# Patient Record
Sex: FEMALE | Race: BLACK OR AFRICAN AMERICAN | NOT HISPANIC OR LATINO | Employment: OTHER | URBAN - METROPOLITAN AREA
[De-identification: names, ages, dates, MRNs, and addresses within clinical notes are randomized per-mention and may not be internally consistent; named-entity substitution may affect disease eponyms.]

---

## 2017-01-29 ENCOUNTER — HOSPITAL ENCOUNTER (OUTPATIENT)
Facility: HOSPITAL | Age: 47
Discharge: HOME | End: 2017-01-30
Attending: EMERGENCY MEDICINE | Admitting: FAMILY MEDICINE
Payer: MEDICARE

## 2017-01-29 DIAGNOSIS — R10.84 GENERALIZED ABDOMINAL PAIN: ICD-10-CM

## 2017-01-29 DIAGNOSIS — R53.81 PHYSICAL DECONDITIONING: ICD-10-CM

## 2017-01-29 DIAGNOSIS — E87.1 HYPONATREMIA: Primary | ICD-10-CM

## 2017-01-29 LAB
ALBUMIN SERPL BCP-MCNC: 4.2 G/DL
ALP SERPL-CCNC: 60 U/L
ALT SERPL W/O P-5'-P-CCNC: 12 U/L
AMPHET+METHAMPHET UR QL: NEGATIVE
AMYLASE SERPL-CCNC: 206 U/L
ANION GAP SERPL CALC-SCNC: 11 MMOL/L
ANION GAP SERPL CALC-SCNC: 18 MMOL/L
ANISOCYTOSIS BLD QL SMEAR: SLIGHT
AST SERPL-CCNC: 27 U/L
B-HCG UR QL: NEGATIVE
BARBITURATES UR QL SCN>200 NG/ML: NEGATIVE
BASOPHILS # BLD AUTO: 0.01 K/UL
BASOPHILS # BLD AUTO: 0.02 K/UL
BASOPHILS NFR BLD: 0.3 %
BASOPHILS NFR BLD: 0.4 %
BENZODIAZ UR QL SCN>200 NG/ML: NEGATIVE
BILIRUB SERPL-MCNC: 0.9 MG/DL
BILIRUB UR QL STRIP: NEGATIVE
BUN SERPL-MCNC: 6 MG/DL
BUN SERPL-MCNC: 9 MG/DL
BZE UR QL SCN: NEGATIVE
CALCIUM SERPL-MCNC: 8.9 MG/DL
CALCIUM SERPL-MCNC: 9.7 MG/DL
CANNABINOIDS UR QL SCN: NEGATIVE
CHLORIDE SERPL-SCNC: 100 MMOL/L
CHLORIDE SERPL-SCNC: 85 MMOL/L
CLARITY UR: CLEAR
CO2 SERPL-SCNC: 17 MMOL/L
CO2 SERPL-SCNC: 20 MMOL/L
COLOR UR: YELLOW
CREAT SERPL-MCNC: 0.7 MG/DL
CREAT SERPL-MCNC: 0.8 MG/DL
CREAT UR-MCNC: 18.6 MG/DL
CTP QC/QA: YES
DIFFERENTIAL METHOD: ABNORMAL
DIFFERENTIAL METHOD: ABNORMAL
EOSINOPHIL # BLD AUTO: 0 K/UL
EOSINOPHIL # BLD AUTO: 0 K/UL
EOSINOPHIL NFR BLD: 0.4 %
EOSINOPHIL NFR BLD: 1 %
ERYTHROCYTE [DISTWIDTH] IN BLOOD BY AUTOMATED COUNT: 17.8 %
ERYTHROCYTE [DISTWIDTH] IN BLOOD BY AUTOMATED COUNT: 18 %
EST. GFR  (AFRICAN AMERICAN): >60 ML/MIN/1.73 M^2
EST. GFR  (AFRICAN AMERICAN): >60 ML/MIN/1.73 M^2
EST. GFR  (NON AFRICAN AMERICAN): >60 ML/MIN/1.73 M^2
EST. GFR  (NON AFRICAN AMERICAN): >60 ML/MIN/1.73 M^2
ETHANOL SERPL-MCNC: 154 MG/DL
FOLATE SERPL-MCNC: 15.9 NG/ML
GLUCOSE SERPL-MCNC: 53 MG/DL
GLUCOSE SERPL-MCNC: 59 MG/DL
GLUCOSE UR QL STRIP: NEGATIVE
HCT VFR BLD AUTO: 22.8 %
HCT VFR BLD AUTO: 24.4 %
HGB BLD-MCNC: 7.2 G/DL
HGB BLD-MCNC: 7.4 G/DL
HGB UR QL STRIP: NEGATIVE
HYPOCHROMIA BLD QL SMEAR: ABNORMAL
INR PPP: 0.9
KETONES UR QL STRIP: ABNORMAL
LEUKOCYTE ESTERASE UR QL STRIP: NEGATIVE
LIPASE SERPL-CCNC: 23 U/L
LYMPHOCYTES # BLD AUTO: 1.7 K/UL
LYMPHOCYTES # BLD AUTO: 1.9 K/UL
LYMPHOCYTES NFR BLD: 34.7 %
LYMPHOCYTES NFR BLD: 49.3 %
MAGNESIUM SERPL-MCNC: 2.1 MG/DL
MCH RBC QN AUTO: 20.4 PG
MCH RBC QN AUTO: 20.8 PG
MCHC RBC AUTO-ENTMCNC: 30.3 %
MCHC RBC AUTO-ENTMCNC: 31.6 %
MCV RBC AUTO: 66 FL
MCV RBC AUTO: 67 FL
METHADONE UR QL SCN>300 NG/ML: NEGATIVE
MONOCYTES # BLD AUTO: 0.4 K/UL
MONOCYTES # BLD AUTO: 0.5 K/UL
MONOCYTES NFR BLD: 11.3 %
MONOCYTES NFR BLD: 9.3 %
NEUTROPHILS # BLD AUTO: 1.5 K/UL
NEUTROPHILS # BLD AUTO: 2.7 K/UL
NEUTROPHILS NFR BLD: 38.1 %
NEUTROPHILS NFR BLD: 55.2 %
NITRITE UR QL STRIP: NEGATIVE
OB PNL STL: NEGATIVE
OPIATES UR QL SCN: NEGATIVE
PCP UR QL SCN>25 NG/ML: NEGATIVE
PH UR STRIP: 6 [PH] (ref 5–8)
PLATELET # BLD AUTO: 284 K/UL
PLATELET # BLD AUTO: 299 K/UL
PLATELET BLD QL SMEAR: ABNORMAL
PMV BLD AUTO: 8.7 FL
PMV BLD AUTO: 8.7 FL
POIKILOCYTOSIS BLD QL SMEAR: SLIGHT
POTASSIUM SERPL-SCNC: 4.2 MMOL/L
POTASSIUM SERPL-SCNC: 4.4 MMOL/L
PROT SERPL-MCNC: 7.5 G/DL
PROT UR QL STRIP: NEGATIVE
PROTHROMBIN TIME: 10.1 SEC
RBC # BLD AUTO: 3.46 M/UL
RBC # BLD AUTO: 3.62 M/UL
SODIUM SERPL-SCNC: 120 MMOL/L
SODIUM SERPL-SCNC: 131 MMOL/L
SP GR UR STRIP: <=1.005 (ref 1–1.03)
STOMATOCYTES BLD QL SMEAR: PRESENT
TARGETS BLD QL SMEAR: ABNORMAL
TOXICOLOGY INFORMATION: NORMAL
URN SPEC COLLECT METH UR: ABNORMAL
UROBILINOGEN UR STRIP-ACNC: NEGATIVE EU/DL
VIT B12 SERPL-MCNC: 574 PG/ML
WBC # BLD AUTO: 3.81 K/UL
WBC # BLD AUTO: 4.96 K/UL

## 2017-01-29 PROCEDURE — 63600175 PHARM REV CODE 636 W HCPCS: Performed by: FAMILY MEDICINE

## 2017-01-29 PROCEDURE — 25000003 PHARM REV CODE 250: Performed by: EMERGENCY MEDICINE

## 2017-01-29 PROCEDURE — 99285 EMERGENCY DEPT VISIT HI MDM: CPT | Mod: 25

## 2017-01-29 PROCEDURE — 80320 DRUG SCREEN QUANTALCOHOLS: CPT

## 2017-01-29 PROCEDURE — G0378 HOSPITAL OBSERVATION PER HR: HCPCS

## 2017-01-29 PROCEDURE — 96374 THER/PROPH/DIAG INJ IV PUSH: CPT

## 2017-01-29 PROCEDURE — 82272 OCCULT BLD FECES 1-3 TESTS: CPT

## 2017-01-29 PROCEDURE — 25000003 PHARM REV CODE 250: Performed by: FAMILY MEDICINE

## 2017-01-29 PROCEDURE — 80048 BASIC METABOLIC PNL TOTAL CA: CPT

## 2017-01-29 PROCEDURE — 82746 ASSAY OF FOLIC ACID SERUM: CPT

## 2017-01-29 PROCEDURE — 85610 PROTHROMBIN TIME: CPT

## 2017-01-29 PROCEDURE — 80053 COMPREHEN METABOLIC PANEL: CPT

## 2017-01-29 PROCEDURE — 85025 COMPLETE CBC W/AUTO DIFF WBC: CPT | Mod: 91

## 2017-01-29 PROCEDURE — 93005 ELECTROCARDIOGRAM TRACING: CPT

## 2017-01-29 PROCEDURE — 96376 TX/PRO/DX INJ SAME DRUG ADON: CPT

## 2017-01-29 PROCEDURE — 83735 ASSAY OF MAGNESIUM: CPT

## 2017-01-29 PROCEDURE — 63600175 PHARM REV CODE 636 W HCPCS: Performed by: EMERGENCY MEDICINE

## 2017-01-29 PROCEDURE — 36415 COLL VENOUS BLD VENIPUNCTURE: CPT

## 2017-01-29 PROCEDURE — 96361 HYDRATE IV INFUSION ADD-ON: CPT

## 2017-01-29 PROCEDURE — 82570 ASSAY OF URINE CREATININE: CPT

## 2017-01-29 PROCEDURE — 82150 ASSAY OF AMYLASE: CPT

## 2017-01-29 PROCEDURE — 82607 VITAMIN B-12: CPT

## 2017-01-29 PROCEDURE — 83690 ASSAY OF LIPASE: CPT

## 2017-01-29 PROCEDURE — 81003 URINALYSIS AUTO W/O SCOPE: CPT

## 2017-01-29 RX ORDER — DICYCLOMINE HYDROCHLORIDE 20 MG/1
20 TABLET ORAL 3 TIMES DAILY PRN
Status: DISCONTINUED | OUTPATIENT
Start: 2017-01-29 | End: 2017-01-30 | Stop reason: HOSPADM

## 2017-01-29 RX ORDER — SODIUM CHLORIDE 9 MG/ML
INJECTION, SOLUTION INTRAVENOUS CONTINUOUS
Status: DISCONTINUED | OUTPATIENT
Start: 2017-01-29 | End: 2017-01-30 | Stop reason: HOSPADM

## 2017-01-29 RX ORDER — MONTELUKAST SODIUM 10 MG/1
10 TABLET ORAL NIGHTLY
Status: DISCONTINUED | OUTPATIENT
Start: 2017-01-29 | End: 2017-01-30 | Stop reason: HOSPADM

## 2017-01-29 RX ORDER — LORAZEPAM 2 MG/ML
1 INJECTION INTRAMUSCULAR
Status: DISCONTINUED | OUTPATIENT
Start: 2017-01-29 | End: 2017-01-30

## 2017-01-29 RX ORDER — HYDROMORPHONE HYDROCHLORIDE 1 MG/ML
0.5 INJECTION, SOLUTION INTRAMUSCULAR; INTRAVENOUS; SUBCUTANEOUS
Status: COMPLETED | OUTPATIENT
Start: 2017-01-29 | End: 2017-01-29

## 2017-01-29 RX ORDER — ASPIRIN 325 MG/1
100 TABLET, FILM COATED ORAL 2 TIMES DAILY
Status: DISCONTINUED | OUTPATIENT
Start: 2017-01-29 | End: 2017-01-30 | Stop reason: HOSPADM

## 2017-01-29 RX ORDER — VENLAFAXINE HYDROCHLORIDE 75 MG/1
75 CAPSULE, EXTENDED RELEASE ORAL 2 TIMES DAILY
Status: DISCONTINUED | OUTPATIENT
Start: 2017-01-29 | End: 2017-01-29

## 2017-01-29 RX ORDER — HYDROMORPHONE HYDROCHLORIDE 1 MG/ML
0.5 INJECTION, SOLUTION INTRAMUSCULAR; INTRAVENOUS; SUBCUTANEOUS ONCE
Status: COMPLETED | OUTPATIENT
Start: 2017-01-29 | End: 2017-01-29

## 2017-01-29 RX ORDER — ONDANSETRON 4 MG/1
4 TABLET, ORALLY DISINTEGRATING ORAL EVERY 6 HOURS PRN
Status: DISCONTINUED | OUTPATIENT
Start: 2017-01-29 | End: 2017-01-30 | Stop reason: HOSPADM

## 2017-01-29 RX ORDER — FUROSEMIDE 40 MG/1
40 TABLET ORAL DAILY
Status: CANCELLED | OUTPATIENT
Start: 2017-01-29

## 2017-01-29 RX ORDER — ALPRAZOLAM 1 MG/1
1 TABLET ORAL DAILY PRN
Status: DISCONTINUED | OUTPATIENT
Start: 2017-01-29 | End: 2017-01-30

## 2017-01-29 RX ORDER — SODIUM CHLORIDE 9 MG/ML
1000 INJECTION, SOLUTION INTRAVENOUS
Status: COMPLETED | OUTPATIENT
Start: 2017-01-29 | End: 2017-01-29

## 2017-01-29 RX ORDER — CLONIDINE HYDROCHLORIDE 0.2 MG/1
0.2 TABLET ORAL NIGHTLY
Status: DISCONTINUED | OUTPATIENT
Start: 2017-01-29 | End: 2017-01-30 | Stop reason: HOSPADM

## 2017-01-29 RX ORDER — PANTOPRAZOLE SODIUM 40 MG/1
40 TABLET, DELAYED RELEASE ORAL DAILY
Status: DISCONTINUED | OUTPATIENT
Start: 2017-01-29 | End: 2017-01-30 | Stop reason: HOSPADM

## 2017-01-29 RX ORDER — LISINOPRIL 5 MG/1
10 TABLET ORAL EVERY MORNING
Status: DISCONTINUED | OUTPATIENT
Start: 2017-01-30 | End: 2017-01-30 | Stop reason: HOSPADM

## 2017-01-29 RX ORDER — QUETIAPINE FUMARATE 25 MG/1
100 TABLET, FILM COATED ORAL NIGHTLY
Status: DISCONTINUED | OUTPATIENT
Start: 2017-01-29 | End: 2017-01-30 | Stop reason: HOSPADM

## 2017-01-29 RX ORDER — HYDROCHLOROTHIAZIDE 25 MG/1
25 TABLET ORAL DAILY
Status: CANCELLED | OUTPATIENT
Start: 2017-01-29

## 2017-01-29 RX ORDER — DOCUSATE SODIUM 100 MG/1
100 CAPSULE, LIQUID FILLED ORAL 2 TIMES DAILY
Status: DISCONTINUED | OUTPATIENT
Start: 2017-01-29 | End: 2017-01-30 | Stop reason: HOSPADM

## 2017-01-29 RX ADMIN — SODIUM CHLORIDE 1000 ML: 0.9 INJECTION, SOLUTION INTRAVENOUS at 11:01

## 2017-01-29 RX ADMIN — ONDANSETRON 4 MG: 4 TABLET, ORALLY DISINTEGRATING ORAL at 06:01

## 2017-01-29 RX ADMIN — MONTELUKAST SODIUM 10 MG: 10 TABLET, FILM COATED ORAL at 08:01

## 2017-01-29 RX ADMIN — HYDROMORPHONE HYDROCHLORIDE 0.5 MG: 1 INJECTION, SOLUTION INTRAMUSCULAR; INTRAVENOUS; SUBCUTANEOUS at 05:01

## 2017-01-29 RX ADMIN — Medication 1 TABLET: at 05:01

## 2017-01-29 RX ADMIN — PAROXETINE HYDROCHLORIDE 30 MG: 20 TABLET, FILM COATED ORAL at 05:01

## 2017-01-29 RX ADMIN — DOCUSATE SODIUM 100 MG: 100 CAPSULE, LIQUID FILLED ORAL at 08:01

## 2017-01-29 RX ADMIN — CLONIDINE HYDROCHLORIDE 0.2 MG: 0.2 TABLET ORAL at 08:01

## 2017-01-29 RX ADMIN — QUETIAPINE FUMARATE 100 MG: 25 TABLET, FILM COATED ORAL at 08:01

## 2017-01-29 RX ADMIN — FOLIC ACID: 5 INJECTION, SOLUTION INTRAMUSCULAR; INTRAVENOUS; SUBCUTANEOUS at 05:01

## 2017-01-29 RX ADMIN — DICYCLOMINE HYDROCHLORIDE 20 MG: 20 TABLET ORAL at 08:01

## 2017-01-29 RX ADMIN — PANTOPRAZOLE SODIUM 40 MG: 40 TABLET, DELAYED RELEASE ORAL at 05:01

## 2017-01-29 RX ADMIN — HYDROMORPHONE HYDROCHLORIDE 0.5 MG: 1 INJECTION, SOLUTION INTRAMUSCULAR; INTRAVENOUS; SUBCUTANEOUS at 12:01

## 2017-01-29 RX ADMIN — SODIUM CHLORIDE: 0.9 INJECTION, SOLUTION INTRAVENOUS at 05:01

## 2017-01-29 RX ADMIN — HYDROMORPHONE HYDROCHLORIDE 0.5 MG: 1 INJECTION, SOLUTION INTRAMUSCULAR; INTRAVENOUS; SUBCUTANEOUS at 11:01

## 2017-01-29 RX ADMIN — SODIUM CHLORIDE 1000 ML: 0.9 INJECTION, SOLUTION INTRAVENOUS at 12:01

## 2017-01-29 NOTE — PLAN OF CARE
Problem: Patient Care Overview  Goal: Plan of Care Review  Outcome: Ongoing (interventions implemented as appropriate)  Plan of care reviewed with patient. Voices understanding. Fluids and banana bag infusing per orders. Psych consult placed today. Dr. Juarez to see patient. Pain 9/10 and dilaudid administered. Patient states pain is getting better after dilaudid. Will be monitored overnight.

## 2017-01-29 NOTE — ED PROVIDER NOTES
Encounter Date: 2017       History     Chief Complaint   Patient presents with    Abdominal Pain     Pt states stomach cramps started this morning, noted blood in stool, hx of bleeding ulcers, gastric bypass, ETOH abuse     Review of patient's allergies indicates:   Allergen Reactions    Penicillins Hives     HPI Comments: Patient is a 46-year-old female who complains of abdominal pain that began this morning.  She also noted blood in her stool this morning.  Patient has a long history of alcoholism and said she drank 4 drinks last night.  She has nausea but no vomiting.  No fever or chills.  She has no urinary complaints.    The history is provided by the patient.     Past Medical History   Diagnosis Date    Alcohol abuse 10/7/2015    Breast calcification, left      Pt states this has been worked up, she received a biopsy in the past to confirm calcifications were from scar tissue from when she had breast reduction surgery.      Chronic diastolic congestive heart failure     Encounter for blood transfusion     GERD (gastroesophageal reflux disease)     Glaucoma     Hypertension     Hyponatremia 10/2015     Na 109. attributed to polydipsia and alcohol abuse.  suffered a seizure in the ICU .    Insomnia     Malingering 2016    PTSD (post-traumatic stress disorder)      Past Medical History Pertinent Negatives   Diagnosis Date Noted    Diabetes mellitus 2016    Stroke 2016    Transfusion reaction 2016     Past Surgical History   Procedure Laterality Date    Gastric bypass   and     Bile fistula      Breast reduction  2003     section      Cholecystectomy  after      Family History   Problem Relation Age of Onset    Diabetes Mother     Lupus Mother     Diabetes Maternal Grandmother     Crohn's disease Cousin      Social History   Substance Use Topics    Smoking status: Current Every Day Smoker     Packs/day: 1.00     Years: 4.00     Types:  Cigarettes    Smokeless tobacco: Never Used    Alcohol use Yes      Comment: last night- vodka and cranberry juice     Review of Systems   Constitutional: Negative for chills and fever.   Respiratory: Negative for shortness of breath.    Gastrointestinal: Positive for abdominal pain, blood in stool and nausea. Negative for vomiting.   Neurological: Negative for syncope.   All other systems reviewed and are negative.      Physical Exam   Initial Vitals   BP Pulse Resp Temp SpO2   01/29/17 0901 01/29/17 0901 01/29/17 0901 -- 01/29/17 0901   152/94 80 20  100 %     Physical Exam    Nursing note and vitals reviewed.  Constitutional: She appears distressed.   HENT:   Head: Normocephalic and atraumatic.   Eyes: EOM are normal.   Neck: Normal range of motion. Neck supple.   Abdominal: Soft.   Diffuse tenderness with voluntary guarding.  No rebound.  Bowel sounds are normal.  Rectal : No hemorrhoids.  Scant brown stool, no gross blood.   Neurological: She is alert.         ED Course   Critical Care  Date/Time: 1/29/2017 2:31 PM  Performed by: ERICA SPRING  Authorized by: ERICA SPRING   Direct patient critical care time: 12 minutes  Ordering / reviewing critical care time: 12 minutes  Documentation critical care time: 12 minutes  Consulting other physicians critical care time: 3 minutes  Total critical care time (exclusive of procedural time) : 39 minutes        Labs Reviewed   CBC W/ AUTO DIFFERENTIAL - Abnormal; Notable for the following:        Result Value    RBC 3.62 (*)     Hemoglobin 7.4 (*)     Hematocrit 24.4 (*)     MCV 67 (*)     MCH 20.4 (*)     MCHC 30.3 (*)     RDW 17.8 (*)     MPV 8.7 (*)     All other components within normal limits   COMPREHENSIVE METABOLIC PANEL - Abnormal; Notable for the following:     Sodium 120 (*)     Chloride 85 (*)     CO2 17 (*)     Glucose 59 (*)     Anion Gap 18 (*)     All other components within normal limits   URINALYSIS - Abnormal; Notable for the following:      Specific Gravity, UA <=1.005 (*)     Ketones, UA Trace (*)     All other components within normal limits   AMYLASE - Abnormal; Notable for the following:     Amylase 206 (*)     All other components within normal limits   ALCOHOL,MEDICAL (ETHANOL) - Abnormal; Notable for the following:     Alcohol, Medical, Serum 154 (*)     All other components within normal limits   PROTIME-INR   DRUG SCREEN PANEL, URINE EMERGENCY   LIPASE   MAGNESIUM   OCCULT BLOOD X 1, STOOL                               ED Course     Clinical Impression:   The primary encounter diagnosis was Hyponatremia. Diagnoses of Generalized abdominal pain and Alcoholism /alcohol abuse were also pertinent to this visit.          Cruz Cline MD  01/29/17 [id number]

## 2017-01-29 NOTE — IP AVS SNAPSHOT
[facility]  [address]  [place] [address]  Phone: [phone]           Patient Discharge Instructions     Our goal is to set you up for success. This packet includes information on your condition, medications, and your home care. It will help you to care for yourself so you don't get sicker and need to go back to the hospital.     Please ask your nurse if you have any questions.        There are many details to remember when preparing to leave the hospital. Here is what you will need to do:    1. Take your medicine. If you are prescribed medications, review your Medication List in the following pages. You may have new medications to  at the pharmacy and others that you'll need to stop taking. Review the instructions for how and when to take your medications. Talk with your doctor or nurses if you are unsure of what to do.     2. Go to your follow-up appointments. Specific follow-up information is listed in the following pages. Your may be contacted by a transition nurse or clinical provider about future appointments. Be sure we have all of the phone numbers to reach you, if needed. Please contact your provider's office if you are unable to make an appointment.     3. Watch for warning signs. Your doctor or nurse will give you detailed warning signs to watch for and when to call for assistance. These instructions may also include educational information about your condition. If you experience any of warning signs to your health, call your doctor.               ** Verify the list of medication(s) below is accurate and up to date. Carry this with you in case of emergency. If your medications have changed, please notify your healthcare provider.             Medication List      START taking these medications        Additional Info                      disulfiram 250 mg tablet   Commonly known as:  ANTABUSE   Quantity:  30 tablet   Refills:  0   Dose:  250 mg    Last time this was given:   250 mg on 1/30/2017  9:25 AM   Instructions:  Take 1 tablet (250 mg total) by mouth once daily.     Begin Date    AM    Noon    PM    Bedtime       lorazepam 0.5 MG tablet   Commonly known as:  ATIVAN   Quantity:  5 tablet   Refills:  0   Dose:  0.5 mg    Start Date:  1/31/2017   Last time this was given:  1 mg on 1/30/2017  3:41 PM   Instructions:  Take 1 tablet (0.5 mg total) by mouth 2 (two) times daily. Today: take 2 tablets by mouth Tomorrow take 1 tablet every 12 hours Third day take 1 tablet by mouth     Begin Date    AM    Noon    PM    Bedtime         CHANGE how you take these medications        Additional Info                      ondansetron 8 MG Tbdl   Commonly known as:  ZOFRAN-ODT   Quantity:  60 tablet   Refills:  2   Dose:  8 mg   What changed:  reasons to take this    Last time this was given:  4 mg on 1/29/2017  6:00 PM   Instructions:  Take 1 tablet (8 mg total) by mouth every 8 (eight) hours as needed.     Begin Date    AM    Noon    PM    Bedtime       paroxetine 40 MG tablet   Commonly known as:  PAXIL   Quantity:  30 tablet   Refills:  0   Dose:  40 mg   What changed:    - medication strength  - how much to take    Last time this was given:  40 mg on 1/30/2017  9:24 AM   Instructions:  Take 1 tablet (40 mg total) by mouth once daily.     Begin Date    AM    Noon    PM    Bedtime       quetiapine 100 MG Tab   Commonly known as:  SEROQUEL   Quantity:  30 tablet   Refills:  0   Dose:  100 mg   What changed:    - when to take this  - reasons to take this    Last time this was given:  100 mg on 1/29/2017  8:51 PM   Instructions:  Take 1 tablet (100 mg total) by mouth every evening.     Begin Date    AM    Noon    PM    Bedtime       venlafaxine 75 MG 24 hr capsule   Commonly known as:  EFFEXOR-XR   Quantity:  7 capsule   Refills:  0   Dose:  75 mg   What changed:  when to take this    Last time this was given:  75 mg on 1/30/2017  9:24 AM   Instructions:  Take 1 capsule (75 mg total) by mouth once  daily.     Begin Date    AM    Noon    PM    Bedtime         CONTINUE taking these medications        Additional Info                      cloNIDine 0.2 MG tablet   Commonly known as:  CATAPRES   Quantity:  90 tablet   Refills:  0    Last time this was given:  0.2 mg on 1/29/2017  8:51 PM   Instructions:  TAKE 1 TABLET BY MOUTH EVERY EVENING     Begin Date    AM    Noon    PM    Bedtime       cyanocobalamin 1,000 mcg/mL injection   Refills:  0   Dose:  1000 mcg    Instructions:  Inject 1,000 mcg into the muscle every 28 days. On the 15th on the month     Begin Date    AM    Noon    PM    Bedtime       dicyclomine 20 mg tablet   Commonly known as:  BENTYL   Refills:  0   Dose:  20 mg    Last time this was given:  20 mg on 1/30/2017  6:15 AM   Instructions:  Take 20 mg by mouth 3 (three) times daily as needed (for abdominal cramps).     Begin Date    AM    Noon    PM    Bedtime       docusate sodium 100 MG capsule   Commonly known as:  COLACE   Refills:  0   Dose:  100 mg    Last time this was given:  100 mg on 1/30/2017  9:24 AM   Instructions:  Take 1 capsule (100 mg total) by mouth 2 (two) times daily.     Begin Date    AM    Noon    PM    Bedtime       DORZOLAMIDE-TIMOLOL (PF) OPHT   Refills:  0   Dose:  1 drop    Instructions:  Place 1 drop into both eyes every evening.     Begin Date    AM    Noon    PM    Bedtime       fluticasone 50 mcg/actuation nasal spray   Commonly known as:  FLONASE   Refills:  0   Dose:  1 spray    Instructions:  1 spray by Each Nare route once daily.     Begin Date    AM    Noon    PM    Bedtime       furosemide 40 MG tablet   Commonly known as:  LASIX   Refills:  0   Dose:  40 mg    Instructions:  Take 40 mg by mouth once daily.     Begin Date    AM    Noon    PM    Bedtime       latanoprost 0.005 % ophthalmic solution   Refills:  0   Dose:  1 drop    Instructions:  Place 1 drop into both eyes every evening.     Begin Date    AM    Noon    PM    Bedtime       lisinopril 10 MG tablet    Refills:  0   Dose:  10 mg    Instructions:  Take 10 mg by mouth every morning.     Begin Date    AM    Noon    PM    Bedtime       montelukast 10 mg tablet   Commonly known as:  SINGULAIR   Refills:  0   Dose:  10 mg    Last time this was given:  10 mg on 1/29/2017  8:51 PM   Instructions:  Take 10 mg by mouth every evening.     Begin Date    AM    Noon    PM    Bedtime       multivitamin tablet   Commonly known as:  THERAGRAN   Quantity:  30 tablet   Refills:  5   Dose:  1 tablet    Last time this was given:  1 tablet on 1/30/2017  9:24 AM   Instructions:  Take 1 tablet by mouth once daily.     Begin Date    AM    Noon    PM    Bedtime       omeprazole 40 MG capsule   Commonly known as:  PRILOSEC   Refills:  0   Dose:  40 mg    Instructions:  Take 40 mg by mouth once daily.     Begin Date    AM    Noon    PM    Bedtime       potassium chloride 10 MEQ Cpsr   Commonly known as:  MICRO-K   Refills:  0   Dose:  10 mEq    Instructions:  Take 10 mEq by mouth once daily.     Begin Date    AM    Noon    PM    Bedtime       promethazine 25 MG suppository   Commonly known as:  PHENERGAN   Refills:  0   Dose:  25 mg    Instructions:  Place 25 mg rectally every 6 (six) hours as needed for Nausea.     Begin Date    AM    Noon    PM    Bedtime       thiamine mononitrate (vit B1) 100 mg Tab   Quantity:  60 tablet   Refills:  0   Dose:  100 mg    Last time this was given:  100 mg on 1/30/2017 10:15 AM   Instructions:  Take 1 tablet (100 mg total) by mouth 2 (two) times daily.     Begin Date    AM    Noon    PM    Bedtime         STOP taking these medications     alprazolam 1 MG tablet   Commonly known as:  XANAX       hydrochlorothiazide 25 MG tablet   Commonly known as:  HYDRODIURIL       ibuprofen 800 MG tablet   Commonly known as:  ADVIL,MOTRIN            Where to Get Your Medications      You can get these medications from any pharmacy     Bring a paper prescription for each of these medications     disulfiram 250 mg tablet     lorazepam 0.5 MG tablet    paroxetine 40 MG tablet    quetiapine 100 MG Tab    venlafaxine 75 MG 24 hr capsule                  Please bring to all follow up appointments:    1. A copy of your discharge instructions.  2. All medicines you are currently taking in their original bottles.  3. Identification and insurance card.    Please arrive 15 minutes ahead of scheduled appointment time.    Please call 24 hours in advance if you must reschedule your appointment and/or time.        Follow-up Information     Follow up with Caridad Padilla MD On 2/7/2017.    Specialty:  Family Medicine    Why:  time: 10:10    Contact information:    [address] [facility]  [place] [address]  [phone]          Follow up with Walt Juarez MD On 2/23/2017.    Specialty:  Psychiatry    Why:  time:3:00    Contact information:    [address] [facility]  [place] [address]  [phone]          [facility] [place]    Specialty:  Home Health Services    Why:  Home Health    Contact information:    [address]  [address]  [place] [address]  [phone]        Referrals     Future Orders    Ambulatory referral to Home Health         Discharge Instructions     Future Orders    Activity as tolerated     Call MD for:  difficulty breathing or increased cough     Call MD for:  increased confusion or weakness     Call MD for:  persistent dizziness, light-headedness, or visual disturbances     Call MD for:  persistent nausea and vomiting or diarrhea     Call MD for:  redness, tenderness, or signs of infection (pain, swelling, redness, odor or green/yellow discharge around incision site)     Call MD for:  severe persistent headache     Call MD for:  severe uncontrolled pain     Call MD for:  temperature >100.4     Diet general     Scheduling Instructions:    Low salt    Questions:    Total calories:      Fat restriction, if any:      Protein restriction, if any:      Na restriction, if any:      Fluid  restriction:      Additional restrictions:          Discharge Instructions       OVARIAN CYST (ENGLISH) View Edit Remove  OVARIAN CYSTS, TREATMENT FOR (ENGLISH) View Edit Remove  OVARIAN CYSTS, UNDERSTANDING (ENGLISH) View Edit Remove  ALCOHOL ABUSE (ENGLISH) View Edit Remove  ALCOHOL WITHDRAWAL (ENGLISH) View Edit Remove  ALCOHOL WITHDRAWAL: WHAT TO EXPECT (ENGLISH) View Edit Remove  ALCOHOLISM: HOW TO BE PART OF THE SOLUTION (ENGLISH) View Edit Remove  ALCOHOLISM: RESOURCES FOR FAMILY AND FRIENDS (ENGLISH) View Edit Remove  ALCOHOLISM, UNDERSTANDING (ENGLISH) View Edit Remove  ALCOHOLISM: GETTING HELP (ENGLISH) View Edit Remove  DRIVING UNDER THE INFLUENCE (ENGLISH) View Edit Remove  MYTHS AND FACTS, ALCOHOLISM (ENGLISH) View Edit Remove  DISULFIRAM (ENGLISH) View Edit Remove  LORAZEPAM ORAL TABLET (ENGLISH) View Edit Remove          Discharge References/Attachments     OVARIAN CYST (ENGLISH)    OVARIAN CYSTS, TREATMENT FOR  (ENGLISH)    OVARIAN CYSTS, UNDERSTANDING (ENGLISH)    ALCOHOL ABUSE (ENGLISH)    ALCOHOL WITHDRAWAL (ENGLISH)    ALCOHOL WITHDRAWAL: WHAT TO EXPECT (ENGLISH)    ALCOHOLISM: HOW TO BE PART OF THE SOLUTION (ENGLISH)    ALCOHOLISM: RESOURCES FOR FAMILY AND FRIENDS (ENGLISH)    ALCOHOLISM, UNDERSTANDING (ENGLISH)    ALCOHOLISM: GETTING HELP (ENGLISH)    DRIVING UNDER THE INFLUENCE (ENGLISH)    MYTHS AND FACTS, ALCOHOLISM (ENGLISH)    DISULFIRAM (ENGLISH)    LORAZEPAM ORAL TABLET (ENGLISH)        Primary Diagnosis     Your primary diagnosis was:  Low Sodium Levels      Admission Information     Date & Time Provider Department [facility]    1/29/2017  8:59 AM Jarrett Walker MD [facility] [id number]      Care Providers     Provider Role Specialty Primary office phone    Jarrett Walker MD Attending Provider Family Medicine [phone]    Jarrett Walker MD Team Attending  Family Medicine  [phone]    Walt Juarez MD Consulting Physician  Psychiatry [phone]      Your  "Vitals Were     BP Pulse Temp Resp Height Weight    137/61 56 98.2 °F (36.8 °C) (Oral) 18 5' 5" (1.651 m) 72.6 kg (160 lb)    SpO2 BMI             97% 26.63 kg/m2         Recent Lab Values        6/23/2015                           7:58 AM           A1C 4.5                       Pending Labs     Order Current Status    CBC with Automated Differential In process      Allergies as of 1/30/2017        Reactions    Penicillins Hives      [facility]     [facility] Nurse Care Line - 24/7 Assistance  Unless otherwise directed by your provider, please contact [facility], our nurse care line that is available for 24/7 assistance.     Registered nurses in the [facility] provide clinical advisement, health education, appointment booking, and other advisory services.  Call for this free service at [phone].        Advance Directives     An advance directive is a document which, in the event you are no longer able to make decisions for yourself, tells your healthcare team what kind of treatment you do or do not want to receive, or who you would like to make those decisions for you.  If you do not currently have an advance directive, [facility] encourages you to create one.  For more information call:  [phone]), [phone] ([phone]),  or log on to [email or web address].        Smoking Cessation     If you would like to quit smoking:   You may be eligible for free services if you are a [place] resident and started smoking cigarettes before September 1, 1988.  Call the Smoking Cessation [employer] (SCT) toll free at (377) 229-6903 or (266) 073-4612.   Call 7-495-QUIT-NOW if you do not meet the above criteria.            Language Assistance Services     ATTENTION: Language assistance services are available, free of charge. Please call [phone].      ATENCIÓN: Si habla español, tiene a caba disposición servicios gratuitos de asistencia lingüística. Llame al " [phone].     [facility] Ý: N?u b?n nói Ti?ng Vi?t, có các d?ch v? h? tr? ngôn ng? mi?n phí dành cho b?n. G?i s? [phone].        Heart Failure Education       Heart Failure: Being Active  You have a condition called heart failure. Being active doesnt mean that you have to wear yourself out. Even a little movement each day helps to strengthen your heart. If you cant get out to exercise, you can do simple stretching and strengthening exercises at home. These are good ways to keep you well-conditioned and prevent you and your heart from becoming excessively weak.    Ideas to get you started  · Add a little movement to things you do now. Walk to mail letters. Park your car at the far end of the parking lot and walk to the store. Walk up a flight of stairs instead of taking the elevator.  · Choose activities you enjoy. You might walk, swim, or ride an exercise bike. Things like gardening and washing the car count, too. Other possibilities include: washing dishes, walking the dog, walking around the mall, and doing aerobic activities with friends.  · Join a group exercise program at a [facility], a senior center, or a community center. Or look into a hospital cardiac rehabilitation program. Ask your doctor if you qualify.  Tips to keep you going  · Get up and get dressed each day. Go to a coffee shop and read a newspaper or go somewhere that you'll be in the presence of other active people. Youll feel more like being active.  · Make a plan. Choose one or more activities that you enjoy and that you can easily do. Then plan to do at least one each day. You might write your plan on a calendar.  · Go with a friend or a group if you like company. This can help you feel supported and stay motivated, too.  · Plan social events that you enjoy. This will keep you mentally engaged as well as physically motivated to do things you find pleasure in.  For your safety  · Talk with your healthcare provider before starting an  exercise program.  · Exercise indoors when its too hot or too cold outside, or when the air quality is poor. Try walking at a shopping mall.  · Wear socks and sturdy shoes to maintain your balance and prevent falls.  · Start slowly. Do a few minutes several times a day at first. Increase your time and speed little by little.  · Stop and rest whenever you feel tired or get short of breath.  · Dont push yourself on days when you dont feel well.  © 5687-3998 [employer]. [address] [place] [address]. All rights reserved. This information is not intended as a substitute for professional medical care. Always follow your healthcare professional's instructions.              Heart Failure: Evaluating Your Heart  You have a condition called heart failure. To evaluate your condition, your doctor will examine you, ask questions, and do some tests. Along with looking for signs of heart failure, the doctor looks for any other health problems that may have led to heart failure. The results of your evaluation will help your doctor form a treatment plan.  Health history and physical exam  Your visit will start with a health history. Tell the doctor about any symptoms youve noticed and about all medicines you take. Then youll have a physical exam. This includes listening to your heartbeat and breathing. Youll also be checked for swelling (edema) in your legs and neck. When you have fluid buildup or fluid in the lungs, it may be called congestive heart failure.  Diagnosing heart failure     During an echocardiogram, sound waves bounce off the heart. These are converted into a picture on the screen.   The following may be done to help your doctor form a diagnosis:  · X-rays show the size and shape of your heart. These pictures can also show fluid in your lungs.  · An electrocardiogram (ECG or EKG) shows the pattern of your heartbeat. Small pads (electrodes) are placed on your chest, arms, and legs.  Wires connect the pads to the ECG machine, which records your hearts electrical signals. This can give the doctor information about heart function.  · An echocardiogram uses ultrasound waves to show the structure and movement of your heart muscle. This shows how well the heart pumps. It also shows the thickness of the heart walls, and if the heart is enlarged. It is one of the most useful, non-invasive tests as it provides information about the heart's general function. This helps your doctor make treatment decisions.  · Lab tests evaluate small amounts of blood or urine for signs of problems. A BNP lab test can help diagnose and evaluate heart failure. BNP stands for B-type natriuretic peptide. The ventricles secrete more BNP when heart failure worsens. Lab tests can also provide information about metabolic dysfunction or heart dysfunction.  Your treatment plan  Based on the results of your evaluation and tests, your doctor will develop a treatment plan. This plan is designed to relieve some of your heart failure symptoms and help make you more comfortable. Your treatment plan may include:  · Medicine to help your heart work better and improve your quality of life  · Changes in what you eat and drink to help prevent fluid from backing up in your body  · Daily monitoring of your weight and heart failure symptoms to see how well your treatment plan is working  · Exercise to help you stay healthy  · Help with quitting smoking  · Emotional and psychological support to help adjust to the changes  · Referrals to other specialists to make sure you are being treated comprehensively  © 4607-3317 [employer]. [address] [place] [address], [place] [address]. All rights reserved. This information is not intended as a substitute for professional medical care. Always follow your healthcare professional's instructions.              Heart Failure: Making Changes to Your Diet  You have a condition called heart failure.  When you have heart failure, excess fluid is more likely to build up in your body because your heart isn't working well. This makes the heart work harder to pump blood. Fluid buildup causes symptoms such as shortness of breath and swelling (edema). This is often referred to as congestive heart failure or CHF. Controlling the amount of salt (sodium) you eat may help stop fluid from building up. Your doctor may also tell you to reduce the amount of fluid you drink.  Reading food labels    Your healthcare provider will tell you how much sodium you can eat each day. Read food labels to keep track. Keep in mind that certain foods are high in salt. These include canned, frozen, and processed foods. Check the amount of sodium in each serving. Watch out for high-sodium ingredients. These include MSG (monosodium glutamate), baking soda, and sodium phosphate.   Eating less salt  Give yourself time to get used to eating less salt. It may take a little while. Here are some tips to help:  · Take the saltshaker off the table. Replace it with salt-free herb mixes and spices.  · Eat fresh or plain frozen vegetables. These have much less salt than canned vegetables.  · Choose low-sodium snacks like sodium-free pretzels, crackers, or air-popped popcorn.  · Dont add salt to your food when youre cooking. Instead, season your foods with pepper, lemon, garlic, or onion.  · When you eat out, ask that your food be cooked without added salt.  · Avoid eating fried foods as these often have a great deal of salt.  If youre told to limit fluids  You may need to limit how much fluid you have to help prevent swelling. This includes anything that is liquid at room temperature, such as ice cream and soup. If your doctor tells you to limit fluid, try these tips:  · Measure drinks in a measuring cup before you drink them. This will help you meet daily goals.  · Chill drinks to make them more refreshing.  · Suck on frozen lemon wedges to quench  thirst.  · Only drink when youre thirsty.  · Chew sugarless gum or suck on hard candy to keep your mouth moist.  · Weigh yourself daily to know if your body's fluid content is rising.  My sodium goal  Your healthcare provider may give you a sodium goal to meet each day. This includes sodium found in food as well as salt that you add. My goal is to eat no more than ___________ mg of sodium per day.     When to call your doctor  Call your doctor right away if you have any symptoms of worsening heart failure. These can include:  · Sudden weight gain  · Increased swelling of your legs or ankles  · Trouble breathing when youre resting or at night  · Increase in the number of pillows you have to sleep on  · Chest pain, pressure, discomfort, or pain in the jaw, neck, or back   © 7470-7310 [employer]. [address] [place] [address]. All rights reserved. This information is not intended as a substitute for professional medical care. Always follow your healthcare professional's instructions.              Heart Failure: Medicines to Help Your Heart    You have a condition called heart failure (also known as congestive heart failure, or CHF). Your doctor will likely prescribe medicines for heart failure and any underlying health problems you have. Most heart failure patients take one or more types of medicinen. Your healthcare provider will work to find the combination of medicines that works best for you.  Heart failure medicines  Here are the most common heart failure medicines:  · ACE inhibitors lower blood pressure and decrease strain on the heart. This makes it easier for the heart to pump. Angiotensin receptor blockers have similar effects. These are prescribed for some patients instead of ACE inhibitors.  · Beta-blockers relieve stress on the heart. They also improve symptoms. They may also improve the heart's pumping action over time.  · Diuretics (also called water pills) help rid your  body of excess water. This can help rid your body of swelling (edema). Having less fluid to pump means your heart doesnt have to work as hard. Some diuretics make your body lose a mineral called potassium. Your doctor will tell you if you need to take supplements or eat more foods high in potassium.  · Digoxin helps your heart pump with more strength. This helps your heart pump more blood with each beat. So, more oxygen-rich blood travels to the rest of the body.  · Aldosterone antagonists help alter hormones and decrease strain on the heart.  · Hydralazine and nitrates are two separate medicines used together to treat heart failure. They may come in one combination pill. They lower blood pressure and decrease how hard the heart has to pump.  Medicines for related conditions  Controlling other heart problems helps keep heart failure under control, too. Depending on other heart problems you have, medicines may be prescribed to:  · Lower blood pressure (antihypertensives).  · Lower cholesterol levels (statins).  · Prevent blood clots (anticoagulants or aspirin).  · Keep the heartbeat steady (antiarrhythmics).  © 2609-4309 [employer]. [address] [place] [address]. All rights reserved. This information is not intended as a substitute for professional medical care. Always follow your healthcare professional's instructions.              Heart Failure: Procedures That May Help    The heart is a muscle that pumps oxygen-rich blood to all parts of the body. When you have heart failure, the heart is not able to pump as well as it should. Blood and fluid may back up into the lungs (congestive heart failure), and some parts of the body dont get enough oxygen-rich blood to work normally. These problems lead to the symptoms of heart failure.     Certain procedures may help the heart pump better in some cases of heart failure. Some procedures are done to treat health problems that may have caused  the heart failure such as coronary artery disease or heart rhythm problems. For more serious heart failure, other options are available.  Treating artery and valve problems  If you have coronary artery disease or valve disease, procedures may be done to improve blood flow. This helps the heart pump better, which can improve heart failure symptoms. First, your doctor may do a cardiac catheterization to help detect clogged blood vessels or valve damage. During this procedure, a  thin tube (catheter) in inserted into a blood vessel and guided to the heart. There a dye is injected and a special type of X-ray (angiogram) is taken of the blood vessels. Procedures to open a blocked artery or fix damaged valves can also be done using catheterization.  · Angioplasty uses a balloon-tipped instrument at the end of the catheter. The balloon is inflated to widen the narrowed artery. In many cases, a stent is expanded to further support the narrowed artery. A stent is a metal mesh tube.  · Valve surgery repairs or replacement of faulty valves can also be done during catheterization so blood can flow properly through the chambers of the heart.  Bypass surgery is another option to help treat blocked arteries. It uses a healthy blood vessel from elsewhere in the body. The healthy blood vessel is attached above and below the blocked area so that blood can flow around the blocked artery.  Treating heart rhythm problems  A device may be placed in the chest to help a weak heart maintain a healthy, heartbeat so the heart can pump more effectively:  · Pacemaker. A pacemaker is an implanted device that regulates your heartbeat electronically. It monitors your heart's rhythm and generates a painless electric impulse that helps the heart beat in a regular rhythm. A pacemaker is programmed to meet your specific heart rhythm needs.  · Biventricular pacing/cardiac resynchronization therapy. A type of pacemaker that paces both pumping chambers  of the heart at the same time to coordinate contractions and to improve the heart's function. Some people with heart failure are candidates for this therapy.  · Implantable cardioverter defibrillator. A device similar to a pacemaker that senses when the heart is beating too fast and delivers an electrical shock to convert the fast rhythm to a normal rhythm. This can be a life saving device.  In severe cases  In more serious cases of heart failure when other treatments no longer work, other options may include:  · Ventricular assist devices (VADs). These are mechanical devices used to take over the pumping function for one or both of the heart's ventricles, or pumping chambers. A VAD may be necessary when heart failure progresses to the point that medicines and other treatments no longer help. In some cases, a VAD may be used as a bridge to transplant.  · Heart transplant. This is replacing the diseased heart with a healthy one from a donor. This is an option for a few people who are very sick. A heart transplant is very serious and not an option for all patients. Your doctor can tell you more.  © 7580-4591 [employer]. [address] [place] [address]. All rights reserved. This information is not intended as a substitute for professional medical care. Always follow your healthcare professional's instructions.              Heart Failure: Tracking Your Weight  You have a condition called heart failure. When you have heart failure, a sudden weight gain or a steady rise in weight is a warning sign that your body is retaining too much water and salt. This could mean your heart failure is getting worse. If left untreated, it can cause problems for your lungs and result in shortness of breath. Weighing yourself each day is the best way to know if youre retaining water. If your weight goes up quickly, call your doctor. You will be given instructions on how to get rid of the excess water. You will  likely need medicines and to avoid salt. This will help your heart work better.  Call your doctor if you gain more than 2 pounds in 1 day, more than 5 pounds in 1 week, or whatever weight gain you were told to report by your doctor. This is often a sign of worsening heart failure and needs to be evaluated and treated. Your doctor will tell you what to do next.   Tips for weighing yourself    · Weigh yourself at the same time each morning, wearing the same clothes. Weigh yourself after urinating and before eating.  · Use the same scale each day. Make sure the numbers are easy to read. Put the scale on a flat, hard surface -- not on a rug or carpet.  · Do not stop weighing yourself. If you forget one day, weigh again the next morning.  How to use your weight chart  · Keep your weight chart near the scale. Write your weight on the chart as soon as you get off the scale.  · Fill in the month and the start date on the chart. Then write down your weight each day. Your chart will look like this:    · If you miss a day, leave the space blank. Weigh yourself the next day and write your weight in the next space.  · Take your weight chart with you when you go to see your doctor.  © 5846-1965 The "[employer]. [address] [place] [address], [place] [address]. All rights reserved. This information is not intended as a substitute for professional medical care. Always follow your healthcare professional's instructions.              Heart Failure: Warning Signs of a Flare-Up  You have a condition called heart failure. Once you have heart failure, flare-ups can happen. Below are signs that can mean your heart failure is getting worse. If you notice any of these warning signs, call your healthcare provider.  Swelling    · Your feet, ankles, or lower legs get puffier.  · You notice skin changes on your lower legs.  · Your shoes feel too tight.  · Your clothes are tighter in the waist.  · You have trouble getting rings on or off your  fingers.  Shortness of breath  · You have to breathe harder even when youre doing your normal activities or when youre resting.  · You are short of breath walking up stairs or even short distances.  · You wake up at night short of breath or coughing.  · You need to use more pillows or sit up to sleep.  · You wake up tired or restless.  Other warning signs  · You feel weaker, dizzy, or more tired.  · You have chest pain or changes in your heartbeat.  · You have a cough that wont go away.  · You cant remember things or dont feel like eating.  Tracking your weight  Gaining weight is often the first warning sign that heart failure is getting worse. Gaining even a few pounds can be a sign that your body is retaining excess water and salt. Weighing yourself each day in the morning after you urinate and before you eat, is the best way to know if you're retaining water. Get a scale that is easy to read and make sure you wear the same clothes and use the same scale every time you weigh. Your healthcare provider will show you how to track your weight. Call your doctor if you gain more than 2 pounds in 1 day, 5 pounds in 1 week, or whatever weight gain you were told to report by your doctor. This is often a sign of worsening heart failure and needs to be evaluated and treated before it compromises your breathing. Your doctor will tell you what to do next.    © 3982-7086 [employer]. [address] [place] [address], [place] [address]. All rights reserved. This information is not intended as a substitute for professional medical care. Always follow your healthcare professional's instructions.              [facility] Sign-Up     Activating your [facility] account is as easy as 1-2-3!     1) Visit [email or web address], select Sign Up Now, enter this activation code and your date of birth, then select Next.  [email or web address]  Expires: 3/16/2017 10:42 AM      2) Create a username and password to use when you visit [facility] in  the future and select a security question in case you lose your password and select Next.    3) Enter your e-mail address and click Sign Up!    Additional Information  If you have questions, please e-mail [email or web address] or call [phone] to talk to our [facility] staff. Remember, [facility] is NOT to be used for urgent needs. For medical emergencies, dial 911.          [facility] complies with applicable Federal civil rights laws and does not discriminate on the basis of race, color, national origin, age, disability, or sex.

## 2017-01-29 NOTE — ED TRIAGE NOTES
47 Y/O F woth CC of Abdominal pain 9/10. Pt states she is an alcoholic and last night had 4 vodka and cranberries. Pt states this morning has been having constant abdominal cramping to lower quadrants. States noticed bright red blood in stool, has hx of gastric ulcers and bypass. Currently nauseous without vomiting. Believes she is dehydrated. Also states too weak to walk. No other complaints verbalized. NAD noted.

## 2017-01-29 NOTE — H&P
"[facility]  History & Physical    SUBJECTIVE:     Chief Complaint/Reason for Admission: Hyponatremia    History of Present Illness:  Patient is a 46 year old female with a PMHx of alcholism, HTN, GERD, gastric bypass, anxiety/dpression and PTSD here with hyponatremia.  Patient presented to the ED after complaining of 9/10 dull abdominal pain that started suddenly.  She reports drinking 4 vodka cranberries overnight.  States she got worried when she noted bright red blood in her stool this AM and presented to the ED.  Pain currently is localized to her periumbilical and suprapubic region.  States she has intermittent nausea, but has not had any episodes of emesis.  Reports feeling fatigued and weak, and states "I feel dehydrated".  She currently denies any headaches/CP/SOB.    Per chart check patient with several admissions to the hospital with hyponatremia.  Has had EGD in the past with last EGD in 2016.  Patient was noted to have bleeding from persistent anastomotic ulcer.  FOBT in ED is negative.    Review of patient's allergies indicates:   Allergen Reactions    Penicillins Hives       Past Medical History   Diagnosis Date    Alcohol abuse 10/7/2015    Breast calcification, left      Pt states this has been worked up, she received a biopsy in the past to confirm calcifications were from scar tissue from when she had breast reduction surgery.      Chronic diastolic congestive heart failure     Encounter for blood transfusion     GERD (gastroesophageal reflux disease)     Glaucoma     Hypertension     Hyponatremia 10/2015     Na 109. attributed to polydipsia and alcohol abuse.  suffered a seizure in the ICU .    Insomnia     Malingering 2016    PTSD (post-traumatic stress disorder)      Past Surgical History   Procedure Laterality Date    Gastric bypass   and     Bile fistula      Breast reduction       section      Cholecystectomy  after  "     Family History   Problem Relation Age of Onset    Diabetes Mother     Lupus Mother     Diabetes Maternal Grandmother     Crohn's disease Cousin      Social History   Substance Use Topics    Smoking status: Current Every Day Smoker     Packs/day: 1.00     Years: 4.00     Types: Cigarettes    Smokeless tobacco: Never Used    Alcohol use Yes      Comment: last night- vodka and cranberry juice        Review of Systems:  Constitutional: no fever or chills or activity change  Eyes: no visual changes  ENT: no nasal congestion or sore throat  Respiratory: no cough or shortness of breath  Cardiovascular: no chest pain or palpitations  Gastrointestinal: positive for nausea/abdominal pain, bright red blood in stool  Genitourinary: no hematuria or dysuria  Musculoskeletal: no arthralgias or myalgias  Neurological: no seizures or tremors  Behavioral/Psych: no auditory or visual hallucinations, +depression    OBJECTIVE:     Vital Signs (Most Recent):  Pulse: 74 (01/29/17 1352)  Resp: 13 (01/29/17 1352)  BP: 100/75 (01/29/17 1352)  SpO2: 100 % (01/29/17 1352)    Physical Exam:  General: well developed, well nourished no acute distress noted, smells of alcohol  Eyes: conjunctivae/corneas clear. PERRL..  HENT: Head:normocephalic, atraumatic. Nose: Nares normal. Septum midline. Mucosa normal. No drainage or sinus tenderness., no discharge. Neck: supple, symmetrical, trachea midline, no JVD and thyroid not enlarged, symmetric, no tenderness/mass/nodules  Lungs:  clear to auscultation bilaterally and normal respiratory effort  Cardiovascular: Heart: regular rate and rhythm, S1, S2 normal, no murmur, click, rub or gallop. Chest Wall: no tenderness. Extremities: no cyanosis or edema, or clubbing. Pulses: 2+ and symmetric.  Abdomen: soft, tender to palpation periumbilical and suprapubic region  Musculoskeletal:no edema or swelling noted  Neurologic: Normal strength and tone. No focal numbness or weakness  Psych/Behavioral:   Flat affect tearful on exam    Laboratory:  CBC:     Recent Labs  Lab 01/29/17  1002   WBC 4.96   RBC 3.62*   HGB 7.4*   HCT 24.4*      MCV 67*   MCH 20.4*   MCHC 30.3*     CMP:     Recent Labs  Lab 01/29/17  1002   GLU 59*   CALCIUM 9.7   ALBUMIN 4.2   PROT 7.5   *   K 4.2   CO2 17*   CL 85*   BUN 9   CREATININE 0.8   ALKPHOS 60   ALT 12   AST 27   BILITOT 0.9       Recent Labs  Lab 01/29/17  1002   COLORU Yellow   SPECGRAV <=1.005*   PHUR 6.0   PROTEINUA Negative   NITRITE Negative   LEUKOCYTESUR Negative   UROBILINOGEN Negative       ASSESSMENT/PLAN:   Patient is a 46 year old female with a PMHx of alcholism, HTN, GERD, gastric bypass, anxiety/dpression and PTSD here with hyponatremia.    Plan: Admit to [facility]-Dr. Walker    Hyponatremia:  -per chart check patient has multiple hospitalizations with low sodium levels, history of seizing with levels as low as 109 in the past   -pt alert and oriented without neurological deficits or complaints  -Na+: 120 in the ED, s/p I.25 L IVF  -continue IVFs NS at 100cc/hr    ?Hematochezia:  -pt reported bright red blood in stool this AM  -FOBT in ED negative  -has a history of bleeding anastomotic ulcers  -H/H: 7.4/24.4 on admit (baseline: 8/25).  -pt required transfusion in past for bleeding ulcers  -CBC q6 hours  -NPO at this time  -Pantoprazole 40 mg    HTN:  -pt reported not taking her medications 2/2 to feeling weak  -restarted patient's home meds: Clonidine 0.2mg, Lisinopril 10 mg    HFpEF:  -Diastolic dysfunction  -pt on home dose lasix, will restart once clinically improved    Abdominal Pain:  -pt with history of gastric bypass surgery and bleeding anastomotic ulcers  -takes home meds: Bentyl will restart and monitor pain     Anxiety/Depression/PTSD:  -patient tearful on exam  -would like to seek help to quiet drinking  -restarted home medications: Xanax 1 mg qdaily PRN, paroetine 30mg, Seroquel 100 mg, Venlafaxine  -Psych consulted: recs  appreciated    Alcoholism:  -pt with longstanding history of drinking  -reports 3-5 drinks every other night  -several hospital admissions per chart check  -last drink yesterday 4 drinks of vodka cranberry  -Alcohol   -CIIWA score  -banana bag, thiamine, folate  -f/u B12, folate levels  -neuro checks  -aspiration/fall precuations  -NPO at this time  -Ativan 1 mg PRN  -PT/OT/ST  -Patient interested in quitting, Dr. Juarez consulted    PPx:  -Pantropazole  -SCDs    Dispo: monitor CBC q6.    Patient discussed with staff.    Carmen Lebron MD  [facility],  2  1/29/2017  2:11 PM

## 2017-01-30 VITALS
TEMPERATURE: 98 F | HEART RATE: 56 BPM | HEIGHT: 65 IN | OXYGEN SATURATION: 97 % | DIASTOLIC BLOOD PRESSURE: 61 MMHG | WEIGHT: 160 LBS | BODY MASS INDEX: 26.66 KG/M2 | RESPIRATION RATE: 18 BRPM | SYSTOLIC BLOOD PRESSURE: 137 MMHG

## 2017-01-30 LAB
ALBUMIN SERPL BCP-MCNC: 3.3 G/DL
ALP SERPL-CCNC: 48 U/L
ALT SERPL W/O P-5'-P-CCNC: 13 U/L
ANION GAP SERPL CALC-SCNC: 12 MMOL/L
ANION GAP SERPL CALC-SCNC: 13 MMOL/L
ANION GAP SERPL CALC-SCNC: 14 MMOL/L
ANION GAP SERPL CALC-SCNC: 9 MMOL/L
ANISOCYTOSIS BLD QL SMEAR: SLIGHT
AST SERPL-CCNC: 28 U/L
BASOPHILS # BLD AUTO: 0 K/UL
BASOPHILS # BLD AUTO: 0.01 K/UL
BASOPHILS NFR BLD: 0 %
BASOPHILS NFR BLD: 0.3 %
BASOPHILS NFR BLD: 0.3 %
BASOPHILS NFR BLD: 0.4 %
BILIRUB SERPL-MCNC: 0.9 MG/DL
BUN SERPL-MCNC: 6 MG/DL
BUN SERPL-MCNC: 6 MG/DL
BUN SERPL-MCNC: 7 MG/DL
BUN SERPL-MCNC: 7 MG/DL
BURR CELLS BLD QL SMEAR: ABNORMAL
BURR CELLS BLD QL SMEAR: ABNORMAL
CALCIUM SERPL-MCNC: 8.4 MG/DL
CALCIUM SERPL-MCNC: 8.6 MG/DL
CALCIUM SERPL-MCNC: 8.7 MG/DL
CALCIUM SERPL-MCNC: 8.7 MG/DL
CHLORIDE SERPL-SCNC: 103 MMOL/L
CHLORIDE SERPL-SCNC: 105 MMOL/L
CHLORIDE SERPL-SCNC: 105 MMOL/L
CHLORIDE SERPL-SCNC: 106 MMOL/L
CO2 SERPL-SCNC: 17 MMOL/L
CO2 SERPL-SCNC: 21 MMOL/L
CREAT SERPL-MCNC: 0.7 MG/DL
DACRYOCYTES BLD QL SMEAR: ABNORMAL
DACRYOCYTES BLD QL SMEAR: ABNORMAL
DIFFERENTIAL METHOD: ABNORMAL
EOSINOPHIL # BLD AUTO: 0 K/UL
EOSINOPHIL NFR BLD: 0.7 %
EOSINOPHIL NFR BLD: 0.8 %
EOSINOPHIL NFR BLD: 0.9 %
EOSINOPHIL NFR BLD: 1 %
ERYTHROCYTE [DISTWIDTH] IN BLOOD BY AUTOMATED COUNT: 17.9 %
ERYTHROCYTE [DISTWIDTH] IN BLOOD BY AUTOMATED COUNT: 18.1 %
ERYTHROCYTE [DISTWIDTH] IN BLOOD BY AUTOMATED COUNT: 18.1 %
ERYTHROCYTE [DISTWIDTH] IN BLOOD BY AUTOMATED COUNT: 18.5 %
EST. GFR  (AFRICAN AMERICAN): >60 ML/MIN/1.73 M^2
EST. GFR  (NON AFRICAN AMERICAN): >60 ML/MIN/1.73 M^2
GLUCOSE SERPL-MCNC: 51 MG/DL
GLUCOSE SERPL-MCNC: 51 MG/DL
GLUCOSE SERPL-MCNC: 52 MG/DL
GLUCOSE SERPL-MCNC: 54 MG/DL
HCT VFR BLD AUTO: 23.2 %
HCT VFR BLD AUTO: 23.2 %
HCT VFR BLD AUTO: 24 %
HCT VFR BLD AUTO: 26.5 %
HGB BLD-MCNC: 6.9 G/DL
HGB BLD-MCNC: 6.9 G/DL
HGB BLD-MCNC: 7 G/DL
HGB BLD-MCNC: 7.8 G/DL
HYPOCHROMIA BLD QL SMEAR: ABNORMAL
LYMPHOCYTES # BLD AUTO: 0.8 K/UL
LYMPHOCYTES # BLD AUTO: 1 K/UL
LYMPHOCYTES # BLD AUTO: 1 K/UL
LYMPHOCYTES # BLD AUTO: 1.4 K/UL
LYMPHOCYTES NFR BLD: 27.8 %
LYMPHOCYTES NFR BLD: 29.1 %
LYMPHOCYTES NFR BLD: 36.7 %
LYMPHOCYTES NFR BLD: 45.5 %
MAGNESIUM SERPL-MCNC: 1.9 MG/DL
MCH RBC QN AUTO: 20.2 PG
MCH RBC QN AUTO: 20.3 PG
MCH RBC QN AUTO: 20.4 PG
MCH RBC QN AUTO: 20.7 PG
MCHC RBC AUTO-ENTMCNC: 29.2 %
MCHC RBC AUTO-ENTMCNC: 29.4 %
MCHC RBC AUTO-ENTMCNC: 29.7 %
MCHC RBC AUTO-ENTMCNC: 29.7 %
MCV RBC AUTO: 68 FL
MCV RBC AUTO: 69 FL
MCV RBC AUTO: 69 FL
MCV RBC AUTO: 70 FL
MONOCYTES # BLD AUTO: 0.2 K/UL
MONOCYTES # BLD AUTO: 0.3 K/UL
MONOCYTES NFR BLD: 10 %
MONOCYTES NFR BLD: 10.3 %
MONOCYTES NFR BLD: 5.7 %
MONOCYTES NFR BLD: 9 %
NEUTROPHILS # BLD AUTO: 1.3 K/UL
NEUTROPHILS # BLD AUTO: 1.4 K/UL
NEUTROPHILS # BLD AUTO: 1.8 K/UL
NEUTROPHILS # BLD AUTO: 2.2 K/UL
NEUTROPHILS NFR BLD: 44.5 %
NEUTROPHILS NFR BLD: 52.1 %
NEUTROPHILS NFR BLD: 60.9 %
NEUTROPHILS NFR BLD: 64 %
OVALOCYTES BLD QL SMEAR: ABNORMAL
PHOSPHATE SERPL-MCNC: 3.4 MG/DL
PLATELET # BLD AUTO: 218 K/UL
PLATELET # BLD AUTO: 245 K/UL
PLATELET # BLD AUTO: 253 K/UL
PLATELET # BLD AUTO: 288 K/UL
PLATELET BLD QL SMEAR: ABNORMAL
PMV BLD AUTO: 8.2 FL
PMV BLD AUTO: 8.6 FL
PMV BLD AUTO: 8.8 FL
PMV BLD AUTO: 9.7 FL
POIKILOCYTOSIS BLD QL SMEAR: SLIGHT
POLYCHROMASIA BLD QL SMEAR: ABNORMAL
POTASSIUM SERPL-SCNC: 4.4 MMOL/L
POTASSIUM SERPL-SCNC: 4.4 MMOL/L
POTASSIUM SERPL-SCNC: 4.5 MMOL/L
POTASSIUM SERPL-SCNC: 4.5 MMOL/L
PROT SERPL-MCNC: 6 G/DL
RBC # BLD AUTO: 3.34 M/UL
RBC # BLD AUTO: 3.39 M/UL
RBC # BLD AUTO: 3.46 M/UL
RBC # BLD AUTO: 3.84 M/UL
SODIUM SERPL-SCNC: 133 MMOL/L
SODIUM SERPL-SCNC: 135 MMOL/L
SODIUM SERPL-SCNC: 135 MMOL/L
SODIUM SERPL-SCNC: 136 MMOL/L
STOMATOCYTES BLD QL SMEAR: PRESENT
TARGETS BLD QL SMEAR: ABNORMAL
WBC # BLD AUTO: 2.59 K/UL
WBC # BLD AUTO: 3.01 K/UL
WBC # BLD AUTO: 3.02 K/UL
WBC # BLD AUTO: 3.5 K/UL

## 2017-01-30 PROCEDURE — 85025 COMPLETE CBC W/AUTO DIFF WBC: CPT | Mod: 91

## 2017-01-30 PROCEDURE — G8978 MOBILITY CURRENT STATUS: HCPCS | Mod: CK

## 2017-01-30 PROCEDURE — 97161 PT EVAL LOW COMPLEX 20 MIN: CPT

## 2017-01-30 PROCEDURE — G0378 HOSPITAL OBSERVATION PER HR: HCPCS

## 2017-01-30 PROCEDURE — 84100 ASSAY OF PHOSPHORUS: CPT

## 2017-01-30 PROCEDURE — G8987 SELF CARE CURRENT STATUS: HCPCS | Mod: CJ

## 2017-01-30 PROCEDURE — 25000003 PHARM REV CODE 250: Performed by: FAMILY MEDICINE

## 2017-01-30 PROCEDURE — 80048 BASIC METABOLIC PNL TOTAL CA: CPT | Mod: 91

## 2017-01-30 PROCEDURE — 25500020 PHARM REV CODE 255: Performed by: FAMILY MEDICINE

## 2017-01-30 PROCEDURE — 63600175 PHARM REV CODE 636 W HCPCS: Performed by: FAMILY MEDICINE

## 2017-01-30 PROCEDURE — 80053 COMPREHEN METABOLIC PANEL: CPT

## 2017-01-30 PROCEDURE — G8988 SELF CARE GOAL STATUS: HCPCS | Mod: CI

## 2017-01-30 PROCEDURE — 94761 N-INVAS EAR/PLS OXIMETRY MLT: CPT

## 2017-01-30 PROCEDURE — 25000003 PHARM REV CODE 250: Performed by: PSYCHIATRY & NEUROLOGY

## 2017-01-30 PROCEDURE — 97166 OT EVAL MOD COMPLEX 45 MIN: CPT

## 2017-01-30 PROCEDURE — 83735 ASSAY OF MAGNESIUM: CPT

## 2017-01-30 PROCEDURE — 80048 BASIC METABOLIC PNL TOTAL CA: CPT

## 2017-01-30 PROCEDURE — G8979 MOBILITY GOAL STATUS: HCPCS | Mod: CJ

## 2017-01-30 PROCEDURE — 36415 COLL VENOUS BLD VENIPUNCTURE: CPT

## 2017-01-30 RX ORDER — LORAZEPAM 0.5 MG/1
0.5 TABLET ORAL 2 TIMES DAILY
Qty: 5 TABLET | Refills: 0 | Status: SHIPPED | OUTPATIENT
Start: 2017-01-31 | End: 2017-01-30

## 2017-01-30 RX ORDER — PAROXETINE HYDROCHLORIDE 40 MG/1
40 TABLET, FILM COATED ORAL DAILY
Qty: 30 TABLET | Refills: 0 | Status: SHIPPED | OUTPATIENT
Start: 2017-01-30 | End: 2017-01-30

## 2017-01-30 RX ORDER — VENLAFAXINE HYDROCHLORIDE 75 MG/1
150 CAPSULE, EXTENDED RELEASE ORAL DAILY
Status: DISCONTINUED | OUTPATIENT
Start: 2017-02-06 | End: 2017-01-30 | Stop reason: HOSPADM

## 2017-01-30 RX ORDER — LORAZEPAM 0.5 MG/1
0.5 TABLET ORAL 2 TIMES DAILY
Status: DISCONTINUED | OUTPATIENT
Start: 2017-01-31 | End: 2017-01-30 | Stop reason: HOSPADM

## 2017-01-30 RX ORDER — DISULFIRAM 250 MG/1
250 TABLET ORAL DAILY
Qty: 30 TABLET | Refills: 0 | Status: SHIPPED | OUTPATIENT
Start: 2017-01-30 | End: 2017-01-30

## 2017-01-30 RX ORDER — VENLAFAXINE HYDROCHLORIDE 75 MG/1
75 CAPSULE, EXTENDED RELEASE ORAL DAILY
Qty: 7 CAPSULE | Refills: 0 | Status: SHIPPED | OUTPATIENT
Start: 2017-01-30 | End: 2017-09-19

## 2017-01-30 RX ORDER — QUETIAPINE FUMARATE 100 MG/1
100 TABLET, FILM COATED ORAL NIGHTLY
Qty: 30 TABLET | Refills: 0 | Status: SHIPPED | OUTPATIENT
Start: 2017-01-30 | End: 2018-03-22 | Stop reason: SDUPTHER

## 2017-01-30 RX ORDER — VENLAFAXINE HYDROCHLORIDE 75 MG/1
75 CAPSULE, EXTENDED RELEASE ORAL DAILY
Qty: 7 CAPSULE | Refills: 0 | Status: SHIPPED | OUTPATIENT
Start: 2017-01-30 | End: 2017-01-30

## 2017-01-30 RX ORDER — DISULFIRAM 250 MG/1
250 TABLET ORAL DAILY
Qty: 30 TABLET | Refills: 0 | Status: ON HOLD | OUTPATIENT
Start: 2017-01-30 | End: 2017-05-02

## 2017-01-30 RX ORDER — PAROXETINE HYDROCHLORIDE 40 MG/1
40 TABLET, FILM COATED ORAL DAILY
Qty: 30 TABLET | Refills: 0 | Status: SHIPPED | OUTPATIENT
Start: 2017-01-30 | End: 2017-09-19

## 2017-01-30 RX ORDER — PAROXETINE HYDROCHLORIDE 20 MG/1
40 TABLET, FILM COATED ORAL DAILY
Status: DISCONTINUED | OUTPATIENT
Start: 2017-01-30 | End: 2017-01-30 | Stop reason: HOSPADM

## 2017-01-30 RX ORDER — DISULFIRAM 250 MG/1
250 TABLET ORAL DAILY
Status: DISCONTINUED | OUTPATIENT
Start: 2017-01-30 | End: 2017-01-30 | Stop reason: HOSPADM

## 2017-01-30 RX ORDER — LORAZEPAM 0.5 MG/1
0.5 TABLET ORAL 2 TIMES DAILY
Qty: 5 TABLET | Refills: 0 | Status: ON HOLD | OUTPATIENT
Start: 2017-01-31 | End: 2017-05-03 | Stop reason: HOSPADM

## 2017-01-30 RX ORDER — LORAZEPAM 1 MG/1
1 TABLET ORAL 3 TIMES DAILY
Status: DISCONTINUED | OUTPATIENT
Start: 2017-01-30 | End: 2017-01-30 | Stop reason: HOSPADM

## 2017-01-30 RX ORDER — VENLAFAXINE HYDROCHLORIDE 75 MG/1
75 CAPSULE, EXTENDED RELEASE ORAL DAILY
Status: DISCONTINUED | OUTPATIENT
Start: 2017-01-30 | End: 2017-01-30 | Stop reason: HOSPADM

## 2017-01-30 RX ORDER — MORPHINE SULFATE 2 MG/ML
1 INJECTION, SOLUTION INTRAMUSCULAR; INTRAVENOUS ONCE
Status: COMPLETED | OUTPATIENT
Start: 2017-01-30 | End: 2017-01-30

## 2017-01-30 RX ORDER — LORAZEPAM 0.5 MG/1
0.5 TABLET ORAL ONCE
Status: DISCONTINUED | OUTPATIENT
Start: 2017-02-01 | End: 2017-01-30 | Stop reason: HOSPADM

## 2017-01-30 RX ADMIN — Medication 1 TABLET: at 09:01

## 2017-01-30 RX ADMIN — LORAZEPAM 1 MG: 1 TABLET ORAL at 09:01

## 2017-01-30 RX ADMIN — MORPHINE SULFATE 1 MG: 2 INJECTION, SOLUTION INTRAMUSCULAR; INTRAVENOUS at 10:01

## 2017-01-30 RX ADMIN — PROMETHAZINE HYDROCHLORIDE 12.5 MG: 25 INJECTION INTRAMUSCULAR; INTRAVENOUS at 06:01

## 2017-01-30 RX ADMIN — PAROXETINE HYDROCHLORIDE 40 MG: 20 TABLET, FILM COATED ORAL at 09:01

## 2017-01-30 RX ADMIN — SODIUM CHLORIDE: 0.9 INJECTION, SOLUTION INTRAVENOUS at 05:01

## 2017-01-30 RX ADMIN — Medication 100 MG: at 10:01

## 2017-01-30 RX ADMIN — VENLAFAXINE HYDROCHLORIDE 75 MG: 75 CAPSULE, EXTENDED RELEASE ORAL at 09:01

## 2017-01-30 RX ADMIN — IOHEXOL 75 ML: 350 INJECTION, SOLUTION INTRAVENOUS at 02:01

## 2017-01-30 RX ADMIN — PANTOPRAZOLE SODIUM 40 MG: 40 TABLET, DELAYED RELEASE ORAL at 09:01

## 2017-01-30 RX ADMIN — DICYCLOMINE HYDROCHLORIDE 20 MG: 20 TABLET ORAL at 06:01

## 2017-01-30 RX ADMIN — DOCUSATE SODIUM 100 MG: 100 CAPSULE, LIQUID FILLED ORAL at 09:01

## 2017-01-30 RX ADMIN — IOHEXOL 30 ML: 350 INJECTION, SOLUTION INTRAVENOUS at 11:01

## 2017-01-30 RX ADMIN — DISULFIRAM 250 MG: 250 TABLET ORAL at 09:01

## 2017-01-30 RX ADMIN — LORAZEPAM 1 MG: 1 TABLET ORAL at 03:01

## 2017-01-30 NOTE — PT/OT/SLP EVAL
Occupational Therapy  Evaluation    Daksha Marie   MRN: [id number]   Admitting Diagnosis: Hyponatremia    OT Date of Treatment: 17   OT Start Time: 1640  OT Stop Time: 1701  OT Total Time (min): 21 min    Billable Minutes:  Evaluation 21  Total Time 21    Diagnosis: Hyponatremia   The primary encounter diagnosis was Hyponatremia. Diagnoses of Generalized abdominal pain and Alcoholism /alcohol abuse were also pertinent to this visit.  Pt. Information obtained from medical records and pt interview.    Past Medical History   Diagnosis Date    Alcohol abuse 10/7/2015    Breast calcification, left      Pt states this has been worked up, she received a biopsy in the past to confirm calcifications were from scar tissue from when she had breast reduction surgery.      Chronic diastolic congestive heart failure     Encounter for blood transfusion     GERD (gastroesophageal reflux disease)     Glaucoma     Hypertension     Hyponatremia 10/2015     Na 109. attributed to polydipsia and alcohol abuse.  suffered a seizure in the ICU .    Insomnia     Malingering 2016    PTSD (post-traumatic stress disorder)       Past Surgical History   Procedure Laterality Date    Gastric bypass   and     Bile fistula      Breast reduction       section      Cholecystectomy  after        Referring physician: Aaron  Date referred to OT: 2017    General Precautions: Standard, fall  Orthopedic Precautions: N/A  Braces: N/A    Do you have any cultural, spiritual, Gnosticism conflicts, given your current situation?: none     Patient History:  Living Environment  Lives With: child(delano), adult, child(delano), dependent  Living Arrangements: house  Home Accessibility:  (tub is not a walk in; 1 step to enter)  Transportation Available: family or friend will provide  Living Environment Comment: Pt. lives with 14 and 22 y/o children in [facility] with 1 step to enter with tub/shower combo; pt  indep all ADLS, IADLS; ambulates with RW on occasions; drives and indep IADLS.  DME  pt has access to TTB, BSc  Equipment Currently Used at Home: walker, rolling (owns BSC and has borrowed TTB)    Prior level of function:   Bed Mobility/Transfers: needs device  Grooming: independent  Bathing: independent  Upper Body Dressing: independent  Lower Body Dressing: independent  Toileting: independent  Home Management Skills: independent  Homemaking Responsibilities: Yes  Meal Prep Responsibility: Primary  Laundry Responsibility: Primary  Cleaning Responsibility: Primary  Bill Paying/Finance Responsibility: Primary  Shopping Responsibility: Primary  Driving License: Yes  Mode of Transportation: Car     Dominant hand: right    Subjective:  Communicated with nurse prior to session.    Chief Complaint: abdominal pain  Patient/Family stated goals: none    Pain Ratin/10  Location - Side: Bilateral  Location - Orientation: generalized  Location: abdomen     Pain Rating Post-Intervention:  (note rated but grimaces 3/10 hoff baker sclae used to rate)    Objective:       Cognitive Exam:  Oriented to: Person, Place, Time and Situation  Follows Commands/attention: Follows multistep  commands  Communication: clear/fluent  Memory:  No Deficits noted  Safety awareness/insight to disability: impaired  Coping skills/emotional control: Appropriate to situation    Visual/perceptual:  Intact    Physical Exam:  Postural examination/scapula alignment: Rounded shoulder  Skin integrity: Visible skin intact  Edema: None noted BUE    Sensation:   Intact    Upper Extremity Range of Motion:  Right Upper Extremity: WFL  Left Upper Extremity: WFL    Upper Extremity Strength:  Right Upper Extremity: WFL  Left Upper Extremity: WFL   Strength: WNL    Fine motor coordination:   Intact    Gross motor coordination: WFL    Functional Mobility:  Bed Mobility:  Rolling/Turning to Left: Independent  Rolling/Turning Right: Independent  Scooting/Bridging:  Independent  Supine to Sit: Modified Independent (slow moving from abdominal pain)    Transfers:  Sit <> Stand Assistance: Stand By Assistance  Sit <> Stand Assistive Device: Rolling Walker  Bed <> Chair Technique: Stand Pivot  Bed <> Chair Transfer Assistance: Contact Guard Assistance  Bed <> Chair Assistive Device: Rolling Walker  Toilet Transfer Assistive Device: bedside commode  Tub Transfer Technique: Stand Pivot  Tub Bench Transfer Assistance: Stand By Assistance    Functional Ambulation: CGA with RW in room and in hallway straight; scissoring legs without RW use; unsteady and limping on LLE with RW    Activities of Daily Living:  Feeding Level of Assistance: Independent    UE Dressing Level of Assistance: Independent    LE Dressing Level of Assistance: Modified independent (socks)    Grooming Position: Standing at sink (for balance safety)  Grooming Level of Assistance: Stand by assistance  Toileting Where Assessed: Bedside commode  Toileting Level of Assistance: Stand by assistance (unsteady)         Balance:   Static Sit: NORMAL: No deviations seen in posture held statically  Dynamic Sit: NORMAL: No deviations seen in posture held dynamically  Static Stand: FAIR: Maintains without assist but unable to take challenges  Dynamic stand: FAIR: Needs CONTACT GUARD during gait    Therapeutic Activities and Exercises:  Importance of HH therapy; fall risk; safety ed; recommended DME for home safety .    AM-PAC 6 CLICK ADL  How much help from another person does this patient currently need?  1 = Unable, Total/Dependent Assistance  2 = A lot, Maximum/Moderate Assistance  3 = A little, Minimum/Contact Guard/Supervision  4 = None, Modified [place]/Independent    Putting on and taking off regular lower body clothing? : 4  Bathing (including washing, rinsing, drying)?: 3  Toileting, which includes using toilet, bedpan, or urinal? : 3  Putting on and taking off regular upper body clothing?: 4  Taking care of personal  "grooming such as brushing teeth?: 3  Eating meals?: 4  Total Score: 21    AM-PAC Raw Score CMS "G-Code Modifier Level of Impairment Assistance   6 % Total / Unable   7 - 9 CM 80 - 100% Maximal Assist   10 - 14 CL 60 - 80% Moderate Assist   15 - 19 CK 40 - 60% Moderate Assist   20 - 22 CJ 20 - 40% Minimal Assist   23 CI 1-20% SBA / CGA   24 CH 0% Independent/ Mod I       Patient left seated EOB with all lines intact, call button in reach, nurse and  notified     Assessment:  Daksha Marie is a 46 y.o. female with a medical diagnosis of Hyponatremia and presents with impaired standing balancee and high fall risk; LOB during fx mobility with HHA with noted scissoring legs; Ambulated with RW and CGA with improved stability with no scissoring noted bur unsteady with "limping" in LLE but pt attributes it to pain in abdominal area.  Educated pt in importance to use RW for safety with mobiilty and home and necessity for HH therapy 2/2 high fall risk.  Recommended use of TTB at [facility] and BSc.  Pt. In agreement.  Feeding indep; Grooming SBA standing at sik; LBB dressing Yakelin socksl; UBD indep; toileting SBA; BSC stand pivot . SBA.  Recommend HH therapy; pt has RW, BSc and TTB access at home. Continue with OT PO    Rehab identified problem list/impairments: Rehab identified problem list/impairments: weakness, impaired self care skills, impaired balance, impaired functional mobilty, impaired endurance, pain, decreased safety awareness, gait instability    Rehab potential is good.    Activity tolerance: Good    Discharge recommendations: Discharge Facility/Level Of Care Needs: home health PT, home health OT     Barriers to discharge: Barriers to Discharge: None    Equipment recommendations: none     GOALS:   Occupational Therapy Goals     Not on file      Multidisciplinary Problems (Resolved)        Problem: Occupational Therapy Goal    Goal Priority Disciplines Outcome Interventions   Occupational " Therapy Goal   (Resolved)     OT, PT/OT Outcome(s) achieved    Description:  Goals to be met by: 2/5/2017    Patient will increase functional independence with ADLs by performing:    Grooming while standing at sink with Modified [place].  Toileting from bedside commode with Modified [place] for hygiene and clothing management.   Toilet transfer to bedside commode with Modified [place].                PLAN:  Patient to be seen 3 x/week to address the above listed problems via self-care/home management, therapeutic activities, therapeutic exercises  Plan of Care expires:    Plan of Care reviewed with: patient    OT G-codes  Functional Assessment Tool Used: AM-PAC  Score: 21  Functional Limitation: Self care  Self Care Current Status (): CJ  Self Care Goal Status (): DAKOTA Tapia OT  01/30/2017

## 2017-01-30 NOTE — PLAN OF CARE
"  Pt scheduled for abdominal CT. Possible discharge later today per team. PCP and Psych follow up appts scheduled. Pt reports she has transportation home and no further discharge needs.  PT/OT consulted and informed TN recommendations are for home health and RW. TN spoke with pt regarding recommendations and pt stated, " I don't need that." TN informed pt insurance would cover costs and hh is recommended for safe discharge as she is weak. Pt again declined home health and RW. Tn informed Family Medicine Team of pt's refusal.    1715- Pt now wanting home health and request [facility]. TN called referral to Mellisa at  [facility] and sent referral via [employer] care.       01/30/17 1040   Discharge Assessment   Assessment Type Discharge Planning Assessment   Confirmed/corrected address and phone number on facesheet? Yes   Assessment information obtained from? Patient   Expected Length of Stay (days) 1   Communicated expected length of stay with patient/caregiver yes   Prior to hospitilization cognitive status: Alert/Oriented   Prior to hospitalization functional status: Independent   Current cognitive status: Alert/Oriented   Current Functional Status: Independent   Arrived From home or self-care   Lives With child(delano), adult;child(delano), dependent   Able to Return to Prior Arrangements yes   Is patient able to care for self after discharge? Yes   How many people do you have in your home that can help with your care after discharge? 1   Who are your caregiver(s) and their phone number(s)? Leisa [phone]   Patient's perception of discharge disposition home or selfcare   Readmission Within The Last 30 Days no previous admission in last 30 days   Patient currently being followed by outpatient case management? No   Patient currently receives home health services? No   Does the patient currently use HME? Yes   Equipment Currently Used at Home 3-in-1 commode   Do you have any problems affording any of your prescribed " medications? No   Is the patient taking medications as prescribed? yes   Do you have any financial concerns preventing you from receiving the healthcare you need? No   Does the patient have transportation to healthcare appointments? Yes   Transportation Available family or friend will provide;car   Does the patient receive services at the [facility]? No   Are there any open cases? No   Discharge Plan A Home with family   Discharge Plan B Home Health   Patient/Family In Agreement With Plan yes

## 2017-01-30 NOTE — PLAN OF CARE
Problem: Patient Care Overview  Goal: Individualization & Mutuality  Outcome: Ongoing (interventions implemented as appropriate)  Answered all questions concerning medications and visits form md. Resting comfortably in bed. No evidence of alcohol withdrawal.

## 2017-01-30 NOTE — DISCHARGE SUMMARY
Discharge Summary      Admit Date: 1/29/2017    Discharge Date and Time:  1/30/17     Attending Physician: Montse Rg MD    Discharge Physician: Carmen Lebron    Principal Diagnoses: Hyponatremia  The primary encounter diagnosis was Hyponatremia. Diagnoses of Generalized abdominal pain and Alcoholism /alcohol abuse were also pertinent to this visit.    Discharged Condition: stable    Hospital Course: Daksha Marie is a 46 y.o. female with pmh  has a past medical history of Alcohol abuse (10/7/2015); Breast calcification, left; Chronic diastolic congestive heart failure; Encounter for blood transfusion; GERD (gastroesophageal reflux disease); Glaucoma (2008); Hypertension; Hyponatremia (10/2015); Insomnia; Malingering (1/24/2016); and PTSD (post-traumatic stress disorder). and persistent anastomotic ulcer who presented with Hyponatremia. The following is the hospital course:    Patient presented to the ED with BRB in stool and periumbilical and suprapubic pain on 1/29/2017. She was subsequently found to be hyponatremic (120 in the ED that improved with I.25 L IVF NS to 135 on 1/30/2017).   She has a history of anastomotic ulcers and gastric bypass surgery. Home bentyl was restarted for abdominal pain. Her exam was negative to pain on deep palpation. She had no more episodes of BRB in her stool after the initial episode that brought her into the ED. FOBT was negative in the ED and H/H remained stable in the ranges of 7.4/24.4 -7.0/24 and will continue pantoprazole 40mg.     Workup was negative for an infectious process.   CT scan showed:  Previous induration small bowel loops with adjacent fluid and thickening are seen with presumed operative change from focal small bowel resection.  Remote operative change from cholecystectomy and partial gastrectomy.  Nonspecific edema retroperitoneum about the origin of the celiac axis, superior mesenteric artery and periaortic region which may represent fluid in  sequela of pancreatitis however indeterminate. Unusual hypodensity in the left lobe liver above the falciform ligament also unchanged. 3 cm probable right ovarian cyst.    Patient was seen by psychiatry as she suggested that she wanted help to stop drinking. She was started on Xanex, Ativan, paroextine, quetiapine and venlafaxine per their recs. She will need a referral to follow with Dr. Juarez for outpatient care.   Patient endorsed non-compliance with HTN medication 2/2 to weakness. Home medications of clonidine 0.2mg and lisinopril 10mg were restarted but HCTZ will be held on discharge in light of chronic hyponatremia. BP was stable upon discharge at 102/50. She will restart lasix for diastolic dysfunction.    Patient will need outpatient psychiatry follow up.    Consults: IP CONSULT TO PSYCHIATRY    Significant Diagnostic Studies: CT abdomen  Laboratory Data:  CBC CMP     Recent Labs  Lab 01/30/17  0010 01/30/17  0614 01/30/17  1139   WBC 3.01* 2.59* 3.02*   RBC 3.39* 3.46* 3.34*   HGB 6.9* 7.0* 6.9*   HCT 23.2* 24.0* 23.2*    253 218   MCV 68* 69* 70*   MCH 20.4* 20.2* 20.7*   MCHC 29.7* 29.2* 29.7*      Recent Labs  Lab 01/29/17  1002  01/30/17  0010 01/30/17  0614 01/30/17  0755   CALCIUM 9.7  < > 8.4* 8.6*  8.7 8.7   PROT 7.5  --   --  6.0  --    *  < > 133* 135*  135* 136   K 4.2  < > 4.5 4.5  4.4 4.4   CO2 17*  < > 21* 17*  17* 17*   CL 85*  < > 103 105  106 105   BUN 9  < > 7 7  6 6   CREATININE 0.8  < > 0.7 0.7  0.7 0.7   ALKPHOS 60  --   --  48*  --    ALT 12  --   --  13  --    AST 27  --   --  28  --    BILITOT 0.9  --   --  0.9  --    < > = values in this interval not displayed.         Recent Labs  Lab 01/29/17  1002   INR 0.9     meds: lisinopril (generic) and clonidine, bently, zofran, pantoprazole    Disposition: Home or Self Care    Patient Instructions:   Current Discharge Medication List      START taking these medications    Details   disulfiram (ANTABUSE) 250 mg tablet  Take 1 tablet (250 mg total) by mouth once daily.  Qty: 30 tablet, Refills: 0      lorazepam (ATIVAN) 0.5 MG tablet Take 1 tablet (0.5 mg total) by mouth 2 (two) times daily. Today: take 2 tablets by mouth  Tomorrow take 1 tablet every 12 hours  Third day take 1 tablet by mouth  Qty: 5 tablet, Refills: 0         CONTINUE these medications which have CHANGED    Details   paroxetine (PAXIL) 40 MG tablet Take 1 tablet (40 mg total) by mouth once daily.  Qty: 30 tablet, Refills: 0      quetiapine (SEROQUEL) 100 MG Tab Take 1 tablet (100 mg total) by mouth every evening.  Qty: 30 tablet, Refills: 0      venlafaxine (EFFEXOR-XR) 75 MG 24 hr capsule Take 1 capsule (75 mg total) by mouth once daily.  Qty: 7 capsule, Refills: 0         CONTINUE these medications which have NOT CHANGED    Details   cloNIDine (CATAPRES) 0.2 MG tablet TAKE 1 TABLET BY MOUTH EVERY EVENING  Qty: 90 tablet, Refills: 0      cyanocobalamin 1,000 mcg/mL injection Inject 1,000 mcg into the muscle every 28 days. On the 15th on the month      dicyclomine (BENTYL) 20 mg tablet Take 20 mg by mouth 3 (three) times daily as needed (for abdominal cramps).      docusate sodium (COLACE) 100 MG capsule Take 1 capsule (100 mg total) by mouth 2 (two) times daily.  Refills: 0      DORZOLAMIDE-TIMOLOL, PF, OPHT Place 1 drop into both eyes every evening.       fluticasone (FLONASE) 50 mcg/actuation nasal spray 1 spray by Each Nare route once daily.      furosemide (LASIX) 40 MG tablet Take 40 mg by mouth once daily.      latanoprost 0.005 % ophthalmic solution Place 1 drop into both eyes every evening.       lisinopril 10 MG tablet Take 10 mg by mouth every morning.       montelukast (SINGULAIR) 10 mg tablet Take 10 mg by mouth every evening.      multivitamin (THERAGRAN) tablet Take 1 tablet by mouth once daily.  Qty: 30 tablet, Refills: 5      omeprazole (PRILOSEC) 40 MG capsule Take 40 mg by mouth once daily.      ondansetron (ZOFRAN-ODT) 8 MG TbDL Take 1 tablet  (8 mg total) by mouth every 8 (eight) hours as needed.  Qty: 60 tablet, Refills: 2      potassium chloride (MICRO-K) 10 MEQ CpSR Take 10 mEq by mouth once daily.       thiamine mononitrate 100 mg Tab Take 1 tablet (100 mg total) by mouth 2 (two) times daily.  Qty: 60 tablet, Refills: 0      promethazine (PHENERGAN) 25 MG suppository Place 25 mg rectally every 6 (six) hours as needed for Nausea.         STOP taking these medications       alprazolam (XANAX) 1 MG tablet Comments:   Reason for Stopping:         hydrochlorothiazide (HYDRODIURIL) 25 MG tablet Comments:   Reason for Stopping:         ibuprofen (ADVIL,MOTRIN) 800 MG tablet Comments:   Reason for Stopping:                 Discharge Procedure Orders  Diet general   Scheduling Instructions: Low salt     Activity as tolerated     Call MD for:  temperature >100.4     Call MD for:  persistent nausea and vomiting or diarrhea     Call MD for:  severe uncontrolled pain     Call MD for:  redness, tenderness, or signs of infection (pain, swelling, redness, odor or green/yellow discharge around incision site)     Call MD for:  difficulty breathing or increased cough     Call MD for:  severe persistent headache     Call MD for:  persistent dizziness, light-headedness, or visual disturbances     Call MD for:  increased confusion or weakness         Carmen Lebron  01/30/2017  1:06 PM

## 2017-01-30 NOTE — PT/OT/SLP EVAL
Physical Therapy  Evaluation    Daksha Marie   MRN: [id number]   Admitting Diagnosis: Hyponatremia    PT Received On: 17  PT Start Time: 1137     PT Stop Time: 1155    PT Total Time (min): 18 min       Billable Minutes:  Evaluation 18    Diagnosis: Hyponatremia      Past Medical History   Diagnosis Date    Alcohol abuse 10/7/2015    Breast calcification, left      Pt states this has been worked up, she received a biopsy in the past to confirm calcifications were from scar tissue from when she had breast reduction surgery.      Chronic diastolic congestive heart failure     Encounter for blood transfusion     GERD (gastroesophageal reflux disease)     Glaucoma     Hypertension     Hyponatremia 10/2015     Na 109. attributed to polydipsia and alcohol abuse.  suffered a seizure in the ICU .    Insomnia     Malingering 2016    PTSD (post-traumatic stress disorder)       Past Surgical History   Procedure Laterality Date    Gastric bypass   and     Bile fistula      Breast reduction       section      Cholecystectomy  after        General Precautions: Standard, fall  Orthopedic Precautions: N/A   Braces: N/A       Do you have any cultural, spiritual, Temple conflicts, given your current situation?: none    Patient History:  Lives With: child(delano), adult, child(delano), dependent  Living Arrangements: house  Home Accessibility:  (no concerns)  Transportation Available: family or friend will provide  Living Environment Comment: Lives with 2 sons 13 and 20, reports supportive father down the street  Equipment Currently Used at Home: none (states that she has BSC)  DME owned (not currently used): bedside commode    Previous Level of Function:  Ambulation Skills: independent  Transfer Skills: independent  ADL Skills: independent    Subjective:  Communicated with nsg prior to session.  Pt agreeable to eval, states that her ambulation is limited 2/2 B ALLISON  fatigue.  Chief Complaint: B LE fatigue and abdominal pain  Patient goals: none stated    Pain Ratin/10            Pain Addressed: Pre-medicate for activity, Reposition, Distraction, Cessation of Activity, Nurse notified  Pain Rating Post-Intervention: 6/10    Objective:   Patient found with: peripheral IV     Cognitive Exam:  Oriented to: Person, Place, Time and Situation    Follows Commands/attention: Follows one-step commands  Communication: clear/fluent  Safety awareness/insight to disability: impaired    Physical Exam:  Postural examination/scapula alignment: Rounded shoulder and Head forward    Skin integrity: Visible skin intact  Edema: none noted    Sensation:   Intact  light/touch B LE's      Upper Extremity Range of Motion:  Right Upper Extremity: WFL  Left Upper Extremity: WFL    Upper Extremity Strength:  Right Upper Extremity: WFL  Left Upper Extremity: WFL    Lower Extremity Range of Motion:  Right Lower Extremity: WFL  Left Lower Extremity: WFL    Lower Extremity Strength:  Right Lower Extremity: WFL  Left Lower Extremity: WFL     Fine motor coordination:  N/T    Gross motor coordination: WFL    Functional Mobility:  Bed Mobility:  Scooting/Bridging: Supervision  Supine to Sit: Supervision  Sit to Supine: Supervision    Transfers:  Sit <> Stand Assistance: Contact Guard Assistance  Sit <> Stand Assistive Device: Rolling Walker    Gait:   Gait Distance: 50'  Assistance 1: Contact Guard Assistance  Gait Assistive Device: Rolling walker  Gait Pattern: reciprocal  Gait Deviation(s): decreased segundo, increased time in double stance, decreased step length, toes first, decreased toe-to-floor clearance, decreased weight-shifting ability, excessive knee flexion    Stairs:  N/T    Balance:   Static Sit: FAIR+: Able to take MINIMAL challenges from all directions  Dynamic Sit: FAIR+: Maintains balance through MINIMAL excursions of active trunk motion  Static Stand: FAIR+: Takes MINIMAL challenges from all  "directions  Dynamic stand: FAIR: Needs CONTACT GUARD during gait    Therapeutic Activities and Exercises:  eval only.  Pt ambulating with "CP like" gait pattern without contributing deficits.  AM-PAC 6 CLICK MOBILITY  How much help from another person does this patient currently need?   1 = Unable, Total/Dependent Assistance  2 = A lot, Maximum/Moderate Assistance  3 = A little, Minimum/Contact Guard/Supervision  4 = None, Modified [place]/Independent    Turning over in bed (including adjusting bedclothes, sheets and blankets)?: 4  Sitting down on and standing up from a chair with arms (e.g., wheelchair, bedside commode, etc.): 3  Moving from lying on back to sitting on the side of the bed?: 4  Moving to and from a bed to a chair (including a wheelchair)?: 3  Need to walk in hospital room?: 3  Climbing 3-5 steps with a railing?: 2  Total Score: 19     AM-PAC Raw Score CMS G-Code Modifier Level of Impairment Assistance   6 % Total / Unable   7 - 9 CM 80 - 100% Maximal Assist   10 - 14 CL 60 - 80% Moderate Assist   15 - 19 CK 40 - 60% Moderate Assist   20 - 22 CJ 20 - 40% Minimal Assist   23 CI 1-20% SBA / CGA   24 CH 0% Independent/ Mod I     Patient left left sidelying with all lines intact, call button in reach and nsg notified.    Assessment:   Daksha Marie is a 46 y.o. female with a medical diagnosis of Hyponatremia and presents with decreased functional independence 2/2 pain, deconditioning, and gen weakness.  Pt could benefit from .skilled  PT services to address deficits below in order to maximize function prior to DC.    Rehab identified problem list/impairments: Rehab identified problem list/impairments: weakness, impaired endurance, impaired self care skills, impaired functional mobilty, decreased coordination, impaired balance, gait instability, decreased safety awareness, pain, impaired coordination, impaired fine motor    Rehab potential is good.    Activity tolerance: " Fair    Discharge recommendations: Discharge Facility/Level Of Care Needs: home health PT     Barriers to discharge: Barriers to Discharge: None    Equipment recommendations: Equipment Needed After Discharge: walker, rolling     GOALS:   Physical Therapy Goals        Problem: Physical Therapy Goal    Goal Priority Disciplines Outcome Goal Variances Interventions   Physical Therapy Goal     PT/OT, PT Ongoing (interventions implemented as appropriate)     Description:  Goals to be met by: 2017     Patient will increase functional independence with mobility by performin. Supine to sit with Modified [place]  2. Sit to stand transfer with Modified [place]  3. Gait  x 150 feet with Modified [place] using Rolling Walker.                 PLAN:    Patient to be seen 5 x/week to address the above listed problems via gait training, therapeutic activities, therapeutic exercises  Plan of Care expires: 17  Plan of Care reviewed with: patient    Functional Assessment Tool Used: AM-PAC  Score: 19  Functional Limitation: Mobility: Walking and moving around  Mobility: Walking and Moving Around Current Status (): CK  Mobility: Walking and Moving Around Goal Status (): ANALIA Guevara, PT  2017

## 2017-01-30 NOTE — PLAN OF CARE
"Problem: Occupational Therapy Goal  Goal: Occupational Therapy Goal  Goals to be met by: 2/5/2017    Patient will increase functional independence with ADLs by performing:    Grooming while standing at sink with Modified [place].  Toileting from bedside commode with Modified [place] for hygiene and clothing management.   Toilet transfer to bedside commode with Modified [place].  Outcome: Ongoing (interventions implemented as appropriate)  Pt. limited by impaired standing balancee and high fall risk; LOB during fx mobility with HHA with noted scissoring legs; Ambulated with RW and CGA with improved stability with no scissoring noted bur unsteady with "limping" in LLE but pt attributes it to pain in abdominal area.  Educated pt in importance to use RW for safety with mobiilty and home and necessity for HH therapy 2/2 high fall risk.  Recommended use of TTB at [facility] and BSc.  Pt. In agreement.  Feeding indep; Grooming SBA standing at sik; LBB dressing [place] socksl; UBD indep; toileting SBA; BSC stand pivot . SBA.  Recommend HH therapy; pt has RW, BSc and TTB access at home. Continue with OT POC.       "

## 2017-01-30 NOTE — PROGRESS NOTES
Progress Note    Admit Date: 1/29/2017   LOS: 0 days     SUBJECTIVE:     Patient seen and examined this AM.  Reports feeling well this AM.  Reports suprapubic and periumbilical pain persists.  Denies any nausea/vomiting or blood stool overnight.    Scheduled Meds:   cloNIDine  0.2 mg Oral QHS    docusate sodium  100 mg Oral BID    lisinopril  10 mg Oral QAM    montelukast  10 mg Oral QHS    multivitamin  1 tablet Oral Daily    pantoprazole  40 mg Oral Daily    paroxetine  30 mg Oral Daily    quetiapine  100 mg Oral QHS    thiamine mononitrate (vit B1)  100 mg Oral BID     Continuous Infusions:   sodium chloride 0.9% 100 mL/hr at 01/30/17 0557     PRN Meds:alprazolam, dicyclomine, flu vac [id number] 36mos up(PF), lorazepam, ondansetron, pneumoc 13-joseph conj-dip cr(PF)    Review of patient's allergies indicates:   Allergen Reactions    Penicillins Hives       Review of Systems  Constitutional: negative for chills, fevers and night sweats  Respiratory: negative for cough and dyspnea on exertion  Cardiovascular: negative for chest pain  Gastrointestinal: positive for abdominal pain, positive for bright red blood per rectum (prior to admit, no episodes since admit) negative change in bowel habits  Genitourinary:negative for dysuria and frequency    OBJECTIVE:     Vital Signs (Most Recent)  Temp: 98.3 °F (36.8 °C) (01/30/17 0500)  Pulse: (!) 58 (01/30/17 0500)  Resp: 18 (01/29/17 2000)  BP: (!) 102/50 (01/30/17 0500)  SpO2: 96 % (01/30/17 0443)    Vital Signs Range (Last 24H):  Temp:  [98.3 °F (36.8 °C)-98.7 °F (37.1 °C)]   Pulse:  [58-89]   Resp:  [11-20]   BP: (100-153)/()   SpO2:  [96 %-100 %]     I & O (Last 24H):  Intake/Output Summary (Last 24 hours) at 01/30/17 0832  Last data filed at 01/30/17 0500   Gross per 24 hour   Intake           1000.5 ml   Output             2300 ml   Net          -1299.5 ml     Physical Exam:  General: well developed, well nourished, no acute distress  Lungs:  clear to  auscultation bilaterally and normal respiratory effort  Cardiovascular: RRR, normal heart sounds  Abdomen:  Suprapubic tenderness,periumbilical tenderness to palpation  Neuro: no focal deficits noted  Psych: flat effect and depressed mood     Laboratory:  CBC:   Recent Labs  Lab 17  0614   WBC 2.59*   RBC 3.46*   HGB 7.0*   HCT 24.0*      MCV 69*   MCH 20.2*   MCHC 29.2*     CMP:   Recent Labs  Lab 17  0614   GLU 51*  52*   CALCIUM 8.6*  8.7   ALBUMIN 3.3*   PROT 6.0   *  135*   K 4.5  4.4   CO2 17*  17*     106   BUN 7  6   CREATININE 0.7  0.7   ALKPHOS 48*   ALT 13   AST 28   BILITOT 0.9       Recent Labs  Lab 17  1002   COLORU Yellow   SPECGRAV <=1.005*   PHUR 6.0   PROTEINUA Negative   NITRITE Negative   LEUKOCYTESUR Negative   UROBILINOGEN Negative       ASSESSMENT/PLAN:     Patient is a 46 year old female with a PMHx of alcholism, HTN, GERD, gastric bypass, anxiety/dpression and PTSD here with hyponatremia.    Hyponatremia (improving):  -per chart check patient has multiple hospitalizations with low sodium levels, history of seizing with levels as low as 109 in the past   -pt alert and oriented without neurological deficits or complaints  -Na+: 120 in the ED, s/p I.25 L IVF NS, continued IVFs NS at 100cc/hr  -improvin this AM     Hematochezia:  -pt reported bright red blood in stool prior to admit (1 episode)  -FOBT in ED negative  -has a history of bleeding anastomotic ulcers  -H/H: 7.4/24.4 on admit (baseline: ).  -pt required transfusion in past for bleeding ulcers  -CBC trended q6: remains stable  -NPO at this time  -Pantoprazole 40 mg  - H/H stable at 7.0/24 this AM     HTN:  -pt reported not taking her medications 2/2 to feeling weak  -restarted patient's home meds: Clonidine 0.2mg, Lisinopril 10 mg  - BP controlled; 102/50 this AM  -will hold patient's HCTZ on discharge in light of chronic hyponatremia     HFpEF:  -Diastolic dysfunction  -will  restart home dose lasix      Suprapubic Abdominal Pain:  -pt with history of gastric bypass surgery and bleeding anastomotic ulcers  -takes home meds: Bentyl will restart and monitor pain   - Lipase 23, Amylase 206   -UA: negative for infectious process  - f/u CT abdomen     Anxiety/Depression/PTSD:  -patient tearful on exam  -would like to seek help to quit drinking  -Xanax, Ativan, Paroextine, Quetiapine, Velafaxine per Dr. Juarez    Alcoholism:  -pt with longstanding history of drinking  -reports 3-5 drinks every other night  -several hospital admissions per chart check  -last drink Saturday 4 drinks of vodka cranberry  -CIIWA :negative  -s/p banana bag, will administer thiamine, folate  -B12, folate levels WNL  -neuro checks  -aspiration/fall precuations  -NPO at this time until CT abdomen complete  -Ativan 1 mg PRN  -PT/OT/ST  -Patient clear from psych for discharge     PPx:  -Pantropazole  -SCDs     Dispo: f/u CT abdomen.    Carmen Lebron MD  [facility],  2  1/30/2017  9:50 AM

## 2017-01-30 NOTE — PLAN OF CARE
Problem: Physical Therapy Goal  Goal: Physical Therapy Goal  Goals to be met by: 2017     Patient will increase functional independence with mobility by performin. Supine to sit with Modified [place]  2. Sit to stand transfer with Modified [place]  3. Gait x 150 feet with Modified [place] using Rolling Walker.   Outcome: Ongoing (interventions implemented as appropriate)  Initial PT evaluation performed.  Pt could benefit from skilled PT services 5x/wk in order to maximize function prior to D/C.  RW and HHPT recommended upon D/C.

## 2017-01-30 NOTE — PLAN OF CARE
Problem: Patient Care Overview  Goal: Plan of Care Review  Outcome: Ongoing (interventions implemented as appropriate)  Pt received on RA.  SPO2  97%.  Pt in no apparent respiratory distress.  Will continue to monitor.

## 2017-01-30 NOTE — PLAN OF CARE
Problem: Patient Care Overview  Goal: Plan of Care Review  Outcome: Ongoing (interventions implemented as appropriate)  Plan of care reviewed with patient. Instructed to call for assistance when needed. Verbalizes understanding of teaching. Tolerating iv fluids. Received prn medication for abdominal cramps. Reports medication works.md consults called for today. Resting comfortably. Bed alarm on.

## 2017-01-30 NOTE — DISCHARGE INSTRUCTIONS
OVARIAN CYST (ENGLISH) View Edit Remove  OVARIAN CYSTS, TREATMENT FOR (ENGLISH) View Edit Remove  OVARIAN CYSTS, UNDERSTANDING (ENGLISH) View Edit Remove  ALCOHOL ABUSE (ENGLISH) View Edit Remove  ALCOHOL WITHDRAWAL (ENGLISH) View Edit Remove  ALCOHOL WITHDRAWAL: WHAT TO EXPECT (ENGLISH) View Edit Remove  ALCOHOLISM: HOW TO BE PART OF THE SOLUTION (ENGLISH) View Edit Remove  ALCOHOLISM: RESOURCES FOR FAMILY AND FRIENDS (ENGLISH) View Edit Remove  ALCOHOLISM, UNDERSTANDING (ENGLISH) View Edit Remove  ALCOHOLISM: GETTING HELP (ENGLISH) View Edit Remove  DRIVING UNDER THE INFLUENCE (ENGLISH) View Edit Remove  MYTHS AND FACTS, ALCOHOLISM (ENGLISH) View Edit Remove  DISULFIRAM (ENGLISH) View Edit Remove  LORAZEPAM ORAL TABLET (ENGLISH) View Edit Remove

## 2017-01-31 NOTE — PLAN OF CARE
Patient discharged off unit with wheelchair with nurse at side. No distress noted. Discharge instructions given by off going nurse. PIV removed by offgoing nurse.  No questions verbalized.
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
## 2017-03-01 RX ORDER — CLONIDINE HYDROCHLORIDE 0.2 MG/1
TABLET ORAL
Qty: 90 TABLET | Refills: 0 | Status: SHIPPED | OUTPATIENT
Start: 2017-03-01 | End: 2017-06-29 | Stop reason: SDUPTHER

## 2017-03-01 RX ORDER — CLONIDINE HYDROCHLORIDE 0.2 MG/1
TABLET ORAL
Qty: 30 TABLET | Refills: 0 | Status: SHIPPED | OUTPATIENT
Start: 2017-03-01 | End: 2017-03-01 | Stop reason: SDUPTHER

## 2017-03-02 RX ORDER — IBUPROFEN 800 MG/1
TABLET ORAL
Qty: 50 TABLET | Refills: 0 | Status: ON HOLD | OUTPATIENT
Start: 2017-03-02 | End: 2017-05-03 | Stop reason: HOSPADM

## 2017-05-01 ENCOUNTER — ANESTHESIA EVENT (OUTPATIENT)
Dept: EMERGENCY MEDICINE | Facility: HOSPITAL | Age: 47
DRG: 641 | End: 2017-05-01
Payer: MEDICARE

## 2017-05-01 ENCOUNTER — ANESTHESIA (OUTPATIENT)
Dept: EMERGENCY MEDICINE | Facility: HOSPITAL | Age: 47
DRG: 641 | End: 2017-05-01
Payer: MEDICARE

## 2017-05-01 ENCOUNTER — HOSPITAL ENCOUNTER (INPATIENT)
Facility: HOSPITAL | Age: 47
LOS: 2 days | Discharge: HOME | DRG: 641 | End: 2017-05-03
Attending: EMERGENCY MEDICINE | Admitting: HOSPITALIST
Payer: MEDICARE

## 2017-05-01 DIAGNOSIS — E87.1 ACUTE HYPONATREMIA: ICD-10-CM

## 2017-05-01 DIAGNOSIS — Z45.2 ENCOUNTER FOR CENTRAL LINE PLACEMENT: ICD-10-CM

## 2017-05-01 DIAGNOSIS — E87.20 ACIDOSIS: ICD-10-CM

## 2017-05-01 DIAGNOSIS — E87.1 HYPONATREMIA WITH DECREASED SERUM OSMOLALITY: Chronic | ICD-10-CM

## 2017-05-01 DIAGNOSIS — S01.01XA OCCIPITAL SCALP LACERATION, INITIAL ENCOUNTER: ICD-10-CM

## 2017-05-01 DIAGNOSIS — T14.90XA TRAUMA: ICD-10-CM

## 2017-05-01 DIAGNOSIS — E87.1 HYPONATREMIA: ICD-10-CM

## 2017-05-01 LAB
ALBUMIN SERPL BCP-MCNC: 4.2 G/DL
ALP SERPL-CCNC: 67 U/L
ALT SERPL W/O P-5'-P-CCNC: 16 U/L
AMMONIA PLAS-SCNC: 25 UMOL/L
ANION GAP SERPL CALC-SCNC: 21 MMOL/L
ANISOCYTOSIS BLD QL SMEAR: SLIGHT
AST SERPL-CCNC: 46 U/L
B-HCG UR QL: NEGATIVE
BASOPHILS # BLD AUTO: 0.01 K/UL
BASOPHILS NFR BLD: 0.1 %
BILIRUB SERPL-MCNC: 0.9 MG/DL
BUN SERPL-MCNC: 10 MG/DL
CALCIUM SERPL-MCNC: 8.9 MG/DL
CHLORIDE SERPL-SCNC: 87 MMOL/L
CO2 SERPL-SCNC: 12 MMOL/L
CREAT SERPL-MCNC: 1.1 MG/DL
CTP QC/QA: YES
DACRYOCYTES BLD QL SMEAR: ABNORMAL
DIFFERENTIAL METHOD: ABNORMAL
EOSINOPHIL # BLD AUTO: 0 K/UL
EOSINOPHIL NFR BLD: 0.1 %
ERYTHROCYTE [DISTWIDTH] IN BLOOD BY AUTOMATED COUNT: 18.7 %
EST. GFR  (AFRICAN AMERICAN): >60 ML/MIN/1.73 M^2
EST. GFR  (NON AFRICAN AMERICAN): >60 ML/MIN/1.73 M^2
ETHANOL SERPL-MCNC: 150 MG/DL
GLUCOSE SERPL-MCNC: 72 MG/DL
HCT VFR BLD AUTO: 31.2 %
HGB BLD-MCNC: 9.8 G/DL
HYPOCHROMIA BLD QL SMEAR: ABNORMAL
LYMPHOCYTES # BLD AUTO: 0.8 K/UL
LYMPHOCYTES NFR BLD: 9.7 %
MCH RBC QN AUTO: 20.4 PG
MCHC RBC AUTO-ENTMCNC: 31.4 %
MCV RBC AUTO: 65 FL
MONOCYTES # BLD AUTO: 0.4 K/UL
MONOCYTES NFR BLD: 5 %
NEUTROPHILS # BLD AUTO: 7 K/UL
NEUTROPHILS NFR BLD: 85.1 %
OVALOCYTES BLD QL SMEAR: ABNORMAL
PLATELET # BLD AUTO: 172 K/UL
PLATELET BLD QL SMEAR: ABNORMAL
PMV BLD AUTO: 9.4 FL
POCT GLUCOSE: 52 MG/DL (ref 70–110)
POCT GLUCOSE: 81 MG/DL (ref 70–110)
POCT GLUCOSE: 89 MG/DL (ref 70–110)
POIKILOCYTOSIS BLD QL SMEAR: ABNORMAL
POTASSIUM SERPL-SCNC: 3.9 MMOL/L
PROT SERPL-MCNC: 7.8 G/DL
RBC # BLD AUTO: 4.8 M/UL
SODIUM SERPL-SCNC: 120 MMOL/L
TARGETS BLD QL SMEAR: ABNORMAL
TROPONIN I SERPL DL<=0.01 NG/ML-MCNC: 0.01 NG/ML
WBC # BLD AUTO: 8.32 K/UL

## 2017-05-01 PROCEDURE — 96366 THER/PROPH/DIAG IV INF ADDON: CPT

## 2017-05-01 PROCEDURE — 90471 IMMUNIZATION ADMIN: CPT | Performed by: EMERGENCY MEDICINE

## 2017-05-01 PROCEDURE — 80053 COMPREHEN METABOLIC PANEL: CPT

## 2017-05-01 PROCEDURE — 25000003 PHARM REV CODE 250: Performed by: EMERGENCY MEDICINE

## 2017-05-01 PROCEDURE — 63600175 PHARM REV CODE 636 W HCPCS: Performed by: EMERGENCY MEDICINE

## 2017-05-01 PROCEDURE — 84484 ASSAY OF TROPONIN QUANT: CPT

## 2017-05-01 PROCEDURE — 99285 EMERGENCY DEPT VISIT HI MDM: CPT | Mod: 25

## 2017-05-01 PROCEDURE — 12000002 HC ACUTE/MED SURGE SEMI-PRIVATE ROOM

## 2017-05-01 PROCEDURE — 81025 URINE PREGNANCY TEST: CPT | Performed by: EMERGENCY MEDICINE

## 2017-05-01 PROCEDURE — 96376 TX/PRO/DX INJ SAME DRUG ADON: CPT

## 2017-05-01 PROCEDURE — 85025 COMPLETE CBC W/AUTO DIFF WBC: CPT

## 2017-05-01 PROCEDURE — 96365 THER/PROPH/DIAG IV INF INIT: CPT

## 2017-05-01 PROCEDURE — C1751 CATH, INF, PER/CENT/MIDLINE: HCPCS | Performed by: ANESTHESIOLOGY

## 2017-05-01 PROCEDURE — 90714 TD VACC NO PRESV 7 YRS+ IM: CPT | Performed by: EMERGENCY MEDICINE

## 2017-05-01 PROCEDURE — 0HQ0XZZ REPAIR SCALP SKIN, EXTERNAL APPROACH: ICD-10-PCS | Performed by: HOSPITALIST

## 2017-05-01 PROCEDURE — 80320 DRUG SCREEN QUANTALCOHOLS: CPT

## 2017-05-01 PROCEDURE — 82962 GLUCOSE BLOOD TEST: CPT

## 2017-05-01 PROCEDURE — 3E0234Z INTRODUCTION OF SERUM, TOXOID AND VACCINE INTO MUSCLE, PERCUTANEOUS APPROACH: ICD-10-PCS | Performed by: HOSPITALIST

## 2017-05-01 PROCEDURE — 96375 TX/PRO/DX INJ NEW DRUG ADDON: CPT

## 2017-05-01 PROCEDURE — 12002 RPR S/N/AX/GEN/TRNK2.6-7.5CM: CPT

## 2017-05-01 PROCEDURE — 76937 US GUIDE VASCULAR ACCESS: CPT | Performed by: ANESTHESIOLOGY

## 2017-05-01 PROCEDURE — 82140 ASSAY OF AMMONIA: CPT

## 2017-05-01 RX ORDER — DEXTROSE 50 % IN WATER (D50W) INTRAVENOUS SYRINGE
Status: DISPENSED
Start: 2017-05-01 | End: 2017-05-02

## 2017-05-01 RX ORDER — MORPHINE SULFATE 2 MG/ML
4 INJECTION, SOLUTION INTRAMUSCULAR; INTRAVENOUS
Status: COMPLETED | OUTPATIENT
Start: 2017-05-01 | End: 2017-05-01

## 2017-05-01 RX ORDER — MORPHINE SULFATE 2 MG/ML
2.5 INJECTION, SOLUTION INTRAMUSCULAR; INTRAVENOUS
Status: COMPLETED | OUTPATIENT
Start: 2017-05-01 | End: 2017-05-01

## 2017-05-01 RX ORDER — SODIUM CHLORIDE 9 MG/ML
1000 INJECTION, SOLUTION INTRAVENOUS
Status: COMPLETED | OUTPATIENT
Start: 2017-05-01 | End: 2017-05-01

## 2017-05-01 RX ORDER — ONDANSETRON 2 MG/ML
4 INJECTION INTRAMUSCULAR; INTRAVENOUS
Status: COMPLETED | OUTPATIENT
Start: 2017-05-01 | End: 2017-05-01

## 2017-05-01 RX ORDER — SODIUM CHLORIDE 9 MG/ML
125 INJECTION, SOLUTION INTRAVENOUS ONCE
Status: DISCONTINUED | OUTPATIENT
Start: 2017-05-01 | End: 2017-05-01

## 2017-05-01 RX ADMIN — MORPHINE SULFATE 4 MG: 2 INJECTION, SOLUTION INTRAMUSCULAR; INTRAVENOUS at 05:05

## 2017-05-01 RX ADMIN — MORPHINE SULFATE 2.5 MG: 2 INJECTION, SOLUTION INTRAMUSCULAR; INTRAVENOUS at 08:05

## 2017-05-01 RX ADMIN — MORPHINE SULFATE 4 MG: 2 INJECTION, SOLUTION INTRAMUSCULAR; INTRAVENOUS at 04:05

## 2017-05-01 RX ADMIN — FOLIC ACID: 5 INJECTION, SOLUTION INTRAMUSCULAR; INTRAVENOUS; SUBCUTANEOUS at 05:05

## 2017-05-01 RX ADMIN — SODIUM CHLORIDE 1000 ML: 0.9 INJECTION, SOLUTION INTRAVENOUS at 07:05

## 2017-05-01 RX ADMIN — ONDANSETRON 4 MG: 2 INJECTION INTRAMUSCULAR; INTRAVENOUS at 11:05

## 2017-05-01 RX ADMIN — CLOSTRIDIUM TETANI TOXOID ANTIGEN (FORMALDEHYDE INACTIVATED) AND CORYNEBACTERIUM DIPHTHERIAE TOXOID ANTIGEN (FORMALDEHYDE INACTIVATED) 0.5 ML: 5; 2 INJECTION, SUSPENSION INTRAMUSCULAR at 04:05
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
## 2017-05-01 NOTE — IP AVS SNAPSHOT
[facility]  [address]  [place] [address]  Phone: [phone]           Patient Discharge Instructions   Our goal is to set you up for success. This packet includes information on your condition, medications, and your home care.  It will help you care for yourself to prevent having to return to the hospital.     Please ask your nurse if you have any questions.      There are many details to remember when preparing to leave the hospital. Here is what you will need to do:    1. Take your medicine. If you are prescribed medications, review your Medication List on the following pages. You may have new medications to  at the pharmacy and others that you'll need to stop taking. Review the instructions for how and when to take your medications. Talk with your doctor or nurses if you are unsure of what to do.     2. Go to your follow-up appointments. Specific follow-up information is listed in the following pages. Your may be contacted by a nurse or clinical provider about future appointments. Be sure we have all of the phone numbers to reach you. Please contact your provider's office if you are unable to make an appointment.     3. Watch for warning signs. Your doctor or nurse will give you detailed warning signs to watch for and when to call for assistance. These instructions may also include educational information about your condition. If you experience any of warning signs to your health, call your doctor.           [facility]  Unless otherwise directed by your provider, please   contact [facility], our nurse care line   that is available for 24/7 assistance.     [phone] (toll-free)     Registered nurses in the [facility]   provide: appointment scheduling, clinical advisement, health education, and other advisory services.                  ** Verify the list of medication(s) below is accurate and up to date. Carry this with you in case of emergency. If your  medications have changed, please notify your healthcare provider.             Medication List      START taking these medications        Additional Info                      disulfiram 250 mg tablet   Commonly known as:  ANTABUSE   Quantity:  30 tablet   Refills:  11   Dose:  250 mg    Instructions:  Take 1 tablet (250 mg total) by mouth once daily.     Begin Date    AM    Noon    PM    Bedtime       ferrous sulfate 325 (65 FE) MG EC tablet   Refills:  0   Dose:  325 mg    Instructions:  Take 1 tablet (325 mg total) by mouth once daily.     Begin Date    AM    Noon    PM    Bedtime       hydrocodone-acetaminophen 10-325mg  mg Tab   Commonly known as:  NORCO   Quantity:  15 tablet   Refills:  0   Dose:  1 tablet    Last time this was given:  1 tablet on 5/3/2017  5:33 PM   Instructions:  Take 1 tablet by mouth every 6 (six) hours as needed for Pain.     Begin Date    AM    Noon    PM    Bedtime         CONTINUE taking these medications        Additional Info                      cloNIDine 0.2 MG tablet   Commonly known as:  CATAPRES   Quantity:  90 tablet   Refills:  0   Comments:  **Patient requests 90 days supply**    Instructions:  TAKE 1 TABLET BY MOUTH EVERY EVENING     Begin Date    AM    Noon    PM    Bedtime       cyanocobalamin 1,000 mcg/mL injection   Refills:  0   Dose:  1000 mcg    Instructions:  Inject 1,000 mcg into the muscle every 28 days. On the 15th on the month     Begin Date    AM    Noon    PM    Bedtime       dicyclomine 20 mg tablet   Commonly known as:  BENTYL   Refills:  0   Dose:  20 mg    Instructions:  Take 20 mg by mouth 3 (three) times daily as needed (for abdominal cramps).     Begin Date    AM    Noon    PM    Bedtime       docusate sodium 100 MG capsule   Commonly known as:  COLACE   Refills:  0   Dose:  100 mg    Last time this was given:  100 mg on 5/3/2017  8:00 AM   Instructions:  Take 1 capsule (100 mg total) by mouth 2 (two) times daily.     Begin Date    AM    Noon    PM     Bedtime       DORZOLAMIDE-TIMOLOL (PF) OPHT   Refills:  0   Dose:  1 drop    Instructions:  Place 1 drop into both eyes every evening.     Begin Date    AM    Noon    PM    Bedtime       fluticasone 50 mcg/actuation nasal spray   Commonly known as:  FLONASE   Refills:  0   Dose:  1 spray    Instructions:  1 spray by Each Nare route once daily.     Begin Date    AM    Noon    PM    Bedtime       latanoprost 0.005 % ophthalmic solution   Refills:  0   Dose:  1 drop    Instructions:  Place 1 drop into both eyes every evening.     Begin Date    AM    Noon    PM    Bedtime       lisinopril 10 MG tablet   Refills:  0   Dose:  10 mg    Instructions:  Take 10 mg by mouth every morning.     Begin Date    AM    Noon    PM    Bedtime       montelukast 10 mg tablet   Commonly known as:  SINGULAIR   Refills:  0   Dose:  10 mg    Instructions:  Take 10 mg by mouth every evening.     Begin Date    AM    Noon    PM    Bedtime       multivitamin tablet   Commonly known as:  THERAGRAN   Quantity:  30 tablet   Refills:  5   Dose:  1 tablet    Instructions:  Take 1 tablet by mouth once daily.     Begin Date    AM    Noon    PM    Bedtime       omeprazole 40 MG capsule   Commonly known as:  PRILOSEC   Quantity:  30 capsule   Refills:  11   Dose:  40 mg    Instructions:  Take 1 capsule (40 mg total) by mouth once daily.     Begin Date    AM    Noon    PM    Bedtime       ondansetron 8 MG Tbdl   Commonly known as:  ZOFRAN-ODT   Quantity:  60 tablet   Refills:  2   Dose:  8 mg    Instructions:  Take 1 tablet (8 mg total) by mouth every 8 (eight) hours as needed.     Begin Date    AM    Noon    PM    Bedtime       paroxetine 40 MG tablet   Commonly known as:  PAXIL   Quantity:  30 tablet   Refills:  0   Dose:  40 mg    Last time this was given:  40 mg on 5/3/2017  8:00 AM   Instructions:  Take 1 tablet (40 mg total) by mouth once daily.     Begin Date    AM    Noon    PM    Bedtime       potassium chloride 10 MEQ Cpsr   Commonly known  as:  MICRO-K   Refills:  0   Dose:  10 mEq    Instructions:  Take 10 mEq by mouth once daily.     Begin Date    AM    Noon    PM    Bedtime       promethazine 25 MG suppository   Commonly known as:  PHENERGAN   Refills:  0   Dose:  25 mg    Instructions:  Place 25 mg rectally every 6 (six) hours as needed for Nausea.     Begin Date    AM    Noon    PM    Bedtime       quetiapine 100 MG Tab   Commonly known as:  SEROQUEL   Quantity:  30 tablet   Refills:  0   Dose:  100 mg    Instructions:  Take 1 tablet (100 mg total) by mouth every evening.     Begin Date    AM    Noon    PM    Bedtime       thiamine mononitrate (vit B1) 100 mg Tab   Quantity:  60 tablet   Refills:  0   Dose:  100 mg    Instructions:  Take 1 tablet (100 mg total) by mouth 2 (two) times daily.     Begin Date    AM    Noon    PM    Bedtime       venlafaxine 75 MG 24 hr capsule   Commonly known as:  EFFEXOR-XR   Quantity:  7 capsule   Refills:  0   Dose:  75 mg    Instructions:  Take 1 capsule (75 mg total) by mouth once daily.     Begin Date    AM    Noon    PM    Bedtime         STOP taking these medications     furosemide 40 MG tablet   Commonly known as:  LASIX       ibuprofen 800 MG tablet   Commonly known as:  ADVIL,MOTRIN       lorazepam 0.5 MG tablet   Commonly known as:  ATIVAN            Where to Get Your Medications      You can get these medications from any pharmacy     Bring a paper prescription for each of these medications     disulfiram 250 mg tablet    hydrocodone-acetaminophen 10-325mg  mg Tab    omeprazole 40 MG capsule       You don't need a prescription for these medications     ferrous sulfate 325 (65 FE) MG EC tablet                  Please bring to all follow up appointments:    1. A copy of your discharge instructions.  2. All medicines you are currently taking in their original bottles.  3. Identification and insurance card.    Please arrive 15 minutes ahead of scheduled appointment time.    Please call 24 hours in  advance if you must reschedule your appointment and/or time.        Your Scheduled Appointments     May 11, 2017  9:00 AM CDT   Post OP with DO [place] - General Surgery ([facility] [place])    [address]  4th [address]  [facility] [address] [phone]            May 15, 2017  9:00 AM CDT   Hospital Follow Up with MD [place] - Internal Medicine Priority ([facility])    [address]  [facility] [address] [phone]              Follow-up Information     Follow up with [facility] Internal Medicine [facility]. Call on 5/15/2017.    Specialty:  Priority Care    Why:  Lab 8:30am and clinic appt at 9am    Contact information:    [address] [facility] [address]  [facility] [address] [phone]        Follow up with Raina Ron DO On 5/11/2017.    Specialties:  General Surgery, Surgery    Why:  For wound re-check and staple removal, appt at 9am    Contact information:    [address] [facility]  [address]  [facility] [id number] [phone]          Discharge Instructions     Future Orders    Activity as tolerated     Call MD for:  persistent dizziness, light-headedness, or visual disturbances     Call MD for:  redness, tenderness, or signs of infection (pain, swelling, redness, odor or green/yellow discharge around incision site)     Diet general     Questions:    Total calories:      Fat restriction, if any:      Protein restriction, if any:      Na restriction, if any:      Fluid restriction:      Additional restrictions:        Discharge References/Attachments     HEART FAILURE, WHAT IS (ENGLISH)    HEART FAILURE: BEING ACTIVE (ENGLISH)    HEART FAILURE: TRACKING YOUR WEIGHT (ENGLISH)    HEART FAILURE: WARNING SIGNS OF A FLARE-UP (ENGLISH)    HEART FAILURE: MAKING CHANGES TO YOUR DIET (ENGLISH)        Primary Diagnosis     Your primary diagnosis was:  Low Sodium Levels      Admission Information     Date & Time Provider Department CSN     "5/1/2017  4:05 PM Luis A Sun MD [facility] [id number]      Care Providers     Provider Role Specialty Primary office phone    Luis A Sun MD Attending Provider Hospitalist [phone]    Raina Ron,  Consulting Physician  General Surgery [phone]      Your Vitals Were     BP Pulse Temp Resp Height Weight    135/78 61 97.6 °F (36.4 °C) (Oral) 18 5' 5" (1.651 m) 66.9 kg (147 lb 7.8 oz)    Last Period SpO2 BMI          04/08/2017 100% 24.54 kg/m2        Recent Lab Values        6/23/2015                           7:58 AM           A1C 4.5                       Pending Labs     Order Current Status    Antibody identification In process    Prepare RBC 1 Unit Preliminary result    Prepare RBC 1 Unit Preliminary result      Allergies as of 5/3/2017        Reactions    Penicillins Hives      Advance Directives     An advance directive is a document which, in the event you are no longer able to make decisions for yourself, tells your healthcare team what kind of treatment you do or do not want to receive, or who you would like to make those decisions for you.  If you do not currently have an advance directive, [facility] encourages you to create one.  For more information call:  [phone]), [phone] ([phone]),  or log on to [email or web address].        Language Assistance Services     ATTENTION: Language assistance services are available, free of charge. Please call [phone].      ATENCIÓN: Si habla español, tiene a caba disposición servicios gratuitos de asistencia lingüística. Llame al [phone].     CHÚ Ý: N?u b?n nói Ti?ng Vi?t, có các d?ch v? h? tr? ngôn ng? mi?n phí dành cho b?n. G?i s? [phone].        Blood Transfusion Reaction Signs and Symptoms     The blood you have received has been matched for you as carefully as possible. Most patients who receive a blood transfusion do not experience problems. However, there can be a delayed " reaction that happens a few weeks after your blood transfusion. Contact your physician immediately if you experience any NEW SYMPTOMS listed below:     Fever greater than 100.4 degrees    Chills   Yellow color to your skin or eyes(Jaundice)   Back pain, chest pain, or pain at the infusion site   Weakness (more than usual)   Discomfort or uneasiness more than usual (Malaise)   Nausea or vomiting   Shortness of breath, wheezing, or coughing   Higher or lower blood pressure than normal   Skin rash, itching, skin redness, or localized swelling (example: hands or feet)   Urinating less than normal   Urine appears reddish or orange and is darker than normal      Remember that some these signs may already exist for you--such as having chronic back pain or high blood pressure. You only need to look for and report to your doctor any new occurrences since your blood transfusion that are of concern.        Heart Failure Education       Heart Failure: Being Active  You have a condition called heart failure. Being active doesnt mean that you have to wear yourself out. Even a little movement each day helps to strengthen your heart. If you cant get out to exercise, you can do simple stretching and strengthening exercises at home. These are good ways to keep you well-conditioned and prevent you and your heart from becoming excessively weak.    Ideas to get you started  · Add a little movement to things you do now. Walk to mail letters. Park your car at the far end of the parking lot and walk to the store. Walk up a flight of stairs instead of taking the elevator.  · Choose activities you enjoy. You might walk, swim, or ride an exercise bike. Things like gardening and washing the car count, too. Other possibilities include: washing dishes, walking the dog, walking around the mall, and doing aerobic activities with friends.  · Join a group exercise program at a [facility] or [facility], a senior center, or a community center. Or  look into a hospital cardiac rehabilitation program. Ask your doctor if you qualify.  Tips to keep you going  · Get up and get dressed each day. Go to a coffee shop and read a newspaper or go somewhere that you'll be in the presence of other active people. Youll feel more like being active.  · Make a plan. Choose one or more activities that you enjoy and that you can easily do. Then plan to do at least one each day. You might write your plan on a calendar.  · Go with a friend or a group if you like company. This can help you feel supported and stay motivated, too.  · Plan social events that you enjoy. This will keep you mentally engaged as well as physically motivated to do things you find pleasure in.  For your safety  · Talk with your healthcare provider before starting an exercise program.  · Exercise indoors when its too hot or too cold outside, or when the air quality is poor. Try walking at a shopping mall.  · Wear socks and sturdy shoes to maintain your balance and prevent falls.  · Start slowly. Do a few minutes several times a day at first. Increase your time and speed little by little.  · Stop and rest whenever you feel tired or get short of breath.  · Dont push yourself on days when you dont feel well.  Date Last Reviewed: 3/20/2016  © 0525-5409 [employer]. [address] [place] [address], [place] [address]. All rights reserved. This information is not intended as a substitute for professional medical care. Always follow your healthcare professional's instructions.              Heart Failure: Evaluating Your Heart  You have a condition called heart failure. To evaluate your condition, your doctor will examine you, ask questions, and do some tests. Along with looking for signs of heart failure, the doctor looks for any other health problems that may have led to heart failure. The results of your evaluation will help your doctor form a treatment plan.  Health history and physical exam  Your visit  will start with a health history. Tell the doctor about any symptoms youve noticed and about all medicines you take. Then youll have a physical exam. This includes listening to your heartbeat and breathing. Youll also be checked for swelling (edema) in your legs and neck. When you have fluid buildup or fluid in the lungs, it may be called congestive heart failure.  Diagnosing heart failure     During an echocardiogram, sound waves bounce off the heart. These are converted into a picture on the screen.   The following may be done to help your doctor form a diagnosis:  · X-rays show the size and shape of your heart. These pictures can also show fluid in your lungs.  · An electrocardiogram (ECG or EKG) shows the pattern of your heartbeat. Small pads (electrodes) are placed on your chest, arms, and legs. Wires connect the pads to the ECG machine, which records your hearts electrical signals. This can give the doctor information about heart function.  · An echocardiogram uses ultrasound waves to show the structure and movement of your heart muscle. This shows how well the heart pumps. It also shows the thickness of the heart walls, and if the heart is enlarged. It is one of the most useful, non-invasive tests as it provides information about the heart's general function. This helps your doctor make treatment decisions.  · Lab tests evaluate small amounts of blood or urine for signs of problems. A BNP lab test can help diagnose and evaluate heart failure. BNP stands for B-type natriuretic peptide. The ventricles secrete more BNP when heart failure worsens. Lab tests can also provide information about metabolic dysfunction or heart dysfunction.  Your treatment plan  Based on the results of your evaluation and tests, your doctor will develop a treatment plan. This plan is designed to relieve some of your heart failure symptoms and help make you more comfortable. Your treatment plan may include:  · Medicine to help your  heart work better and improve your quality of life  · Changes in what you eat and drink to help prevent fluid from backing up in your body  · Daily monitoring of your weight and heart failure symptoms to see how well your treatment plan is working  · Exercise to help you stay healthy  · Help with quitting smoking  · Emotional and psychological support to help adjust to the changes  · Referrals to other specialists to make sure you are being treated comprehensively  Date Last Reviewed: 3/21/2016  © 8685-8232 [employer]. [address] [place] [address], [place] [address]. All rights reserved. This information is not intended as a substitute for professional medical care. Always follow your healthcare professional's instructions.              Heart Failure: Making Changes to Your Diet  You have a condition called heart failure. When you have heart failure, excess fluid is more likely to build up in your body because your heart isn't working well. This makes the heart work harder to pump blood. Fluid buildup causes symptoms such as shortness of breath and swelling (edema). This is often referred to as congestive heart failure or CHF. Controlling the amount of salt (sodium) you eat may help stop fluid from building up. Your doctor may also tell you to reduce the amount of fluid you drink.  Reading food labels    Your healthcare provider will tell you how much sodium you can eat each day. Read food labels to keep track. Keep in mind that certain foods are high in salt. These include canned, frozen, and processed foods. Check the amount of sodium in each serving. Watch out for high-sodium ingredients. These include MSG (monosodium glutamate), baking soda, and sodium phosphate.   Eating less salt  Give yourself time to get used to eating less salt. It may take a little while. Here are some tips to help:  · Take the saltshaker off the table. Replace it with salt-free herb mixes and spices.  · Eat fresh or plain frozen  vegetables. These have much less salt than canned vegetables.  · Choose low-sodium snacks like sodium-free pretzels, crackers, or air-popped popcorn.  · Dont add salt to your food when youre cooking. Instead, season your foods with pepper, lemon, garlic, or onion.  · When you eat out, ask that your food be cooked without added salt.  · Avoid eating fried foods as these often have a great deal of salt.  If youre told to limit fluids  You may need to limit how much fluid you have to help prevent swelling. This includes anything that is liquid at room temperature, such as ice cream and soup. If your doctor tells you to limit fluid, try these tips:  · Measure drinks in a measuring cup before you drink them. This will help you meet daily goals.  · Chill drinks to make them more refreshing.  · Suck on frozen lemon wedges to quench thirst.  · Only drink when youre thirsty.  · Chew sugarless gum or suck on hard candy to keep your mouth moist.  · Weigh yourself daily to know if your body's fluid content is rising.  My sodium goal  Your healthcare provider may give you a sodium goal to meet each day. This includes sodium found in food as well as salt that you add. My goal is to eat no more than ___________ mg of sodium per day.     When to call your doctor  Call your doctor right away if you have any symptoms of worsening heart failure. These can include:  · Sudden weight gain  · Increased swelling of your legs or ankles  · Trouble breathing when youre resting or at night  · Increase in the number of pillows you have to sleep on  · Chest pain, pressure, discomfort, or pain in the jaw, neck, or back   Date Last Reviewed: 3/21/2016  © 0193-1519 [employer] [address] [place] [address], [place] [address]. All rights reserved. This information is not intended as a substitute for professional medical care. Always follow your healthcare professional's instructions.              Heart Failure: Medicines to Help Your  Heart    You have a condition called heart failure (also known as congestive heart failure, or CHF). Your doctor will likely prescribe medicines for heart failure and any underlying health problems you have. Most heart failure patients take one or more types of medicinen. Your healthcare provider will work to find the combination of medicines that works best for you.  Heart failure medicines  Here are the most common heart failure medicines:  · ACE inhibitors lower blood pressure and decrease strain on the heart. This makes it easier for the heart to pump. Angiotensin receptor blockers have similar effects. These are prescribed for some patients instead of ACE inhibitors.  · Beta-blockers relieve stress on the heart. They also improve symptoms. They may also improve the heart's pumping action over time.  · Diuretics (also called water pills) help rid your body of excess water. This can help rid your body of swelling (edema). Having less fluid to pump means your heart doesnt have to work as hard. Some diuretics make your body lose a mineral called potassium. Your doctor will tell you if you need to take supplements or eat more foods high in potassium.  · Digoxin helps your heart pump with more strength. This helps your heart pump more blood with each beat. So, more oxygen-rich blood travels to the rest of the body.  · Aldosterone antagonists help alter hormones and decrease strain on the heart.  · Hydralazine and nitrates are two separate medicines used together to treat heart failure. They may come in one combination pill. They lower blood pressure and decrease how hard the heart has to pump.  Medicines for related conditions  Controlling other heart problems helps keep heart failure under control, too. Depending on other heart problems you have, medicines may be prescribed to:  · Lower blood pressure (antihypertensives).  · Lower cholesterol levels (statins).  · Prevent blood clots (anticoagulants or  aspirin).  · Keep the heartbeat steady (antiarrhythmics).  Date Last Reviewed: 3/5/2016  © 6798-2834 [employer]. [address] [place] [address], [place] [address]. All rights reserved. This information is not intended as a substitute for professional medical care. Always follow your healthcare professional's instructions.              Heart Failure: Procedures That May Help    The heart is a muscle that pumps oxygen-rich blood to all parts of the body. When you have heart failure, the heart is not able to pump as well as it should. Blood and fluid may back up into the lungs (congestive heart failure), and some parts of the body dont get enough oxygen-rich blood to work normally. These problems lead to the symptoms of heart failure.     Certain procedures may help the heart pump better in some cases of heart failure. Some procedures are done to treat health problems that may have caused the heart failure such as coronary artery disease or heart rhythm problems. For more serious heart failure, other options are available.  Treating artery and valve problems  If you have coronary artery disease or valve disease, procedures may be done to improve blood flow. This helps the heart pump better, which can improve heart failure symptoms. First, your doctor may do a cardiac catheterization to help detect clogged blood vessels or valve damage. During this procedure, a  thin tube (catheter) in inserted into a blood vessel and guided to the heart. There a dye is injected and a special type of X-ray (angiogram) is taken of the blood vessels. Procedures to open a blocked artery or fix damaged valves can also be done using catheterization.  · Angioplasty uses a balloon-tipped instrument at the end of the catheter. The balloon is inflated to widen the narrowed artery. In many cases, a stent is expanded to further support the narrowed artery. A stent is a metal mesh tube.  · Valve surgery repairs or replacement of faulty  valves can also be done during catheterization so blood can flow properly through the chambers of the heart.  Bypass surgery is another option to help treat blocked arteries. It uses a healthy blood vessel from elsewhere in the body. The healthy blood vessel is attached above and below the blocked area so that blood can flow around the blocked artery.  Treating heart rhythm problems  A device may be placed in the chest to help a weak heart maintain a healthy, heartbeat so the heart can pump more effectively:  · Pacemaker. A pacemaker is an implanted device that regulates your heartbeat electronically. It monitors your heart's rhythm and generates a painless electric impulse that helps the heart beat in a regular rhythm. A pacemaker is programmed to meet your specific heart rhythm needs.  · Biventricular pacing/cardiac resynchronization therapy. A type of pacemaker that paces both pumping chambers of the heart at the same time to coordinate contractions and to improve the heart's function. Some people with heart failure are candidates for this therapy.  · Implantable cardioverter defibrillator. A device similar to a pacemaker that senses when the heart is beating too fast and delivers an electrical shock to convert the fast rhythm to a normal rhythm. This can be a life saving device.  In severe cases  In more serious cases of heart failure when other treatments no longer work, other options may include:  · Ventricular assist devices (VADs). These are mechanical devices used to take over the pumping function for one or both of the heart's ventricles, or pumping chambers. A VAD may be necessary when heart failure progresses to the point that medicines and other treatments no longer help. In some cases, a VAD may be used as a bridge to transplant.  · Heart transplant. This is replacing the diseased heart with a healthy one from a donor. This is an option for a few people who are very sick. A heart transplant is very  serious and not an option for all patients. Your doctor can tell you more.  Date Last Reviewed: 3/20/2016  © 8489-5684 [employer]. [address] [place] [address], [place] [address]. All rights reserved. This information is not intended as a substitute for professional medical care. Always follow your healthcare professional's instructions.              Heart Failure: Tracking Your Weight  You have a condition called heart failure. When you have heart failure, a sudden weight gain or a steady rise in weight is a warning sign that your body is retaining too much water and salt. This could mean your heart failure is getting worse. If left untreated, it can cause problems for your lungs and result in shortness of breath. Weighing yourself each day is the best way to know if youre retaining water. If your weight goes up quickly, call your doctor. You will be given instructions on how to get rid of the excess water. You will likely need medicines and to avoid salt. This will help your heart work better.  Call your doctor if you gain more than 2 pounds in 1 day, more than 5 pounds in 1 week, or whatever weight gain you were told to report by your doctor. This is often a sign of worsening heart failure and needs to be evaluated and treated. Your doctor will tell you what to do next.   Tips for weighing yourself    · Weigh yourself at the same time each morning, wearing the same clothes. Weigh yourself after urinating and before eating.  · Use the same scale each day. Make sure the numbers are easy to read. Put the scale on a flat, hard surface -- not on a rug or carpet.  · Do not stop weighing yourself. If you forget one day, weigh again the next morning.  How to use your weight chart  · Keep your weight chart near the scale. Write your weight on the chart as soon as you get off the scale.  · Fill in the month and the start date on the chart. Then write down your weight each day. Your chart will look like  this:    · If you miss a day, leave the space blank. Weigh yourself the next day and write your weight in the next space.  · Take your weight chart with you when you go to see your doctor.  Date Last Reviewed: 3/20/2016  © 1106-1015 [employer]. [address] [place] [id number]. All rights reserved. This information is not intended as a substitute for professional medical care. Always follow your healthcare professional's instructions.              Heart Failure: Warning Signs of a Flare-Up  You have a condition called heart failure. Once you have heart failure, flare-ups can happen. Below are signs that can mean your heart failure is getting worse. If you notice any of these warning signs, call your healthcare provider.  Swelling    · Your feet, ankles, or lower legs get puffier.  · You notice skin changes on your lower legs.  · Your shoes feel too tight.  · Your clothes are tighter in the waist.  · You have trouble getting rings on or off your fingers.  Shortness of breath  · You have to breathe harder even when youre doing your normal activities or when youre resting.  · You are short of breath walking up stairs or even short distances.  · You wake up at night short of breath or coughing.  · You need to use more pillows or sit up to sleep.  · You wake up tired or restless.  Other warning signs  · You feel weaker, dizzy, or more tired.  · You have chest pain or changes in your heartbeat.  · You have a cough that wont go away.  · You cant remember things or dont feel like eating.  Tracking your weight  Gaining weight is often the first warning sign that heart failure is getting worse. Gaining even a few pounds can be a sign that your body is retaining excess water and salt. Weighing yourself each day in the morning after you urinate and before you eat, is the best way to know if you're retaining water. Get a scale that is easy to read and make sure you wear the same clothes and use the  same scale every time you weigh. Your healthcare provider will show you how to track your weight. Call your doctor if you gain more than 2 pounds in 1 day, 5 pounds in 1 week, or whatever weight gain you were told to report by your doctor. This is often a sign of worsening heart failure and needs to be evaluated and treated before it compromises your breathing. Your doctor will tell you what to do next.    Date Last Reviewed: 3/15/2016  © 9823-9849 [employer]. [address] [place] [address]. All rights reserved. This information is not intended as a substitute for professional medical care. Always follow your healthcare professional's instructions.              [facility] Sign-Up     Activating your [facility] account is as easy as 1-2-3!     1) Visit [email or web address], select Sign Up Now, enter this activation code and your date of birth, then select Next.  [email or web address]  Expires: 6/17/2017  6:52 PM      2) Create a username and password to use when you visit [facility] in the future and select a security question in case you lose your password and select Next.    3) Enter your e-mail address and click Sign Up!    Additional Information  If you have questions, please e-mail [email or web address] or call [phone] to talk to our [facility] staff. Remember, [facility] is NOT to be used for urgent needs. For medical emergencies, dial 911.          [facility] complies with applicable Federal civil rights laws and does not discriminate on the basis of race, color, national origin, age, disability, or sex.

## 2017-05-01 NOTE — ED PROVIDER NOTES
Encounter Date: 2017       History     Chief Complaint   Patient presents with    Fatigue     felt weak and dizzy, got out of bed and fell, hit the back of her head with +laceration, CBG 52, 1 amp of D50     Review of patient's allergies indicates:   Allergen Reactions    Penicillins Hives     HPI Comments: 6-year-old female presents to the emergency department by EMS, after she was found on the floor by her sons.  Patient can't tell me exactly what happened.  She apparently told the EMS that she was feeling weak and dizzy, and hit the back of her head.  She had about a 5 cm laceration to the posterior occiput, was put into a c-collar by EMS and arrived C-collared and backboarded.  On arrival are triage obtained a blood glucose which was 52.  She was given 1 amp of D50.  Patient is complaining of headache and neck pain.  She says she was drinking this morning.  She can't remember exactly what happened.  She denies having chest pain or shortness of breath.  She is extremely uncomfortable on the c-collar and backboard.    The history is provided by the patient. The history is limited by the condition of the patient.     Past Medical History:   Diagnosis Date    Alcohol abuse 10/7/2015    Breast calcification, left     Pt states this has been worked up, she received a biopsy in the past to confirm calcifications were from scar tissue from when she had breast reduction surgery.      Chronic diastolic congestive heart failure     Encounter for blood transfusion     GERD (gastroesophageal reflux disease)     Glaucoma     Hypertension     Hyponatremia 10/2015    Na 109. attributed to polydipsia and alcohol abuse.  suffered a seizure in the ICU .    Insomnia     Malingering 2016    PTSD (post-traumatic stress disorder)      Past Surgical History:   Procedure Laterality Date    bile fistula      breast reduction       SECTION      CHOLECYSTECTOMY  after     GASTRIC BYPASS    and 2008     Family History   Problem Relation Age of Onset    Diabetes Mother     Lupus Mother     Diabetes Maternal Grandmother     Crohn's disease Cousin      Social History   Substance Use Topics    Smoking status: Current Every Day Smoker     Packs/day: 1.00     Years: 4.00     Types: Cigarettes    Smokeless tobacco: Never Used    Alcohol use Yes      Comment: last night- vodka and cranberry juice     Review of Systems   Unable to perform ROS: Mental status change       Physical Exam   Initial Vitals   BP Pulse Resp Temp SpO2   -- -- -- -- --            Physical Exam    Nursing note and vitals reviewed.  Constitutional: She appears well-developed and well-nourished. She appears distressed.   Smells of alcohol   HENT:   Head: Normocephalic and atraumatic.   Right Ear: External ear normal.   Left Ear: External ear normal.   Eyes: EOM are normal. Pupils are equal, round, and reactive to light.   Neck: Normal range of motion. Neck supple.   Cardiovascular: Normal rate and normal heart sounds.   Pulmonary/Chest: Breath sounds normal.   Abdominal: Soft. She exhibits no distension. There is no tenderness.   Musculoskeletal: Normal range of motion.   Neurological: She is alert and oriented to person, place, and time. She has normal strength. No cranial nerve deficit.   Skin: Skin is warm and dry.   Has 5cm linear laceration, approx 0.5 deep on the occiput  Some bogginess around the laceration  No midline neck tenderness   Psychiatric:   Agitated         ED Course   Lac Repair  Date/Time: 5/1/2017 4:49 PM  Performed by: IDALIA MONTOYA  Authorized by: IDALIA MONTOYA   Body area: head/neck  Location details: scalp  Laceration length: 5 cm  Tendon involvement: none  Nerve involvement: none  Vascular damage: no  Patient sedated: no  Amount of cleaning: standard  Debridement: none  Skin closure: staples  Number of sutures: 5  Technique: simple  Approximation: close  Approximation difficulty: simple        Labs  Reviewed   POCT GLUCOSE - Abnormal; Notable for the following:        Result Value    POCT Glucose 52 (*)     All other components within normal limits   CBC W/ AUTO DIFFERENTIAL   COMPREHENSIVE METABOLIC PANEL   ALCOHOL,MEDICAL (ETHANOL)   TROPONIN I   POCT URINE PREGNANCY   POCT GLUCOSE          X-Rays:   Independently Interpreted Readings:   Chest X-Ray: CVC OK, no PTX     Medical Decision Making:   Initial Assessment:   46 year-old female with polysubstance abuse and alcohol abuse presents after a syncopal episode fall with a laceration back of her head, brought in by EMS with a c-collar on.  Differential Diagnosis:   Skull fracture, intracranial hemorrhage, C-spine fracture, intoxication, metabolic derangement, renal failure with hypoglycemia  Clinical Tests:   Lab Tests: Ordered and Reviewed  The following lab test(s) were unremarkable: CMP and CBC  ED Management:  Patient was given morphine x 2  Banana bag  Tetanus updated  HCT/Cspine was negative  CXR and pelvis Xray negative  Patient's laceration was cleaned and stapled with 5 staples    Unable to get peripheral or EJ for access. Anesthesia called for central line to treat hyponatremia. Patient will need admission to unreferred service, with normal saline. Patient signed out to Dr Lefort at 7:00pm.   Pain, nausea improved with medications.  Will admit for further management.  VSS.                   ED Course     Clinical Impression:   The encounter diagnosis was Trauma.    Disposition:   Disposition: Admitted  Condition: Stable       Guy J. Lefort, MD  05/01/17 0411

## 2017-05-02 PROBLEM — E87.1 HYPONATREMIA WITH DECREASED SERUM OSMOLALITY: Chronic | Status: ACTIVE | Noted: 2017-05-01

## 2017-05-02 PROBLEM — R55 SYNCOPE AND COLLAPSE: Status: ACTIVE | Noted: 2017-05-02

## 2017-05-02 LAB
ALBUMIN SERPL BCP-MCNC: 3.3 G/DL
ALP SERPL-CCNC: 50 U/L
ALT SERPL W/O P-5'-P-CCNC: 10 U/L
ANION GAP SERPL CALC-SCNC: 5 MMOL/L
ANION GAP SERPL CALC-SCNC: 7 MMOL/L
ANISOCYTOSIS BLD QL SMEAR: SLIGHT
APTT BLDCRRT: 26.8 SEC
AST SERPL-CCNC: 19 U/L
BASOPHILS # BLD AUTO: 0 K/UL
BASOPHILS NFR BLD: 0 %
BILIRUB SERPL-MCNC: 0.5 MG/DL
BLD GP AB SCN CELLS X3 SERPL QL: NORMAL
BUN SERPL-MCNC: 14 MG/DL
BUN SERPL-MCNC: 15 MG/DL
BUN SERPL-MCNC: 16 MG/DL
CALCIUM SERPL-MCNC: 8.4 MG/DL
CALCIUM SERPL-MCNC: 8.5 MG/DL
CALCIUM SERPL-MCNC: 8.6 MG/DL
CHLORIDE SERPL-SCNC: 100 MMOL/L
CHLORIDE SERPL-SCNC: 96 MMOL/L
CHLORIDE SERPL-SCNC: 96 MMOL/L
CO2 SERPL-SCNC: 24 MMOL/L
CO2 SERPL-SCNC: 26 MMOL/L
CREAT SERPL-MCNC: 0.7 MG/DL
CREAT SERPL-MCNC: 0.9 MG/DL
CREAT SERPL-MCNC: 1 MG/DL
CREAT SERPL-MCNC: 1 MG/DL
DAT IGG-SP REAG RBC-IMP: NORMAL
DIFFERENTIAL METHOD: ABNORMAL
EOSINOPHIL # BLD AUTO: 0 K/UL
EOSINOPHIL NFR BLD: 0.2 %
ERYTHROCYTE [DISTWIDTH] IN BLOOD BY AUTOMATED COUNT: 18.6 %
EST. GFR  (AFRICAN AMERICAN): >60 ML/MIN/1.73 M^2
EST. GFR  (NON AFRICAN AMERICAN): >60 ML/MIN/1.73 M^2
GLUCOSE SERPL-MCNC: 217 MG/DL
GLUCOSE SERPL-MCNC: 93 MG/DL
GLUCOSE SERPL-MCNC: 93 MG/DL
GLUCOSE SERPL-MCNC: 96 MG/DL
GLUCOSE SERPL-MCNC: 96 MG/DL
GLUCOSE SERPL-MCNC: 97 MG/DL
HCT VFR BLD AUTO: 21.5 %
HCT VFR BLD AUTO: 22.6 %
HGB BLD-MCNC: 6.6 G/DL
HGB BLD-MCNC: 6.8 G/DL
HYPOCHROMIA BLD QL SMEAR: ABNORMAL
INR PPP: 1
LYMPHOCYTES # BLD AUTO: 1.3 K/UL
LYMPHOCYTES NFR BLD: 29.7 %
MAGNESIUM SERPL-MCNC: 1.6 MG/DL
MCH RBC QN AUTO: 19.8 PG
MCHC RBC AUTO-ENTMCNC: 30.7 %
MCV RBC AUTO: 65 FL
MONOCYTES # BLD AUTO: 0.4 K/UL
MONOCYTES NFR BLD: 9.9 %
NEUTROPHILS # BLD AUTO: 2.7 K/UL
NEUTROPHILS NFR BLD: 60.2 %
OVALOCYTES BLD QL SMEAR: ABNORMAL
PHOSPHATE SERPL-MCNC: 4.1 MG/DL
PLATELET # BLD AUTO: 185 K/UL
PLATELET BLD QL SMEAR: ABNORMAL
PMV BLD AUTO: 9 FL
POCT GLUCOSE: 148 MG/DL (ref 70–110)
POCT GLUCOSE: 69 MG/DL (ref 70–110)
POCT GLUCOSE: 81 MG/DL (ref 70–110)
POCT GLUCOSE: 82 MG/DL (ref 70–110)
POCT GLUCOSE: 87 MG/DL (ref 70–110)
POCT GLUCOSE: 94 MG/DL (ref 70–110)
POIKILOCYTOSIS BLD QL SMEAR: ABNORMAL
POTASSIUM SERPL-SCNC: 3.7 MMOL/L
POTASSIUM SERPL-SCNC: 3.7 MMOL/L
POTASSIUM SERPL-SCNC: 4.1 MMOL/L
POTASSIUM SERPL-SCNC: 4.4 MMOL/L
PROT SERPL-MCNC: 5.9 G/DL
PROTHROMBIN TIME: 10.2 SEC
RBC # BLD AUTO: 3.33 M/UL
SODIUM SERPL-SCNC: 127 MMOL/L
SODIUM SERPL-SCNC: 127 MMOL/L
SODIUM SERPL-SCNC: 131 MMOL/L
STOMATOCYTES BLD QL SMEAR: PRESENT
TARGETS BLD QL SMEAR: ABNORMAL
WBC # BLD AUTO: 4.45 K/UL

## 2017-05-02 PROCEDURE — 84100 ASSAY OF PHOSPHORUS: CPT

## 2017-05-02 PROCEDURE — 86850 RBC ANTIBODY SCREEN: CPT

## 2017-05-02 PROCEDURE — 85018 HEMOGLOBIN: CPT

## 2017-05-02 PROCEDURE — 85025 COMPLETE CBC W/AUTO DIFF WBC: CPT

## 2017-05-02 PROCEDURE — 86900 BLOOD TYPING SEROLOGIC ABO: CPT

## 2017-05-02 PROCEDURE — 86901 BLOOD TYPING SEROLOGIC RH(D): CPT

## 2017-05-02 PROCEDURE — 86922 COMPATIBILITY TEST ANTIGLOB: CPT

## 2017-05-02 PROCEDURE — 25000003 PHARM REV CODE 250: Performed by: NURSE PRACTITIONER

## 2017-05-02 PROCEDURE — 85730 THROMBOPLASTIN TIME PARTIAL: CPT

## 2017-05-02 PROCEDURE — 11000001 HC ACUTE MED/SURG PRIVATE ROOM

## 2017-05-02 PROCEDURE — 85014 HEMATOCRIT: CPT

## 2017-05-02 PROCEDURE — 80048 BASIC METABOLIC PNL TOTAL CA: CPT

## 2017-05-02 PROCEDURE — 83735 ASSAY OF MAGNESIUM: CPT

## 2017-05-02 PROCEDURE — 80053 COMPREHEN METABOLIC PANEL: CPT

## 2017-05-02 PROCEDURE — 94761 N-INVAS EAR/PLS OXIMETRY MLT: CPT

## 2017-05-02 PROCEDURE — 86870 RBC ANTIBODY IDENTIFICATION: CPT

## 2017-05-02 PROCEDURE — 63600175 PHARM REV CODE 636 W HCPCS: Performed by: NURSE PRACTITIONER

## 2017-05-02 PROCEDURE — 36415 COLL VENOUS BLD VENIPUNCTURE: CPT

## 2017-05-02 PROCEDURE — 86880 COOMBS TEST DIRECT: CPT

## 2017-05-02 PROCEDURE — 85610 PROTHROMBIN TIME: CPT

## 2017-05-02 PROCEDURE — 25000003 PHARM REV CODE 250: Performed by: HOSPITALIST

## 2017-05-02 RX ORDER — HYDROCODONE BITARTRATE AND ACETAMINOPHEN 500; 5 MG/1; MG/1
TABLET ORAL
Status: DISCONTINUED | OUTPATIENT
Start: 2017-05-02 | End: 2017-05-03 | Stop reason: HOSPADM

## 2017-05-02 RX ORDER — SODIUM CHLORIDE 9 MG/ML
INJECTION, SOLUTION INTRAVENOUS CONTINUOUS
Status: DISCONTINUED | OUTPATIENT
Start: 2017-05-02 | End: 2017-05-02

## 2017-05-02 RX ORDER — SODIUM CHLORIDE 9 MG/ML
INJECTION, SOLUTION INTRAVENOUS CONTINUOUS
Status: DISCONTINUED | OUTPATIENT
Start: 2017-05-02 | End: 2017-05-03

## 2017-05-02 RX ORDER — HYDROXYZINE HYDROCHLORIDE 25 MG/1
25 TABLET, FILM COATED ORAL 3 TIMES DAILY PRN
Status: DISCONTINUED | OUTPATIENT
Start: 2017-05-02 | End: 2017-05-03 | Stop reason: HOSPADM

## 2017-05-02 RX ORDER — ACETAMINOPHEN 325 MG/1
650 TABLET ORAL EVERY 8 HOURS PRN
Status: DISCONTINUED | OUTPATIENT
Start: 2017-05-02 | End: 2017-05-03 | Stop reason: HOSPADM

## 2017-05-02 RX ORDER — DEXTROSE MONOHYDRATE AND SODIUM CHLORIDE 5; .9 G/100ML; G/100ML
INJECTION, SOLUTION INTRAVENOUS CONTINUOUS
Status: DISCONTINUED | OUTPATIENT
Start: 2017-05-02 | End: 2017-05-02

## 2017-05-02 RX ORDER — HYDROCODONE BITARTRATE AND ACETAMINOPHEN 5; 325 MG/1; MG/1
1 TABLET ORAL EVERY 6 HOURS PRN
Status: DISCONTINUED | OUTPATIENT
Start: 2017-05-02 | End: 2017-05-02

## 2017-05-02 RX ORDER — PANTOPRAZOLE SODIUM 40 MG/1
40 TABLET, DELAYED RELEASE ORAL DAILY
Status: DISCONTINUED | OUTPATIENT
Start: 2017-05-02 | End: 2017-05-03 | Stop reason: HOSPADM

## 2017-05-02 RX ORDER — LISINOPRIL 10 MG/1
10 TABLET ORAL EVERY MORNING
Status: DISCONTINUED | OUTPATIENT
Start: 2017-05-02 | End: 2017-05-02

## 2017-05-02 RX ORDER — RAMELTEON 8 MG/1
8 TABLET ORAL NIGHTLY PRN
Status: DISCONTINUED | OUTPATIENT
Start: 2017-05-02 | End: 2017-05-03 | Stop reason: HOSPADM

## 2017-05-02 RX ORDER — QUETIAPINE FUMARATE 100 MG/1
100 TABLET, FILM COATED ORAL NIGHTLY
Status: DISCONTINUED | OUTPATIENT
Start: 2017-05-02 | End: 2017-05-03 | Stop reason: HOSPADM

## 2017-05-02 RX ORDER — IBUPROFEN 200 MG
16 TABLET ORAL
Status: DISCONTINUED | OUTPATIENT
Start: 2017-05-02 | End: 2017-05-03 | Stop reason: HOSPADM

## 2017-05-02 RX ORDER — DOCUSATE SODIUM 100 MG/1
100 CAPSULE, LIQUID FILLED ORAL 2 TIMES DAILY
Status: DISCONTINUED | OUTPATIENT
Start: 2017-05-02 | End: 2017-05-03 | Stop reason: HOSPADM

## 2017-05-02 RX ORDER — GLUCAGON 1 MG
1 KIT INJECTION
Status: DISCONTINUED | OUTPATIENT
Start: 2017-05-02 | End: 2017-05-03 | Stop reason: HOSPADM

## 2017-05-02 RX ORDER — HYDROCODONE BITARTRATE AND ACETAMINOPHEN 10; 325 MG/1; MG/1
1 TABLET ORAL EVERY 8 HOURS PRN
Status: DISCONTINUED | OUTPATIENT
Start: 2017-05-02 | End: 2017-05-03

## 2017-05-02 RX ORDER — DICYCLOMINE HYDROCHLORIDE 20 MG/1
20 TABLET ORAL 3 TIMES DAILY PRN
Status: DISCONTINUED | OUTPATIENT
Start: 2017-05-02 | End: 2017-05-03 | Stop reason: HOSPADM

## 2017-05-02 RX ORDER — DIPHENHYDRAMINE HYDROCHLORIDE 50 MG/ML
12.5 INJECTION INTRAMUSCULAR; INTRAVENOUS
Status: DISCONTINUED | OUTPATIENT
Start: 2017-05-02 | End: 2017-05-03 | Stop reason: HOSPADM

## 2017-05-02 RX ORDER — HYDROMORPHONE HYDROCHLORIDE 1 MG/ML
0.5 INJECTION, SOLUTION INTRAMUSCULAR; INTRAVENOUS; SUBCUTANEOUS EVERY 4 HOURS PRN
Status: DISCONTINUED | OUTPATIENT
Start: 2017-05-02 | End: 2017-05-02

## 2017-05-02 RX ORDER — IBUPROFEN 200 MG
24 TABLET ORAL
Status: DISCONTINUED | OUTPATIENT
Start: 2017-05-02 | End: 2017-05-03 | Stop reason: HOSPADM

## 2017-05-02 RX ORDER — PAROXETINE HYDROCHLORIDE 20 MG/1
40 TABLET, FILM COATED ORAL DAILY
Status: DISCONTINUED | OUTPATIENT
Start: 2017-05-02 | End: 2017-05-03 | Stop reason: HOSPADM

## 2017-05-02 RX ADMIN — DEXTROSE AND SODIUM CHLORIDE: 5; .9 INJECTION, SOLUTION INTRAVENOUS at 06:05

## 2017-05-02 RX ADMIN — HYDROCODONE BITARTRATE AND ACETAMINOPHEN 1 TABLET: 10; 325 TABLET ORAL at 12:05

## 2017-05-02 RX ADMIN — HYDROCODONE BITARTRATE AND ACETAMINOPHEN 1 TABLET: 10; 325 TABLET ORAL at 08:05

## 2017-05-02 RX ADMIN — SODIUM CHLORIDE: 0.9 INJECTION, SOLUTION INTRAVENOUS at 12:05

## 2017-05-02 RX ADMIN — HYDROCODONE BITARTRATE AND ACETAMINOPHEN 1 TABLET: 5; 325 TABLET ORAL at 03:05

## 2017-05-02 RX ADMIN — HYDROXYZINE HYDROCHLORIDE 25 MG: 25 TABLET, FILM COATED ORAL at 12:05

## 2017-05-02 RX ADMIN — PANTOPRAZOLE SODIUM 40 MG: 40 TABLET, DELAYED RELEASE ORAL at 08:05

## 2017-05-02 RX ADMIN — HYDROMORPHONE HYDROCHLORIDE 0.5 MG: 1 INJECTION, SOLUTION INTRAMUSCULAR; INTRAVENOUS; SUBCUTANEOUS at 09:05

## 2017-05-02 RX ADMIN — DOCUSATE SODIUM 100 MG: 100 CAPSULE, LIQUID FILLED ORAL at 08:05

## 2017-05-02 RX ADMIN — ACETAMINOPHEN 650 MG: 325 TABLET ORAL at 12:05

## 2017-05-02 RX ADMIN — SODIUM CHLORIDE: 0.9 INJECTION, SOLUTION INTRAVENOUS at 07:05

## 2017-05-02 RX ADMIN — SODIUM CHLORIDE: 0.9 INJECTION, SOLUTION INTRAVENOUS at 11:05

## 2017-05-02 RX ADMIN — HYDROXYZINE HYDROCHLORIDE 25 MG: 25 TABLET, FILM COATED ORAL at 09:05

## 2017-05-02 RX ADMIN — HYDROMORPHONE HYDROCHLORIDE 0.5 MG: 1 INJECTION, SOLUTION INTRAMUSCULAR; INTRAVENOUS; SUBCUTANEOUS at 05:05

## 2017-05-02 RX ADMIN — PAROXETINE HYDROCHLORIDE HEMIHYDRATE 40 MG: 20 TABLET, FILM COATED ORAL at 08:05

## 2017-05-02 NOTE — ASSESSMENT & PLAN NOTE
Sodium 120 (up from 136)  --Currently no neurological deficits. Had syncopal episode, likely d/t ETOH or Hypoglycemia  --1 liter normal saline given in the ED and continued at 125 ml through the night then changed to D5NS at 125 ml/hr d/t persistent blood sugars in the 60's

## 2017-05-02 NOTE — H&P
[facility] Medicine  History & Physical    Patient Name: Daksha Marie  MRN: [id number]  Admission Date: 5/1/2017  Attending Physician: Kel Quezada MD   Primary Care Provider: Caridad Padilla MD         Patient information was obtained from patient, past medical records and ER records.     Subjective:     Principal Problem:Acute hyponatremia    Chief Complaint:   Chief Complaint   Patient presents with    Fatigue     felt weak and dizzy, got out of bed and fell, hit the back of her head with +laceration, CBG 52, 1 amp of D50        HPI: Daksha Marie is a 46 y.o. female with pmh  has a past medical history of Alcohol abuse (10/7/2015); Breast calcification, left; S/P Bilateral Breat Reduction, Chronic diastolic congestive heart failure; Encounter for blood transfusion; GERD (gastroesophageal reflux disease); Glaucoma (2008); Hypertension; Hyponatremia (1/2017); Insomnia; Malingering (1/24/2016); and PTSD (post-traumatic stress disorder), gastric bypass surgery, and persistent anastomotic ulcer.    She presented [facility] ED after found on the floor by her sons.  She states that she was feeling weak and dizzy, and fell and hit the back of her head. She stated that she drank 1/2 pint of Vodka prior to the incident. She states that she still drinks 1/2 pint to 1 pint of vodka about 3 times a week.  She tries to hide it from her family.  She was prescribed antabuse in January which she does not take. She states that she has spoken with people at , but has not going to any meetings.  She had about a 5 cm laceration to the posterior occiput which was closed with staples in the ED.   On arrival are triage obtained a blood glucose which was 52.  She was given 1 amp of D50.  Patient is complaining of headache and neck pain.  She had a head CT and Neck CT which were both negative.  A right IJ Central line was placed in ED because they could not get IV access. Chest  X-ray showed line in good position with no acute process. Labs revealed Hgb 9.8 (above her baseline). Sodium of 120 (Last sodium was 136). Her serum alcohol level was 150. She was given Normal Saline bolus. She is admitted to [facility] for hyponatremia. Will monitor on telemetry and continue normal saline.        Past Medical History:   Diagnosis Date    Alcohol abuse 10/7/2015    Breast calcification, left     Pt states this has been worked up, she received a biopsy in the past to confirm calcifications were from scar tissue from when she had breast reduction surgery.      Chronic diastolic congestive heart failure     Encounter for blood transfusion     GERD (gastroesophageal reflux disease)     Glaucoma     Hypertension     Hyponatremia 10/2015    Na 109. attributed to polydipsia and alcohol abuse.  suffered a seizure in the ICU .    Insomnia     Malingering 2016    PTSD (post-traumatic stress disorder)        Past Surgical History:   Procedure Laterality Date    bile fistula      breast reduction       SECTION      CHOLECYSTECTOMY  after     GASTRIC BYPASS   and        Review of patient's allergies indicates:   Allergen Reactions    Penicillins Hives       No current facility-administered medications on file prior to encounter.      Current Outpatient Prescriptions on File Prior to Encounter   Medication Sig    cloNIDine (CATAPRES) 0.2 MG tablet TAKE 1 TABLET BY MOUTH EVERY EVENING    furosemide (LASIX) 40 MG tablet Take 40 mg by mouth once daily.    lisinopril 10 MG tablet Take 10 mg by mouth every morning.     montelukast (SINGULAIR) 10 mg tablet Take 10 mg by mouth every evening.    multivitamin (THERAGRAN) tablet Take 1 tablet by mouth once daily.    omeprazole (PRILOSEC) 40 MG capsule Take 40 mg by mouth once daily.    paroxetine (PAXIL) 40 MG tablet Take 1 tablet (40 mg total) by mouth once daily.    thiamine mononitrate 100 mg  Tab Take 1 tablet (100 mg total) by mouth 2 (two) times daily.    cyanocobalamin 1,000 mcg/mL injection Inject 1,000 mcg into the muscle every 28 days. On the 15th on the month    dicyclomine (BENTYL) 20 mg tablet Take 20 mg by mouth 3 (three) times daily as needed (for abdominal cramps).    docusate sodium (COLACE) 100 MG capsule Take 1 capsule (100 mg total) by mouth 2 (two) times daily.    DORZOLAMIDE-TIMOLOL, PF, OPHT Place 1 drop into both eyes every evening.     fluticasone (FLONASE) 50 mcg/actuation nasal spray 1 spray by Each Nare route once daily.    ibuprofen (ADVIL,MOTRIN) 800 MG tablet TAKE 1 TABLET BY MOUTH EVERY 6 HOURS AS NEEDED FOR PAIN    latanoprost 0.005 % ophthalmic solution Place 1 drop into both eyes every evening.     lorazepam (ATIVAN) 0.5 MG tablet Take 1 tablet (0.5 mg total) by mouth 2 (two) times daily. Today: take 2 tablets by mouth  Tomorrow take 1 tablet every 12 hours  Third day take 1 tablet by mouth    ondansetron (ZOFRAN-ODT) 8 MG TbDL Take 1 tablet (8 mg total) by mouth every 8 (eight) hours as needed. (Patient taking differently: Take 8 mg by mouth every 8 (eight) hours as needed (for nausea and vomiting). )    potassium chloride (MICRO-K) 10 MEQ CpSR Take 10 mEq by mouth once daily.     promethazine (PHENERGAN) 25 MG suppository Place 25 mg rectally every 6 (six) hours as needed for Nausea.    quetiapine (SEROQUEL) 100 MG Tab Take 1 tablet (100 mg total) by mouth every evening.    venlafaxine (EFFEXOR-XR) 75 MG 24 hr capsule Take 1 capsule (75 mg total) by mouth once daily.    [DISCONTINUED] disulfiram (ANTABUSE) 250 mg tablet Take 1 tablet (250 mg total) by mouth once daily.     Family History     Problem Relation (Age of Onset)    Crohn's disease Cousin    Diabetes Mother, Maternal Grandmother    Lupus Mother        Social History Main Topics    Smoking status: Current Every Day Smoker     Packs/day: 1.00     Years: 4.00     Types: Cigarettes    Smokeless  tobacco: Never Used    Alcohol use Yes      Comment: last night- vodka and cranberry juice    Drug use: No    Sexual activity: Yes     Partners: Male     Review of Systems   Constitutional: Negative for chills, diaphoresis and fever.   HENT: Negative for sore throat and trouble swallowing.    Eyes: Negative for photophobia and visual disturbance.   Respiratory: Negative for cough, shortness of breath and wheezing.    Cardiovascular: Negative for chest pain and palpitations.   Gastrointestinal: Positive for nausea. Negative for abdominal pain, constipation, diarrhea and vomiting.   Endocrine: Negative for polydipsia and polyphagia.   Genitourinary: Negative for decreased urine volume, dysuria, hematuria and urgency.   Musculoskeletal: Negative for joint swelling, neck pain and neck stiffness.   Neurological: Positive for syncope, weakness, light-headedness and headaches (Posterior at Laceration Site). Negative for numbness.   Psychiatric/Behavioral: Negative for agitation, dysphoric mood and suicidal ideas.     Objective:     Vital Signs (Most Recent):  Temp: 99.6 °F (37.6 °C) (05/02/17 0504)  Pulse: 83 (05/02/17 0504)  Resp: 18 (05/02/17 0504)  BP: (!) 105/58 (05/02/17 0504)  SpO2: 96 % (05/02/17 0336) Vital Signs (24h Range):  Temp:  [97.6 °F (36.4 °C)-99.6 °F (37.6 °C)] 99.6 °F (37.6 °C)  Pulse:  [67-86] 83  Resp:  [12-21] 18  SpO2:  [92 %-100 %] 96 %  BP: (101-126)/(58-82) 105/58     Weight: 66.9 kg (147 lb 7.8 oz)  Body mass index is 24.54 kg/(m^2).    Physical Exam   Constitutional: She is oriented to person, place, and time. She appears well-developed and well-nourished. No distress.   HENT:   Head: Normocephalic and atraumatic.   Mouth/Throat: Oropharynx is clear and moist. No oropharyngeal exudate.   Right IJ Triple Lumen Central Line Intact   Eyes: Conjunctivae are normal. Pupils are equal, round, and reactive to light. No scleral icterus.   Neck: Neck supple.   Cardiovascular: Normal rate, regular  rhythm, normal heart sounds and intact distal pulses.  Exam reveals no gallop and no friction rub.    No murmur heard.  Pulmonary/Chest: Effort normal and breath sounds normal. No respiratory distress. She has no wheezes. She has no rales.   Abdominal: Soft. Bowel sounds are normal. She exhibits no distension. There is no tenderness. There is no rebound and no guarding.   Musculoskeletal: She exhibits no edema, tenderness or deformity.   Neurological: She is alert and oriented to person, place, and time. She exhibits normal muscle tone.   Skin: Skin is warm and dry. No rash noted. She is not diaphoretic.   Posterior Parietal 3 cm laceration with intact staples. No bleeding   Psychiatric: She has a normal mood and affect. Her behavior is normal.   Nursing note and vitals reviewed.       Significant Labs:   CBC:   Recent Labs  Lab 05/01/17  1801   WBC 8.32   HGB 9.8*   HCT 31.2*        CMP:   Recent Labs  Lab 05/01/17  1801   *   K 3.9   CL 87*   CO2 12*   GLU 72   BUN 10   CREATININE 1.1   CALCIUM 8.9   PROT 7.8   ALBUMIN 4.2   BILITOT 0.9   ALKPHOS 67   AST 46*   ALT 16   ANIONGAP 21*   EGFRNONAA >60     POCT Glucose:   Recent Labs  Lab 05/01/17  2206 05/02/17  0114 05/02/17  0546   POCTGLUCOSE 89 81 69*     Troponin:   Recent Labs  Lab 05/01/17  1801   TROPONINI 0.007     Serum Alcohol   150     Significant Imaging: I have reviewed all pertinent imaging results/findings within the past 24 hours.   Imaging Results         X-Ray Chest 1 View (Final result) Result time:  05/01/17 20:05:11    Final result by Alberto Gilliland MD (05/01/17 20:05:11)    Impression:        Right IJ catheter with tip overlies the mid right atrium approximately 5 cm below the SVC/RA junction. No pneumothorax.        Electronically signed by: ALBERTO GILLILAND MD  Date:     05/01/17  Time:    20:05     Narrative:    Chest AP portable    Indication:.    Comparison:May 1, 2017.    Findings:     Right IJ catheter with tip overlies  the mid right atrium approximately 5 cm below the SVC/RA junction. No pneumothorax.    Heart and lungs unchanged when allowing for differences in technique and positioning.            CT Cervical Spine Without Contrast (Final result) Result time:  05/01/17 17:46:20    Final result by Chivo Ballard MD (05/01/17 17:46:20)    Impression:     No evidence of fracture.      Electronically signed by: CHIVO BALLARD MD  Date:     05/01/17  Time:    17:46     Narrative:    CT cervical spine without.    Findings: The visualized portion of the intracranial content is unremarkable.  The visualized soft tissues and vascular structures from the base of the skull to the visualized portion of the superior mediastinum are significant for bilateral carotid calcification.  The visualized portion of the lungs is unremarkable.  There is straightening of the normal cervical lordosis.  The vertebral body heights and disc space heights are satisfactorily preserved.  There is no prevertebral soft tissue swelling.  There is no fracture, dislocation, or bone erosion.  Special            CT Head Without Contrast (Final result) Result time:  05/01/17 17:44:04    Final result by Chivo Ballard MD (05/01/17 17:44:04)    Impression:     No acute intracranial abnormality.      Electronically signed by: CHIVO BALLARD MD  Date:     05/01/17  Time:    17:44     Narrative:    CT head without.    Findings: The brain is normally formed and unremarkable.  The ventricular system is within normal limits of size for age and shows no distortion by mass effect.  There is no intra-or extra-axial mass or hemorrhage identified.  The visualized extracranial structures are unremarkable.            X-Ray Chest 1 View (Final result) Result time:  05/01/17 17:43:04    Final result by Chivo Ballard MD (05/01/17 17:43:04)    Impression:     As above.      Electronically signed by: CHIVO BALLARD MD  Date:     05/01/17  Time:    17:43     Narrative:    Chest 1 view.    Findings:  The lungs are clear.  There is no pneumothorax or pleural fluid.  The cardiac silhouette is enlarged.  The osseous structures demonstrate mild degenerative change.            X-Ray Pelvis Routine AP (Final result) Result time:  05/01/17 17:42:35    Final result by Chivo Ballard MD (05/01/17 17:42:35)    Impression:     As above.      Electronically signed by: CHIVO BALLARD MD  Date:     05/01/17  Time:    17:42     Narrative:    Pelvis routine AP.    Findings: There is no displaced fracture or bony erosion identified.                Assessment/Plan:     * Acute hyponatremia  Sodium 120 (up from 136)  --Currently no neurological deficits. Had syncopal episode, likely d/t ETOH or Hypoglycemia  --1 liter normal saline given in the ED and continued at 125 ml through the night then changed to D5NS at 125 ml/hr d/t persistent blood sugars in the 60's           Chronic diastolic congestive heart failure  Hypertention  Last Echo showed EF 60%. With Diastolic dysfunction. Monitor on Telemetry  -Continue home lisinopril 10 mg daily  --taked clonidine 0.2 mg po when she need it for elevated BP. Holding    History of Haylee-en-Y gastric bypass  Noted        Gastroesophageal reflux disease without esophagitis  Takes omeprozole 40 mg daily. Giving pantoprozole 40 mg daily      Syncope and collapse  Alcohol Abuse  Patient felt dizzy and lightheaded and then later found unresponsive by her son.  She drank 1/2 pint of Vodka prior to the syncopal episode. She was hypoglycemic in the ED  Likely d/t intoxication or hypoglycemic episode. She has a history of alcohol abuse and was given prescription for antabuse in January, which she did use.  --Monitor Blood Sugar every 4 hours with hypoglycemic protocol.  --She was give a banana bag and thiamine in the ED          VTE Risk Mitigation         Ordered     Medium Risk of VTE  Once      05/02/17 0031     Place RISHI hose  Until discontinued      05/02/17 0031     Place sequential compression  device  Until discontinued      05/02/17 0031        Xiao Inman NP  Department of Hospital Medicine   [facility]

## 2017-05-02 NOTE — ASSESSMENT & PLAN NOTE
Alcohol Abuse  Patient felt dizzy and lightheaded and then later found unresponsive by her son.  She drank 1/2 pint of Vodka prior to the syncopal episode. She was hypoglycemic in the ED  Likely d/t intoxication or hypoglycemic episode. She has a history of alcohol abuse and was given prescription for antabuse in January, which she did use.  --Monitor Blood Sugar every 4 hours with hypoglycemic protocol.  --She was give a banana bag and thiamine in the ED

## 2017-05-02 NOTE — ED NOTES
Report received. Care assumed. Pt AAOx4. Respirations even and unlabored. Pt VSS. Will continue to monitor.

## 2017-05-02 NOTE — PROGRESS NOTES
.Pharmacy New Medication Education    Patient accepted medication education.    Pharmacy educated patient on the following medications, using the teach-back method.   Apap  Colace  Bentyl  Colace  Norco  Dilaudid  Lisinopril  Pantoprazole  Paxil  Seroquel  [place]on    Learners of pharmacy medication education included:  patient    Patient +/- learner response:  verbalize understanding

## 2017-05-02 NOTE — ANESTHESIA PROCEDURE NOTES
Central Line    Diagnosis: poor venous access  Patient location during procedure: ED  Procedure start time: 5/1/2017 7:08 PM  Timeout: 5/1/2017 7:08 PM  Procedure end time: 5/1/2017 7:22 PM  Staffing  Anesthesiologist: LEDA COPELAND  Performed by: anesthesiologist   Anesthesiologist was present at the time of the procedure.  Preanesthetic Checklist  Completed: patient identified, site marked, surgical consent, pre-op evaluation, timeout performed, IV checked, risks and benefits discussed, monitors and equipment checked and anesthesia consent given  Indication  Indication: hemodynamic monitoring, vascular access, med administration     Anesthesia   local infiltration  Local Infiltration: lidocaine 2% without epinephrine    Central Line  Skin Prep: skin prepped with ChloraPrep, skin prep agent completely dried prior to procedure  maximum sterile barriers used during central venous catheter insertion  hand hygiene performed prior to central venous catheter insertion  Location: right internal jugular,   Catheter type: triple lumen  Catheter Size: 7 Fr  Inserted Catheter Length: 16 cm  Ultrasound: vascular probe with ultrasound  Vessel Caliber: large, patent, compressibility normal  Needle advanced into vessel with real time Ultrasound guidance.  Guidewire confirmed in vessel.  Sterile sheath used.  Image recorded and saved.  Insertion Attempts: 1   Securement:line sutured, chlorhexidine patch, sterile dressing applied and blood return through all ports     Post-Procedure  X-Ray: no pneumothorax on x-ray, placement verified by x-ray, tip termination and successful placement  Tip termination comments: xray   Adverse Events:none

## 2017-05-02 NOTE — NURSING
(1000) Nurse called by monitor tech that pt had 6 beat run of v-tach. Pt asymptomatic; VSS stable see flowsheet.   (1015) Dr Sun notified of pts v-tach. No new orders given. Will continue to monitor.

## 2017-05-02 NOTE — ASSESSMENT & PLAN NOTE
Hypertention  Last Echo showed EF 60%. With Diastolic dysfunction. Monitor on Telemetry  -Continue home lisinopril 10 mg daily  --taked clonidine 0.2 mg po when she need it for elevated BP. Holding

## 2017-05-02 NOTE — ASSESSMENT & PLAN NOTE
Symptomatic. Transfused 1 unit pRBCs. Iron supplements. Advised to get enough vitamin C in diet as well. Already gets B12 injections outpatient.

## 2017-05-02 NOTE — PLAN OF CARE
Problem: Patient Care Overview  Goal: Plan of Care Review  Outcome: Ongoing (interventions implemented as appropriate)  Plan of care reviewed with patient. Call light within reach, fall precautions maintained. Patient aware. Nurse instructed patient to call if needs assistance. Patient verbalized complete understanding. Telemetry monitor NSR throughout shift. NS infusing at 125 mL/hr. Prn pain meds administered for pain. Laceration noted to back of head w/5 staples in place and edges well approximated. R Subc dressing CDI. NAD noted. Will continue to monitor and continue plan of care.

## 2017-05-02 NOTE — ED NOTES
Pt appears to be resting . Respirations even and unlabored. Equal rise and fall of chest noted. Skin warm and dry. Pt easily aroused to verbal stimulation. Will continue to monitor.

## 2017-05-02 NOTE — PLAN OF CARE
Patient arrived on unit via stretcher from ED. In no apparent distress. Will continue to monitor.

## 2017-05-02 NOTE — PLAN OF CARE
Patient is mostly independent has family member that can drive if needed.  Does not use Medicaid transportation. Has used [facility] in the past and would use them again if needed, amicable to [facility] clinic, appt has been requested.     05/02/17 1336   Discharge Assessment   Assessment Type Discharge Planning Assessment   Confirmed/corrected address and phone number on facesheet? Yes   Assessment information obtained from? Patient   Communicated expected length of stay with patient/caregiver yes   Prior to hospitilization cognitive status: Alert/Oriented   Prior to hospitalization functional status: Assistive Equipment   Current cognitive status: Alert/Oriented   Current Functional Status: Assistive Equipment   Arrived From home or self-care   Lives With child(delano), adult   Able to Return to Prior Arrangements yes   Is patient able to care for self after discharge? Yes   How many people do you have in your home that can help with your care after discharge? 2   Who are your caregiver(s) and their phone number(s)? Arielle Walter [phone]    Patient's perception of discharge disposition home or selfcare   Readmission Within The Last 30 Days no previous admission in last 30 days   Patient currently being followed by outpatient case management? No   Patient currently receives home health services? No   Does the patient currently use HME? Yes   Patient currently receives private duty nursing? No   Patient currently receives any other outside agency services? No   Equipment Currently Used at Home bedside commode   Do you have any problems affording any of your prescribed medications? No   Is the patient taking medications as prescribed? yes   Do you have any financial concerns preventing you from receiving the healthcare you need? No   Does the patient have transportation to healthcare appointments? Yes   Transportation Available family or friend will provide;car   On Dialysis? No   Discharge Plan  A Home with family   Discharge Plan B Home Health;Home with family

## 2017-05-02 NOTE — ASSESSMENT & PLAN NOTE
Chronic diastolic congestive heart failure  Taking lisinopril, hydrochlorothiazide, clonidine, and furosemide. Stop/hold. Resume lisinopril on discharge.

## 2017-05-02 NOTE — ED NOTES
Patient resting in bed. Pt asks to call her son Meena to let him know she will be admitted ([phone]). Call light within reach

## 2017-05-02 NOTE — SUBJECTIVE & OBJECTIVE
Past Medical History:   Diagnosis Date    Alcohol abuse 10/7/2015    Breast calcification, left     Pt states this has been worked up, she received a biopsy in the past to confirm calcifications were from scar tissue from when she had breast reduction surgery.      Chronic diastolic congestive heart failure     Encounter for blood transfusion     GERD (gastroesophageal reflux disease)     Glaucoma     Hypertension     Hyponatremia 10/2015    Na 109. attributed to polydipsia and alcohol abuse.  suffered a seizure in the ICU .    Insomnia     Malingering 2016    PTSD (post-traumatic stress disorder)        Past Surgical History:   Procedure Laterality Date    bile fistula      breast reduction       SECTION      CHOLECYSTECTOMY  after     GASTRIC BYPASS   and        Review of patient's allergies indicates:   Allergen Reactions    Penicillins Hives       No current facility-administered medications on file prior to encounter.      Current Outpatient Prescriptions on File Prior to Encounter   Medication Sig    cloNIDine (CATAPRES) 0.2 MG tablet TAKE 1 TABLET BY MOUTH EVERY EVENING    furosemide (LASIX) 40 MG tablet Take 40 mg by mouth once daily.    lisinopril 10 MG tablet Take 10 mg by mouth every morning.     montelukast (SINGULAIR) 10 mg tablet Take 10 mg by mouth every evening.    multivitamin (THERAGRAN) tablet Take 1 tablet by mouth once daily.    omeprazole (PRILOSEC) 40 MG capsule Take 40 mg by mouth once daily.    paroxetine (PAXIL) 40 MG tablet Take 1 tablet (40 mg total) by mouth once daily.    thiamine mononitrate 100 mg Tab Take 1 tablet (100 mg total) by mouth 2 (two) times daily.    cyanocobalamin 1,000 mcg/mL injection Inject 1,000 mcg into the muscle every 28 days. On the  on the month    dicyclomine (BENTYL) 20 mg tablet Take 20 mg by mouth 3 (three) times daily as needed (for abdominal cramps).    docusate sodium (COLACE) 100 MG capsule  Take 1 capsule (100 mg total) by mouth 2 (two) times daily.    DORZOLAMIDE-TIMOLOL, PF, OPHT Place 1 drop into both eyes every evening.     fluticasone (FLONASE) 50 mcg/actuation nasal spray 1 spray by Each Nare route once daily.    ibuprofen (ADVIL,MOTRIN) 800 MG tablet TAKE 1 TABLET BY MOUTH EVERY 6 HOURS AS NEEDED FOR PAIN    latanoprost 0.005 % ophthalmic solution Place 1 drop into both eyes every evening.     lorazepam (ATIVAN) 0.5 MG tablet Take 1 tablet (0.5 mg total) by mouth 2 (two) times daily. Today: take 2 tablets by mouth  Tomorrow take 1 tablet every 12 hours  Third day take 1 tablet by mouth    ondansetron (ZOFRAN-ODT) 8 MG TbDL Take 1 tablet (8 mg total) by mouth every 8 (eight) hours as needed. (Patient taking differently: Take 8 mg by mouth every 8 (eight) hours as needed (for nausea and vomiting). )    potassium chloride (MICRO-K) 10 MEQ CpSR Take 10 mEq by mouth once daily.     promethazine (PHENERGAN) 25 MG suppository Place 25 mg rectally every 6 (six) hours as needed for Nausea.    quetiapine (SEROQUEL) 100 MG Tab Take 1 tablet (100 mg total) by mouth every evening.    venlafaxine (EFFEXOR-XR) 75 MG 24 hr capsule Take 1 capsule (75 mg total) by mouth once daily.    [DISCONTINUED] disulfiram (ANTABUSE) 250 mg tablet Take 1 tablet (250 mg total) by mouth once daily.     Family History     Problem Relation (Age of Onset)    Crohn's disease Cousin    Diabetes Mother, Maternal Grandmother    Lupus Mother        Social History Main Topics    Smoking status: Current Every Day Smoker     Packs/day: 1.00     Years: 4.00     Types: Cigarettes    Smokeless tobacco: Never Used    Alcohol use Yes      Comment: last night- vodka and cranberry juice    Drug use: No    Sexual activity: Yes     Partners: Male     Review of Systems   Constitutional: Negative for chills, diaphoresis and fever.   HENT: Negative for sore throat and trouble swallowing.    Eyes: Negative for photophobia and visual  disturbance.   Respiratory: Negative for cough, shortness of breath and wheezing.    Cardiovascular: Negative for chest pain and palpitations.   Gastrointestinal: Positive for nausea. Negative for abdominal pain, constipation, diarrhea and vomiting.   Endocrine: Negative for polydipsia and polyphagia.   Genitourinary: Negative for decreased urine volume, dysuria, hematuria and urgency.   Musculoskeletal: Negative for joint swelling, neck pain and neck stiffness.   Neurological: Positive for syncope, weakness, light-headedness and headaches (Posterior at Laceration Site). Negative for numbness.   Psychiatric/Behavioral: Negative for agitation, dysphoric mood and suicidal ideas.     Objective:     Vital Signs (Most Recent):  Temp: 99.6 °F (37.6 °C) (05/02/17 0504)  Pulse: 83 (05/02/17 0504)  Resp: 18 (05/02/17 0504)  BP: (!) 105/58 (05/02/17 0504)  SpO2: 96 % (05/02/17 0336) Vital Signs (24h Range):  Temp:  [97.6 °F (36.4 °C)-99.6 °F (37.6 °C)] 99.6 °F (37.6 °C)  Pulse:  [67-86] 83  Resp:  [12-21] 18  SpO2:  [92 %-100 %] 96 %  BP: (101-126)/(58-82) 105/58     Weight: 66.9 kg (147 lb 7.8 oz)  Body mass index is 24.54 kg/(m^2).    Physical Exam   Constitutional: She is oriented to person, place, and time. She appears well-developed and well-nourished. No distress.   HENT:   Head: Normocephalic and atraumatic.   Mouth/Throat: Oropharynx is clear and moist. No oropharyngeal exudate.   Right IJ Triple Lumen Central Line Intact   Eyes: Conjunctivae are normal. Pupils are equal, round, and reactive to light. No scleral icterus.   Neck: Neck supple.   Cardiovascular: Normal rate, regular rhythm, normal heart sounds and intact distal pulses.  Exam reveals no gallop and no friction rub.    No murmur heard.  Pulmonary/Chest: Effort normal and breath sounds normal. No respiratory distress. She has no wheezes. She has no rales.   Abdominal: Soft. Bowel sounds are normal. She exhibits no distension. There is no tenderness. There is  no rebound and no guarding.   Musculoskeletal: She exhibits no edema, tenderness or deformity.   Neurological: She is alert and oriented to person, place, and time. She exhibits normal muscle tone.   Skin: Skin is warm and dry. No rash noted. She is not diaphoretic.   Posterior Parietal 3 cm laceration with intact staples. No bleeding   Psychiatric: She has a normal mood and affect. Her behavior is normal.   Nursing note and vitals reviewed.       Significant Labs:   CBC:   Recent Labs  Lab 05/01/17  1801   WBC 8.32   HGB 9.8*   HCT 31.2*        CMP:   Recent Labs  Lab 05/01/17  1801   *   K 3.9   CL 87*   CO2 12*   GLU 72   BUN 10   CREATININE 1.1   CALCIUM 8.9   PROT 7.8   ALBUMIN 4.2   BILITOT 0.9   ALKPHOS 67   AST 46*   ALT 16   ANIONGAP 21*   EGFRNONAA >60     POCT Glucose:   Recent Labs  Lab 05/01/17  2206 05/02/17  0114 05/02/17  0546   POCTGLUCOSE 89 81 69*     Troponin:   Recent Labs  Lab 05/01/17  1801   TROPONINI 0.007     Serum Alcohol   150     Significant Imaging: I have reviewed all pertinent imaging results/findings within the past 24 hours.   Imaging Results         X-Ray Chest 1 View (Final result) Result time:  05/01/17 20:05:11    Final result by Alberto Gilliland MD (05/01/17 20:05:11)    Impression:        Right IJ catheter with tip overlies the mid right atrium approximately 5 cm below the SVC/RA junction. No pneumothorax.        Electronically signed by: ALBERTO GILLILAND MD  Date:     05/01/17  Time:    20:05     Narrative:    Chest AP portable    Indication:.    Comparison:May 1, 2017.    Findings:     Right IJ catheter with tip overlies the mid right atrium approximately 5 cm below the SVC/RA junction. No pneumothorax.    Heart and lungs unchanged when allowing for differences in technique and positioning.            CT Cervical Spine Without Contrast (Final result) Result time:  05/01/17 17:46:20    Final result by Chivo Mistry MD (05/01/17 17:46:20)    Impression:     No  evidence of fracture.      Electronically signed by: CHIVO BALLARD MD  Date:     05/01/17  Time:    17:46     Narrative:    CT cervical spine without.    Findings: The visualized portion of the intracranial content is unremarkable.  The visualized soft tissues and vascular structures from the base of the skull to the visualized portion of the superior mediastinum are significant for bilateral carotid calcification.  The visualized portion of the lungs is unremarkable.  There is straightening of the normal cervical lordosis.  The vertebral body heights and disc space heights are satisfactorily preserved.  There is no prevertebral soft tissue swelling.  There is no fracture, dislocation, or bone erosion.  Special            CT Head Without Contrast (Final result) Result time:  05/01/17 17:44:04    Final result by Chivo Ballard MD (05/01/17 17:44:04)    Impression:     No acute intracranial abnormality.      Electronically signed by: CHIVO BALLARD MD  Date:     05/01/17  Time:    17:44     Narrative:    CT head without.    Findings: The brain is normally formed and unremarkable.  The ventricular system is within normal limits of size for age and shows no distortion by mass effect.  There is no intra-or extra-axial mass or hemorrhage identified.  The visualized extracranial structures are unremarkable.            X-Ray Chest 1 View (Final result) Result time:  05/01/17 17:43:04    Final result by Chivo Ballard MD (05/01/17 17:43:04)    Impression:     As above.      Electronically signed by: CHIVO BALLARD MD  Date:     05/01/17  Time:    17:43     Narrative:    Chest 1 view.    Findings: The lungs are clear.  There is no pneumothorax or pleural fluid.  The cardiac silhouette is enlarged.  The osseous structures demonstrate mild degenerative change.            X-Ray Pelvis Routine AP (Final result) Result time:  05/01/17 17:42:35    Final result by Chivo Ballard MD (05/01/17 17:42:35)    Impression:     As  above.      Electronically signed by: MAURA BALLARD MD  Date:     05/01/17  Time:    17:42     Narrative:    Pelvis routine AP.    Findings: There is no displaced fracture or bony erosion identified.

## 2017-05-02 NOTE — PLAN OF CARE
Problem: Patient Care Overview  Goal: Plan of Care Review  Outcome: Ongoing (interventions implemented as appropriate)  Plan of care reviewed with patient. Patient verbalized understanding. Laceration noted to back of patient head; edges well approximated with 5 staples. PRN tylenol, norco administered for head pain. IV fluids maintained. Central line dressing clean, dry and intact. Patient NSR on telemetry monitoring, HR 70's-80's, with no ectopy noted. Bed is low and locked, bed alarm is activated, call light is within reach. Patient has been instructed to call if in need of assistance, verbalized understanding.

## 2017-05-03 VITALS
TEMPERATURE: 98 F | HEART RATE: 61 BPM | WEIGHT: 147.5 LBS | BODY MASS INDEX: 24.57 KG/M2 | SYSTOLIC BLOOD PRESSURE: 135 MMHG | OXYGEN SATURATION: 100 % | HEIGHT: 65 IN | RESPIRATION RATE: 18 BRPM | DIASTOLIC BLOOD PRESSURE: 78 MMHG

## 2017-05-03 PROBLEM — S01.01XA OCCIPITAL SCALP LACERATION: Status: ACTIVE | Noted: 2017-05-01

## 2017-05-03 PROBLEM — E87.1 HYPONATREMIA: Chronic | Status: RESOLVED | Noted: 2017-05-01 | Resolved: 2017-05-03

## 2017-05-03 PROBLEM — R55 SYNCOPE AND COLLAPSE: Status: RESOLVED | Noted: 2017-05-02 | Resolved: 2017-05-03

## 2017-05-03 LAB
ABO + RH BLD: NORMAL
ANION GAP SERPL CALC-SCNC: 3 MMOL/L
ANION GAP SERPL CALC-SCNC: 7 MMOL/L
ANION GAP SERPL CALC-SCNC: 8 MMOL/L
ANION GAP SERPL CALC-SCNC: 8 MMOL/L
ANISOCYTOSIS BLD QL SMEAR: SLIGHT
BASOPHILS # BLD AUTO: 0 K/UL
BASOPHILS # BLD AUTO: 0.01 K/UL
BASOPHILS NFR BLD: 0 %
BASOPHILS NFR BLD: 0.1 %
BLD PROD TYP BPU: NORMAL
BLOOD UNIT EXPIRATION DATE: NORMAL
BLOOD UNIT TYPE CODE: 6200
BLOOD UNIT TYPE: NORMAL
BUN SERPL-MCNC: 10 MG/DL
BUN SERPL-MCNC: 10 MG/DL
BUN SERPL-MCNC: 12 MG/DL
BUN SERPL-MCNC: 13 MG/DL
CALCIUM SERPL-MCNC: 8.3 MG/DL
CALCIUM SERPL-MCNC: 8.4 MG/DL
CALCIUM SERPL-MCNC: 8.8 MG/DL
CALCIUM SERPL-MCNC: 8.8 MG/DL
CHLORIDE SERPL-SCNC: 102 MMOL/L
CHLORIDE SERPL-SCNC: 104 MMOL/L
CO2 SERPL-SCNC: 24 MMOL/L
CO2 SERPL-SCNC: 28 MMOL/L
CODING SYSTEM: NORMAL
CREAT SERPL-MCNC: 0.7 MG/DL
CREAT SERPL-MCNC: 0.8 MG/DL
DIFFERENTIAL METHOD: ABNORMAL
DIFFERENTIAL METHOD: ABNORMAL
DISPENSE STATUS: NORMAL
EOSINOPHIL # BLD AUTO: 0 K/UL
EOSINOPHIL # BLD AUTO: 0.1 K/UL
EOSINOPHIL NFR BLD: 0.5 %
EOSINOPHIL NFR BLD: 0.7 %
ERYTHROCYTE [DISTWIDTH] IN BLOOD BY AUTOMATED COUNT: 18.6 %
ERYTHROCYTE [DISTWIDTH] IN BLOOD BY AUTOMATED COUNT: 20.2 %
EST. GFR  (AFRICAN AMERICAN): >60 ML/MIN/1.73 M^2
EST. GFR  (NON AFRICAN AMERICAN): >60 ML/MIN/1.73 M^2
GLUCOSE SERPL-MCNC: 82 MG/DL
GLUCOSE SERPL-MCNC: 88 MG/DL
GLUCOSE SERPL-MCNC: 89 MG/DL
GLUCOSE SERPL-MCNC: 89 MG/DL
HCT VFR BLD AUTO: 20.5 %
HCT VFR BLD AUTO: 24.3 %
HGB BLD-MCNC: 6.1 G/DL
HGB BLD-MCNC: 7.4 G/DL
HYPOCHROMIA BLD QL SMEAR: ABNORMAL
LYMPHOCYTES # BLD AUTO: 1.1 K/UL
LYMPHOCYTES # BLD AUTO: 1.2 K/UL
LYMPHOCYTES NFR BLD: 15.9 %
LYMPHOCYTES NFR BLD: 28.3 %
MCH RBC QN AUTO: 19.8 PG
MCH RBC QN AUTO: 21.2 PG
MCHC RBC AUTO-ENTMCNC: 29.8 %
MCHC RBC AUTO-ENTMCNC: 30.5 %
MCV RBC AUTO: 67 FL
MCV RBC AUTO: 70 FL
MONOCYTES # BLD AUTO: 0.3 K/UL
MONOCYTES # BLD AUTO: 0.5 K/UL
MONOCYTES NFR BLD: 11.4 %
MONOCYTES NFR BLD: 4.4 %
NEUTROPHILS # BLD AUTO: 2.6 K/UL
NEUTROPHILS # BLD AUTO: 5.4 K/UL
NEUTROPHILS NFR BLD: 59.8 %
NEUTROPHILS NFR BLD: 78.9 %
PLATELET # BLD AUTO: 152 K/UL
PLATELET # BLD AUTO: 163 K/UL
PLATELET BLD QL SMEAR: ABNORMAL
PMV BLD AUTO: 8.9 FL
PMV BLD AUTO: 9.2 FL
POCT GLUCOSE: 76 MG/DL (ref 70–110)
POCT GLUCOSE: 76 MG/DL (ref 70–110)
POCT GLUCOSE: 86 MG/DL (ref 70–110)
POTASSIUM SERPL-SCNC: 4.4 MMOL/L
POTASSIUM SERPL-SCNC: 4.5 MMOL/L
POTASSIUM SERPL-SCNC: 4.6 MMOL/L
POTASSIUM SERPL-SCNC: 4.6 MMOL/L
RBC # BLD AUTO: 3.08 M/UL
RBC # BLD AUTO: 3.49 M/UL
SODIUM SERPL-SCNC: 133 MMOL/L
SODIUM SERPL-SCNC: 135 MMOL/L
SODIUM SERPL-SCNC: 136 MMOL/L
SODIUM SERPL-SCNC: 136 MMOL/L
TARGETS BLD QL SMEAR: ABNORMAL
TRANS ERYTHROCYTES VOL PATIENT: NORMAL ML
WBC # BLD AUTO: 4.28 K/UL
WBC # BLD AUTO: 6.8 K/UL

## 2017-05-03 PROCEDURE — 25000003 PHARM REV CODE 250: Performed by: HOSPITALIST

## 2017-05-03 PROCEDURE — 99223 1ST HOSP IP/OBS HIGH 75: CPT | Mod: ,,, | Performed by: SURGERY

## 2017-05-03 PROCEDURE — 36430 TRANSFUSION BLD/BLD COMPNT: CPT

## 2017-05-03 PROCEDURE — 80048 BASIC METABOLIC PNL TOTAL CA: CPT

## 2017-05-03 PROCEDURE — P9021 RED BLOOD CELLS UNIT: HCPCS

## 2017-05-03 PROCEDURE — 25000003 PHARM REV CODE 250: Performed by: NURSE PRACTITIONER

## 2017-05-03 PROCEDURE — 63600175 PHARM REV CODE 636 W HCPCS: Performed by: HOSPITALIST

## 2017-05-03 PROCEDURE — 85025 COMPLETE CBC W/AUTO DIFF WBC: CPT | Mod: 91

## 2017-05-03 PROCEDURE — 94761 N-INVAS EAR/PLS OXIMETRY MLT: CPT

## 2017-05-03 RX ORDER — FERROUS SULFATE 325(65) MG
325 TABLET, DELAYED RELEASE (ENTERIC COATED) ORAL DAILY
Refills: 0 | COMMUNITY
Start: 2017-05-03 | End: 2018-01-31

## 2017-05-03 RX ORDER — DISULFIRAM 250 MG/1
250 TABLET ORAL DAILY
Qty: 30 TABLET | Refills: 11 | Status: SHIPPED | OUTPATIENT
Start: 2017-05-03 | End: 2018-03-22 | Stop reason: SDUPTHER

## 2017-05-03 RX ORDER — HYDROCODONE BITARTRATE AND ACETAMINOPHEN 10; 325 MG/1; MG/1
1 TABLET ORAL EVERY 4 HOURS PRN
Status: DISCONTINUED | OUTPATIENT
Start: 2017-05-03 | End: 2017-05-03 | Stop reason: HOSPADM

## 2017-05-03 RX ORDER — OMEPRAZOLE 40 MG/1
40 CAPSULE, DELAYED RELEASE ORAL DAILY
Qty: 30 CAPSULE | Refills: 11 | Status: SHIPPED | OUTPATIENT
Start: 2017-05-03 | End: 2019-01-17 | Stop reason: SDUPTHER

## 2017-05-03 RX ORDER — HYDROCODONE BITARTRATE AND ACETAMINOPHEN 10; 325 MG/1; MG/1
1 TABLET ORAL EVERY 6 HOURS PRN
Qty: 15 TABLET | Refills: 0 | Status: SHIPPED | OUTPATIENT
Start: 2017-05-03 | End: 2017-05-26

## 2017-05-03 RX ORDER — MORPHINE SULFATE 2 MG/ML
4 INJECTION, SOLUTION INTRAMUSCULAR; INTRAVENOUS ONCE
Status: COMPLETED | OUTPATIENT
Start: 2017-05-03 | End: 2017-05-03

## 2017-05-03 RX ADMIN — SODIUM CHLORIDE: 0.9 INJECTION, SOLUTION INTRAVENOUS at 04:05

## 2017-05-03 RX ADMIN — PAROXETINE HYDROCHLORIDE HEMIHYDRATE 40 MG: 20 TABLET, FILM COATED ORAL at 08:05

## 2017-05-03 RX ADMIN — DOCUSATE SODIUM 100 MG: 100 CAPSULE, LIQUID FILLED ORAL at 08:05

## 2017-05-03 RX ADMIN — HYDROCODONE BITARTRATE AND ACETAMINOPHEN 1 TABLET: 10; 325 TABLET ORAL at 05:05

## 2017-05-03 RX ADMIN — MORPHINE SULFATE 4 MG: 2 INJECTION, SOLUTION INTRAMUSCULAR; INTRAVENOUS at 01:05

## 2017-05-03 RX ADMIN — DIPHENHYDRAMINE HYDROCHLORIDE 12.5 MG: 50 INJECTION, SOLUTION INTRAMUSCULAR; INTRAVENOUS at 05:05

## 2017-05-03 RX ADMIN — PANTOPRAZOLE SODIUM 40 MG: 40 TABLET, DELAYED RELEASE ORAL at 08:05

## 2017-05-03 RX ADMIN — HYDROXYZINE HYDROCHLORIDE 25 MG: 25 TABLET, FILM COATED ORAL at 08:05

## 2017-05-03 RX ADMIN — DIPHENHYDRAMINE HYDROCHLORIDE 12.5 MG: 50 INJECTION, SOLUTION INTRAMUSCULAR; INTRAVENOUS at 09:05

## 2017-05-03 RX ADMIN — HYDROCODONE BITARTRATE AND ACETAMINOPHEN 1 TABLET: 10; 325 TABLET ORAL at 10:05

## 2017-05-03 NOTE — SUBJECTIVE & OBJECTIVE
Interval History: Took all night to find matching blood. She felt anxious about it.     Review of Systems   Constitutional: Positive for fatigue. Negative for fever.   Respiratory: Negative for cough and shortness of breath.      Objective:     Vital Signs (Most Recent):  Temp: 98 °F (36.7 °C) (05/03/17 1210)  Pulse: 68 (05/03/17 1210)  Resp: 20 (05/03/17 1210)  BP: 127/66 (05/03/17 1210)  SpO2: 100 % (05/03/17 1116) Vital Signs (24h Range):  Temp:  [97.7 °F (36.5 °C)-98.9 °F (37.2 °C)] 98 °F (36.7 °C)  Pulse:  [61-87] 68  Resp:  [18-20] 20  SpO2:  [99 %-100 %] 100 %  BP: (108-146)/(54-90) 127/66     Weight: 66.9 kg (147 lb 7.8 oz)  Body mass index is 24.54 kg/(m^2).    Intake/Output Summary (Last 24 hours) at 05/03/17 1326  Last data filed at 05/03/17 0930   Gross per 24 hour   Intake          2427.08 ml   Output             2400 ml   Net            27.08 ml      Physical Exam   Constitutional: She is oriented to person, place, and time. She appears well-developed. No distress.   Cardiovascular: Normal rate and regular rhythm.    Pulmonary/Chest: Effort normal. No respiratory distress.   Neurological: She is alert and oriented to person, place, and time.   Skin:   Posterior scalp staples, 2nd staple from the top with right side more depressed into scalp, tender, removed with staple removal kit, no subsequent bleeding   Psychiatric: She has a normal mood and affect.   Nursing note and vitals reviewed.      Significant Labs:   CBC:   Recent Labs  Lab 05/01/17  1801 05/02/17  0851 05/02/17  1011 05/03/17  0301   WBC 8.32 4.45  --  4.28   HGB 9.8* 6.6* 6.8* 6.1*   HCT 31.2* 21.5* 22.6* 20.5*    185  --  163     CMP:   Recent Labs  Lab 05/01/17  1801 05/02/17  0851  05/02/17  2230 05/03/17  0301 05/03/17  0702   * 127*  127*  < > 131* 133* 135*   K 3.9 4.1  4.1  < > 4.4 4.5 4.4   CL 87* 96  96  < > 100 102 104   CO2 12* 24  24  < > 26 28 24   GLU 72 96  96  < > 97 88 82   BUN 10 16  16  < > 15 13 12    CREATININE 1.1 1.0  1.0  < > 0.7 0.8 0.7   CALCIUM 8.9 8.5*  8.5*  < > 8.4* 8.4* 8.3*   PROT 7.8 5.9*  --   --   --   --    ALBUMIN 4.2 3.3*  --   --   --   --    BILITOT 0.9 0.5  --   --   --   --    ALKPHOS 67 50*  --   --   --   --    AST 46* 19  --   --   --   --    ALT 16 10  --   --   --   --    ANIONGAP 21* 7*  7*  < > 5* 3* 7*   EGFRNONAA >60 >60  >60  < > >60 >60 >60   < > = values in this interval not displayed.    Significant Imaging: I have reviewed all pertinent imaging results/findings within the past 24 hours.

## 2017-05-03 NOTE — NURSING
"Nurse notified by blood bank that they were not able to get the second unit of blood for the pt. Blood bank checked with main campus and they do not have anymore of that blood type. Blood bank informed nurse that the pt can get a "least compatible" blood type, but the Dr would have to sign an emergency waive form. Nurse notified Dr. Sun. Nurse ordered to just transfuse 1 unit of blood for now. Will await blood work after transfusion is complete.   "

## 2017-05-03 NOTE — DISCHARGE SUMMARY
[facility]  Discharge Summary      Patient Name: Daksha Marie  MRN: [id number]  Admission Date: 5/1/2017  Hospital Length of Stay: 2 days  Discharge Date and Time: 5/3/2017  7:30 PM  Attending Physician: Luis A Sun MD   Discharging Provider: Luis A Sun MD  Primary Care Provider: Caridad Padilla MD      HPI:   Daksha Marie is a 46 y.o. black woman with sickle cell trait, hypertension, glaucoma, chronic diastolic congestive heart failure, history of breast reduction, history of Haylee-en-Y gastric bypass, anastomotic ulcer, posttraumatic stress disorder, alcohol abuse, history of malingering, vitamin B12 deficiency, and gastroesophageal reflux disease. She lives in [place], [place]. Her primary care physician is Dr. Mclean (Angelia Padilla.     She was last hospitalized at [facility] from 1/29/17-1/30/17 for hyponatremia (120) that improved to 135 in one day after 1.25 liters of normal saline and hypoglycemia (63). She was prescribed disulfiram but ran out and had no refills. She was on hydrochlorothiazide, which was discontinued. She had a similar hospitalization for hyponatremia (120) from 1/24/16-1/26/16, when she was on hydrochlorothiazide and furosemide. She did not realize it and continued to take both hydrochlorothiazide and furosemide.    She presented to [facility] on 5/01/17 after being found on the floor by her sons. She was weak and dizzy and had hit the back of her head. She had drunk a 1/2 pint of vodka prior to falling. She had a 5 cm posterior occipital laceration that was closed with staples in the ED. She again had hypoglycemia similar to before, as well as recurrent hyponatremia (120) similar to before. Serum alcohol level was 150. A peripheral IV could not be placed so a right internal jugular central venous catheter was placed. She was given IV saline and admitted to [facility] to continue treating her  hyponatremia and hypoglycemia.       Procedures:  Laceration staples placement in ED  Right IJ central venous catheter placement in ED (removed prior to discharge)     Indwelling Lines/Drains at time of discharge: None    Hospital Course:   She was given dextrose 5% and 0.9% saline at 125 mL/hr. Hypoglycemia resolved. Hyponatremia progressively improved. She was relatively hypotensive. Upon repeat questioning, she reported that she was still taking hydrochlorothiazide and furosemide. Sodium improved with saline administration and holding thiazide and loop diuretics. Hemoglobin and hematocrit were 6.6 and 21.5. She had required pRBC transfusion for her chronic anemia in the past for anastomotic ulcer bleed. She takes omeprazole daily already and had no overt bleeding, so she may have nutritional deficiencies related to her gastric bypass. She was advised to take vitamin C and iron supplements. She was transfused 1 unit of pRBCs. One of the staples placed in the ED was crooked and hurt significantly, so it was removed and General Surgery was consulted to determine if she needed further wound closure. Surgeon Dr. Raina Ron felt it would be fine with local wound care. She requested a refill of disulfiram and pain medications for her scalp laceration. She was given a prescription for hydrocodone-acetaminophen  mg (15 tablets). Post-transfusion hemoglobin and hematocrit were 7.4 and 24.3. Final sodium was 136. She was discharged home in the evening of 5/03/17.     Consults:   Consults         Status Ordering Provider     Inpatient consult to General Surgery  Once     Provider:  DO Lowell Cox DUSTIN A          Significant Diagnostic Studies:   X-Ray Pelvis Routine AP 5/01/17: There is no displaced fracture or bony erosion identified.  X-Ray Chest 1 View 5/01/17: The lungs are clear.  There is no pneumothorax or pleural fluid.  The cardiac silhouette is enlarged.  The osseous  structures demonstrate mild degenerative change.  CT Head Without Contrast 5/01/17: The brain is normally formed and unremarkable.  The ventricular system is within normal limits of size for age and shows no distortion by mass effect.  There is no intra-or extra-axial mass or hemorrhage identified.  The visualized extracranial structures are unremarkable.  CT Cervical Spine Without Contrast 5/01/17: The visualized portion of the intracranial content is unremarkable.  The visualized soft tissues and vascular structures from the base of the skull to the visualized portion of the superior mediastinum are significant for bilateral carotid calcification.  The visualized portion of the lungs is unremarkable.  There is straightening of the normal cervical lordosis.  The vertebral body heights and disc space heights are satisfactorily preserved.  There is no prevertebral soft tissue swelling.  There is no fracture, dislocation, or bone erosion.    X-Ray Chest 1 View 5/01/17: Right IJ catheter with tip overlies the mid right atrium approximately 5 cm below the SVC/RA junction. No pneumothorax.    Final Active Diagnoses:    Diagnosis Date Noted POA    Occipital scalp laceration [S01.01XA] 05/01/2017 Yes    Gastroesophageal reflux disease without esophagitis [K21.9] 10/29/2015 Yes    Alcohol abuse [F10.10] 10/07/2015 Yes     Chronic    Microcytic anemia [D50.9] 10/07/2015 Yes     Chronic    History of Haylee-en-Y gastric bypass [Z98.84] 06/24/2015 Not Applicable     Chronic    Chronic diastolic congestive heart failure [I50.32]  Yes     Chronic    PTSD (post-traumatic stress disorder) [F43.10]  Yes     Chronic    Essential hypertension [I10] 08/12/2014 Yes     Chronic      Problems Resolved During this Admission:    Diagnosis Date Noted Date Resolved POA    PRINCIPAL PROBLEM:  Hyponatremia [E87.1] 05/01/2017 05/03/2017 Yes     Chronic    Acute hyponatremia [E87.1]  05/03/2017 Yes    Trauma [T14.90]  05/03/2017 Yes     Syncope and collapse [R55] 05/02/2017 05/03/2017 Yes        Discharged Condition: good    Disposition: Home or Self Care    Follow Up:  Follow-up Information     Follow up with [facility] Call on 5/15/2017.    Specialty:  [facility]    Why:  Lab 8:30am and clinic appt at 9am    Contact information:    [address]  [facility] [address] [phone]        Follow up with Raina Ron DO On 5/11/2017.    Specialties:  General Surgery, Surgery    Why:  For wound re-check and staple removal, appt at 9am    Contact information:    [address]  [address]  [place] [address]  [phone]          Patient Instructions:     Diet general     Activity as tolerated     Call MD for:  redness, tenderness, or signs of infection (pain, swelling, redness, odor or green/yellow discharge around incision site)     Call MD for:  persistent dizziness, light-headedness, or visual disturbances       Medications:  Reconciled Home Medications:   Current Discharge Medication List      START taking these medications    Details   disulfiram (ANTABUSE) 250 mg tablet Take 1 tablet (250 mg total) by mouth once daily.  Qty: 30 tablet, Refills: 11      ferrous sulfate 325 (65 FE) MG EC tablet Take 1 tablet (325 mg total) by mouth once daily.  Refills: 0      hydrocodone-acetaminophen 10-325mg (NORCO)  mg Tab Take 1 tablet by mouth every 6 (six) hours as needed for Pain.  Qty: 15 tablet, Refills: 0         CONTINUE these medications which have CHANGED    Details   omeprazole (PRILOSEC) 40 MG capsule Take 1 capsule (40 mg total) by mouth once daily.  Qty: 30 capsule, Refills: 11         CONTINUE these medications which have NOT CHANGED    Details   cloNIDine (CATAPRES) 0.2 MG tablet TAKE 1 TABLET BY MOUTH EVERY EVENING  Qty: 90 tablet, Refills: 0    Comments: **Patient requests 90 days supply**      dicyclomine (BENTYL) 20 mg tablet Take 20 mg by mouth 3 (three) times daily as  needed (for abdominal cramps).      DORZOLAMIDE-TIMOLOL, PF, OPHT Place 1 drop into both eyes every evening.       fluticasone (FLONASE) 50 mcg/actuation nasal spray 1 spray by Each Nare route once daily.      latanoprost 0.005 % ophthalmic solution Place 1 drop into both eyes every evening.       lisinopril 10 MG tablet Take 10 mg by mouth every morning.       montelukast (SINGULAIR) 10 mg tablet Take 10 mg by mouth every evening.      multivitamin (THERAGRAN) tablet Take 1 tablet by mouth once daily.  Qty: 30 tablet, Refills: 5      ondansetron (ZOFRAN-ODT) 8 MG TbDL Take 1 tablet (8 mg total) by mouth every 8 (eight) hours as needed.  Qty: 60 tablet, Refills: 2      paroxetine (PAXIL) 40 MG tablet Take 1 tablet (40 mg total) by mouth once daily.  Qty: 30 tablet, Refills: 0      potassium chloride (MICRO-K) 10 MEQ CpSR Take 10 mEq by mouth once daily.       promethazine (PHENERGAN) 25 MG suppository Place 25 mg rectally every 6 (six) hours as needed for Nausea.      quetiapine (SEROQUEL) 100 MG Tab Take 1 tablet (100 mg total) by mouth every evening.  Qty: 30 tablet, Refills: 0      thiamine mononitrate 100 mg Tab Take 1 tablet (100 mg total) by mouth 2 (two) times daily.  Qty: 60 tablet, Refills: 0      venlafaxine (EFFEXOR-XR) 75 MG 24 hr capsule Take 1 capsule (75 mg total) by mouth once daily.  Qty: 7 capsule, Refills: 0      cyanocobalamin 1,000 mcg/mL injection Inject 1,000 mcg into the muscle every 28 days. On the 15th on the month      docusate sodium (COLACE) 100 MG capsule Take 1 capsule (100 mg total) by mouth 2 (two) times daily.  Refills: 0         STOP taking these medications       furosemide (LASIX) 40 MG tablet Comments:   Reason for Stopping:         ibuprofen (ADVIL,MOTRIN) 800 MG tablet Comments:   Reason for Stopping:         lorazepam (ATIVAN) 0.5 MG tablet Comments:   Reason for Stopping:             Time spent on the discharge of patient: 45 minutes    Luis A Sun MD  Department of  Hospital Medicine [facility]

## 2017-05-03 NOTE — PLAN OF CARE
Spoke to the lab to find out the statis of patient's unit of blood. They were still waiting to hear from [facility]

## 2017-05-03 NOTE — PROGRESS NOTES
.Pharmacy New Medication Education    Patient accepted medication education.    Pharmacy educated patient on the following medications, using the teach-back method.   Benadryl  Hydroxyzine    Learners of pharmacy medication education included:  patient    Patient +/- learner response:  verbalize understanding

## 2017-05-03 NOTE — CONSULTS
[facility]  History & Physical    SUBJECTIVE:     Chief Complaint/Reason for Admission: scalp lac    History of Present Illness:  Patient is a 46 y.o. female presents s/p a fall at home 2 d ago.  She was found by her sons.  She reportedly passed out and hit the back of her head after drinking vodka.  She has a history of alcohol abuse.  She also was found to have hypoglycemia and hyponatremia.  She had a recent hospitalization for similar imbalances.  She was recommended to discontinue her hydrochlorothiazide and furosemide in the past, but it appears that she continued to take them.  She was seen in the ER upon presentation this admission and a scalp laceration was identified.  This was cleaned and repaired with skin staples.  I was asked to evaluate the wound prior to discharge.  She was receiving another unit of blood at the time of our interview earlier this afternoon (she received 1 unit on admission as well).  She states she feels well.  No new complaints.  She complains of pain at her wound with palpation.  Otherwise it is tolerable.  No obvious drainage from the wound.  There was a bothersome staple that was removed by Dr. Sun earlier today.    PTA Medications   Medication Sig    cloNIDine (CATAPRES) 0.2 MG tablet TAKE 1 TABLET BY MOUTH EVERY EVENING    dicyclomine (BENTYL) 20 mg tablet Take 20 mg by mouth 3 (three) times daily as needed (for abdominal cramps).    DORZOLAMIDE-TIMOLOL, PF, OPHT Place 1 drop into both eyes every evening.     fluticasone (FLONASE) 50 mcg/actuation nasal spray 1 spray by Each Nare route once daily.    latanoprost 0.005 % ophthalmic solution Place 1 drop into both eyes every evening.     lisinopril 10 MG tablet Take 10 mg by mouth every morning.     montelukast (SINGULAIR) 10 mg tablet Take 10 mg by mouth every evening.    multivitamin (THERAGRAN) tablet Take 1 tablet by mouth once daily.    ondansetron (ZOFRAN-ODT) 8 MG TbDL Take 1 tablet (8 mg  total) by mouth every 8 (eight) hours as needed.    paroxetine (PAXIL) 40 MG tablet Take 1 tablet (40 mg total) by mouth once daily.    potassium chloride (MICRO-K) 10 MEQ CpSR Take 10 mEq by mouth once daily.     promethazine (PHENERGAN) 25 MG suppository Place 25 mg rectally every 6 (six) hours as needed for Nausea.    quetiapine (SEROQUEL) 100 MG Tab Take 1 tablet (100 mg total) by mouth every evening.    thiamine mononitrate 100 mg Tab Take 1 tablet (100 mg total) by mouth 2 (two) times daily.    venlafaxine (EFFEXOR-XR) 75 MG 24 hr capsule Take 1 capsule (75 mg total) by mouth once daily.    [DISCONTINUED] omeprazole (PRILOSEC) 40 MG capsule Take 40 mg by mouth once daily.    cyanocobalamin 1,000 mcg/mL injection Inject 1,000 mcg into the muscle every 28 days. On the 15th on the month    docusate sodium (COLACE) 100 MG capsule Take 1 capsule (100 mg total) by mouth 2 (two) times daily.    [DISCONTINUED] furosemide (LASIX) 40 MG tablet Take 40 mg by mouth once daily.    [DISCONTINUED] ibuprofen (ADVIL,MOTRIN) 800 MG tablet TAKE 1 TABLET BY MOUTH EVERY 6 HOURS AS NEEDED FOR PAIN    [DISCONTINUED] lorazepam (ATIVAN) 0.5 MG tablet Take 1 tablet (0.5 mg total) by mouth 2 (two) times daily. Today: take 2 tablets by mouth  Tomorrow take 1 tablet every 12 hours  Third day take 1 tablet by mouth       Review of patient's allergies indicates:   Allergen Reactions    Penicillins Hives       Past Medical History:   Diagnosis Date    Alcohol abuse 10/7/2015    Breast calcification, left     Pt states this has been worked up, she received a biopsy in the past to confirm calcifications were from scar tissue from when she had breast reduction surgery.      Chronic diastolic congestive heart failure     Encounter for blood transfusion     GERD (gastroesophageal reflux disease)     Glaucoma 2008    Hypertension     Hyponatremia 10/2015    Na 109. attributed to polydipsia and alcohol abuse.  suffered a  seizure in the ICU .    Insomnia     Malingering 2016    PTSD (post-traumatic stress disorder)     Sickle cell trait      Past Surgical History:   Procedure Laterality Date    bile fistula      breast reduction  2003     SECTION      CHOLECYSTECTOMY  after 2007    GASTRIC BYPASS  2007 and 2008     Family History   Problem Relation Age of Onset    Diabetes Mother     Lupus Mother     Diabetes Maternal Grandmother     Crohn's disease Cousin      Social History   Substance Use Topics    Smoking status: Current Every Day Smoker     Packs/day: 1.00     Years: 4.00     Types: Cigarettes    Smokeless tobacco: Never Used    Alcohol use Yes      Comment: last night- vodka and cranberry juice        Review of Systems:  All systems were reviewed and are negative, except that mentioned in the HPI.    OBJECTIVE:     Vital Signs (Most Recent):  Temp: 98 °F (36.7 °C) (17 1210)  Pulse: 68 (17 1210)  Resp: 20 (17 1210)  BP: 127/66 (17 1210)  SpO2: 100 % (17 1116)    Physical Exam:  General: well developed, well nourished, no distress  Eyes: conjunctivae/corneas clear. PERRL..  HENT: Head:normocephalic, atraumatic. Ears:hearing grossly normal bilaterally. Nose: no discharge. Throat: not examined.  Neck: from, no JVD  Lungs:  clear to auscultation bilaterally and normal respiratory effort  Cardiovascular: Heart: regular rate and rhythm. Chest Wall: no tenderness. Extremities: no cyanosis or edema, or clubbing. Pulses: not examined.  Abdomen/Rectal: Abdomen: soft, ND, NT. Rectal: not examined  Skin: intact stapleline of posterior scalp, ttp, no active drainage, no obvious cellulitis  Musculoskeletal:FROM b/l extremities  Neurologic: Normal strength and tone. No focal numbness or weakness  Psych/Behavioral:  Alert and oriented, appropriate affect.    Laboratory:  CBC:   Recent Labs  Lab 17  0301   WBC 4.28   RBC 3.08*   HGB 6.1*   HCT 20.5*      MCV 67*   MCH 19.8*    MCHC 29.8*     CMP:   Recent Labs  Lab 05/02/17  0851  05/03/17  0702   GLU 96  96  < > 82   CALCIUM 8.5*  8.5*  < > 8.3*   ALBUMIN 3.3*  --   --    PROT 5.9*  --   --    *  127*  < > 135*   K 4.1  4.1  < > 4.4   CO2 24  24  < > 24   CL 96  96  < > 104   BUN 16  16  < > 12   CREATININE 1.0  1.0  < > 0.7   ALKPHOS 50*  --   --    ALT 10  --   --    AST 19  --   --    BILITOT 0.5  --   --    < > = values in this interval not displayed.    Diagnostic Results:  CT head/neck 5/1/17 images and reports were personally reviewed.  No acute injuries noted    ASSESSMENT/PLAN:     Patient Active Problem List   Diagnosis    Essential hypertension    Chronic diastolic congestive heart failure    PTSD (post-traumatic stress disorder)    Glaucoma    History of Haylee-en-Y gastric bypass    Alcohol abuse    Convulsions    Generalized abdominal pain    Gastroesophageal reflux disease without esophagitis    Enteritis    Alcoholism /alcohol abuse    Hyponatremia with decreased serum osmolality    Microcytic anemia    Occipital scalp laceration       Plan:   Scalp laceration:   Wound care instructions were discussed  Apply antibiotic ointment 3 times a day  Otherwise keep the wound clean and dry  F/u in my office 5/11/17 for staple removal, if they have not been removed by her PCP/priority clinic.    Hyponatremia  Resolved    Syncope and alcohol abuse  Avoid alcohol use  Vitamin supplementation

## 2017-05-03 NOTE — PROGRESS NOTES
[facility]  Progress Note    Patient Name: Daksha Marie  MRN: [id number]  Patient Class: IP- Inpatient   Admission Date: 5/1/2017  Length of Stay: 2 days  Attending Physician: Luis A Sun MD  Primary Care Provider: Caridad Padilla MD        Subjective:     Principal Problem:Hyponatremia with decreased serum osmolality    HPI:  Daksha Marie is a 46 y.o. black woman with sickle cell trait, hypertension, glaucoma, chronic diastolic congestive heart failure, history of breast reduction, history of Haylee-en-Y gastric bypass, anastomotic ulcer, posttraumatic stress disorder, alcohol abuse, history of malingering, vitamin B12 deficiency, and gastroesophageal reflux disease. She lives in [place], [place]. Her primary care physician is Dr. Mclean (Angelia Padilla.     She was last hospitalized at [facility] from 1/29/17-1/30/17 for hyponatremia (120) that improved to 135 in one day after 1.25 liters of normal saline and hypoglycemia (63). She was prescribed disulfiram but ran out and had no refills. She was on hydrochlorothiazide, which was discontinued. She had a similar hospitalization for hyponatremia (120) from 1/24/16-1/26/16, when she was on hydrochlorothiazide and furosemide. She did not realize it and continued to take both hydrochlorothiazide and furosemide.    She presented to [facility] on 5/01/17 after being found on the floor by her sons. She was weak and dizzy and had hit the back of her head. She had drunk a 1/2 pint of vodka prior to falling. She had a 5 cm posterior occipital laceration that was closed with staples in the ED. She again had hypoglycemia similar to before, as well as recurrent hyponatremia (120) similar to before. Serum alcohol level was 150. A peripheral IV could not be placed so a right internal jugular central venous catheter was placed. She was given IV saline and admitted to [facility] to continue treating  her hyponatremia and hypoglycemia.         Hospital Course:  She was given dextrose 5% and 0.9% saline at 125 mL/hr. Hypoglycemia resolved. Hyponatremia progressively improved. She was relatively hypotensive. Upon repeat questioning, she reported that she was still taking hydrochlorothiazide and furosemide. Sodium improved with saline administration and holding thiazide and loop diuretics. Hemoglobin and hematocrit were 6.6 and 21.5. She had required pRBC transfusion for her chronic anemia in the past for anastomotic ulcer bleed. She takes omeprazole daily already and had no overt bleeding, so she may have nutritional deficiencies related to her gastric bypass. She was advised to take vitamin C and iron supplements. She was transfused 1 unit of pRBCs. One of the staples placed in the ED was crooked and hurt significantly, so it was removed and General Surgery was consulted to determine if she needed further wound closure. She requested a refill of disulfiram and pain medications for her scalp laceration.     Interval History: Took all night to find matching blood. She felt anxious about it.     Review of Systems   Constitutional: Positive for fatigue. Negative for fever.   Respiratory: Negative for cough and shortness of breath.      Objective:     Vital Signs (Most Recent):  Temp: 98 °F (36.7 °C) (05/03/17 1210)  Pulse: 68 (05/03/17 1210)  Resp: 20 (05/03/17 1210)  BP: 127/66 (05/03/17 1210)  SpO2: 100 % (05/03/17 1116) Vital Signs (24h Range):  Temp:  [97.7 °F (36.5 °C)-98.9 °F (37.2 °C)] 98 °F (36.7 °C)  Pulse:  [61-87] 68  Resp:  [18-20] 20  SpO2:  [99 %-100 %] 100 %  BP: (108-146)/(54-90) 127/66     Weight: 66.9 kg (147 lb 7.8 oz)  Body mass index is 24.54 kg/(m^2).    Intake/Output Summary (Last 24 hours) at 05/03/17 1326  Last data filed at 05/03/17 0930   Gross per 24 hour   Intake          2427.08 ml   Output             2400 ml   Net            27.08 ml      Physical Exam   Constitutional: She is oriented  to person, place, and time. She appears well-developed. No distress.   Cardiovascular: Normal rate and regular rhythm.    Pulmonary/Chest: Effort normal. No respiratory distress.   Neurological: She is alert and oriented to person, place, and time.   Skin:   Posterior scalp staples, 2nd staple from the top with right side more depressed into scalp, tender, removed with staple removal kit, no subsequent bleeding   Psychiatric: She has a normal mood and affect.   Nursing note and vitals reviewed.      Significant Labs:   CBC:   Recent Labs  Lab 05/01/17  1801 05/02/17  0851 05/02/17  1011 05/03/17  0301   WBC 8.32 4.45  --  4.28   HGB 9.8* 6.6* 6.8* 6.1*   HCT 31.2* 21.5* 22.6* 20.5*    185  --  163     CMP:   Recent Labs  Lab 05/01/17  1801 05/02/17  0851  05/02/17  2230 05/03/17  0301 05/03/17  0702   * 127*  127*  < > 131* 133* 135*   K 3.9 4.1  4.1  < > 4.4 4.5 4.4   CL 87* 96  96  < > 100 102 104   CO2 12* 24  24  < > 26 28 24   GLU 72 96  96  < > 97 88 82   BUN 10 16  16  < > 15 13 12   CREATININE 1.1 1.0  1.0  < > 0.7 0.8 0.7   CALCIUM 8.9 8.5*  8.5*  < > 8.4* 8.4* 8.3*   PROT 7.8 5.9*  --   --   --   --    ALBUMIN 4.2 3.3*  --   --   --   --    BILITOT 0.9 0.5  --   --   --   --    ALKPHOS 67 50*  --   --   --   --    AST 46* 19  --   --   --   --    ALT 16 10  --   --   --   --    ANIONGAP 21* 7*  7*  < > 5* 3* 7*   EGFRNONAA >60 >60  >60  < > >60 >60 >60   < > = values in this interval not displayed.    Significant Imaging: I have reviewed all pertinent imaging results/findings within the past 24 hours.    Assessment/Plan:      * Hyponatremia with decreased serum osmolality  Improving with IV saline. Same etiology as in the past.          Essential hypertension  Chronic diastolic congestive heart failure  Taking lisinopril, hydrochlorothiazide, clonidine, and furosemide. Stop/hold. Resume lisinopril on discharge.      History of Haylee-en-Y gastric bypass  Likely has vitamin  malabsorption.        Alcohol abuse  Refill disulfiram.      Gastroesophageal reflux disease without esophagitis  Takes omeprozole 40 mg daily. Giving pantoprozole 40 mg daily      Microcytic anemia  Symptomatic. Transfused 1 unit pRBCs. Iron supplements. Advised to get enough vitamin C in diet as well. Already gets B12 injections outpatient.      Occipital scalp laceration  Prescribe hydrocodone-acetaminophen. Surgery consulted to look at wound where staple removed.      VTE Risk Mitigation         Ordered     Medium Risk of VTE  Once      05/02/17 0031     Place RISHI hose  Until discontinued      05/02/17 0031     Place sequential compression device  Until discontinued      05/02/17 0031        Disposition: Possibly home later today.    Luis A Sun MD  Department of Hospital Medicine   [facility]

## 2017-05-04 ENCOUNTER — NURSE TRIAGE (OUTPATIENT)
Dept: ADMINISTRATIVE | Facility: CLINIC | Age: 47
End: 2017-05-04

## 2017-05-04 LAB
BLD PROD TYP BPU: NORMAL
BLD PROD TYP BPU: NORMAL
BLOOD UNIT EXPIRATION DATE: NORMAL
BLOOD UNIT EXPIRATION DATE: NORMAL
BLOOD UNIT TYPE CODE: 600
BLOOD UNIT TYPE CODE: 6200
BLOOD UNIT TYPE: NORMAL
BLOOD UNIT TYPE: NORMAL
CODING SYSTEM: NORMAL
CODING SYSTEM: NORMAL
DISPENSE STATUS: NORMAL
DISPENSE STATUS: NORMAL
TRANS ERYTHROCYTES VOL PATIENT: NORMAL ML
TRANS ERYTHROCYTES VOL PATIENT: NORMAL ML

## 2017-05-04 NOTE — NURSING
Discharge instructions and education reviewed with pt. Reviewed follow up appts, new medications, diet, and importance of medication compliance. Allowed time for questions. Patient verbalized complete understanding.    Central line removed by nurse. Sutures removed. Catheter intact. Held pressure for 5 minutes. Patient tolerated well. Telemetry monitor discontinued. No acute distress noted.     Pt waiting for transportation.

## 2017-05-04 NOTE — PLAN OF CARE
Future Appointments  Date Time Provider Department Center   5/11/2017 9:00 AM Raina Ron DO [facility]   5/15/2017 9:00 AM Bessie Hale MD [facility]        05/04/17 0728   Final Note   Assessment Type Final Discharge Note   Discharge Disposition Home   Discharge planning education complete? Yes   What phone number can be called within the next 1-3 days to see how you are doing after discharge? [phone]   Hospital Follow Up  Appt(s) scheduled? Yes

## 2017-05-05 NOTE — TELEPHONE ENCOUNTER
Reason for Disposition   Caller has NON-URGENT medication question about med that PCP prescribed and triager unable to answer question    Answer Assessment - Initial Assessment Questions  Pt discharged from hospital 5/3/17. Has central line and staples in head from laceration when she passed out. Is taking norco q 6 hrs but this is not helping her pain. Wanted to know if there is anything else    Protocols used: ST MEDICATION QUESTION CALL-A-    Pt advised she cannot take ibuprofen due to hx of gastric bypass. Advised to supplement her norco with tylenol 325 mg tonight and f/u with  tomorrow for any further recommendations.

## 2017-05-15 ENCOUNTER — TELEPHONE (OUTPATIENT)
Dept: PRIMARY CARE CLINIC | Facility: CLINIC | Age: 47
End: 2017-05-15

## 2017-05-26 ENCOUNTER — OFFICE VISIT (OUTPATIENT)
Dept: SURGERY | Facility: CLINIC | Age: 47
End: 2017-05-26
Payer: MEDICARE

## 2017-05-26 VITALS
BODY MASS INDEX: 26.74 KG/M2 | HEART RATE: 103 BPM | TEMPERATURE: 99 F | DIASTOLIC BLOOD PRESSURE: 109 MMHG | WEIGHT: 160.5 LBS | SYSTOLIC BLOOD PRESSURE: 148 MMHG | HEIGHT: 65 IN

## 2017-05-26 DIAGNOSIS — G43.009 NONINTRACTABLE MIGRAINE, UNSPECIFIED MIGRAINE TYPE: ICD-10-CM

## 2017-05-26 DIAGNOSIS — F10.10 ALCOHOL ABUSE: ICD-10-CM

## 2017-05-26 DIAGNOSIS — F17.200 SMOKER: ICD-10-CM

## 2017-05-26 DIAGNOSIS — S01.01XD OCCIPITAL SCALP LACERATION, SUBSEQUENT ENCOUNTER: Primary | ICD-10-CM

## 2017-05-26 PROCEDURE — 99214 OFFICE O/P EST MOD 30 MIN: CPT | Mod: S$PBB,,, | Performed by: SURGERY

## 2017-05-26 PROCEDURE — 99213 OFFICE O/P EST LOW 20 MIN: CPT | Mod: PBBFAC,PN | Performed by: SURGERY

## 2017-05-26 PROCEDURE — 99999 PR PBB SHADOW E&M-EST. PATIENT-LVL III: CPT | Mod: PBBFAC,,, | Performed by: SURGERY

## 2017-05-26 NOTE — PROGRESS NOTES
Subjective:       Patient ID: Daksha Marie is a 46 y.o. female.    Chief Complaint: Suture / Staple Removal    HPI   Patient is status post fall with laceration to the occipital region approximately 1 month ago.  It was repaired in the ER, skin was closed with staples.  I was consulted for evaluation prior to patient's discharge from her hospital admission on May 3.  The primary care provider in the hospital removed several staples that were causing discomfort and were not approximating the wound.  Since that time, the patient has kept the wound clean and dry, showering using soap and water, applying Neosporin.  No fevers or chills. She has cancelled several appointments with us since her discharge from the hospital. No new complaints except intermittent migraine headaches.  She reports not having a provider to treat them and is interested in referral.  Of note, the patient has a significant history for smoking and alcohol abuse.    Review of Systems    All systems were reviewed and are negative, except that mentioned in the HPI.    Objective:      Physical Exam   Constitutional: She is oriented to person, place, and time. She appears well-developed and well-nourished. No distress.   HENT:   Head: Normocephalic and atraumatic.   Eyes: EOM are normal. Pupils are equal, round, and reactive to light. No scleral icterus.   Neck: Normal range of motion. No JVD present.   Cardiovascular: Normal rate and regular rhythm.    Pulmonary/Chest: Effort normal and breath sounds normal. No respiratory distress.   Abdominal: Soft. She exhibits no distension.   Musculoskeletal: Normal range of motion.   Neurological: She is alert and oriented to person, place, and time. No cranial nerve deficit.   Skin: Skin is warm and dry. She is not diaphoretic.   Well-healed scalp incision, 4 staples removed, wound appears well healed without signs of infection, nontender to palpation, no drainage, no dehiscence   Psychiatric: She  has a normal mood and affect.       Assessment:       1. Occipital scalp laceration, subsequent encounter    2. Nonintractable migraine, unspecified migraine type    3. Alcohol abuse    4. Smoker        Plan:   Remaining scalp staples removed at bedside.  Wound appears well healed.  No need to apply antibiotic ointment.  Okay to get her hair cut.  We'll refer her to migraine specialists at her request.  Smoking cessation encouraged. All questions answered. F/u prn

## 2017-06-27 ENCOUNTER — HOSPITAL ENCOUNTER (EMERGENCY)
Facility: HOSPITAL | Age: 47
Discharge: HOME | End: 2017-06-28
Attending: EMERGENCY MEDICINE
Payer: MEDICARE

## 2017-06-27 DIAGNOSIS — R07.9 CHEST PAIN: ICD-10-CM

## 2017-06-27 DIAGNOSIS — R07.89 CHEST WALL PAIN: Primary | ICD-10-CM

## 2017-06-27 PROCEDURE — 99284 EMERGENCY DEPT VISIT MOD MDM: CPT | Mod: 25

## 2017-06-27 PROCEDURE — 93010 ELECTROCARDIOGRAM REPORT: CPT | Mod: ,,, | Performed by: INTERNAL MEDICINE

## 2017-06-27 PROCEDURE — 93005 ELECTROCARDIOGRAM TRACING: CPT

## 2017-06-27 RX ORDER — KETOROLAC TROMETHAMINE 30 MG/ML
15 INJECTION, SOLUTION INTRAMUSCULAR; INTRAVENOUS
Status: DISCONTINUED | OUTPATIENT
Start: 2017-06-27 | End: 2017-06-27

## 2017-06-27 RX ORDER — KETOROLAC TROMETHAMINE 30 MG/ML
15 INJECTION, SOLUTION INTRAMUSCULAR; INTRAVENOUS
Status: DISCONTINUED | OUTPATIENT
Start: 2017-06-27 | End: 2017-06-28

## 2017-06-27 RX ORDER — ONDANSETRON 2 MG/ML
4 INJECTION INTRAMUSCULAR; INTRAVENOUS
Status: DISCONTINUED | OUTPATIENT
Start: 2017-06-27 | End: 2017-06-28

## 2017-06-28 VITALS
HEIGHT: 65 IN | RESPIRATION RATE: 13 BRPM | BODY MASS INDEX: 28.32 KG/M2 | SYSTOLIC BLOOD PRESSURE: 115 MMHG | OXYGEN SATURATION: 99 % | HEART RATE: 78 BPM | DIASTOLIC BLOOD PRESSURE: 78 MMHG | WEIGHT: 170 LBS | TEMPERATURE: 98 F

## 2017-06-28 LAB
ALBUMIN SERPL BCP-MCNC: 3.3 G/DL
ALP SERPL-CCNC: 61 U/L
ALT SERPL W/O P-5'-P-CCNC: 15 U/L
ANION GAP SERPL CALC-SCNC: 10 MMOL/L
ANISOCYTOSIS BLD QL SMEAR: SLIGHT
AST SERPL-CCNC: 24 U/L
BASOPHILS # BLD AUTO: 0.02 K/UL
BASOPHILS NFR BLD: 0.3 %
BILIRUB SERPL-MCNC: 0.2 MG/DL
BUN SERPL-MCNC: 12 MG/DL
BURR CELLS BLD QL SMEAR: ABNORMAL
CALCIUM SERPL-MCNC: 9 MG/DL
CHLORIDE SERPL-SCNC: 105 MMOL/L
CO2 SERPL-SCNC: 18 MMOL/L
CREAT SERPL-MCNC: 0.9 MG/DL
DIFFERENTIAL METHOD: ABNORMAL
EOSINOPHIL # BLD AUTO: 0 K/UL
EOSINOPHIL NFR BLD: 0.6 %
ERYTHROCYTE [DISTWIDTH] IN BLOOD BY AUTOMATED COUNT: 19.5 %
EST. GFR  (AFRICAN AMERICAN): >60 ML/MIN/1.73 M^2
EST. GFR  (NON AFRICAN AMERICAN): >60 ML/MIN/1.73 M^2
GLUCOSE SERPL-MCNC: 74 MG/DL
HCT VFR BLD AUTO: 23.9 %
HGB BLD-MCNC: 7.3 G/DL
HYPOCHROMIA BLD QL SMEAR: ABNORMAL
LYMPHOCYTES # BLD AUTO: 1.8 K/UL
LYMPHOCYTES NFR BLD: 25.8 %
MCH RBC QN AUTO: 20.5 PG
MCHC RBC AUTO-ENTMCNC: 30.5 %
MCV RBC AUTO: 67 FL
MONOCYTES # BLD AUTO: 0.5 K/UL
MONOCYTES NFR BLD: 7 %
NEUTROPHILS # BLD AUTO: 4.6 K/UL
NEUTROPHILS NFR BLD: 66.3 %
OVALOCYTES BLD QL SMEAR: ABNORMAL
PLATELET # BLD AUTO: 268 K/UL
PLATELET BLD QL SMEAR: ABNORMAL
PMV BLD AUTO: 8.7 FL
POIKILOCYTOSIS BLD QL SMEAR: SLIGHT
POLYCHROMASIA BLD QL SMEAR: ABNORMAL
POTASSIUM SERPL-SCNC: 4.7 MMOL/L
PROT SERPL-MCNC: 6.5 G/DL
RBC # BLD AUTO: 3.56 M/UL
SODIUM SERPL-SCNC: 133 MMOL/L
TARGETS BLD QL SMEAR: ABNORMAL
TROPONIN I SERPL DL<=0.01 NG/ML-MCNC: 0.04 NG/ML
TROPONIN I SERPL DL<=0.01 NG/ML-MCNC: 0.04 NG/ML
WBC # BLD AUTO: 6.97 K/UL

## 2017-06-28 PROCEDURE — 84484 ASSAY OF TROPONIN QUANT: CPT

## 2017-06-28 PROCEDURE — 80053 COMPREHEN METABOLIC PANEL: CPT

## 2017-06-28 PROCEDURE — 85025 COMPLETE CBC W/AUTO DIFF WBC: CPT

## 2017-06-28 PROCEDURE — 96372 THER/PROPH/DIAG INJ SC/IM: CPT

## 2017-06-28 PROCEDURE — 63600175 PHARM REV CODE 636 W HCPCS: Performed by: EMERGENCY MEDICINE

## 2017-06-28 PROCEDURE — 25000003 PHARM REV CODE 250: Performed by: EMERGENCY MEDICINE

## 2017-06-28 PROCEDURE — 84484 ASSAY OF TROPONIN QUANT: CPT | Mod: 91

## 2017-06-28 RX ORDER — OXYCODONE AND ACETAMINOPHEN 7.5; 325 MG/1; MG/1
1 TABLET ORAL ONCE
Status: COMPLETED | OUTPATIENT
Start: 2017-06-28 | End: 2017-06-28

## 2017-06-28 RX ORDER — ORPHENADRINE CITRATE 100 MG/1
100 TABLET, EXTENDED RELEASE ORAL 2 TIMES DAILY
Qty: 15 TABLET | Refills: 0 | Status: SHIPPED | OUTPATIENT
Start: 2017-06-28 | End: 2017-07-08

## 2017-06-28 RX ORDER — KETOROLAC TROMETHAMINE 30 MG/ML
60 INJECTION, SOLUTION INTRAMUSCULAR; INTRAVENOUS
Status: COMPLETED | OUTPATIENT
Start: 2017-06-28 | End: 2017-06-28

## 2017-06-28 RX ORDER — IBUPROFEN 600 MG/1
600 TABLET ORAL EVERY 6 HOURS PRN
Qty: 20 TABLET | Refills: 0 | Status: SHIPPED | OUTPATIENT
Start: 2017-06-28 | End: 2017-09-19

## 2017-06-28 RX ORDER — HYDROMORPHONE HYDROCHLORIDE 1 MG/ML
1 INJECTION, SOLUTION INTRAMUSCULAR; INTRAVENOUS; SUBCUTANEOUS
Status: COMPLETED | OUTPATIENT
Start: 2017-06-28 | End: 2017-06-28

## 2017-06-28 RX ADMIN — KETOROLAC TROMETHAMINE 60 MG: 30 INJECTION, SOLUTION INTRAMUSCULAR at 12:06

## 2017-06-28 RX ADMIN — HYDROMORPHONE HYDROCHLORIDE 1 MG: 1 INJECTION, SOLUTION INTRAMUSCULAR; INTRAVENOUS; SUBCUTANEOUS at 02:06

## 2017-06-28 RX ADMIN — OXYCODONE HYDROCHLORIDE AND ACETAMINOPHEN 1 TABLET: 7.5; 325 TABLET ORAL at 12:06

## 2017-06-28 NOTE — ED NOTES
Review of patient's allergies indicates:   Allergen Reactions    Penicillins Hives        Patient has verified the spelling of their name and  on armband.     Pt c/o chest pain that started around 1900 tonight.  Pt described pain as a constant stabbing pain underneath her left breast.  Pt rates her pain 10 out of 10. Pt denies any cardiac history.      APPEARANCE: Patient is alert, calm, oriented x 4, and does not appear distressed.  SKIN: Skin is normal for race, warm, and dry. Normal skin turgor and mucous membranes moist.  RESPIRATORY:Normal rate and effort. Breath sounds clear bilaterally throughout chest. Respirations are equal and unlabored.    GASTRO: Bowel sounds normal, abdomen is soft, no tenderness, and no abdominal distention.  MUSCLE: Full ROM. No bony tenderness or soft tissue tenderness. No obvious deformity.  PERIPHERAL VASCULAR: peripheral pulses present. Normal cap refill. No edema. Warm to touch.  NEURO: 5/5 strength major flexors/extensors bilaterally. Sensory intact to light touch bilaterally. Rl coma scale: eyes open spontaneously-4, oriented & converses-5, obeys commands-6. No neurological abnormalities.   MENTAL STATUS: awake, alert and aware of environment.  EYE: PERRL, both eyes: pupils brisk and reactive to light. Normal size.

## 2017-06-28 NOTE — ED PROVIDER NOTES
Encounter Date: 2017       History     Chief Complaint   Patient presents with    Chest Pain     onset of lt. stabbing chest pain at rest approx. 1hr ago.      Patient is a 47-year-old female who complains of sharp substernal chest pain that began suddenly about one hour ago.  Patient was at rest when this occurred.  She is also been extremely nauseated but no vomiting.  No shortness of breath.  No fever or cough.  She describes the pain as stabbing and constant, greatly worsened if she touches her chest.       The history is provided by the patient.     Review of patient's allergies indicates:   Allergen Reactions    Penicillins Hives     Past Medical History:   Diagnosis Date    Alcohol abuse 10/7/2015    Breast calcification, left     Pt states this has been worked up, she received a biopsy in the past to confirm calcifications were from scar tissue from when she had breast reduction surgery.      Chronic diastolic congestive heart failure     Encounter for blood transfusion     GERD (gastroesophageal reflux disease)     Glaucoma     Hypertension     Hyponatremia 10/2015    Na 109. attributed to polydipsia and alcohol abuse.  suffered a seizure in the ICU .    Insomnia     Malingering 2016    PTSD (post-traumatic stress disorder)     Sickle cell trait      Past Surgical History:   Procedure Laterality Date    bile fistula      breast reduction       SECTION      CHOLECYSTECTOMY  after 2007    GASTRIC BYPASS   and 2008     Family History   Problem Relation Age of Onset    Diabetes Mother     Lupus Mother     Diabetes Maternal Grandmother     Crohn's disease Cousin      Social History   Substance Use Topics    Smoking status: Current Every Day Smoker     Packs/day: 1.00     Years: 4.00     Types: Cigarettes    Smokeless tobacco: Never Used    Alcohol use Yes      Comment: last night- vodka and cranberry juice     Review of Systems   Constitutional: Negative  for chills and fever.   Respiratory: Negative for cough and shortness of breath.    Cardiovascular: Positive for chest pain.   Gastrointestinal: Positive for nausea. Negative for abdominal pain and vomiting.   Musculoskeletal: Negative for back pain.   Skin: Negative for rash.   Neurological: Negative for dizziness, syncope and light-headedness.   All other systems reviewed and are negative.      Physical Exam     Initial Vitals [06/27/17 2227]   BP Pulse Resp Temp SpO2   (!) 129/90 90 20 97.6 °F (36.4 °C) 100 %      MAP       103         Physical Exam    Nursing note and vitals reviewed.  Constitutional: She appears distressed.   HENT:   Head: Normocephalic and atraumatic.   Eyes: EOM are normal.   Neck: Normal range of motion. Neck supple.   Cardiovascular: Normal rate, regular rhythm and normal heart sounds.   Pulmonary/Chest: Breath sounds normal. She exhibits tenderness.   Abdominal: Soft. There is no tenderness.   Musculoskeletal: Normal range of motion. She exhibits no edema.   Neurological: She is alert and oriented to person, place, and time.   Skin: Skin is warm and dry.   Psychiatric: Thought content normal.         ED Course   Procedures  Labs Reviewed   CBC W/ AUTO DIFFERENTIAL - Abnormal; Notable for the following:        Result Value    RBC 3.56 (*)     Hemoglobin 7.3 (*)     Hematocrit 23.9 (*)     MCV 67 (*)     MCH 20.5 (*)     MCHC 30.5 (*)     RDW 19.5 (*)     MPV 8.7 (*)     All other components within normal limits   COMPREHENSIVE METABOLIC PANEL - Abnormal; Notable for the following:     Sodium 133 (*)     CO2 18 (*)     Albumin 3.3 (*)     All other components within normal limits   TROPONIN I - Abnormal; Notable for the following:     Troponin I 0.041 (*)     All other components within normal limits   TROPONIN I - Abnormal; Notable for the following:     Troponin I 0.040 (*)     All other components within normal limits     EKG Readings: (Independently Interpreted)   Heart Rate: 82. ST  Segments: Normal ST Segments. T Waves Flipped: V3, V4 and V5.          Medical Decision Making:   ED Management:  47-year-old female with chest pain.  This pain is exactly reproducible on palpation of her chest.  She has an unremarkable EKG.  Initial troponin was slightly elevated at 0.041.  Repeat was 0.040.  Given the fact that I can exactly reproduce the patient's pain on palpation, I do not feel this is of cardiac etiology.  She has no radiation of the pain or associated symptoms.  She has had normal vital signs here in the ED.  She has been written prescriptions for Norflex and ibuprofen.  I suggested she follow-up with her physician as soon as able for recheck and further treatment as warranted.  She will also return here for any concerning chest pain developing prior to follow-up.                   ED Course     Clinical Impression:   The primary encounter diagnosis was Chest wall pain. A diagnosis of Chest pain was also pertinent to this visit.                           Cruz Cline MD  06/28/17 8268

## 2017-06-28 NOTE — ED NOTES
Charge Nurse Crista Notified that IV access was attempted and unsuccessful x 2. Pt stated the last three times she was her she had a central line.  Charge Nurse notified.

## 2017-06-29 RX ORDER — CLONIDINE HYDROCHLORIDE 0.2 MG/1
TABLET ORAL
Qty: 90 TABLET | Refills: 0 | Status: SHIPPED | OUTPATIENT
Start: 2017-06-29 | End: 2017-09-19 | Stop reason: SDUPTHER

## 2017-07-10 ENCOUNTER — OFFICE VISIT (OUTPATIENT)
Dept: NEUROLOGY | Facility: CLINIC | Age: 47
End: 2017-07-10
Payer: MEDICARE

## 2017-07-10 VITALS
BODY MASS INDEX: 28.14 KG/M2 | SYSTOLIC BLOOD PRESSURE: 137 MMHG | HEIGHT: 65 IN | WEIGHT: 168.88 LBS | DIASTOLIC BLOOD PRESSURE: 99 MMHG | HEART RATE: 110 BPM

## 2017-07-10 DIAGNOSIS — G44.40 MEDICATION OVERUSE HEADACHE: ICD-10-CM

## 2017-07-10 DIAGNOSIS — S01.01XD OCCIPITAL SCALP LACERATION, SUBSEQUENT ENCOUNTER: ICD-10-CM

## 2017-07-10 PROCEDURE — 99214 OFFICE O/P EST MOD 30 MIN: CPT | Mod: PBBFAC,PO | Performed by: PSYCHIATRY & NEUROLOGY

## 2017-07-10 PROCEDURE — 99999 PR PBB SHADOW E&M-EST. PATIENT-LVL IV: CPT | Mod: PBBFAC,,, | Performed by: PSYCHIATRY & NEUROLOGY

## 2017-07-10 PROCEDURE — 99204 OFFICE O/P NEW MOD 45 MIN: CPT | Mod: S$PBB,,, | Performed by: PSYCHIATRY & NEUROLOGY

## 2017-07-10 NOTE — LETTER
July 10, 2017      Raina oRn, DO  [address]  [address]  [facility] [address]           [facility] Neurology  [address] [facility] [address] [facility] [address] [address]  Phone: [phone]  Fax: [phone]          Patient: Daksha Marie   MR Number: [id number]   YOB: 1970   Date of Visit: 7/10/2017       Dear Dr. Raina Ron:    Thank you for referring Daksha Marie to me for evaluation. Attached you will find relevant portions of my assessment and plan of care.    If you have questions, please do not hesitate to call me. I look forward to following Daksha Marie along with you.    Sincerely,    Juliano Lynne MD    Enclosure  CC:  No Recipients    If you would like to receive this communication electronically, please contact [email or web address] or [phone] to request more information on [employer] access.    For providers and/or their staff who would like to refer a patient to [facility], please contact us through our one-stop-shop provider referral line, [facility], at [phone].    If you feel you have received this communication in error or would no longer like to receive these types of communications, please e-mail [email or web address]

## 2017-07-10 NOTE — PATIENT INSTRUCTIONS
Rebound Headache     Overuse of pain medications can lead to rebound headaches.   You use pain medicines called analgesics to treat your headaches. You are now having more frequent or intense headaches (rebound headaches). They are your bodys response to too much pain medicine. Prescription pain medicines can cause these headaches. But so can over-the-counter medicines like acetaminophen or ibuprofen. A drug that contains caffeine or butalbital is most likely to cause rebound headache.  Symptoms of rebound headache include:  · Mild to moderate headache for 15 or more days each month for 3 months or more  · Headache when you wake up that continues most of the day  · Headaches getting worse over time  · Need for more and more medicine to treat headaches  Rebound headaches are most often diagnosed by your symptoms and medicine history. You may need tests to rule out other causes of your headaches. In the emergency room, you may be given a non-analgesic pain medicine to treat the pain or stop future headaches.  Home care  Treatment involves stopping use of your pain medicines. Your healthcare provider can tell you how to safely do this. You may be able to stop right away. Or you may need to take less and less over time (taper off). This will depend on the medicines you have been taking. To do this, follow the schedule that your provider gives you. If you are taking pain medicines for other types of pain at the same time, your healthcare provider may need other specialists to participate in your care.  · For the first week or so after stopping, the headaches will likely get worse. You may also have withdrawal symptoms. These often include nausea, vomiting, and trouble sleeping. You may be given a medicine to help relieve pain and withdrawal symptoms. Take this exactly as you have been told. It is vital to avoid taking daily pain medicine. If you do so, rebound headaches will continue.  · Caffeine can make rebound  headaches worse. If you have caffeinated drinks every day, slowly cut your intake.  · Keep a written log of your headaches. This can help you and your healthcare provider track your progress.  · Be patient. It can take about 2 to 6 months to stop having rebound headaches.  · Once you have broken the headache cycle, be careful not to start it again. Work with your provider to make a treatment plan for headache pain that has low risk of causing rebound headaches.  · Relaxation can help lower tension and relieve pain. Try a massage, meditation, yoga, or other relaxation techniques. Or make time for a relaxing hobby that you enjoy.  Follow-up care  Follow up with your healthcare provider, or as advised.  When to seek medical advice  Call your healthcare provider right away if any of these occur:  · Fever of 100.4°F (38°C) or higher  · Headaches that wake you from sleep  · Repeated vomiting or visual problems that don't go away  · Headache with a stiff neck, rash, confusion, weakness, numbness, seizure (convulsion), or trouble talking  · Headache that starts after a head injury or fall  · A type of headache you have never had before  · Headache that gets worse despite rest and medicine  Date Last Reviewed: 11/20/2015  © 1940-1501 [employer]. [address] [place] [address], [place] [address]. All rights reserved. This information is not intended as a substitute for professional medical care. Always follow your healthcare professional's instructions.

## 2017-07-10 NOTE — PROGRESS NOTES
[facility] NEUROLOGY  [facility] [place]    Date: July 10, 2017   Patient Name: Daksha Marie   MRN: [id number]   PCP: Caridad Padilla  Referring Provider: Raina Ron DO    Assessment:   Daksha Marie is a 47 y.o. female presenting with persistent headache with daily use of Excedrin.  I counseled patient extensively on medication overuse headache.  I have also counseled her conservative headache management strategies.  After patient detoxes off of Excedrin, have discussed that we may consider other abortive and preventative options if needed. CT brain has been reviewed as below.  Plan:     Problem List Items Addressed This Visit        Neuro    Medication overuse headache    Current Assessment & Plan     -- patient counseled on conservative management strategies  -- discontinue excedrin            Other    Occipital scalp laceration    Current Assessment & Plan     -- healing well           Other Visit Diagnoses    None.         Juliano Lynne MD  [facility]   Department of Neurology    Patient note was created using Dragon Dictation.  Any errors in syntax or even information may not have been identified and edited on initial review prior to signing this note.  Subjective:   Patient seen in consultation at the request of Dr. Pierre for the evaluation of headaches. A copy of this note will be sent to the referring physician.      HPI:   Ms. Daksha Marie is a 47 y.o. female who presents with a chief complaint of persistent headaches.  The patient presents with her father, who contributes to the history.  The patient reports that in May, she suffered a fall resulting in a laceration to the back of her scalp.  Since that time, she has not that she states feels like a bandlike tight.  She states that the onset of the headache and May, she has been taking at least 2 tablets of Excedrin every single day.  She states that the headache never seems  to fully resolved.  Of note, the patient did undergo a head CT at the time of her injury which was normal (personally reviewed).  She states she is otherwise doing well today with no focal neurologic deficits.  She has no other complaints today.    PAST MEDICAL HISTORY:  Past Medical History:   Diagnosis Date    Alcohol abuse 10/7/2015    Breast calcification, left     Pt states this has been worked up, she received a biopsy in the past to confirm calcifications were from scar tissue from when she had breast reduction surgery.      Chronic diastolic congestive heart failure     Encounter for blood transfusion     GERD (gastroesophageal reflux disease)     Glaucoma     Hypertension     Hyponatremia 10/2015    Na 109. attributed to polydipsia and alcohol abuse.  suffered a seizure in the ICU .    Insomnia     Malingering 2016    PTSD (post-traumatic stress disorder)     Sickle cell trait        PAST SURGICAL HISTORY:  Past Surgical History:   Procedure Laterality Date    bile fistula      breast reduction       SECTION      CHOLECYSTECTOMY  after     GASTRIC BYPASS   and        CURRENT MEDS:  Current Outpatient Prescriptions   Medication Sig Dispense Refill    cloNIDine (CATAPRES) 0.2 MG tablet TAKE 1 TABLET BY MOUTH EVERY EVENING 90 tablet 0    cyanocobalamin 1,000 mcg/mL injection Inject 1,000 mcg into the muscle every 28 days. On the 15th on the month      disulfiram (ANTABUSE) 250 mg tablet Take 1 tablet (250 mg total) by mouth once daily. 30 tablet 11    docusate sodium (COLACE) 100 MG capsule Take 1 capsule (100 mg total) by mouth 2 (two) times daily.  0    DORZOLAMIDE-TIMOLOL, PF, OPHT Place 1 drop into both eyes every evening.       ferrous sulfate 325 (65 FE) MG EC tablet Take 1 tablet (325 mg total) by mouth once daily.  0    fluticasone (FLONASE) 50 mcg/actuation nasal spray 1 spray by Each Nare route once daily.      latanoprost 0.005 % ophthalmic  solution Place 1 drop into both eyes every evening.       lisinopril 10 MG tablet Take 10 mg by mouth every morning.       montelukast (SINGULAIR) 10 mg tablet Take 10 mg by mouth every evening.      multivitamin (THERAGRAN) tablet Take 1 tablet by mouth once daily. 30 tablet 5    omeprazole (PRILOSEC) 40 MG capsule Take 1 capsule (40 mg total) by mouth once daily. 30 capsule 11    ondansetron (ZOFRAN-ODT) 8 MG TbDL Take 1 tablet (8 mg total) by mouth every 8 (eight) hours as needed. 60 tablet 2    paroxetine (PAXIL) 40 MG tablet Take 1 tablet (40 mg total) by mouth once daily. 30 tablet 0    potassium chloride (MICRO-K) 10 MEQ CpSR Take 10 mEq by mouth once daily.       promethazine (PHENERGAN) 25 MG suppository Place 25 mg rectally every 6 (six) hours as needed for Nausea.      quetiapine (SEROQUEL) 100 MG Tab Take 1 tablet (100 mg total) by mouth every evening. 30 tablet 0    thiamine mononitrate 100 mg Tab Take 1 tablet (100 mg total) by mouth 2 (two) times daily. 60 tablet 0    dicyclomine (BENTYL) 20 mg tablet Take 20 mg by mouth 3 (three) times daily as needed (for abdominal cramps).      ibuprofen (ADVIL,MOTRIN) 600 MG tablet Take 1 tablet (600 mg total) by mouth every 6 (six) hours as needed for Pain. 20 tablet 0    venlafaxine (EFFEXOR-XR) 75 MG 24 hr capsule Take 1 capsule (75 mg total) by mouth once daily. 7 capsule 0     No current facility-administered medications for this visit.        ALLERGIES:  Review of patient's allergies indicates:   Allergen Reactions    Penicillins Hives       FAMILY HISTORY:  Family History   Problem Relation Age of Onset    Diabetes Mother     Lupus Mother     Diabetes Maternal Grandmother     Crohn's disease Cousin        SOCIAL HISTORY:  Social History   Substance Use Topics    Smoking status: Current Every Day Smoker     Packs/day: 1.00     Years: 4.00     Types: Cigarettes    Smokeless tobacco: Never Used    Alcohol use Yes      Comment: last night-  "vodka and cranberry juice       Review of Systems:  12 review of systems is negative except for the symptoms mentioned in HPI.      Objective:     Vitals:    07/10/17 1100   BP: (!) 137/99   Pulse: 110   Weight: 76.6 kg (168 lb 14 oz)   Height: 5' 5" (1.651 m)     General: NAD, well nourished   Eyes: no tearing, discharge, no erythema   ENT: moist mucous membranes of the oral cavity, nares patent    Neck: Supple, full range of motion  Cardiovascular: Warm and well perfused, pulses equal and symmetrical  Lungs: Normal work of breathing, normal chest wall excursions  Skin: No rash, lesions, or breakdown on exposed skin  Psychiatry: Mood and affect are appropriate   Abdomen: soft, non tender, non distended  Extremeties: No cyanosis, clubbing or edema.    Neurological   MENTAL STATUS: Alert and oriented to person, place, and time. Attention and concentration within normal limits. Speech without dysarthria, able to name and repeat without difficulty. Recent and remote memory within normal limits   CRANIAL NERVES: Fundi WNL. No papilledema. Visual fields intact. PERRL. EOMI. Facial sensation intact. Face symmetrical. Hearing grossly intact. Full shoulder shrug bilaterally. Tongue protrudes midline   SENSORY: Sensation is intact to light touch throughout.    MOTOR: Normal bulk and tone. No pronator drift.  5/5 deltoid, biceps, triceps, interosseous, hand  bilaterally. 5/5 iliopsoas, knee extension/flexion, foot dorsi/plantarflexion bilaterally.    REFLEXES: Symmetric and 2+ throughout.   CEREBELLAR/COORDINATION/GAIT: Gait steady with normal arm swing and stride length.  Heel to shin intact. Finger to nose intact. Normal rapid alternating movements.       "

## 2017-07-26 ENCOUNTER — TELEPHONE (OUTPATIENT)
Dept: NEUROLOGY | Facility: CLINIC | Age: 47
End: 2017-07-26

## 2017-07-26 NOTE — TELEPHONE ENCOUNTER
The patient says her head has been hurting since May 1st it feels like a heart beat and she has not been taking anything for the pain. She wants something called on now. The patient went to the primary doctor Monday and got a shot and it is not helping at all. The patient would like a call back.

## 2017-07-26 NOTE — TELEPHONE ENCOUNTER
----- Message from Dede Livingston sent at 7/26/2017  9:10 AM CDT -----  Contact: [phone]/self  Pt requesting a prescription for headaches.  Please advise

## 2017-08-02 RX ORDER — CLONIDINE HYDROCHLORIDE 0.2 MG/1
TABLET ORAL
Qty: 30 TABLET | Refills: 0 | Status: SHIPPED | OUTPATIENT
Start: 2017-08-02 | End: 2017-09-19 | Stop reason: SDUPTHER

## 2017-08-05 RX ORDER — CLONIDINE HYDROCHLORIDE 0.2 MG/1
TABLET ORAL
Qty: 90 TABLET | Refills: 0 | Status: SHIPPED | OUTPATIENT
Start: 2017-08-05 | End: 2018-01-31

## 2017-09-19 ENCOUNTER — HOSPITAL ENCOUNTER (EMERGENCY)
Facility: HOSPITAL | Age: 47
Discharge: HOME | End: 2017-09-20
Attending: EMERGENCY MEDICINE
Payer: MEDICARE

## 2017-09-19 DIAGNOSIS — E86.0 DEHYDRATION: Primary | ICD-10-CM

## 2017-09-19 DIAGNOSIS — R53.83 FATIGUE: ICD-10-CM

## 2017-09-19 DIAGNOSIS — R55 NEAR SYNCOPE: ICD-10-CM

## 2017-09-19 LAB
ALBUMIN SERPL BCP-MCNC: 3.5 G/DL
ALP SERPL-CCNC: 79 U/L
ALT SERPL W/O P-5'-P-CCNC: 14 U/L
AMPHET+METHAMPHET UR QL: NEGATIVE
ANION GAP SERPL CALC-SCNC: 10 MMOL/L
AST SERPL-CCNC: 21 U/L
B-HCG UR QL: NEGATIVE
BARBITURATES UR QL SCN>200 NG/ML: NEGATIVE
BASOPHILS # BLD AUTO: 0.02 K/UL
BASOPHILS NFR BLD: 0.4 %
BENZODIAZ UR QL SCN>200 NG/ML: NEGATIVE
BILIRUB SERPL-MCNC: 0.3 MG/DL
BILIRUB UR QL STRIP: NEGATIVE
BUN SERPL-MCNC: 17 MG/DL
BZE UR QL SCN: NEGATIVE
CALCIUM SERPL-MCNC: 9.1 MG/DL
CANNABINOIDS UR QL SCN: NEGATIVE
CHLORIDE SERPL-SCNC: 100 MMOL/L
CK SERPL-CCNC: 104 U/L
CLARITY UR: CLEAR
CO2 SERPL-SCNC: 19 MMOL/L
COLOR UR: YELLOW
CREAT SERPL-MCNC: 1 MG/DL
CREAT UR-MCNC: 19.1 MG/DL
CTP QC/QA: YES
DIFFERENTIAL METHOD: ABNORMAL
EOSINOPHIL # BLD AUTO: 0 K/UL
EOSINOPHIL NFR BLD: 0.7 %
ERYTHROCYTE [DISTWIDTH] IN BLOOD BY AUTOMATED COUNT: 19.9 %
EST. GFR  (AFRICAN AMERICAN): >60 ML/MIN/1.73 M^2
EST. GFR  (NON AFRICAN AMERICAN): >60 ML/MIN/1.73 M^2
ETHANOL SERPL-MCNC: <10 MG/DL
ETHANOL SERPL-MCNC: <10 MG/DL
GLUCOSE SERPL-MCNC: 75 MG/DL
GLUCOSE UR QL STRIP: NEGATIVE
HCT VFR BLD AUTO: 26.9 %
HGB BLD-MCNC: 8.4 G/DL
HGB UR QL STRIP: NEGATIVE
KETONES UR QL STRIP: NEGATIVE
LEUKOCYTE ESTERASE UR QL STRIP: NEGATIVE
LIPASE SERPL-CCNC: 71 U/L
LYMPHOCYTES # BLD AUTO: 2.4 K/UL
LYMPHOCYTES NFR BLD: 41.7 %
MCH RBC QN AUTO: 21.9 PG
MCHC RBC AUTO-ENTMCNC: 31.2 G/DL
MCV RBC AUTO: 70 FL
METHADONE UR QL SCN>300 NG/ML: NEGATIVE
MONOCYTES # BLD AUTO: 0.3 K/UL
MONOCYTES NFR BLD: 5.7 %
NEUTROPHILS # BLD AUTO: 2.9 K/UL
NEUTROPHILS NFR BLD: 51.3 %
NITRITE UR QL STRIP: NEGATIVE
OPIATES UR QL SCN: NEGATIVE
PCP UR QL SCN>25 NG/ML: NEGATIVE
PH UR STRIP: 6 [PH] (ref 5–8)
PLATELET # BLD AUTO: 245 K/UL
PMV BLD AUTO: 8.8 FL
POTASSIUM SERPL-SCNC: 3.6 MMOL/L
PROT SERPL-MCNC: 7.2 G/DL
PROT UR QL STRIP: NEGATIVE
RBC # BLD AUTO: 3.83 M/UL
SODIUM SERPL-SCNC: 129 MMOL/L
SP GR UR STRIP: <=1.005 (ref 1–1.03)
TOXICOLOGY INFORMATION: NORMAL
URN SPEC COLLECT METH UR: ABNORMAL
UROBILINOGEN UR STRIP-ACNC: NEGATIVE EU/DL
WBC # BLD AUTO: 5.66 K/UL

## 2017-09-19 PROCEDURE — 25000003 PHARM REV CODE 250: Performed by: EMERGENCY MEDICINE

## 2017-09-19 PROCEDURE — 63600175 PHARM REV CODE 636 W HCPCS: Performed by: EMERGENCY MEDICINE

## 2017-09-19 PROCEDURE — 93010 ELECTROCARDIOGRAM REPORT: CPT | Mod: ,,, | Performed by: INTERNAL MEDICINE

## 2017-09-19 PROCEDURE — 80307 DRUG TEST PRSMV CHEM ANLYZR: CPT

## 2017-09-19 PROCEDURE — 81003 URINALYSIS AUTO W/O SCOPE: CPT

## 2017-09-19 PROCEDURE — 80053 COMPREHEN METABOLIC PANEL: CPT

## 2017-09-19 PROCEDURE — 85025 COMPLETE CBC W/AUTO DIFF WBC: CPT

## 2017-09-19 PROCEDURE — 80320 DRUG SCREEN QUANTALCOHOLS: CPT

## 2017-09-19 PROCEDURE — 83690 ASSAY OF LIPASE: CPT

## 2017-09-19 PROCEDURE — 93005 ELECTROCARDIOGRAM TRACING: CPT

## 2017-09-19 PROCEDURE — 99284 EMERGENCY DEPT VISIT MOD MDM: CPT | Mod: 25

## 2017-09-19 PROCEDURE — 82550 ASSAY OF CK (CPK): CPT

## 2017-09-19 PROCEDURE — 96374 THER/PROPH/DIAG INJ IV PUSH: CPT

## 2017-09-19 PROCEDURE — 81025 URINE PREGNANCY TEST: CPT | Performed by: EMERGENCY MEDICINE

## 2017-09-19 RX ORDER — FOLIC ACID 1 MG/1
1 TABLET ORAL DAILY
COMMUNITY
End: 2019-08-21 | Stop reason: SDUPTHER

## 2017-09-19 RX ORDER — KETOROLAC TROMETHAMINE 30 MG/ML
15 INJECTION, SOLUTION INTRAMUSCULAR; INTRAVENOUS
Status: COMPLETED | OUTPATIENT
Start: 2017-09-19 | End: 2017-09-19

## 2017-09-19 RX ORDER — ALPRAZOLAM 0.5 MG/1
0.5 TABLET ORAL 3 TIMES DAILY PRN
COMMUNITY
End: 2018-01-31

## 2017-09-19 RX ORDER — FERROUS SULFATE, DRIED 160(50) MG
1 TABLET, EXTENDED RELEASE ORAL 2 TIMES DAILY WITH MEALS
COMMUNITY
End: 2018-01-31

## 2017-09-19 RX ADMIN — SODIUM CHLORIDE 1000 ML: 0.9 INJECTION, SOLUTION INTRAVENOUS at 09:09

## 2017-09-19 RX ADMIN — KETOROLAC TROMETHAMINE 15 MG: 30 INJECTION, SOLUTION INTRAMUSCULAR at 10:09

## 2017-09-20 VITALS
RESPIRATION RATE: 18 BRPM | WEIGHT: 167 LBS | TEMPERATURE: 99 F | HEIGHT: 65 IN | BODY MASS INDEX: 27.82 KG/M2 | OXYGEN SATURATION: 100 % | SYSTOLIC BLOOD PRESSURE: 118 MMHG | DIASTOLIC BLOOD PRESSURE: 78 MMHG | HEART RATE: 76 BPM

## 2017-09-20 RX ORDER — ONDANSETRON 4 MG/1
4 TABLET, FILM COATED ORAL EVERY 6 HOURS PRN
Qty: 10 TABLET | Refills: 0 | Status: SHIPPED | OUTPATIENT
Start: 2017-09-20 | End: 2017-10-20

## 2017-09-20 NOTE — ED NOTES
Patient identifiers for Daksha Marie checked and correct.  LOC: The patient is awake, alert and aware of environment with an appropriate affect, the patient is oriented x 3 and speaking appropriately.  APPEARANCE: Patient uncomfortable and in no acute distress, patient is clean and well groomed, patient's clothing are properly fastened.  SKIN: The skin is warm and dry, patient has normal skin turgor and moist mucus membranes.  MUSKULOSKELETAL: Patient moving all extremities well, no obvious swelling or deformities noted.  RESPIRATORY: Airway is open and patent, respirations are spontaneous, patient has a normal effort and rate.  CARDIAC: Patient has a normal rate and rhythm, no periphreal edema noted, capillary refill < 3 seconds.  NEUROLOGIC: PERRL, facial expression is symmetrical, bilateral hand grasp equal and even, normal sensation in all extremities when touched with a finger.

## 2017-09-20 NOTE — ED NOTES
Complaining of low blood pressure, feeling fatigued and headache x 2 days.  States she has been seeing dark spots. Also complaining of stabbing pain in left upper arm.

## 2017-09-20 NOTE — ED PROVIDER NOTES
Encounter Date: 9/19/2017    SCRIBE #1 NOTE: I, Nikolay Foote, am scribing for, and in the presence of,  Flo Harvey MD. I have scribed the entire note.       History     Chief Complaint   Patient presents with    Fatigue     States BP running low at home (90's systolic) with weakness, dizziness, generally not feeling well. Reports poor appetite but tolerating fluids. Intermittent left arm weakness. Presents alert and oriented with steady gait. No deficits noted.      Time patient was seen by the provider: 9:21 PM      The patient is a 47 y.o. female with hx of:CHF, hyponatremia, that presents to the ED with a complaint of fatigue and visual aura. She describes visual aura and black spots in front of her eyes when she stands up abruptly. She feels light headed with this. She sees neurology for headaches in the past few months with headache here today as per her typical. She has also had nausea, and diarrhea but no recent chest pain, shortness of breath, or fevers.  She states her blood pressure has been low.  She reports drinking pickle juice without improvement. There are no other exacerbating or relieving factors reported.        The history is provided by the patient.     Review of patient's allergies indicates:   Allergen Reactions    Penicillins Hives     Past Medical History:   Diagnosis Date    Alcohol abuse 10/7/2015    Breast calcification, left     Pt states this has been worked up, she received a biopsy in the past to confirm calcifications were from scar tissue from when she had breast reduction surgery.      Chronic diastolic congestive heart failure     Encounter for blood transfusion     GERD (gastroesophageal reflux disease)     Glaucoma 2008    Hypertension     Hyponatremia 10/2015    Na 109. attributed to polydipsia and alcohol abuse.  suffered a seizure in the ICU .    Insomnia     Malingering 1/24/2016    PTSD (post-traumatic stress disorder)     Sickle cell trait      Past  Surgical History:   Procedure Laterality Date    bile fistula      breast reduction  2003     SECTION      CHOLECYSTECTOMY  after 2007    GASTRIC BYPASS  2007 and 2008     Family History   Problem Relation Age of Onset    Diabetes Mother     Lupus Mother     Diabetes Maternal Grandmother     Crohn's disease Cousin      Social History   Substance Use Topics    Smoking status: Current Every Day Smoker     Packs/day: 1.00     Years: 4.00     Types: Cigarettes    Smokeless tobacco: Never Used    Alcohol use No      Comment: former heavy drinker     Review of Systems   Constitutional: Negative for appetite change, chills and fever.   HENT: Negative for congestion, sore throat and voice change.    Eyes: Negative for photophobia, pain and redness.   Respiratory: Positive for shortness of breath. Negative for cough and choking.    Cardiovascular: Negative for chest pain, palpitations and leg swelling.   Gastrointestinal: Positive for diarrhea and nausea. Negative for abdominal pain and vomiting.   Genitourinary: Negative for dysuria, frequency and urgency.   Musculoskeletal: Negative for back pain, neck pain and neck stiffness.   Skin: Negative for rash.   Neurological: Positive for light-headedness and headaches. Negative for seizures, weakness and numbness.   Hematological: Does not bruise/bleed easily.   All other systems reviewed and are negative.      Physical Exam     Initial Vitals [173]   BP Pulse Resp Temp SpO2   (!) 144/87 81 16 98.7 °F (37.1 °C) 100 %      MAP       106         Physical Exam    Nursing note and vitals reviewed.  Constitutional: She appears well-developed and well-nourished. No distress.   HENT:   Head: Normocephalic and atraumatic.   Dry mucous membranes; Oropharynx clear   Eyes: Conjunctivae and EOM are normal. Pupils are equal, round, and reactive to light.   Neck: Normal range of motion. Neck supple. No tracheal deviation present.   Cardiovascular: Normal rate,  regular rhythm, normal heart sounds and intact distal pulses. Exam reveals no gallop and no friction rub.    No murmur heard.  Pulmonary/Chest: Breath sounds normal. She has no wheezes. She has no rhonchi. She has no rales.   Abdominal: Soft. Bowel sounds are normal. She exhibits no distension. There is no tenderness.   Musculoskeletal: Normal range of motion. She exhibits no edema or tenderness.   Neurological: She is alert and oriented to person, place, and time. She has normal reflexes. No cranial nerve deficit or sensory deficit.   Skin: Skin is warm and dry. Capillary refill takes less than 2 seconds. No rash noted. No erythema.   Psychiatric: She has a normal mood and affect.         ED Course   Procedures  Labs Reviewed   CBC W/ AUTO DIFFERENTIAL - Abnormal; Notable for the following:        Result Value    RBC 3.83 (*)     Hemoglobin 8.4 (*)     Hematocrit 26.9 (*)     MCV 70 (*)     MCH 21.9 (*)     MCHC 31.2 (*)     RDW 19.9 (*)     MPV 8.8 (*)     All other components within normal limits   URINALYSIS - Abnormal; Notable for the following:     Specific Gravity, UA <=1.005 (*)     All other components within normal limits   COMPREHENSIVE METABOLIC PANEL - Abnormal; Notable for the following:     Sodium 129 (*)     CO2 19 (*)     All other components within normal limits   LIPASE - Abnormal; Notable for the following:     Lipase 71 (*)     All other components within normal limits   DRUG SCREEN PANEL, URINE EMERGENCY   CK   ALCOHOL,MEDICAL (ETHANOL)   ALCOHOL,MEDICAL (ETHANOL)   POCT URINE PREGNANCY     EKG Readings: (Independently Interpreted)   Initial Reading: No STEMI. Rhythm: Normal Sinus Rhythm. Heart Rate: 80. Ectopy: No Ectopy. Conduction: Normal. ST Segments: Normal ST Segments. Axis: Normal.   NSR, rate 80, occasional PVC, inverted T wave in lead I    Non specific changes from 6/27/2017       X-Rays:   Independently Interpreted Readings:   Chest X-Ray: Imaging interpreted by radiologist and  visualized by me:      Imaging Results          X-Ray Chest PA And Lateral (Final result)  Result time 09/20/17 00:04:27    Final result by Josh Tuttle MD (09/20/17 00:04:27)                 Impression:       No acute process.              Electronically signed by: JOSH TUTTLE MD  Date:     09/20/17  Time:    00:04              Narrative:    Exam: [id number]  09/19/17  23:42:31 IMG36 ([facility]) : XR CHEST PA AND LATERAL    Technique:    Frontal and lateral chest x-ray    Comparison:    06/27/2017    Findings:      Monitoring EKG leads are present.  The trachea is unremarkable.  The cardiomediastinal silhouette is within normal limits.  The hemidiaphragms are unremarkable.  There are no pleural effusions.  There is no evidence of free air beneath the hemidiaphragms.  There is no evidence of a pneumothorax.  There is no evidence of pneumomediastinum.  No airspace opacities are present.  The osseous structures are unremarkable.  The patient is status post cholecystectomy.                              Medical Decision Making:   Initial Assessment:   46 y/O female complaining of light headedness with position change and fatigue.  Differential Diagnosis:   Electrolyte disturbance, vasovagal episodes,    Independently Interpreted Test(s):   I have ordered and independently interpreted X-rays - see prior notes.  I have ordered and independently interpreted EKG Reading(s) - see prior notes  Clinical Tests:   Lab Tests: Ordered and Reviewed       <> Summary of Lab: Hyponatremia; Dehydration  ED Management:    Instructed to increase fluid intake with water, Gatorate, G2, or powerade light. She was instructed to follow with her primary doctor. Return with new or worsening sx                   ED Course      Clinical Impression:     1. Dehydration    2. Fatigue    3. Near syncope          Disposition:   Disposition: Discharged  Condition: Stable       I, Dr. Flo Harvey, personally performed the services described in this  documentation. All medical record entries made by the scribe were at my direction and in my presence.  I have reviewed the chart and agree that the record reflects my personal performance and is accurate and complete. Flo Harvey MD.  12:15 AM 09/20/2017                     Flo Harvey MD  09/20/17 0015

## 2017-09-26 ENCOUNTER — OUTPATIENT CASE MANAGEMENT (OUTPATIENT)
Dept: ADMINISTRATIVE | Facility: OTHER | Age: 47
End: 2017-09-26

## 2017-09-26 NOTE — PROGRESS NOTES
The following patient has been assigned to Eloina Castano RN with Outpatient Complex Care Management for high risk screening.    Reason: High Risk    Please contact [facility] at [phone] with any questions.    Thank you,  Ana Luisa Riley

## 2017-10-02 ENCOUNTER — OUTPATIENT CASE MANAGEMENT (OUTPATIENT)
Dept: ADMINISTRATIVE | Facility: OTHER | Age: 47
End: 2017-10-02
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
## 2017-10-02 NOTE — PROGRESS NOTES
10/2/17  Attempt to complete initial assessment for Outpatient Care Management; left messages requesting return call on both listed phone numbers.

## 2017-10-03 ENCOUNTER — OUTPATIENT CASE MANAGEMENT (OUTPATIENT)
Dept: ADMINISTRATIVE | Facility: OTHER | Age: 47
End: 2017-10-03

## 2017-10-06 ENCOUNTER — OUTPATIENT CASE MANAGEMENT (OUTPATIENT)
Dept: ADMINISTRATIVE | Facility: OTHER | Age: 47
End: 2017-10-06

## 2017-10-06 NOTE — PROGRESS NOTES
CSW attempted to contact patient to review disaster plan check list.no answer. CSW  Left a message with the office of emergency preparedness for [facility] [phone].

## 2017-10-10 ENCOUNTER — OUTPATIENT CASE MANAGEMENT (OUTPATIENT)
Dept: ADMINISTRATIVE | Facility: OTHER | Age: 47
End: 2017-10-10

## 2017-10-10 RX ORDER — TOPIRAMATE 25 MG/1
25 TABLET ORAL 2 TIMES DAILY
COMMUNITY
End: 2017-10-11 | Stop reason: SDUPTHER

## 2017-10-10 NOTE — PROGRESS NOTES
10/10/17  CM f/u to update plan of care. Verbal message left requesting return call. In message provided Ms. Calles with names of 2 Psychiatrists listed in Medicare.gov in the [place] area:  Dr. Raina Weber, [address] [place] [address], TEL: [phone] and Dr. Walt Juarez, [address] [place] [place], [address], [place]:  [phone].  Pt is expected to see Dr. Padilla, PCP 10/11 on same day as her Neuro appt.   Mailed patient contact information for the two [place] Psych.  Verbal message left with Sarah at the office of Dr. Padilla, PCP regarding med rec on Topamax, HCTZ, Protonix and to notify of pt's desire to have rx for smoking cessation. Informed Sarah that pt is planning to walk-in tomorrow to clinic to see PCP.   8:26 am Call back from Sarah. Dr. Padilla confirms pt is to take Topamaz 25 mg BID & omperazole 40 mg daily (EPCI updated) and NOT to take HCTZ, & calling in Chantix for pt-smoking cessation.     Plan for next encounter  F/u with pt regarding med rec  Check that pt resumed Effexor-ER  F/u on her PCP/NEURO appts 10/11  F/u on her plans to establish with Psych

## 2017-10-11 ENCOUNTER — OFFICE VISIT (OUTPATIENT)
Dept: NEUROLOGY | Facility: CLINIC | Age: 47
End: 2017-10-11
Payer: MEDICARE

## 2017-10-11 VITALS
HEIGHT: 65 IN | BODY MASS INDEX: 27.85 KG/M2 | WEIGHT: 167.13 LBS | SYSTOLIC BLOOD PRESSURE: 114 MMHG | HEART RATE: 109 BPM | DIASTOLIC BLOOD PRESSURE: 74 MMHG

## 2017-10-11 DIAGNOSIS — R51.9 CHRONIC NONINTRACTABLE HEADACHE, UNSPECIFIED HEADACHE TYPE: ICD-10-CM

## 2017-10-11 DIAGNOSIS — G44.40 MEDICATION OVERUSE HEADACHE: ICD-10-CM

## 2017-10-11 DIAGNOSIS — F32.A DEPRESSION, UNSPECIFIED DEPRESSION TYPE: ICD-10-CM

## 2017-10-11 DIAGNOSIS — G89.29 CHRONIC NONINTRACTABLE HEADACHE, UNSPECIFIED HEADACHE TYPE: ICD-10-CM

## 2017-10-11 DIAGNOSIS — I50.32 CHRONIC DIASTOLIC CONGESTIVE HEART FAILURE: Chronic | ICD-10-CM

## 2017-10-11 PROCEDURE — 99213 OFFICE O/P EST LOW 20 MIN: CPT | Mod: PBBFAC,PO | Performed by: PSYCHIATRY & NEUROLOGY

## 2017-10-11 PROCEDURE — 99999 PR PBB SHADOW E&M-EST. PATIENT-LVL III: CPT | Mod: PBBFAC,,, | Performed by: PSYCHIATRY & NEUROLOGY

## 2017-10-11 PROCEDURE — 99214 OFFICE O/P EST MOD 30 MIN: CPT | Mod: S$PBB,,, | Performed by: PSYCHIATRY & NEUROLOGY

## 2017-10-11 RX ORDER — TOPIRAMATE 50 MG/1
50 TABLET, FILM COATED ORAL 2 TIMES DAILY
Qty: 180 TABLET | Refills: 11 | Status: SHIPPED | OUTPATIENT
Start: 2017-10-11 | End: 2018-02-02 | Stop reason: ALTCHOICE

## 2017-10-11 NOTE — PATIENT INSTRUCTIONS
"  Self-Care for Headaches  Most headaches aren't serious and can be relieved with self-care. But some headaches may be a sign of another health problem like eye trouble or high blood pressure. To find the best treatment, learn what kind of headaches you get. For tension headaches, self-care will usually help. To treat migraines, ask your healthcare provider for advice. It is also possible to get both tension and migraine headaches. Self-care involves relieving the pain and avoiding headache triggers if you can.    Ways to reduce pain and tension  Try these steps:  · Apply a cold compress or ice pack to the pain site.  · Drink fluids. If nausea makes it hard to drink, try sucking on ice.  · Rest. Protect yourself from bright light and loud noises.  · Calm your emotions by imagining a peaceful scene.  · Massage tight neck, shoulder, and head muscles.  · To relax muscles, soak in a hot bath or use a hot shower.  Use medicines  Aspirin or aspirin substitutes, such as ibuprofen and acetaminophen, can relieve headache. Remember: Never give aspirin to anyone 18 years old or younger because of the risk of developing Reye syndrome. Use pain medicines only when necessary.  Track your headaches  Keeping a headache diary can help you and your healthcare provider identify what's causing your headaches:  · Note when each headache happens.  · Identify your activities and the foods you've eaten 6 to 8 hours before the headache began.  · Look for any trends or "triggers."  Signs of tension headache  Any of the following can be signs:  · Dull pain or feeling of pressure in a tight band around your head  · Pain in your neck or shoulders  · Headache without a definite beginning or end  · Headache after an activity such as driving or working on a computer  Signs of migraine  Any of the following can be signs:  · Throbbing pain on one or both sides of your head  · Nausea or vomiting  · Extreme sensitivity to light, sound, and " "smells  · Bright spots, flashes, or other visual changes  · Pain or nausea so severe that you can't continue your daily activities  Call your healthcare provider   If you have any of the following symptoms, contact your healthcare provider:  · A headache that lingers after a recent injury or bump to the head.  · A fever with a stiff neck or pain when you bend your head toward your chest.  · A headache along with slurred speech, changes in your vision, or numbness or weakness in your arms or legs.  · A headache for longer than 3 days.  · Frequent headaches, especially in the morning.  · Headaches with seizures   · Seek immediate medical attention if you have a headache that you would call "the worst headache you have ever had."   Date Last Reviewed: 10/4/2015  © 7737-2562 [employer]. [address] [place] [address], [place] [address]. All rights reserved. This information is not intended as a substitute for professional medical care. Always follow your healthcare professional's instructions.        "

## 2017-10-17 ENCOUNTER — OUTPATIENT CASE MANAGEMENT (OUTPATIENT)
Dept: ADMINISTRATIVE | Facility: OTHER | Age: 47
End: 2017-10-17

## 2017-10-17 NOTE — PROGRESS NOTES
10/17/2017:   spoke to patient via telephone in order to complete assessment.  Pt reported that she is Independent with ADLS and lives with her two children ages 14 and 21.  She owns a car; however, it is not reliable for long distances.  Pt was not aware of Medicaid transportation benefit.      Pt reported that she has been attempting to find mental health services because of depression.  She was provided with 2 names for psychiatrist.  One is not taking new patients and the other she has seen in the past and does not want to return to him.   printed a list of Medicare providers from Medicare.gov website and mailed it to patient.  I also mailed educational material from [employer] re: Depression.    She has attended an [facility] in the past and did not like the program.  She does not want to return to [facility] at this time.    Pt expressed some financial issues due to food and housing costs.   mailed patient information re: [place], [place], and [facility] Community Development Department.  Pt receives Food [place] and has applied for Section 8 Housing.        I also mailed her information re: [place] [place] [employer] of Emergency Management Brochure since patient does not have an evacuation plan.      Will continue to follow.    Plan for next Encounter:  1. Ensure that patient has received mailing  2 Encourage patient to contact local psychiatrist for an appointment.  3. Answer any questions  4. Assess for additional needs

## 2017-10-20 ENCOUNTER — OFFICE VISIT (OUTPATIENT)
Dept: OBSTETRICS AND GYNECOLOGY | Facility: CLINIC | Age: 47
End: 2017-10-20
Payer: MEDICARE

## 2017-10-20 VITALS
BODY MASS INDEX: 26.6 KG/M2 | HEIGHT: 65 IN | WEIGHT: 159.63 LBS | SYSTOLIC BLOOD PRESSURE: 100 MMHG | DIASTOLIC BLOOD PRESSURE: 76 MMHG

## 2017-10-20 DIAGNOSIS — N92.6 IRREGULAR PERIODS/MENSTRUAL CYCLES: ICD-10-CM

## 2017-10-20 DIAGNOSIS — Z01.419 WELL WOMAN EXAM WITH ROUTINE GYNECOLOGICAL EXAM: Primary | ICD-10-CM

## 2017-10-20 DIAGNOSIS — D25.9 UTERINE LEIOMYOMA, UNSPECIFIED LOCATION: ICD-10-CM

## 2017-10-20 DIAGNOSIS — Z12.31 ENCOUNTER FOR SCREENING MAMMOGRAM FOR MALIGNANT NEOPLASM OF BREAST: ICD-10-CM

## 2017-10-20 DIAGNOSIS — N95.1 PERIMENOPAUSE: ICD-10-CM

## 2017-10-20 PROCEDURE — 99999 PR PBB SHADOW E&M-EST. PATIENT-LVL V: CPT | Mod: PBBFAC,,, | Performed by: OBSTETRICS & GYNECOLOGY

## 2017-10-20 PROCEDURE — G0101 CA SCREEN;PELVIC/BREAST EXAM: HCPCS | Mod: S$PBB,,, | Performed by: OBSTETRICS & GYNECOLOGY

## 2017-10-20 PROCEDURE — 99215 OFFICE O/P EST HI 40 MIN: CPT | Mod: PBBFAC,PO | Performed by: OBSTETRICS & GYNECOLOGY

## 2017-10-20 PROCEDURE — 88175 CYTOPATH C/V AUTO FLUID REDO: CPT

## 2017-10-20 RX ORDER — VARENICLINE TARTRATE 0.5 (11)-1
KIT ORAL
Refills: 0 | COMMUNITY
Start: 2017-10-10 | End: 2018-01-31

## 2017-10-20 RX ORDER — LACTULOSE 10 G/15ML
SOLUTION ORAL; RECTAL
Refills: 0 | COMMUNITY
Start: 2017-07-28 | End: 2021-01-04 | Stop reason: SDUPTHER

## 2017-10-20 RX ORDER — CLONIDINE HYDROCHLORIDE 0.2 MG/1
0.2 TABLET ORAL NIGHTLY
Qty: 30 TABLET | Refills: 11 | Status: ON HOLD | OUTPATIENT
Start: 2017-10-20 | End: 2019-01-16 | Stop reason: SDUPTHER

## 2017-10-20 RX ORDER — FERROUS SULFATE 325(65) MG
TABLET ORAL
Refills: 2 | COMMUNITY
Start: 2017-07-24 | End: 2017-10-20 | Stop reason: SDUPTHER

## 2017-10-20 RX ORDER — HYDROCHLOROTHIAZIDE 25 MG/1
TABLET ORAL
Refills: 2 | Status: ON HOLD | COMMUNITY
Start: 2017-09-27 | End: 2018-10-15 | Stop reason: HOSPADM

## 2017-10-20 RX ORDER — POTASSIUM CHLORIDE 750 MG/1
TABLET, EXTENDED RELEASE ORAL
Refills: 0 | COMMUNITY
Start: 2017-09-16 | End: 2017-10-20 | Stop reason: SDUPTHER

## 2017-10-20 RX ORDER — ESTRADIOL AND NORETHINDRONE ACETATE 1; .5 MG/1; MG/1
1 TABLET ORAL DAILY
Qty: 30 TABLET | Refills: 11 | Status: SHIPPED | OUTPATIENT
Start: 2017-10-20 | End: 2018-05-07 | Stop reason: SDUPTHER

## 2017-10-20 RX ORDER — VENLAFAXINE HYDROCHLORIDE 75 MG/1
CAPSULE, EXTENDED RELEASE ORAL
Refills: 1 | COMMUNITY
Start: 2017-10-03 | End: 2018-03-22 | Stop reason: SDUPTHER

## 2017-10-20 NOTE — PROGRESS NOTES
Subjective:       Patient ID: Daksha Marie is a 47 y.o. female.    Chief Complaint: Well Woman (irregular cycles and bad cramping )    Patient has irregular bleeding.  She also has night sweats and occasional hot flashes.  This probably represents perimenopause.  Patient was counseled for this.  Medical management will be attempted.  The patient asked about the need for hysterectomy and this may be included in her management in the future both the present time we'll try medical approach.  She will be given refill for her clonidine at bedtime for night sweats and we will add Activella 1/.5 for management of vasomotor symptoms and attempt at controlling bleeding.     HPI  Review of Systems   Gastrointestinal: Negative for abdominal distention, abdominal pain, constipation and nausea.   Genitourinary: Negative for dyspareunia, dysuria, genital sores, pelvic pain, vaginal bleeding and vaginal discharge.       Objective:      Physical Exam   Constitutional: She appears well-developed and well-nourished.   Pulmonary/Chest: Right breast exhibits no mass, no nipple discharge, no skin change and no tenderness. Left breast exhibits no mass, no nipple discharge, no skin change and no tenderness.   Abdominal: Soft. Bowel sounds are normal. She exhibits no distension and no mass. There is no tenderness. There is no rebound and no guarding. Hernia confirmed negative in the right inguinal area and confirmed negative in the left inguinal area.   Genitourinary: Rectum normal, vagina normal and uterus normal. No breast tenderness or discharge. There is no lesion on the right labia. There is no lesion on the left labia. Uterus is not fixed and not tender. Cervix exhibits no motion tenderness, no discharge and no friability. Right adnexum displays no mass, no tenderness and no fullness. Left adnexum displays no mass, no tenderness and no fullness. No tenderness in the vagina. No vaginal discharge found.   Lymphadenopathy:         Right: No inguinal adenopathy present.        Left: No inguinal adenopathy present.       Patient has a small anterior fibroid directly under the bladder.  Uterus is not very enlarged.  No adnexal masses palpable.  Breast exam shows thickened tissue particularly on the left side under the areola and in the left lower outer aspect of the breast.  Patient states she had a workup for this and it is considered scar tissue and fatty aggregate tissue after a breast reduction procedure.  Breast biopsy was done as part of that workup.  Mammogram ordered.    Assessment:       1. Well woman exam with routine gynecological exam    2. Irregular periods/menstrual cycles    3. Perimenopause    4. Uterine leiomyoma, unspecified location    5. Encounter for screening mammogram for malignant neoplasm of breast         Plan:        annual GYN visit with Pap and mammogram.  Perimenopause, Rx clonidine and Activella.  Irregular breast findings attributed to surgical sequela.  Uterine fibroid.

## 2017-10-22 PROBLEM — S01.01XA OCCIPITAL SCALP LACERATION: Status: RESOLVED | Noted: 2017-05-01 | Resolved: 2017-10-22

## 2017-10-22 PROBLEM — R51.9 CHRONIC HEADACHE: Status: ACTIVE | Noted: 2017-10-22

## 2017-10-22 PROBLEM — G89.29 CHRONIC HEADACHE: Status: ACTIVE | Noted: 2017-10-22

## 2017-10-22 NOTE — PROGRESS NOTES
[facility] NEUROLOGY  [facility]    Date: October 22, 2017   Patient Name: Daksha Marie   MRN: [id number]   PCP: Caridad Padilla  Referring Provider: No ref. provider found    Assessment:   Daksha Marie is a 47 y.o. female presenting in follow up for intractable headache. Have again reinforced appropriate use of OTC analgesics. Will initiate Topamax for headache prophylaxsis as below. Have also provided referral to psychiatry at patient's request.  Plan:     Problem List Items Addressed This Visit        Neuro    Medication overuse headache    Current Assessment & Plan     -- patient again counseled to limit OTC analgesics         Chronic headache    Current Assessment & Plan     -- starting trial of topamax for prophylaxis            Psychiatric    Depression    Current Assessment & Plan     -- patient again counseled on seeking help via list of psychiatry resources         Relevant Orders    Ambulatory consult to Psychology       Cardiac/Vascular    Chronic diastolic congestive heart failure (Chronic)    Overview     Followed by PCP             Juliano Lynne MD  [facility]   Department of Neurology    Patient note was created using Dragon Dictation.  Any errors in syntax or even information may not have been identified and edited on initial review prior to signing this note.  Subjective:     HPI:   Ms. Daksha Marie is a 47 y.o. female who presents with a chief complaint of persistent headaches. Since the patient's last visit, she reports that she continues to experience frequent headaches nearly daily. She does continue to use to use over the counter analgesics with relative frequency. She is amenable to initiating a prophylactic agent today. The bulk of today's visit is spent discussing her depression. She states that she remains pre contemplative about getting psychiatric support. She has no other complaints today.     PAST MEDICAL  HISTORY:  Past Medical History:   Diagnosis Date    Alcohol abuse 10/7/2015    Breast calcification, left     Pt states this has been worked up, she received a biopsy in the past to confirm calcifications were from scar tissue from when she had breast reduction surgery.      Chronic diastolic congestive heart failure     Encounter for blood transfusion     GERD (gastroesophageal reflux disease)     Glaucoma     Hypertension     Hyponatremia 10/2015    Na 109. attributed to polydipsia and alcohol abuse.  suffered a seizure in the ICU .    Insomnia     Malingering 2016    PTSD (post-traumatic stress disorder)     Sickle cell trait        PAST SURGICAL HISTORY:  Past Surgical History:   Procedure Laterality Date    bile fistula      breast reduction       SECTION      CHOLECYSTECTOMY  after     GASTRIC BYPASS   and        CURRENT MEDS:  Current Outpatient Prescriptions   Medication Sig Dispense Refill    alprazolam (XANAX) 0.5 MG tablet Take 0.5 mg by mouth 3 (three) times daily as needed for Anxiety.      calcium-vitamin D3 (CALCIUM 500 + D) 500 mg(1,250mg) -200 unit per tablet Take 1 tablet by mouth 2 (two) times daily with meals.      cloNIDine (CATAPRES) 0.2 MG tablet TAKE ONE TABLET BY MOUTH EVERY EVENING 90 tablet 0    cyanocobalamin 1,000 mcg/mL injection Inject 1,000 mcg into the muscle every 28 days. On the  on the month      dicyclomine (BENTYL) 20 mg tablet Take 20 mg by mouth 3 (three) times daily as needed (for abdominal cramps).      disulfiram (ANTABUSE) 250 mg tablet Take 1 tablet (250 mg total) by mouth once daily. 30 tablet 11    docusate sodium (COLACE) 100 MG capsule Take 1 capsule (100 mg total) by mouth 2 (two) times daily.  0    DORZOLAMIDE-TIMOLOL, PF, OPHT Place 1 drop into both eyes every evening.       ferrous sulfate 325 (65 FE) MG EC tablet Take 1 tablet (325 mg total) by mouth once daily. (Patient taking differently: Take 325  mg by mouth daily as needed. )  0    fluticasone (FLONASE) 50 mcg/actuation nasal spray 1 spray by Each Nare route once daily.      folic acid (FOLVITE) 1 MG tablet Take 1 mg by mouth once daily.      latanoprost 0.005 % ophthalmic solution Place 1 drop into both eyes every evening.       lisinopril 10 MG tablet Take 10 mg by mouth every morning.       montelukast (SINGULAIR) 10 mg tablet Take 10 mg by mouth every evening.      multivitamin (THERAGRAN) tablet Take 1 tablet by mouth once daily. 30 tablet 5    omeprazole (PRILOSEC) 40 MG capsule Take 1 capsule (40 mg total) by mouth once daily. 30 capsule 11    ondansetron (ZOFRAN-ODT) 8 MG TbDL Take 1 tablet (8 mg total) by mouth every 8 (eight) hours as needed. 60 tablet 2    potassium chloride (MICRO-K) 10 MEQ CpSR Take 10 mEq by mouth once daily.       promethazine (PHENERGAN) 25 MG suppository Place 25 mg rectally every 6 (six) hours as needed for Nausea.      quetiapine (SEROQUEL) 100 MG Tab Take 1 tablet (100 mg total) by mouth every evening. (Patient taking differently: Take 100 mg by mouth nightly as needed. ) 30 tablet 0    thiamine mononitrate 100 mg Tab Take 1 tablet (100 mg total) by mouth 2 (two) times daily. 60 tablet 0    topiramate (TOPAMAX) 50 MG tablet Take 1 tablet (50 mg total) by mouth 2 (two) times daily. 180 tablet 11    CHANTIX STARTING MONTH BOX 0.5 mg (11)- 1 mg (42) tablet USE UTD  0    cloNIDine (CATAPRES) 0.2 MG tablet Take 1 tablet (0.2 mg total) by mouth every evening. 30 tablet 11    estradiol-norethindrone (ACTIVELLA) 1-0.5 mg per tablet Take 1 tablet by mouth once daily. 30 tablet 11    hydrochlorothiazide (HYDRODIURIL) 25 MG tablet TK 1 T PO QD  2    lactulose (CHRONULAC) 10 gram/15 mL solution TK 15 TO 30 ML PO ONCE TO BID PRF CONSTIPATION  0    venlafaxine (EFFEXOR-XR) 75 MG 24 hr capsule   1     No current facility-administered medications for this visit.        ALLERGIES:  Review of patient's allergies  "indicates:   Allergen Reactions    Penicillins Hives       FAMILY HISTORY:  Family History   Problem Relation Age of Onset    Diabetes Mother     Lupus Mother     Diabetes Maternal Grandmother     Crohn's disease Cousin        SOCIAL HISTORY:  Social History   Substance Use Topics    Smoking status: Current Every Day Smoker     Packs/day: 1.00     Years: 4.00     Types: Cigarettes    Smokeless tobacco: Never Used    Alcohol use No      Comment: former heavy drinker     Review of Systems:  12 review of systems is negative except for the symptoms mentioned in HPI.      Objective:     Vitals:    10/11/17 0811   BP: 114/74   Pulse: 109   Weight: 75.8 kg (167 lb 1.7 oz)   Height: 5' 5" (1.651 m)     General: NAD, well nourished   Eyes: no tearing, discharge, no erythema   ENT: moist mucous membranes of the oral cavity, nares patent    Neck: Supple, full range of motion  Cardiovascular: Warm and well perfused, pulses equal and symmetrical  Lungs: Normal work of breathing, normal chest wall excursions  Skin: No rash, lesions, or breakdown on exposed skin  Psychiatry: Mood and affect are appropriate   Abdomen: soft, non tender, non distended  Extremeties: No cyanosis, clubbing or edema.    Neurological   MENTAL STATUS: Alert and oriented to person, place, and time. Attention and concentration within normal limits. Speech without dysarthria, able to name and repeat without difficulty. Recent and remote memory within normal limits   CRANIAL NERVES: Visual fields intact. PERRL. EOMI. Facial sensation intact. Face symmetrical. Hearing grossly intact. Full shoulder shrug bilaterally. Tongue protrudes midline   SENSORY: Sensation is intact to light touch throughout.    MOTOR: Normal bulk and tone. No pronator drift.  5/5 deltoid, biceps, triceps, interosseous, hand  bilaterally. 5/5 iliopsoas, knee extension/flexion, foot dorsi/plantarflexion bilaterally.    REFLEXES: Symmetric and 2+ throughout. "   CEREBELLAR/COORDINATION/GAIT: Gait steady with normal arm swing and stride length.  Finger to nose intact. Normal rapid alternating movements.

## 2017-10-27 ENCOUNTER — TELEPHONE (OUTPATIENT)
Dept: OBSTETRICS AND GYNECOLOGY | Facility: CLINIC | Age: 47
End: 2017-10-27

## 2017-10-27 NOTE — TELEPHONE ENCOUNTER
----- Message from Amna Shabazz sent at 10/27/2017  9:56 AM CDT -----  Contact: [phone]/ self   Patient requesting to speak with you regarding changing rx due to insurance coverage. Please advise.

## 2017-10-30 NOTE — TELEPHONE ENCOUNTER
Patient informed of results and verbalized understanding. Also stated that insurance does not cover Lopreeza, requesting alternative.    Please sign orders.

## 2017-10-31 ENCOUNTER — TELEPHONE (OUTPATIENT)
Dept: OBSTETRICS AND GYNECOLOGY | Facility: CLINIC | Age: 47
End: 2017-10-31

## 2017-10-31 ENCOUNTER — OUTPATIENT CASE MANAGEMENT (OUTPATIENT)
Dept: ADMINISTRATIVE | Facility: OTHER | Age: 47
End: 2017-10-31

## 2017-10-31 RX ORDER — ESTRADIOL AND NORETHINDRONE ACETATE 1; .5 MG/1; MG/1
1 TABLET ORAL DAILY
Qty: 30 TABLET | Refills: 11 | Status: SHIPPED | OUTPATIENT
Start: 2017-10-31 | End: 2018-01-31

## 2017-10-31 RX ORDER — FLUCONAZOLE 150 MG/1
150 TABLET ORAL DAILY
Qty: 1 TABLET | Refills: 0 | Status: SHIPPED | OUTPATIENT
Start: 2017-10-31 | End: 2017-11-01

## 2017-10-31 NOTE — PROGRESS NOTES
10/31/2017:   left a message on patient's voicemail in an attempt to follow up.  Will try again next week.        10/17/2017:   spoke to patient via telephone in order to complete assessment.  Pt reported that she is Independent with ADLS and lives with her two children ages 14 and 21.  She owns a car; however, it is not reliable for long distances.  Pt was not aware of Medicaid transportation benefit.      Pt reported that she has been attempting to find mental health services because of depression.  She was provided with 2 names for psychiatrist.  One is not taking new patients and the other she has seen in the past and does not want to return to him.   printed a list of Medicare providers from Medicare.gov website and mailed it to patient.  I also mailed educational material from JIM re: Depression.    She has attended an IOP in the past and did not like the program.  She does not want to return to IOP at this time.    Pt expressed some financial issues due to food and housing costs.   mailed patient information re: [employer], [employer], and [employer].  Pt receives [employer] [place] and has applied for Section 8 Housing.        I also mailed her information re: [facility] Department of Emergency Management Brochure since patient does not have an evacuation plan.      Will continue to follow.    Plan for next Encounter:  1. Ensure that patient has received mailing  2 Encourage patient to contact local psychiatrist for an appointment.  3. Answer any questions  4. Assess for additional needs

## 2017-10-31 NOTE — TELEPHONE ENCOUNTER
----- Message from Chata Baker sent at 10/31/2017  3:58 PM CDT -----  Contact: Self [phone]  Calling to talk to nurse concerning to see if her medication has been called in to the pharmacy.

## 2017-10-31 NOTE — TELEPHONE ENCOUNTER
Patient states her medications were suppose to be sent to [facility] since [facility] was out of stock.  She states she spoke to someone in the office on yesterday.  jeovany called into the pharmacy.

## 2017-11-03 ENCOUNTER — OUTPATIENT CASE MANAGEMENT (OUTPATIENT)
Dept: ADMINISTRATIVE | Facility: OTHER | Age: 47
End: 2017-11-03

## 2017-11-03 NOTE — PROGRESS NOTES
11/3/17  Chart reviewed.  CM f/u to update plan of care. Verbal messages left on home/mobile phones requesting return call.

## 2017-11-07 ENCOUNTER — OUTPATIENT CASE MANAGEMENT (OUTPATIENT)
Dept: ADMINISTRATIVE | Facility: OTHER | Age: 47
End: 2017-11-07

## 2017-11-07 NOTE — PROGRESS NOTES
11/7/2017:   left a message on patient's voicemail in an attempt to follow up.  Will send letter if patient does not return my call by the end of the week (11/10).        10/31/2017:   left a message on patient's voicemail in an attempt to follow up.  Will try again next week.        10/17/2017:   spoke to patient via telephone in order to complete assessment.  Pt reported that she is Independent with ADLS and lives with her two children ages 14 and 21.  She owns a car; however, it is not reliable for long distances.  Pt was not aware of Medicaid transportation benefit.      Pt reported that she has been attempting to find mental health services because of depression.  She was provided with 2 names for psychiatrist.  One is not taking new patients and the other she has seen in the past and does not want to return to him.   printed a list of Medicare providers from Medicare.gov website and mailed it to patient.  I also mailed educational material from JIM re: Depression.    She has attended an [facility] in the past and did not like the program.  She does not want to return to [facility] at this time.    Pt expressed some financial issues due to food and housing costs.   mailed patient information re: [employer], [employer] and [facility] Community Development Department.  Pt receives Food [place] and has applied for Section 8 Housing.        I also mailed her information re: [facility] Department of Emergency Management Brochure since patient does not have an evacuation plan.      Will continue to follow.    Plan for next Encounter:  1. Ensure that patient has received mailing  2 Encourage patient to contact local psychiatrist for an appointment.  3. Answer any questions  4. Assess for additional needs

## 2017-11-12 ENCOUNTER — PATIENT MESSAGE (OUTPATIENT)
Dept: NEUROLOGY | Facility: CLINIC | Age: 47
End: 2017-11-12

## 2017-11-13 ENCOUNTER — OUTPATIENT CASE MANAGEMENT (OUTPATIENT)
Dept: ADMINISTRATIVE | Facility: OTHER | Age: 47
End: 2017-11-13

## 2017-11-13 NOTE — PROGRESS NOTES
"11/13/2017:   received a [employer] message from patient reporting that she has received the information that I mailed her.  She stated that most of the psychiatrist are "too far" from her home.  She is considering contacted Dr. Juarez to see if she can see him instead of going to his program.      Pt reported that he car has broken down leaving her without transportation at this time.  She has contacted the [employer] in her area; however, she cannot get to it.       discussed the fact that patient emailed her doctor on a Sunday re: headaches and depression.  She is aware that no one is in the office on Sundays and did not expect a call back at that time.   provided patient with 211 crisis line and encouraged her to use it if she become depressed in the future and needs to talk to someone.      Pt reported that she will contact Dr. Juarez to see if he is accepting patients at his [place] location.  Encouraged her to ask if there is another doctor if Dr. Juarez is not available.  She is also going to call Dr. Raina Weber with [facility].      Will continue to follow.        Plan for next Encounter:  1. Ensure that patient has received mailing-11/13/17  2 Encourage patient to contact local psychiatrist for an appointment.-11/13/2017  3. Answer any questions  4. Assess for additional needs      "

## 2017-11-16 ENCOUNTER — PATIENT MESSAGE (OUTPATIENT)
Dept: OBSTETRICS AND GYNECOLOGY | Facility: CLINIC | Age: 47
End: 2017-11-16

## 2017-11-16 DIAGNOSIS — N92.6 IRREGULAR UTERINE BLEEDING: Primary | ICD-10-CM

## 2017-11-16 DIAGNOSIS — N92.6 IRREGULAR UTERINE BLEEDING: ICD-10-CM

## 2017-11-16 RX ORDER — MEDROXYPROGESTERONE ACETATE 10 MG/1
TABLET ORAL
Qty: 14 TABLET | Refills: 1 | Status: SHIPPED | OUTPATIENT
Start: 2017-11-16 | End: 2017-11-16 | Stop reason: SDUPTHER

## 2017-11-16 RX ORDER — MEDROXYPROGESTERONE ACETATE 10 MG/1
TABLET ORAL
Qty: 90 TABLET | Refills: 1 | Status: SHIPPED | OUTPATIENT
Start: 2017-11-16 | End: 2018-10-24

## 2017-11-16 NOTE — TELEPHONE ENCOUNTER
Instruct patient to continue activella. I will call out provera for her to take also (both together). After provera done, stop both for 5 days then resume activella.  Call back if bleeding persists

## 2017-11-16 NOTE — TELEPHONE ENCOUNTER
Patient has been experiencing irregular bleeding. States that she has been taking Activella for 15 days and she is still bleeding and cramping. Patient reports that she has been consistent with taking the pill every day.    Please advise.

## 2017-11-21 ENCOUNTER — OUTPATIENT CASE MANAGEMENT (OUTPATIENT)
Dept: ADMINISTRATIVE | Facility: OTHER | Age: 47
End: 2017-11-21

## 2017-11-21 NOTE — LETTER
November 21, 2017    Daksha Calles Marie  [address]  [place] [address]             [facility] [address] [place] [address] Dear Mrs. Daksha Marie:    I work with [facility] Outpatient Case Management Department. I have been unsuccessful at reaching you to follow-up to see how you have been doing.     The Outpatient Case Management Department can be reached at [phone] from 8:00 am to 4:30 pm on Monday thru Friday. [facility] also has a program where a nurse is available 24/7 to answer questions or provide medical advice. Their phone number is [phone].     Please don't hesitate to call upon receiving this letter or any other time.         Sincerely,      Eloina Castano RN   Outpatient Case Management  TEL:  [phone]

## 2017-11-21 NOTE — PROGRESS NOTES
11/21/17  CM f/u to update plan of care. Verbal message left requesting return call.   Chart reviewed.  EPIC letter written and mailed to pt. Letter held after receiving call back from patient.    Ms. Stevan Marie reports on progress being made. S/p NEURO appt to treat her migraines, Topamax increased to 50 mg BID not working, various tests being ordered for C/o right hand pain. MMG 11/28/17.  S/p fall last week-- prescribed Robaxin and Vicodin by her PCP Dr. Caridad Padilla ([place]indy). In need of a rubber mat in her tub which she says she will get. Her PCP has been prescribing her Effexor XR with notable results on her mood she states and is taking Chantix- quit smoking so far.   Ms. Marie having difficulty locating a mental health provider in the [place] area. Pt's car is malfunctioning and not reliable to travel outside of [place]. She is aware of Psych Referral entered by [facility] but that the [facility] are located at [facility].She is communicating with [facility] via [facility]given website [email or web address]    which she will look into.  Elliot Mascorro LCSW has been in touch with pt and provided pt with 211 Crisis Ph #. Ms. Marie is happy to know about this ph number.       Plan for next encounter  F/u on Mental Health

## 2017-11-27 ENCOUNTER — OUTPATIENT CASE MANAGEMENT (OUTPATIENT)
Dept: ADMINISTRATIVE | Facility: OTHER | Age: 47
End: 2017-11-27

## 2017-11-27 NOTE — PROGRESS NOTES
11/27/2017:  Patient reported that she is still researching mental health options.  She is trying to contact [facility].  She stated that the family is planning a memorial for her mother so she has not been able to contact them at this time.  She stated that she plans to do so later this week.  Pt also reported some depression and anxiety; however, denies suicidal ideation.  Will continue to follow.          Plan for next Encounter:  1. Ensure that patient has received mailing-11/13/17  2 Encourage patient to contact local psychiatrist for an appointment.-11/13/2017  3. Answer any questions  4. Assess for additional needs

## 2017-11-28 ENCOUNTER — TELEPHONE (OUTPATIENT)
Dept: NEUROLOGY | Facility: CLINIC | Age: 47
End: 2017-11-28

## 2017-11-28 ENCOUNTER — HOSPITAL ENCOUNTER (OUTPATIENT)
Dept: RADIOLOGY | Facility: HOSPITAL | Age: 47
Discharge: HOME | End: 2017-11-28
Attending: OBSTETRICS & GYNECOLOGY
Payer: MEDICARE

## 2017-11-28 DIAGNOSIS — Z01.419 WELL WOMAN EXAM WITH ROUTINE GYNECOLOGICAL EXAM: ICD-10-CM

## 2017-11-28 DIAGNOSIS — Z12.31 ENCOUNTER FOR SCREENING MAMMOGRAM FOR MALIGNANT NEOPLASM OF BREAST: ICD-10-CM

## 2017-11-28 PROCEDURE — 77067 SCR MAMMO BI INCL CAD: CPT | Mod: TC

## 2017-11-28 PROCEDURE — 77063 BREAST TOMOSYNTHESIS BI: CPT | Mod: 26,,, | Performed by: RADIOLOGY

## 2017-11-28 PROCEDURE — 77067 SCR MAMMO BI INCL CAD: CPT | Mod: 26,,, | Performed by: RADIOLOGY

## 2017-11-28 NOTE — TELEPHONE ENCOUNTER
The patient came in today with complaints of head pain the patient also states she has 3 fingers tingling on the right hand. The patient said you increased the Topamax and it is not working, I booked the patient the first available appointment but she would like a call back.

## 2017-11-30 ENCOUNTER — PATIENT MESSAGE (OUTPATIENT)
Dept: OBSTETRICS AND GYNECOLOGY | Facility: CLINIC | Age: 47
End: 2017-11-30

## 2017-12-03 ENCOUNTER — PATIENT MESSAGE (OUTPATIENT)
Dept: OBSTETRICS AND GYNECOLOGY | Facility: CLINIC | Age: 47
End: 2017-12-03

## 2017-12-04 ENCOUNTER — TELEPHONE (OUTPATIENT)
Dept: OBSTETRICS AND GYNECOLOGY | Facility: CLINIC | Age: 47
End: 2017-12-04

## 2017-12-04 RX ORDER — FLUCONAZOLE 100 MG/1
100 TABLET ORAL DAILY
Qty: 30 TABLET | Refills: 2 | Status: SHIPPED | OUTPATIENT
Start: 2017-12-04 | End: 2018-01-03

## 2017-12-04 NOTE — TELEPHONE ENCOUNTER
Dr. Soni I would like to request Diflucan pills 30 day supply due to the amount of medications that I take I get frequent yeast infections.         Please advise.

## 2017-12-05 ENCOUNTER — PATIENT MESSAGE (OUTPATIENT)
Dept: NEUROLOGY | Facility: CLINIC | Age: 47
End: 2017-12-05

## 2017-12-11 ENCOUNTER — OUTPATIENT CASE MANAGEMENT (OUTPATIENT)
Dept: ADMINISTRATIVE | Facility: OTHER | Age: 47
End: 2017-12-11

## 2017-12-11 NOTE — PROGRESS NOTES
12/11/2017:   left a message on patient's voicemail in an attempt to follow up.  Will try again next week if patient does not return my call by then.      11/27/2017:  Patient reported that she is still researching mental health options.  She is trying to contact [facility].  She stated that the family is planning a memorial for her mother so she has not been able to contact them at this time.  She stated that she plans to do so later this week.  Pt also reported some depression and anxiety; however, denies suicidal ideation.  Will continue to follow.          Plan for next Encounter:  1. Ensure that patient has received mailing-11/13/17  2 Encourage patient to contact local psychiatrist for an appointment.-11/13/2017  3. Answer any questions  4. Assess for additional needs

## 2017-12-14 ENCOUNTER — OUTPATIENT CASE MANAGEMENT (OUTPATIENT)
Dept: ADMINISTRATIVE | Facility: OTHER | Age: 47
End: 2017-12-14

## 2017-12-14 NOTE — PROGRESS NOTES
12-14-17-- Follow-up call completed. I spoke with patient. Patient reports that she is doing well with regards to her mood. Patient denies any SI or HI. Patient reports that she is aware of 211 crisis number if needs assistance.  Patient reports that she has not been able to arrange a Psych appointment as of yet. She is working with [facility] for assistance with transportation.  Notified of appointment on 1-8-17 with Neuro- patient unaware, will mail appointment reminder. Patient working with [facility].      Plan for next encounter  F/u on Mental Health   F/u regards to Topamax usage.    Mario SAHU CCM

## 2017-12-18 ENCOUNTER — OUTPATIENT CASE MANAGEMENT (OUTPATIENT)
Dept: ADMINISTRATIVE | Facility: OTHER | Age: 47
End: 2017-12-18

## 2017-12-18 ENCOUNTER — PATIENT MESSAGE (OUTPATIENT)
Dept: OBSTETRICS AND GYNECOLOGY | Facility: CLINIC | Age: 47
End: 2017-12-18

## 2017-12-18 NOTE — LETTER
December 18, 2017    Daksha Marie  [address]  [place] [address]             [facility] [address] [place] [address] Dear MsDom Stevan Marie:    It was so nice working with you while you were enrolled in the Complex Case Management Department for services.  I have attempted to contact you for follow up without success; therefore, I will be closing your case at this time.  I would like to encourage you to contact our department if any needs arise that you think we can assist with.    The Outpatient Complex Case Management Department can be reached at [phone] from 8:00am until 4:30pm Monday thru Friday.  [facility] also has a program where a nurse is available 24/7 to answer questions or provide medical advice, their number is [phone].    If you have any questions or concerns, please don't hesitate to call.    Sincerely,        Elliot Cline, LCSW-BACS    III  Outpatient Complex Case Management

## 2017-12-18 NOTE — PROGRESS NOTES
12/18/2017:   left a message on patient's voicemail in an attempt to follow up.  Mailed letter to patient's home.  Will close case on 12/27 if patient does not return my call by then.        11/27/2017:  Patient reported that she is still researching mental health options.  She is trying to contact [facility].  She stated that the family is planning a memorial for her mother so she has not been able to contact them at this time.  She stated that she plans to do so later this week.  Pt also reported some depression and anxiety; however, denies suicidal ideation.  Will continue to follow.          Plan for next Encounter:  1. Ensure that patient has received mailing-11/13/17  2 Encourage patient to contact local psychiatrist for an appointment.-11/13/2017  3. Answer any questions  4. Assess for additional needs

## 2017-12-19 ENCOUNTER — TELEPHONE (OUTPATIENT)
Dept: OBSTETRICS AND GYNECOLOGY | Facility: CLINIC | Age: 47
End: 2017-12-19

## 2017-12-27 ENCOUNTER — OUTPATIENT CASE MANAGEMENT (OUTPATIENT)
Dept: ADMINISTRATIVE | Facility: OTHER | Age: 47
End: 2017-12-27

## 2017-12-27 NOTE — PROGRESS NOTES
12/27/2017:   to close case at this time since patient has not returned my telephone calls.  Please consult  if needs arise in the future.

## 2017-12-28 ENCOUNTER — PATIENT MESSAGE (OUTPATIENT)
Dept: OBSTETRICS AND GYNECOLOGY | Facility: CLINIC | Age: 47
End: 2017-12-28

## 2017-12-28 ENCOUNTER — TELEPHONE (OUTPATIENT)
Dept: OBSTETRICS AND GYNECOLOGY | Facility: CLINIC | Age: 47
End: 2017-12-28

## 2017-12-28 ENCOUNTER — PATIENT MESSAGE (OUTPATIENT)
Dept: NEUROLOGY | Facility: CLINIC | Age: 47
End: 2017-12-28

## 2017-12-29 ENCOUNTER — OUTPATIENT CASE MANAGEMENT (OUTPATIENT)
Dept: ADMINISTRATIVE | Facility: OTHER | Age: 47
End: 2017-12-29

## 2017-12-29 NOTE — PROGRESS NOTES
12/29/17  CM f/u to update plan of care. Verbal message left on home number, requesting return call. Home ph rings and clicks off.

## 2018-01-02 NOTE — TELEPHONE ENCOUNTER
No she does not but activella alone won't resolve fibroid symptoms like pressure and pelvic discomfort.

## 2018-01-08 ENCOUNTER — OUTPATIENT CASE MANAGEMENT (OUTPATIENT)
Dept: ADMINISTRATIVE | Facility: OTHER | Age: 48
End: 2018-01-08

## 2018-01-08 NOTE — LETTER
January 8, 2018    Daksha Marie  [address]  [place] [address]             [facility] [address] [place] [address] Dear Ms. Asenciosamuel Stevan Marie:    I work with [facility] Outpatient Case Management Department and have spoken with you by phone several times.  I have been unsuccessful at reaching you to follow-up to see how you have been doing.   I wanted to be sure you have been able to schedule all needed appointments.     The Outpatient Case Management Department can be reached at [phone] from 8:00 am to 4:30 pm on Monday thru Friday. [facility] also has a program where a nurse is available 24/7 to answer questions or provide medical advice. Their phone number is [phone].     If you require any future assistance or if any new concerns or problems arise, please do not hesitate to call.     Sincerely,        Eloina Castano RN   Outpatient Case Management  TEL:  [phone]

## 2018-01-08 NOTE — PROGRESS NOTES
1/8/18  Chart reviewed.   CM f/u to update plan of care. CM attempted to reach pt by phone Home/Mobile-- phones ring then click off.   EPIC letter -unable to reach--mailed.     Plan to f/u 1/12/18

## 2018-01-16 ENCOUNTER — PATIENT MESSAGE (OUTPATIENT)
Dept: NEUROLOGY | Facility: CLINIC | Age: 48
End: 2018-01-16

## 2018-01-16 ENCOUNTER — PATIENT MESSAGE (OUTPATIENT)
Dept: OBSTETRICS AND GYNECOLOGY | Facility: CLINIC | Age: 48
End: 2018-01-16

## 2018-01-17 ENCOUNTER — OUTPATIENT CASE MANAGEMENT (OUTPATIENT)
Dept: ADMINISTRATIVE | Facility: OTHER | Age: 48
End: 2018-01-17

## 2018-01-17 RX ORDER — INDOMETHACIN 25 MG/1
25 CAPSULE ORAL 3 TIMES DAILY PRN
Qty: 30 CAPSULE | Refills: 0 | Status: ON HOLD | OUTPATIENT
Start: 2018-01-17 | End: 2018-10-15 | Stop reason: HOSPADM

## 2018-01-17 RX ORDER — INDOMETHACIN 25 MG/1
CAPSULE ORAL
Qty: 270 CAPSULE | Refills: 0 | OUTPATIENT
Start: 2018-01-17

## 2018-01-17 NOTE — PROGRESS NOTES
1/17/18  CM f/u to update plan of care. Chart reviewed. New phone # per pt's [facility] communications with her physicians. CM reached Daksha Calles this AM via new ph. (Updated demographics). She says that this CM is the first person to call her since her phone and internet were disconnected and now connected. She began communication yest via [facility] to Dr. Lynne, Neuro and to Dr Soni, OB/GYN. She thought her Neuro appt was for 1/18/18 and therefore missed her 1/8/18 appt-- must reschedule.   She reports c/o headaches/light & sound sensitivity improved initially whenTopamax was increased to 50 mg BID then 100 mg BID now these symptoms resumed. She is presently taking Ativella for menstrual bleeding and in need of f/u OB/GYN appt. She has not seen Psych yet. Transportation barrier- her car is in need of repair, got behind with her ph/internet bills so it was shut off, now caught up she reports. Her father can provide transportation sometimes-- not when it is cold like today.   She reports having all of her meds and is taking Effexor. She denies SI/HI.   CM focused on how pt can move forward. CM encouraged pt to schedule her appts by phone or through [facility] (Neuro, OB/GYN, Psych) and hopefully her father can help with transportation issues. She agrees.   CM instructed pt that she can contact Elliot Mascorro LCSW w/[facility] for questions. LCSW closed case after 3 failed attempts to reach pt.   Plan is to f/u with pt this Fri 1/19.     Pt reports being okay with today's freezing weather:      [] Please be sure to have a supply of water, non-perishable food items, flashlights and batteries with you in your house.   [x] Please be sure you bring all of your medications with you. Bring at least a five day supply. It is best to bring your medication bottles, in case you are displaced for a longer period of time, therefore you can get your medication refilled from your temporary location.   [] Please bring sure to bring  any DME that you may need. This includes walkers, wheelchairs, shower chairs, nebulizer machine, etc.   [] If you are a diabetic- be sure to bring your glucometer and all glucometer monitoring supplies.   [] If you have high blood pressure- be sure to bring your blood pressure cuff so you can continue to monitor.   [] If you have CHF-be sure to bring your scale so you can continue to monitor.  [] If on PEG feeding- be sure to bring tube feedings and feeding supplies.  [] If on oxygen- be sure to bring all of your oxygen supplies: Cannula, portable tanks, concentrator, etc. Also contact you Oxygen Supply Company to find out the nearest location of an oxygen supply company to where you will be located. ([employer]: [phone], [facility] [place]: [phone], [facility]:[phone], [employer]: [phone]).   [] If you receive hemodialysis- you should already have a plan in place with your dialysis center. If you do not know where you need to evacuate to in order to be close to a dialysis center- reach out to your dialysis center for further direction. ([facility]  service line: [phone], [facility]  service line [phone])  [] If receiving treatment at an infusion center- please contact the infusion center to find out which infusion center they have a contract with. Also ask your infusion center for location of the infusion center they are in contract with, so if need be you can evacuate to that area.   Office of Emergency Preparedness Phone number:  [x] [facility]: [phone]  [] [facility]: [phone]  [] [facility]: [phone]  [] [facility]: [phone]  [] [place]: [phone]  [] [facility]: [phone]  [] [facility]: [phone]  [] [facility]: [phone]  [] [facility]: [phone]  [] [facility]: [phone]  [] [facility]: [phone]  [] [facility]:  [phone]  [] [facility]: [phone]    [] [place]:  [phone]   [] [place]:  [phone]   [] [place]:  [phone]   [] [facility]:  [phone]   [] [place]:  [phone]   [] [place]: [phone]   [] [place]:  [phone]   [] [facility]:  [phone]   [] [facility]:  [phone]   [] [facility]:  [phone]   [] [facility]:  [phone]   [] [place]:  [phone]   [] [facility]:  [phone]   [] [facility]:  [phone]   [] [facility]:  [phone]   [] [facility]: [phone]   [] [facility]:  [phone]   [] [facility]: [phone]   [] [facility]: [phone]

## 2018-01-19 ENCOUNTER — OUTPATIENT CASE MANAGEMENT (OUTPATIENT)
Dept: ADMINISTRATIVE | Facility: OTHER | Age: 48
End: 2018-01-19

## 2018-01-19 NOTE — PROGRESS NOTES
1/19/18  F/u on pt scheduling her appts--- her GYN & Neuro appts scheduled. CM not allowed access to Psych appt.   Verbal message left requesting return call.

## 2018-01-25 ENCOUNTER — OUTPATIENT CASE MANAGEMENT (OUTPATIENT)
Dept: ADMINISTRATIVE | Facility: OTHER | Age: 48
End: 2018-01-25

## 2018-01-25 NOTE — PROGRESS NOTES
1/2518  Ms. Calles left phone message today to let CM know that she followed through and scheduled  GYN 1/31, NEURO 2/21 and Psych 3/20 appts.       Plan for next encounter  F/u with pt on her status  Plan on closing case

## 2018-01-29 ENCOUNTER — OUTPATIENT CASE MANAGEMENT (OUTPATIENT)
Dept: ADMINISTRATIVE | Facility: OTHER | Age: 48
End: 2018-01-29

## 2018-01-29 RX ORDER — INDOMETHACIN 25 MG/1
CAPSULE ORAL
Qty: 270 CAPSULE | Refills: 0 | OUTPATIENT
Start: 2018-01-29

## 2018-01-29 NOTE — PROGRESS NOTES
"1/29/18  CM f/u to update plan of care. Telephonic contact with Ms. Daksha Marie. She left a message late last Fri 126 and then today, stating she scheduled her needed appts to GYN, NEURO, PSYCH.   She has a ride to bring her to Psych appt on [facility]. She reports feeling like the walls are closing in on her, having letters from [facility] [employer] come about cut wages. CM offered LCSW resource if she would like--noting however original LCSW referral eventually closed -unable to reach pt. She has been feeling a lot of stress and "fell off the smoking cessation". She stopped Chantix. She called 211 she states and received help with some resources. She expresses concern over being on so many meds, memory concerns. She plans to see her PCP, Dr. Caridad Padilla in [place]- ([facility] on 1/31 after seeing GYN, to review all of her meds to see if any can be eliminated.   This CM concerned that her PCP does not document in EPIC, having multiple providers who can be unaware of meds prescribed by the other. Ms. Crooks has a good repoire with her PCP, can walk-in to meet with MD at any time. CM encouraged pt to meet with her PCP as she suggests, to review all of her meds for poss safe discontinuation. CM mentioned the [facility] MTM program to review meds for pts.  In pt's case, again her PCP is outside [facility] and getting to [facility] location is a hardship for pt coming from [place] with limited transportation.             She is aware that her rxs for antabuse will need refills from [facility]. She is compliant with taking Effexor. She asks CM how to get rid of her old rxs-- CM suggested bringing old meds to pharmacy for safe disposal.   CM encouraged pt to schedule appt with Ophthalm since she is without her glaucoma meds.She says she realizes she needs to see Eye Doctor.   JINNY explained how [facility] is a short-term program to connect pts to needed resources/services and that this CM will be closing case in the next few " weeks or so, however, she can call this CM with future concerns or her case can be reopened. She verbalizes her understanding.         Plan for next encounter  F/u with pt on her meeting with her PCP to reduce # of rxs

## 2018-01-31 ENCOUNTER — OFFICE VISIT (OUTPATIENT)
Dept: OBSTETRICS AND GYNECOLOGY | Facility: CLINIC | Age: 48
End: 2018-01-31
Payer: MEDICARE

## 2018-01-31 ENCOUNTER — PATIENT MESSAGE (OUTPATIENT)
Dept: NEUROLOGY | Facility: CLINIC | Age: 48
End: 2018-01-31

## 2018-01-31 ENCOUNTER — LAB VISIT (OUTPATIENT)
Dept: LAB | Facility: HOSPITAL | Age: 48
End: 2018-01-31
Attending: OBSTETRICS & GYNECOLOGY
Payer: MEDICARE

## 2018-01-31 VITALS
SYSTOLIC BLOOD PRESSURE: 100 MMHG | WEIGHT: 151.25 LBS | DIASTOLIC BLOOD PRESSURE: 72 MMHG | HEIGHT: 65 IN | BODY MASS INDEX: 25.2 KG/M2

## 2018-01-31 DIAGNOSIS — Z87.42 HISTORY OF POSTMENOPAUSAL BLEEDING: ICD-10-CM

## 2018-01-31 DIAGNOSIS — N95.1 PERIMENOPAUSE: ICD-10-CM

## 2018-01-31 DIAGNOSIS — N95.1 PERIMENOPAUSE: Primary | ICD-10-CM

## 2018-01-31 LAB
ESTRADIOL SERPL-MCNC: 55 PG/ML
FSH SERPL-ACNC: 3.6 MIU/ML

## 2018-01-31 PROCEDURE — 99213 OFFICE O/P EST LOW 20 MIN: CPT | Mod: S$PBB,,, | Performed by: OBSTETRICS & GYNECOLOGY

## 2018-01-31 PROCEDURE — 99999 PR PBB SHADOW E&M-EST. PATIENT-LVL III: CPT | Mod: PBBFAC,,, | Performed by: OBSTETRICS & GYNECOLOGY

## 2018-01-31 PROCEDURE — 36415 COLL VENOUS BLD VENIPUNCTURE: CPT

## 2018-01-31 PROCEDURE — 83001 ASSAY OF GONADOTROPIN (FSH): CPT

## 2018-01-31 PROCEDURE — 99213 OFFICE O/P EST LOW 20 MIN: CPT | Mod: PBBFAC,PO | Performed by: OBSTETRICS & GYNECOLOGY

## 2018-01-31 PROCEDURE — 82670 ASSAY OF TOTAL ESTRADIOL: CPT

## 2018-01-31 RX ORDER — ALPRAZOLAM 1 MG/1
TABLET ORAL
Refills: 0 | COMMUNITY
Start: 2018-01-23 | End: 2018-03-22 | Stop reason: SDUPTHER

## 2018-01-31 RX ORDER — TRAZODONE HYDROCHLORIDE 150 MG/1
TABLET ORAL
Refills: 0 | COMMUNITY
Start: 2017-12-20 | End: 2018-03-22

## 2018-01-31 RX ORDER — METHOCARBAMOL 500 MG/1
TABLET, FILM COATED ORAL
Refills: 0 | COMMUNITY
Start: 2018-01-23 | End: 2018-10-24

## 2018-01-31 RX ORDER — FLUCONAZOLE 100 MG/1
TABLET ORAL
Refills: 2 | COMMUNITY
Start: 2018-01-06 | End: 2018-08-08 | Stop reason: SDUPTHER

## 2018-01-31 RX ORDER — POTASSIUM CHLORIDE 750 MG/1
TABLET, EXTENDED RELEASE ORAL
Refills: 0 | COMMUNITY
Start: 2017-11-25 | End: 2019-07-23

## 2018-01-31 NOTE — PROGRESS NOTES
Patient came in today for perimenopausal bleeding.  She is presently on Mimvey.  When the patient was questioned regarding her menstrual cycles she realized that she has not had any bleeding problems in the last 2 months.  She is advised that it would be best to continue the estradiol and norethindrone combination pill.  Laboratory tests will be done to assess menopausal status.  Patient will return if problems return.

## 2018-02-01 ENCOUNTER — PATIENT MESSAGE (OUTPATIENT)
Dept: NEUROLOGY | Facility: CLINIC | Age: 48
End: 2018-02-01

## 2018-02-02 ENCOUNTER — TELEPHONE (OUTPATIENT)
Dept: OBSTETRICS AND GYNECOLOGY | Facility: CLINIC | Age: 48
End: 2018-02-02

## 2018-02-02 ENCOUNTER — OFFICE VISIT (OUTPATIENT)
Dept: NEUROLOGY | Facility: CLINIC | Age: 48
End: 2018-02-02
Payer: MEDICARE

## 2018-02-02 VITALS
BODY MASS INDEX: 25.27 KG/M2 | DIASTOLIC BLOOD PRESSURE: 87 MMHG | HEART RATE: 78 BPM | HEIGHT: 65 IN | WEIGHT: 151.69 LBS | SYSTOLIC BLOOD PRESSURE: 123 MMHG

## 2018-02-02 DIAGNOSIS — R51.9 CHRONIC NONINTRACTABLE HEADACHE, UNSPECIFIED HEADACHE TYPE: ICD-10-CM

## 2018-02-02 DIAGNOSIS — F32.A DEPRESSION, UNSPECIFIED DEPRESSION TYPE: ICD-10-CM

## 2018-02-02 DIAGNOSIS — F10.10 ALCOHOL ABUSE: Chronic | ICD-10-CM

## 2018-02-02 DIAGNOSIS — G89.29 CHRONIC NONINTRACTABLE HEADACHE, UNSPECIFIED HEADACHE TYPE: ICD-10-CM

## 2018-02-02 PROBLEM — G44.40 MEDICATION OVERUSE HEADACHE: Status: RESOLVED | Noted: 2017-07-10 | Resolved: 2018-02-02

## 2018-02-02 PROCEDURE — 99999 PR PBB SHADOW E&M-EST. PATIENT-LVL II: CPT | Mod: PBBFAC,,, | Performed by: PSYCHIATRY & NEUROLOGY

## 2018-02-02 PROCEDURE — 99212 OFFICE O/P EST SF 10 MIN: CPT | Mod: PBBFAC | Performed by: PSYCHIATRY & NEUROLOGY

## 2018-02-02 PROCEDURE — 99213 OFFICE O/P EST LOW 20 MIN: CPT | Mod: S$PBB,,, | Performed by: PSYCHIATRY & NEUROLOGY

## 2018-02-02 RX ORDER — SUMATRIPTAN 50 MG/1
TABLET, FILM COATED ORAL
Qty: 12 TABLET | Refills: 3 | Status: SHIPPED | OUTPATIENT
Start: 2018-02-02 | End: 2018-04-17 | Stop reason: SDUPTHER

## 2018-02-02 RX ORDER — ZONISAMIDE 50 MG/1
CAPSULE ORAL
Qty: 30 CAPSULE | Refills: 11 | Status: SHIPPED | OUTPATIENT
Start: 2018-02-02 | End: 2018-10-24

## 2018-02-02 RX ORDER — HYDROCODONE BITARTRATE AND ACETAMINOPHEN 10; 325 MG/1; MG/1
TABLET ORAL
COMMUNITY
End: 2018-10-24

## 2018-02-02 NOTE — PATIENT INSTRUCTIONS
"  Self-Care for Headaches  Most headaches aren't serious and can be relieved with self-care. But some headaches may be a sign of another health problem like eye trouble or high blood pressure. To find the best treatment, learn what kind of headaches you get. For tension headaches, self-care will usually help. To treat migraines, ask your healthcare provider for advice. It is also possible to get both tension and migraine headaches. Self-care involves relieving the pain and avoiding headache triggers if you can.    Ways to reduce pain and tension  Try these steps:  · Apply a cold compress or ice pack to the pain site.  · Drink fluids. If nausea makes it hard to drink, try sucking on ice.  · Rest. Protect yourself from bright light and loud noises.  · Calm your emotions by imagining a peaceful scene.  · Massage tight neck, shoulder, and head muscles.  · To relax muscles, soak in a hot bath or use a hot shower.  Use medicines  Aspirin or aspirin substitutes, such as ibuprofen and acetaminophen, can relieve headache. Remember: Never give aspirin to anyone 18 years old or younger because of the risk of developing Reye syndrome. Use pain medicines only when necessary.  Track your headaches  Keeping a headache diary can help you and your healthcare provider identify what's causing your headaches:  · Note when each headache happens.  · Identify your activities and the foods you've eaten 6 to 8 hours before the headache began.  · Look for any trends or "triggers."  Signs of tension headache  Any of the following can be signs:  · Dull pain or feeling of pressure in a tight band around your head  · Pain in your neck or shoulders  · Headache without a definite beginning or end  · Headache after an activity such as driving or working on a computer  Signs of migraine  Any of the following can be signs:  · Throbbing pain on one or both sides of your head  · Nausea or vomiting  · Extreme sensitivity to light, sound, and " "smells  · Bright spots, flashes, or other visual changes  · Pain or nausea so severe that you can't continue your daily activities  Call your healthcare provider   If you have any of the following symptoms, contact your healthcare provider:  · A headache that lingers after a recent injury or bump to the head.  · A fever with a stiff neck or pain when you bend your head toward your chest.  · A headache along with slurred speech, changes in your vision, or numbness or weakness in your arms or legs.  · A headache for longer than 3 days.  · Frequent headaches, especially in the morning.  · Headaches with seizures   · Seek immediate medical attention if you have a headache that you would call "the worst headache you have ever had."   Date Last Reviewed: 10/4/2015  © 6373-8486 [employer]. [address] [place] [address], [place] [address]. All rights reserved. This information is not intended as a substitute for professional medical care. Always follow your healthcare professional's instructions.        "

## 2018-02-02 NOTE — PROGRESS NOTES
"[facility] NEUROLOGY  [facility] [place]    Date: February 2, 2018   Patient Name: Daksha Marie   MRN: [id number]   PCP: Caridad Padilla  Referring Provider: No ref. provider found    Assessment:   Daksha Marie is a 47 y.o. female presenting in follow up for intractable headache. Will attempt trial of zonegran in lieu of topamax as below. Discussed conservative management strategies. May consider botox pending response.  Plan:     Problem List Items Addressed This Visit        Neuro    Chronic headache    Current Assessment & Plan     -- stop topamax  -- start zonegran  -- imitrex PRN              Psychiatric    Alcohol abuse (Chronic)    Overview     Sober since May 1 2017, on antabuse         Depression    Overview     On Effexor             Juliano Lynne MD  [facility]   Department of Neurology    Patient note was created using Dragon Dictation.  Any errors in syntax or even information may not have been identified and edited on initial review prior to signing this note.  Subjective:     HPI:   Ms. Daksha Marie is a 47 y.o. female who presents with a chief complaint of persistent headaches. She presents with her father who contributes to the history. The patient reports persistent daily headaches despite treatment with topamax and states her headaches are now associated with dizziness, photophobia, and phonophobia. She denies using OTC analgesics. She states that she does sometimes take Norco for her headaches and it is "the only thing that helps." She does state her mood has been poor however notes that she will be seeing a psychiatrist in the coming weeks.     PAST MEDICAL HISTORY:  Past Medical History:   Diagnosis Date    Alcohol abuse 10/7/2015    Breast calcification, left     Pt states this has been worked up, she received a biopsy in the past to confirm calcifications were from scar tissue from when she had breast reduction surgery.   "    Chronic diastolic congestive heart failure     Encounter for blood transfusion     GERD (gastroesophageal reflux disease)     Glaucoma     Hypertension     Hyponatremia 10/2015    Na 109. attributed to polydipsia and alcohol abuse.  suffered a seizure in the ICU .    Insomnia     Malingering 2016    PTSD (post-traumatic stress disorder)     Sickle cell trait        PAST SURGICAL HISTORY:  Past Surgical History:   Procedure Laterality Date    bile fistula      BREAST BIOPSY Left     breast reduction       SECTION      CHOLECYSTECTOMY  after 2007    GASTRIC BYPASS   and 2008    TOTAL REDUCTION MAMMOPLASTY Bilateral        CURRENT MEDS:  Current Outpatient Prescriptions   Medication Sig Dispense Refill    ALPRAZolam (XANAX) 1 MG tablet TK 1 T PO QD PRF ANXIETY  0    cloNIDine (CATAPRES) 0.2 MG tablet Take 1 tablet (0.2 mg total) by mouth every evening. 30 tablet 11    cyanocobalamin 1,000 mcg/mL injection Inject 1,000 mcg into the muscle every 28 days. On the 15 on the month      disulfiram (ANTABUSE) 250 mg tablet Take 1 tablet (250 mg total) by mouth once daily. 30 tablet 11    estradiol-norethindrone (ACTIVELLA) 1-0.5 mg per tablet Take 1 tablet by mouth once daily. 30 tablet 11    fluconazole (DIFLUCAN) 100 MG tablet   2    folic acid (FOLVITE) 1 MG tablet Take 1 mg by mouth once daily.      hydrochlorothiazide (HYDRODIURIL) 25 MG tablet TK 1 T PO QD  2    hydrocodone-acetaminophen 10-325mg (NORCO)  mg Tab Take by mouth.      indomethacin (INDOCIN) 25 MG capsule Take 1 capsule (25 mg total) by mouth 3 (three) times daily as needed. 30 capsule 0    lactulose (CHRONULAC) 10 gram/15 mL solution TK 15 TO 30 ML PO ONCE TO BID PRF CONSTIPATION  0    lisinopril 10 MG tablet Take 10 mg by mouth every morning.       medroxyPROGESTERone (PROVERA) 10 MG tablet TAKE 1 TABLET BY MOUTH DAILY FOR 2 WEEKS 90 tablet 1    methocarbamol (ROBAXIN) 500 MG Tab TK 1 T  "PO  BID PRF MUSCLE SPASMS  0    montelukast (SINGULAIR) 10 mg tablet Take 10 mg by mouth every evening.      multivitamin (THERAGRAN) tablet Take 1 tablet by mouth once daily. 30 tablet 5    omeprazole (PRILOSEC) 40 MG capsule Take 1 capsule (40 mg total) by mouth once daily. 30 capsule 11    potassium chloride SA (K-DUR,KLOR-CON) 10 MEQ tablet TK 1 T PO  QD  0    quetiapine (SEROQUEL) 100 MG Tab Take 1 tablet (100 mg total) by mouth every evening. (Patient taking differently: Take 100 mg by mouth nightly as needed. ) 30 tablet 0    thiamine mononitrate 100 mg Tab Take 1 tablet (100 mg total) by mouth 2 (two) times daily. 60 tablet 0    traZODone (DESYREL) 150 MG tablet TK 1 T PO QHS  0    venlafaxine (EFFEXOR-XR) 75 MG 24 hr capsule   1    latanoprost 0.005 % ophthalmic solution Place 1 drop into both eyes every evening.       sumatriptan (IMITREX) 50 MG tablet Once for severe headache. May repeat once after 2 hours. Do not exceed 3-4 doses in one week. 12 tablet 3    zonisamide (ZONEGRAN) 50 MG Cap 1/2 tab nightly for one week then increase to 1 tab nightly 30 capsule 11     No current facility-administered medications for this visit.        ALLERGIES:  Review of patient's allergies indicates:   Allergen Reactions    Penicillins Hives       FAMILY HISTORY:  Family History   Problem Relation Age of Onset    Diabetes Mother     Lupus Mother     Diabetes Maternal Grandmother     Crohn's disease Cousin        SOCIAL HISTORY:  Social History   Substance Use Topics    Smoking status: Current Every Day Smoker     Packs/day: 1.00     Years: 4.00     Types: Cigarettes    Smokeless tobacco: Never Used    Alcohol use No      Comment: former heavy drinker     Review of Systems:  12 review of systems is negative except for the symptoms mentioned in HPI.      Objective:     Vitals:    02/02/18 1314   BP: 123/87   Pulse: 78   Weight: 68.8 kg (151 lb 10.8 oz)   Height: 5' 5" (1.651 m)     General: NAD, well " nourished   Eyes: no tearing, discharge, no erythema   ENT: moist mucous membranes of the oral cavity, nares patent    Neck: Supple, full range of motion  Cardiovascular: Warm and well perfused, pulses equal and symmetrical  Lungs: Normal work of breathing, normal chest wall excursions  Skin: No rash, lesions, or breakdown on exposed skin  Psychiatry: Mood and affect are appropriate   Abdomen: soft, non tender, non distended  Extremeties: No cyanosis, clubbing or edema.    Neurological   MENTAL STATUS: Alert and oriented to person, place, and time. Attention and concentration within normal limits. Speech without dysarthria, able to name and repeat without difficulty. Recent and remote memory within normal limits   CRANIAL NERVES: Visual fields intact. PERRL. EOMI. Facial sensation intact. Face symmetrical. Hearing grossly intact. Full shoulder shrug bilaterally. Tongue protrudes midline   SENSORY: Sensation is intact to light touch throughout.    MOTOR: Normal bulk and tone. No pronator drift.  5/5 deltoid, biceps, triceps, interosseous, hand  bilaterally. 5/5 iliopsoas, knee extension/flexion, foot dorsi/plantarflexion bilaterally.    REFLEXES: Symmetric and 2+ throughout.   CEREBELLAR/COORDINATION/GAIT: Gait steady with normal arm swing and stride length.  Finger to nose intact. Normal rapid alternating movements.

## 2018-02-03 ENCOUNTER — PATIENT MESSAGE (OUTPATIENT)
Dept: NEUROLOGY | Facility: CLINIC | Age: 48
End: 2018-02-03

## 2018-02-05 ENCOUNTER — TELEPHONE (OUTPATIENT)
Dept: OBSTETRICS AND GYNECOLOGY | Facility: CLINIC | Age: 48
End: 2018-02-05

## 2018-02-05 NOTE — TELEPHONE ENCOUNTER
----- Message from iK Horne sent at 2/5/2018 10:59 AM CST -----  Contact: self/[phone]  Patient is returning your call.  Please call and advise.

## 2018-02-12 ENCOUNTER — PATIENT MESSAGE (OUTPATIENT)
Dept: PSYCHIATRY | Facility: CLINIC | Age: 48
End: 2018-02-12

## 2018-02-12 ENCOUNTER — PATIENT MESSAGE (OUTPATIENT)
Dept: NEUROLOGY | Facility: CLINIC | Age: 48
End: 2018-02-12

## 2018-02-12 ENCOUNTER — OUTPATIENT CASE MANAGEMENT (OUTPATIENT)
Dept: ADMINISTRATIVE | Facility: OTHER | Age: 48
End: 2018-02-12

## 2018-02-12 NOTE — PROGRESS NOTES
2/12/18  CM f/u to update plan of care. Spoke by phone with Ms. Calles. She c/o continued headaches despite med changes by her Neuro 2/2/18---stop topamax, start zonegran,imitrex PRN. She reports vivid nightmare like dreams. Her children report her screaming in her sleep. She messaged Dr. Lynne via patient portal and awaiting MD response. Meanwhile, CM recommended that she not take the new zonegran rx, take topamax as needed until hearing back from NEURO.   She reports not going to her PCP, due to her father could not provide transportation. She reports questions needed to be discussed with her PCP-- whether she should be on HCTZ or not, she has not being taking and has SOB occas and whether PCP will provide a few weeks of xanax rx to cover until initial [facility] appt 3/20/18. She has 14 tabs left--has 36 days left until Psych appt. CM suggested cutting tabs in 1/2-- however this is still not adeq to cover her needs. She is taking xanax 1 mg one daily.   CM stressed the need to go see Dr Padilla about these concerns. She says she will speak to her father about a ride- as her car remains in need of repairs.   CM explained how CM will no continue to call, however she may call CM should she have future questions or needs. She states her understanding.   CM assesses that pt is capable of coordinating her care.   Case closed. Goals met.   Message sent to pt's PCP- Dr. Renaldo Padilla.       Dr. Padilla:  PCP     Ms. Calles continues to have concerns about meds. She presently has 14 xanax 1 mg tabs left -taken once daily. She will be without xanax x 22 days prior to her 3/20 initial [facility] appt. She also- reports not realizing she should be taking HCTZ along with lisinopril in managing HTN.   I have encouraged Ms. Calles to see her PCP to discuss these issues. She says that she will do so. Her transportation is dependent upon her father's availability to drive her to appt.   Please advise.   I am in the process of  closing out her case with Outpatient Case Management.     Thank you,    FRANCESCA LeesN, RN, CCM [facility]  TEL:  [phone]

## 2018-02-12 NOTE — TELEPHONE ENCOUNTER
I called the patient and explained to her the doctor is not in and to call the primary doctor and she states she will stop the medication until she talks with a doctor.  The patient needs a answer.

## 2018-02-15 ENCOUNTER — PATIENT MESSAGE (OUTPATIENT)
Dept: OBSTETRICS AND GYNECOLOGY | Facility: CLINIC | Age: 48
End: 2018-02-15

## 2018-02-15 NOTE — TELEPHONE ENCOUNTER
"Patient has questions in regards to blood work results. Wants to know what exactly "premenopausal means"? And can she still get pregnant?    Please advise.  "

## 2018-02-15 NOTE — TELEPHONE ENCOUNTER
Results indicates she is still in the reproductive (premenopausal) face of life and could possibly still get pregnant.

## 2018-03-22 ENCOUNTER — OFFICE VISIT (OUTPATIENT)
Dept: PSYCHIATRY | Facility: CLINIC | Age: 48
End: 2018-03-22
Payer: MEDICARE

## 2018-03-22 VITALS
BODY MASS INDEX: 25.93 KG/M2 | HEIGHT: 65 IN | SYSTOLIC BLOOD PRESSURE: 149 MMHG | WEIGHT: 155.63 LBS | HEART RATE: 87 BPM | DIASTOLIC BLOOD PRESSURE: 99 MMHG

## 2018-03-22 DIAGNOSIS — F41.0 PANIC DISORDER: ICD-10-CM

## 2018-03-22 DIAGNOSIS — G47.00 INSOMNIA DISORDER, WITH NON-SLEEP DISORDER MENTAL COMORBIDITY: ICD-10-CM

## 2018-03-22 DIAGNOSIS — F41.1 GENERALIZED ANXIETY DISORDER: ICD-10-CM

## 2018-03-22 DIAGNOSIS — F43.10 PTSD (POST-TRAUMATIC STRESS DISORDER): Primary | Chronic | ICD-10-CM

## 2018-03-22 DIAGNOSIS — F32.A DEPRESSION, UNSPECIFIED DEPRESSION TYPE: ICD-10-CM

## 2018-03-22 PROCEDURE — 99205 OFFICE O/P NEW HI 60 MIN: CPT | Mod: S$PBB,,, | Performed by: NURSE PRACTITIONER

## 2018-03-22 PROCEDURE — 99999 PR PBB SHADOW E&M-EST. PATIENT-LVL III: CPT | Mod: PBBFAC,,, | Performed by: NURSE PRACTITIONER

## 2018-03-22 PROCEDURE — 99213 OFFICE O/P EST LOW 20 MIN: CPT | Mod: PBBFAC | Performed by: NURSE PRACTITIONER

## 2018-03-22 RX ORDER — VENLAFAXINE HYDROCHLORIDE 150 MG/1
150 CAPSULE, EXTENDED RELEASE ORAL DAILY
Qty: 30 CAPSULE | Refills: 5 | Status: SHIPPED | OUTPATIENT
Start: 2018-03-22 | End: 2018-06-26 | Stop reason: SDUPTHER

## 2018-03-22 RX ORDER — DISULFIRAM 250 MG/1
250 TABLET ORAL DAILY
Qty: 30 TABLET | Refills: 5 | Status: SHIPPED | OUTPATIENT
Start: 2018-03-22 | End: 2018-06-26 | Stop reason: SDUPTHER

## 2018-03-22 RX ORDER — ALPRAZOLAM 1 MG/1
1 TABLET ORAL DAILY PRN
Qty: 30 TABLET | Refills: 3 | Status: SHIPPED | OUTPATIENT
Start: 2018-03-22 | End: 2018-06-26 | Stop reason: ALTCHOICE

## 2018-03-22 RX ORDER — QUETIAPINE FUMARATE 25 MG/1
25 TABLET, FILM COATED ORAL NIGHTLY PRN
Qty: 30 TABLET | Refills: 5 | Status: SHIPPED | OUTPATIENT
Start: 2018-03-22 | End: 2018-06-26 | Stop reason: SDUPTHER
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
## 2018-04-17 ENCOUNTER — PATIENT MESSAGE (OUTPATIENT)
Dept: OBSTETRICS AND GYNECOLOGY | Facility: CLINIC | Age: 48
End: 2018-04-17

## 2018-04-17 ENCOUNTER — PATIENT MESSAGE (OUTPATIENT)
Dept: NEUROLOGY | Facility: CLINIC | Age: 48
End: 2018-04-17

## 2018-04-17 ENCOUNTER — TELEPHONE (OUTPATIENT)
Dept: OBSTETRICS AND GYNECOLOGY | Facility: CLINIC | Age: 48
End: 2018-04-17

## 2018-04-17 RX ORDER — SUMATRIPTAN 50 MG/1
TABLET, FILM COATED ORAL
Qty: 12 TABLET | Refills: 3 | Status: SHIPPED | OUTPATIENT
Start: 2018-04-17 | End: 2020-07-30 | Stop reason: SDUPTHER

## 2018-04-18 ENCOUNTER — PATIENT MESSAGE (OUTPATIENT)
Dept: OBSTETRICS AND GYNECOLOGY | Facility: CLINIC | Age: 48
End: 2018-04-18

## 2018-04-18 NOTE — TELEPHONE ENCOUNTER
I don't usually prescribe continuous Diflucan.  If this is for chronic vaginitis it may be a secondary effect of another problems such as diabetes etc.

## 2018-05-05 ENCOUNTER — PATIENT MESSAGE (OUTPATIENT)
Dept: OBSTETRICS AND GYNECOLOGY | Facility: CLINIC | Age: 48
End: 2018-05-05

## 2018-05-07 DIAGNOSIS — N92.6 IRREGULAR PERIODS/MENSTRUAL CYCLES: ICD-10-CM

## 2018-05-07 DIAGNOSIS — N95.1 PERIMENOPAUSE: ICD-10-CM

## 2018-05-07 RX ORDER — ESTRADIOL AND NORETHINDRONE ACETATE 1; .5 MG/1; MG/1
1 TABLET ORAL DAILY
Qty: 30 TABLET | Refills: 11 | Status: SHIPPED | OUTPATIENT
Start: 2018-05-07 | End: 2018-08-03 | Stop reason: SDUPTHER

## 2018-05-23 ENCOUNTER — PATIENT MESSAGE (OUTPATIENT)
Dept: OBSTETRICS AND GYNECOLOGY | Facility: CLINIC | Age: 48
End: 2018-05-23

## 2018-05-23 RX ORDER — FLUCONAZOLE 150 MG/1
150 TABLET ORAL DAILY
Qty: 1 TABLET | Refills: 0 | Status: SHIPPED | OUTPATIENT
Start: 2018-05-23 | End: 2018-05-24

## 2018-05-25 ENCOUNTER — OFFICE VISIT (OUTPATIENT)
Dept: PSYCHIATRY | Facility: CLINIC | Age: 48
End: 2018-05-25
Payer: MEDICARE

## 2018-05-25 DIAGNOSIS — F41.0 PANIC DISORDER: ICD-10-CM

## 2018-05-25 DIAGNOSIS — F10.21 ALCOHOL USE DISORDER, MODERATE, IN SUSTAINED REMISSION: ICD-10-CM

## 2018-05-25 DIAGNOSIS — F33.0 MAJOR DEPRESSIVE DISORDER, RECURRENT EPISODE, MILD: ICD-10-CM

## 2018-05-25 DIAGNOSIS — F43.10 PTSD (POST-TRAUMATIC STRESS DISORDER): Primary | ICD-10-CM

## 2018-05-25 DIAGNOSIS — F41.1 GENERALIZED ANXIETY DISORDER: ICD-10-CM

## 2018-05-25 PROCEDURE — 90791 PSYCH DIAGNOSTIC EVALUATION: CPT | Mod: S$PBB,,, | Performed by: SOCIAL WORKER

## 2018-05-25 PROCEDURE — 90791 PSYCH DIAGNOSTIC EVALUATION: CPT | Mod: PBBFAC | Performed by: SOCIAL WORKER

## 2018-06-26 ENCOUNTER — OFFICE VISIT (OUTPATIENT)
Dept: PSYCHIATRY | Facility: CLINIC | Age: 48
End: 2018-06-26
Payer: MEDICARE

## 2018-06-26 VITALS
SYSTOLIC BLOOD PRESSURE: 128 MMHG | DIASTOLIC BLOOD PRESSURE: 77 MMHG | BODY MASS INDEX: 26.47 KG/M2 | HEART RATE: 79 BPM | WEIGHT: 159.06 LBS

## 2018-06-26 DIAGNOSIS — F32.A DEPRESSION, UNSPECIFIED DEPRESSION TYPE: ICD-10-CM

## 2018-06-26 DIAGNOSIS — F41.0 PANIC DISORDER: ICD-10-CM

## 2018-06-26 DIAGNOSIS — F41.1 GENERALIZED ANXIETY DISORDER: Primary | ICD-10-CM

## 2018-06-26 DIAGNOSIS — G47.00 INSOMNIA DISORDER, WITH NON-SLEEP DISORDER MENTAL COMORBIDITY: ICD-10-CM

## 2018-06-26 DIAGNOSIS — F10.21 ALCOHOL USE DISORDER, MODERATE, IN SUSTAINED REMISSION: ICD-10-CM

## 2018-06-26 DIAGNOSIS — F43.10 PTSD (POST-TRAUMATIC STRESS DISORDER): ICD-10-CM

## 2018-06-26 PROCEDURE — 99214 OFFICE O/P EST MOD 30 MIN: CPT | Mod: S$PBB,,, | Performed by: NURSE PRACTITIONER

## 2018-06-26 PROCEDURE — 90833 PSYTX W PT W E/M 30 MIN: CPT | Mod: PBBFAC | Performed by: NURSE PRACTITIONER

## 2018-06-26 PROCEDURE — 99999 PR PBB SHADOW E&M-EST. PATIENT-LVL III: CPT | Mod: PBBFAC,,, | Performed by: NURSE PRACTITIONER

## 2018-06-26 PROCEDURE — 90833 PSYTX W PT W E/M 30 MIN: CPT | Mod: S$PBB,,, | Performed by: NURSE PRACTITIONER

## 2018-06-26 PROCEDURE — 99213 OFFICE O/P EST LOW 20 MIN: CPT | Mod: PBBFAC | Performed by: NURSE PRACTITIONER

## 2018-06-26 RX ORDER — CLONAZEPAM 0.5 MG/1
0.5 TABLET ORAL 2 TIMES DAILY PRN
Qty: 60 TABLET | Refills: 1 | Status: SHIPPED | OUTPATIENT
Start: 2018-06-26 | End: 2018-07-27

## 2018-06-26 RX ORDER — VENLAFAXINE HYDROCHLORIDE 150 MG/1
150 CAPSULE, EXTENDED RELEASE ORAL DAILY
Qty: 30 CAPSULE | Refills: 5 | Status: SHIPPED | OUTPATIENT
Start: 2018-06-26 | End: 2018-10-09 | Stop reason: SDUPTHER

## 2018-06-26 RX ORDER — CLONAZEPAM 0.5 MG/1
0.5 TABLET, ORALLY DISINTEGRATING ORAL 2 TIMES DAILY PRN
Qty: 60 TABLET | Refills: 0 | Status: SHIPPED | OUTPATIENT
Start: 2018-06-26 | End: 2018-06-26 | Stop reason: ALTCHOICE

## 2018-06-26 RX ORDER — QUETIAPINE FUMARATE 25 MG/1
25 TABLET, FILM COATED ORAL NIGHTLY PRN
Qty: 30 TABLET | Refills: 5 | Status: SHIPPED | OUTPATIENT
Start: 2018-06-26 | End: 2018-10-09 | Stop reason: SDUPTHER

## 2018-06-26 RX ORDER — DISULFIRAM 250 MG/1
250 TABLET ORAL DAILY
Qty: 30 TABLET | Refills: 5 | Status: SHIPPED | OUTPATIENT
Start: 2018-06-26 | End: 2018-10-09 | Stop reason: SDUPTHER

## 2018-06-26 RX ORDER — VENLAFAXINE HYDROCHLORIDE 75 MG/1
75 CAPSULE, EXTENDED RELEASE ORAL DAILY
Qty: 30 CAPSULE | Refills: 5 | Status: SHIPPED | OUTPATIENT
Start: 2018-06-26 | End: 2018-10-09 | Stop reason: SDUPTHER

## 2018-06-26 NOTE — PROGRESS NOTES
"Outpatient Psychiatry Follow-Up Visit (MD/NP)    6/26/2018    Clinical Status of Patient:  Outpatient (Ambulatory)    Chief Complaint:  Daksha Marie is a 48 y.o. female who presents today for follow-up of anxiety and PTSD.  Met with patient.      Last visit was: 3/22/18. Chart reviewed.     Interval History and Content of Current Session:  Current Psychiatric Medications/changes  · Antabuse 250 mg po daily  · Medication teaching for Antabuse: Avoid alcohol and drugs or food with alcohol in them. This also includes alcohol in hidden forms like some sauces, vinegars, and topical products like aftershaves and back rubs  · Xanax 1 mg po daily PRN panic attacks  · Change to Effexor  mg po daily  · Change to Seroquel 25 mg po q hs PRN insomnia    Pt presents blunted affect and dysthymic mood.  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   Pt compliant with medications and denies side effects or adverse reactions.  Maintains sobriety and denies cravings for ETOH.  Reports daily use of Xanax which she describes as " not working as long as it did".   Discussed switching to long-acting benzo (clonazepam).  Pt denies SI/HI/AVH.  Attended initial evaluation with social work, Dr. Verdin.  Encouraged pt to set follow-up appointment.   Sleep improved with Seroquel; no nightmares reported.  Denies EPS; AIMS scale completed.       Psychotherapy:  · Target symptoms: anxiety   · Why chosen therapy is appropriate versus another modality: relevant to diagnosis  · Outcome monitoring methods: self-report  · Therapeutic intervention type: insight oriented psychotherapy  · Topics discussed/themes: building skills sets for symptom management, symptom recognition  · The patient's response to the intervention is accepting. The patient's progress toward treatment goals is good.   · Duration of intervention: 18 minutes.    Review of Systems "   · PSYCHIATRIC: Pertinant items are noted in the narrative.  · CONSTITUTIONAL: No weight gain or loss.   · MUSCULOSKELETAL: No pain or stiffness of the joints.  · NEUROLOGIC: No weakness, sensory changes, seizures, confusion, memory loss, tremor or other abnormal movements.  · ENDOCRINE: No polydipsia or polyuria.  · INTEGUMENTARY: No rashes or lacerations.  · EYES: No exophthalmos, jaundice or blindness.  · ENT: No dizziness, tinnitus or hearing loss.  · RESPIRATORY: No shortness of breath.  · CARDIOVASCULAR: No tachycardia or chest pain.  · GASTROINTESTINAL: No nausea, vomiting, pain, constipation or diarrhea.  · GENITOURINARY: No frequency, dysuria or sexual dysfunction.  · HEMATOLOGIC/LYMPHATIC: No excessive bleeding, prolonged or excessive bleeding after dental extraction/injury.  · ALLERGIC/IMMUNOLOGIC: No allergic response to materials, foods or animals at this time.    Past Medical, Family and Social History: The patient's past medical, family and social history have been reviewed and updated as appropriate within the electronic medical record - see encounter notes.    Compliance: yes    Side effects: None    Risk Parameters:  Patient reports no suicidal ideation  Patient reports no homicidal ideation  Patient reports no self-injurious behavior  Patient reports no violent behavior    Exam (detailed: at least 9 elements; comprehensive: all 15 elements)   Constitutional  Vitals:  Most recent vital signs, dated greater than 90 days prior to this appointment, were reviewed.   Vitals:    06/26/18 1042   BP: 128/77   Pulse: 79   Weight: 72.1 kg (159 lb 1 oz)        General:  unremarkable, age appropriate     Musculoskeletal  Muscle Strength/Tone:  no tremor, no tic   Gait & Station:  non-ataxic     Psychiatric  Speech:  no latency; no press   Mood & Affect:  dysthymic  congruent and appropriate   Thought Process:  normal and logical   Associations:  intact   Thought Content:  normal, no suicidality, no  homicidality, delusions, or paranoia   Insight:  intact   Judgement: behavior is adequate to circumstances   Orientation:  grossly intact   Memory: intact for content of interview   Language: grossly intact   Attention Span & Concentration:  able to focus   Fund of Knowledge:  intact and appropriate to age and level of education     Assessment and Diagnosis   Status/Progress: Based on the examination today, the patient's problem(s) is/are inadequately controlled.  New problems have not been presented today.   Co-morbidities and Lack of compliance are not complicating management of the primary condition.  There are no active rule-out diagnoses for this patient at this time.     General Impression:       ICD-10-CM ICD-9-CM   1. Generalized anxiety disorder F41.1 300.02   2. Panic disorder F41.0 300.01   3. Depression, unspecified depression type F32.9 311   4. Insomnia disorder, with non-sleep disorder mental comorbidity G47.00 780.52   5. Alcohol use disorder, moderate, in sustained remission F10.21 305.03   6. PTSD (post-traumatic stress disorder) F43.10 309.81       Intervention/Counseling/Treatment Plan   · Medication Management: Continue current medications. The risks and benefits of medication were discussed with the patient.  · AA/NA/CA/ACOA/Abstinence   · Increase to Effexor  mg po daily (150 + 75 mg capsules)  · Antabuse 250 mg po daily  · Medication teaching for Antabuse: Avoid alcohol and drugs or food with alcohol in them. This also includes alcohol in hidden forms like some sauces, vinegars, and topical products like aftershaves and back rubs  · DC Xanax  Start Klonopin 0.5 mg po BID PRN panic attacks/severe anxiety  · Continue Seroquel 25 mg po q hs PRN insomnia  · Completed AIMS scale  · Continue individual psychotherapy with Dr. Anand    Return to Clinic: 6 months

## 2018-07-24 ENCOUNTER — PATIENT MESSAGE (OUTPATIENT)
Dept: PSYCHIATRY | Facility: CLINIC | Age: 48
End: 2018-07-24

## 2018-07-25 ENCOUNTER — PATIENT MESSAGE (OUTPATIENT)
Dept: PSYCHIATRY | Facility: CLINIC | Age: 48
End: 2018-07-25

## 2018-07-27 DIAGNOSIS — F41.0 PANIC DISORDER: ICD-10-CM

## 2018-07-27 RX ORDER — ALPRAZOLAM 1 MG/1
1 TABLET ORAL DAILY PRN
Qty: 30 TABLET | Refills: 3 | Status: SHIPPED | OUTPATIENT
Start: 2018-07-27 | End: 2018-09-24 | Stop reason: SDUPTHER

## 2018-07-27 NOTE — TELEPHONE ENCOUNTER
Returned pt phone call.  She stated Klonopin not effective with panic attacks.  She preferred Xanax.  Reordered Xanax 1 mg po daily PRN panic attacks.  Pt has appointment scheduled with me in October.

## 2018-07-30 NOTE — PROGRESS NOTES
10/3/17  CM called the office of Dr. Caridad Padilla, PCP ([phone]) as CM unable to send message through [facility] to address Topamax 25 mg BID PO, HCTZ, Protonix. H/o stopping herself abruptly off of Effexor -XR a month ago, PHQ9=11.  CM was informed by  that pt was not a pt of Dr. Padilla. CM clarified with another office number ([phone]) that yes pt does see Dr. Padilla. CM spoke with Ms Calles who is slightly irritated by the confusion about not being a pt of PCP. She admits to cancelling her last appt and understands she is due for f/u appt. CM encouraged pt to schedule her PCP appt as soon as possible to address med discrepancies, need for Psych in [place] area. This CM was informed yest that [facility] practice out of [facility] [place] [facility] [place] offices and not [place] where pt live and prefers.   In speaking with Ms. Calles-- she reports that she will plan to see Dr. Padilla on 10/11 as a walk-in which she says they allow, coordinating the appt with her [facility] appt in [place] same day. CM encouraged Ms Calles to call [facility] to check whether she has a refill on her Effexor which she did and found out that she does have one 90 day refill left. She plans to fill this order today. She reports being aware of some [facility]s in [place] after she researched Medicare providers on Medicare.gov.       Plan next encounter  F/u with pt on resuming Effexor  F/u on 10/11 appts--med clarification  F/u on Psych appt   no cough/no fever

## 2018-08-03 DIAGNOSIS — N92.6 IRREGULAR PERIODS/MENSTRUAL CYCLES: ICD-10-CM

## 2018-08-03 DIAGNOSIS — N95.1 PERIMENOPAUSE: ICD-10-CM

## 2018-08-03 RX ORDER — ESTRADIOL AND NORETHINDRONE ACETATE 1; .5 MG/1; MG/1
TABLET, FILM COATED ORAL
Qty: 28 TABLET | Refills: 3 | Status: SHIPPED | OUTPATIENT
Start: 2018-08-03 | End: 2018-10-29 | Stop reason: SDUPTHER
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
## 2018-08-08 ENCOUNTER — PATIENT MESSAGE (OUTPATIENT)
Dept: PSYCHIATRY | Facility: CLINIC | Age: 48
End: 2018-08-08

## 2018-08-08 ENCOUNTER — PATIENT MESSAGE (OUTPATIENT)
Dept: OBSTETRICS AND GYNECOLOGY | Facility: CLINIC | Age: 48
End: 2018-08-08

## 2018-08-08 ENCOUNTER — OFFICE VISIT (OUTPATIENT)
Dept: PSYCHIATRY | Facility: CLINIC | Age: 48
End: 2018-08-08
Payer: MEDICARE

## 2018-08-08 DIAGNOSIS — F41.1 GENERALIZED ANXIETY DISORDER: Primary | ICD-10-CM

## 2018-08-08 DIAGNOSIS — F43.10 PTSD (POST-TRAUMATIC STRESS DISORDER): ICD-10-CM

## 2018-08-08 DIAGNOSIS — F33.0 MAJOR DEPRESSIVE DISORDER, RECURRENT EPISODE, MILD: ICD-10-CM

## 2018-08-08 PROCEDURE — 90834 PSYTX W PT 45 MINUTES: CPT | Mod: S$PBB,,, | Performed by: SOCIAL WORKER

## 2018-08-08 PROCEDURE — 90834 PSYTX W PT 45 MINUTES: CPT | Mod: PBBFAC | Performed by: SOCIAL WORKER

## 2018-08-08 RX ORDER — FLUCONAZOLE 100 MG/1
150 TABLET ORAL ONCE
Qty: 1 TABLET | Refills: 2 | Status: SHIPPED | OUTPATIENT
Start: 2018-08-08 | End: 2018-08-08

## 2018-08-08 NOTE — PROGRESS NOTES
"Individual Psychotherapy (PhD/LCSW)    8/8/2018    Site:  [facility]         Therapeutic Intervention: Met with patient.  Outpatient - Insight oriented psychotherapy 45 min - CPT code 02908    Chief complaint/reason for encounter: depression and anxiety     Interval history and content of current session:       Discussion on how her coping over trauma is not helping her.   She avoids loud noises but this even includes going to [facility].   She avoids going out anywhere as she could see relatives of ex who may tell her she ruined ex life.    Pt introduced to concepts of behaviors and thoughts that can contribute to getting stuck.     Homework she will write narration of cause of trauma and how she thinks it has affected her.      She does not like groups so she is not inclined to go to the trauma group of Dr. Gomez or the [facility].    She is not actively suicidal.    "I am not going to kill myself".       Treatment plan:  · Target symptoms: depression, anxiety   · Why chosen therapy is appropriate versus another modality: relevant to diagnosis  · Outcome monitoring methods: self-report, observation  · Therapeutic intervention type: behavior modifying psychotherapy, supportive psychotherapy    Risk parameters:  Patient reports no suicidal ideation  Patient reports no homicidal ideation  Patient reports no self-injurious behavior  Patient reports no violent behavior    Verbal deficits: None    Patient's response to intervention:  The patient's response to intervention is accepting.    Progress toward goals and other mental status changes:  The patient's progress toward goals is limited.    Diagnosis:     ICD-10-CM ICD-9-CM   1. Generalized anxiety disorder F41.1 300.02   2. PTSD (post-traumatic stress disorder) F43.10 309.81   3. Major depressive disorder, recurrent episode, mild F33.0 296.31       Plan:  individual psychotherapy    Return to clinic: as scheduled    Length of Service (minutes): 45      "

## 2018-08-23 ENCOUNTER — PATIENT MESSAGE (OUTPATIENT)
Dept: PSYCHIATRY | Facility: CLINIC | Age: 48
End: 2018-08-23

## 2018-08-28 ENCOUNTER — PATIENT MESSAGE (OUTPATIENT)
Dept: NEUROLOGY | Facility: CLINIC | Age: 48
End: 2018-08-28

## 2018-08-28 RX ORDER — SUMATRIPTAN 50 MG/1
TABLET, FILM COATED ORAL
Qty: 12 TABLET | Refills: 3 | Status: ON HOLD | OUTPATIENT
Start: 2018-08-28 | End: 2018-10-15 | Stop reason: HOSPADM

## 2018-09-20 ENCOUNTER — PATIENT MESSAGE (OUTPATIENT)
Dept: PSYCHIATRY | Facility: CLINIC | Age: 48
End: 2018-09-20

## 2018-09-24 ENCOUNTER — PATIENT MESSAGE (OUTPATIENT)
Dept: PSYCHIATRY | Facility: CLINIC | Age: 48
End: 2018-09-24

## 2018-09-24 DIAGNOSIS — F41.0 PANIC DISORDER: ICD-10-CM

## 2018-09-24 RX ORDER — ALPRAZOLAM 1 MG/1
1 TABLET ORAL 2 TIMES DAILY PRN
Qty: 60 TABLET | Refills: 0 | Status: SHIPPED | OUTPATIENT
Start: 2018-09-24 | End: 2018-10-09 | Stop reason: SDUPTHER

## 2018-10-09 ENCOUNTER — OFFICE VISIT (OUTPATIENT)
Dept: PSYCHIATRY | Facility: CLINIC | Age: 48
End: 2018-10-09
Payer: MEDICARE

## 2018-10-09 VITALS
HEIGHT: 65 IN | HEART RATE: 89 BPM | BODY MASS INDEX: 28.99 KG/M2 | SYSTOLIC BLOOD PRESSURE: 128 MMHG | DIASTOLIC BLOOD PRESSURE: 94 MMHG | WEIGHT: 174 LBS

## 2018-10-09 DIAGNOSIS — F41.1 GENERALIZED ANXIETY DISORDER: ICD-10-CM

## 2018-10-09 DIAGNOSIS — F10.21 ALCOHOL USE DISORDER, MODERATE, IN SUSTAINED REMISSION: ICD-10-CM

## 2018-10-09 DIAGNOSIS — G47.00 INSOMNIA DISORDER, WITH NON-SLEEP DISORDER MENTAL COMORBIDITY: ICD-10-CM

## 2018-10-09 DIAGNOSIS — F41.0 PANIC DISORDER: ICD-10-CM

## 2018-10-09 DIAGNOSIS — F32.A DEPRESSION, UNSPECIFIED DEPRESSION TYPE: Primary | ICD-10-CM

## 2018-10-09 PROCEDURE — 99999 PR PBB SHADOW E&M-EST. PATIENT-LVL III: CPT | Mod: PBBFAC,,, | Performed by: NURSE PRACTITIONER

## 2018-10-09 PROCEDURE — 90833 PSYTX W PT W E/M 30 MIN: CPT | Mod: S$PBB,,, | Performed by: NURSE PRACTITIONER

## 2018-10-09 PROCEDURE — 99213 OFFICE O/P EST LOW 20 MIN: CPT | Mod: S$PBB,,, | Performed by: NURSE PRACTITIONER

## 2018-10-09 PROCEDURE — 99213 OFFICE O/P EST LOW 20 MIN: CPT | Mod: PBBFAC | Performed by: NURSE PRACTITIONER

## 2018-10-09 PROCEDURE — 90833 PSYTX W PT W E/M 30 MIN: CPT | Mod: PBBFAC | Performed by: NURSE PRACTITIONER

## 2018-10-09 RX ORDER — QUETIAPINE FUMARATE 25 MG/1
25 TABLET, FILM COATED ORAL NIGHTLY PRN
Qty: 30 TABLET | Refills: 5 | Status: SHIPPED | OUTPATIENT
Start: 2018-10-09 | End: 2018-12-02

## 2018-10-09 RX ORDER — DISULFIRAM 250 MG/1
250 TABLET ORAL DAILY
Qty: 30 TABLET | Refills: 5 | Status: SHIPPED | OUTPATIENT
Start: 2018-10-09 | End: 2019-01-10 | Stop reason: SDUPTHER

## 2018-10-09 RX ORDER — ALPRAZOLAM 1 MG/1
1 TABLET ORAL 2 TIMES DAILY PRN
Qty: 60 TABLET | Refills: 3 | Status: SHIPPED | OUTPATIENT
Start: 2018-10-09 | End: 2019-01-10 | Stop reason: SDUPTHER

## 2018-10-09 RX ORDER — VENLAFAXINE HYDROCHLORIDE 150 MG/1
150 CAPSULE, EXTENDED RELEASE ORAL DAILY
Qty: 30 CAPSULE | Refills: 5 | Status: SHIPPED | OUTPATIENT
Start: 2018-10-09 | End: 2019-01-10 | Stop reason: SDUPTHER

## 2018-10-09 RX ORDER — VENLAFAXINE HYDROCHLORIDE 75 MG/1
75 CAPSULE, EXTENDED RELEASE ORAL DAILY
Qty: 30 CAPSULE | Refills: 5 | Status: SHIPPED | OUTPATIENT
Start: 2018-10-09 | End: 2019-01-10 | Stop reason: SDUPTHER

## 2018-10-09 NOTE — PROGRESS NOTES
Outpatient Psychiatry Follow-Up Visit (MD/NP)    10/9/2018    Clinical Status of Patient:  Outpatient (Ambulatory)    Chief Complaint:  Daksha Marie is a 48 y.o. female who presents today for follow-up of anxiety and PTSD.  Met with patient.      Last visit was: 6/26/18. Chart reviewed.     Interval History and Content of Current Session:  Current Psychiatric Medications/changes  · Increase to Effexor  mg po daily (150 + 75 mg capsules)  · Antabuse 250 mg po daily  · Medication teaching for Antabuse: Avoid alcohol and drugs or food with alcohol in them. This also includes alcohol in hidden forms like some sauces, vinegars, and topical products like aftershaves and back rubs  · DC Xanax  Start Klonopin 0.5 mg po BID PRN panic attacks/severe anxiety (Discontinued and reordered Xanax 1 mg po BID)  · Continue Seroquel 25 mg po q hs PRN insomnia    Pt presents blunted affect and dysthymic mood.  Pt recently had eye surgery.  Medical issues have been a major stressor.  Pt also discussed her 15 y/o son getting suspended from school for cursing a teacher. Pt compliant with medications and denies side effects or adverse reactions.  Maintains sobriety and denies cravings for ETOH.  Pt had contacted me since last visit to report Klonopin did not work as well as Xanax. Xanax restarted.  Pt denies SI/HI/AVH.  Sleep improved with Seroquel; no nightmares reported.  Denies EPS; AIMS scale completed.     Psychotherapy:  · Target symptoms: anxiety   · Why chosen therapy is appropriate versus another modality: relevant to diagnosis  · Outcome monitoring methods: self-report  · Therapeutic intervention type: insight oriented psychotherapy  · Topics discussed/themes: building skills sets for symptom management, symptom recognition  · The patient's response to the intervention is accepting. The patient's progress toward treatment goals is good.   · Duration of intervention: 18 minutes.    Review of Systems  "  · PSYCHIATRIC: Pertinant items are noted in the narrative.  · CONSTITUTIONAL: No weight gain or loss.   · MUSCULOSKELETAL: No pain or stiffness of the joints.  · NEUROLOGIC: No weakness, sensory changes, seizures, confusion, memory loss, tremor or other abnormal movements.  · ENDOCRINE: No polydipsia or polyuria.  · INTEGUMENTARY: No rashes or lacerations.  · EYES: No exophthalmos, jaundice or blindness.  · ENT: No dizziness, tinnitus or hearing loss.  · RESPIRATORY: No shortness of breath.  · CARDIOVASCULAR: No tachycardia or chest pain.  · GASTROINTESTINAL: No nausea, vomiting, pain, constipation or diarrhea.  · GENITOURINARY: No frequency, dysuria or sexual dysfunction.  · HEMATOLOGIC/LYMPHATIC: No excessive bleeding, prolonged or excessive bleeding after dental extraction/injury.  · ALLERGIC/IMMUNOLOGIC: No allergic response to materials, foods or animals at this time.    Past Medical, Family and Social History: The patient's past medical, family and social history have been reviewed and updated as appropriate within the electronic medical record - see encounter notes.    Compliance: yes    Side effects: None    Risk Parameters:  Patient reports no suicidal ideation  Patient reports no homicidal ideation  Patient reports no self-injurious behavior  Patient reports no violent behavior    Exam (detailed: at least 9 elements; comprehensive: all 15 elements)   Constitutional  Vitals:  Most recent vital signs, dated greater than 90 days prior to this appointment, were reviewed.   Vitals:    10/09/18 1011   BP: (!) 128/94   Pulse: 89   Weight: 78.9 kg (174 lb)   Height: 5' 5" (1.651 m)        General:  unremarkable, age appropriate     Musculoskeletal  Muscle Strength/Tone:  no tremor, no tic   Gait & Station:  non-ataxic     Psychiatric  Speech:  no latency; no press   Mood & Affect:  dysthymic  congruent and appropriate   Thought Process:  normal and logical   Associations:  intact   Thought Content:  normal, no " suicidality, no homicidality, delusions, or paranoia   Insight:  intact   Judgement: behavior is adequate to circumstances   Orientation:  grossly intact   Memory: intact for content of interview   Language: grossly intact   Attention Span & Concentration:  able to focus   Fund of Knowledge:  intact and appropriate to age and level of education     Assessment and Diagnosis   Status/Progress: Based on the examination today, the patient's problem(s) is/are inadequately controlled.  New problems have not been presented today.   Co-morbidities and Lack of compliance are not complicating management of the primary condition.  There are no active rule-out diagnoses for this patient at this time.     General Impression:       ICD-10-CM ICD-9-CM   1. Depression, unspecified depression type F32.9 311   2. Alcohol use disorder, moderate, in sustained remission F10.21 305.03   3. Generalized anxiety disorder F41.1 300.02   4. Panic disorder F41.0 300.01   5. Insomnia disorder, with non-sleep disorder mental comorbidity G47.00 780.52       Intervention/Counseling/Treatment Plan   · Medication Management: Continue current medications. The risks and benefits of medication were discussed with the patient.  · AA/NA/CA/ACOA/Abstinence   · Continue Effexor  mg po daily (150 + 75 mg capsules)  · Antabuse 250 mg po daily  · Medication teaching for Antabuse: Avoid alcohol and drugs or food with alcohol in them. This also includes alcohol in hidden forms like some sauces, vinegars, and topical products like aftershaves and back rubs  · Restart Xanax 0.5 mg po BID PRN  · DC Klonopin   · Continue Seroquel 25 mg po q hs PRN insomnia  · Completed AIMS scale  · Continue individual psychotherapy with Dr. Anand    Return to Clinic: 3 months

## 2018-10-12 ENCOUNTER — HOSPITAL ENCOUNTER (INPATIENT)
Facility: HOSPITAL | Age: 48
LOS: 1 days | Discharge: HOME | DRG: 292 | End: 2018-10-15
Admitting: FAMILY MEDICINE
Payer: MEDICARE

## 2018-10-12 DIAGNOSIS — M79.642 LEFT HAND PAIN: ICD-10-CM

## 2018-10-12 DIAGNOSIS — D64.9 ANEMIA, UNSPECIFIED TYPE: ICD-10-CM

## 2018-10-12 DIAGNOSIS — I51.89 DIASTOLIC DYSFUNCTION: ICD-10-CM

## 2018-10-12 DIAGNOSIS — R06.02 SOB (SHORTNESS OF BREATH): ICD-10-CM

## 2018-10-12 DIAGNOSIS — R52 PAIN: ICD-10-CM

## 2018-10-12 DIAGNOSIS — R07.9 CHEST PAIN: ICD-10-CM

## 2018-10-12 DIAGNOSIS — I50.9 CONGESTIVE HEART FAILURE, UNSPECIFIED HF CHRONICITY, UNSPECIFIED HEART FAILURE TYPE: Primary | ICD-10-CM

## 2018-10-12 DIAGNOSIS — F10.21 H/O ALCOHOL DEPENDENCE: ICD-10-CM

## 2018-10-12 DIAGNOSIS — F33.9 CHRONIC RECURRENT MAJOR DEPRESSIVE DISORDER: ICD-10-CM

## 2018-10-12 DIAGNOSIS — I50.33 ACUTE ON CHRONIC DIASTOLIC HEART FAILURE: ICD-10-CM

## 2018-10-12 DIAGNOSIS — T80.818A EXTRAVASATION ACCIDENT, INITIAL ENCOUNTER: ICD-10-CM

## 2018-10-12 LAB
ALBUMIN SERPL BCP-MCNC: 3.6 G/DL
ALP SERPL-CCNC: 87 U/L
ALT SERPL W/O P-5'-P-CCNC: 54 U/L
ANION GAP SERPL CALC-SCNC: 11 MMOL/L
ANISOCYTOSIS BLD QL SMEAR: SLIGHT
AST SERPL-CCNC: 33 U/L
B-HCG UR QL: NEGATIVE
BASOPHILS # BLD AUTO: ABNORMAL K/UL
BASOPHILS NFR BLD: 0 %
BILIRUB SERPL-MCNC: 0.5 MG/DL
BNP SERPL-MCNC: 1064 PG/ML
BUN SERPL-MCNC: 14 MG/DL
CALCIUM SERPL-MCNC: 9.4 MG/DL
CHLORIDE SERPL-SCNC: 104 MMOL/L
CO2 SERPL-SCNC: 20 MMOL/L
CREAT SERPL-MCNC: 1.1 MG/DL
CTP QC/QA: YES
D DIMER PPP IA.FEU-MCNC: 1.91 MG/L FEU
DIFFERENTIAL METHOD: ABNORMAL
EOSINOPHIL # BLD AUTO: ABNORMAL K/UL
EOSINOPHIL NFR BLD: 0 %
ERYTHROCYTE [DISTWIDTH] IN BLOOD BY AUTOMATED COUNT: 20.1 %
EST. GFR  (AFRICAN AMERICAN): >60 ML/MIN/1.73 M^2
EST. GFR  (NON AFRICAN AMERICAN): 60 ML/MIN/1.73 M^2
GLUCOSE SERPL-MCNC: 70 MG/DL
HCT VFR BLD AUTO: 34.5 %
HGB BLD-MCNC: 10.2 G/DL
HYPOCHROMIA BLD QL SMEAR: ABNORMAL
LYMPHOCYTES # BLD AUTO: ABNORMAL K/UL
LYMPHOCYTES NFR BLD: 40 %
MCH RBC QN AUTO: 22.8 PG
MCHC RBC AUTO-ENTMCNC: 29.6 G/DL
MCV RBC AUTO: 77 FL
MONOCYTES # BLD AUTO: ABNORMAL K/UL
MONOCYTES NFR BLD: 7 %
NEUTROPHILS # BLD AUTO: ABNORMAL K/UL
NEUTROPHILS NFR BLD: 52 %
NEUTS BAND NFR BLD MANUAL: 1 %
PLATELET # BLD AUTO: 259 K/UL
PLATELET BLD QL SMEAR: ABNORMAL
PMV BLD AUTO: 9.3 FL
POTASSIUM SERPL-SCNC: 5.1 MMOL/L
PROT SERPL-MCNC: 6.9 G/DL
RBC # BLD AUTO: 4.47 M/UL
SODIUM SERPL-SCNC: 135 MMOL/L
TROPONIN I SERPL DL<=0.01 NG/ML-MCNC: 0.01 NG/ML
WBC # BLD AUTO: 6.19 K/UL

## 2018-10-12 PROCEDURE — 85027 COMPLETE CBC AUTOMATED: CPT

## 2018-10-12 PROCEDURE — 81025 URINE PREGNANCY TEST: CPT

## 2018-10-12 PROCEDURE — 96375 TX/PRO/DX INJ NEW DRUG ADDON: CPT

## 2018-10-12 PROCEDURE — 83880 ASSAY OF NATRIURETIC PEPTIDE: CPT

## 2018-10-12 PROCEDURE — 99285 EMERGENCY DEPT VISIT HI MDM: CPT | Mod: 25

## 2018-10-12 PROCEDURE — 25000003 PHARM REV CODE 250

## 2018-10-12 PROCEDURE — 94640 AIRWAY INHALATION TREATMENT: CPT

## 2018-10-12 PROCEDURE — 93010 ELECTROCARDIOGRAM REPORT: CPT | Mod: ,,, | Performed by: INTERNAL MEDICINE

## 2018-10-12 PROCEDURE — 96374 THER/PROPH/DIAG INJ IV PUSH: CPT

## 2018-10-12 PROCEDURE — 85379 FIBRIN DEGRADATION QUANT: CPT

## 2018-10-12 PROCEDURE — 85007 BL SMEAR W/DIFF WBC COUNT: CPT

## 2018-10-12 PROCEDURE — 93005 ELECTROCARDIOGRAM TRACING: CPT

## 2018-10-12 PROCEDURE — 80053 COMPREHEN METABOLIC PANEL: CPT

## 2018-10-12 PROCEDURE — 25000242 PHARM REV CODE 250 ALT 637 W/ HCPCS

## 2018-10-12 PROCEDURE — 84484 ASSAY OF TROPONIN QUANT: CPT

## 2018-10-12 RX ORDER — ACETAMINOPHEN 325 MG/1
650 TABLET ORAL
Status: COMPLETED | OUTPATIENT
Start: 2018-10-12 | End: 2018-10-12

## 2018-10-12 RX ORDER — IBUPROFEN 600 MG/1
600 TABLET ORAL
Status: DISCONTINUED | OUTPATIENT
Start: 2018-10-12 | End: 2018-10-12

## 2018-10-12 RX ORDER — IPRATROPIUM BROMIDE AND ALBUTEROL SULFATE 2.5; .5 MG/3ML; MG/3ML
3 SOLUTION RESPIRATORY (INHALATION)
Status: COMPLETED | OUTPATIENT
Start: 2018-10-12 | End: 2018-10-12

## 2018-10-12 RX ADMIN — IPRATROPIUM BROMIDE AND ALBUTEROL SULFATE 3 ML: .5; 3 SOLUTION RESPIRATORY (INHALATION) at 10:10

## 2018-10-12 RX ADMIN — ACETAMINOPHEN 650 MG: 325 TABLET ORAL at 09:10

## 2018-10-13 PROBLEM — I50.9 CONGESTIVE HEART FAILURE: Status: ACTIVE | Noted: 2018-10-13

## 2018-10-13 PROBLEM — I50.33 ACUTE ON CHRONIC DIASTOLIC HEART FAILURE: Status: ACTIVE | Noted: 2018-10-13

## 2018-10-13 LAB
ALBUMIN SERPL BCP-MCNC: 3.7 G/DL
ALP SERPL-CCNC: 96 U/L
ALT SERPL W/O P-5'-P-CCNC: 59 U/L
ANION GAP SERPL CALC-SCNC: 12 MMOL/L
AST SERPL-CCNC: 42 U/L
BASOPHILS # BLD AUTO: 0.02 K/UL
BASOPHILS NFR BLD: 0.4 %
BILIRUB SERPL-MCNC: 0.8 MG/DL
BUN SERPL-MCNC: 14 MG/DL
CALCIUM SERPL-MCNC: 9.6 MG/DL
CHLORIDE SERPL-SCNC: 103 MMOL/L
CO2 SERPL-SCNC: 20 MMOL/L
CREAT SERPL-MCNC: 1.2 MG/DL
DIFFERENTIAL METHOD: ABNORMAL
EOSINOPHIL # BLD AUTO: 0 K/UL
EOSINOPHIL NFR BLD: 0.8 %
ERYTHROCYTE [DISTWIDTH] IN BLOOD BY AUTOMATED COUNT: 20.3 %
EST. GFR  (AFRICAN AMERICAN): >60 ML/MIN/1.73 M^2
EST. GFR  (NON AFRICAN AMERICAN): 54 ML/MIN/1.73 M^2
ESTIMATED AVG GLUCOSE: 103 MG/DL
GLUCOSE SERPL-MCNC: 178 MG/DL
HBA1C MFR BLD HPLC: 5.2 %
HCT VFR BLD AUTO: 35 %
HGB BLD-MCNC: 10.4 G/DL
LYMPHOCYTES # BLD AUTO: 2.1 K/UL
LYMPHOCYTES NFR BLD: 43 %
MAGNESIUM SERPL-MCNC: 1.8 MG/DL
MCH RBC QN AUTO: 22.7 PG
MCHC RBC AUTO-ENTMCNC: 29.7 G/DL
MCV RBC AUTO: 76 FL
MONOCYTES # BLD AUTO: 0.3 K/UL
MONOCYTES NFR BLD: 5.3 %
NEUTROPHILS # BLD AUTO: 2.5 K/UL
NEUTROPHILS NFR BLD: 50.5 %
PHOSPHATE SERPL-MCNC: 3.5 MG/DL
PLATELET # BLD AUTO: 281 K/UL
PMV BLD AUTO: 9.5 FL
POTASSIUM SERPL-SCNC: 4.7 MMOL/L
PROT SERPL-MCNC: 7.2 G/DL
RBC # BLD AUTO: 4.58 M/UL
SODIUM SERPL-SCNC: 135 MMOL/L
TROPONIN I SERPL DL<=0.01 NG/ML-MCNC: 0.4 NG/ML
TROPONIN I SERPL DL<=0.01 NG/ML-MCNC: 0.46 NG/ML
TROPONIN I SERPL DL<=0.01 NG/ML-MCNC: 0.49 NG/ML
TROPONIN I SERPL DL<=0.01 NG/ML-MCNC: 0.51 NG/ML
WBC # BLD AUTO: 4.93 K/UL

## 2018-10-13 PROCEDURE — 63600175 PHARM REV CODE 636 W HCPCS: Performed by: STUDENT IN AN ORGANIZED HEALTH CARE EDUCATION/TRAINING PROGRAM

## 2018-10-13 PROCEDURE — 80053 COMPREHEN METABOLIC PANEL: CPT

## 2018-10-13 PROCEDURE — G0378 HOSPITAL OBSERVATION PER HR: HCPCS

## 2018-10-13 PROCEDURE — 25000003 PHARM REV CODE 250: Performed by: STUDENT IN AN ORGANIZED HEALTH CARE EDUCATION/TRAINING PROGRAM

## 2018-10-13 PROCEDURE — 84484 ASSAY OF TROPONIN QUANT: CPT | Mod: 91

## 2018-10-13 PROCEDURE — 25000242 PHARM REV CODE 250 ALT 637 W/ HCPCS: Performed by: STUDENT IN AN ORGANIZED HEALTH CARE EDUCATION/TRAINING PROGRAM

## 2018-10-13 PROCEDURE — 36415 COLL VENOUS BLD VENIPUNCTURE: CPT

## 2018-10-13 PROCEDURE — 93005 ELECTROCARDIOGRAM TRACING: CPT

## 2018-10-13 PROCEDURE — 93010 ELECTROCARDIOGRAM REPORT: CPT | Mod: 76,,, | Performed by: INTERNAL MEDICINE

## 2018-10-13 PROCEDURE — 93010 ELECTROCARDIOGRAM REPORT: CPT | Mod: ,,, | Performed by: INTERNAL MEDICINE

## 2018-10-13 PROCEDURE — 63600175 PHARM REV CODE 636 W HCPCS

## 2018-10-13 PROCEDURE — 25500020 PHARM REV CODE 255

## 2018-10-13 PROCEDURE — 94761 N-INVAS EAR/PLS OXIMETRY MLT: CPT

## 2018-10-13 PROCEDURE — 0399T 2D ECHO ONLY: CPT

## 2018-10-13 PROCEDURE — 84100 ASSAY OF PHOSPHORUS: CPT

## 2018-10-13 PROCEDURE — 85025 COMPLETE CBC W/AUTO DIFF WBC: CPT

## 2018-10-13 PROCEDURE — 84484 ASSAY OF TROPONIN QUANT: CPT

## 2018-10-13 PROCEDURE — 83036 HEMOGLOBIN GLYCOSYLATED A1C: CPT

## 2018-10-13 PROCEDURE — 83735 ASSAY OF MAGNESIUM: CPT

## 2018-10-13 PROCEDURE — 94640 AIRWAY INHALATION TREATMENT: CPT

## 2018-10-13 RX ORDER — HYDRALAZINE HYDROCHLORIDE 20 MG/ML
10 INJECTION INTRAMUSCULAR; INTRAVENOUS EVERY 6 HOURS PRN
Status: DISCONTINUED | OUTPATIENT
Start: 2018-10-13 | End: 2018-10-15 | Stop reason: HOSPADM

## 2018-10-13 RX ORDER — ALPRAZOLAM 0.25 MG/1
0.5 TABLET ORAL 2 TIMES DAILY PRN
Status: DISCONTINUED | OUTPATIENT
Start: 2018-10-13 | End: 2018-10-15 | Stop reason: HOSPADM

## 2018-10-13 RX ORDER — MORPHINE SULFATE 2 MG/ML
5 INJECTION, SOLUTION INTRAMUSCULAR; INTRAVENOUS
Status: COMPLETED | OUTPATIENT
Start: 2018-10-13 | End: 2018-10-13

## 2018-10-13 RX ORDER — GLUCAGON 1 MG
1 KIT INJECTION
Status: DISCONTINUED | OUTPATIENT
Start: 2018-10-13 | End: 2018-10-15 | Stop reason: HOSPADM

## 2018-10-13 RX ORDER — IPRATROPIUM BROMIDE AND ALBUTEROL SULFATE 2.5; .5 MG/3ML; MG/3ML
3 SOLUTION RESPIRATORY (INHALATION) EVERY 6 HOURS PRN
Status: DISCONTINUED | OUTPATIENT
Start: 2018-10-13 | End: 2018-10-15 | Stop reason: HOSPADM

## 2018-10-13 RX ORDER — LISINOPRIL 5 MG/1
10 TABLET ORAL EVERY MORNING
Status: DISCONTINUED | OUTPATIENT
Start: 2018-10-13 | End: 2018-10-15 | Stop reason: HOSPADM

## 2018-10-13 RX ORDER — FOLIC ACID 1 MG/1
1 TABLET ORAL DAILY
Status: DISCONTINUED | OUTPATIENT
Start: 2018-10-13 | End: 2018-10-15 | Stop reason: HOSPADM

## 2018-10-13 RX ORDER — QUETIAPINE FUMARATE 25 MG/1
25 TABLET, FILM COATED ORAL NIGHTLY PRN
Status: DISCONTINUED | OUTPATIENT
Start: 2018-10-13 | End: 2018-10-13

## 2018-10-13 RX ORDER — CLONIDINE HYDROCHLORIDE 0.2 MG/1
0.2 TABLET ORAL NIGHTLY
Status: DISCONTINUED | OUTPATIENT
Start: 2018-10-13 | End: 2018-10-15 | Stop reason: HOSPADM

## 2018-10-13 RX ORDER — ENOXAPARIN SODIUM 100 MG/ML
40 INJECTION SUBCUTANEOUS EVERY 24 HOURS
Status: DISCONTINUED | OUTPATIENT
Start: 2018-10-13 | End: 2018-10-15 | Stop reason: HOSPADM

## 2018-10-13 RX ORDER — LATANOPROST 50 UG/ML
1 SOLUTION/ DROPS OPHTHALMIC NIGHTLY
Status: DISCONTINUED | OUTPATIENT
Start: 2018-10-13 | End: 2018-10-15 | Stop reason: HOSPADM

## 2018-10-13 RX ORDER — PANTOPRAZOLE SODIUM 40 MG/1
40 TABLET, DELAYED RELEASE ORAL DAILY
Status: DISCONTINUED | OUTPATIENT
Start: 2018-10-13 | End: 2018-10-15 | Stop reason: HOSPADM

## 2018-10-13 RX ORDER — DISULFIRAM 250 MG/1
250 TABLET ORAL DAILY
Status: DISCONTINUED | OUTPATIENT
Start: 2018-10-13 | End: 2018-10-15 | Stop reason: HOSPADM

## 2018-10-13 RX ORDER — MONTELUKAST SODIUM 10 MG/1
10 TABLET ORAL NIGHTLY
Status: DISCONTINUED | OUTPATIENT
Start: 2018-10-13 | End: 2018-10-15 | Stop reason: HOSPADM

## 2018-10-13 RX ORDER — AMLODIPINE BESYLATE 5 MG/1
5 TABLET ORAL DAILY
Status: DISCONTINUED | OUTPATIENT
Start: 2018-10-13 | End: 2018-10-13

## 2018-10-13 RX ORDER — SODIUM CHLORIDE 0.9 % (FLUSH) 0.9 %
5 SYRINGE (ML) INJECTION
Status: DISCONTINUED | OUTPATIENT
Start: 2018-10-13 | End: 2018-10-15 | Stop reason: HOSPADM

## 2018-10-13 RX ORDER — ONDANSETRON 2 MG/ML
4 INJECTION INTRAMUSCULAR; INTRAVENOUS EVERY 6 HOURS PRN
Status: DISCONTINUED | OUTPATIENT
Start: 2018-10-13 | End: 2018-10-15 | Stop reason: HOSPADM

## 2018-10-13 RX ORDER — TRAMADOL HYDROCHLORIDE 50 MG/1
50 TABLET ORAL EVERY 6 HOURS PRN
Status: DISCONTINUED | OUTPATIENT
Start: 2018-10-13 | End: 2018-10-15 | Stop reason: HOSPADM

## 2018-10-13 RX ORDER — VENLAFAXINE HYDROCHLORIDE 75 MG/1
150 CAPSULE, EXTENDED RELEASE ORAL DAILY
Status: DISCONTINUED | OUTPATIENT
Start: 2018-10-13 | End: 2018-10-15 | Stop reason: HOSPADM

## 2018-10-13 RX ORDER — ALPRAZOLAM 1 MG/1
1 TABLET ORAL 2 TIMES DAILY PRN
Status: DISCONTINUED | OUTPATIENT
Start: 2018-10-13 | End: 2018-10-13

## 2018-10-13 RX ORDER — HYDROCHLOROTHIAZIDE 25 MG/1
25 TABLET ORAL DAILY
Status: DISCONTINUED | OUTPATIENT
Start: 2018-10-13 | End: 2018-10-15

## 2018-10-13 RX ORDER — ZONISAMIDE 25 MG/1
50 CAPSULE ORAL NIGHTLY
Status: DISCONTINUED | OUTPATIENT
Start: 2018-10-13 | End: 2018-10-13

## 2018-10-13 RX ORDER — IBUPROFEN 200 MG
16 TABLET ORAL
Status: DISCONTINUED | OUTPATIENT
Start: 2018-10-13 | End: 2018-10-15 | Stop reason: HOSPADM

## 2018-10-13 RX ORDER — VENLAFAXINE HYDROCHLORIDE 75 MG/1
75 CAPSULE, EXTENDED RELEASE ORAL DAILY
Status: DISCONTINUED | OUTPATIENT
Start: 2018-10-13 | End: 2018-10-15 | Stop reason: HOSPADM

## 2018-10-13 RX ORDER — HYDRALAZINE HYDROCHLORIDE 20 MG/ML
10 INJECTION INTRAMUSCULAR; INTRAVENOUS EVERY 6 HOURS PRN
Status: DISCONTINUED | OUTPATIENT
Start: 2018-10-13 | End: 2018-10-13

## 2018-10-13 RX ORDER — FUROSEMIDE 10 MG/ML
40 INJECTION INTRAMUSCULAR; INTRAVENOUS ONCE
Status: COMPLETED | OUTPATIENT
Start: 2018-10-13 | End: 2018-10-13

## 2018-10-13 RX ORDER — LABETALOL HYDROCHLORIDE 5 MG/ML
10 INJECTION, SOLUTION INTRAVENOUS EVERY 6 HOURS PRN
Status: DISCONTINUED | OUTPATIENT
Start: 2018-10-13 | End: 2018-10-15 | Stop reason: HOSPADM

## 2018-10-13 RX ORDER — FUROSEMIDE 10 MG/ML
80 INJECTION INTRAMUSCULAR; INTRAVENOUS
Status: COMPLETED | OUTPATIENT
Start: 2018-10-13 | End: 2018-10-13

## 2018-10-13 RX ORDER — THIAMINE HCL 100 MG
100 TABLET ORAL DAILY
Status: DISCONTINUED | OUTPATIENT
Start: 2018-10-13 | End: 2018-10-15 | Stop reason: HOSPADM

## 2018-10-13 RX ORDER — IBUPROFEN 200 MG
24 TABLET ORAL
Status: DISCONTINUED | OUTPATIENT
Start: 2018-10-13 | End: 2018-10-15 | Stop reason: HOSPADM

## 2018-10-13 RX ADMIN — ALPRAZOLAM 0.5 MG: 0.25 TABLET ORAL at 08:10

## 2018-10-13 RX ADMIN — TRAMADOL HYDROCHLORIDE 50 MG: 50 TABLET, COATED ORAL at 08:10

## 2018-10-13 RX ADMIN — AMLODIPINE BESYLATE 5 MG: 5 TABLET ORAL at 01:10

## 2018-10-13 RX ADMIN — FUROSEMIDE 80 MG: 10 INJECTION, SOLUTION INTRAMUSCULAR; INTRAVENOUS at 12:10

## 2018-10-13 RX ADMIN — FUROSEMIDE 40 MG: 10 INJECTION, SOLUTION INTRAMUSCULAR; INTRAVENOUS at 07:10

## 2018-10-13 RX ADMIN — LISINOPRIL 10 MG: 5 TABLET ORAL at 07:10

## 2018-10-13 RX ADMIN — HYDROCHLOROTHIAZIDE 25 MG: 25 TABLET ORAL at 08:10

## 2018-10-13 RX ADMIN — PANTOPRAZOLE SODIUM 40 MG: 40 TABLET, DELAYED RELEASE ORAL at 08:10

## 2018-10-13 RX ADMIN — MONTELUKAST SODIUM 10 MG: 10 TABLET, COATED ORAL at 03:10

## 2018-10-13 RX ADMIN — TRAMADOL HYDROCHLORIDE 50 MG: 50 TABLET, COATED ORAL at 02:10

## 2018-10-13 RX ADMIN — ALPRAZOLAM 1 MG: 1 TABLET ORAL at 03:10

## 2018-10-13 RX ADMIN — IPRATROPIUM BROMIDE AND ALBUTEROL SULFATE 3 ML: .5; 3 SOLUTION RESPIRATORY (INHALATION) at 06:10

## 2018-10-13 RX ADMIN — IOHEXOL 100 ML: 350 INJECTION, SOLUTION INTRAVENOUS at 12:10

## 2018-10-13 RX ADMIN — QUETIAPINE FUMARATE 25 MG: 25 TABLET ORAL at 03:10

## 2018-10-13 RX ADMIN — CLONIDINE HYDROCHLORIDE 0.2 MG: 0.2 TABLET ORAL at 08:10

## 2018-10-13 RX ADMIN — FOLIC ACID 1 MG: 1 TABLET ORAL at 08:10

## 2018-10-13 RX ADMIN — ENOXAPARIN SODIUM 40 MG: 100 INJECTION SUBCUTANEOUS at 05:10

## 2018-10-13 RX ADMIN — DISULFIRAM 250 MG: 250 TABLET ORAL at 08:10

## 2018-10-13 RX ADMIN — CLONIDINE HYDROCHLORIDE 0.2 MG: 0.2 TABLET ORAL at 03:10

## 2018-10-13 RX ADMIN — VENLAFAXINE HYDROCHLORIDE 150 MG: 75 CAPSULE, EXTENDED RELEASE ORAL at 08:10

## 2018-10-13 RX ADMIN — MORPHINE SULFATE 5 MG: 2 INJECTION, SOLUTION INTRAMUSCULAR; INTRAVENOUS at 12:10

## 2018-10-13 RX ADMIN — MONTELUKAST SODIUM 10 MG: 10 TABLET, COATED ORAL at 08:10

## 2018-10-13 RX ADMIN — VENLAFAXINE HYDROCHLORIDE 75 MG: 75 CAPSULE, EXTENDED RELEASE ORAL at 08:10

## 2018-10-13 NOTE — NURSING
Plan of care reviewed with patient.Pt AA0x4 today. Verbalizes needs to staff. Noted edema to left hand and wrist. Elevated on pillows at this time.Complained of pain x1 for both arms and back.Ultram effective at this time. 2DEcho done today with results pending at this time. Verbalizes to staff understanding. NSR on monitor with 0 red alarms noted.  No acute distress observed at this time. Side rails x2, bed in low position, call bell within reach. Bed alarm in place for patient safety. Will relay to oncoming nurse to monitor for changes in condition.

## 2018-10-13 NOTE — H&P
"[facility]  History & Physical    SUBJECTIVE:     Chief Complaint:   SOB x 2-3 days    History of Present Illness:  49 yo female w/ pmhx of HFpEF (2015 ECHO w/ EF of 60%, DD), Essential HTN (not on medications, was taken off approx 1 year ago for unknown reason by unknown MD), ETOH dependence now on antabuse (quit May 2017 after trauma to her head 2/2 drinking) sickle cell trait, chronic microcystic anemia who presents to the ED with SOB for 2-3 days making it difficult to get to the bathroom at her house.  Onset was gradual and does feel like a prior presentation > 1 year ago when she was told she was in a heart failure exacercation.  Never had an MI or CVA before.  No chest pain with this occurrence although she did describe some chest discomfort- "pressure" that was mild and has now resolved.    Reviewed the ED note that mentions diffuse CP that was described as "tightness" in quality; pt states this wasn't really the case.  It was more the sensation of having difficulty breathing.    She has taken lasix in the past, but states she was told to stop it a year ago by an unknown MD for an unknown reason.  No difficulties with fluid overload, shortness of breath in the past year.  She has been eating lots of lunch meat with tortilla though she has minimal appetite ever since she had the gastric bypass surgery.    Pt was evaluated in the ER and found to have elevated BNP, elevated D-dimer, a CTA that was neg for PE but pos for 4 cm dilated fusiform portion of her ascending thoracic aneurysm.  BP was elevated in the ED but came down with medications.  Additionally, she was given 80 mg lasix x 1 dose.    Left arm IV contrast did infiltrate in pt's arm causing pain and swelling    Allergies:  Review of patient's allergies indicates:   Allergen Reactions    Penicillins Hives       Home Medications:  No current facility-administered medications on file prior to encounter.      Current Outpatient Medications on " File Prior to Encounter   Medication Sig    ALPRAZolam (XANAX) 1 MG tablet Take 1 tablet (1 mg total) by mouth 2 (two) times daily as needed for Anxiety.    cloNIDine (CATAPRES) 0.2 MG tablet Take 1 tablet (0.2 mg total) by mouth every evening.    cyanocobalamin 1,000 mcg/mL injection Inject 1,000 mcg into the muscle every 28 days. On the 15th on the month    disulfiram (ANTABUSE) 250 mg tablet Take 1 tablet (250 mg total) by mouth once daily.    folic acid (FOLVITE) 1 MG tablet Take 1 mg by mouth once daily.    hydrochlorothiazide (HYDRODIURIL) 25 MG tablet TK 1 T PO QD    hydrocodone-acetaminophen 10-325mg (NORCO)  mg Tab Take by mouth.    indomethacin (INDOCIN) 25 MG capsule Take 1 capsule (25 mg total) by mouth 3 (three) times daily as needed.    lactulose (CHRONULAC) 10 gram/15 mL solution TK 15 TO 30 ML PO ONCE TO BID PRF CONSTIPATION    latanoprost 0.005 % ophthalmic solution Place 1 drop into both eyes every evening.     lisinopril 10 MG tablet Take 10 mg by mouth every morning.     medroxyPROGESTERone (PROVERA) 10 MG tablet TAKE 1 TABLET BY MOUTH DAILY FOR 2 WEEKS    methocarbamol (ROBAXIN) 500 MG Tab TK 1 T PO  BID PRF MUSCLE SPASMS    MIMVEY 1-0.5 mg per tablet TAKE 1 TABLET EVERYDAY    montelukast (SINGULAIR) 10 mg tablet Take 10 mg by mouth every evening.    multivitamin (THERAGRAN) tablet Take 1 tablet by mouth once daily.    omeprazole (PRILOSEC) 40 MG capsule Take 1 capsule (40 mg total) by mouth once daily.    potassium chloride SA (K-DUR,KLOR-CON) 10 MEQ tablet TK 1 T PO  QD    QUEtiapine (SEROQUEL) 25 MG Tab Take 1 tablet (25 mg total) by mouth nightly as needed.    sumatriptan (IMITREX) 50 MG tablet Once for severe headache. May repeat once after 2 hours. Do not exceed 3-4 doses in one week.    sumatriptan (IMITREX) 50 MG tablet Once for severe headache. May repeat once after 2 hours. Do not exceed 3-4 doses in one week.    thiamine mononitrate 100 mg Tab Take 1 tablet  (100 mg total) by mouth 2 (two) times daily.    venlafaxine (EFFEXOR-XR) 150 MG Cp24 Take 1 capsule (150 mg total) by mouth once daily. Take with Effexor 75 mg to equal 225 mg / daily    venlafaxine (EFFEXOR-XR) 75 MG 24 hr capsule Take 1 capsule (75 mg total) by mouth once daily. Take with Effexor 150 mg capsules to total 225 mg /daily    zonisamide (ZONEGRAN) 50 MG Cap 1/2 tab nightly for one week then increase to 1 tab nightly    [DISCONTINUED] clonazePAM (KLONOPIN) 0.5 MG tablet Take 1 tablet (0.5 mg total) by mouth 2 (two) times daily as needed for Anxiety.       Past Medical History:   Diagnosis Date    Alcohol abuse 10/7/2015    Breast calcification, left     Pt states this has been worked up, she received a biopsy in the past to confirm calcifications were from scar tissue from when she had breast reduction surgery.      Chronic diastolic congestive heart failure     Encounter for blood transfusion     GERD (gastroesophageal reflux disease)     Glaucoma     Hypertension     Hyponatremia 10/2015    Na 109. attributed to polydipsia and alcohol abuse.  suffered a seizure in the ICU .    Insomnia     Malingering 2016    PTSD (post-traumatic stress disorder)     Sickle cell trait      Past Surgical History:   Procedure Laterality Date    bile fistula      BREAST BIOPSY Left     breast reduction       SECTION      CHOLECYSTECTOMY  after     ESOPHAGOGASTRODUODENOSCOPY (EGD) N/A 2016    [facility]    ESOPHAGOGASTRODUODENOSCOPY (EGD) N/A 2015    [facility] ([facility]ND [facility]    ESOPHAGOGASTRODUODENOSCOPY (EGD) N/A 2015    [facility]    GASTRIC BYPASS   and     SIGMOIDOSCOPY-FLEXIBLE N/A 2015    [facility]    TOTAL REDUCTION MAMMOPLASTY Bilateral      Family History   Problem Relation Age of Onset    Diabetes  "Mother     Lupus Mother     Diabetes Maternal Grandmother     Crohn's disease Cousin      Social History     Tobacco Use    Smoking status: Current Every Day Smoker     Packs/day: 1.00     Years: 4.00     Pack years: 4.00     Types: Cigarettes    Smokeless tobacco: Never Used   Substance Use Topics    Alcohol use: No     Comment: former heavy drinker    Drug use: No        Review of Systems:  Constitutional: no fever or chills  Eyes: no visual changes  ENT: no nasal congestion or sore throat  Respiratory: no cough, + shortess of breath  Cardiovascular: mild chest "pressure", no palpitations  Gastrointestinal: no nausea or vomiting, no abdominal pain or change in bowel habits  Genitourinary: no hematuria or dysuria  Integument/Breast: no rash or pruritis  Hematologic/Lymphatic: no easy bruising or lymphadenopathy  Musculoskeletal: no arthralgias or myalgias  Neurological: no seizures or tremors  Behavioral/Psych: no auditory or visual hallucinations  Endocrine: no heat or cold intolerance    OBJECTIVE:     Vital Signs:  Temp: 97.8 °F (36.6 °C) (10/12/18 2106)  Pulse: 79 (10/13/18 0132)  Resp: 18 (10/13/18 0132)  BP: (!) 129/97 (10/13/18 0132)  SpO2: 100 % (10/13/18 0132)    Physical Exam:  General: well developed, no distress  HEENT: normocephalic, atraumatic, hearing grossly normal bilaterally, mucous membranes moist, EOM intact, no scleral icterus  Neck: supple, symmetrical, trachea midline, no JVD  Lungs:  +crackles and normal respiratory effort on RA  Cardiovascular: regular rate and rhythm.  No edema, distal pulses palpable and symmetric  Extremities: warm, well-perfused, moving all extremities,   Abdomen: soft, non-tender to palpation, no distention, no masses/hernias  Skin: Skin color, texture, turgor normal. No rashes or lesions  Musculoskeletal  no deformities, left arm swollen painful after IV infiltrate from CT  Neurologic: No focal numbness or weakness  Psych/Behavioral:  Alert and oriented, flat " affect.    Laboratory:  Labs Reviewed   COMPREHENSIVE METABOLIC PANEL - Abnormal; Notable for the following components:       Result Value    Sodium 135 (*)     CO2 20 (*)     ALT 54 (*)     All other components within normal limits   D DIMER, QUANTITATIVE - Abnormal; Notable for the following components:    D-Dimer 1.91 (*)     All other components within normal limits   CBC W/ AUTO DIFFERENTIAL - Abnormal; Notable for the following components:    Hemoglobin 10.2 (*)     Hematocrit 34.5 (*)     MCV 77 (*)     MCH 22.8 (*)     MCHC 29.6 (*)     RDW 20.1 (*)     All other components within normal limits   B-TYPE NATRIURETIC PEPTIDE - Abnormal; Notable for the following components:    BNP 1,064 (*)     All other components within normal limits   TROPONIN I   POCT URINE PREGNANCY       Diagnostic Results:  CXR 10/12: Cardiac silhouette is mildly enlarged.  Lungs are symmetrically expanded.  There is prominence of central pulmonary vasculature.  No evidence of focal consolidative process, pneumothorax, or significant effusion.  No acute osseous abnormality identified.  Partially calcified left breast lesion is again seen.    CTA 10/13:   1. No evidence of PE.  2. Mild cardiomegaly with interlobular septal thickening suggestive for mild pulmonary interstitial edema.  3. Mild fusiform dilatation of the ascending thoracic aorta measuring 4.0 cm.    ASSESSMENT:     49 yo female w/ pmhx of HFpEF (2015 ECHO w/ EF of 60%, DD), Essential HTN (not on medications, was taken off approx 1 year ago for unknown reason by unknown MD), ETOH dependence now on antabuse (quit May 2017 after trauma to her head 2/2 drinking) sickle cell trait, chronic microcystic anemia who presents with SOB x 3 days, suspected to be 2/2 acute on chronic diastolic heart failure.  Also found to have new 4 cm long dilation of the thoracic aorta.    CTA neg for PE and pro-BNP elevated.  Giving diuresis and monitoring for improvement.    PLAN:     Acute on  Chronic Diastolic Heart Failure  2015 ECHO with EF 60% and DD, will repeat  Was not taking medications for maintenance of HF, nor for HTN for unknown reason  Given lasix 80 mg x 1 last night, ordered additional 40 mg in the AM  Monitor I's/O's    HTN  BP elevated with diastolic in 116's in ED, added PRN hydralazine and labetolol (not concerned about decompensated HF at this point)  Has been on lisinopril-HCTZ in the past, will restart in the AM    ETOH dependence  Continued home antabuse    Depression  Cont home effexor 225 mg daily    Migraine  Home regimen with imitrex 50 mg PRN, would recommend new strategies after discharge  Holding while in-patient    PPX:   DVT ppx- lovenox  GI ppx- can consider H2 blocker, but will observe for now.  Tolerating regular diet    Code: Full    Dispo: Pending clinical improvement, repeat ECHO, plans for moving forward (home lasix, home BP meds), arrange f/u    Discussed with staff, Dr. Miles.      Mario JONES  [facility]  1:40 AM  10/13/2018

## 2018-10-13 NOTE — PLAN OF CARE
10/13/18 1304   Discharge Assessment   Assessment Type Discharge Planning Assessment   Confirmed/corrected address and phone number on facesheet? Yes   Assessment information obtained from? Patient   Prior to hospitilization cognitive status: Alert/Oriented   Prior to hospitalization functional status: Independent   Current Functional Status: Independent   Facility Arrived From: home   Lives With child(delano), dependent  (15 and 22 year old sons)   Able to Return to Prior Arrangements yes   Is patient able to care for self after discharge? Yes   Who are your caregiver(s) and their phone number(s)? Leisajohn Youssef(friend)[phone]   Readmission Within The Last 30 Days no previous admission in last 30 days   Patient currently being followed by outpatient case management? No   Patient currently receives any other outside agency services? No   Is it the patient/care giver preference to resume care with the current outside agency? No   Equipment Currently Used at Home bedside commode   Do you have any problems affording any of your prescribed medications? No   Is the patient taking medications as prescribed? yes   Does the patient have transportation home? Yes   Transportation Available family or friend will provide;car   Does the patient receive services at the [facility]? No   Discharge Plan A Home with family   Discharge Plan B Home Health   Patient/Family In Agreement With Plan yes   The Sw met with the pt to complete the assessment. The pt's independent with her adl's and uses a bcs as needed at home. The pt states she drove herself here but will get family to transport her home at d/c. The pt's unemployed but receives a disability check monthly. The Sw left her contact info on the white board in the pt's room.

## 2018-10-13 NOTE — ED PROVIDER NOTES
Encounter Date: 10/12/2018    SCRIBE #1 NOTE: I, Mirta Mccurdy, am scribing for, and in the presence of, Dr. Robins.       History     Chief Complaint   Patient presents with    Shortness of Breath     Complaining of feeling SOB and chest pain x 2 days.     Daksha Marie is a 48 y.o. female who  has a past medical history of Alcohol abuse (10/7/2015), Breast calcification, left, Chronic diastolic congestive heart failure, Encounter for blood transfusion, GERD (gastroesophageal reflux disease), Glaucoma (2008), Hypertension, Hyponatremia (10/2015), Insomnia, Malingering (1/24/2016), PTSD (post-traumatic stress disorder), and Sickle cell trait.    The patient presents to the ED due to diffuse CP for past 2 days. CP is described as tightness, worse with SOB. She reports SOB with increasing exercise intolerance over the last couple of days. She also reports right shoulder pain and back pain. Denies any fever, cough, vomiting, or diarrhea. She reports depression that is unchanged from baseline. No other palliative or provocative factors except as noted      The history is provided by the patient.     Review of patient's allergies indicates:   Allergen Reactions    Penicillins Hives     Past Medical History:   Diagnosis Date    Alcohol abuse 10/7/2015    Breast calcification, left     Pt states this has been worked up, she received a biopsy in the past to confirm calcifications were from scar tissue from when she had breast reduction surgery.      Chronic diastolic congestive heart failure     Encounter for blood transfusion     GERD (gastroesophageal reflux disease)     Glaucoma 2008    Hypertension     Hyponatremia 10/2015    Na 109. attributed to polydipsia and alcohol abuse.  suffered a seizure in the ICU .    Insomnia     Malingering 1/24/2016    PTSD (post-traumatic stress disorder)     Sickle cell trait      Past Surgical History:   Procedure Laterality Date    bile fistula      BREAST  BIOPSY Left     breast reduction       SECTION      CHOLECYSTECTOMY  after     ESOPHAGOGASTRODUODENOSCOPY (EGD) N/A 2016    [facility]    ESOPHAGOGASTRODUODENOSCOPY (EGD) N/A 2015    [facility] (2ND F[facility])    ESOPHAGOGASTRODUODENOSCOPY (EGD) N/A 2015    [facility]    GASTRIC BYPASS   and     SIGMOIDOSCOPY-FLEXIBLE N/A 2015    [facility]    TOTAL REDUCTION MAMMOPLASTY Bilateral      Family History   Problem Relation Age of Onset    Diabetes Mother     Lupus Mother     Diabetes Maternal Grandmother     Crohn's disease Cousin      Social History     Tobacco Use    Smoking status: Current Every Day Smoker     Packs/day: 1.00     Years: 4.00     Pack years: 4.00     Types: Cigarettes    Smokeless tobacco: Never Used   Substance Use Topics    Alcohol use: No     Comment: former heavy drinker    Drug use: No     Review of Systems   All other systems reviewed and are negative.      Physical Exam     Initial Vitals [10/12/18 2106]   BP Pulse Resp Temp SpO2   (!) 134/104 84 20 97.8 °F (36.6 °C) 100 %      MAP       --         Physical Exam    Nursing note and vitals reviewed.  Constitutional: She appears well-developed.   HENT:   Head: Normocephalic and atraumatic.   Eyes: EOM are normal.   Neck: Neck supple.   Cardiovascular: Normal rate and regular rhythm.   Pulmonary/Chest: Breath sounds normal. No respiratory distress.   Abdominal: Soft. There is no tenderness.   Musculoskeletal: Normal range of motion.   No acute deformity  No calf tenderness   No pedal edema  Right shoulder with reproducible pain   Neurological: She is alert and oriented to person, place, and time.   Skin: Skin is warm and dry.   Psychiatric: She has a normal mood and affect.         ED Course   Procedures  Labs Reviewed   COMPREHENSIVE METABOLIC PANEL -  Abnormal; Notable for the following components:       Result Value    Sodium 135 (*)     CO2 20 (*)     ALT 54 (*)     All other components within normal limits   D DIMER, QUANTITATIVE - Abnormal; Notable for the following components:    D-Dimer 1.91 (*)     All other components within normal limits   CBC W/ AUTO DIFFERENTIAL - Abnormal; Notable for the following components:    Hemoglobin 10.2 (*)     Hematocrit 34.5 (*)     MCV 77 (*)     MCH 22.8 (*)     MCHC 29.6 (*)     RDW 20.1 (*)     All other components within normal limits   B-TYPE NATRIURETIC PEPTIDE - Abnormal; Notable for the following components:    BNP 1,064 (*)     All other components within normal limits   TROPONIN I   POCT URINE PREGNANCY          Imaging Results          X-Ray Forearm Left (Final result)  Result time 10/13/18 00:23:30    Final result by Sharron Cortes MD (10/13/18 00:23:30)                 Impression:      See above.      Electronically signed by: Sharron Cortes MD  Date:    10/13/2018  Time:    00:23             Narrative:    EXAMINATION:  XR FOREARM LEFT    CLINICAL HISTORY:  Pain, unspecified    TECHNIQUE:  AP and lateral views of the left forearm were performed.    COMPARISON:  None    FINDINGS:  Extensive contrast extravasation is visualized throughout the medial aspect of the forearm from the elbow through the wrist.    No acute osseous abnormality identified noting that osseous structures are largely obscured by superimposed contrast.                               CTA Chest Non-Coronary (Final result)  Result time 10/13/18 00:14:50    Final result by Sharron Cortes MD (10/13/18 00:14:50)                 Impression:      1. No evidence of PE.  2. Mild cardiomegaly with interlobular septal thickening suggestive for mild pulmonary interstitial edema.  3. Mild fusiform dilatation of the ascending thoracic aorta measuring 4.0 cm.      Electronically signed by: Sharron Cortes MD  Date:    10/13/2018  Time:    00:14              Narrative:    EXAMINATION:  CTA CHEST NON CORONARY    CLINICAL HISTORY:  suspected PE/cp/sob;    TECHNIQUE:  Low dose axial images, sagittal and coronal reformations were obtained from the thoracic inlet to the lung bases following the IV administration of 100 mL of Omnipaque 350.  Contrast timing was optimized to evaluate the pulmonary arteries.  MIP images were performed.    COMPARISON:  CT chest from April 2016.    FINDINGS:  Structures at the base of the neck are unremarkable.  There is mild fusiform dilatation of the ascending thoracic aorta measuring 4.0 cm.  Heart is mildly enlarged without evidence of pericardial effusion.  No intraluminal filling defects within the pulmonary arteries to suggest pulmonary thromboembolism.   There is no evidence of mediastinal, axillary, or hilar lymph node enlargement.  The esophagus maintains a normal course and caliber.    The trachea and bronchi are patent.  The lungs are symmetrically expanded.  There is mild right basilar atelectasis.  Interlobular septal thickening is seen within the lungs with minimal patchy ground-glass attenuation, more prominent within the lower lobes.  No evidence of confluent focal consolidation, mass, or effusion.    The visualized abdominal structures are unremarkable.  Postsurgical changes are seen within the stomach.  Osseous structures are unremarkable.  Peripherally calcified left breast lesion is again seen.                               X-Ray Chest PA And Lateral (Final result)  Result time 10/12/18 22:22:27    Final result by Sharron Cortes MD (10/12/18 22:22:27)                 Impression:      Cardiomegaly with mild central pulmonary vascular congestion.      Electronically signed by: Sharron Cortes MD  Date:    10/12/2018  Time:    22:22             Narrative:    EXAMINATION:  XR CHEST PA AND LATERAL    CLINICAL HISTORY:  Chest pain, unspecified    TECHNIQUE:  PA and lateral views of the chest were  performed.    COMPARISON:  None    FINDINGS:  Cardiac silhouette is mildly enlarged.  Lungs are symmetrically expanded.  There is prominence of central pulmonary vasculature.  No evidence of focal consolidative process, pneumothorax, or significant effusion.  No acute osseous abnormality identified.  Partially calcified left breast lesion is again seen.                                 Medical Decision Making:   History:   Old Medical Records: I decided to obtain old medical records.  Independently Interpreted Test(s):   I have ordered and independently interpreted EKG Reading(s) - see prior notes  Clinical Tests:   Lab Tests: Ordered and Reviewed  Radiological Study: Reviewed and Ordered  Medical Tests: Ordered and Reviewed  ED Management:  Lab studies concerning for congestive heart failure and pulmonary embolism.  CTA ordered to rule out PE, however during the CT patient had some extravasation of IV contrast dye.  Pain and swelling noted IV site, compartment soft, no pain on passive motion of fingers.    [facility] paged for admission.                        Clinical Impression:     1. Congestive heart failure, unspecified HF chronicity, unspecified heart failure type    2. Chest pain    3. Pain    4. Extravasation accident, initial encounter                 I, Gunner Robins, personally performed the services described in this documentation. All medical record entries made by the scribe were at my direction and in my presence.  I have reviewed the chart and agree that the record reflects my personal performance and is accurate and complete within the limitations of emergency medical charting. Gunner Robins MD  10/13/18 0034

## 2018-10-13 NOTE — PLAN OF CARE
10/13/18 1346   RANGEL Message   Medicare Outpatient and Observation Notification regarding financial responsibility Given to patient/caregiver;Explained to patient/caregiver;Signed/date by patient/caregiver   Date RANGEL was signed 10/13/18   Time RANGEL was signed 1315

## 2018-10-13 NOTE — PROGRESS NOTES
"[facility]  History & Physical    SUBJECTIVE:     Chief Complaint:   SOB x 2-3 days    History of Present Illness:  49 yo female w/ pmhx of HFpEF (2015 ECHO w/ EF of 60%, DD), Essential HTN (not on medications, was taken off approx 1 year ago for unknown reason by unknown MD), ETOH dependence now on antabuse (quit May 2017 after trauma to her head 2/2 drinking) sickle cell trait, chronic microcystic anemia who presents to the ED with SOB for 2-3 days making it difficult to get to the bathroom at her house.  Onset was gradual and does feel like a prior presentation > 1 year ago when she was told she was in a heart failure exacercation.  Never had an MI or CVA before.  No chest pain with this occurrence although she did describe some chest discomfort- "pressure" that was mild and has now resolved.    Reviewed the ED note that mentions diffuse CP that was described as "tightness" in quality; pt states this wasn't really the case.  It was more the sensation of having difficulty breathing.    She has taken lasix in the past, but states she was told to stop it a year ago by an unknown MD for an unknown reason.  No difficulties with fluid overload, shortness of breath in the past year.  She has been eating lots of lunch meat with tortilla though she has minimal appetite ever since she had the gastric bypass surgery.    Pt was evaluated in the ER and found to have elevated BNP, elevated D-dimer, a CTA that was neg for PE but pos for 4 cm dilated fusiform portion of her ascending thoracic aneurysm.  BP was elevated in the ED but came down with medications.  Additionally, she was given 80 mg lasix x 1 dose.    Left arm IV contrast did infiltrate in pt's arm causing pain and swelling    Allergies:  Review of patient's allergies indicates:   Allergen Reactions    Penicillins Hives       Home Medications:  No current facility-administered medications on file prior to encounter.      Current Outpatient Medications on " File Prior to Encounter   Medication Sig    ALPRAZolam (XANAX) 1 MG tablet Take 1 tablet (1 mg total) by mouth 2 (two) times daily as needed for Anxiety.    cloNIDine (CATAPRES) 0.2 MG tablet Take 1 tablet (0.2 mg total) by mouth every evening.    cyanocobalamin 1,000 mcg/mL injection Inject 1,000 mcg into the muscle every 28 days. On the 15th on the month    disulfiram (ANTABUSE) 250 mg tablet Take 1 tablet (250 mg total) by mouth once daily.    folic acid (FOLVITE) 1 MG tablet Take 1 mg by mouth once daily.    hydrochlorothiazide (HYDRODIURIL) 25 MG tablet TK 1 T PO QD    hydrocodone-acetaminophen 10-325mg (NORCO)  mg Tab Take by mouth.    indomethacin (INDOCIN) 25 MG capsule Take 1 capsule (25 mg total) by mouth 3 (three) times daily as needed.    lactulose (CHRONULAC) 10 gram/15 mL solution TK 15 TO 30 ML PO ONCE TO BID PRF CONSTIPATION    latanoprost 0.005 % ophthalmic solution Place 1 drop into both eyes every evening.     lisinopril 10 MG tablet Take 10 mg by mouth every morning.     medroxyPROGESTERone (PROVERA) 10 MG tablet TAKE 1 TABLET BY MOUTH DAILY FOR 2 WEEKS    methocarbamol (ROBAXIN) 500 MG Tab TK 1 T PO  BID PRF MUSCLE SPASMS    MIMVEY 1-0.5 mg per tablet TAKE 1 TABLET EVERYDAY    montelukast (SINGULAIR) 10 mg tablet Take 10 mg by mouth every evening.    multivitamin (THERAGRAN) tablet Take 1 tablet by mouth once daily.    omeprazole (PRILOSEC) 40 MG capsule Take 1 capsule (40 mg total) by mouth once daily.    potassium chloride SA (K-DUR,KLOR-CON) 10 MEQ tablet TK 1 T PO  QD    QUEtiapine (SEROQUEL) 25 MG Tab Take 1 tablet (25 mg total) by mouth nightly as needed.    sumatriptan (IMITREX) 50 MG tablet Once for severe headache. May repeat once after 2 hours. Do not exceed 3-4 doses in one week.    sumatriptan (IMITREX) 50 MG tablet Once for severe headache. May repeat once after 2 hours. Do not exceed 3-4 doses in one week.    thiamine mononitrate 100 mg Tab Take 1 tablet  (100 mg total) by mouth 2 (two) times daily.    venlafaxine (EFFEXOR-XR) 150 MG Cp24 Take 1 capsule (150 mg total) by mouth once daily. Take with Effexor 75 mg to equal 225 mg / daily    venlafaxine (EFFEXOR-XR) 75 MG 24 hr capsule Take 1 capsule (75 mg total) by mouth once daily. Take with Effexor 150 mg capsules to total 225 mg /daily    zonisamide (ZONEGRAN) 50 MG Cap 1/2 tab nightly for one week then increase to 1 tab nightly    [DISCONTINUED] clonazePAM (KLONOPIN) 0.5 MG tablet Take 1 tablet (0.5 mg total) by mouth 2 (two) times daily as needed for Anxiety.       Past Medical History:   Diagnosis Date    Alcohol abuse 10/7/2015    Breast calcification, left     Pt states this has been worked up, she received a biopsy in the past to confirm calcifications were from scar tissue from when she had breast reduction surgery.      Chronic diastolic congestive heart failure     Encounter for blood transfusion     GERD (gastroesophageal reflux disease)     Glaucoma     Hypertension     Hyponatremia 10/2015    Na 109. attributed to polydipsia and alcohol abuse.  suffered a seizure in the ICU .    Insomnia     Malingering 2016    PTSD (post-traumatic stress disorder)     Sickle cell trait      Past Surgical History:   Procedure Laterality Date    bile fistula      BREAST BIOPSY Left     breast reduction       SECTION      CHOLECYSTECTOMY  after     ESOPHAGOGASTRODUODENOSCOPY (EGD) N/A 2016    Per[facility] Edy [facility]    ESOPHAGOGASTRODUODENOSCOPY (EGD) N/A 2015    Performed by [facility] (2ND F[facility])    ESOPHAGOGASTRODUODENOSCOPY (EGD) N/A 2015    Performed by Eren Mars [facility] at [facility]    GASTRIC BYPASS   and     SIGMOIDOSCOPY-FLEXIBLE N/A 2015    Performed by [facility]ren [facility] at [facility]    TOTAL REDUCTION MAMMOPLASTY Bilateral      Family History   Problem Relation Age of Onset    Diabetes  "Mother     Lupus Mother     Diabetes Maternal Grandmother     Crohn's disease Cousin      Social History     Tobacco Use    Smoking status: Current Every Day Smoker     Packs/day: 1.00     Years: 4.00     Pack years: 4.00     Types: Cigarettes    Smokeless tobacco: Never Used   Substance Use Topics    Alcohol use: No     Comment: former heavy drinker    Drug use: No        Review of Systems:  Constitutional: no fever or chills  Eyes: no visual changes  ENT: no nasal congestion or sore throat  Respiratory: no cough, + shortess of breath  Cardiovascular: mild chest "pressure", no palpitations  Gastrointestinal: no nausea or vomiting, no abdominal pain or change in bowel habits  Genitourinary: no hematuria or dysuria  Integument/Breast: no rash or pruritis  Hematologic/Lymphatic: no easy bruising or lymphadenopathy  Musculoskeletal: no arthralgias or myalgias  Neurological: no seizures or tremors  Behavioral/Psych: no auditory or visual hallucinations  Endocrine: no heat or cold intolerance    OBJECTIVE:     Vital Signs:  Temp: 97.8 °F (36.6 °C) (10/12/18 2106)  Pulse: 79 (10/13/18 0132)  Resp: 18 (10/13/18 0132)  BP: (!) 129/97 (10/13/18 0132)  SpO2: 100 % (10/13/18 0132)    Physical Exam:  General: well developed, no distress  HEENT: normocephalic, atraumatic, hearing grossly normal bilaterally, mucous membranes moist, EOM intact, no scleral icterus  Neck: supple, symmetrical, trachea midline, no JVD  Lungs:  +crackles and normal respiratory effort on RA  Cardiovascular: regular rate and rhythm.  No edema, distal pulses palpable and symmetric  Extremities: warm, well-perfused, moving all extremities,   Abdomen: soft, non-tender to palpation, no distention, no masses/hernias  Skin: Skin color, texture, turgor normal. No rashes or lesions  Musculoskeletal  no deformities, left arm swollen painful after IV infiltrate from CT  Neurologic: No focal numbness or weakness  Psych/Behavioral:  Alert and oriented, flat " affect.    Laboratory:  Labs Reviewed   COMPREHENSIVE METABOLIC PANEL - Abnormal; Notable for the following components:       Result Value    Sodium 135 (*)     CO2 20 (*)     ALT 54 (*)     All other components within normal limits   D DIMER, QUANTITATIVE - Abnormal; Notable for the following components:    D-Dimer 1.91 (*)     All other components within normal limits   CBC W/ AUTO DIFFERENTIAL - Abnormal; Notable for the following components:    Hemoglobin 10.2 (*)     Hematocrit 34.5 (*)     MCV 77 (*)     MCH 22.8 (*)     MCHC 29.6 (*)     RDW 20.1 (*)     All other components within normal limits   B-TYPE NATRIURETIC PEPTIDE - Abnormal; Notable for the following components:    BNP 1,064 (*)     All other components within normal limits   TROPONIN I   POCT URINE PREGNANCY       Diagnostic Results:  CXR 10/12: Cardiac silhouette is mildly enlarged.  Lungs are symmetrically expanded.  There is prominence of central pulmonary vasculature.  No evidence of focal consolidative process, pneumothorax, or significant effusion.  No acute osseous abnormality identified.  Partially calcified left breast lesion is again seen.    CTA 10/13:   1. No evidence of PE.  2. Mild cardiomegaly with interlobular septal thickening suggestive for mild pulmonary interstitial edema.  3. Mild fusiform dilatation of the ascending thoracic aorta measuring 4.0 cm.    ASSESSMENT:     49 yo female w/ pmhx of HFpEF (2015 ECHO w/ EF of 60%, DD), Essential HTN (not on medications, was taken off approx 1 year ago for unknown reason by unknown MD), ETOH dependence now on antabuse (quit May 2017 after trauma to her head 2/2 drinking) sickle cell trait, chronic microcystic anemia who presents with SOB x 3 days, suspected to be 2/2 acute on chronic diastolic heart failure.  Also found to have new 4 cm long dilation of the thoracic aorta.    CTA neg for PE and pro-BNP elevated.  Giving diuresis and monitoring for improvement.    PLAN:     Acute on  Chronic Diastolic Heart Failure  2015 ECHO with EF 60% and DD, will repeat  Was not taking medications for maintenance of HF, nor for HTN for unknown reason  Given lasix 80 mg x 1 last night, ordered additional 40 mg in the AM  Monitor I's/O's    HTN  BP elevated with diastolic in 116's in ED, added PRN hydralazine and labetolol (not concerned about decompensated HF at this point)  Has been on lisinopril-HCTZ in the past, will restart in the AM    ETOH dependence  Continued home antabuse    Depression  Cont home effexor 225 mg daily    Migraine  Home regimen with imitrex 50 mg PRN, would recommend new strategies after discharge  Holding while in-patient    PPX:   DVT ppx- lovenox  GI ppx- can consider H2 blocker, but will observe for now.  Tolerating regular diet    Code: Full    Dispo: Pending clinical improvement, repeat ECHO, plans for moving forward (home lasix, home BP meds), arrange f/u    Discussed with staff, Dr. Miles.      Mario JONES  [facility]  1:40 AM  10/13/2018

## 2018-10-13 NOTE — ED NOTES
"Pt presents to the ED with c/o sob. Pt reports SOB x 2 days with associated cp. Pt reports "the chest pain is like a pressure." rr even and unlabored on ra. Pt denies abdominal pain, n/v, headache, weakness, bladder or bowel issues at this time. Pt placed on cardiac monitor, cont bp, and cont pulse ox.   "

## 2018-10-13 NOTE — PROGRESS NOTES
Progress Note  [facility]  Admit Date: 10/12/2018   LOS: 0 days   SUBJECTIVE:   Follow-up For: SOB    SOB improved with diuresis, no chest pain.  Arm still painful from extrav of contrast.  Troponins increased, EKG with PVC    ROS   See above (no CP, no N/V, +fatigue)    OBJECTIVE:   Vital Signs (Most Recent)  Temp: 97.8 °F (36.6 °C) (10/13/18 0230)  Pulse: 88 (10/13/18 0721)  Resp: 18 (10/13/18 0721)  BP: (!) 133/97 (10/13/18 0721)  SpO2: 95 % (10/13/18 0801)    I & O (Last 24H):    Intake/Output Summary (Last 24 hours) at 10/13/2018 0854  Last data filed at 10/13/2018 0549  Gross per 24 hour   Intake --   Output 3050 ml   Net -3050 ml     Wt Readings from Last 3 Encounters:   10/12/18 78.9 kg (174 lb)   02/02/18 68.8 kg (151 lb 10.8 oz)   01/31/18 68.6 kg (151 lb 3.8 oz)       Current Diet Order   Procedures    Diet Cardiac        Physical Exam  Physical Exam:  General: well developed, no distress  HEENT: normocephalic, atraumatic, hearing grossly normal bilaterally, mucous membranes moist, EOM intact, no scleral icterus  Neck: supple, symmetrical, trachea midline, no JVD  Lungs:  +crackles and normal respiratory effort on RA  Cardiovascular: regular rate and rhythm.  No edema, distal pulses palpable and symmetric  Extremities: warm, well-perfused, moving all extremities,   Abdomen: soft, non-tender to palpation, no distention, no masses/hernias  Skin: Skin color, texture, turgor normal. No rashes or lesions  Musculoskeletal  no deformities, left arm swollen but still soft compartment TTP after IV infiltrate from CT  Neurologic: No focal numbness or weakness  Psych/Behavioral:  Alert and oriented, flat affect.        Laboratory Data:  CBC  Recent Labs   Lab  10/12/18   2259  10/13/18   0323   WBC  6.19  4.93   RBC  4.47  4.58   HGB  10.2*  10.4*   HCT  34.5*  35.0*   PLT  259  281   MCV  77*  76*   MCH  22.8*  22.7*   MCHC  29.6*  29.7*     CMP  Recent Labs   Lab  10/12/18   2233  10/13/18   0327    CALCIUM  9.4  9.6   PROT  6.9  7.2   NA  135*  135*   K  5.1  4.7   CO2  20*  20*   CL  104  103   BUN  14  14   CREATININE  1.1  1.2   ALKPHOS  87  96   ALT  54*  59*   AST  33  42*   BILITOT  0.5  0.8     MICRO  Microbiology Results (last 7 days)     ** No results found for the last 168 hours. **        Diagnostic Results:  Imaging in last 24 hours:      CTA: 1. No evidence of PE.  2. Mild cardiomegaly with interlobular septal thickening suggestive for mild pulmonary interstitial edema.  3. Mild fusiform dilatation of the ascending thoracic aorta measuring 4.0 cm.    Xray arm:  Extensive contrast extravasation is visualized throughout the medial aspect of the forearm from the elbow through the wrist.    No acute osseous abnormality identified noting that osseous structures are largely obscured by superimposed contrast.    ASSESSMENT/PLAN:   Daksha Marie is a 48 y.o. female    Acute on Chronic Diastolic Heart Failure  2015 ECHO with EF 60% and DD, will repeat  80 mg of lasix made 3,000 ml diuresis  Cont to watch I's/O's     HTN  BP elevated with diastolic in 116's in ED, added PRN hydralazine and labetolol (not concerned about decompensated HF at this point)  Has been on lisinopril-HCTZ in the past, restarting today     ETOH dependence  Continued home antabuse     Depression  Cont home effexor 225 mg daily     Migraine  Home regimen with imitrex 50 mg PRN, would recommend new strategies after discharge  Holding while in-patient     PPX:   DVT ppx- lovenox  GI ppx- can consider H2 blocker, but will observe for now.  Tolerating regular diet     Code: Full     Dispo: Pending clinical improvement, repeat ECHO, need home meds planned & f/u with PCP     Discussed with staff, Dr. Miles.        10/13/2018 Mario Gallegos MD  8:54 AM

## 2018-10-13 NOTE — ED NOTES
Pt returned from CT with left wrist PIV infiltrated. Pt left wrist PIV removed, catheter tip intact. Pt lower arm swollen, firm, and tender to touch. Ice pack applied. Dr. Robins notified.

## 2018-10-14 PROBLEM — I50.9 CONGESTIVE HEART FAILURE: Status: ACTIVE | Noted: 2018-10-14

## 2018-10-14 LAB
ALBUMIN SERPL BCP-MCNC: 3.2 G/DL
ALP SERPL-CCNC: 83 U/L
ALT SERPL W/O P-5'-P-CCNC: 42 U/L
ANION GAP SERPL CALC-SCNC: 10 MMOL/L
AST SERPL-CCNC: 21 U/L
BASOPHILS # BLD AUTO: 0.01 K/UL
BASOPHILS NFR BLD: 0.2 %
BILIRUB SERPL-MCNC: 0.6 MG/DL
BUN SERPL-MCNC: 23 MG/DL
CALCIUM SERPL-MCNC: 8.9 MG/DL
CHLORIDE SERPL-SCNC: 101 MMOL/L
CO2 SERPL-SCNC: 22 MMOL/L
CREAT SERPL-MCNC: 1.4 MG/DL
DIASTOLIC DYSFUNCTION: YES
DIFFERENTIAL METHOD: ABNORMAL
EOSINOPHIL # BLD AUTO: 0.1 K/UL
EOSINOPHIL NFR BLD: 1.2 %
ERYTHROCYTE [DISTWIDTH] IN BLOOD BY AUTOMATED COUNT: 19.8 %
EST. GFR  (AFRICAN AMERICAN): 52 ML/MIN/1.73 M^2
EST. GFR  (NON AFRICAN AMERICAN): 45 ML/MIN/1.73 M^2
ESTIMATED PA SYSTOLIC PRESSURE: 38.69
FERRITIN SERPL-MCNC: 13 NG/ML
FOLATE SERPL-MCNC: >40 NG/ML
GLUCOSE SERPL-MCNC: 80 MG/DL
HCT VFR BLD AUTO: 33.6 %
HGB BLD-MCNC: 10.1 G/DL
IRON SERPL-MCNC: 27 UG/DL
LYMPHOCYTES # BLD AUTO: 2.1 K/UL
LYMPHOCYTES NFR BLD: 51.5 %
MAGNESIUM SERPL-MCNC: 1.8 MG/DL
MCH RBC QN AUTO: 22.8 PG
MCHC RBC AUTO-ENTMCNC: 30.1 G/DL
MCV RBC AUTO: 76 FL
MITRAL VALVE REGURGITATION: ABNORMAL
MONOCYTES # BLD AUTO: 0.4 K/UL
MONOCYTES NFR BLD: 10.4 %
NEUTROPHILS # BLD AUTO: 1.5 K/UL
NEUTROPHILS NFR BLD: 36.5 %
PHOSPHATE SERPL-MCNC: 5.4 MG/DL
PLATELET # BLD AUTO: 246 K/UL
PMV BLD AUTO: 9.6 FL
POTASSIUM SERPL-SCNC: 4.4 MMOL/L
PROT SERPL-MCNC: 6.4 G/DL
RBC # BLD AUTO: 4.43 M/UL
RETIRED EF AND QEF - SEE NOTES: 30 (ref 55–65)
SATURATED IRON: 5 %
SODIUM SERPL-SCNC: 133 MMOL/L
TOTAL IRON BINDING CAPACITY: 502 UG/DL
TRANSFERRIN SERPL-MCNC: 339 MG/DL
TRICUSPID VALVE REGURGITATION: ABNORMAL
VIT B12 SERPL-MCNC: 819 PG/ML
WBC # BLD AUTO: 4.12 K/UL

## 2018-10-14 PROCEDURE — 94761 N-INVAS EAR/PLS OXIMETRY MLT: CPT

## 2018-10-14 PROCEDURE — 82746 ASSAY OF FOLIC ACID SERUM: CPT

## 2018-10-14 PROCEDURE — 63600175 PHARM REV CODE 636 W HCPCS: Performed by: STUDENT IN AN ORGANIZED HEALTH CARE EDUCATION/TRAINING PROGRAM

## 2018-10-14 PROCEDURE — 25000003 PHARM REV CODE 250: Performed by: STUDENT IN AN ORGANIZED HEALTH CARE EDUCATION/TRAINING PROGRAM

## 2018-10-14 PROCEDURE — 83540 ASSAY OF IRON: CPT

## 2018-10-14 PROCEDURE — 85025 COMPLETE CBC W/AUTO DIFF WBC: CPT

## 2018-10-14 PROCEDURE — 11000001 HC ACUTE MED/SURG PRIVATE ROOM

## 2018-10-14 PROCEDURE — 83735 ASSAY OF MAGNESIUM: CPT

## 2018-10-14 PROCEDURE — 36415 COLL VENOUS BLD VENIPUNCTURE: CPT

## 2018-10-14 PROCEDURE — 84100 ASSAY OF PHOSPHORUS: CPT

## 2018-10-14 PROCEDURE — 82607 VITAMIN B-12: CPT

## 2018-10-14 PROCEDURE — 82728 ASSAY OF FERRITIN: CPT

## 2018-10-14 PROCEDURE — 80053 COMPREHEN METABOLIC PANEL: CPT

## 2018-10-14 RX ORDER — LANOLIN ALCOHOL/MO/W.PET/CERES
400 CREAM (GRAM) TOPICAL ONCE
Status: COMPLETED | OUTPATIENT
Start: 2018-10-14 | End: 2018-10-14

## 2018-10-14 RX ORDER — CARVEDILOL 3.12 MG/1
3.12 TABLET ORAL 2 TIMES DAILY
Status: DISCONTINUED | OUTPATIENT
Start: 2018-10-14 | End: 2018-10-14

## 2018-10-14 RX ORDER — MORPHINE SULFATE 4 MG/ML
2 INJECTION, SOLUTION INTRAMUSCULAR; INTRAVENOUS ONCE
Status: COMPLETED | OUTPATIENT
Start: 2018-10-15 | End: 2018-10-15

## 2018-10-14 RX ORDER — ACETAMINOPHEN 325 MG/1
650 TABLET ORAL EVERY 6 HOURS PRN
Status: DISCONTINUED | OUTPATIENT
Start: 2018-10-14 | End: 2018-10-15 | Stop reason: HOSPADM

## 2018-10-14 RX ADMIN — TRAMADOL HYDROCHLORIDE 50 MG: 50 TABLET, COATED ORAL at 03:10

## 2018-10-14 RX ADMIN — TRAMADOL HYDROCHLORIDE 50 MG: 50 TABLET, COATED ORAL at 09:10

## 2018-10-14 RX ADMIN — ALPRAZOLAM 0.5 MG: 0.25 TABLET ORAL at 03:10

## 2018-10-14 RX ADMIN — MAGNESIUM OXIDE TAB 400 MG (241.3 MG ELEMENTAL MG) 400 MG: 400 (241.3 MG) TAB at 08:10

## 2018-10-14 RX ADMIN — MONTELUKAST SODIUM 10 MG: 10 TABLET, COATED ORAL at 09:10

## 2018-10-14 RX ADMIN — ENOXAPARIN SODIUM 40 MG: 100 INJECTION SUBCUTANEOUS at 05:10

## 2018-10-14 RX ADMIN — DISULFIRAM 250 MG: 250 TABLET ORAL at 08:10

## 2018-10-14 RX ADMIN — HYDROCHLOROTHIAZIDE 25 MG: 25 TABLET ORAL at 08:10

## 2018-10-14 RX ADMIN — TRAMADOL HYDROCHLORIDE 50 MG: 50 TABLET, COATED ORAL at 06:10

## 2018-10-14 RX ADMIN — VENLAFAXINE HYDROCHLORIDE 150 MG: 75 CAPSULE, EXTENDED RELEASE ORAL at 08:10

## 2018-10-14 RX ADMIN — ACETAMINOPHEN 650 MG: 325 TABLET ORAL at 11:10

## 2018-10-14 RX ADMIN — ALPRAZOLAM 0.5 MG: 0.25 TABLET ORAL at 09:10

## 2018-10-14 RX ADMIN — CLONIDINE HYDROCHLORIDE 0.2 MG: 0.2 TABLET ORAL at 09:10

## 2018-10-14 RX ADMIN — PANTOPRAZOLE SODIUM 40 MG: 40 TABLET, DELAYED RELEASE ORAL at 08:10

## 2018-10-14 RX ADMIN — LISINOPRIL 10 MG: 5 TABLET ORAL at 06:10

## 2018-10-14 RX ADMIN — FOLIC ACID 1 MG: 1 TABLET ORAL at 08:10

## 2018-10-14 RX ADMIN — VENLAFAXINE HYDROCHLORIDE 75 MG: 75 CAPSULE, EXTENDED RELEASE ORAL at 08:10

## 2018-10-14 RX ADMIN — QUETIAPINE FUMARATE 12.5 MG: 25 TABLET, FILM COATED ORAL at 09:10

## 2018-10-14 NOTE — PLAN OF CARE
Problem: Patient Care Overview  Goal: Plan of Care Review  Outcome: Ongoing (interventions implemented as appropriate)  Plan of care reviewed with patient, understanding verbalized. Pt remains SR on tele. Complaints of pain x1 overnight relieved by PRN medication. Instructed to call for assistance, understanding verbalized. Bed alarm on, call light in reach, fall precautions in place. Will continue to monitor.

## 2018-10-14 NOTE — PROGRESS NOTES
Progress Note  [facility]  Admit Date: 10/12/2018   LOS: 0 days   SUBJECTIVE:   Follow-up For: SOB    Update: SOB improved w/ diuresis, no chest pain.  TTP over the area of IV extravasation. Reports increased SOB w/ ambulation.  Denies fever, HA, abd pain, N/V or paresthesia.     ROS   See above     OBJECTIVE:   Vital Signs (Most Recent)  Temp: 97.5 °F (36.4 °C) (10/14/18 0346)  Pulse: 70 (10/14/18 0716)  Resp: 18 (10/14/18 0346)  BP: 111/76 (10/14/18 0716)  SpO2: 98 % (10/14/18 0716)    I & O (Last 24H):    Intake/Output Summary (Last 24 hours) at 10/14/2018 0726  Last data filed at 10/14/2018 0626  Gross per 24 hour   Intake 485 ml   Output 5300 ml   Net -4815 ml     Wt Readings from Last 3 Encounters:   10/12/18 78.9 kg (174 lb)   02/02/18 68.8 kg (151 lb 10.8 oz)   01/31/18 68.6 kg (151 lb 3.8 oz)       Current Diet Order   Procedures    Diet Cardiac        Physical Exam  Physical Exam:  General: well developed, no distress  HEENT: normocephalic, atraumatic, hearing grossly normal bilaterally, mucous membranes moist, EOM intact, no scleral icterus  Neck: supple, symmetrical, trachea midline, no JVD  Lungs:  +crackles and normal respiratory effort on RA  Cardiovascular: regular rate and rhythm.  No edema, distal pulses palpable and symmetric  Extremities: warm, well-perfused, moving all extremities,   Abdomen: soft, non-tender to palpation, no distention, no masses/hernias  Skin: Skin color, texture, turgor normal. No rashes or lesions  Musculoskeletal  no deformities, left arm is improving since the pt has elevated her arm but still soft compartment. No digns of compartment syndrome. TTP after IV infiltrate from CT. AIN/PIN/radial, median, and ulnar motor intact, SILT C5-T1 w/o gross deficit to the LUE.   Neurologic: No focal numbness or weakness  Psych/Behavioral:  Alert and oriented, flat affect.        Laboratory Data:  CBC  Recent Labs   Lab  10/12/18   2259  10/13/18   0323  10/14/18   0349   WBC   6.19  4.93  4.12   RBC  4.47  4.58  4.43   HGB  10.2*  10.4*  10.1*   HCT  34.5*  35.0*  33.6*   PLT  259  281  246   MCV  77*  76*  76*   MCH  22.8*  22.7*  22.8*   MCHC  29.6*  29.7*  30.1*     CMP  Recent Labs   Lab  10/12/18   2233  10/13/18   0323  10/14/18   0349   CALCIUM  9.4  9.6  8.9   PROT  6.9  7.2  6.4   NA  135*  135*  133*   K  5.1  4.7  4.4   CO2  20*  20*  22*   CL  104  103  101   BUN  14  14  23*   CREATININE  1.1  1.2  1.4   ALKPHOS  87  96  83   ALT  54*  59*  42   AST  33  42*  21   BILITOT  0.5  0.8  0.6     MICRO  Microbiology Results (last 7 days)     ** No results found for the last 168 hours. **        Diagnostic Results:  Imaging in last 24 hours:      CTA: 1. No evidence of PE.  2. Mild cardiomegaly with interlobular septal thickening suggestive for mild pulmonary interstitial edema.  3. Mild fusiform dilatation of the ascending thoracic aorta measuring 4.0 cm.    Xray arm:  Extensive contrast extravasation is visualized throughout the medial aspect of the forearm from the elbow through the wrist.    No acute osseous abnormality identified noting that osseous structures are largely obscured by superimposed contrast.    ASSESSMENT/PLAN:   Daksha Marie is a 48 y.o. female    #Acute on Chronic Diastolic Heart Failure  -2015 ECHO with EF 60% and DD  -Repeat echo on 10/13 pending results  - Lasix IV 40 mg at 9  - I/O -3.7L  - Repeat trop 0.399, no additional trop needed bc was trending down     #HTN  - /76   - PRN hydralazine and labetolol (not concerned about decompensated HF at this point)  - Continue home medical management     #ETOH dependence  Continued home antabuse     #Depression d/o  Cont home effexor 225 mg daily     #Migraine H/A  Continue Home regimen with imitrex 50 mg PRN  Holding while in-patient     PPX:   DVT ppx- Lovenox     Code: Full     Dispo:   -Continue supportive care and pain management  - Replaced electrolytes  - Echo on 10/13 pending and will f/u  on results  - I/O -3.7 L/day, continue diuresis  - LUE is improving but continue to monitor and encourage elevation      10/14/2018 Jake Morrison, DO

## 2018-10-14 NOTE — PLAN OF CARE
Problem: Patient Care Overview  Goal: Plan of Care Review  Outcome: Ongoing (interventions implemented as appropriate)  Plan of care reviewed with patient.Pt [id number] and able to verbalize needs to staff. Ambulated about the room to the bathroom. 1000 output of urine today. Medicated x1 for complaint of arm pain and some anxiety noted about procedure for Monday morning. Talked about how the test goes and she was a little more relieved. Xanax given x1 with med effective. Pt with an episode of 12 beat run of vtach this evening. Md notified. Verbalizes to staff understanding. NSR on monitor with 0 red alarms noted.  No acute distress observed at this time. Side rails x2, bed in low position, call bell within reach. Bed alarm in place for patient safety. Will relay to oncoming nurse to monitor for changes in condition.

## 2018-10-15 VITALS
OXYGEN SATURATION: 98 % | HEIGHT: 65 IN | TEMPERATURE: 97 F | HEART RATE: 76 BPM | SYSTOLIC BLOOD PRESSURE: 117 MMHG | RESPIRATION RATE: 16 BRPM | BODY MASS INDEX: 28.99 KG/M2 | WEIGHT: 174 LBS | DIASTOLIC BLOOD PRESSURE: 68 MMHG

## 2018-10-15 LAB
ALBUMIN SERPL BCP-MCNC: 3.2 G/DL
ALP SERPL-CCNC: 82 U/L
ALT SERPL W/O P-5'-P-CCNC: 31 U/L
ANION GAP SERPL CALC-SCNC: 9 MMOL/L
AST SERPL-CCNC: 13 U/L
BASOPHILS # BLD AUTO: 0.01 K/UL
BASOPHILS NFR BLD: 0.2 %
BILIRUB SERPL-MCNC: 0.5 MG/DL
BUN SERPL-MCNC: 32 MG/DL
CALCIUM SERPL-MCNC: 9.1 MG/DL
CHLORIDE SERPL-SCNC: 99 MMOL/L
CO2 SERPL-SCNC: 25 MMOL/L
CREAT SERPL-MCNC: 1.5 MG/DL
DIASTOLIC DYSFUNCTION: NO
DIFFERENTIAL METHOD: ABNORMAL
EOSINOPHIL # BLD AUTO: 0.1 K/UL
EOSINOPHIL NFR BLD: 1.4 %
ERYTHROCYTE [DISTWIDTH] IN BLOOD BY AUTOMATED COUNT: 19.8 %
EST. GFR  (AFRICAN AMERICAN): 47 ML/MIN/1.73 M^2
EST. GFR  (NON AFRICAN AMERICAN): 41 ML/MIN/1.73 M^2
GLUCOSE SERPL-MCNC: 117 MG/DL
HCT VFR BLD AUTO: 33.8 %
HGB BLD-MCNC: 10 G/DL
LYMPHOCYTES # BLD AUTO: 2.2 K/UL
LYMPHOCYTES NFR BLD: 52.9 %
MAGNESIUM SERPL-MCNC: 2.2 MG/DL
MCH RBC QN AUTO: 22.6 PG
MCHC RBC AUTO-ENTMCNC: 29.6 G/DL
MCV RBC AUTO: 76 FL
MONOCYTES # BLD AUTO: 0.3 K/UL
MONOCYTES NFR BLD: 7.7 %
NEUTROPHILS # BLD AUTO: 1.6 K/UL
NEUTROPHILS NFR BLD: 37.6 %
PHOSPHATE SERPL-MCNC: 5.1 MG/DL
PLATELET # BLD AUTO: 233 K/UL
PMV BLD AUTO: 9 FL
POTASSIUM SERPL-SCNC: 4.5 MMOL/L
PROT SERPL-MCNC: 6.4 G/DL
RBC # BLD AUTO: 4.43 M/UL
RPR SER QL: NORMAL
SODIUM SERPL-SCNC: 133 MMOL/L
WBC # BLD AUTO: 4.14 K/UL

## 2018-10-15 PROCEDURE — 80053 COMPREHEN METABOLIC PANEL: CPT

## 2018-10-15 PROCEDURE — 93017 CV STRESS TEST TRACING ONLY: CPT

## 2018-10-15 PROCEDURE — 63600175 PHARM REV CODE 636 W HCPCS: Performed by: FAMILY MEDICINE

## 2018-10-15 PROCEDURE — 25000003 PHARM REV CODE 250: Performed by: FAMILY MEDICINE

## 2018-10-15 PROCEDURE — G0009 ADMIN PNEUMOCOCCAL VACCINE: HCPCS | Performed by: FAMILY MEDICINE

## 2018-10-15 PROCEDURE — 3E0234Z INTRODUCTION OF SERUM, TOXOID AND VACCINE INTO MUSCLE, PERCUTANEOUS APPROACH: ICD-10-PCS | Performed by: FAMILY MEDICINE

## 2018-10-15 PROCEDURE — 83735 ASSAY OF MAGNESIUM: CPT

## 2018-10-15 PROCEDURE — 84100 ASSAY OF PHOSPHORUS: CPT

## 2018-10-15 PROCEDURE — 90670 PCV13 VACCINE IM: CPT | Performed by: FAMILY MEDICINE

## 2018-10-15 PROCEDURE — 25000003 PHARM REV CODE 250: Performed by: STUDENT IN AN ORGANIZED HEALTH CARE EDUCATION/TRAINING PROGRAM

## 2018-10-15 PROCEDURE — 86592 SYPHILIS TEST NON-TREP QUAL: CPT

## 2018-10-15 PROCEDURE — 85025 COMPLETE CBC W/AUTO DIFF WBC: CPT

## 2018-10-15 PROCEDURE — 36415 COLL VENOUS BLD VENIPUNCTURE: CPT

## 2018-10-15 PROCEDURE — 63600175 PHARM REV CODE 636 W HCPCS: Performed by: STUDENT IN AN ORGANIZED HEALTH CARE EDUCATION/TRAINING PROGRAM

## 2018-10-15 PROCEDURE — 90471 IMMUNIZATION ADMIN: CPT | Performed by: FAMILY MEDICINE

## 2018-10-15 RX ORDER — CARVEDILOL 3.12 MG/1
3.12 TABLET ORAL 2 TIMES DAILY WITH MEALS
Status: DISCONTINUED | OUTPATIENT
Start: 2018-10-15 | End: 2018-10-15 | Stop reason: HOSPADM

## 2018-10-15 RX ORDER — FUROSEMIDE 20 MG/1
20 TABLET ORAL DAILY PRN
Qty: 60 TABLET | Refills: 1 | Status: SHIPPED | OUTPATIENT
Start: 2018-10-15 | End: 2019-02-07 | Stop reason: SDUPTHER

## 2018-10-15 RX ORDER — SPIRONOLACTONE 25 MG/1
25 TABLET ORAL DAILY
Status: DISCONTINUED | OUTPATIENT
Start: 2018-10-15 | End: 2018-10-15 | Stop reason: HOSPADM

## 2018-10-15 RX ORDER — CARVEDILOL 3.12 MG/1
3.12 TABLET ORAL 2 TIMES DAILY
Qty: 90 TABLET | Refills: 3 | Status: SHIPPED | OUTPATIENT
Start: 2018-10-15 | End: 2019-01-15 | Stop reason: SDUPTHER

## 2018-10-15 RX ORDER — SPIRONOLACTONE 25 MG/1
25 TABLET ORAL DAILY
Qty: 90 TABLET | Refills: 1 | Status: SHIPPED | OUTPATIENT
Start: 2018-10-15 | End: 2019-01-15 | Stop reason: SDUPTHER

## 2018-10-15 RX ADMIN — PNEUMOCOCCAL 13-VALENT CONJUGATE VACCINE 0.5 ML: 2.2; 2.2; 2.2; 2.2; 2.2; 4.4; 2.2; 2.2; 2.2; 2.2; 2.2; 2.2; 2.2 INJECTION, SUSPENSION INTRAMUSCULAR at 02:10

## 2018-10-15 RX ADMIN — Medication 100 MG: at 08:10

## 2018-10-15 RX ADMIN — LIDOCAINE HYDROCHLORIDE: 20 SOLUTION ORAL; TOPICAL at 03:10

## 2018-10-15 RX ADMIN — TRAMADOL HYDROCHLORIDE 50 MG: 50 TABLET, COATED ORAL at 02:10

## 2018-10-15 RX ADMIN — SPIRONOLACTONE 25 MG: 25 TABLET, FILM COATED ORAL at 02:10

## 2018-10-15 RX ADMIN — MORPHINE SULFATE 2 MG: 4 INJECTION INTRAVENOUS at 12:10

## 2018-10-15 RX ADMIN — DISULFIRAM 250 MG: 250 TABLET ORAL at 08:10

## 2018-10-15 RX ADMIN — VENLAFAXINE HYDROCHLORIDE 75 MG: 75 CAPSULE, EXTENDED RELEASE ORAL at 08:10

## 2018-10-15 RX ADMIN — PANTOPRAZOLE SODIUM 40 MG: 40 TABLET, DELAYED RELEASE ORAL at 08:10

## 2018-10-15 RX ADMIN — TRAMADOL HYDROCHLORIDE 50 MG: 50 TABLET, COATED ORAL at 08:10

## 2018-10-15 RX ADMIN — FOLIC ACID 1 MG: 1 TABLET ORAL at 08:10

## 2018-10-15 RX ADMIN — VENLAFAXINE HYDROCHLORIDE 150 MG: 75 CAPSULE, EXTENDED RELEASE ORAL at 08:10

## 2018-10-15 RX ADMIN — HYDROCHLOROTHIAZIDE 25 MG: 25 TABLET ORAL at 08:10

## 2018-10-15 NOTE — PROGRESS NOTES
Progress Note  [facility]  Admit Date: 10/12/2018   LOS: 1 day   SUBJECTIVE:   Follow-up For: SOB    Update: SOB improved w/ diuresis, no chest pain she has increased SOB w/ ambulation.  She continues to have pain at the site of IV infiltration, swelling is greatly improved. Denies fever, HA, abd pain, N/V or paresthesia.     ROS   Otherwise negative     OBJECTIVE:   Vital Signs (Most Recent)  Temp: 97.7 °F (36.5 °C) (10/15/18 0750)  Pulse: 67 (10/15/18 0800)  Resp: 18 (10/15/18 0750)  BP: 100/64 (10/15/18 0750)  SpO2: 98 % (10/15/18 0430)    I & O (Last 24H):    Intake/Output Summary (Last 24 hours) at 10/15/2018 0828  Last data filed at 10/15/2018 0517  Gross per 24 hour   Intake 665 ml   Output 1400 ml   Net -735 ml     Wt Readings from Last 3 Encounters:   10/12/18 78.9 kg (174 lb)   02/02/18 68.8 kg (151 lb 10.8 oz)   01/31/18 68.6 kg (151 lb 3.8 oz)       Current Diet Order   Procedures    Diet NPO Except for: Medication, Ice Chips     Order Specific Question:   Except for     Answer:   Medication     Order Specific Question:   Except for     Answer:   Ice Chips        Physical Exam  Physical Exam:  General: well developed, no distress  HEENT: normocephalic, atraumatic, hearing grossly normal bilaterally, mucous membranes moist, EOM intact, no scleral icterus  Neck: supple, symmetrical, trachea midline, no JVD  Lungs:  -crackles and normal respiratory effort  Cardiovascular: regular rate and rhythm.  No edema, distal pulses palpable and symmetric  Extremities: warm, well-perfused, moving all extremities,   Abdomen: soft, non-tender to palpation, no distention, no masses/hernias  Skin: Skin color, texture, turgor normal. No rashes or lesions  Musculoskeletal:  no deformities, left arm is improving since the pt has elevated her arm but still soft compartment. Swelling is greatly improved, able to see bony landmarks, No signs of compartment syndrome. TTP after IV infiltrate from CT. AIN/PIN/radial,  median, and ulnar motor intact, SILT C5-T1 w/o gross deficit to the LUE.   Neurologic: No focal numbness or weakness  Psych/Behavioral:  Alert and oriented, flat affect.        Laboratory Data:  CBC  Recent Labs   Lab  10/13/18   0323  10/14/18   0349  10/15/18   0340   WBC  4.93  4.12  4.14   RBC  4.58  4.43  4.43   HGB  10.4*  10.1*  10.0*   HCT  35.0*  33.6*  33.8*   PLT  281  246  233   MCV  76*  76*  76*   MCH  22.7*  22.8*  22.6*   MCHC  29.7*  30.1*  29.6*     CMP  Recent Labs   Lab  10/13/18   0323  10/14/18   0349  10/15/18   0340   CALCIUM  9.6  8.9  9.1   PROT  7.2  6.4  6.4   NA  135*  133*  133*   K  4.7  4.4  4.5   CO2  20*  22*  25   CL  103  101  99   BUN  14  23*  32*   CREATININE  1.2  1.4  1.5*   ALKPHOS  96  83  82   ALT  59*  42  31   AST  42*  21  13   BILITOT  0.8  0.6  0.5     MICRO  Microbiology Results (last 7 days)     ** No results found for the last 168 hours. **        Diagnostic Results:  Imaging in last 24 hours:      CTA: 1. No evidence of PE.  2. Mild cardiomegaly with interlobular septal thickening suggestive for mild pulmonary interstitial edema.  3. Mild fusiform dilatation of the ascending thoracic aorta measuring 4.0 cm.    Xray arm:  Extensive contrast extravasation is visualized throughout the medial aspect of the forearm from the elbow through the wrist.    No acute osseous abnormality identified noting that osseous structures are largely obscured by superimposed contrast.    X-Ray Forearm Left   Final Result      See above.         Electronically signed by: Sharron Cortes MD   Date:    10/13/2018   Time:    00:23      CTA Chest Non-Coronary   Final Result      1. No evidence of PE.   2. Mild cardiomegaly with interlobular septal thickening suggestive for mild pulmonary interstitial edema.   3. Mild fusiform dilatation of the ascending thoracic aorta measuring 4.0 cm.         Electronically signed by: Sharron Cortes MD   Date:    10/13/2018   Time:    00:14      X-Ray Chest  PA And Lateral   Final Result      Cardiomegaly with mild central pulmonary vascular congestion.         Electronically signed by: Sharron Cortes MD   Date:    10/12/2018   Time:    22:22      NM Myocardial Perfusion Spect Multi Pharmacologic    (Results Pending)   2D Echo:   1. Moderately depressed left ventricular systolic function (EF 30%).  Reduced global LV strain of -7.2%..     2 - Wall motion abnormalities.     3 - Concentric hypertrophy.     4 - Restrictive LV filling pattern, indicating markedly elevated LAP (grade 3 diastolic dysfunction).     5 - Severe left atrial enlargement.     6 - The estimated PA systolic pressure is 39 mmHg.     7 - Normal right ventricular systolic function .     8 - Intermediate central venous pressure.       ASSESSMENT/PLAN:   Daksha Marie is a 48 y.o. female    #Acute on Chronic Diastolic Heart Failure  -Echo shows EF of 30% with concentric hypertrophu, with restrictive LV filling pattern (grade 3 diastolic dysfunction)  -Follow up Dobutamine Stress Test   -No crackles appreciated on physical exam   - I/O -735 ccs        #HTN  - /64   - PRN hydralazine and labetolol (not concerned about decompensated HF at this point)  - Continue home medical management     #ETOH dependence  Continued home antabuse     #Depression d/o  Cont home effexor 225 mg daily     #Migraine H/A  Continue Home regimen with imitrex 50 mg PRN  Holding while in-patient     PPX:   DVT ppx- Lovenox     Code: Full     Dispo:   -Continue supportive care and pain management  - Replaced electrolytes  - I/O -750 L/day, continue diuresis  - LUE is improving but continue to monitor and pain management       10/15/2018 Meredith Daley MD

## 2018-10-15 NOTE — PLAN OF CARE
Pt discharge to home. Pt is stable. Discharge teaching discussed with pt and Panda (father) Discussed about the new medications and its side effects and upcoming appts. Verbalized clear understanding. IV removed and exhibit no signs of active bleeding. No complaints of discomfort or pain. No respiratory distress noted. Telemetry box removed. Waiting for transport to [facility].

## 2018-10-15 NOTE — PLAN OF CARE
TN went to meet with patient, no family at bedside. Patient is discharged to home with OP Therapy. Patient is agreeable to follow-up at the [facility]. She also would like to see Dr. Little for follow-up. Appointments scheduled.     Prescriptions sent for bedside delivery.    Patient reports she will drive herself home (She spoke with MD as well).    Future Appointments   Date Time Provider Department Center   10/18/2018  9:00 AM Osvaldo Verdin, LCSW [facility] [address]   10/22/2018 10:00 AM [facility]   11/1/2018  9:00 AM Osvaldoamber Verdin, LCSW [facility] [address]   11/15/2018  9:00 AM Osvaldoamber Verdin, LCSW [facility] [address]   11/29/2018  9:00 AM Osvaldo Verdin, LCSW [facility] [address]   12/13/2018  9:00 AM Osvaldo Verdin, LCSW [facility] [address]   1/10/2019 10:30 AM Ceferino Farr III, BEHZAD [facility] [address]     Follow-up With  Details  Why  Contact Info   Caridad Padilla MD (Cindy)  Schedule an appointment as soon as possible for a visit in 1 week  as needed  [address] [facility]  [place] [address]  [phone]   John Broderick PA-C  On 10/22/2018  at 10:00 am--Hospital Follow-Up  [address]  [address]  [place] [address]  [phone]   Rocael Little MD  On 10/24/2018  at 10:45 am--Cardiology Follow-Up  [address]  [address]  [place] [address]  [phone]   Outpatient Therapy      They will call you to schedule outpatient therapy for your hand.     Phone Number: [phone]      10/15/18 9477   Final Note   Assessment Type Final Discharge Note   Discharge Disposition Home   What phone number can be called within the next 1-3 days to see how you are doing after discharge? [phone]   Hospital Follow Up  Appt(s) scheduled? Yes   Right Care Referral Info   Post Acute Recommendation No Care     Coral M. John RN  Transition Navigator  [phone]

## 2018-10-15 NOTE — PLAN OF CARE
Problem: Patient Care Overview  Goal: Plan of Care Review  Outcome: Ongoing (interventions implemented as appropriate)  Pt received on room air with SpO2 99 %. No distress noted and will continue to monitor.

## 2018-10-15 NOTE — PROGRESS NOTES
1000 Pt on table, ready for test. Pt states being allergic to PCN, pt states having hx of Glaucoma.  1010Connected to monitor EKG and NIBP. PIV in place R wrist, flushed smoothly. No s/s of infiltration.  1010 Epifanio PERSON at bedside to explain procedure and obtain consent.  1011Test started per protocol, see EKG sheet for VS  1011 Dobutamine at 10mcg to increase HR, target .  1014 Dobutamine at 20mcg, pt tolerating well  1017 Dobutamine at 30mcg, leg exercises and hand ball given.  1021 Dobutamine at 40mcg, pt still at baseline HR, no chest pain NV or any other change.Target HR has not been obtained.  1026 Unable to obtain HR. MD Pavon notified. Testing phase stopped . HR 79, pt denies discomfort at this time.   1030 Recovery phase completed. Pt states feels normal, no distress. pt released to cardio.

## 2018-10-15 NOTE — DISCHARGE SUMMARY
[facility]  Discharge Summary      Admit Date: 10/12/2018    Discharge Date and Time:  10/15/2018 2:57 PM    Attending Physician: Alexis Farias III, MD     Discharging Physician: Meredith Daley MD    Reason for Admission: Congestive Heart Failure exacerbation    Procedures Performed: * No surgery found *    Hospital Course: Patient is a 47 y/o female with a past medical history of CHF, HTN, alcohol abuse, GERD, glaucoma, seizures, sickle cell trait, and malingering who presented to the ED on 10/12/18 with a 3 day history of dyspnea on minimal exertion associated with chest pain. BNP in the ED was elevated at 1,064, elevated D-dimer, and a CTA that was negative for PE but revealed a 4 cm dilated fusiform portion of her ascending thoracic aorta.  BP was elevated in the ED but came down with medications.  Additionally, she was given an 80 mg dose of lasix. [facility] and given tramadol for pain at IV site in left hand.  Cardiology was consulted. Troponin levels were trended (0.514 => 0.456 => 0.399) and since trended downward, follow up labs were unnecessary.  Echo revealed EF of 30% and patient was diagnosed with acute on chronic heart failure.  For blood pressure control, patient was started on hydralazine and labetolol PRN. On day 2 of admission, patient doing well on room air and dobutamine stress test was scheduled, which was unremarkable.  Cardiology recommended  witholding further diuresis and optimizing medical regimen by  stopping clonidine and HCTZ, starting Coreg post nuclear scan, maximizing lisinopril, adding spironolactone, and placing patient on PRN PO lasix.  With resolution of symptoms and no further cardiology workup necessary, patient is suitable for discharge today.      Consults: cardiology    Significant Diagnostic Studies: Labs:   BMP:   Recent Labs   Lab  10/14/18   0349  10/15/18   0340   GLU  80  117*   NA  133*  133*   K  4.4   4.5   CL  101  99   CO2  22*  25   BUN  23*  32*   CREATININE  1.4  1.5*   CALCIUM  8.9  9.1   MG  1.8  2.2   , CMP   Recent Labs   Lab  10/14/18   0349  10/15/18   0340   NA  133*  133*   K  4.4  4.5   CL  101  99   CO2  22*  25   GLU  80  117*   BUN  23*  32*   CREATININE  1.4  1.5*   CALCIUM  8.9  9.1   PROT  6.4  6.4   ALBUMIN  3.2*  3.2*   BILITOT  0.6  0.5   ALKPHOS  83  82   AST  21  13   ALT  42  31   ANIONGAP  10  9   ESTGFRAFRICA  52*  47*   EGFRNONAA  45*  41*   , CBC   Recent Labs   Lab  10/14/18   0349  10/15/18   0340   WBC  4.12  4.14   HGB  10.1*  10.0*   HCT  33.6*  33.8*   PLT  246  233    and Troponin   Recent Labs   Lab  10/13/18   1717   TROPONINI  0.399*       X-Ray Chest PA And Lateral   Order: [id number]   Status:  Final result   Visible to patient:  Yes (Patient Portal) Next appt:  10/18/2018 at 09:00 AM in Psychiatry ([facility], [facility]) Dx:  Chest pain   Details     Reading Physician Reading Date Result Priority   Sharron Cortes MD 10/12/2018       Narrative     EXAMINATION:  XR CHEST PA AND LATERAL    CLINICAL HISTORY:  Chest pain, unspecified    TECHNIQUE:  PA and lateral views of the chest were performed.    COMPARISON:  None    FINDINGS:  Cardiac silhouette is mildly enlarged.  Lungs are symmetrically expanded.  There is prominence of central pulmonary vasculature.  No evidence of focal consolidative process, pneumothorax, or significant effusion.  No acute osseous abnormality identified.  Partially calcified left breast lesion is again seen.      Impression       Cardiomegaly with mild central pulmonary vascular congestion.      Electronically signed by: Sharron Cortes MD  Date: 10/12/2018  Time: 22:22             Last Resulted: 10/12/18 22:22 Order Details View Encounter Lab and Collection Details Routing Result History            External Result Report     External Result Report   Narrative     EXAMINATION:  XR CHEST PA AND LATERAL    CLINICAL HISTORY:  Chest pain,  unspecified    TECHNIQUE:  PA and lateral views of the chest were performed.    COMPARISON:  None    FINDINGS:  Cardiac silhouette is mildly enlarged.  Lungs are symmetrically expanded.  There is prominence of central pulmonary vasculature.  No evidence of focal consolidative process, pneumothorax, or significant effusion.  No acute osseous abnormality identified.  Partially calcified left breast lesion is again seen.   Impression       Cardiomegaly with mild central pulmonary vascular congestion.      Electronically signed by: Sharron Cortes MD  Date: 10/12/2018  Time: 22:22          CTA Chest Non-Coronary   Order: [id number]   Status:  Final result   Visible to patient:  Yes (Patient Portal) Next appt:  10/18/2018 at 09:00 AM [facility]   Details     Reading Physician Reading Date Result Priority   Sharron Cortes MD 10/13/2018       Narrative     EXAMINATION:  CTA CHEST NON CORONARY    CLINICAL HISTORY:  suspected PE/cp/sob;    TECHNIQUE:  Low dose axial images, sagittal and coronal reformations were obtained from the thoracic inlet to the lung bases following the IV administration of 100 mL of Omnipaque 350.  Contrast timing was optimized to evaluate the pulmonary arteries.  MIP images were performed.    COMPARISON:  CT chest from April 2016.    FINDINGS:  Structures at the base of the neck are unremarkable.  There is mild fusiform dilatation of the ascending thoracic aorta measuring 4.0 cm.  Heart is mildly enlarged without evidence of pericardial effusion.  No intraluminal filling defects within the pulmonary arteries to suggest pulmonary thromboembolism.   There is no evidence of mediastinal, axillary, or hilar lymph node enlargement.  The esophagus maintains a normal course and caliber.    The trachea and bronchi are patent.  The lungs are symmetrically expanded.  There is mild right basilar atelectasis.  Interlobular septal thickening is seen within the lungs with minimal  patchy ground-glass attenuation, more prominent within the lower lobes.  No evidence of confluent focal consolidation, mass, or effusion.    The visualized abdominal structures are unremarkable.  Postsurgical changes are seen within the stomach.  Osseous structures are unremarkable.  Peripherally calcified left breast lesion is again seen.      Impression       1. No evidence of PE.  2. Mild cardiomegaly with interlobular septal thickening suggestive for mild pulmonary interstitial edema.  3. Mild fusiform dilatation of the ascending thoracic aorta measuring 4.0 cm.      Electronically signed by: Sharron Cortes MD  Date: 10/13/2018  Time: 00:14           X-Ray Forearm Left   Order: [id number]   Status:  Final result   Visible to patient:  Yes (Patient Portal) Next appt:  10/18/2018 at 09:00 AM in Psychiatry ([facility]) Dx:  Pain   Details     Reading Physician Reading Date Result Priority   Sharron Cortes MD 10/13/2018       Narrative     EXAMINATION:  XR FOREARM LEFT    CLINICAL HISTORY:  Pain, unspecified    TECHNIQUE:  AP and lateral views of the left forearm were performed.    COMPARISON:  None    FINDINGS:  Extensive contrast extravasation is visualized throughout the medial aspect of the forearm from the elbow through the wrist.    No acute osseous abnormality identified noting that osseous structures are largely obscured by superimposed contrast.      Impression       See above.      Electronically signed by: Sharron Cortes MD  Date: 10/13/2018  Time: 00:23           NM Myocardial Perfusion Spect Multi Pharmacologic   Order: [id number]   Status:  Final result   Visible to patient:  No (Not Released) Next appt:  10/18/2018 at 09:00 AM in Psychiatry ([facility])   Details     Reading Physician Reading Date Result Priority   Alexis Cook MD 10/15/2018       Narrative     EXAMINATION:  NM MYOCARDIAL PERFUSION SPECT MULTI PHARM    CLINICAL HISTORY:  Chest pain, acute, nonspecific, low  prob CAD;    TECHNIQUE:  SPECT images were acquired after the injection of 10 mCi of Tc-99m tetrofosmin at rest and 25 mCi during Lexiscan injection.  The clinical stress and ECG portion of the study is to be read separately.    COMPARISON:  None.    FINDINGS:  There is mild fixed hypoperfusion of the anterior and inferior walls, possibly reflecting prior infarcts or artifact.  No evidence for reversible ischemia.  There is global hypokinesis with estimated LVEF of 27%.           2D ECHO WITH COLOR FLOW DOPPLER   Contains abnormal data 2D echo with color flow doppler   Order: [id number]   Status:  Final result   Visible to patient:  No (Not Released) Next appt:  10/18/2018 at 09:00 AM in Psychiatry ([facility]) Dx:  Diastolic dysfunction   Details        Narrative     Date of Procedure: 10/13/2018        TEST DESCRIPTION   Technical Quality: This is a technically adequate study.     Aorta: The aortic root is normal in size, measuring 3.2 cm at sinotubular junction.     Left Atrium: The left atrial volume index is severely enlarged, measuring 52.50 cc/m2.     Left Ventricle: The left ventricle is normal in size, with an end-diastolic diameter of 5.4 cm, and an end-systolic diameter of 4.5 cm. LV wall thickness is normal, with the septum and the posterior wall each measuring 1.3 cm across. Relative wall   thickness was increased at 0.48, and the LV mass index was increased at 190.9 g/m2 consistent with concentric left ventricular hypertrophy. The lateral wall is severely hypokinetic. Left ventricular systolic function appears moderately depressed.   Visually estimated ejection fraction is 30-35%. Global longitudinal speckle-derived LV 2D strain is -7%. The LV Doppler derived stroke volume equals 41.0 ccs; at a measured heart rate of 75 bpm this results in a LV Doppler derived cardiac output of 3.1   L/min (CI = 1.67 L/min/m2).     Diastolic indices: E wave velocity 0.8 m/s, E/A ratio 2.2,  msec.,  E/e' ratio(avg) 15. Mean left atrial pressures are increased. There is diastolic dysfunction secondary to restrictive abnormality.     Right Atrium: The right atrium is normal in size, measuring 5.0 cm in length in the apical view.     Right Ventricle: The right ventricle is normal in size measuring 2.8 cm at the base in the apical right ventricle-focused view. Global right ventricular systolic function appears normal. Tissue Doppler-derived tricuspid annular peak systolic velocity (S   prime) is 12.0 cm/s. The estimated PA systolic pressure is 39 mmHg.     Mitral Valve:  There is trivial to mild mitral regurgitation.     Tricuspid Valve:  There is mild tricuspid regurgitation.     IVC: IVC is enlarged but collapses > 50% with a sniff, suggesting intermediate right atrial pressure of 8 mmHg.     Intracavitary: There is no evidence of pericardial effusion, intracavity mass, thrombi, or vegetation.         CONCLUSIONS     1 - Moderately depressed left ventricular systolic function (EF 30%).  Reduced global LV strain of -7.2%..     2 - Wall motion abnormalities.     3 - Concentric hypertrophy.     4 - Restrictive LV filling pattern, indicating markedly elevated LAP (grade 3 diastolic dysfunction).     5 - Severe left atrial enlargement.     6 - The estimated PA systolic pressure is 39 mmHg.     7 - Normal right ventricular systolic function .     8 - Intermediate central venous pressure.             This document has been electronically    SIGNED BY: Davin Ponce MD On: 10/14/2018 10:09              NM MULTI STUDY RX STRESS CARD COMPONENT   NM Multi Study Stress Cardiac Component   Order: [id number]   Status:  Final result   Visible to patient:  No (Not Released) Next appt:  10/18/2018 at 09:00 AM in Psychiatry ([facility], [facility]) Dx:  Chest pain   Details        Narrative     Date of Procedure: 10/15/2018    PRE-TEST DATA   EKG: Resting electrocardiogram reveals normal sinus rhythm at a rate of 67 bpm.     TEST  DESCRIPTION   The patient received 0.4 mg of Regadenoson, achieving a peak heart rate of 86 bpm, which is 52% of the age predicted maximum heart rate. .     EKG Conclusions:    1. The EKG portion of this study is negative for ischemia at a peak heart rate of 86 bpm (52% of predicted).   2. Blood pressure remained stable throughout the protocol  (Presenting BP: 91/72 Peak BP: 97/74).   3. No significant arrhythmias were present.   4. There were no symptoms of chest discomfort or significant dyspnea throughout the protocol.   5. Nuclear portion of this study will be reported seperately.          This document has been electronically    SIGNED BY: Rocael Watson MD On: 10/15/2018 12:14               Final Diagnoses:    Principal Problem: Acute on chronic diastolic heart failure    Discharged Condition: stable    Disposition: Home or Self Care    Follow Up/Patient Instructions:     Medications:  Reconciled Home Medications:      Medication List      START taking these medications    carvedilol 3.125 MG tablet  Commonly known as:  COREG  Take 1 tablet (3.125 mg total) by mouth 2 (two) times daily.     furosemide 20 MG tablet  Commonly known as:  LASIX  Take 1 tablet (20 mg total) by mouth daily as needed. For fluid build-up such as pitting edema, SOB shortness of breath, weight gain of 3 pounds     spironolactone 25 MG tablet  Commonly known as:  ALDACTONE  Take 1 tablet (25 mg total) by mouth once daily.        CHANGE how you take these medications    sumatriptan 50 MG tablet  Commonly known as:  IMITREX  Once for severe headache. May repeat once after 2 hours. Do not exceed 3-4 doses in one week.  What changed:  Another medication with the same name was removed. Continue taking this medication, and follow the directions you see here.        CONTINUE taking these medications    ALPRAZolam 1 MG tablet  Commonly known as:  XANAX  Take 1 tablet (1 mg total) by mouth 2 (two) times daily as needed for Anxiety.      cyanocobalamin 1,000 mcg/mL injection  Inject 1,000 mcg into the muscle every 28 days. On the 15th on the month     disulfiram 250 mg tablet  Commonly known as:  ANTABUSE  Take 1 tablet (250 mg total) by mouth once daily.     folic acid 1 MG tablet  Commonly known as:  FOLVITE  Take 1 mg by mouth once daily.     HYDROcodone-acetaminophen  mg per tablet  Commonly known as:  NORCO  Take by mouth.     lactulose 10 gram/15 mL solution  Commonly known as:  CHRONULAC  TK 15 TO 30 ML PO ONCE TO BID PRF CONSTIPATION     latanoprost 0.005 % ophthalmic solution  Place 1 drop into both eyes every evening.     lisinopril 10 MG tablet  Take 20 mg by mouth every morning.     medroxyPROGESTERone 10 MG tablet  Commonly known as:  PROVERA  TAKE 1 TABLET BY MOUTH DAILY FOR 2 WEEKS     methocarbamol 500 MG Tab  Commonly known as:  ROBAXIN  TK 1 T PO  BID PRF MUSCLE SPASMS     MIMVEY 1-0.5 mg per tablet  Generic drug:  estradiol-norethindrone  TAKE 1 TABLET EVERYDAY     montelukast 10 mg tablet  Commonly known as:  SINGULAIR  Take 10 mg by mouth every evening.     multivitamin tablet  Commonly known as:  THERAGRAN  Take 1 tablet by mouth once daily.     omeprazole 40 MG capsule  Commonly known as:  PRILOSEC  Take 1 capsule (40 mg total) by mouth once daily.     potassium chloride SA 10 MEQ tablet  Commonly known as:  K-DUR,KLOR-CON  TK 1 T PO  QD     QUEtiapine 25 MG Tab  Commonly known as:  SEROQUEL  Take 1 tablet (25 mg total) by mouth nightly as needed.     thiamine mononitrate (vit B1) 100 mg Tab  Commonly known as:  VITAMIN B-1 (MONONITRATE)  Take 1 tablet (100 mg total) by mouth 2 (two) times daily.     * venlafaxine 150 MG Cp24  Commonly known as:  EFFEXOR-XR  Take 1 capsule (150 mg total) by mouth once daily. Take with Effexor 75 mg to equal 225 mg / daily     * venlafaxine 75 MG 24 hr capsule  Commonly known as:  EFFEXOR-XR  Take 1 capsule (75 mg total) by mouth once daily. Take with Effexor 150 mg capsules to total  225 mg /daily     zonisamide 50 MG Cap  Commonly known as:  ZONEGRAN  1/2 tab nightly for one week then increase to 1 tab nightly         * This list has 2 medication(s) that are the same as other medications prescribed for you. Read the directions carefully, and ask your doctor or other care provider to review them with you.            STOP taking these medications    cloNIDine 0.2 MG tablet  Commonly known as:  CATAPRES     hydroCHLOROthiazide 25 MG tablet  Commonly known as:  HYDRODIURIL     indomethacin 25 MG capsule  Commonly known as:  INDOCIN          No discharge procedures on file.  Follow-up Information     [facility].    Specialty:  Emergency Medicine  Why:  As needed  Contact information:  [address] [facility] [address] [phone]           Rocael Little MD. Schedule an appointment as soon as possible for a visit in 1 week.    Specialty:  Cardiology  Contact information:  [address]  [address]  [facility] [address] [phone]             Caridad Padilla MD. Schedule an appointment as soon as possible for a visit in 1 week.    Specialty:  Family Medicine  Contact information:  [address] [facility] [address] [phone]                 > 30 minutes spent on this patient's discharge summary.

## 2018-10-15 NOTE — PLAN OF CARE
"Complaints of acid reflux/indigestion after she ate her lunch. She stated,"it's so embarrassing I keep on burping". Informed Dr. Johnson at [facility] and MD will put new orders.   "

## 2018-10-15 NOTE — PROGRESS NOTES
No ischemia noted. EF poor as per echocardiogram. Would discharge on heart failure regimen. Alcohol abstination. Needs followup

## 2018-10-15 NOTE — PLAN OF CARE
Pt returned from the cardio stress test. Denies chest pain or lightheadedness. Still complaints of L arm pain. Will continue to monitor.

## 2018-10-15 NOTE — PLAN OF CARE
Problem: Patient Care Overview  Goal: Plan of Care Review  Outcome: Ongoing (interventions implemented as appropriate)  Plan of care reviewed with patient, understanding verbalized. Pt remains SR on tele. Complaints of left arm pain, PRN pain medicine given per order. Instructed to call for any assistance, understanding verbalized. Bed alarm on, call light in reach, fall precautions in place. Will continue to monitor.

## 2018-10-15 NOTE — CONSULTS
"U Cardiology Consult - Resident Note    Consultant Attending: Dr. Little  Consultant Resident: Get    Reason for Consult:     Acute on Chronic Heart Failure, newly systolic    Subjective:      History of Present Illness:  Daksha Marie is a 48 y.o. AA female who  has a past medical history of Alcohol abuse (10/7/2015), Arthritis, Breast calcification, left, Chronic diastolic congestive heart failure, Encounter for blood transfusion, GERD (gastroesophageal reflux disease), Glaucoma (2008), Hypertension, Hyponatremia (10/2015), Insomnia, Malingering (1/24/2016), PTSD (post-traumatic stress disorder), Seizures, and Sickle cell trait.. The patient presented to the [facility] on 10/12/2018 with a primary complaint of shortness of breath.     Ms. Calles is a 49 yo F w/ a hx of alcohol abuse, hypertension, sickle cell trait, and previous documented diastolic heart failure who presents with 1 week of slowly progressive shortness of breath associated with a chest tightness which began after her shortness of breath. Per our patient, she "has had heart failure in the past" but had a recent echo at [place] that was reportedly normal 1 month ago. Confirmed with Dr. Padilla [place] which was EF >60% and mild diastolic dysfunction. She reportedly had an angiogram in 2005 that was normal. She denies any preceding chest pain prior to the onset of shortness of breath.     She was given 2 doses of lasix IV and has output documented of 9L. Cardiology was consulted given new systolic dysfunction and persistence of symtpoms.     Past Medical History:  Past Medical History:   Diagnosis Date    Alcohol abuse 10/7/2015    Arthritis     Breast calcification, left     Pt states this has been worked up, she received a biopsy in the past to confirm calcifications were from scar tissue from when she had breast reduction surgery.      Chronic diastolic congestive heart " failure     Encounter for blood transfusion     GERD (gastroesophageal reflux disease)     Glaucoma     Hypertension     Hyponatremia 10/2015    Na 109. attributed to polydipsia and alcohol abuse.  suffered a seizure in the ICU .    Insomnia     Malingering 2016    PTSD (post-traumatic stress disorder)     Seizures     Sickle cell trait        Past Surgical History:  Past Surgical History:   Procedure Laterality Date    bile fistula      BREAST BIOPSY Left     breast reduction       SECTION      CHOLECYSTECTOMY  after     ESOPHAGOGASTRODUODENOSCOPY (EGD) N/A 2016    Performed by [facility]    ESOPHAGOGASTRODUODENOSCOPY (EGD) N/A 2015    [facility] (2ND FLR)    ESOPHAGOGASTRODUODENOSCOPY (EGD) N/A 2015    Performed by Eren [facility] at [facility] ENDO    GASTRIC BYPASS   and     SIGMOIDOSCOPY-FLEXIBLE N/A 2015    Performed by [facility] at [facility] ENDO    TOTAL REDUCTION MAMMOPLASTY Bilateral        Allergies:  Review of patient's allergies indicates:   Allergen Reactions    Penicillins Hives       Medications:   In-Hospital Scheduled Medications:   cloNIDine  0.2 mg Oral QHS    disulfiram  250 mg Oral Daily    enoxaparin  40 mg Subcutaneous Daily    folic acid  1 mg Oral Daily    hydroCHLOROthiazide  25 mg Oral Daily    latanoprost  1 drop Both Eyes QHS    lisinopril  10 mg Oral QAM    montelukast  10 mg Oral QHS    pantoprazole  40 mg Oral Daily    thiamine  100 mg Oral Daily    venlafaxine  150 mg Oral Daily    venlafaxine  75 mg Oral Daily      In-Hospital PRN Medications:  acetaminophen, albuterol-ipratropium, ALPRAZolam, dextrose 50%, dextrose 50%, glucagon (human recombinant), glucose, glucose, hydrALAZINE, labetalol, ondansetron, QUEtiapine, sodium chloride 0.9%, traMADol   In-Hospital IV Infusion Medications:     Home Medications:  Prior to Admission medications     Medication Sig Start Date End Date Taking? Authorizing Provider   ALPRAZolam (XANAX) 1 MG tablet Take 1 tablet (1 mg total) by mouth 2 (two) times daily as needed for Anxiety. 10/9/18  Yes Ceferino Farr III, NP   cloNIDine (CATAPRES) 0.2 MG tablet Take 1 tablet (0.2 mg total) by mouth every evening. 10/20/17 10/20/18 Yes Hong Soni MD   cyanocobalamin 1,000 mcg/mL injection Inject 1,000 mcg into the muscle every 28 days. On the 15th on the month   Yes Historical Provider, MD   disulfiram (ANTABUSE) 250 mg tablet Take 1 tablet (250 mg total) by mouth once daily. 10/9/18  Yes Ceferino Farr III, NP   folic acid (FOLVITE) 1 MG tablet Take 1 mg by mouth once daily.   Yes Historical Provider, MD   hydrocodone-acetaminophen 10-325mg (NORCO)  mg Tab Take by mouth.   Yes Historical Provider, MD   indomethacin (INDOCIN) 25 MG capsule Take 1 capsule (25 mg total) by mouth 3 (three) times daily as needed. 1/17/18  Yes Juliano Lynne MD   lactulose (CHRONULAC) 10 gram/15 mL solution TK 15 TO 30 ML PO ONCE TO BID PRF CONSTIPATION 7/28/17  Yes Historical Provider, MD   lisinopril 10 MG tablet Take 20 mg by mouth every morning.    Yes Historical Provider, MD   methocarbamol (ROBAXIN) 500 MG Tab TK 1 T PO  BID PRF MUSCLE SPASMS 1/23/18  Yes Historical Provider, MD   MIMVEY 1-0.5 mg per tablet TAKE 1 TABLET EVERYDAY 8/3/18  Yes Hong Soni MD   montelukast (SINGULAIR) 10 mg tablet Take 10 mg by mouth every evening.   Yes Historical Provider, MD   multivitamin (THERAGRAN) tablet Take 1 tablet by mouth once daily. 10/10/15  Yes Karla Hathaway MD   omeprazole (PRILOSEC) 40 MG capsule Take 1 capsule (40 mg total) by mouth once daily. 5/3/17  Yes Luis A Sun MD   potassium chloride SA (K-DUR,KLOR-CON) 10 MEQ tablet TK 1 T PO  QD 11/25/17  Yes Historical Provider, MD   QUEtiapine (SEROQUEL) 25 MG Tab Take 1 tablet (25 mg total) by mouth nightly as needed. 10/9/18  Yes Ceferino Farr III, NP    sumatriptan (IMITREX) 50 MG tablet Once for severe headache. May repeat once after 2 hours. Do not exceed 3-4 doses in one week. 4/17/18  Yes Juliano Lynne MD   sumatriptan (IMITREX) 50 MG tablet Once for severe headache. May repeat once after 2 hours. Do not exceed 3-4 doses in one week. 8/28/18  Yes Juliano Lynne MD   venlafaxine (EFFEXOR-XR) 150 MG Cp24 Take 1 capsule (150 mg total) by mouth once daily. Take with Effexor 75 mg to equal 225 mg / daily 10/9/18  Yes Ceferino Farr III, NP   venlafaxine (EFFEXOR-XR) 75 MG 24 hr capsule Take 1 capsule (75 mg total) by mouth once daily. Take with Effexor 150 mg capsules to total 225 mg /daily 10/9/18 10/9/19 Yes Ceferino Farr III, NP   hydrochlorothiazide (HYDRODIURIL) 25 MG tablet TK 1 T PO QD 9/27/17   Historical Provider, MD   latanoprost 0.005 % ophthalmic solution Place 1 drop into both eyes every evening.     Historical Provider, MD   medroxyPROGESTERone (PROVERA) 10 MG tablet TAKE 1 TABLET BY MOUTH DAILY FOR 2 WEEKS 11/16/17   Hong Soni MD   thiamine mononitrate 100 mg Tab Take 1 tablet (100 mg total) by mouth 2 (two) times daily. 10/10/15   Karla Hathaway MD   zonisamide (ZONEGRAN) 50 MG Cap 1/2 tab nightly for one week then increase to 1 tab nightly 2/2/18   Juliano Lynne MD   clonazePAM (KLONOPIN) 0.5 MG tablet Take 1 tablet (0.5 mg total) by mouth 2 (two) times daily as needed for Anxiety. 6/26/18 7/27/18  Ceferino Farr III, NP       Family History:  Family History   Problem Relation Age of Onset    Diabetes Mother     Lupus Mother     Diabetes Maternal Grandmother     Crohn's disease Cousin        Social History:  Social History     Tobacco Use    Smoking status: Current Every Day Smoker     Packs/day: 1.00     Years: 4.00     Pack years: 4.00     Types: Cigarettes    Smokeless tobacco: Never Used   Substance Use Topics    Alcohol use: No     Comment: former heavy drinker since May 1st 2017    Drug use: No       Review of  Systems:  Pertinent items are noted in HPI. All other systems are reviewed and are negative.     Objective:   Last 24 Hour Vital Signs:  BP  Min: 96/64  Max: 108/79  Temp  Av.2 °F (36.2 °C)  Min: 96 °F (35.6 °C)  Max: 97.8 °F (36.6 °C)  Pulse  Av  Min: 65  Max: 82  Resp  Av.7  Min: 16  Max: 18  SpO2  Av.5 %  Min: 98 %  Max: 99 %  I/O last 3 completed shifts:  In: 665 [P.O.:665]  Out: 2500 [Urine:2500]  Body mass index is 28.96 kg/m².    Physical Examination:  General: well developed, no distress  HEENT: normocephalic, atraumatic, mucous membranes moist, EOM intact, no scleral icterus  Neck: supple, symmetrical, trachea midline, no JVD  Lungs:  CTAB  Cardiovascular: regular rate and rhythm.    Extremities: warm, well-perfused, moving all extremities,   Abdomen: soft, non-tender to palpation, no distention, no masses/hernias  Skin: Skin color, texture, turgor normal. No rashes or lesions  TTP on LUE after IV infiltrate from CT.   Neurologic: No focal numbness or weakness  Psych/Behavioral:  Alert and oriented, flat affect.    Laboratory Results:  Most Recent Data:  CBC:   Lab Results   Component Value Date    WBC 4.14 10/15/2018    HGB 10.0 (L) 10/15/2018    HCT 33.8 (L) 10/15/2018     10/15/2018    MCV 76 (L) 10/15/2018    RDW 19.8 (H) 10/15/2018     BMP:   Lab Results   Component Value Date     (L) 10/15/2018    K 4.5 10/15/2018    CL 99 10/15/2018    CO2 25 10/15/2018    BUN 32 (H) 10/15/2018    CREATININE 1.5 (H) 10/15/2018     (H) 10/15/2018    CALCIUM 9.1 10/15/2018    MG 2.2 10/15/2018    PHOS 5.1 (H) 10/15/2018     LFTs:   Lab Results   Component Value Date    PROT 6.4 10/15/2018    ALBUMIN 3.2 (L) 10/15/2018    BILITOT 0.5 10/15/2018    AST 13 10/15/2018    ALKPHOS 82 10/15/2018    ALT 31 10/15/2018     Coags:   Lab Results   Component Value Date    INR 1.0 2017     FLP: No results found for: CHOL, HDL, LDLCALC, TRIG, CHOLHDL  DM:   Lab Results   Component Value  Date    HGBA1C 5.2 10/13/2018    HGBA1C 4.5 06/23/2015    CREATININE 1.5 (H) 10/15/2018     Thyroid:   Lab Results   Component Value Date    TSH 0.491 04/18/2016     Anemia:   Lab Results   Component Value Date    IRON 27 (L) 10/14/2018    TIBC 502 (H) 10/14/2018    FERRITIN 13 (L) 10/14/2018    YRZCZCLL23 819 10/14/2018    FOLATE >40.0 (H) 10/14/2018     Cardiac:   Lab Results   Component Value Date    TROPONINI 0.399 (H) 10/13/2018    BNP 1,064 (H) 10/12/2018     Urinalysis:   Lab Results   Component Value Date    LABURIN ESCHERICHIA COLI  >100,000 cfu/ml   07/24/2014    COLORU Yellow 09/19/2017    SPECGRAV <=1.005 (A) 09/19/2017    NITRITE Negative 09/19/2017    KETONESU Negative 09/19/2017    UROBILINOGEN Negative 09/19/2017       Trended Lab Data:  Recent Labs   Lab  10/13/18   0323  10/14/18   0349  10/15/18   0340   WBC  4.93  4.12  4.14   HGB  10.4*  10.1*  10.0*   HCT  35.0*  33.6*  33.8*   PLT  281  246  233   MCV  76*  76*  76*   RDW  20.3*  19.8*  19.8*   NA  135*  133*  133*   K  4.7  4.4  4.5   CL  103  101  99   CO2  20*  22*  25   BUN  14  23*  32*   CREATININE  1.2  1.4  1.5*   GLU  178*  80  117*   PROT  7.2  6.4  6.4   ALBUMIN  3.7  3.2*  3.2*   BILITOT  0.8  0.6  0.5   AST  42*  21  13   ALKPHOS  96  83  82   ALT  59*  42  31       Trended Cardiac Data:  Recent Labs   Lab  10/12/18   2302   10/13/18   0901  10/13/18   1416  10/13/18   1717   TROPONINI   --    < >  0.514*  0.456*  0.399*   BNP  1,064*   --    --    --    --     < > = values in this interval not displayed.     Other Results:  EKG (my interpretation): inferior T waves present, mild LVH.    Radiology:  X-ray Chest Pa And Lateral    Result Date: 10/12/2018  EXAMINATION: XR CHEST PA AND LATERAL CLINICAL HISTORY: Chest pain, unspecified TECHNIQUE: PA and lateral views of the chest were performed. COMPARISON: None FINDINGS: Cardiac silhouette is mildly enlarged.  Lungs are symmetrically expanded.  There is prominence of central pulmonary  vasculature.  No evidence of focal consolidative process, pneumothorax, or significant effusion.  No acute osseous abnormality identified.  Partially calcified left breast lesion is again seen.     Cardiomegaly with mild central pulmonary vascular congestion. Electronically signed by: Sharron Cortes MD Date:    10/12/2018 Time:    22:22    X-ray Forearm Left    Result Date: 10/13/2018  EXAMINATION: XR FOREARM LEFT CLINICAL HISTORY: Pain, unspecified TECHNIQUE: AP and lateral views of the left forearm were performed. COMPARISON: None FINDINGS: Extensive contrast extravasation is visualized throughout the medial aspect of the forearm from the elbow through the wrist. No acute osseous abnormality identified noting that osseous structures are largely obscured by superimposed contrast.     See above. Electronically signed by: Sharron Cortes MD Date:    10/13/2018 Time:    00:23    Cta Chest Non-coronary    Result Date: 10/13/2018  EXAMINATION: CTA CHEST NON CORONARY CLINICAL HISTORY: suspected PE/cp/sob; TECHNIQUE: Low dose axial images, sagittal and coronal reformations were obtained from the thoracic inlet to the lung bases following the IV administration of 100 mL of Omnipaque 350.  Contrast timing was optimized to evaluate the pulmonary arteries.  MIP images were performed. COMPARISON: CT chest from April 2016. FINDINGS: Structures at the base of the neck are unremarkable.  There is mild fusiform dilatation of the ascending thoracic aorta measuring 4.0 cm.  Heart is mildly enlarged without evidence of pericardial effusion.  No intraluminal filling defects within the pulmonary arteries to suggest pulmonary thromboembolism.   There is no evidence of mediastinal, axillary, or hilar lymph node enlargement.  The esophagus maintains a normal course and caliber. The trachea and bronchi are patent.  The lungs are symmetrically expanded.  There is mild right basilar atelectasis.  Interlobular septal thickening is seen within  the lungs with minimal patchy ground-glass attenuation, more prominent within the lower lobes.  No evidence of confluent focal consolidation, mass, or effusion. The visualized abdominal structures are unremarkable.  Postsurgical changes are seen within the stomach.  Osseous structures are unremarkable.  Peripherally calcified left breast lesion is again seen.     1. No evidence of PE. 2. Mild cardiomegaly with interlobular septal thickening suggestive for mild pulmonary interstitial edema. 3. Mild fusiform dilatation of the ascending thoracic aorta measuring 4.0 cm. Electronically signed by: Sharron Cortes MD Date:    10/13/2018 Time:    00:14    Echo (10/13/2018)   CONCLUSIONS     1 - Moderately depressed left ventricular systolic function (EF 30%).  Reduced global LV strain of -7.2%..     2 - Wall motion abnormalities.     3 - Concentric hypertrophy.     4 - Restrictive LV filling pattern, indicating markedly elevated LAP (grade 3 diastolic dysfunction).     5 - Severe left atrial enlargement.     6 - The estimated PA systolic pressure is 39 mmHg.     7 - Normal right ventricular systolic function .     8 - Intermediate central venous pressure.     Assessment:     Daksha Marie is a 48 y.o. female with a PMHx of alcohol abuse, diastolic CHF, HTN, and arthritis here w/ worsening systolic function. 1 month ago with a normal EF per PCP, now w/ EF <30% and evidence of wall motion abnormalities.        Recommendations:     - Ordered Dobutamine Nuclear Scan to assess for demand ischemia/perfusion deficits  - Would hold further diuresis given now w/ DONTAE.    - Optimize medical regimen: Stop clonidine, HCTZ. Start on Coreg following nuclear scan, maximize lisinopril. Add spironolactone. Place on PRN PO lasix.     Thank you for allowing us to participate in the care of this patient. Please contact me at [phone] if you have any questions regarding this consult.    José Antonio Deutsch  [facility] Internal Medicine  HO-II

## 2018-10-16 ENCOUNTER — PATIENT OUTREACH (OUTPATIENT)
Dept: ADMINISTRATIVE | Facility: CLINIC | Age: 48
End: 2018-10-16

## 2018-10-16 RX ORDER — DORZOLAMIDE HYDROCHLORIDE AND TIMOLOL MALEATE PRESERVATIVE FREE 20; 5 MG/ML; MG/ML
1 SOLUTION/ DROPS OPHTHALMIC 2 TIMES DAILY
COMMUNITY
End: 2019-07-23

## 2018-10-16 RX ORDER — DIFLUPREDNATE OPHTHALMIC 0.5 MG/ML
1 EMULSION OPHTHALMIC DAILY
COMMUNITY
End: 2019-07-23

## 2018-10-16 RX ORDER — BRIMONIDINE TARTRATE AND TIMOLOL MALEATE 2; 5 MG/ML; MG/ML
1 SOLUTION OPHTHALMIC 2 TIMES DAILY
COMMUNITY

## 2018-10-16 NOTE — PATIENT INSTRUCTIONS

## 2018-10-18 ENCOUNTER — OFFICE VISIT (OUTPATIENT)
Dept: PSYCHIATRY | Facility: CLINIC | Age: 48
End: 2018-10-18
Payer: MEDICARE

## 2018-10-18 DIAGNOSIS — F43.10 PTSD (POST-TRAUMATIC STRESS DISORDER): Primary | ICD-10-CM

## 2018-10-18 DIAGNOSIS — F32.A DEPRESSION, UNSPECIFIED DEPRESSION TYPE: ICD-10-CM

## 2018-10-18 PROCEDURE — 90834 PSYTX W PT 45 MINUTES: CPT | Mod: S$PBB,,, | Performed by: SOCIAL WORKER

## 2018-10-18 PROCEDURE — 90834 PSYTX W PT 45 MINUTES: CPT | Mod: PBBFAC | Performed by: SOCIAL WORKER

## 2018-10-18 NOTE — PHYSICIAN QUERY
PT Name: Daksha Marie  MR #: [id number]  Physician Query Form - Renal Condition Clarification     CDS/: NARGIS Vu, RN, CCDS               Contact information: [email or web address]    This form is a permanent document in the medical record.     QueryDate: October 18, 2018    By submitting this query, we are merely seeking further clarification of documentation. Please utilize your independent clinical judgment when addressing the question(s) below.    The Medical record contains the following:   Indicator Supporting Clinical Findings Location in Medical Record    Kidney (Renal) Insufficiency      Kidney (Renal) Failure / Injury      Nephrotoxic Agents     X BUN/Creatinine GFR Cr = 1.1-->1.5  BUN = 14-->32  eGFR (if ) = greater than 60 to 47 10/12-10/15 Labs    Urine: Casts         Eosinophils      Dehydration      Nausea/Vomiting      Dialysis/CRRT      Treatment:     X Other:  - Would hold further diuresis given now w/ DONTAE.     Daksha Marie is a 48 y.o. female with a PMHx of alcohol abuse, diastolic CHF, HTN, and arthritis here w/ worsening systolic function. 1 month ago with a normal EF per PCP, now w/ EF <30% and evidence of wall motion abnormalities.     - Optimize medical regimen: Stop clonidine, HCTZ. Start on Coreg following nuclear scan, maximize lisinopril. Add spironolactone. Place on PRN PO lasix.  10/15 Cardiology Consult Note   Acute Kidney Injury / Acute Renal Failure has different defining criteria. A generally accepted guideline  is:   A greater than 100% (2X) rise in serum creatinine from baseline* occurring during the course of a single hospital stay.   *Baseline as determined by the providers judgment and consideration of previous lab values and other documentation, if available.    A diagnosis of Acute Kidney Injury/ Acute Renal Failure should incorporate abnormal labs and clinical findings that are clinically significant      References: 1Dom Galarza  et al. Acute renal failure-definition, outcome measures, animal models, fluid therapy and information technology needs: the Second International Consensus Conference of the Acute Dialysis Quality Initiative (ADQI) Group. Crit Care 2004; 8:B204; 2. Fay et al. Acute Kidney Injury Network: report of an initiative to improve outcomes in acute kidney injury. Crit Care 2007; 11:R31; 3. Kidney Disease: Improving Global Outcomes (KDIGO). Acute Kidney Injury Work Group. KDIGO clinical practice guidelines for acute kidney injury. Kidney Int Suppl 2012; 2:1.    The clinical guidelines noted below is only a system guideline, it does not replace the providers clinical judgment.    Provider, please specify the diagnosis or diagnoses associated with above clinical findings.    DONTAE was noted by the Cardiology Consultant 10/15/18. Please further specify DONTAE.    [    ]  Other Acute Kidney Failure/Injury (please specify): ____________    [    ]  Unspecified Acute Kidney Failure/Injury     [ x ]  Acute Renal Insufficiency  Consider if SCr rise is transient and normalizes quickly with no efforts at real resuscitation of vital signs and perfusion  [    ]  Other (please specify): _______________________________  [    ]  Clinically Undetermined    Please document in your progress notes daily for the duration of treatment until resolved and include in your discharge summary.

## 2018-10-18 NOTE — PROGRESS NOTES
Individual Psychotherapy (PhD/LCSW)    10/18/2018    Site:  [facility]         Therapeutic Intervention: Met with patient.  Outpatient - Insight oriented psychotherapy 45 min - CPT code 12133    Chief complaint/reason for encounter: depression and anxiety     Interval history and content of current session:       She had eye surgeries and was hospitalized per congestive heart problems.   She is weak.  Regardless, she carried out the homework which was read.  We may use this at a later session to process her trauma.  There seems to be some guilt over the trauma which may benefit from cognitive therapy.  She will start cardiovascular therapy next week.     Treatment plan:  · Target symptoms: depression, anxiety   · Why chosen therapy is appropriate versus another modality: relevant to diagnosis  · Outcome monitoring methods: self-report, observation  · Therapeutic intervention type: behavior modifying psychotherapy, supportive psychotherapy    Risk parameters:  Patient reports no suicidal ideation  Patient reports no homicidal ideation  Patient reports no self-injurious behavior  Patient reports no violent behavior    Verbal deficits: None    Patient's response to intervention:  The patient's response to intervention is accepting.    Progress toward goals and other mental status changes:  The patient's progress toward goals is limited.    Diagnosis:     ICD-10-CM ICD-9-CM   1. PTSD (post-traumatic stress disorder) F43.10 309.81   2. Depression, unspecified depression type F32.9 311       Plan:  individual psychotherapy    Return to clinic: as scheduled    Length of Service (minutes): 45

## 2018-10-24 ENCOUNTER — OFFICE VISIT (OUTPATIENT)
Dept: FAMILY MEDICINE | Facility: HOSPITAL | Age: 48
End: 2018-10-24
Attending: FAMILY MEDICINE
Payer: MEDICARE

## 2018-10-24 VITALS
SYSTOLIC BLOOD PRESSURE: 114 MMHG | HEIGHT: 65 IN | HEART RATE: 86 BPM | BODY MASS INDEX: 28.95 KG/M2 | WEIGHT: 173.75 LBS | DIASTOLIC BLOOD PRESSURE: 88 MMHG

## 2018-10-24 DIAGNOSIS — F32.A DEPRESSION, UNSPECIFIED DEPRESSION TYPE: ICD-10-CM

## 2018-10-24 DIAGNOSIS — Z98.84 HISTORY OF ROUX-EN-Y GASTRIC BYPASS: Chronic | ICD-10-CM

## 2018-10-24 DIAGNOSIS — I10 ESSENTIAL HYPERTENSION: Chronic | ICD-10-CM

## 2018-10-24 DIAGNOSIS — I50.32 CHRONIC DIASTOLIC CONGESTIVE HEART FAILURE: Chronic | ICD-10-CM

## 2018-10-24 DIAGNOSIS — I50.9 CONGESTIVE HEART FAILURE, UNSPECIFIED HF CHRONICITY, UNSPECIFIED HEART FAILURE TYPE: Primary | ICD-10-CM

## 2018-10-24 DIAGNOSIS — F43.10 PTSD (POST-TRAUMATIC STRESS DISORDER): Chronic | ICD-10-CM

## 2018-10-24 DIAGNOSIS — F10.10 ALCOHOL ABUSE: Chronic | ICD-10-CM

## 2018-10-24 DIAGNOSIS — F41.1 GENERALIZED ANXIETY DISORDER: ICD-10-CM

## 2018-10-24 PROCEDURE — 99213 OFFICE O/P EST LOW 20 MIN: CPT | Performed by: PHYSICIAN ASSISTANT

## 2018-10-24 RX ORDER — DORZOLAMIDE HYDROCHLORIDE AND TIMOLOL MALEATE 20; 5 MG/ML; MG/ML
SOLUTION/ DROPS OPHTHALMIC
Refills: 3 | COMMUNITY
Start: 2018-10-22 | End: 2019-07-23

## 2018-10-24 RX ORDER — AMLODIPINE BESYLATE 5 MG/1
TABLET ORAL
Refills: 0 | COMMUNITY
Start: 2018-10-04 | End: 2019-01-15 | Stop reason: SDUPTHER

## 2018-10-24 RX ORDER — NEPAFENAC 3 MG/ML
SUSPENSION/ DROPS OPHTHALMIC
Refills: 2 | COMMUNITY
Start: 2018-08-28 | End: 2018-10-24

## 2018-10-24 RX ORDER — HYDROCODONE BITARTRATE AND ACETAMINOPHEN 5; 325 MG/1; MG/1
TABLET ORAL
COMMUNITY
Start: 2018-10-16 | End: 2018-10-24

## 2018-10-24 RX ORDER — LISINOPRIL 20 MG/1
TABLET ORAL
Refills: 0 | COMMUNITY
Start: 2018-08-23 | End: 2019-02-07 | Stop reason: SDUPTHER

## 2018-10-24 RX ORDER — OXYCODONE AND ACETAMINOPHEN 7.5; 325 MG/1; MG/1
TABLET ORAL
Refills: 0 | COMMUNITY
Start: 2018-08-30 | End: 2018-10-24

## 2018-10-24 RX ORDER — FLUCONAZOLE 150 MG/1
TABLET ORAL
Refills: 2 | COMMUNITY
Start: 2018-10-18 | End: 2019-09-03

## 2018-10-24 RX ORDER — ERYTHROMYCIN 5 MG/G
OINTMENT OPHTHALMIC
Refills: 5 | COMMUNITY
Start: 2018-10-20 | End: 2019-07-23

## 2018-10-24 RX ORDER — PSEUDOEPHEDRINE HCL 30 MG
TABLET ORAL
COMMUNITY
Start: 2007-03-20

## 2018-10-24 RX ORDER — HYDROCODONE BITARTRATE AND ACETAMINOPHEN 7.5; 325 MG/1; MG/1
TABLET ORAL
Refills: 0 | COMMUNITY
Start: 2018-09-18 | End: 2018-10-24

## 2018-10-24 RX ORDER — TRAZODONE HYDROCHLORIDE 150 MG/1
TABLET ORAL
Refills: 2 | COMMUNITY
Start: 2018-09-11 | End: 2018-12-02 | Stop reason: SDUPTHER

## 2018-10-24 RX ORDER — QUETIAPINE FUMARATE 100 MG/1
TABLET, FILM COATED ORAL
Refills: 0 | COMMUNITY
Start: 2018-09-17 | End: 2018-12-02

## 2018-10-24 RX ORDER — OFLOXACIN 3 MG/ML
SOLUTION/ DROPS OPHTHALMIC
Refills: 2 | COMMUNITY
Start: 2018-09-14 | End: 2018-10-24

## 2018-10-24 RX ORDER — TRAMADOL HYDROCHLORIDE 50 MG/1
TABLET ORAL
Refills: 0 | COMMUNITY
Start: 2018-10-04 | End: 2018-10-24

## 2018-10-24 RX ORDER — CYCLOBENZAPRINE HCL 10 MG
TABLET ORAL
COMMUNITY
Start: 2018-10-16 | End: 2019-07-23

## 2018-10-24 RX ORDER — HYDROCODONE BITARTRATE AND ACETAMINOPHEN 5; 325 MG/1; MG/1
TABLET ORAL
Refills: 0 | COMMUNITY
Start: 2018-10-16 | End: 2018-10-24

## 2018-10-24 RX ORDER — TIZANIDINE 2 MG/1
TABLET ORAL
Refills: 1 | COMMUNITY
Start: 2018-09-20 | End: 2018-10-24

## 2018-10-24 RX ORDER — CYCLOBENZAPRINE HCL 10 MG
TABLET ORAL
Refills: 0 | COMMUNITY
Start: 2018-10-16 | End: 2018-10-24

## 2018-10-24 RX ORDER — CLONAZEPAM 0.5 MG/1
TABLET, ORALLY DISINTEGRATING ORAL
Refills: 0 | COMMUNITY
Start: 2018-09-02 | End: 2018-10-24
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
## 2018-10-25 ENCOUNTER — PATIENT MESSAGE (OUTPATIENT)
Dept: FAMILY MEDICINE | Facility: HOSPITAL | Age: 48
End: 2018-10-25

## 2018-10-25 ENCOUNTER — CLINICAL SUPPORT (OUTPATIENT)
Dept: REHABILITATION | Facility: HOSPITAL | Age: 48
End: 2018-10-25
Attending: FAMILY MEDICINE
Payer: MEDICARE

## 2018-10-25 DIAGNOSIS — M47.816 LUMBAR FACET JOINT SYNDROME: ICD-10-CM

## 2018-10-25 DIAGNOSIS — M79.642 LEFT HAND PAIN: ICD-10-CM

## 2018-10-25 DIAGNOSIS — M54.2 CERVICALGIA: ICD-10-CM

## 2018-10-25 DIAGNOSIS — M25.632 WRIST STIFFNESS, LEFT: ICD-10-CM

## 2018-10-25 DIAGNOSIS — M53.3 SACROCOCCYGEAL DISORDERS, NOT ELSEWHERE CLASSIFIED: Primary | ICD-10-CM

## 2018-10-25 DIAGNOSIS — E87.5 HYPERKALEMIA: Primary | ICD-10-CM

## 2018-10-25 PROCEDURE — 97022 WHIRLPOOL THERAPY: CPT | Mod: PN

## 2018-10-25 PROCEDURE — G8985 CARRY GOAL STATUS: HCPCS | Mod: CJ,PN

## 2018-10-25 PROCEDURE — G8984 CARRY CURRENT STATUS: HCPCS | Mod: CK,PN

## 2018-10-25 PROCEDURE — 97165 OT EVAL LOW COMPLEX 30 MIN: CPT | Mod: PN

## 2018-10-25 NOTE — PATIENT INSTRUCTIONS
"[facility], OCCUPATIONAL THERAPY  HOME EXERCISE PROGRAM     Complete the following massages for 2-3 min each, 2x/day.                                            Complete the following exercises with 10 repetitions each, 3x/day.     AROM: Supination / Pronation   With your elbow by your side, turn your palm up then turn your palm down.     AROM: Wrist Flexion / Extension               Bend your wrist forward and back as far as possible.      AROM: Wrist Radial / Ulnar Deviation  Bend your wrist from side to side as far as possible.        Complete the following exercises with 10 repetitions each, 3x/day.     AAROM: Composite Movement Circumduction  Make clockwise circles with thumb. Reverse and make counterclockwise circles with thumb.                AROM: Composite Flesion ("Pinky Slides")  Touch thumb to tip of small finger. Slide thumb down small finger into palm.         Complete the following exercises with 10 repetitions each, 3x/day.     AROM: Isolated MCP Flexion / Extension ("Wave")   Bend only your large, bottom knuckles. Hold 3 seconds. Keep the tips of your fingers straight. Straighten fingers.    AROM: Isolated IPJ Flexion / Extension ("Hook")  Bend only your middle and end knuckles. Hold 3 seconds.   Straighten your fingers.     AROM: MCP and PIP Flexion / Extension ("Straight Fist")  Bend your bottom and middle knuckles, keeping the tips of your fingers straight. Try to touch the pads of your fingers on your palm. Hold 3 seconds. Straighten your fingers.     AROM: Composite Flexion / Extension ("Full Fist")  Bend every joint in your hand into a fist. Hold 3 seconds. Straighten your fingers.         AROM: Abduction / Adduction  With hand flat on table, spread all fingers apart, then bring them together as close as possible.       Copyright © I. All rights reserved.     Therapist: RACHELL Hutchinson     "

## 2018-10-25 NOTE — PROGRESS NOTES
K+ 5.4. Called and left message for patient to stop taking K+ supplement (if she still is) and to come and have repeat labs Monday (cora).

## 2018-10-25 NOTE — PLAN OF CARE
[facility] Occupational Therapy  Initial Evaluation     Name: Daksha Marie  Clinic Number: [id number]    Medical Diagnosis: Left hand pain  Date of Onset: 10/12/18  Date of Surgery: n/a  Surgical Procedure: n/a  Therapy Diagnosis:   Encounter Diagnoses   Name Primary?    Left hand pain     Wrist stiffness, left      Precautions: Standard and CHF    Physician: Edgar Johnson, *; PCP D'Amico Johnson  Physician Orders: eval and treat  Date of Return to MD: ???    Evaluation Date: 10/25/2018  Plan of Care Certification Period: 11/25/18    Visit #: 1/ Visits authorized: 18  Insurance Authorization period Expiration: 12/31/18    Time In:11:10  Time Out: 12:00  Total Billable Time: 50 minutes  Charges for this Visit: IE x 1, FL x 1      Subjective     Involved Side:  left  Dominant Side: Right  Imaging: x-ray of forearm on 10/13/18 indicates: Extensive contrast extravasation is visualized throughout the medial aspect of the forearm from the elbow through the wrist.  No acute osseous abnormality identified noting that osseous structures are largely obscured by superimposed contrast  Mechanism of Injury: pt reports he had an issue with the contrast dye being infiltrated into forearm when she went to the ER for CHF issues. She reports she had an issue with the IV.  History of Current Condition: Pt cont to have c/o high pain in hand and forearm  Previous Therapy: no prior therapy on hand    Pain:  Functional Pain Scale Rating 0-10:   5/10 at current  5/10 at best  9-10/10 at worst  Location: L dorsal hand and fingertips; and gets pain from wrist shooting to elbow  Description: sore  Aggravating Factors: Touching and moving fingers  Easing Factors: pain medication and hot compress      Past Medical History/Physical Systems Review:   Daksha Marie  has a past medical history of Alcohol abuse, Arthritis, Breast calcification, left, Chronic diastolic congestive heart failure,  Encounter for blood transfusion, GERD (gastroesophageal reflux disease), Glaucoma, Hypertension, Hyponatremia, Insomnia, Malingering, PTSD (post-traumatic stress disorder), Seizures, and Sickle cell trait.    Daksha Marie  has a past surgical history that includes Gastric bypass ( and ); bile fistula; breast reduction ();  section; Cholecystectomy (after ); Breast biopsy (Left); Total Reduction Mammoplasty (Bilateral, ); ESOPHAGOGASTRODUODENOSCOPY (EGD) (N/A, 2016); ESOPHAGOGASTRODUODENOSCOPY (EGD) (N/A, 2015); SIGMOIDOSCOPY-FLEXIBLE (N/A, 2015); and ESOPHAGOGASTRODUODENOSCOPY (EGD) (N/A, 2015).    Daksha has a current medication list which includes the following prescription(s): alprazolam, amlodipine, brimonidine-timolol, calcium citrate, carvedilol, cyanocobalamin, cyclobenzaprine, difluprednate, disulfiram, dorzolamide-timolol 2-0.5%, dorzolamide-timolol (pf), erythromycin, fluconazole, folic acid, furosemide, lactulose, lisinopril, mimvey, montelukast, multivitamin, omeprazole, potassium chloride sa, quetiapine, quetiapine, spironolactone, sumatriptan, trazodone, venlafaxine, and venlafaxine.    Review of patient's allergies indicates:   Allergen Reactions    Penicillins Hives          Patient's Goals for Therapy: to not have pain    Occupation:    Pt is on disability    Functional Limitations/Social History:   PLOF independent    Pt lives at home with her 2 children (15 and 21 y/o). Pt drives herself. Pt performs cooking and cleaning at home, her sons normally assist with heavier items and reaching.     Limited with picking up heavier items.        Objective     Observation/Appearance: pt appears somewhat guarded of L hand, moves slowly. Mild swelling noted at dorsal wrist    Edema. Measured in centimeters.   10/25/2018 10/25/2018    Left Right   2in. Above elbow     2in. Below elbow 24 NT   Wrist Crease 16.1 15.7   Figure of 8 19.7 20   MCPs 20  20       Elbow and Wrist ROM. Measured in degrees.   10/25/2018 10/25/2018    Left Right   Elbow Ext/Flex WNL WNL   Supination/Pronation 70/83 80/80   Wrist Ext/Flex 25/75 65/90   Wrist RD/UD 10/27 10/37   **However, observed pt extending L wrist past 25* later in session to about WFL      Hand ROM. Measured in degrees.   10/25/2018 10/25/2018    Left Right        Thumb: MP WFL WFL                IP WFL WFL       Rad ADD/ABD 45 WFL       Pal ADD/ABD 50 WFL          Opposition To tip of SF NT          Strength (Dynamometer) and Pinch Strength (Pinch Gauge)  Measured in pounds.   10/25/2018 10/25/2018    Left Right   Rung II 18 64   Petty Pinch 6 16.5   3pt Pinch 5 13   2pt Pinch 4 9.5     Sensation: c/o occasional tingling in fingertips   10/25/2018 10/25/2018    Left Right   Polo Meredith     Normal 1.65-2.83 x x   Diminished Light Touch 3.22-3.61     Diminished Protective 3.84-4.31     Loss of Protective 4.56-6.65     Untestable >6.65     2 Point Discrimination     Static     Dynamic       Manual Muscle Test   10/25/2018 10/25/2018    Left Right   Wrist Extension  3/5 NT   Wrist Flexion 3/5 NT   Radial Deviation 3/5 NT   Ulnar Deviation 3/5 NT   Supination 3/5 NT   Pronation 3/5 NT   Elbow Extension 4/5 NT   Elbow Flexion 3+/5 NT   **MMT not tested on wrist and FA due to pain    CMS Impairment/Limitation/Restriction for FOTO Survey  Therapist reviewed FOTO scores for Daksha Marie.  FOTO documents entered into [employer] - see Media section.    Intake Limitation Score: 54% - 10/25/2018  Category: Carrying    Current : CK = at least 40% but < 60% impaired, limited or restricted  Goal: CJ = at least 20% but < 40% impaired, limited or restricted  Discharge: tba         Treatment     Treatment Time In: 11:45 am  Treatment Time Out: 12:00  Total Treatment time separate from Evaluation time:15 min    Daksha received the following supervised modalities after being cleared for contradictions for 10 minutes:  "  -Patient received fluidotherapy to L hand(s) for 10 minutes to increase blood flow, circulation, desensitization, sensory re-education and for pain management, @110*; deferred more due to pt c/o "burning"      Daksha received therapeutic exercises for 5 minutes including:  -Instructed on and performed tendon glides, wrist AROM 4 ways as tolerated, opposition/pinky slides, and thumb circumduction.      Home Exercise Program/Education:  Issued HEP (see patient instructions in EMR) and educated on modality use for pain management . Exercises were reviewed and Daksha was able to demonstrate them prior to the end of the session.   Pt received a written copy of exercises to perform at home. Daksha demonstrated good  understanding of the education provided.  Pt was advised to perform these exercises free of pain, and to stop performing them if pain occurs.    Patient/Family Education: role of OT, goals for OT, scheduling/cancellations - pt verbalized understanding. Discussed insurance limitations with patient.    Additional Education provided: modalities, retrograde massage for edema      Assessment     Daksha Marie is a 48 y.o. female referred to outpatient occupational/hand therapy and presents with a medical diagnosis of L hand pain, resulting in Decreased ROM, Decreased  strength, Decreased pinch strength, Decreased muscle strength, Decreased functional hand use, Increased pain and Edema and demonstrates limitations as described in the chart below. Following extended medical record review it is determined that pt will benefit from occupational therapy services in order to maximize pain free and/or functional use of left hand. The following goals were discussed with the patient and patient is in agreement with them as to be addressed in the treatment plan. The patient's rehab potential is Good.     Anticipated barriers to occupational therapy: unsure of cause of pain  Pt has no cultural, " educational or language barriers to learning provided.    Profile and History Assessment of Occupational Performance Level of Clinical Decision Making Complexity Score   Occupational Profile:   Daksha Marie is a 48 y.o. female who lives with their family and is currently disability.     Daksha Marie has difficulty with  lifting heavier objects  affecting his/her daily functional abilities. His/her main goal for therapy is to have less pain.     Previous functional status: Independent with all ADLs, but requires assist with reaching for objects and lifting something heavier    Comorbidities:   See PMH and Physical Systems Review Section above.    Medical and Therapy History Review:   Expanded               Performance Deficits    Physical:  Joint Mobility  Edema   Strength  Pinch Strength  Pain    Cognitive:  No Deficits    Psychosocial:    No Deficits     Clinical Decision Making:  low    Assessment Process:  Detailed Assessments    Modification/Need for Assistance:  Minimal-Moderate Modifications/Assistance    Intervention Selection:  Limited Treatment Options       low  Based on PMHX, co morbidities , data from assessments and functional level of assistance required with task and clinical presentation directly impacting function.         Goals   The following goals were discussed with the patient and patient is in agreement with them as to be addressed in the treatment plan.   Long Term Goals (LTGs); to be met by discharge.  LTG #1: Pt will report a pain level of no more than 1 out of 10 with lifting objects or opening things   LTG #2: Pt will demo improved FOTO score by at least 15 points.   LTG #3: Pt will return to prior level of function for ADLs and household management.   LTG #4: Pt will demonstrate L wrist, FA and elbow MMT WFL for performing IADLs independently    Short Term Goals (STGs); to be met within 2 weeks (11/8/18).  STG #1a: Pt will report 3 out of 10 pain level with  performing ADLs/IADLs.  STG #2: Pt will demonstrate independence with issued HEP.   STG #3b: Pt will demo improved L wrist AROM by at least 10-15 degrees needed to aid with grasping and moving items.    Plan     Outpatient occupational therapy 1 times weekly for 4 weeks during the certification period from 10/25/2018 to 11/25/18.    Treatment to include: Paraffin, Fluidotherapy, Manual therapy/joint mobilizations, Modalities for pain management, US 3 mhz, Therapeutic exercises/activities., Strengthening, Orthotic Fabrication/Fit/Training, Edema Control, Joint Protection and Energy Conservation, as well as any other treatments deemed necessary based on the patient's needs or progress.     Therapist: RACHELL Hutchinson     I certify the need for these services furnished under this plan of treatment and while under my care    ____________________________________                         __________________  Physician/Referring Practitioner                                               Date of Signature

## 2018-10-27 NOTE — PHYSICIAN QUERY
"PT Name: Daksha Marie  MR #: [id number]    Physician Query Form - Heart  Condition Clarification     CDS/: Jayda Stratton               Contact information: [email or web address]   This form is a permanent document in the medical record.     Query Date: October 27, 2018    By submitting this query, we are merely seeking further clarification of documentation. Please utilize your independent clinical judgment when addressing the question(s) below.    The medical record contains the following   Indicators     Supporting Clinical Findings Location in Medical Record   X BNP BNP 1064   Lab 10/12   X EF EF 30   2D Echo 10/13   X Radiology findings Cardiac silhouette is mildly enlarged.     CXR 10/12   X Echo Results CONCLUSIONS     1 - Moderately depressed left ventricular systolic function (EF 30%).  Reduced global LV strain of -7.2%..     2 - Wall motion abnormalities.     3 - Concentric hypertrophy.     4 - Restrictive LV filling pattern, indicating markedly elevated LAP (grade 3 diastolic dysfunction).     5 - Severe left atrial enlargement.     6 - The estimated PA systolic pressure is 39 mmHg.     7 - Normal right ventricular systolic function .     8 - Intermediate central venous pressure.    2D Echo 10/13    "Ascites" documented      "SOB" or "GARCIA" documented SOB x 2-3 days     H&P 10/13    "Hypoxia" documented      Heart Failure documented Acute on chronic HF exacerbation due to suboptimally treated HTN    Acute on chronic diastolic heart failure    Acute on Chronic Heart Failure, newly systolic    pmhx of HFpEF  H&P 10/13    DC Summary 10/15    Cardiology Consult 10/15    H&P 10/13    "Edema" documented      Diuretics/Meds      Treatment:      Other:      Heart failure (HF) can be acute, chronic or both. It is generally further specificed as systolic, diastolic, or combined. Lastly, it is important to identify an underlying etiology if known or suspected.     Common clues to acute " exacerbation:  Rapidly progressive symptoms (w/in 2 weeks of presentation), using IV diuretics to treat, using supplemental O2, pulmonary edema on Xray, MI w/in 4 weeks, and/or BNP >500    Systolic Heart Failure: is defined as chart documentation of a left ventricular ejection fraction (LVEF) less than 40%     Diastolic Heart Failure: is defined as a left ventricular ejection fraction (LVEF) greater than 40%   +      Evidence of diastolic dysfunction on echocardiography OR    Right heart catheterization wedge pressure above 12 mm Hg OR    Left heart catheterization left ventricular end diastolic pressure 18 mm Hg or above.    References: *American Heart Association    The clinical guidelines noted below are only system guidelines, and do not replace the providers clinical judgment.     Provider, please specify the diagnosis associated with above clinical findings    [   ] Acute on Chronic Systolic Heart Failure- Pre-existing systolic HF diagnosis.  EF < 40%  and acute HF symptoms documented    [x  ] Acute on Chronic Diastolic Heart Failure -    Pre-existing diastoic HF diagnosis.  EF > 40%  and acute HF symptoms documented                                   [   ] Acute on Chronic Combined Systolic and Diastolic Heart Failure                     [   ] Other Type of Heart Failure (please specify type): _________________________    [  ] Clinically Undetermined                          Please document in your progress notes daily for the duration of treatment until resolved and include in your discharge summary.

## 2018-10-29 ENCOUNTER — CLINICAL SUPPORT (OUTPATIENT)
Dept: REHABILITATION | Facility: HOSPITAL | Age: 48
End: 2018-10-29
Attending: FAMILY MEDICINE
Payer: MEDICARE

## 2018-10-29 ENCOUNTER — DOCUMENTATION ONLY (OUTPATIENT)
Dept: FAMILY MEDICINE | Facility: HOSPITAL | Age: 48
End: 2018-10-29

## 2018-10-29 ENCOUNTER — PATIENT MESSAGE (OUTPATIENT)
Dept: OBSTETRICS AND GYNECOLOGY | Facility: CLINIC | Age: 48
End: 2018-10-29

## 2018-10-29 DIAGNOSIS — M79.642 LEFT HAND PAIN: ICD-10-CM

## 2018-10-29 DIAGNOSIS — N95.1 PERIMENOPAUSE: ICD-10-CM

## 2018-10-29 DIAGNOSIS — N92.6 IRREGULAR PERIODS/MENSTRUAL CYCLES: ICD-10-CM

## 2018-10-29 DIAGNOSIS — M25.632 WRIST STIFFNESS, LEFT: ICD-10-CM

## 2018-10-29 PROCEDURE — 97110 THERAPEUTIC EXERCISES: CPT | Mod: PN

## 2018-10-29 PROCEDURE — 97140 MANUAL THERAPY 1/> REGIONS: CPT | Mod: PN

## 2018-10-29 PROCEDURE — 97022 WHIRLPOOL THERAPY: CPT | Mod: PN

## 2018-10-29 RX ORDER — SUMATRIPTAN 50 MG/1
TABLET, FILM COATED ORAL
Qty: 12 TABLET | Refills: 3 | OUTPATIENT
Start: 2018-10-29

## 2018-10-29 RX ORDER — ESTRADIOL AND NORETHINDRONE ACETATE 1; .5 MG/1; MG/1
1 TABLET ORAL DAILY
Qty: 28 TABLET | Refills: 11 | Status: SHIPPED | OUTPATIENT
Start: 2018-10-29 | End: 2018-11-06 | Stop reason: SDUPTHER

## 2018-10-29 NOTE — PROGRESS NOTES
Spoke with patient about her elevated potassium. She stopped taking the supplement on Friday and will stop by the lab in the next couple days to have her lab rechecked.

## 2018-10-29 NOTE — PROGRESS NOTES
"  Occupational Therapy Daily Treatment Note      Date: 10/29/2018  Name: Daksha Marie  Clinic Number: [id number]    Medical Diagnosis: Left hand pain  Date of Onset: 10/12/18  Date of Surgery: n/a  Surgical Procedure: n/a  Therapy Diagnosis:        Encounter Diagnoses   Name Primary?    Left hand pain      Wrist stiffness, left        Precautions: Standard and CHF     Physician: Edgar Johnson, *; PCP D'Amico Johnson  Physician Orders: eval and treat  Date of Return to MD: ???     Evaluation Date: 10/25/2018  Plan of Care Certification Period: 11/25/18     Visit #: 2/ Visits authorized: 18  Insurance Authorization period Expiration: 12/31/18    Time In:10:05  Time Out: 11:00  Total Billable Time: 40 minutes (15 min untimed, 25 min 1:1)  Charges for this Visit: FL x 1, MT x 1, TE x 1    Subjective     Pt reports: "i've been trying to do the exercises. I was able to close my fingers with minimal pain." Pt reports she's been squeezing a ball for strength.  she was compliant with home exercise program given last session.   Response to previous treatment:good, improved finger ROM and pain  Functional change: cont to be limited functionally, but improving    Pain: 3/10  Location: left hand and wrist    Objective     Pt seen by OT this session. Treatment consisted of the following:        Daksha received the following supervised modalities after being cleared for contradictions for 15 minutes:   Patient received fluidotherapy to L  hand(s) for 15 minutes to increase blood flow, circulation, desensitization, sensory re-education and for pain management.       Daksha received the following manual therapy techniques for 8 minutes:   Performed RM to L digits/hand/wrist x 8 min to decrease edema, improve joint mobility, decrease pain and improve lymphatic drainage to increase hand function.       Daksha received therapeutic exercises for 32 minutes including: (15 min 1:1, 1NC)  Wrist AROM 10 reps each "   Pinky slides 10 reps   Sup/pro 10 reps   Finger add with foam 30 reps   Finger abd with band 20 reps   isospheres  2'   In hand manipulation and FM task with small pom poms 2 containers   Wrist dexerciser 2'   Desensitization and light resistance gripping with dry rice 2'         Home Exercises and Education Provided     Education provided: cont with HEP, cont FM tasks, modalities  - Progress towards goals     Written Home Exercises Provided: Patient instructed to cont prior HEP.  Exercises were reviewed and Daksha was able to demonstrate them prior to the end of the session.  Daksha demonstrated good  understanding of the education provided.   .   See EMR under Patient Instructions for exercises provided prior visit.     Assessment     Daksha Marie is a 48 y.o. female referred to outpatient occupational/hand therapy and presents with a medical diagnosis of L hand and wrist pain.    Daksha is progressing well towards her goals and there are no updates to goals at this time. Pt prognosis continues as Good. Pt reported increased finger movement and decreased pain this date. Cont with some dorsal hand pain and weakness. Good tolerance of progression of ex this date. Improved opposition and wrist ROM noted this date. Pt will continue to benefit from skilled outpatient occupational therapy to address the deficits listed in the problem list on initial evaluation, provide pt/family education and to maximize pt's level of independence in the home and community environment.     Anticipated barriers to continued occupational therapy: none    Pt's spiritual, cultural and educational needs considered and pt agreeable to plan of care and goals.    Goals     Long Term Goals (LTGs); to be met by discharge.  LTG #1: Pt will report a pain level of no more than 1 out of 10 with lifting objects or opening things             LTG #2: Pt will demo improved FOTO score by at least 15 points.   LTG #3: Pt will return to  prior level of function for ADLs and household management.   LTG #4: Pt will demonstrate L wrist, FA and elbow MMT WFL for performing IADLs independently     Short Term Goals (STGs); to be met within 2 weeks (11/8/18).  STG #1a: Pt will report 3 out of 10 pain level with performing ADLs/IADLs.  STG #2: Pt will demonstrate independence with issued HEP.   STG #3b: Pt will demo improved L wrist AROM by at least 10-15 degrees needed to aid with grasping and moving items.    Plan     Continue skilled occupational therapy with individualized plan of care 1x/week for 4 weeks from 10/25/18 to 11/25/18.    Discussed Plan of Care with patient: Yes  Updates/Grading for next session: progress as tolerated    RACHELL Hutchinson

## 2018-10-30 ENCOUNTER — LAB VISIT (OUTPATIENT)
Dept: LAB | Facility: HOSPITAL | Age: 48
End: 2018-10-30
Attending: PHYSICIAN ASSISTANT
Payer: MEDICARE

## 2018-10-30 DIAGNOSIS — E87.5 HYPERKALEMIA: ICD-10-CM

## 2018-10-30 LAB
ANION GAP SERPL CALC-SCNC: 6 MMOL/L
BUN SERPL-MCNC: 24 MG/DL
CALCIUM SERPL-MCNC: 8.8 MG/DL
CHLORIDE SERPL-SCNC: 105 MMOL/L
CO2 SERPL-SCNC: 23 MMOL/L
CREAT SERPL-MCNC: 1.2 MG/DL
EST. GFR  (AFRICAN AMERICAN): >60 ML/MIN/1.73 M^2
EST. GFR  (NON AFRICAN AMERICAN): 54 ML/MIN/1.73 M^2
GLUCOSE SERPL-MCNC: 95 MG/DL
POTASSIUM SERPL-SCNC: 4.8 MMOL/L
SODIUM SERPL-SCNC: 134 MMOL/L

## 2018-10-30 PROCEDURE — 36415 COLL VENOUS BLD VENIPUNCTURE: CPT

## 2018-10-30 PROCEDURE — 80048 BASIC METABOLIC PNL TOTAL CA: CPT

## 2018-11-01 ENCOUNTER — OFFICE VISIT (OUTPATIENT)
Dept: PSYCHIATRY | Facility: CLINIC | Age: 48
End: 2018-11-01
Payer: MEDICARE

## 2018-11-01 DIAGNOSIS — F33.0 MAJOR DEPRESSIVE DISORDER, RECURRENT EPISODE, MILD: ICD-10-CM

## 2018-11-01 DIAGNOSIS — F43.10 PTSD (POST-TRAUMATIC STRESS DISORDER): Primary | ICD-10-CM

## 2018-11-01 DIAGNOSIS — F42.8 OTHER OBSESSIVE-COMPULSIVE DISORDERS: ICD-10-CM

## 2018-11-01 PROCEDURE — 90834 PSYTX W PT 45 MINUTES: CPT | Mod: S$PBB,,, | Performed by: SOCIAL WORKER

## 2018-11-01 PROCEDURE — 90834 PSYTX W PT 45 MINUTES: CPT | Mod: PBBFAC | Performed by: SOCIAL WORKER

## 2018-11-01 NOTE — PROGRESS NOTES
"Individual Psychotherapy (PhD/LCSW)    11/1/2018    Site:  [facility]         Therapeutic Intervention: Met with patient.  Outpatient - Insight oriented psychotherapy 45 min - CPT code 33292    Chief complaint/reason for encounter: depression and anxiety     Interval history and content of current session:       As usual she is polite and soft spoken.  I discussed with her how we could treat her ptsd through exposure therapy.  She has agreed.  We will start with our next session.   In the meantime she was motivated to start at home.  I told her to leave the exposure to me but that she could write in a piece of paper the events that occur.  To pick on of the events.  Also that if she felt it was to much to not feel bad, to stop, and again to just wait for our next session.   We discussed her fear of going to [place].  I gave her some techniques to lessen the task.  I also told her to walk at normal pace.  To not speed up.  She was quite cooperative.    Reports she is walking looking up because if she looks down she is compelled to  anything she might see in the floor.    Also reports that mother sheltered her.   That she avoided sweating and "getting dirty".        Treatment plan:  · Target symptoms: depression, anxiety   · Why chosen therapy is appropriate versus another modality: relevant to diagnosis  · Outcome monitoring methods: self-report, observation  · Therapeutic intervention type: behavior modifying psychotherapy, supportive psychotherapy    Risk parameters:  Patient reports no suicidal ideation  Patient reports no homicidal ideation  Patient reports no self-injurious behavior  Patient reports no violent behavior    Verbal deficits: None    Patient's response to intervention:  The patient's response to intervention is accepting.    Progress toward goals and other mental status changes:  The patient's progress toward goals is limited.    Diagnosis:     ICD-10-CM ICD-9-CM   1. PTSD " (post-traumatic stress disorder) F43.10 309.81   2. Major depressive disorder, recurrent episode, mild F33.0 296.31   3.      Consider  Obsessive compulsive disorder    Plan:  individual psychotherapy    Return to clinic: as scheduled    Length of Service (minutes): 45

## 2018-11-05 ENCOUNTER — CLINICAL SUPPORT (OUTPATIENT)
Dept: REHABILITATION | Facility: HOSPITAL | Age: 48
End: 2018-11-05
Attending: FAMILY MEDICINE
Payer: MEDICARE

## 2018-11-05 ENCOUNTER — PATIENT MESSAGE (OUTPATIENT)
Dept: FAMILY MEDICINE | Facility: HOSPITAL | Age: 48
End: 2018-11-05

## 2018-11-05 DIAGNOSIS — M25.632 WRIST STIFFNESS, LEFT: ICD-10-CM

## 2018-11-05 DIAGNOSIS — M79.642 LEFT HAND PAIN: ICD-10-CM

## 2018-11-05 DIAGNOSIS — N92.6 IRREGULAR PERIODS/MENSTRUAL CYCLES: ICD-10-CM

## 2018-11-05 DIAGNOSIS — N95.1 PERIMENOPAUSE: ICD-10-CM

## 2018-11-05 PROCEDURE — 97022 WHIRLPOOL THERAPY: CPT | Mod: PN

## 2018-11-05 PROCEDURE — 97140 MANUAL THERAPY 1/> REGIONS: CPT | Mod: PN

## 2018-11-05 PROCEDURE — 97110 THERAPEUTIC EXERCISES: CPT | Mod: PN

## 2018-11-05 NOTE — PROGRESS NOTES
"  Occupational Therapy Daily Treatment Note      Date: 11/5/2018  Name: Daksha Marie  Clinic Number: [id number]    Medical Diagnosis: Left hand pain  Date of Onset: 10/12/18  Date of Surgery: n/a  Surgical Procedure: n/a  Therapy Diagnosis:        Encounter Diagnoses   Name Primary?    Left hand pain      Wrist stiffness, left        Precautions: Standard and CHF     Physician: Edgar Johnson, *; PCP D'Amico Johnson  Physician Orders: eval and treat  Date of Return to MD: ???     Evaluation Date: 10/25/2018  Plan of Care Certification Period: 11/25/18     Visit #: 3/ Visits authorized: 18  Insurance Authorization period Expiration: 12/31/18    Time In:10:05  Time Out: 11:00  Total Billable Time: 40 minutes (15 min untimed, 25 min 1:1)  Charges for this Visit: FL x 1, MT x 1, TE x 1    Subjective     Pt reports: "I took my pain medicine this morning for my back and tailbone." Pt c/o moderate pain in back and tailbone this date.  she was compliant with home exercise program given last session.   Response to previous treatment:good, improved finger ROM and pain  Functional change: cont to be limited functionally, but improving    Pain: 3/10  Location: left hand and wrist    Objective     Pt seen by OT this session. Treatment consisted of the following:        Daksha received the following supervised modalities after being cleared for contradictions for 15 minutes:   Patient received fluidotherapy to L  hand(s) for 15 minutes to increase blood flow, circulation, desensitization, sensory re-education and for pain management.       Daksha received the following manual therapy techniques for 8 minutes:   Performed RM to L digits/hand/wrist x 8 min to decrease edema, improve joint mobility, decrease pain and improve lymphatic drainage to increase hand function.       Daksha received therapeutic exercises for 32 minutes including: (15 min 1:1, 1NC)  Wrist AROM 10 reps each   Pinky slides 10 reps "   Sup/pro 10 reps   Finger add with foam 30 reps   Finger abd with band 20 reps   isospheres  2'   In hand manipulation and FM task with small pom poms 2 containers   Wrist dexerciser 2'   Desensitization and light resistance gripping with dry rice 2' (NT due to time)         Home Exercises and Education Provided     Education provided: cont with HEP, cont FM tasks, modalities  - Progress towards goals     Written Home Exercises Provided: Patient instructed to cont prior HEP.  Exercises were reviewed and Daksha was able to demonstrate them prior to the end of the session.  Daksha demonstrated good  understanding of the education provided.   .   See EMR under Patient Instructions for exercises provided prior visit.     Assessment     Daksha Marie is a 48 y.o. female referred to outpatient occupational/hand therapy and presents with a medical diagnosis of L hand and wrist pain.    Daksha is progressing well towards her goals and there are no updates to goals at this time. Pt prognosis continues as Good. Pt reported increased finger movement and decreased pain this date. Cont with some dorsal hand pain and weakness. Cont with decreased endurance in hand. Good tolerance of progression of ex this date. Improved opposition and wrist ROM noted this date. Pt will continue to benefit from skilled outpatient occupational therapy to address the deficits listed in the problem list on initial evaluation, provide pt/family education and to maximize pt's level of independence in the home and community environment.     Anticipated barriers to continued occupational therapy: none    Pt's spiritual, cultural and educational needs considered and pt agreeable to plan of care and goals.    Goals     Long Term Goals (LTGs); to be met by discharge.  LTG #1: Pt will report a pain level of no more than 1 out of 10 with lifting objects or opening things             LTG #2: Pt will demo improved FOTO score by at least 15  points.   LTG #3: Pt will return to prior level of function for ADLs and household management.   LTG #4: Pt will demonstrate L wrist, FA and elbow MMT WFL for performing IADLs independently     Short Term Goals (STGs); to be met within 2 weeks (11/8/18).  STG #1a: Pt will report 3 out of 10 pain level with performing ADLs/IADLs.  STG #2: Pt will demonstrate independence with issued HEP.   STG #3b: Pt will demo improved L wrist AROM by at least 10-15 degrees needed to aid with grasping and moving items.    Plan     Continue skilled occupational therapy with individualized plan of care 1x/week for 4 weeks from 10/25/18 to 11/25/18.    Discussed Plan of Care with patient: Yes  Updates/Grading for next session: progress as tolerated    RACHELL Hutchinson

## 2018-11-06 ENCOUNTER — PATIENT MESSAGE (OUTPATIENT)
Dept: PSYCHIATRY | Facility: CLINIC | Age: 48
End: 2018-11-06

## 2018-11-06 RX ORDER — ESTRADIOL AND NORETHINDRONE ACETATE 1; .5 MG/1; MG/1
TABLET, FILM COATED ORAL
Qty: 28 TABLET | Refills: 3 | Status: SHIPPED | OUTPATIENT
Start: 2018-11-06 | End: 2019-02-28 | Stop reason: SDUPTHER

## 2018-11-07 ENCOUNTER — PATIENT MESSAGE (OUTPATIENT)
Dept: FAMILY MEDICINE | Facility: HOSPITAL | Age: 48
End: 2018-11-07

## 2018-11-08 ENCOUNTER — PATIENT MESSAGE (OUTPATIENT)
Dept: FAMILY MEDICINE | Facility: HOSPITAL | Age: 48
End: 2018-11-08

## 2018-11-20 ENCOUNTER — OFFICE VISIT (OUTPATIENT)
Dept: FAMILY MEDICINE | Facility: HOSPITAL | Age: 48
End: 2018-11-20
Attending: SPECIALIST
Payer: MEDICARE

## 2018-11-20 VITALS
BODY MASS INDEX: 29.97 KG/M2 | SYSTOLIC BLOOD PRESSURE: 123 MMHG | HEIGHT: 65 IN | DIASTOLIC BLOOD PRESSURE: 88 MMHG | HEART RATE: 93 BPM | WEIGHT: 179.88 LBS

## 2018-11-20 DIAGNOSIS — F10.10 ALCOHOL ABUSE: Chronic | ICD-10-CM

## 2018-11-20 DIAGNOSIS — F43.10 PTSD (POST-TRAUMATIC STRESS DISORDER): Chronic | ICD-10-CM

## 2018-11-20 DIAGNOSIS — D50.9 MICROCYTIC ANEMIA: ICD-10-CM

## 2018-11-20 DIAGNOSIS — I50.9 CONGESTIVE HEART FAILURE, UNSPECIFIED HF CHRONICITY, UNSPECIFIED HEART FAILURE TYPE: Primary | ICD-10-CM

## 2018-11-20 PROCEDURE — 99215 OFFICE O/P EST HI 40 MIN: CPT | Performed by: STUDENT IN AN ORGANIZED HEALTH CARE EDUCATION/TRAINING PROGRAM

## 2018-11-20 RX ORDER — HYDROCODONE BITARTRATE AND ACETAMINOPHEN 5; 325 MG/1; MG/1
TABLET ORAL
Refills: 0 | COMMUNITY
Start: 2018-11-13 | End: 2019-08-21

## 2018-11-20 RX ORDER — TIZANIDINE 2 MG/1
TABLET ORAL
Refills: 1 | COMMUNITY
Start: 2018-11-09 | End: 2019-07-23

## 2018-11-20 RX ORDER — METHOCARBAMOL 500 MG/1
TABLET, FILM COATED ORAL
Refills: 0 | COMMUNITY
Start: 2018-11-09 | End: 2019-07-23

## 2018-11-20 RX ORDER — CYANOCOBALAMIN 1000 UG/ML
1000 INJECTION, SOLUTION INTRAMUSCULAR; SUBCUTANEOUS
Status: ACTIVE | OUTPATIENT
Start: 2018-11-20

## 2018-11-21 ENCOUNTER — CLINICAL SUPPORT (OUTPATIENT)
Dept: REHABILITATION | Facility: HOSPITAL | Age: 48
End: 2018-11-21
Attending: FAMILY MEDICINE
Payer: MEDICARE

## 2018-11-21 DIAGNOSIS — M79.642 LEFT HAND PAIN: ICD-10-CM

## 2018-11-21 DIAGNOSIS — M25.632 WRIST STIFFNESS, LEFT: ICD-10-CM

## 2018-11-21 PROCEDURE — 97022 WHIRLPOOL THERAPY: CPT | Mod: PN

## 2018-11-21 PROCEDURE — 97110 THERAPEUTIC EXERCISES: CPT | Mod: PN

## 2018-11-21 NOTE — PROGRESS NOTES
"  Occupational Therapy Daily Treatment Note      Date: 11/21/2018  Name: Daksha Marie  Clinic Number: [id number]    Medical Diagnosis: Left hand pain  Date of Onset: 10/12/18  Date of Surgery: n/a  Surgical Procedure: n/a  Therapy Diagnosis:        Encounter Diagnoses   Name Primary?    Left hand pain      Wrist stiffness, left        Precautions: Standard and CHF     Physician: Edgar Johnson, *; PCP D'Amico Johnson  Physician Orders: eval and treat  Date of Return to MD: ???     Evaluation Date: 10/25/2018  Plan of Care Certification Period: 11/25/18     Visit #: 4/ Visits authorized: 18  Insurance Authorization period Expiration: 12/31/18    Time In:10:50  Time Out: 11:50  Total Billable Time: 40 minutes (15 min untimed, 25 min 1:1)  Charges for this Visit: FL x 1,  TE x 2    Subjective     Pt reports: "I think I'm doing better. I don;t really have much pain anymore, but it's still sore."    she was compliant with home exercise program given last session.   Response to previous treatment:good, improved finger ROM and pain  Functional change: cont to be limited functionally with strength    Pain: 3/10  Location: left hand and wrist    Objective     Pt seen by OT this session. Treatment consisted of the following:        Daksha received the following supervised modalities after being cleared for contradictions for 15 minutes:   Patient received fluidotherapy to L  hand(s) for 15 minutes to increase blood flow, circulation, desensitization, sensory re-education and for pain management.       Daksha received the following manual therapy techniques for 5 minutes:   Performed RM to L digits/hand/wrist x 5 min to decrease edema, improve joint mobility, decrease pain and improve lymphatic drainage to increase hand function.       Daksha received therapeutic exercises for 40 minutes including: (25 min 1:1, 1NC)  Wrist AROM 10 reps each   Pinky slides 10 reps   Sup/pro 10 reps   Finger add with foam " 30 reps   Finger abd with band 20 reps   isospheres  2'   In hand manipulation and FM task with small pom poms 2 containers   Wrist dexerciser 2'   Red clothespin picking up pom poms 1 container, small pom poms   Wrist PRE, 1#, 3 ways 2 x 10 reps   Medium grippers 8 grippers, 10 each         Measurements taken today 11/21/18:  Edema. Measured in centimeters.    10/25/2018 10/25/2018 11/21/18     Left Right Left   2in. Above elbow        2in. Below elbow 24 NT    Wrist Crease 16.1 15.7 16   Figure of 8 19.7 20 20   MCPs 20 20 19.9         Elbow and Wrist ROM. Measured in degrees.    10/25/2018 10/25/2018 11/21/18     Left Right Left   Elbow Ext/Flex WNL WNL    Supination/Pronation 70/83 80/80 74/86 (+4/+3)   Wrist Ext/Flex 25/75 65/90 52/90 (+27/15)   Wrist RD/UD 10/27 10/37 18/34 (+8/+7)   **However, observed pt extending L wrist past 25* later in session to about WFL      Opposition : WFL         Strength (Dynamometer) and Pinch Strength (Pinch Gauge)  Measured in pounds.    10/25/2018 10/25/2018 11/21/18     Left Right Left   Rung II 18 64 41 (+23)   Petty Pinch 6 16.5 12 (+6)   3pt Pinch 5 13 9 (+4)   2pt Pinch 4 9.5 7 (+3)           Home Exercises and Education Provided     Education provided: cont with HEP, cont FM tasks, modalities  - Progress towards goals     Written Home Exercises Provided: Patient instructed to cont prior HEP.  Exercises were reviewed and Daksha was able to demonstrate them prior to the end of the session.  Daksha demonstrated good  understanding of the education provided.   .   See EMR under Patient Instructions for exercises provided prior visit.     Assessment     Daksha Marie is a 48 y.o. female referred to outpatient occupational/hand therapy and presents with a medical diagnosis of L hand and wrist pain.    Daksha is progressing well towards her goals and there are no updates to goals at this time. Pt prognosis continues as Good. Improved ROM in wrist and thumb.   Cont with some dorsal hand soreness and weakness. Improved  and pinch strength noted this date. Cont with decreased endurance in hand. Good tolerance of progression of ex this date. Pt will continue to benefit from skilled outpatient occupational therapy to address the deficits listed in the problem list on initial evaluation, provide pt/family education and to maximize pt's level of independence in the home and community environment.     Anticipated barriers to continued occupational therapy: none    Pt's spiritual, cultural and educational needs considered and pt agreeable to plan of care and goals.    Goals     Long Term Goals (LTGs); to be met by discharge.  LTG #1: Pt will report a pain level of no more than 1 out of 10 with lifting objects or opening things             LTG #2: Pt will demo improved FOTO score by at least 15 points.   LTG #3: Pt will return to prior level of function for ADLs and household management.   LTG #4: Pt will demonstrate L wrist, FA and elbow MMT WFL for performing IADLs independently     Short Term Goals (STGs); to be met within 2 weeks (11/8/18).  STG #1a: Pt will report 3 out of 10 pain level with performing ADLs/IADLs.  STG #2: Pt will demonstrate independence with issued HEP.   STG #3b: Pt will demo improved L wrist AROM by at least 10-15 degrees needed to aid with grasping and moving items.    Plan     Continue skilled occupational therapy with individualized plan of care 1x/week for 4 more weeks from 11/21/18 to 12/21/18    Discussed Plan of Care with patient: Yes  Updates/Grading for next session: progress as tolerated    RACHELL Hutchinson

## 2018-11-26 NOTE — PROGRESS NOTES
I assume primary medical responsibility for this patient, I have reviewed the case history, findings, diagnosis and treatment plan with the resident and agree that the care is reasonable and necessary. This service has been performed by a resident with the presence of a teaching physician under the primary care exception. See below addendum for my evaluation and additional findings.

## 2018-11-29 ENCOUNTER — CLINICAL SUPPORT (OUTPATIENT)
Dept: REHABILITATION | Facility: HOSPITAL | Age: 48
End: 2018-11-29
Attending: FAMILY MEDICINE
Payer: MEDICARE

## 2018-11-29 ENCOUNTER — OFFICE VISIT (OUTPATIENT)
Dept: PSYCHIATRY | Facility: CLINIC | Age: 48
End: 2018-11-29
Payer: MEDICARE

## 2018-11-29 DIAGNOSIS — F41.1 GENERALIZED ANXIETY DISORDER: ICD-10-CM

## 2018-11-29 DIAGNOSIS — F43.10 PTSD (POST-TRAUMATIC STRESS DISORDER): Primary | ICD-10-CM

## 2018-11-29 DIAGNOSIS — F42.2 MIXED OBSESSIONAL THOUGHTS AND ACTS: ICD-10-CM

## 2018-11-29 DIAGNOSIS — M25.632 WRIST STIFFNESS, LEFT: ICD-10-CM

## 2018-11-29 DIAGNOSIS — M79.642 LEFT HAND PAIN: ICD-10-CM

## 2018-11-29 PROCEDURE — 90834 PSYTX W PT 45 MINUTES: CPT | Mod: PBBFAC | Performed by: SOCIAL WORKER

## 2018-11-29 PROCEDURE — 97140 MANUAL THERAPY 1/> REGIONS: CPT | Mod: PN

## 2018-11-29 PROCEDURE — 90834 PSYTX W PT 45 MINUTES: CPT | Mod: S$PBB,,, | Performed by: SOCIAL WORKER

## 2018-11-29 PROCEDURE — 97110 THERAPEUTIC EXERCISES: CPT | Mod: PN
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
## 2018-11-29 NOTE — PROGRESS NOTES
"  Occupational Therapy Daily Treatment Note      Date: 11/29/2018  Name: Daksha Marie  Clinic Number: [id number]    Medical Diagnosis: Left hand pain  Date of Onset: 10/12/18  Date of Surgery: n/a  Surgical Procedure: n/a  Therapy Diagnosis:        Encounter Diagnoses   Name Primary?    Left hand pain      Wrist stiffness, left        Precautions: Standard and CHF     Physician: Edgar Johnosn, *; PCP D'Amico Johnson  Physician Orders: eval and treat  Date of Return to MD:     Evaluation Date: 10/25/2018  Plan of Care Certification Period: 11/25/18     Visit #: 5/ Visits authorized: 18  Insurance Authorization period Expiration: 12/31/18  FOTO: 62% limitation compared to 54% at initial evaluation.     Time In:11:25 am Pt 25 min late  Time Out: 11:50  Total Billable Time: 25 min  Charges for this Visit: 1 MT,  TE x 1    Subjective     Pt reports: "I feel pretty good just dereje a soreness and discomfort."    she was compliant with home exercise program given last session.   Response to previous treatment:good, improved finger ROM and pain  Functional change: cont to be limited functionally with strength    Pain: 3/10  Location: left hand and wrist    Objective     Pt seen by OT this session. Treatment consisted of the following:        Daksha received the following supervised modalities after being cleared for contradictions for 5 minutes:   . Paraffin w/ MHP to L hand for 5 min, pre-tx to decrease pain & increase tissue extensibility while Pt received retrograde massage to decrease edema and stiffness for increased ROM. STM performed to decreased stiffness in surrounding musculature.           Daksha received the following manual therapy techniques for 10 minutes:   Performed RM to L digits/hand/wrist x 5 min to decrease edema, improve joint mobility, decrease pain and improve lymphatic drainage to increase hand function.       Daksah received therapeutic exercises for 15 minutes including: "   Wrist AROM 10 reps each   Pinky slides 10 reps   Sup/pro 10 reps   Finger add with foam 30 reps   Finger abd with band 20 reps   isospheres  2'   In hand manipulation and FM task with small pom poms NT   Wrist dexerciser NT   Red clothespin picking up pom poms 1 container, MD pom poms   Wrist PRE, 1#, 3 ways 2 x 10 reps   Medium grippers NT   Red t putty  Mold     Pinch X 30                Home Exercises and Education Provided     Education provided: cont with HEP, cont FM tasks, modalities  - Progress towards goals     Written Home Exercises Provided: Patient instructed to cont prior HEP.  Exercises were reviewed and Daksha was able to demonstrate them prior to the end of the session.  Daksha demonstrated good  understanding of the education provided.   .   See EMR under Patient Instructions for exercises provided prior visit.     Assessment     Daksha Marie is a 48 y.o. female referred to outpatient occupational/hand therapy and presents with a medical diagnosis of L hand and wrist pain.    Daksha is progressing well towards her goals and there are no updates to goals at this time. Pt prognosis continues as Good. Improved ROM and strength in the hand and wrist.  Noted guarding with all movements and exercises but tolerated them well. Established exercises and new exercises tolerated well. Cont with some dorsal hand soreness and weakness. Pt would continue to benefit from skilled OT services to increase ROM, strength, activity tolerance, and Fm/ GM coordination in order to increase safety and IND with ADLs.       Anticipated barriers to continued occupational therapy: none    Pt's spiritual, cultural and educational needs considered and pt agreeable to plan of care and goals.    Goals     Long Term Goals (LTGs); to be met by discharge.  LTG #1: Pt will report a pain level of no more than 1 out of 10 with lifting objects or opening things             LTG #2: Pt will demo improved FOTO score  by at least 15 points.   LTG #3: Pt will return to prior level of function for ADLs and household management.   LTG #4: Pt will demonstrate L wrist, FA and elbow MMT WFL for performing IADLs independently     Short Term Goals (STGs); to be met within 2 weeks (11/8/18).  STG #1a: Pt will report 3 out of 10 pain level with performing ADLs/IADLs.  STG #2: Pt will demonstrate independence with issued HEP.   STG #3b: Pt will demo improved L wrist AROM by at least 10-15 degrees needed to aid with grasping and moving items.    Plan     Continue skilled occupational therapy with individualized plan of care 1x/week for 4 more weeks from 11/21/18 to 12/21/18    Discussed Plan of Care with patient: Yes  Updates/Grading for next session: progress as tolerated    Justus Paige, OTR/L

## 2018-12-02 ENCOUNTER — PATIENT MESSAGE (OUTPATIENT)
Dept: FAMILY MEDICINE | Facility: HOSPITAL | Age: 48
End: 2018-12-02

## 2018-12-02 RX ORDER — TRAZODONE HYDROCHLORIDE 150 MG/1
150 TABLET ORAL NIGHTLY
Qty: 30 TABLET | Refills: 2 | Status: SHIPPED | OUTPATIENT
Start: 2018-12-02 | End: 2019-04-01 | Stop reason: ALTCHOICE

## 2018-12-02 RX ORDER — QUETIAPINE FUMARATE 100 MG/1
100 TABLET, FILM COATED ORAL 2 TIMES DAILY
Qty: 60 TABLET | Refills: 2 | Status: SHIPPED | OUTPATIENT
Start: 2018-12-02 | End: 2019-04-01 | Stop reason: ALTCHOICE

## 2018-12-05 ENCOUNTER — PATIENT MESSAGE (OUTPATIENT)
Dept: NEUROLOGY | Facility: CLINIC | Age: 48
End: 2018-12-05

## 2018-12-07 ENCOUNTER — CLINICAL SUPPORT (OUTPATIENT)
Dept: REHABILITATION | Facility: HOSPITAL | Age: 48
End: 2018-12-07
Attending: FAMILY MEDICINE
Payer: MEDICARE

## 2018-12-07 DIAGNOSIS — G89.29 CHRONIC BILATERAL LOW BACK PAIN WITHOUT SCIATICA: ICD-10-CM

## 2018-12-07 DIAGNOSIS — M79.642 LEFT HAND PAIN: ICD-10-CM

## 2018-12-07 DIAGNOSIS — M25.632 WRIST STIFFNESS, LEFT: ICD-10-CM

## 2018-12-07 DIAGNOSIS — M54.50 CHRONIC BILATERAL LOW BACK PAIN WITHOUT SCIATICA: ICD-10-CM

## 2018-12-07 DIAGNOSIS — R52 PAIN AGGRAVATED BY SITTING: ICD-10-CM

## 2018-12-07 PROCEDURE — 97110 THERAPEUTIC EXERCISES: CPT | Mod: PN

## 2018-12-07 PROCEDURE — G8989 SELF CARE D/C STATUS: HCPCS | Mod: CK,PN

## 2018-12-07 PROCEDURE — 97162 PT EVAL MOD COMPLEX 30 MIN: CPT | Mod: PN

## 2018-12-07 PROCEDURE — G8988 SELF CARE GOAL STATUS: HCPCS | Mod: CK,PN

## 2018-12-07 PROCEDURE — G8987 SELF CARE CURRENT STATUS: HCPCS | Mod: CK,PN

## 2018-12-07 NOTE — PROGRESS NOTES
"  Occupational Therapy Daily Treatment Note      Date: 12/7/2018  Name: Daksha Marie  Clinic Number: [id number]    Medical Diagnosis: Left hand pain  Date of Onset: 10/12/18  Date of Surgery: n/a  Surgical Procedure: n/a  Therapy Diagnosis:        Encounter Diagnoses   Name Primary?    Left hand pain      Wrist stiffness, left        Precautions: Standard and CHF     Physician: Edgar Johnson, *; PCP D'Amico Johnson  Physician Orders: eval and treat  Date of Return to MD:     Evaluation Date: 10/25/2018  Plan of Care Certification Period: 11/25/18     Visit #: 6/ Visits authorized: 18  Insurance Authorization period Expiration: 12/31/18  FOTO: 62% limitation compared to 54% at initial evaluation.     Time In:11AM  Time Out: 1130AM  Total Billable Time: 30 min  Charges for this Visit: TE x 2    Subjective     Pt reports: "I don't have any pain in it today really, just discomfort"    she was compliant with home exercise program given last session.   Response to previous treatment:good, improved finger ROM and pain  Functional change: I can do most things- I couldn't carry the 10# bag of ice     Pain: 1/10  Location: left hand and wrist    Objective              Elbow and Wrist ROM. Measured in degrees.    10/25/2018 10/25/2018 12/07/2018     Left Right Left   Elbow Ext/Flex WNL WNL WNL   Supination/Pronation 70/83 80/80 WNL   Wrist Ext/Flex 25/75 65/90 75/90   Wrist RD/UD 10/27 10/37 20/30   **However, observed pt extending L wrist past 25* later in session to about WFL        Hand ROM. Measured in degrees.    10/25/2018 10/25/2018 12/07/2018     Left Right Left            Thumb: MP WFL WFL WNL                IP WFL WFL WNL       Rad ADD/ABD 45 WFL WNL       Pal ADD/ABD 50 WFL WNL          Opposition To tip of SF NT DPC             Strength (Dynamometer) and Pinch Strength (Pinch Gauge)  Measured in pounds.    10/25/2018 10/25/2018 12/07/2018     Left Right Left   Rung II 18 64 55   Key Pinch 6 " 16.5 17   3pt Pinch 5 13 15   2pt Pinch 4 9.5 11     CMS Impairment/Limitation/Restriction for FOTO Survey  Therapist reviewed FOTO scores for Daksha Marie.  FOTO documents entered into [employer] - see Media section.     Intake Limitation Score: 54% - 12/7/2018  Category: Carrying     Current : CK = at least 40% but < 60% impaired, limited or restricted  Goal: CK = at least 40% but < 60% impaired, limited or restricted  Discharge:CK = at least 40% but < 60% impaired, limited or restricted              Pt seen by OT this session. Treatment consisted of the following:              Daksha received the following manual therapy techniques for 10 minutes:   Performed RM to L digits/hand/wrist  to decrease edema, improve joint mobility, decrease pain and improve lymphatic drainage to increase hand function.       Daksha received therapeutic exercises for 15 minutes including:   Provided written HEP and reviewed strengthening HEP with putty: twirling 3 min, slow squeeze x 10, pancake and bloom x 5, 3 jaw pinch x 5 logs,  lateral pinch x 5 logs Pt. demo'd understanding.   Provided peach putty for home use.       Home Exercises and Education Provided     Education provided: cont with HEP, cont FM tasks, modalities  - Progress towards goals     Written Home Exercises Provided: yes.  Exercises were reviewed and Daksha was able to demonstrate them prior to the end of the session.  Daksha demonstrated good  understanding of the education provided.   .   See EMR under Patient Instructions for exercises provided 12/07/2018.     Assessment     Daksha Marie is a 48 y.o. female referred to outpatient occupational/hand therapy and presents with a medical diagnosis of L hand and wrist pain.    Daksha is progressing well towards her goals. Re-assessment demonstrates significant improvements to all areas to WNL. Pt prognosis continues as Good. Improved ROM and strength in the hand and wrist.  Noted guarding  with all movements and exercises but tolerated them well. Established exercises and new exercises tolerated well. Cont with some dorsal hand soreness and weakness.   Anticipated barriers to continued occupational therapy: none    Pt's spiritual, cultural and educational needs considered and pt agreeable to plan of care and goals.    Goals     ALL MET    Plan     REHAB SERVICES OUTPATIENT DISCHARGE SUMMARY  Occupational Therapy      Name:  Daksha Marie    Total # Of Visits: 6  Cancelled:  0  No Shows:  0  Diagnosis:    1. Left hand pain     2. Wrist stiffness, left         Physical/Functional Status:  At time of discharge, patient was able to see above objective measurements     The patient is to be discharged from our Therapy service for the following reason(s):  Patient has met all of his/her goals    Degree of Goal Achievement:  Patient has met all goals    Patient Education:  Theraputty strengthening HEP 3xweek    Discharge Plan:  Home Program:  Pt. In agreement.    YAAKOV Shook, OTR/L, CHT   Occupational therapist, Certified Hand Therapist

## 2018-12-07 NOTE — PLAN OF CARE
[facility]  Physical Therapy Initial Evaluation    Name: Daksha Marie  Clinic Number: [id number]    Therapy Diagnosis:   Encounter Diagnoses   Name Primary?    Chronic bilateral low back pain without sciatica     Pain aggravated by sitting      Physician: Sohail Izaguirre MD    Physician Orders: PT Eval and Treat; see referral in Media section of EMR  Medical Diagnosis:   M53.3 (ICD-10-CM) - Sacrococcygeal disorders, not elsewhere classified   M54.2 (ICD-10-CM) - Cervicalgia   M47.816 (ICD-10-CM) - Lumbar facet joint syndrome       Evaluation Date: 12/7/2018  Authorization Period Expiration: 12/31/2018  Plan of Care Certification Period: 12/7/2018 to 01/21/2019  Visit # / Visits authorized: 1/ 18    Time In: 1015  Time Out: 1100  Total Billable Time: 40 minutes ( 1 MCE)  Precautions: Standard, Hx of mental health illness    Subjective   Daksha presents to PT today with primary complaint of 'tailbone' pain that started in 2014 following a fall directly onto her bottom.  She reports a fracture from this fall.  Was progressing well with only minimal pain in her tailbone area until re- injury last year while from another fall directly onto her bottom. Noted shifting and sitting sideways on the PT mat during the intake interview. Her preferred sitting position at home is in her bed in long-sitting.    She reports back overall since 2002; correlates to used to being overweight.  Has undergone gastric bypass since 2002.  Overall she voices a low activity level.  She is on disability.  States that she has come OCD tendencies including washing/folding laundry multiple times per day.  However, she cannot unload the dryer or lift/carry the laundry basket full of clothes due to back pain. Pain alleviated by lying L-side. Does report LLE numbness due to prolonged sitting.      Past Medical History:   Diagnosis Date    Alcohol abuse 10/7/2015    Arthritis     Breast calcification,  left     Pt states this has been worked up, she received a biopsy in the past to confirm calcifications were from scar tissue from when she had breast reduction surgery.      Chronic diastolic congestive heart failure     Encounter for blood transfusion     GERD (gastroesophageal reflux disease)     Glaucoma     Hypertension     Hyponatremia 10/2015    Na 109. attributed to polydipsia and alcohol abuse.  suffered a seizure in the ICU .    Insomnia     Malingering 2016    PTSD (post-traumatic stress disorder)     Seizures     Sickle cell trait      Daksha Marie  has a past surgical history that includes Gastric bypass ( and ); bile fistula; breast reduction ();  section; Cholecystectomy (after ); Breast biopsy (Left); Total Reduction Mammoplasty (Bilateral, ); ESOPHAGOGASTRODUODENOSCOPY (EGD) (N/A, 2016); ESOPHAGOGASTRODUODENOSCOPY (EGD) (N/A, 2015); SIGMOIDOSCOPY-FLEXIBLE (N/A, 2015); and ESOPHAGOGASTRODUODENOSCOPY (EGD) (N/A, 2015).    Daksha has a current medication list which includes the following prescription(s): alprazolam, amlodipine, brimonidine-timolol, calcium citrate, carvedilol, cyanocobalamin, cyclobenzaprine, difluprednate, disulfiram, dorzolamide-timolol 2-0.5%, dorzolamide-timolol (pf), erythromycin, fluconazole, folic acid, furosemide, hydrocodone-acetaminophen, lactulose, lisinopril, methocarbamol, mimvey, montelukast, multivitamin, omeprazole, potassium chloride sa, quetiapine, spironolactone, sumatriptan, tizanidine, trazodone, venlafaxine, and venlafaxine, and the following Facility-Administered Medications: cyanocobalamin.    Review of patient's allergies indicates:   Allergen Reactions    Penicillins Hives      Imaging: none of lumbosacral spine  Prior Therapy: just complete OT at [facility]  Social History: Lives at home with her son.   Occupation: On disability.   Prior Level of Function: Progressive  worsening of tailbone area pain that is now limiting her household chore performance and ability to sit without pain on any surface chair.  Current Level of Function: see above.     Pain:  Current 7/10, worst 9/10, best 5/10   Location: bilateral tailbone region  Description: Tingling, Deep and Sharp    Pts goals: to be able to sit without pain.     Objective     Palpation: TTP along her S1/S2 area centrally but not her coccygeal area.  Gluteal adipose distribution.    Posture:  Increased lumbar lordosis, stands in B hip adduction.   Gross movement analysis:  -Gait: shortened stride length, lack of forward/backward pelvic rotation t/o gait cycle.   -Forward bending: lack of reversal of lumbar curve, lack of full hip flexion. (+) Yohan's sign on return motion.   -Squat: NT  - Unable to tolerate supported/unsupported midline sitting due to sacral area pain.     Lumbar Range of Motion:    Degrees Pain   Flexion 50% limitation NP     Extension 25% limitation Pain lumbosacral junction area   Left Side Bending 25% limitation pain   Right Side Bending 25% limitation pain   Left rotation   50% limitation NP   Right Rotation   50% limitation NP      Range of Motion:   LE Right Left   Hip flexion 100 degrees 100 degrees   Hip abduction 30 30   Hip ER WNL WNL   Hip IR  WNL WNL   Hip extension 5  5   Knee WNL WNL   Ankle DF 5 degrees knee flexed 5 degrees knee flexed       Lower Extremity Strength (MMT)   Right Left   Hip flexion: 4/5 4/5   Hip extension: 4/5 4/5   Hip abduction: 3+/5 3+/5   Hip ER:  3+/5 3+/5   Knee extension: 4/5 4/5   Knee flexion: 4/5 4/5   Ankle dorsiflexion: 5/5 5/5   Great toe extension: 5/5 5/5     Special Tests:  -Repeated Flexion:  NT  -Repeated Ext: NT  -Piriformis Test: negative  -SLR neural tension test: negative  -Lumbar quadrant test: pain on the right  - DORA: mild capsular hip stiffness.     Joint Mobility:  Mild hip capsular stiffness B.  Hypomobile lumbar spine with segmental PA testing  t/o.   Sensation: Intact light touch sensation to BLE through L2-S2 dermatomal distribution    Flexibility:    Ely's test: mild B quadriceps tightness   Popliteal Angle: <20 degrees B   Deric test: (+) B for hip flexor contractures   P/SLR test: <60 degrees B.       CMS Impairment/Limitation/Restriction for FOTO Lumbar Survey    Therapist reviewed FOTO scores for Daksha Marie on 12/7/2018.   FOTO documents entered into [employer] - see Media section.    Limitation Score: 55%  Predicted Limitation Score: 43%       TREATMENT   Treatment Time In:  -  Treatment Time Out: -  Total Treatment time separate from Evaluation time:-    Daksha received therapeutic exercises to develop strength, endurance, ROM, flexibility, posture and core stabilization for - minutes including:  - HSS with strap    NEXT  - TAs      NEXT  - lumbar flexion stretching   NEXT    Home Exercises and Patient Education Provided  Education provided re:   - PT diagnosis and recommended treatment course including frequency, duration, and intensity.  - encouraged bouts of midline short sitting (supported) at home with building of tolerance to this task.     Written Home Exercises Provided: not today.  Daksha demonstrated good  understanding of the education provided.     See EMR under patient instructions for exercises given.   Assessment   Daksha is a 48 y.o. female referred to outpatient Physical Therapy with a medical diagnosis of lumbar facet syndrome and sacrococcygeal disorders. Pt presents with several year history of sacral pain.  Worse following a fall.  She is now unable to sit in proper midline sitting posture due to pain; has to shift or sit at angle on a chair due to pain.  Physical examination reveals no pain however with direct palpation of her coccygeal region.  Pain with upper sacral segments, movement faults including (+) Yohan's sign with lumbosacral pain with returning from forward bending, weak core stabilizers, lumbar  extension preference in standing and sitting, BLE muscular tightness involving HS, quadriceps, and hip flexor complexes, and low tolerance to activity that have collectively resulted in decreased independence with household chore performance and tolerance to functional positioning (mostly sitting)    Pt prognosis is Good.   Pt will benefit from skilled outpatient Physical Therapy to address the deficits stated above and in the chart below, provide pt/family education, and to maximize pt's level of independence.     Plan of care discussed with patient: Yes  Pt's spiritual, cultural and educational needs considered and patient is agreeable to the plan of care and goals as stated below:     Anticipated Barriers for therapy: transportation, motivation    Medical Necessity is demonstrated by the following  History  Co-morbidities and personal factors that may impact the plan of care Co-morbidities:   Allergies, Anxiety or Panic Disorders, Arthritis, Back pain, BMI over 30, Congestive  Heart Failure or Heart Disease, Depression, Gastrointestinal Disease, Headaches, High Blood Pressure, Prior Surgery, Prosthesis  / Implants, Sleep dysfunction, Visual Impairment    Personal Factors:   coping style  lifestyle  attitudes     high   Examination  Body Structures and Functions, activity limitations and participation restrictions that may impact the plan of care Body Regions:   back  lower extremities  trunk    Body Systems:    ROM  strength  gross coordinated movement  gait  transfers  transitions  motor control  motor learning    Participation Restrictions:   See above    Activity limitations:   Learning and applying knowledge  no deficits    General Tasks and Commands  no deficits    Communication  no deficits    Mobility  lifting and carrying objects  walking  using transportation (bus, train, plane, car)    Self care  washing oneself (bathing, drying, washing hands)  toileting  dressing  eating  drinking    Domestic  Life  shopping  cooking  doing house work (cleaning house, washing dishes, laundry)  assisting others    Interactions/Relationships  no deficits    Life Areas  no deficits    Community and Social Life  no deficits         moderate   Clinical Presentation evolving clinical presentation with changing clinical characteristics moderate   Decision Making/ Complexity Score: moderate     Goals:  Short Term Goals: 3-4 weeks:  1.  Patient will demonstrate ability to tolerate proper midline sitting x 10 minutes with <3 overall postural adjustments due to pain.   2.  Patient will demonstrate no [place]'s sign with return from forward bending motion x 10 reps for improved household chore performance without pain onset.   3.  Patient will report <4/10 lumbosacral pain overall t/o her day.     Long Term Goals: 6-8 weeks   1. Patient will demonstrate normal B HS length with P/SLR tension testing (>70 degrees hip flexion.  2. Patient will report no pain with direct palpation of her sacral or with PA springing.  3. Patient will report ability to lift laundry basket and carry into/out of her laundry room without lumbosacral pain.  4. Patient will report < 43 % of limitation on Lumbar FOTO survey.     Plan   Certification Period/Plan of care expiration: 12/7/2018 to 01/21/2019.    Outpatient Physical Therapy 2 times weekly for 6 weeks to include the following interventions: Cervical/Lumbar Traction, Electrical Stimulation IFC, Gait Training, Manual Therapy, Moist Heat/ Ice, Neuromuscular Re-ed, Orthotic Management and Training, Patient Education, Self Care, Therapeutic Activites and Therapeutic Exercise.     Ga Rivera, PT

## 2018-12-07 NOTE — PATIENT INSTRUCTIONS
[facility] [place]  [phone]  TATUM OWENS, OTMOOKIE, OTR/L, CHT  OCCUPATIONAL THERAPIST, CERTIFIED HAND THERAPIST

## 2018-12-19 ENCOUNTER — CLINICAL SUPPORT (OUTPATIENT)
Dept: REHABILITATION | Facility: HOSPITAL | Age: 48
End: 2018-12-19
Attending: FAMILY MEDICINE
Payer: MEDICARE

## 2018-12-19 DIAGNOSIS — G89.29 CHRONIC BILATERAL LOW BACK PAIN WITHOUT SCIATICA: ICD-10-CM

## 2018-12-19 DIAGNOSIS — R52 PAIN AGGRAVATED BY SITTING: ICD-10-CM

## 2018-12-19 DIAGNOSIS — M54.50 CHRONIC BILATERAL LOW BACK PAIN WITHOUT SCIATICA: ICD-10-CM

## 2018-12-19 PROCEDURE — 97110 THERAPEUTIC EXERCISES: CPT | Mod: PN

## 2018-12-19 PROCEDURE — 97140 MANUAL THERAPY 1/> REGIONS: CPT | Mod: PN

## 2018-12-19 NOTE — PROGRESS NOTES
"TIME RECORD    Date:  12/19/2018    Start Time:  9:00 AM   Stop Time:  9:50 AM    PROCEDURES:    TIMED  Procedure Min.   TE 30   MT 15                   Total Timed Minutes:  45  Total Timed Units:  3  Total Untimed Units:  0  Charges Billed/# of units:  2TE, 1 MT      Progress/Current Status    Subjective:     Patient ID: Daksha Marie is a 48 y.o. female.  Diagnosis:   1. Chronic bilateral low back pain without sciatica     2. Pain aggravated by sitting       Pain: "Fatemeh been up since 3 AM, I am obsessive compulsive". 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 She states bending over is very difficult to perform and causes B mid lower back pain (tailbone)    Objective:     Daksha received therapeutic exercises to develop strength, endurance, ROM, flexibility, posture and core stabilization for - minutes including:  -lumbar brigdes    2x10   -hip add iso     5"x15 w/ ball  -HSS with strap    3x25" (Manual)                                              -TAs                                                              5"x10 (verbal / tactile assisted)  -lumbar flexion stretching                               next  -SLR                                     next         Daksha received manual therapy consisting of: alternate sidelying STM to B IT band, STM to B Gluteal musculature, STM to lumbosacral paraspinals (to tolerance) x15 min.     Assessment:     Pt demonstrates strong lumbar/ sacral guarding with transfers and with manual therapy. She reports no increased pain post session. However pt is very overly cautious with transfers and supine positioning.     Patient Education/Response:     Cont HEP    Plans and Goals:     Cont to advance PT as per POC  Goals:  Short Term Goals: 3-4 weeks:  1.  Patient will demonstrate ability to tolerate proper midline sitting x 10 minutes with <3 overall postural adjustments due to pain.   2.  Patient will demonstrate no Brunswick's sign with return from forward bending " motion x 10 reps for improved household chore performance without pain onset.   3.  Patient will report <4/10 lumbosacral pain overall t/o her day.      Long Term Goals: 6-8 weeks   1. Patient will demonstrate normal B HS length with P/SLR tension testing (>70 degrees hip flexion.  2. Patient will report no pain with direct palpation of her sacral or with PA springing.  3. Patient will report ability to lift laundry basket and carry into/out of her laundry room without lumbosacral pain.  4. Patient will report < 43 % of limitation on Lumbar FOTO survey.

## 2018-12-20 ENCOUNTER — CLINICAL SUPPORT (OUTPATIENT)
Dept: REHABILITATION | Facility: HOSPITAL | Age: 48
End: 2018-12-20
Attending: FAMILY MEDICINE
Payer: MEDICARE

## 2018-12-20 DIAGNOSIS — G89.29 CHRONIC BILATERAL LOW BACK PAIN WITHOUT SCIATICA: ICD-10-CM

## 2018-12-20 DIAGNOSIS — R52 PAIN AGGRAVATED BY SITTING: ICD-10-CM

## 2018-12-20 DIAGNOSIS — M54.50 CHRONIC BILATERAL LOW BACK PAIN WITHOUT SCIATICA: ICD-10-CM

## 2018-12-20 PROCEDURE — 97110 THERAPEUTIC EXERCISES: CPT | Mod: PN

## 2018-12-20 PROCEDURE — 97112 NEUROMUSCULAR REEDUCATION: CPT | Mod: PN

## 2018-12-20 PROCEDURE — 97140 MANUAL THERAPY 1/> REGIONS: CPT | Mod: PN

## 2018-12-21 NOTE — PROGRESS NOTES
TIME RECORD    Date:  12/20/2018    Physician Orders: PT Eval and Treat; see referral in Media section of EMR  Medical Diagnosis:   M53.3 (ICD-10-CM) - Sacrococcygeal disorders, not elsewhere classified   M54.2 (ICD-10-CM) - Cervicalgia   M47.816 (ICD-10-CM) - Lumbar facet joint syndrome         Evaluation Date: 12/7/2018  Authorization Period Expiration: 12/31/2018  Plan of Care Certification Period: 12/7/2018 to 01/21/2019  Visit # / Visits authorized: 3/ 18     Time In: 1100  Time Out: 00520  Total Billable Time: 55 minutes ( 2 TE, 1 NM, 1 MT)      Subjective:     Patient ID: Daksha Marie is a 48 y.o. female.  Diagnosis:   1. Chronic bilateral low back pain without sciatica     2. Pain aggravated by sitting       Mrs. Crooks reports no new complaints.   Pain: 7/10 tailbone pain    Objective:     Daksha received therapeutic exercises to develop strength, endurance, ROM, flexibility, posture and core stabilization for - minutes including:  -lumbar brigdes    2x10   - prone TAs     20 reps x 3 sec holds  - prone knee flexion    2x10 B  - prone glut sets    20 reps x 3 sec holds  - SL hip abduction    3x10 B  - SL clamshells    2x10 B     Daksha received neuromuscular re-education for kinesthetic awareness and postural training:  - cueing for proper sitting midline posturing on the mat without L/R listing  - cueing for abdominal activation and gluteal activation.          Daksha received manual therapy to lumbosacral region for a total of 15 minutes:   - STM/MFR to B lumbar paraspinals and gluteal region.   - grade II sacral springing mobs     Assessment:   A:  Mrs. Marie present with hypesthesia to even light touch palpation to her sacrococcygeal region.  Significant fear avoidance behavior demonstrated during the session that requires consistent motivation and cueing to address for even simple, non-weight bearing therapeutic exercises.  Difficulty activating gluteus nubia; HS dominance with  hip extension.     Patient Education/Response:   Again, encouraged patient to begin trying to sit in proper midline posture; trials of 5 minutes to start.     Plans and Goals:   Continue plan of care.     Goals:  Short Term Goals: 3-4 weeks:  1.  Patient will demonstrate ability to tolerate proper midline sitting x 10 minutes with <3 overall postural adjustments due to pain.   2.  Patient will demonstrate no Esko's sign with return from forward bending motion x 10 reps for improved household chore performance without pain onset.   3.  Patient will report <4/10 lumbosacral pain overall t/o her day.      Long Term Goals: 6-8 weeks   1. Patient will demonstrate normal B HS length with P/SLR tension testing (>70 degrees hip flexion.  2. Patient will report no pain with direct palpation of her sacral or with PA springing.  3. Patient will report ability to lift laundry basket and carry into/out of her laundry room without lumbosacral pain.  4. Patient will report < 43 % of limitation on Lumbar FOTO survey.

## 2018-12-29 ENCOUNTER — PATIENT MESSAGE (OUTPATIENT)
Dept: OBSTETRICS AND GYNECOLOGY | Facility: CLINIC | Age: 48
End: 2018-12-29

## 2018-12-31 ENCOUNTER — TELEPHONE (OUTPATIENT)
Dept: OBSTETRICS AND GYNECOLOGY | Facility: CLINIC | Age: 48
End: 2018-12-31

## 2018-12-31 DIAGNOSIS — Z12.39 BREAST SCREENING: Primary | ICD-10-CM

## 2019-01-08 ENCOUNTER — TELEPHONE (OUTPATIENT)
Dept: REHABILITATION | Facility: HOSPITAL | Age: 49
End: 2019-01-08

## 2019-01-10 ENCOUNTER — OFFICE VISIT (OUTPATIENT)
Dept: PSYCHIATRY | Facility: CLINIC | Age: 49
End: 2019-01-10
Payer: MEDICARE

## 2019-01-10 ENCOUNTER — CLINICAL SUPPORT (OUTPATIENT)
Dept: REHABILITATION | Facility: HOSPITAL | Age: 49
End: 2019-01-10
Attending: FAMILY MEDICINE
Payer: MEDICARE

## 2019-01-10 DIAGNOSIS — F41.1 GENERALIZED ANXIETY DISORDER: ICD-10-CM

## 2019-01-10 DIAGNOSIS — F32.A DEPRESSION, UNSPECIFIED DEPRESSION TYPE: ICD-10-CM

## 2019-01-10 DIAGNOSIS — M54.50 CHRONIC BILATERAL LOW BACK PAIN WITHOUT SCIATICA: ICD-10-CM

## 2019-01-10 DIAGNOSIS — F41.0 PANIC DISORDER: ICD-10-CM

## 2019-01-10 DIAGNOSIS — R52 PAIN AGGRAVATED BY SITTING: ICD-10-CM

## 2019-01-10 DIAGNOSIS — G89.29 CHRONIC BILATERAL LOW BACK PAIN WITHOUT SCIATICA: ICD-10-CM

## 2019-01-10 DIAGNOSIS — F10.21 ALCOHOL USE DISORDER, MODERATE, IN SUSTAINED REMISSION: ICD-10-CM

## 2019-01-10 PROCEDURE — 99213 PR OFFICE/OUTPT VISIT, EST, LEVL III, 20-29 MIN: ICD-10-PCS | Mod: S$GLB,,, | Performed by: NURSE PRACTITIONER

## 2019-01-10 PROCEDURE — 99213 OFFICE O/P EST LOW 20 MIN: CPT | Mod: S$GLB,,, | Performed by: NURSE PRACTITIONER

## 2019-01-10 PROCEDURE — 97110 THERAPEUTIC EXERCISES: CPT | Mod: PN

## 2019-01-10 PROCEDURE — 90833 PSYTX W PT W E/M 30 MIN: CPT | Mod: S$GLB,,, | Performed by: NURSE PRACTITIONER

## 2019-01-10 PROCEDURE — 99999 PR PBB SHADOW E&M-EST. PATIENT-LVL II: CPT | Mod: PBBFAC,,, | Performed by: NURSE PRACTITIONER

## 2019-01-10 PROCEDURE — 90833 PR PSYCHOTHERAPY W/PATIENT W/E&M, 30 MIN (ADD ON): ICD-10-PCS | Mod: S$GLB,,, | Performed by: NURSE PRACTITIONER

## 2019-01-10 PROCEDURE — 99999 PR PBB SHADOW E&M-EST. PATIENT-LVL II: ICD-10-PCS | Mod: PBBFAC,,, | Performed by: NURSE PRACTITIONER

## 2019-01-10 PROCEDURE — 97140 MANUAL THERAPY 1/> REGIONS: CPT | Mod: PN

## 2019-01-10 RX ORDER — VENLAFAXINE HYDROCHLORIDE 150 MG/1
150 CAPSULE, EXTENDED RELEASE ORAL DAILY
Qty: 90 CAPSULE | Refills: 1 | Status: SHIPPED | OUTPATIENT
Start: 2019-01-10 | End: 2019-04-01 | Stop reason: ALTCHOICE

## 2019-01-10 RX ORDER — QUETIAPINE FUMARATE 25 MG/1
25 TABLET, FILM COATED ORAL NIGHTLY
Qty: 90 TABLET | Refills: 1 | Status: SHIPPED | OUTPATIENT
Start: 2019-01-10 | End: 2019-04-01 | Stop reason: SDUPTHER

## 2019-01-10 RX ORDER — DISULFIRAM 250 MG/1
250 TABLET ORAL DAILY
Qty: 90 TABLET | Refills: 1 | Status: SHIPPED | OUTPATIENT
Start: 2019-01-10 | End: 2019-04-01 | Stop reason: ALTCHOICE

## 2019-01-10 RX ORDER — ALPRAZOLAM 1 MG/1
1 TABLET ORAL 2 TIMES DAILY PRN
Qty: 60 TABLET | Refills: 3 | Status: SHIPPED | OUTPATIENT
Start: 2019-01-10 | End: 2019-04-01 | Stop reason: SDUPTHER

## 2019-01-10 RX ORDER — VENLAFAXINE HYDROCHLORIDE 75 MG/1
75 CAPSULE, EXTENDED RELEASE ORAL DAILY
Qty: 90 CAPSULE | Refills: 1 | Status: SHIPPED | OUTPATIENT
Start: 2019-01-10 | End: 2019-04-01 | Stop reason: ALTCHOICE

## 2019-01-10 NOTE — PROGRESS NOTES
Outpatient Psychiatry Follow-Up Visit (MD/NP)    1/10/2019    Clinical Status of Patient:  Outpatient (Ambulatory)    Chief Complaint:  Daksha Marie is a 48 y.o. female who presents today for follow-up of anxiety and PTSD.  Met with patient.      Last visit was: 11/29/18. Chart and  reviewed.     Interval History and Content of Current Session:  Current Psychiatric Medications/changes  · Continue Effexor  mg po daily (150 + 75 mg capsules)  · Antabuse 250 mg po daily  · Medication teaching for Antabuse: Avoid alcohol and drugs or food with alcohol in them. This also includes alcohol in hidden forms like some sauces, vinegars, and topical products like aftershaves and back rubs  · Restart Xanax 0.5 mg po BID PRN  · DC Klonopin   · Continue Seroquel 25 mg po q hs PRN insomnia    Pt presents blunted affect and dysthymic mood.  Medical issues have been a major stressor.  Late twice today for appt due to transportation issues. Pt compliant with medications and denies side effects or adverse reactions.  Maintains sobriety and denies cravings for ETOH.   Pt denies SI/HI/AVH.  Sleep improved with Seroquel; no nightmares reported.  Denies EPS; AIMS scale completed. No longer going to therapy due to too many missed appointment.  Offered list of community resources    Psychotherapy:  · Target symptoms: anxiety   · Why chosen therapy is appropriate versus another modality: relevant to diagnosis  · Outcome monitoring methods: self-report  · Therapeutic intervention type: insight oriented psychotherapy  · Topics discussed/themes: building skills sets for symptom management, symptom recognition  · The patient's response to the intervention is accepting. The patient's progress toward treatment goals is good.   · Duration of intervention: 17 minutes.    Review of Systems   · PSYCHIATRIC: Pertinant items are noted in the narrative.  · CONSTITUTIONAL: No weight gain or loss.   · MUSCULOSKELETAL: No pain or  stiffness of the joints.  · NEUROLOGIC: No weakness, sensory changes, seizures, confusion, memory loss, tremor or other abnormal movements.  · ENDOCRINE: No polydipsia or polyuria.  · INTEGUMENTARY: No rashes or lacerations.  · EYES: No exophthalmos, jaundice or blindness.  · ENT: No dizziness, tinnitus or hearing loss.  · RESPIRATORY: No shortness of breath.  · CARDIOVASCULAR: No tachycardia or chest pain.  · GASTROINTESTINAL: No nausea, vomiting, pain, constipation or diarrhea.  · GENITOURINARY: No frequency, dysuria or sexual dysfunction.  · HEMATOLOGIC/LYMPHATIC: No excessive bleeding, prolonged or excessive bleeding after dental extraction/injury.  · ALLERGIC/IMMUNOLOGIC: No allergic response to materials, foods or animals at this time.    Past Medical, Family and Social History: The patient's past medical, family and social history have been reviewed and updated as appropriate within the electronic medical record - see encounter notes.    Compliance: yes    Side effects: None    Risk Parameters:  Patient reports no suicidal ideation  Patient reports no homicidal ideation  Patient reports no self-injurious behavior  Patient reports no violent behavior    Exam (detailed: at least 9 elements; comprehensive: all 15 elements)   Constitutional  Vitals:  Most recent vital signs, dated greater than 90 days prior to this appointment, were reviewed.   There were no vitals filed for this visit.     General:  unremarkable, age appropriate     Musculoskeletal  Muscle Strength/Tone:  no tremor, no tic   Gait & Station:  non-ataxic     Psychiatric  Speech:  no latency; no press   Mood & Affect:  dysthymic  congruent and appropriate   Thought Process:  normal and logical   Associations:  intact   Thought Content:  normal, no suicidality, no homicidality, delusions, or paranoia   Insight:  intact   Judgement: behavior is adequate to circumstances   Orientation:  grossly intact   Memory: intact for content of interview    Language: grossly intact   Attention Span & Concentration:  able to focus   Fund of Knowledge:  intact and appropriate to age and level of education     Assessment and Diagnosis   Status/Progress: Based on the examination today, the patient's problem(s) is/are failing to respond as expected to treatment.  New problems have not been presented today.   Co-morbidities and Lack of compliance are not complicating management of the primary condition.  There are no active rule-out diagnoses for this patient at this time.     General Impression:       ICD-10-CM ICD-9-CM   1. Generalized anxiety disorder F41.1 300.02   2. Panic disorder F41.0 300.01   3. Depression, unspecified depression type F32.9 311   4. Alcohol use disorder, moderate, in sustained remission F10.21 305.03       Intervention/Counseling/Treatment Plan   · Medication Management: Continue current medications. The risks and benefits of medication were discussed with the patient.  · AA/NA/CA/ACOA/Abstinence   · Continue Effexor  mg po daily (150 + 75 mg capsules)  · Continue Antabuse 250 mg po daily  · Medication teaching for Antabuse: Avoid alcohol and drugs or food with alcohol in them. This also includes alcohol in hidden forms like some sauces, vinegars, and topical products like aftershaves and back rubs  · Continue Xanax 0.5 mg po BID PRN  · Continue Seroquel 25 mg po q hs PRN insomnia  · Completed AIMS scale  · Encouraged pt establish care with a therapist for 1:1 counseling or IOP. Offered information of community resources.     Return to Clinic: 6 months

## 2019-01-10 NOTE — PROGRESS NOTES
"TIME RECORD    Date:  01/10/2019    Physician Orders: PT Eval and Treat; see referral in Media section of EMR  Medical Diagnosis:   M53.3 (ICD-10-CM) - Sacrococcygeal disorders, not elsewhere classified   M54.2 (ICD-10-CM) - Cervicalgia   M47.816 (ICD-10-CM) - Lumbar facet joint syndrome         Evaluation Date: 12/7/2018  Authorization Period Expiration: 12/31/2018  Plan of Care Certification Period: 12/7/2018 to 01/21/2019  Visit # / Visits authorized: 4/ 18     Time In: 5:00 PM   Time Out: 5:50 PM    Total Billable Time: 50 (2TE, 1 MT)      Subjective:     Patient ID: Daksha Marie is a 48 y.o. female.  Diagnosis:   1. Chronic bilateral low back pain without sciatica     2. Pain aggravated by sitting       Mrs. Crooks reports she has not taken her pain meds to "gauge" her pain. She is agreeable to PT session.    Pain: 10/10 B tailbone pain    Objective:     Daksha received therapeutic exercises to develop strength, endurance, ROM, flexibility, posture and core stabilization for - minutes including:  -lumbar brigdes    2x10   - prone TAs     20 reps x 3 sec holds  - prone knee flexion    2x10 B  - prone glut sets    20 reps x 3 sec holds  - SL hip abduction    3x10 B  - SL clamshells    2x10 B     Daksha received neuromuscular re-education for kinesthetic awareness and postural training: NT (Painful)  - cueing for proper sitting midline posturing on the mat without L/R listing  - cueing for abdominal activation and gluteal activation.          Daksha received manual therapy to lumbosacral region for a total of 15 minutes:   - STM/MFR to B lumbar paraspinals and gluteal region.   - grade II sacral springing mobs     Assessment:   Pt very tender to palpation B Mid lower back. She demonstrated lower back pain with transfers (sup<>sit<>stand), pt exhibits lower back guarding with ambulation and transfers.     Patient Education/Response:     Cont HEP  Plans and Goals:   Continue PT as per POC, " monitor response to session.      Goals:  Short Term Goals: 3-4 weeks:  1.  Patient will demonstrate ability to tolerate proper midline sitting x 10 minutes with <3 overall postural adjustments due to pain.   2.  Patient will demonstrate no Yohan's sign with return from forward bending motion x 10 reps for improved household chore performance without pain onset.   3.  Patient will report <4/10 lumbosacral pain overall t/o her day.      Long Term Goals: 6-8 weeks   1. Patient will demonstrate normal B HS length with P/SLR tension testing (>70 degrees hip flexion.  2. Patient will report no pain with direct palpation of her sacral or with PA springing.  3. Patient will report ability to lift laundry basket and carry into/out of her laundry room without lumbosacral pain.  4. Patient will report < 43 % of limitation on Lumbar FOTO survey.

## 2019-01-13 PROBLEM — F10.21 ALCOHOL USE DISORDER, MODERATE, IN SUSTAINED REMISSION: Status: ACTIVE | Noted: 2019-01-13

## 2019-01-15 ENCOUNTER — CLINICAL SUPPORT (OUTPATIENT)
Dept: REHABILITATION | Facility: HOSPITAL | Age: 49
End: 2019-01-15
Attending: FAMILY MEDICINE
Payer: MEDICARE

## 2019-01-15 DIAGNOSIS — G89.29 CHRONIC BILATERAL LOW BACK PAIN WITHOUT SCIATICA: ICD-10-CM

## 2019-01-15 DIAGNOSIS — R52 PAIN AGGRAVATED BY SITTING: ICD-10-CM

## 2019-01-15 DIAGNOSIS — M54.50 CHRONIC BILATERAL LOW BACK PAIN WITHOUT SCIATICA: ICD-10-CM

## 2019-01-15 PROCEDURE — 97140 MANUAL THERAPY 1/> REGIONS: CPT | Mod: PN

## 2019-01-15 RX ORDER — AMLODIPINE BESYLATE 5 MG/1
5 TABLET ORAL DAILY
Qty: 90 TABLET | Refills: 3 | Status: SHIPPED | OUTPATIENT
Start: 2019-01-15 | End: 2019-08-21 | Stop reason: SDUPTHER

## 2019-01-15 RX ORDER — SPIRONOLACTONE 25 MG/1
25 TABLET ORAL DAILY
Qty: 90 TABLET | Refills: 1 | Status: SHIPPED | OUTPATIENT
Start: 2019-01-15 | End: 2019-08-21 | Stop reason: SDUPTHER

## 2019-01-15 RX ORDER — CARVEDILOL 3.12 MG/1
3.12 TABLET ORAL 2 TIMES DAILY
Qty: 90 TABLET | Refills: 3 | Status: SHIPPED | OUTPATIENT
Start: 2019-01-15 | End: 2019-12-18 | Stop reason: SDUPTHER

## 2019-01-15 NOTE — PROGRESS NOTES
TIME RECORD    Date:  01/15/2019    Physician Orders: PT Eval and Treat; see referral in Media section of EMR  Medical Diagnosis:   M53.3 (ICD-10-CM) - Sacrococcygeal disorders, not elsewhere classified   M54.2 (ICD-10-CM) - Cervicalgia   M47.816 (ICD-10-CM) - Lumbar facet joint syndrome         Evaluation Date: 12/7/2018  Authorization Period Expiration: 12/31/2018  Plan of Care Certification Period: 12/7/2018 to 01/21/2019  Visit # / Visits authorized: 5/ 18  FOTO: 5/10  G-code: 5/10   cap visit: 27.46  Cap total: 115.56     Time In: 1015   Time Out: 1040   Total Billable Time: 15 minutes (1 MT)      Subjective:     Patient ID: Daksha Marie is a 48 y.o. female.  Diagnosis:   1. Chronic bilateral low back pain without sciatica     2. Pain aggravated by sitting       Mrs. Crooks presents to PT today with multiple complaints including tailbone but also problems with her 15 y/o son getting arrested and pain in her mouth from a dental procedure last week.  Her speech today was slow and she was a bit lethargic.  She was driven by her dad.      Pain: 10/10 B tailbone pain    Objective:     Daksha received therapeutic exercises to develop strength, endurance, ROM, flexibility, posture and core stabilization for - minutes including:  -lumbar brigdes    NT  - prone TAs     NT  - prone knee flexion    NT  - prone glut sets    NT  - SL hip abduction    NT  - SL clamshells    NT     Daksha received neuromuscular re-education for kinesthetic awareness and postural training: NT (Painful)  - cueing for proper sitting midline posturing on the mat without L/R listing  - cueing for abdominal activation and gluteal activation.          Daksha received manual therapy to lumbosacral region for a total of 15 minutes:   - STM/MFR to B lumbar paraspinals and gluteal region.   - grade II sacral springing mobs     Ended session with cold pack x 10 minutes to lumbosacral spine for pain and inflammation control      Assessment:   Limited session today due to low mood, lethargy, and motivation today.      Patient Education/Response:     Cont HEP  Plans and Goals:   Continue PT as per POC, monitor response to session.      Goals:  Short Term Goals: 3-4 weeks:  1.  Patient will demonstrate ability to tolerate proper midline sitting x 10 minutes with <3 overall postural adjustments due to pain.   2.  Patient will demonstrate no Yohan's sign with return from forward bending motion x 10 reps for improved household chore performance without pain onset.   3.  Patient will report <4/10 lumbosacral pain overall t/o her day.      Long Term Goals: 6-8 weeks   1. Patient will demonstrate normal B HS length with P/SLR tension testing (>70 degrees hip flexion.  2. Patient will report no pain with direct palpation of her sacral or with PA springing.  3. Patient will report ability to lift laundry basket and carry into/out of her laundry room without lumbosacral pain.  4. Patient will report < 43 % of limitation on Lumbar FOTO survey.

## 2019-01-16 DIAGNOSIS — N95.1 PERIMENOPAUSE: ICD-10-CM

## 2019-01-16 RX ORDER — CLONIDINE HYDROCHLORIDE 0.2 MG/1
TABLET ORAL
Qty: 30 TABLET | Refills: 0 | Status: SHIPPED | OUTPATIENT
Start: 2019-01-16 | End: 2020-07-07

## 2019-01-17 ENCOUNTER — CLINICAL SUPPORT (OUTPATIENT)
Dept: REHABILITATION | Facility: HOSPITAL | Age: 49
End: 2019-01-17
Attending: FAMILY MEDICINE
Payer: MEDICARE

## 2019-01-17 ENCOUNTER — OFFICE VISIT (OUTPATIENT)
Dept: PSYCHIATRY | Facility: CLINIC | Age: 49
End: 2019-01-17
Payer: MEDICARE

## 2019-01-17 DIAGNOSIS — R52 PAIN AGGRAVATED BY SITTING: ICD-10-CM

## 2019-01-17 DIAGNOSIS — F32.A DEPRESSION, UNSPECIFIED DEPRESSION TYPE: ICD-10-CM

## 2019-01-17 DIAGNOSIS — F42.8 OTHER OBSESSIVE-COMPULSIVE DISORDERS: ICD-10-CM

## 2019-01-17 DIAGNOSIS — F41.1 GENERALIZED ANXIETY DISORDER: Primary | ICD-10-CM

## 2019-01-17 DIAGNOSIS — M54.50 CHRONIC BILATERAL LOW BACK PAIN WITHOUT SCIATICA: ICD-10-CM

## 2019-01-17 DIAGNOSIS — G89.29 CHRONIC BILATERAL LOW BACK PAIN WITHOUT SCIATICA: ICD-10-CM

## 2019-01-17 PROCEDURE — 97110 THERAPEUTIC EXERCISES: CPT | Mod: PN

## 2019-01-17 PROCEDURE — 97140 MANUAL THERAPY 1/> REGIONS: CPT | Mod: PN

## 2019-01-17 PROCEDURE — 99999 PR PBB SHADOW E&M-EST. PATIENT-LVL I: CPT | Mod: PBBFAC,,, | Performed by: SOCIAL WORKER

## 2019-01-17 PROCEDURE — 90834 PSYTX W PT 45 MINUTES: CPT | Mod: PBBFAC | Performed by: SOCIAL WORKER

## 2019-01-17 PROCEDURE — 90834 PSYTX W PT 45 MINUTES: CPT | Mod: S$PBB,,, | Performed by: SOCIAL WORKER

## 2019-01-17 PROCEDURE — 90834 PR PSYCHOTHERAPY W/PATIENT, 45 MIN: ICD-10-PCS | Mod: S$PBB,,, | Performed by: SOCIAL WORKER

## 2019-01-17 PROCEDURE — 99999 PR PBB SHADOW E&M-EST. PATIENT-LVL I: ICD-10-PCS | Mod: PBBFAC,,, | Performed by: SOCIAL WORKER
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
## 2019-01-17 NOTE — PROGRESS NOTES
TIME RECORD    Date:  01/17/2019    Physician Orders: PT Eval and Treat; see referral in Media section of EMR  Medical Diagnosis:   M53.3 (ICD-10-CM) - Sacrococcygeal disorders, not elsewhere classified   M54.2 (ICD-10-CM) - Cervicalgia   M47.816 (ICD-10-CM) - Lumbar facet joint syndrome         Evaluation Date: 12/7/2018  Authorization Period Expiration: 12/31/2018  Plan of Care Certification Period: 12/7/2018 to 01/21/2019  Visit # / Visits authorized: 6/ 18  FOTO: 6/10  G-code: 6/10   cap visit: 57.78  Cap total: 173.34     Time In: 1100   Time Out: 1200  Total Billable Time: 30 minutes (1 MT, 1 TE)  Precautions: Standard, Hx of PTSD and substance abuse      Subjective:     Patient ID: Daksha Marie is a 48 y.o. female.  Diagnosis:   1. Chronic bilateral low back pain without sciatica     2. Pain aggravated by sitting       Mrs. Crooks presents to PT today with continued complaints of feeling depressed.  She is scheduled to see her psychiatric doctor next week per patient.      Pain: 9/10 B tailbone pain    Objective:     Daksha received therapeutic exercises to develop strength, endurance, ROM, flexibility, posture and core stabilization for 15 minutes with PT 1:1 and 20 minutes under supervision  including:  - SL open books   2x10 B  - SL clamshells   3x10 B   - SL hip abduction   2x10 B (pain)  - SL TAs    20 reps x 5 second holds  - prone knee flexion   2x10 B  - prone hip extension   2x10 B           Daksha received manual therapy to lumbosacral region for a total of 15 minutes:   - STM/MFR to B lumbar paraspinals and gluteal region.   - grade II sacral springing mobs   - foam roll to sacral area    Ended session with cold pack x 10 minutes to lumbosacral spine for pain and inflammation control     Assessment:   A: High sensitivity to even light touch to her mid-sacrum posteriorly. Low mood.  Less lethargic today.      Patient Education/Response:   Continue HEP    Plans and Goals:   Nearing  discharge ?    Goals:  Short Term Goals: 3-4 weeks:  1.  Patient will demonstrate ability to tolerate proper midline sitting x 10 minutes with <3 overall postural adjustments due to pain.   2.  Patient will demonstrate no Yohan's sign with return from forward bending motion x 10 reps for improved household chore performance without pain onset.   3.  Patient will report <4/10 lumbosacral pain overall t/o her day.      Long Term Goals: 6-8 weeks   1. Patient will demonstrate normal B HS length with P/SLR tension testing (>70 degrees hip flexion.  2. Patient will report no pain with direct palpation of her sacral or with PA springing.  3. Patient will report ability to lift laundry basket and carry into/out of her laundry room without lumbosacral pain.  4. Patient will report < 43 % of limitation on Lumbar FOTO survey.

## 2019-01-21 RX ORDER — CLONIDINE HYDROCHLORIDE 0.2 MG/1
TABLET ORAL
Qty: 90 TABLET | Refills: 0 | Status: SHIPPED | OUTPATIENT
Start: 2019-01-21 | End: 2019-07-23

## 2019-01-22 ENCOUNTER — CLINICAL SUPPORT (OUTPATIENT)
Dept: REHABILITATION | Facility: HOSPITAL | Age: 49
End: 2019-01-22
Attending: FAMILY MEDICINE
Payer: MEDICARE

## 2019-01-22 DIAGNOSIS — M54.50 CHRONIC BILATERAL LOW BACK PAIN WITHOUT SCIATICA: ICD-10-CM

## 2019-01-22 DIAGNOSIS — G89.29 CHRONIC BILATERAL LOW BACK PAIN WITHOUT SCIATICA: ICD-10-CM

## 2019-01-22 DIAGNOSIS — R52 PAIN AGGRAVATED BY SITTING: ICD-10-CM

## 2019-01-22 NOTE — PROGRESS NOTES
TIME RECORD    Date:  01/24/2019    Physician Orders: PT Eval and Treat; see referral in Media section of EMR  Medical Diagnosis:   M53.3 (ICD-10-CM) - Sacrococcygeal disorders, not elsewhere classified   M54.2 (ICD-10-CM) - Cervicalgia   M47.816 (ICD-10-CM) - Lumbar facet joint syndrome         Evaluation Date: 12/7/2018  Authorization Period Expiration: 12/31/2018  Plan of Care Certification Period: 12/7/2018 to 01/21/2019  Visit # / Visits authorized: 7/ 18  FOTO: 7/10  G-code: 7/10   cap visit: 60.64  Cap total: 233.98     Time In: 1000  Time Out: 1050  Total Billable Time: 30 minutes (2 TE)  Precautions: Standard, Hx of PTSD and substance abuse    Subjective:     Patient ID: Daksha Marie is a 48 y.o. female.  Diagnosis:   1. Chronic bilateral low back pain without sciatica     2. Pain aggravated by sitting          Mrs. Crooks presents to PT today stating that she is feeling better.  States that she took her pain medications prior to PT.  She again appears lethargic with slowed speech and delayed eyes movements.  States that she spoke at length with her psychiatrist and will be admitted for a 3 day inpatient psych stay beginning on 01/29/2019  Functional change:  States that she is now able to sit on her tailbone 5 minutes before moving/shifting due to pain.      Pain: 8/10 B tailbone pain    Objective:     Daksha received therapeutic exercises to develop strength, endurance, ROM, flexibility, posture and core stabilization for 30 minutes with PT 1:1 and 15 minutes under supervision  including:  - SL open books   2x10 B  - SL clamshells   3x10 B   - SL hip abduction   2x10 B (pain)  - SL TAs    20 reps x 5 second holds  - prone knee flexion   2x10 B  - prone hip extension   2x10 B       Daksha received manual therapy to lumbosacral region for a total of 5 minutes:   - STM/MFR to B lumbar paraspinals and gluteal region.   - grade II sacral springing mobs   - foam roll to sacral area    Ended  session with moist hot pack for 10 minutes to pain management and increased tissue extensibility    Assessment:   A: High sensitivity to even light touch to her mid-sacrum posteriorly. Low mood.  More lethargic today.  Able to tolerate therex today without issue.     Patient Education/Response:   Continue HEP    Plans and Goals:   Nearing discharge.  Plan to dc in two visits as she will be admitted to her inpatient stay.  Update POC.    Goals:  Short Term Goals: 3-4 weeks:  1.  Patient will demonstrate ability to tolerate proper midline sitting x 10 minutes with <3 overall postural adjustments due to pain. progressing towards.   2.  Patient will demonstrate no Leland's sign with return from forward bending motion x 10 reps for improved household chore performance without pain onset. progressing towards  3.  Patient will report <4/10 lumbosacral pain overall t/o her day. progressing towards     Long Term Goals: 6-8 weeks   1. Patient will demonstrate normal B HS length with P/SLR tension testing >70 degrees hip flexion. MET  2. Patient will report no pain with direct palpation of her sacral or with PA springing. Progressing towards  3. Patient will report ability to lift laundry basket and carry into/out of her laundry room without lumbosacral pain. Progressing towards  4. Patient will report < 43 % of limitation on Lumbar FOTO survey. progressing towards    Ga Rivera DPT

## 2019-01-24 NOTE — PLAN OF CARE
Outpatient Therapy Updated Plan of Care     Visit Date: 1/22/2019  Name: Daksha Marie  Clinic Number: [id number]    Therapy Diagnosis:   Encounter Diagnoses   Name Primary?    Chronic bilateral low back pain without sciatica     Pain aggravated by sitting      Physician: Sohail Izaguirre MD    Physician Orders: PT Eval and Treat; see referral in Media section of EMR  Medical Diagnosis:   M53.3 (ICD-10-CM) - Sacrococcygeal disorders, not elsewhere classified   M54.2 (ICD-10-CM) - Cervicalgia   M47.816 (ICD-10-CM) - Lumbar facet joint syndrome   Evaluation Date: 12/7/2018    Total Visits Received: 7  Cancelled Visits: 1  No Show Visits: 1    Current Certification Period:  12/7/2018 to 01/21/2019  Precautions:  Hx of PTSD and substance abuse  Visits from Evaluation Date:  -  Functional Level Prior to Evaluation:  Unable to sit or lie on her tailbone due to pain.      Subjective     Update:   Mrs. Crooks presents to PT today stating that she is feeling better.  States that she took her pain medications prior to PT.  She again appears lethargic with slowed speech and delayed eyes movements.  States that she spoke at length with her psychiatrist and will be admitted for a 3 day inpatient psych stay beginning on 01/29/2019  Functional change:  States that she is now able to sit on her tailbone 5 minutes before moving/shifting due to pain.      Pain: 8/10 B tailbone pain    Objective     Update: Hypersensitivity even to light touch along her sacrum.  Mild pain with central PA springing of lumbosacral segment.  Slow movement patterns. Guarded with all sitting movements. Normal forward bending now but difficulty repeating.  Improved tolerance to therex; all being performed in sidelying or prone at this time.      Assessment   Update: High sensitivity to even light touch to her mid-sacrum posteriorly. Low mood.  More lethargic today.  Able to tolerate therex today without issue.     Previous Short Term Goals  Status:    Short Term Goals: 3-4 weeks:  1.  Patient will demonstrate ability to tolerate proper midline sitting x 10 minutes with <3 overall postural adjustments due to pain. progressing towards.   2.  Patient will demonstrate no Yohan's sign with return from forward bending motion x 10 reps for improved household chore performance without pain onset. progressing towards  3.  Patient will report <4/10 lumbosacral pain overall t/o her day. progressing towards    New Short Term Goals Status:   none  Long Term Goal Status:   continue per initial plan of care.  Reasons for Recertification of Therapy:  Mrs. Stevan Marie would benefit from skilled physical therapy services to continue to address the above listed PT problems related to her lumbosacral dysfunction and to complete her PT course.     Plan     Updated Certification Period: 1/22/2019 to 01/31/2019  Recommended Treatment Plan: 2 times per week for 2 weeks: Cervical/Lumbar Traction, Electrical Stimulation IFC, Manual Therapy, Moist Heat/ Ice, Neuromuscular Re-ed, Orthotic Management and Training, Patient Education, Self Care, Therapeutic Activites and Therapeutic Exercise  Other Recommendations: -    Ga Rivera, PT  1/22/2019      I CERTIFY THE NEED FOR THESE SERVICES FURNISHED UNDER THIS PLAN OF TREATMENT AND WHILE UNDER MY CARE    Physician's comments:        Physician's Signature: ___________________________________________________

## 2019-02-05 ENCOUNTER — CLINICAL SUPPORT (OUTPATIENT)
Dept: REHABILITATION | Facility: HOSPITAL | Age: 49
End: 2019-02-05
Attending: FAMILY MEDICINE
Payer: MEDICARE

## 2019-02-05 ENCOUNTER — PATIENT MESSAGE (OUTPATIENT)
Dept: FAMILY MEDICINE | Facility: HOSPITAL | Age: 49
End: 2019-02-05

## 2019-02-05 DIAGNOSIS — R52 PAIN AGGRAVATED BY SITTING: ICD-10-CM

## 2019-02-05 DIAGNOSIS — M54.50 CHRONIC BILATERAL LOW BACK PAIN WITHOUT SCIATICA: ICD-10-CM

## 2019-02-05 DIAGNOSIS — G89.29 CHRONIC BILATERAL LOW BACK PAIN WITHOUT SCIATICA: ICD-10-CM

## 2019-02-05 PROCEDURE — 97110 THERAPEUTIC EXERCISES: CPT | Mod: PN

## 2019-02-05 PROCEDURE — 97140 MANUAL THERAPY 1/> REGIONS: CPT | Mod: PN

## 2019-02-06 PROBLEM — M54.50 CHRONIC BILATERAL LOW BACK PAIN WITHOUT SCIATICA: Status: RESOLVED | Noted: 2018-12-07 | Resolved: 2019-02-06

## 2019-02-06 PROBLEM — R52 PAIN AGGRAVATED BY SITTING: Status: RESOLVED | Noted: 2018-12-07 | Resolved: 2019-02-06

## 2019-02-06 PROBLEM — G89.29 CHRONIC BILATERAL LOW BACK PAIN WITHOUT SCIATICA: Status: RESOLVED | Noted: 2018-12-07 | Resolved: 2019-02-06

## 2019-02-06 NOTE — PROGRESS NOTES
TIME RECORD    Date:  02/06/2019    Physician Orders: PT Eval and Treat; see referral in Media section of EMR  Medical Diagnosis:   M53.3 (ICD-10-CM) - Sacrococcygeal disorders, not elsewhere classified   M54.2 (ICD-10-CM) - Cervicalgia   M47.816 (ICD-10-CM) - Lumbar facet joint syndrome         Evaluation Date: 12/7/2018  Authorization Period Expiration: 12/31/2018  Plan of Care Certification Period:1/22/2019 to 01/31/2019  Visit # / Visits authorized: 8/ 18  FOTO: 8/10  G-code: 8/10   cap visit: 57.78  Cap total: 291.76     Time In: 1100  Time Out: 1150  Total Billable Time: 30 minutes (1 TE, 1 MT)  Precautions: Standard, Hx of PTSD and substance abuse    Subjective:     Patient ID: Daksha Marie is a 48 y.o. female.  Diagnosis:   1. Chronic bilateral low back pain without sciatica     2. Pain aggravated by sitting       Mrs. Crooks did not make her inpatient psych state.  She pushed to stay back until the end of February.  She states that she is still only able to sit for 5 minutes without having to change positions due to tailbone pain.  She     Functional change:  States that she is now able to sit on her tailbone 5 minutes before moving/shifting due to pain.      Pain: 8-9/10 B tailbone pain    Objective:     Daksha received therapeutic exercises to develop strength, endurance, ROM, flexibility, posture and core stabilization for 20 minutes with PT 1:1 and 15 minutes under supervision  including:  - SL open books   2x10 B  - SL clamshells   3x10 B   - SL hip abduction   2x10 B (pain)  - SL TAs    20 reps x 5 second holds  - prone knee flexion   2x10 B  - prone hip extension   2x10 B       Daksha received manual therapy to lumbosacral region for a total of 10 minutes:   - STM/MFR to B lumbar paraspinals and gluteal region.   - grade II sacral springing mobs   - foam roll to sacral area      Assessment:   A: High sensitivity to even light touch to her mid-sacrum posteriorly. Low mood.  At this  time, PT feels like the patient is making little progress toward her established goals at this time.  Her upper/mid-thoracic pain and stiffness is improved but her sacrococcygeal area continues to PT very sensitive limiting her tolerance to sitting or lying supine/hooklying.  Will refer her back to her referring provider at this time.     Patient Education/Response:   Continue HEP    Plans and Goals:   Nearing discharge.  Plan to dc in two visits as she will be admitted to her inpatient stay.  Update POC.    Goals:  Short Term Goals: 3-4 weeks:  1.  Patient will demonstrate ability to tolerate proper midline sitting x 10 minutes with <3 overall postural adjustments due to pain. unmet  2.  Patient will demonstrate no Yohan's sign with return from forward bending motion x 10 reps for improved household chore performance without pain onset. MET  3.  Patient will report <4/10 lumbosacral pain overall t/o her day. UNMET     Long Term Goals: 6-8 weeks   1. Patient will demonstrate normal B HS length with P/SLR tension testing >70 degrees hip flexion. MET  2. Patient will report no pain with direct palpation of her sacral or with PA springing. Unment  3. Patient will report ability to lift laundry basket and carry into/out of her laundry room without lumbosacral pain. unmet  4. Patient will report < 43 % of limitation on Lumbar FOTO survey. unmet    Ga Rivera DPT      Outpatient Therapy Discharge Summary     Name: Daksha Calles [facility] Number: [id number]    Therapy Diagnosis:   Encounter Diagnoses   Name Primary?    Chronic bilateral low back pain without sciatica     Pain aggravated by sitting      Physician: Sohail Izaguirre MD    Physician Orders: PT Eval and Treat; see referral in Media section of EMR  Medical Diagnosis:   M53.3 (ICD-10-CM) - Sacrococcygeal disorders, not elsewhere classified   M54.2 (ICD-10-CM) - Cervicalgia   M47.816 (ICD-10-CM) - Lumbar facet joint syndrome    Evaluation Date:  12/7/2018    Date of Last visit: 02/05/2019  Total Visits Received: 8  Cancelled Visits: 2  No Show Visits: 1    Assessment    Goals: see above    Discharge reason: Patient has completed the physician's prescription but limited improvement with PT at this time.     Plan   This patient is discharged from Physical Therapy.    Ga Rivera DPT

## 2019-02-07 RX ORDER — MONTELUKAST SODIUM 10 MG/1
10 TABLET ORAL NIGHTLY
Qty: 30 TABLET | Refills: 11 | Status: SHIPPED | OUTPATIENT
Start: 2019-02-07 | End: 2019-12-18 | Stop reason: SDUPTHER

## 2019-02-07 RX ORDER — LISINOPRIL 20 MG/1
TABLET ORAL
Qty: 30 TABLET | Refills: 11 | Status: SHIPPED | OUTPATIENT
Start: 2019-02-07 | End: 2019-08-21 | Stop reason: SDUPTHER

## 2019-02-07 RX ORDER — FUROSEMIDE 20 MG/1
20 TABLET ORAL DAILY PRN
Qty: 60 TABLET | Refills: 1 | Status: SHIPPED | OUTPATIENT
Start: 2019-02-07 | End: 2019-08-21

## 2019-02-07 NOTE — TELEPHONE ENCOUNTER
I messaged the patient and informed her that her insurance denied covering folic acid but that she can buy it over the counter.

## 2019-02-28 ENCOUNTER — NURSE TRIAGE (OUTPATIENT)
Dept: ADMINISTRATIVE | Facility: CLINIC | Age: 49
End: 2019-02-28

## 2019-02-28 ENCOUNTER — HOSPITAL ENCOUNTER (EMERGENCY)
Facility: HOSPITAL | Age: 49
Discharge: PSYCHIATRIC OR SUBSTANCE USE FACILITY | End: 2019-02-28
Attending: EMERGENCY MEDICINE
Payer: MEDICARE

## 2019-02-28 VITALS
RESPIRATION RATE: 20 BRPM | WEIGHT: 181 LBS | OXYGEN SATURATION: 98 % | DIASTOLIC BLOOD PRESSURE: 76 MMHG | TEMPERATURE: 98 F | HEART RATE: 82 BPM | SYSTOLIC BLOOD PRESSURE: 125 MMHG | BODY MASS INDEX: 30.16 KG/M2 | HEIGHT: 65 IN

## 2019-02-28 DIAGNOSIS — R45.851 SUICIDAL IDEATION: ICD-10-CM

## 2019-02-28 DIAGNOSIS — F33.2 SEVERE EPISODE OF RECURRENT MAJOR DEPRESSIVE DISORDER, WITHOUT PSYCHOTIC FEATURES: Primary | ICD-10-CM

## 2019-02-28 DIAGNOSIS — N95.1 PERIMENOPAUSE: ICD-10-CM

## 2019-02-28 DIAGNOSIS — N92.6 IRREGULAR PERIODS/MENSTRUAL CYCLES: ICD-10-CM

## 2019-02-28 DIAGNOSIS — Z00.8 MEDICAL CLEARANCE FOR PSYCHIATRIC ADMISSION: ICD-10-CM

## 2019-02-28 LAB
ALBUMIN SERPL BCP-MCNC: 3.3 G/DL
ALP SERPL-CCNC: 66 U/L
ALT SERPL W/O P-5'-P-CCNC: 10 U/L
AMPHET+METHAMPHET UR QL: NEGATIVE
ANION GAP SERPL CALC-SCNC: 10 MMOL/L
APAP SERPL-MCNC: <3 UG/ML
AST SERPL-CCNC: 14 U/L
B-HCG UR QL: NEGATIVE
BARBITURATES UR QL SCN>200 NG/ML: NEGATIVE
BASOPHILS # BLD AUTO: 0.01 K/UL
BASOPHILS NFR BLD: 0.2 %
BENZODIAZ UR QL SCN>200 NG/ML: NEGATIVE
BILIRUB SERPL-MCNC: 0.3 MG/DL
BILIRUB UR QL STRIP: NEGATIVE
BUN SERPL-MCNC: 12 MG/DL
BZE UR QL SCN: NEGATIVE
CALCIUM SERPL-MCNC: 8.5 MG/DL
CANNABINOIDS UR QL SCN: NEGATIVE
CHLORIDE SERPL-SCNC: 104 MMOL/L
CLARITY UR: CLEAR
CO2 SERPL-SCNC: 21 MMOL/L
COLOR UR: YELLOW
CREAT SERPL-MCNC: 1.1 MG/DL
CREAT UR-MCNC: 29.9 MG/DL
CTP QC/QA: YES
DIFFERENTIAL METHOD: ABNORMAL
EOSINOPHIL # BLD AUTO: 0.1 K/UL
EOSINOPHIL NFR BLD: 1.1 %
ERYTHROCYTE [DISTWIDTH] IN BLOOD BY AUTOMATED COUNT: 15.8 %
EST. GFR  (AFRICAN AMERICAN): >60 ML/MIN/1.73 M^2
EST. GFR  (NON AFRICAN AMERICAN): 60 ML/MIN/1.73 M^2
ETHANOL SERPL-MCNC: 187 MG/DL
ETHANOL SERPL-MCNC: 52 MG/DL
GLUCOSE SERPL-MCNC: 65 MG/DL
GLUCOSE UR QL STRIP: NEGATIVE
HCT VFR BLD AUTO: 31.1 %
HGB BLD-MCNC: 10 G/DL
HGB UR QL STRIP: NEGATIVE
KETONES UR QL STRIP: NEGATIVE
LEUKOCYTE ESTERASE UR QL STRIP: NEGATIVE
LYMPHOCYTES # BLD AUTO: 2.4 K/UL
LYMPHOCYTES NFR BLD: 36.7 %
MCH RBC QN AUTO: 28.7 PG
MCHC RBC AUTO-ENTMCNC: 32.2 G/DL
MCV RBC AUTO: 89 FL
METHADONE UR QL SCN>300 NG/ML: NEGATIVE
MONOCYTES # BLD AUTO: 0.6 K/UL
MONOCYTES NFR BLD: 8.3 %
NEUTROPHILS # BLD AUTO: 3.5 K/UL
NEUTROPHILS NFR BLD: 53.5 %
NITRITE UR QL STRIP: NEGATIVE
OPIATES UR QL SCN: NORMAL
PCP UR QL SCN>25 NG/ML: NEGATIVE
PH UR STRIP: 6 [PH] (ref 5–8)
PLATELET # BLD AUTO: 198 K/UL
PMV BLD AUTO: 9.4 FL
POCT GLUCOSE: 111 MG/DL (ref 70–110)
POCT GLUCOSE: 89 MG/DL (ref 70–110)
POTASSIUM SERPL-SCNC: 3.3 MMOL/L
PROT SERPL-MCNC: 6.3 G/DL
PROT UR QL STRIP: NEGATIVE
RBC # BLD AUTO: 3.48 M/UL
SALICYLATES SERPL-MCNC: <5 MG/DL
SODIUM SERPL-SCNC: 135 MMOL/L
SP GR UR STRIP: <=1.005 (ref 1–1.03)
TOXICOLOGY INFORMATION: NORMAL
TSH SERPL DL<=0.005 MIU/L-ACNC: 1.92 UIU/ML
URN SPEC COLLECT METH UR: ABNORMAL
UROBILINOGEN UR STRIP-ACNC: NEGATIVE EU/DL
WBC # BLD AUTO: 6.6 K/UL

## 2019-02-28 PROCEDURE — 81025 URINE PREGNANCY TEST: CPT | Performed by: EMERGENCY MEDICINE

## 2019-02-28 PROCEDURE — 84443 ASSAY THYROID STIM HORMONE: CPT

## 2019-02-28 PROCEDURE — 82962 GLUCOSE BLOOD TEST: CPT | Mod: 91

## 2019-02-28 PROCEDURE — 25000003 PHARM REV CODE 250: Performed by: EMERGENCY MEDICINE

## 2019-02-28 PROCEDURE — 80053 COMPREHEN METABOLIC PANEL: CPT

## 2019-02-28 PROCEDURE — 93005 ELECTROCARDIOGRAM TRACING: CPT

## 2019-02-28 PROCEDURE — 80320 DRUG SCREEN QUANTALCOHOLS: CPT | Mod: 91

## 2019-02-28 PROCEDURE — 81003 URINALYSIS AUTO W/O SCOPE: CPT | Mod: 59

## 2019-02-28 PROCEDURE — 80307 DRUG TEST PRSMV CHEM ANLYZR: CPT

## 2019-02-28 PROCEDURE — 93010 EKG 12-LEAD: ICD-10-PCS | Mod: ,,, | Performed by: INTERNAL MEDICINE

## 2019-02-28 PROCEDURE — 85025 COMPLETE CBC W/AUTO DIFF WBC: CPT

## 2019-02-28 PROCEDURE — 93010 ELECTROCARDIOGRAM REPORT: CPT | Mod: ,,, | Performed by: INTERNAL MEDICINE

## 2019-02-28 PROCEDURE — 99285 EMERGENCY DEPT VISIT HI MDM: CPT | Mod: 25

## 2019-02-28 PROCEDURE — 80329 ANALGESICS NON-OPIOID 1 OR 2: CPT

## 2019-02-28 RX ORDER — ACETAMINOPHEN 325 MG/1
650 TABLET ORAL
Status: COMPLETED | OUTPATIENT
Start: 2019-02-28 | End: 2019-02-28

## 2019-02-28 RX ORDER — FUROSEMIDE 40 MG/1
40 TABLET ORAL DAILY
COMMUNITY
End: 2019-07-23

## 2019-02-28 RX ORDER — ESTRADIOL AND NORETHINDRONE ACETATE 1; .5 MG/1; MG/1
TABLET, FILM COATED ORAL
Qty: 28 TABLET | Refills: 3 | Status: SHIPPED | OUTPATIENT
Start: 2019-02-28 | End: 2019-05-22 | Stop reason: SDUPTHER

## 2019-02-28 RX ORDER — ACETAMINOPHEN 500 MG
500 TABLET ORAL
Status: DISCONTINUED | OUTPATIENT
Start: 2019-02-28 | End: 2019-02-28

## 2019-02-28 RX ORDER — TOPIRAMATE 50 MG/1
50 TABLET, FILM COATED ORAL 2 TIMES DAILY
COMMUNITY
End: 2019-07-23

## 2019-02-28 RX ORDER — ACETAMINOPHEN 500 MG
1000 TABLET ORAL
Status: COMPLETED | OUTPATIENT
Start: 2019-02-28 | End: 2019-02-28

## 2019-02-28 RX ADMIN — ACETAMINOPHEN 650 MG: 325 TABLET ORAL at 04:02

## 2019-02-28 RX ADMIN — ACETAMINOPHEN 1000 MG: 500 TABLET ORAL at 08:02

## 2019-02-28 NOTE — ED NOTES
Gave report to Evelina at [facility]. Evelina requests nurse to call her back with alcohol level.

## 2019-02-28 NOTE — ED NOTES
PEC called and faxed to Lazaro at [facility]. PEC faxed to [facility].

## 2019-02-28 NOTE — ED NOTES
Faxed packet to [facility], [place], [place], [place], Deborah, [facility], Ambrose KLEIN, Ingrid KLEIN, Gennaro Ryan.

## 2019-02-28 NOTE — ED NOTES
Faxed packet to [facility], Cesar Jay, [place], [place], [facility], [facility], [facility], [facility], [facility], ADRIEN Jarvis.

## 2019-02-28 NOTE — ED NOTES
Pt changed into paper scrubs, searched for harmful objects. Risk sitter at bedside    **2 bags of belongings in closet  **1 bag medications in lock box

## 2019-02-28 NOTE — ED NOTES
Tried calling report to J[facility] Pedersen will call back. Patients packet faxed to their facility per their request

## 2019-02-28 NOTE — ED NOTES
APPEARANCE: Alert, oriented, no acute distress noted  CARDIAC: Normal sinus rhythm noted on EKG, HR and BP WNL. Denies chest pain  PERIPHERAL VASCULAR: peripheral pulses +2 and present x4 extremities. Cap refill <3 seconds.. No edema or discoloration. Warm to touch.  RESPIRATORY:Normal rate and effort, breath sounds clear bilaterally throughout chest. Respirations are equal and unlabored no obvious signs of distress.  GASTRO: soft, bowel sounds normal, no tenderness, no abdominal distention.  G/U: no problems urinating reported  MUSC: Full ROM x4 extremities. No obvious deformity.  SKIN: Skin is warm and dry, normal skin turgor, mucous membranes moist.  NEURO:  GCS 15, motor strength WNL x4 extremities.   MENTAL STATUS: AAOx3, calm, cooperative. Pt slow to answer questions, flat affect. Reports +ETOH use tonight
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
## 2019-02-28 NOTE — ED NOTES
Patient accepted by Mary at [facility] ([address] [place] [address]) for the service of Dr. Mccurdy   Report to be called to [phone].    Mary aware of ETOH level notified Mary that nurse will call report once ETOH is under 100.
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
## 2019-02-28 NOTE — ED NOTES
SPD at bedside to transport pt to [facility] [place]. SPD given 2 bags of belongings.

## 2019-02-28 NOTE — ED NOTES
Patient complains of pain, Dr. Valladares notified of patient complaint. No new orders given at this time. Bladder scan performed 945 ml scanned and Dr. Valladares notified of amount scanned on bladder.

## 2019-02-28 NOTE — ED NOTES
PEC received, faxed packet to [facility], [facility], [facility], [facility].

## 2019-03-08 ENCOUNTER — TELEPHONE (OUTPATIENT)
Dept: OBSTETRICS AND GYNECOLOGY | Facility: CLINIC | Age: 49
End: 2019-03-08

## 2019-03-08 ENCOUNTER — PATIENT MESSAGE (OUTPATIENT)
Dept: OBSTETRICS AND GYNECOLOGY | Facility: CLINIC | Age: 49
End: 2019-03-08

## 2019-03-12 ENCOUNTER — OFFICE VISIT (OUTPATIENT)
Dept: PSYCHIATRY | Facility: CLINIC | Age: 49
End: 2019-03-12
Payer: MEDICARE

## 2019-03-12 DIAGNOSIS — F41.1 GENERALIZED ANXIETY DISORDER: Primary | ICD-10-CM

## 2019-03-12 DIAGNOSIS — F33.0 MAJOR DEPRESSIVE DISORDER, RECURRENT EPISODE, MILD: ICD-10-CM

## 2019-03-12 DIAGNOSIS — F42.2 MIXED OBSESSIONAL THOUGHTS AND ACTS: ICD-10-CM

## 2019-03-12 PROCEDURE — 90834 PR PSYCHOTHERAPY W/PATIENT, 45 MIN: ICD-10-PCS | Mod: S$GLB,,, | Performed by: SOCIAL WORKER

## 2019-03-12 PROCEDURE — 90834 PSYTX W PT 45 MINUTES: CPT | Mod: S$GLB,,, | Performed by: SOCIAL WORKER
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
## 2019-03-22 ENCOUNTER — PATIENT MESSAGE (OUTPATIENT)
Dept: OBSTETRICS AND GYNECOLOGY | Facility: CLINIC | Age: 49
End: 2019-03-22

## 2019-04-01 ENCOUNTER — OFFICE VISIT (OUTPATIENT)
Dept: PSYCHIATRY | Facility: CLINIC | Age: 49
End: 2019-04-01
Payer: MEDICARE

## 2019-04-01 VITALS
HEIGHT: 65 IN | HEART RATE: 104 BPM | DIASTOLIC BLOOD PRESSURE: 96 MMHG | BODY MASS INDEX: 30.97 KG/M2 | SYSTOLIC BLOOD PRESSURE: 139 MMHG | WEIGHT: 185.88 LBS

## 2019-04-01 DIAGNOSIS — F41.0 PANIC DISORDER: ICD-10-CM

## 2019-04-01 DIAGNOSIS — F43.10 PTSD (POST-TRAUMATIC STRESS DISORDER): ICD-10-CM

## 2019-04-01 DIAGNOSIS — F10.21 ALCOHOL USE DISORDER, MODERATE, IN SUSTAINED REMISSION: Primary | ICD-10-CM

## 2019-04-01 PROCEDURE — 3008F BODY MASS INDEX DOCD: CPT | Mod: CPTII,S$GLB,, | Performed by: NURSE PRACTITIONER

## 2019-04-01 PROCEDURE — 90833 PR PSYCHOTHERAPY W/PATIENT W/E&M, 30 MIN (ADD ON): ICD-10-PCS | Mod: S$GLB,,, | Performed by: NURSE PRACTITIONER

## 2019-04-01 PROCEDURE — 99213 PR OFFICE/OUTPT VISIT, EST, LEVL III, 20-29 MIN: ICD-10-PCS | Mod: S$GLB,,, | Performed by: NURSE PRACTITIONER

## 2019-04-01 PROCEDURE — 99999 PR PBB SHADOW E&M-EST. PATIENT-LVL III: ICD-10-PCS | Mod: PBBFAC,,, | Performed by: NURSE PRACTITIONER

## 2019-04-01 PROCEDURE — 99213 OFFICE O/P EST LOW 20 MIN: CPT | Mod: S$GLB,,, | Performed by: NURSE PRACTITIONER

## 2019-04-01 PROCEDURE — 3008F PR BODY MASS INDEX (BMI) DOCUMENTED: ICD-10-PCS | Mod: CPTII,S$GLB,, | Performed by: NURSE PRACTITIONER

## 2019-04-01 PROCEDURE — 3080F PR MOST RECENT DIASTOLIC BLOOD PRESSURE >= 90 MM HG: ICD-10-PCS | Mod: CPTII,S$GLB,, | Performed by: NURSE PRACTITIONER

## 2019-04-01 PROCEDURE — 3075F PR MOST RECENT SYSTOLIC BLOOD PRESS GE 130-139MM HG: ICD-10-PCS | Mod: CPTII,S$GLB,, | Performed by: NURSE PRACTITIONER

## 2019-04-01 PROCEDURE — 3080F DIAST BP >= 90 MM HG: CPT | Mod: CPTII,S$GLB,, | Performed by: NURSE PRACTITIONER

## 2019-04-01 PROCEDURE — 99999 PR PBB SHADOW E&M-EST. PATIENT-LVL III: CPT | Mod: PBBFAC,,, | Performed by: NURSE PRACTITIONER

## 2019-04-01 PROCEDURE — 3075F SYST BP GE 130 - 139MM HG: CPT | Mod: CPTII,S$GLB,, | Performed by: NURSE PRACTITIONER

## 2019-04-01 PROCEDURE — 90833 PSYTX W PT W E/M 30 MIN: CPT | Mod: S$GLB,,, | Performed by: NURSE PRACTITIONER

## 2019-04-01 RX ORDER — QUETIAPINE FUMARATE 25 MG/1
25 TABLET, FILM COATED ORAL NIGHTLY
Qty: 90 TABLET | Refills: 1 | Status: SHIPPED | OUTPATIENT
Start: 2019-04-01 | End: 2019-08-12 | Stop reason: SDUPTHER

## 2019-04-01 RX ORDER — ALPRAZOLAM 1 MG/1
1 TABLET ORAL 2 TIMES DAILY PRN
Qty: 60 TABLET | Refills: 3 | Status: SHIPPED | OUTPATIENT
Start: 2019-04-01 | End: 2019-08-12 | Stop reason: SDUPTHER

## 2019-04-01 RX ORDER — DULOXETIN HYDROCHLORIDE 60 MG/1
60 CAPSULE, DELAYED RELEASE ORAL DAILY
Qty: 30 CAPSULE | Refills: 11 | Status: SHIPPED | OUTPATIENT
Start: 2019-04-01 | End: 2019-08-12 | Stop reason: SDUPTHER

## 2019-04-01 RX ORDER — ACAMPROSATE CALCIUM 333 MG/1
666 TABLET, DELAYED RELEASE ORAL 3 TIMES DAILY
Qty: 180 TABLET | Refills: 11 | Status: SHIPPED | OUTPATIENT
Start: 2019-04-01 | End: 2019-08-12 | Stop reason: ALTCHOICE
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
## 2019-04-09 ENCOUNTER — HOSPITAL ENCOUNTER (EMERGENCY)
Facility: HOSPITAL | Age: 49
Discharge: HOME | End: 2019-04-09
Attending: EMERGENCY MEDICINE
Payer: MEDICARE

## 2019-04-09 VITALS
BODY MASS INDEX: 30.16 KG/M2 | HEIGHT: 65 IN | WEIGHT: 181 LBS | DIASTOLIC BLOOD PRESSURE: 59 MMHG | SYSTOLIC BLOOD PRESSURE: 100 MMHG | OXYGEN SATURATION: 100 % | HEART RATE: 84 BPM | RESPIRATION RATE: 16 BRPM | TEMPERATURE: 98 F

## 2019-04-09 DIAGNOSIS — S09.90XA ACUTE HEAD INJURY, INITIAL ENCOUNTER: Primary | ICD-10-CM

## 2019-04-09 DIAGNOSIS — S39.012A LUMBOSACRAL STRAIN, INITIAL ENCOUNTER: ICD-10-CM

## 2019-04-09 LAB
B-HCG UR QL: NEGATIVE
CTP QC/QA: YES

## 2019-04-09 PROCEDURE — 99284 EMERGENCY DEPT VISIT MOD MDM: CPT | Mod: 25

## 2019-04-09 PROCEDURE — 25000003 PHARM REV CODE 250: Performed by: EMERGENCY MEDICINE

## 2019-04-09 PROCEDURE — 81025 URINE PREGNANCY TEST: CPT | Performed by: EMERGENCY MEDICINE

## 2019-04-09 RX ORDER — METHOCARBAMOL 500 MG/1
1000 TABLET, FILM COATED ORAL
Status: DISCONTINUED | OUTPATIENT
Start: 2019-04-09 | End: 2019-04-10 | Stop reason: HOSPADM

## 2019-04-09 RX ORDER — ACETAMINOPHEN 325 MG/1
650 TABLET ORAL
Status: COMPLETED | OUTPATIENT
Start: 2019-04-09 | End: 2019-04-09

## 2019-04-09 RX ORDER — HYDROCODONE BITARTRATE AND ACETAMINOPHEN 5; 325 MG/1; MG/1
1 TABLET ORAL
Status: DISCONTINUED | OUTPATIENT
Start: 2019-04-09 | End: 2019-04-10 | Stop reason: HOSPADM

## 2019-04-09 RX ADMIN — ACETAMINOPHEN 650 MG: 325 TABLET ORAL at 09:04

## 2019-04-10 NOTE — ED NOTES
PT CURRENTLY HYPOTENSIVE. DR. CORNEJO AWARE. VERBAL ORDERS TO HOLD PO MEDICATIONS PER DR. CORNEJO AT THIS TIME NOTED.

## 2019-04-10 NOTE — ED PROVIDER NOTES
"Encounter Date: 4/9/2019       History     Chief Complaint   Patient presents with    Fall     48y F to ED via  EMS from home. pt with c/o headache 10/10 and feeling "dazed" after trip and fall, hitting head on stove.     48-year-old female coming in today due to accidental slipped and fall.  Patient states she was cooking when she dropped some butter on the ground.  Was then walking and slipped back and fell on her tailbone and states she hit the posterior part of her head.  Having minor headache to the posterior part of her head.  Current headache is 5/10.  No LOC.  She denies any neck pain. No hip pain.  Also complaining of some low back pain.  States she landed on her tailbone.  However she notes she does have chronic low back pain and a old "tailbone fracture".  She takes pain medications daily for this.  However she feels that this fall has worsened her pain.  Denies any urinary or bowel incontinence or retention.  No perineal numbness or tingling.  No acute focal weakness. Headache is minor and she feels like it is in the back part of her head.  Denies a radiate anywhere else.  Denies any acute neurologic deficits here.  Denies she could be pregnant.  Patient here denies any recent sickness no fevers no chills. No weakness.  Patient notes she is on pain management and has significant chronic pain.  Note she cannot be discharged home with any pain prescriptions.  Denies any other acute modifying factors at this time.        Review of patient's allergies indicates:   Allergen Reactions    Penicillins Hives     Past Medical History:   Diagnosis Date    Alcohol abuse 10/7/2015    Arthritis     Breast calcification, left     Pt states this has been worked up, she received a biopsy in the past to confirm calcifications were from scar tissue from when she had breast reduction surgery.      Chronic diastolic congestive heart failure     Encounter for blood transfusion     GERD (gastroesophageal reflux " disease)     Glaucoma     Hypertension     Hyponatremia 10/2015    Na 109. attributed to polydipsia and alcohol abuse.  suffered a seizure in the ICU .    Insomnia     Malingering 2016    PTSD (post-traumatic stress disorder)     Seizures     Sickle cell trait      Past Surgical History:   Procedure Laterality Date    bile fistula      BREAST BIOPSY Left     breast reduction       SECTION      CHOLECYSTECTOMY  after     ESOPHAGOGASTRODUODENOSCOPY (EGD) N/A 2016    [facility]    ESOPHAGOGASTRODUODENOSCOPY (EGD) N/A 2015    [facility] ([facility])    ESOPHAGOGASTRODUODENOSCOPY (EGD) N/A 2015    [facility]    GASTRIC BYPASS   and     SIGMOIDOSCOPY-FLEXIBLE N/A 2015    [facility]    TOTAL REDUCTION MAMMOPLASTY Bilateral     xen gel stent implant  2018    Performed at [facility]     Family History   Problem Relation Age of Onset    Diabetes Mother     Lupus Mother     Diabetes Maternal Grandmother     Crohn's disease Cousin      Social History     Tobacco Use    Smoking status: Current Every Day Smoker     Packs/day: 1.00     Years: 4.00     Pack years: 4.00     Types: Cigarettes    Smokeless tobacco: Never Used   Substance Use Topics    Alcohol use: No     Comment: former heavy drinker since May 1st 2017    Drug use: No     Review of Systems   Constitutional:        ----I have reviewed the PMHx, PSHx, FamHX, social hx, allergies and other nursing notes and agree with the nursing notes.         Constitutional: Negative for fever, negative for chills, negative for weight loss    Eyes: Negative for visual changes, negative for eye pain, negative for discharge.    ENMT: Negative for hearing changes, negative for ear pain, negative for ear discharge. negative for neck pain, negative for neck  stiffness. negative for epistaxis, negative for rhinorrhea.    Cardiac: Negative for chest pain, negative for palpitations, negative for lower extremity edema.     Respiratory: Negative for cough, negative for shortness of breath.    GI: Negative for nausea, negative for vomiting, negative for diarrhea, negative for abdominal pain. negative for rectal bleeding.    : Negative for dysuria, negative for frequency, negative for hematuria.    MS: Negative for muscle weakness, negative for joint pain, see HPI    Neuro:  See HPI , negative for focal weakness. negative for LOC.    Skin: Negative for skin rash.     Endocrine: Negative for polyuria, negative for polydypsia.         Physical Exam     Initial Vitals [04/09/19 2009]   BP Pulse Resp Temp SpO2   (!) 94/58 91 17 98.3 °F (36.8 °C) 97 %      MAP       --         Physical Exam    Constitutional:   Constitutional: A+Ox4, NAD    Eyes: PERRLA, EOMI, Negative for scleral erythema, Negative for eye discharge, Negative for periorbital swelling.    ENT: Tympanic membranes are normal in appearance without erythema bilaterally, Negative for external canal exudate and bleeding, Pharynx: Negative for erythema; no exudate noted; Negative for rhinorrhea.    Neck: Negative for neck stiffness, Negative for signs of meningismus.  There is no midline tenderness, deformity, or step-off.  There are no external signs of trauma.  There is full range of motion.    Head: Normocephalic, Atraumatic. There are no signs of trauma. There is no depressed skull fracture or signs of scalp injury. There is some tenderness over the posterior occiput.      Heart: RRR without M/G/R    Lungs: CTA-B, Negative for wheezes, stridor, or respiratory distress.    Abdomen: Soft, Bowel sounds x4, Negative for distension, Negative for tenderness in all 4 quadrants, Negative for guarding, Negative for rebound, Negative for rigidity, NOT a surgical acute abdomen.    Back: No costovertebral angle tenderness  noted. There is no midline tenderness, deformity, or step-off.  There are no external signs of trauma.  There is full range of motion. Diffuse tenderness over the low lumbar spine in a bandlike fashion    Pelvis: Pelvis is stable.  There is no external signs of trauma.  There is no internal/external rotation of the legs.  There is no leg shortening bilaterally.  There is no obvious bony deformity.    Skin: Warm, dry, intact.     MS: Full ROM in all ext.    Psych: Mood appropriate.    Neuro: 5/5 strength in all extremities. 2+ distal pulses and 2+ sensation throughout all extremities. Negative for slurred speech, Negative for facial droop. Negative for any focal neuro deficits. Gait: Walks with a steady gait.          ----Parts of this note were created using [employer] voice recognition software program. Significant efforts were made to correct any mistakes made by this voice recognition software program however some mistakes, errors, or omissions may remain in the note that were not caught when the note was originally created.         ED Course   Procedures  Labs Reviewed   POCT URINE PREGNANCY          Imaging Results          X-Ray Lumbar Spine 2 Or 3 Views (Final result)  Result time 04/09/19 21:36:15    Final result by Tyler Dillon MD (04/09/19 21:36:15)                 Impression:      No acute displaced fracture-dislocation identified.      Electronically signed by: Tyler Dillon MD  Date:    04/09/2019  Time:    21:36             Narrative:    EXAMINATION:  XR LUMBAR SPINE 2 OR 3 VIEWS    CLINICAL HISTORY:  Low back pain, minor trauma;    TECHNIQUE:  AP and lateral views lumbar spine    COMPARISON:  Chest radiograph 10/12/2018 and CT abdomen and pelvis 01/30/2017    FINDINGS:  Cholecystectomy clips.  Bowel anastomotic sutures again noted at the right lower quadrant.  Bones are well mineralized.  Dextrocurvature which may be related to positioning or muscle strain.  Lumbar lordosis is maintained.  Chronic mild  anterior wedge deformity of several lower thoracic and also L1 vertebral bodies.  No displaced fracture, dislocation or significant listhesis.  No subcutaneous emphysema.  Age-related mild facet arthrosis the lower lumbosacral spine.                               CT Head Without Contrast (Final result)  Result time 04/09/19 21:06:51    Final result by Sharron Cortes MD (04/09/19 21:06:51)                 Impression:      No acute intracranial abnormalities identified.      Electronically signed by: Sharron Cortes MD  Date:    04/09/2019  Time:    21:06             Narrative:    EXAMINATION:  CT HEAD WITHOUT CONTRAST    CLINICAL HISTORY:  Head trauma, headache;    TECHNIQUE:  Low dose axial images were obtained through the head.  Coronal and sagittal reformations were also performed. Contrast was not administered.    COMPARISON:  CT head from May 2017.    FINDINGS:  No evidence of acute/recent major vascular distribution cerebral infarction, intraparenchymal hemorrhage, or intra-axial space occupying lesion. The ventricular system is normal in size and configuration with no evidence of hydrocephalus. No effacement of the skull-base cisterns. No abnormal extra-axial fluid collections or blood products. Visualized paranasal sinuses and mastoid air cells are clear. The calvarium shows no significant abnormality.                                 Medical Decision Making:   ED Management:  Patient here is nontoxic well appearing.  Full workup and evaluation was done.  Negative for anything acute.  Blood pressure has been a little low here however she denies any weakness and states she has had a mechanical slip and fall.  She is otherwise completely neurologically intact here and looks well.  As result patient will be discharged home.  Patient was instructed to make sure she follows up closely with her primary care provider and pain management physician.  In addition patient was instructed to return to the ER for any  worsening condition or any other emergent concerns                      Clinical Impression:       ICD-10-CM ICD-9-CM   1. Acute head injury, initial encounter S09.90XA 959.01   2. Lumbosacral strain, initial encounter S39.012A 846.0         Disposition:   Disposition: Discharged  Condition: Stable                        Sage Evans MD  04/09/19 2148       Sage Evans MD  04/09/19 2148

## 2019-04-15 ENCOUNTER — TELEPHONE (OUTPATIENT)
Dept: FAMILY MEDICINE | Facility: HOSPITAL | Age: 49
End: 2019-04-15

## 2019-04-15 NOTE — TELEPHONE ENCOUNTER
Contacted patient, she saw pain mgmt doctor on today and scheduled an appointment to see Dr. Tate.

## 2019-04-15 NOTE — TELEPHONE ENCOUNTER
----- Message from Eloina Marroquin sent at 4/12/2019  2:36 PM CDT -----  PT needs to talk to Dr in regards to the pain she is having. Please call pt back

## 2019-05-22 DIAGNOSIS — N92.6 IRREGULAR PERIODS/MENSTRUAL CYCLES: ICD-10-CM

## 2019-05-22 DIAGNOSIS — N95.1 PERIMENOPAUSE: ICD-10-CM

## 2019-05-22 RX ORDER — ESTRADIOL AND NORETHINDRONE ACETATE 1; .5 MG/1; MG/1
TABLET, FILM COATED ORAL
Qty: 28 TABLET | Refills: 2 | Status: SHIPPED | OUTPATIENT
Start: 2019-05-22 | End: 2019-08-21 | Stop reason: SDUPTHER

## 2019-05-30 ENCOUNTER — OFFICE VISIT (OUTPATIENT)
Dept: PSYCHIATRY | Facility: CLINIC | Age: 49
End: 2019-05-30
Payer: MEDICARE

## 2019-05-30 DIAGNOSIS — F43.10 PTSD (POST-TRAUMATIC STRESS DISORDER): Primary | ICD-10-CM

## 2019-05-30 DIAGNOSIS — F33.0 MAJOR DEPRESSIVE DISORDER, RECURRENT EPISODE, MILD: ICD-10-CM

## 2019-05-30 DIAGNOSIS — F41.0 PANIC DISORDER: ICD-10-CM

## 2019-05-30 DIAGNOSIS — F41.1 GENERALIZED ANXIETY DISORDER: ICD-10-CM

## 2019-05-30 DIAGNOSIS — F42.2 MIXED OBSESSIONAL THOUGHTS AND ACTS: ICD-10-CM

## 2019-05-30 PROCEDURE — 90834 PSYTX W PT 45 MINUTES: CPT | Mod: S$GLB,,, | Performed by: SOCIAL WORKER

## 2019-05-30 PROCEDURE — 90834 PR PSYCHOTHERAPY W/PATIENT, 45 MIN: ICD-10-PCS | Mod: S$GLB,,, | Performed by: SOCIAL WORKER
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
## 2019-06-28 ENCOUNTER — TELEPHONE (OUTPATIENT)
Dept: OBSTETRICS AND GYNECOLOGY | Facility: CLINIC | Age: 49
End: 2019-06-28

## 2019-06-28 NOTE — TELEPHONE ENCOUNTER
----- Message from Ashley Benitez sent at 6/28/2019 12:52 PM CDT -----  Contact: [phone] self   Pt is requesting for a order for a mammogram. Please advise

## 2019-07-23 ENCOUNTER — HOSPITAL ENCOUNTER (OUTPATIENT)
Dept: RADIOLOGY | Facility: HOSPITAL | Age: 49
Discharge: HOME | End: 2019-07-23
Attending: OBSTETRICS & GYNECOLOGY
Payer: MEDICARE

## 2019-07-23 ENCOUNTER — OFFICE VISIT (OUTPATIENT)
Dept: OTOLARYNGOLOGY | Facility: CLINIC | Age: 49
End: 2019-07-23
Payer: MEDICARE

## 2019-07-23 ENCOUNTER — CLINICAL SUPPORT (OUTPATIENT)
Dept: OTOLARYNGOLOGY | Facility: CLINIC | Age: 49
End: 2019-07-23
Payer: MEDICARE

## 2019-07-23 VITALS
SYSTOLIC BLOOD PRESSURE: 111 MMHG | HEIGHT: 65 IN | HEART RATE: 81 BPM | BODY MASS INDEX: 29.77 KG/M2 | WEIGHT: 178.69 LBS | DIASTOLIC BLOOD PRESSURE: 77 MMHG

## 2019-07-23 DIAGNOSIS — H90.71 MIXED CONDUCTIVE AND SENSORINEURAL HEARING LOSS OF RIGHT EAR WITH UNRESTRICTED HEARING OF LEFT EAR: Primary | ICD-10-CM

## 2019-07-23 DIAGNOSIS — H69.93 ETD (EUSTACHIAN TUBE DYSFUNCTION), BILATERAL: ICD-10-CM

## 2019-07-23 DIAGNOSIS — J30.89 NON-SEASONAL ALLERGIC RHINITIS, UNSPECIFIED TRIGGER: ICD-10-CM

## 2019-07-23 DIAGNOSIS — Z12.39 BREAST SCREENING: ICD-10-CM

## 2019-07-23 PROCEDURE — 77063 BREAST TOMOSYNTHESIS BI: CPT | Mod: 26,,, | Performed by: RADIOLOGY

## 2019-07-23 PROCEDURE — 3074F SYST BP LT 130 MM HG: CPT | Mod: CPTII,S$GLB,, | Performed by: OTOLARYNGOLOGY

## 2019-07-23 PROCEDURE — 99999 PR PBB SHADOW E&M-EST. PATIENT-LVL III: ICD-10-PCS | Mod: PBBFAC,,, | Performed by: OTOLARYNGOLOGY

## 2019-07-23 PROCEDURE — 99203 OFFICE O/P NEW LOW 30 MIN: CPT | Mod: S$GLB,,, | Performed by: OTOLARYNGOLOGY

## 2019-07-23 PROCEDURE — 92557 COMPREHENSIVE HEARING TEST: CPT | Mod: S$GLB,,, | Performed by: AUDIOLOGIST-HEARING AID FITTER

## 2019-07-23 PROCEDURE — 99999 PR PBB SHADOW E&M-EST. PATIENT-LVL I: CPT | Mod: PBBFAC,,, | Performed by: AUDIOLOGIST-HEARING AID FITTER

## 2019-07-23 PROCEDURE — 3078F PR MOST RECENT DIASTOLIC BLOOD PRESSURE < 80 MM HG: ICD-10-PCS | Mod: CPTII,S$GLB,, | Performed by: OTOLARYNGOLOGY

## 2019-07-23 PROCEDURE — 77067 SCR MAMMO BI INCL CAD: CPT | Mod: 26,,, | Performed by: RADIOLOGY

## 2019-07-23 PROCEDURE — 3078F DIAST BP <80 MM HG: CPT | Mod: CPTII,S$GLB,, | Performed by: OTOLARYNGOLOGY

## 2019-07-23 PROCEDURE — 77063 MAMMO DIGITAL SCREENING BILAT WITH TOMOSYNTHESIS_CAD: ICD-10-PCS | Mod: 26,,, | Performed by: RADIOLOGY

## 2019-07-23 PROCEDURE — 3008F BODY MASS INDEX DOCD: CPT | Mod: CPTII,S$GLB,, | Performed by: OTOLARYNGOLOGY

## 2019-07-23 PROCEDURE — 3008F PR BODY MASS INDEX (BMI) DOCUMENTED: ICD-10-PCS | Mod: CPTII,S$GLB,, | Performed by: OTOLARYNGOLOGY

## 2019-07-23 PROCEDURE — 99203 PR OFFICE/OUTPT VISIT, NEW, LEVL III, 30-44 MIN: ICD-10-PCS | Mod: S$GLB,,, | Performed by: OTOLARYNGOLOGY

## 2019-07-23 PROCEDURE — 77067 MAMMO DIGITAL SCREENING BILAT WITH TOMOSYNTHESIS_CAD: ICD-10-PCS | Mod: 26,,, | Performed by: RADIOLOGY

## 2019-07-23 PROCEDURE — 99999 PR PBB SHADOW E&M-EST. PATIENT-LVL I: ICD-10-PCS | Mod: PBBFAC,,, | Performed by: AUDIOLOGIST-HEARING AID FITTER

## 2019-07-23 PROCEDURE — 77067 SCR MAMMO BI INCL CAD: CPT | Mod: TC

## 2019-07-23 PROCEDURE — 92567 TYMPANOMETRY: CPT | Mod: S$GLB,,, | Performed by: AUDIOLOGIST-HEARING AID FITTER

## 2019-07-23 PROCEDURE — 92567 PR TYMPA2METRY: ICD-10-PCS | Mod: S$GLB,,, | Performed by: AUDIOLOGIST-HEARING AID FITTER

## 2019-07-23 PROCEDURE — 3074F PR MOST RECENT SYSTOLIC BLOOD PRESSURE < 130 MM HG: ICD-10-PCS | Mod: CPTII,S$GLB,, | Performed by: OTOLARYNGOLOGY

## 2019-07-23 PROCEDURE — 92557 PR COMPREHENSIVE HEARING TEST: ICD-10-PCS | Mod: S$GLB,,, | Performed by: AUDIOLOGIST-HEARING AID FITTER

## 2019-07-23 PROCEDURE — 99999 PR PBB SHADOW E&M-EST. PATIENT-LVL III: CPT | Mod: PBBFAC,,, | Performed by: OTOLARYNGOLOGY

## 2019-07-23 RX ORDER — TRAVOPROST 0.04 MG/ML
SOLUTION/ DROPS OPHTHALMIC
Refills: 3 | COMMUNITY
Start: 2019-06-04

## 2019-07-23 RX ORDER — LEVOCETIRIZINE DIHYDROCHLORIDE 5 MG/1
5 TABLET, FILM COATED ORAL NIGHTLY
Qty: 30 TABLET | Refills: 11 | Status: SHIPPED | OUTPATIENT
Start: 2019-07-23 | End: 2020-11-04 | Stop reason: SDUPTHER

## 2019-07-23 RX ORDER — HYDROCODONE BITARTRATE AND ACETAMINOPHEN 7.5; 325 MG/1; MG/1
TABLET ORAL
Refills: 0 | COMMUNITY
Start: 2019-07-15

## 2019-07-23 RX ORDER — TRAZODONE HYDROCHLORIDE 150 MG/1
TABLET ORAL
Refills: 2 | COMMUNITY
Start: 2019-06-20 | End: 2020-06-12

## 2019-07-23 RX ORDER — BACLOFEN 10 MG/1
TABLET ORAL
Refills: 1 | COMMUNITY
Start: 2019-07-15 | End: 2020-07-07

## 2019-07-23 RX ORDER — FLUTICASONE PROPIONATE 50 MCG
2 SPRAY, SUSPENSION (ML) NASAL DAILY
Qty: 1 BOTTLE | Refills: 12 | Status: SHIPPED | OUTPATIENT
Start: 2019-07-23 | End: 2020-07-30 | Stop reason: SDUPTHER

## 2019-07-23 NOTE — PROGRESS NOTES
Chief Complaint   Patient presents with    Hearing Loss     bilateral   .     HPI:  Daksha Marie is a very pleasant 49 y.o. female here to see me today for the first time for evaluation of hearing loss.  She reports hearing loss that has been gradually progressing over the last 2 years.  She has noted any difference in hearing between the ears, with the left ear being the worse hearing ear.  She has not noted any tinnitus in either ear.  She has not had any recent issues with ear pain or ear drainage.  She denies a family history of hearing loss, and has not had any previous otologic surgery.  She denies any history of significant loud noise exposure.She denies issues with dizziness.        Past Medical History:   Diagnosis Date    Alcohol abuse 10/7/2015    Arthritis     Breast calcification, left     Pt states this has been worked up, she received a biopsy in the past to confirm calcifications were from scar tissue from when she had breast reduction surgery.      Chronic diastolic congestive heart failure     Encounter for blood transfusion     GERD (gastroesophageal reflux disease)     Glaucoma 2008    Hypertension     Hyponatremia 10/2015    Na 109. attributed to polydipsia and alcohol abuse.  suffered a seizure in the ICU .    Insomnia     Malingering 1/24/2016    PTSD (post-traumatic stress disorder)     Seizures     Sickle cell trait      Social History     Socioeconomic History    Marital status: Single     Spouse name: Not on file    Number of children: Not on file    Years of education: Not on file    Highest education level: Not on file   Occupational History    Not on file   Social Needs    Financial resource strain: Not on file    Food insecurity:     Worry: Not on file     Inability: Not on file    Transportation needs:     Medical: Not on file     Non-medical: Not on file   Tobacco Use    Smoking status: Current Every Day Smoker     Packs/day: 1.00     Years: 4.00      Pack years: 4.00     Types: Cigarettes    Smokeless tobacco: Never Used   Substance and Sexual Activity    Alcohol use: No     Comment: former heavy drinker since May 1st 2017    Drug use: No    Sexual activity: Yes     Partners: Male   Lifestyle    Physical activity:     Days per week: Not on file     Minutes per session: Not on file    Stress: Not on file   Relationships    Social connections:     Talks on phone: Not on file     Gets together: Not on file     Attends Zoroastrianism service: Not on file     Active member of club or organization: Not on file     Attends meetings of clubs or organizations: Not on file     Relationship status: Not on file   Other Topics Concern    Not on file   Social History Narrative    Not on file     Past Surgical History:   Procedure Laterality Date    bile fistula      BREAST BIOPSY Left     breast reduction       SECTION      CHOLECYSTECTOMY  after     ESOPHAGOGASTRODUODENOSCOPY (EGD) N/A 2016    [facility]    ESOPHAGOGASTRODUODENOSCOPY (EGD) N/A 2015    [facility] (2ND F[facility])    ESOPHAGOGASTRODUODENOSCOPY (EGD) N/A 2015    [facility] END[facility]    GASTRIC BYPASS   and     SIGMOIDOSCOPY-FLEXIBLE N/A 2015    [facility]    TOTAL REDUCTION MAMMOPLASTY Bilateral     xen gel stent implant  2018    [facility]     Family History   Problem Relation Age of Onset    Diabetes Mother     Lupus Mother     Diabetes Maternal Grandmother     Crohn's disease Cousin          Review of Systems  General: negative for chills, fever or weight loss  Psychological: negative for mood changes or depression  Ophthalmic: negative for blurry vision, photophobia or eye pain  ENT: see HPI  Respiratory: no cough, shortness of breath, or wheezing  Cardiovascular: no chest pain or dyspnea  on exertion  Gastrointestinal: no abdominal pain, change in bowel habits, or black/ bloody stools  Musculoskeletal: negative for gait disturbance or muscular weakness  Neurological: no syncope or seizures; no ataxia  Dermatological: negative for puritis,  rash and jaundice  Hematologic/lymphatic: no easy bruising, no new lumps or bumps      Physical Exam:    Vitals:    07/23/19 0930   BP: 111/77   Pulse: 81       Constitutional: Well appearing / communicating without difficutly.  NAD.  Eyes: EOM I Bilaterally  Head/Face: Normocephalic.  Negative paranasal sinus pressure/tenderness.  Salivary glands WNL.  House Brackmann I Bilaterally.    Right Ear: Auricle normal appearance. External Auditory Canal within normal limits no lesions or masses,TM w/o masses/lesions/perforations. TM mobility noted.   Left Ear: Auricle normal appearance. External Auditory Canal within normal limits no lesions or masses,TM w/o masses/lesions/perforations. TM mobility noted.  Rinne Air conduction >bone conduction bilaterally, John midline.   Nose: No gross nasal septal deviation. Inferior Turbinates 3+ bilaterally. No septal perforation. No masses/lesions. External nasal skin appears normal without masses/lesions.  Oral Cavity: Gingiva/lips within normal limits.  Dentition/gingiva healthy appearing. Mucus membranes moist. Floor of mouth soft, no masses palpated. Oral Tongue mobile. Hard Palate appears normal.    Oropharynx: Base of tongue appears normal. No masses/lesions noted. Tonsillar fossa/pharyngeal wall without lesions. Posterior oropharynx WNL.  Soft palate without masses. Midline uvula.   Neck/Lymphatic: No LAD I-VI bilaterally.  No thyromegaly.  No masses noted on exam.    Mirror laryngoscopy/nasopharyngoscopy: Active gag reflex.  Unable to perform.    Neuro/Psychiatric: AOx3.  Normal mood and affect.   Cardiovascular: Normal carotid pulses bilaterally, no increasing jugular venous distention noted at cervical region bilaterally.     Respiratory: Normal respiratory effort, no stridor, no retractions noted.      Audiogram reviewed personally by myself and in detail with the patient today.           Assessment:    ICD-10-CM ICD-9-CM    1. 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 Non-seasonal allergic rhinitis, unspecified trigger J30.89 477.8    3. ETD (Eustachian tube dysfunction), bilateral H69.83 381.81      The primary encounter diagnosis was Mixed conductive and sensorineural hearing loss of right ear with unrestricted hearing of left ear. Diagnoses of Non-seasonal allergic rhinitis, unspecified trigger and ETD (Eustachian tube dysfunction), bilateral were also pertinent to this visit.      Plan:  No orders of the defined types were placed in this encounter.    Start Xyzal and Flonase  Follow up in 1 month with repeat audiogram to re-evaluate hearing Ad.    Thank you kindly for allowing me to participate in the patient's care.       Mariajose Yao MD

## 2019-08-01 RX ORDER — OMEPRAZOLE 40 MG/1
40 CAPSULE, DELAYED RELEASE ORAL DAILY
Qty: 30 CAPSULE | Refills: 11 | Status: SHIPPED | OUTPATIENT
Start: 2019-08-01 | End: 2019-08-21 | Stop reason: SDUPTHER

## 2019-08-11 NOTE — PROGRESS NOTES
"Outpatient Psychiatry Follow-Up Visit (MD/NP)    8/12/2019    Clinical Status of Patient:  Outpatient (Ambulatory)    Chief Complaint:  Daksha Marie is a 49 y.o. female who presents today for follow-up of anxiety and PTSD.  Met with patient.      Last visit was: 4/01/19. Chart and  reviewed.     Interval History and Content of Current Session:  Current Psychiatric Medications/changes  · Increase to Cymbalta 60 mg po BID  · DC Antabuse 250 mg po daily  · Start Campral 666 mg po TID  · Continue Xanax 0.5 mg po BID PRN  · Continue Seroquel 25 mg po q hs PRN insomnia    Pt stated, "Campral not working because I still drink and have the urge to drink.  My brother was released from [facility] and he lives with me know so that dynamic at home has changed".  Reports that she is mostly drinking only on weekends. Reports that she has a sponsor who visits her at her house.  Though processes appear clear and organized.  Denies SI/HI/AVH.  Will restart Antabuse.     Psychotherapy:  · Target symptoms: anxiety   · Why chosen therapy is appropriate versus another modality: relevant to diagnosis  · Outcome monitoring methods: self-report  · Therapeutic intervention type: insight oriented psychotherapy  · Topics discussed/themes: building skills sets for symptom management, symptom recognition  · The patient's response to the intervention is accepting. The patient's progress toward treatment goals is good.   · Duration of intervention: 17 minutes.    Review of Systems   · PSYCHIATRIC: Pertinant items are noted in the narrative.  · CONSTITUTIONAL: No weight gain or loss.   · MUSCULOSKELETAL: No pain or stiffness of the joints.  · NEUROLOGIC: No weakness, sensory changes, seizures, confusion, memory loss, tremor or other abnormal movements.  · ENDOCRINE: No polydipsia or polyuria.  · INTEGUMENTARY: No rashes or lacerations.  · EYES: No exophthalmos, jaundice or blindness.  · ENT: No dizziness, tinnitus or hearing " loss.  · RESPIRATORY: No shortness of breath.  · CARDIOVASCULAR: No tachycardia or chest pain.  · GASTROINTESTINAL: No nausea, vomiting, pain, constipation or diarrhea.  · GENITOURINARY: No frequency, dysuria or sexual dysfunction.  · HEMATOLOGIC/LYMPHATIC: No excessive bleeding, prolonged or excessive bleeding after dental extraction/injury.  · ALLERGIC/IMMUNOLOGIC: No allergic response to materials, foods or animals at this time.    Past Medical, Family and Social History: The patient's past medical, family and social history have been reviewed and updated as appropriate within the electronic medical record - see encounter notes.    Compliance: yes    Side effects: None    Risk Parameters:  Patient reports no suicidal ideation  Patient reports no homicidal ideation  Patient reports no self-injurious behavior  Patient reports no violent behavior    Exam (detailed: at least 9 elements; comprehensive: all 15 elements)   Constitutional  Vitals:  Most recent vital signs, dated greater than 90 days prior to this appointment, were reviewed.   Vitals:    08/12/19 0857   BP: 115/74   Pulse: 97   Weight: 81 kg (178 lb 9.2 oz)        General:  unremarkable, age appropriate     Musculoskeletal  Muscle Strength/Tone:  no tremor, no tic   Gait & Station:  non-ataxic     Psychiatric  Speech:  no latency; no press   Mood & Affect:  dysthymic  congruent and appropriate   Thought Process:  normal and logical   Associations:  intact   Thought Content:  normal, no suicidality, no homicidality, delusions, or paranoia   Insight:  intact   Judgement: behavior is adequate to circumstances   Orientation:  grossly intact   Memory: intact for content of interview   Language: grossly intact   Attention Span & Concentration:  able to focus   Fund of Knowledge:  intact and appropriate to age and level of education     Assessment and Diagnosis   Status/Progress: Based on the examination today, the patient's problem(s) is/are failing to respond  as expected to treatment.  New problems have not been presented today.   Co-morbidities and Lack of compliance are not complicating management of the primary condition.  There are no active rule-out diagnoses for this patient at this time.     General Impression:       ICD-10-CM ICD-9-CM   1. Panic disorder F41.0 300.01   2. PTSD (post-traumatic stress disorder) F43.10 309.81   3. Insomnia disorder, with non-sleep disorder mental comorbidity G47.00 780.52   4. Alcohol use disorder, mild, in controlled environment F10.10 305.03       Intervention/Counseling/Treatment Plan   · Medication Management: Continue current medications. The risks and benefits of medication were discussed with the patient.  · AA/NA/CA/ACOA/Abstinence   · Continue Cymbalta 60 mg po daily  · Restart Antabuse 250 mg po daily  · DC Campral   · Continue Xanax 0.5 mg po BID PRN  · Continue Seroquel 25 mg po q hs PRN insomnia  · Completed AIMS scale  · Encouraged pt establish care with a therapist for 1:1 counseling or IOP. Offered information of community resources.     Return to Clinic: 6 months     Risks, benefits, side effects and alternative treatments discussed with patient. Patient agrees with the current plan as documented.  Encouraged Patient to keep future appointments.  Take medications as prescribed and abstain from substance abuse.  Pt to present to ED for thoughts to harm herself or others

## 2019-08-12 ENCOUNTER — OFFICE VISIT (OUTPATIENT)
Dept: PSYCHIATRY | Facility: CLINIC | Age: 49
End: 2019-08-12
Payer: MEDICARE

## 2019-08-12 VITALS
HEART RATE: 97 BPM | SYSTOLIC BLOOD PRESSURE: 115 MMHG | BODY MASS INDEX: 29.72 KG/M2 | WEIGHT: 178.56 LBS | DIASTOLIC BLOOD PRESSURE: 74 MMHG

## 2019-08-12 DIAGNOSIS — F10.10 ALCOHOL USE DISORDER, MILD, IN CONTROLLED ENVIRONMENT: ICD-10-CM

## 2019-08-12 DIAGNOSIS — F41.0 PANIC DISORDER: Primary | ICD-10-CM

## 2019-08-12 DIAGNOSIS — F43.10 PTSD (POST-TRAUMATIC STRESS DISORDER): ICD-10-CM

## 2019-08-12 DIAGNOSIS — G47.00 INSOMNIA DISORDER, WITH NON-SLEEP DISORDER MENTAL COMORBIDITY: ICD-10-CM

## 2019-08-12 PROCEDURE — 3008F BODY MASS INDEX DOCD: CPT | Mod: CPTII,S$GLB,, | Performed by: NURSE PRACTITIONER

## 2019-08-12 PROCEDURE — 3074F SYST BP LT 130 MM HG: CPT | Mod: CPTII,S$GLB,, | Performed by: NURSE PRACTITIONER

## 2019-08-12 PROCEDURE — 99213 OFFICE O/P EST LOW 20 MIN: CPT | Mod: S$GLB,,, | Performed by: NURSE PRACTITIONER

## 2019-08-12 PROCEDURE — 99999 PR PBB SHADOW E&M-EST. PATIENT-LVL III: ICD-10-PCS | Mod: PBBFAC,,, | Performed by: NURSE PRACTITIONER

## 2019-08-12 PROCEDURE — 3078F DIAST BP <80 MM HG: CPT | Mod: CPTII,S$GLB,, | Performed by: NURSE PRACTITIONER

## 2019-08-12 PROCEDURE — 3074F PR MOST RECENT SYSTOLIC BLOOD PRESSURE < 130 MM HG: ICD-10-PCS | Mod: CPTII,S$GLB,, | Performed by: NURSE PRACTITIONER

## 2019-08-12 PROCEDURE — 3008F PR BODY MASS INDEX (BMI) DOCUMENTED: ICD-10-PCS | Mod: CPTII,S$GLB,, | Performed by: NURSE PRACTITIONER

## 2019-08-12 PROCEDURE — 99213 PR OFFICE/OUTPT VISIT, EST, LEVL III, 20-29 MIN: ICD-10-PCS | Mod: S$GLB,,, | Performed by: NURSE PRACTITIONER

## 2019-08-12 PROCEDURE — 3078F PR MOST RECENT DIASTOLIC BLOOD PRESSURE < 80 MM HG: ICD-10-PCS | Mod: CPTII,S$GLB,, | Performed by: NURSE PRACTITIONER

## 2019-08-12 PROCEDURE — 99999 PR PBB SHADOW E&M-EST. PATIENT-LVL III: CPT | Mod: PBBFAC,,, | Performed by: NURSE PRACTITIONER

## 2019-08-12 RX ORDER — ALPRAZOLAM 1 MG/1
1 TABLET ORAL 2 TIMES DAILY PRN
Qty: 60 TABLET | Refills: 0 | Status: SHIPPED | OUTPATIENT
Start: 2019-08-12 | End: 2019-09-17 | Stop reason: SDUPTHER

## 2019-08-12 RX ORDER — DISULFIRAM 250 MG/1
250 TABLET ORAL DAILY
Qty: 90 TABLET | Refills: 3 | Status: SHIPPED | OUTPATIENT
Start: 2019-08-12 | End: 2020-06-12 | Stop reason: SDUPTHER

## 2019-08-12 RX ORDER — DISULFIRAM 250 MG/1
250 TABLET ORAL DAILY
Qty: 30 TABLET | Refills: 11 | Status: SHIPPED | OUTPATIENT
Start: 2019-08-12 | End: 2019-08-12 | Stop reason: SDUPTHER

## 2019-08-12 RX ORDER — DULOXETIN HYDROCHLORIDE 60 MG/1
60 CAPSULE, DELAYED RELEASE ORAL DAILY
Qty: 90 CAPSULE | Refills: 3 | Status: SHIPPED | OUTPATIENT
Start: 2019-08-12 | End: 2019-11-18 | Stop reason: SDUPTHER

## 2019-08-12 RX ORDER — QUETIAPINE FUMARATE 25 MG/1
25 TABLET, FILM COATED ORAL NIGHTLY
Qty: 90 TABLET | Refills: 1 | Status: SHIPPED | OUTPATIENT
Start: 2019-08-12 | End: 2019-08-21 | Stop reason: SDUPTHER

## 2019-08-12 RX ORDER — DULOXETIN HYDROCHLORIDE 60 MG/1
60 CAPSULE, DELAYED RELEASE ORAL DAILY
Qty: 30 CAPSULE | Refills: 11 | Status: SHIPPED | OUTPATIENT
Start: 2019-08-12 | End: 2019-08-12 | Stop reason: SDUPTHER

## 2019-08-20 DIAGNOSIS — N95.1 PERIMENOPAUSE: ICD-10-CM

## 2019-08-20 DIAGNOSIS — N92.6 IRREGULAR PERIODS/MENSTRUAL CYCLES: ICD-10-CM

## 2019-08-21 ENCOUNTER — OFFICE VISIT (OUTPATIENT)
Dept: FAMILY MEDICINE | Facility: HOSPITAL | Age: 49
End: 2019-08-21
Attending: FAMILY MEDICINE
Payer: MEDICARE

## 2019-08-21 VITALS
HEIGHT: 65 IN | BODY MASS INDEX: 29.17 KG/M2 | SYSTOLIC BLOOD PRESSURE: 106 MMHG | DIASTOLIC BLOOD PRESSURE: 80 MMHG | HEART RATE: 85 BPM | WEIGHT: 175.06 LBS

## 2019-08-21 DIAGNOSIS — G47.00 INSOMNIA DISORDER, WITH NON-SLEEP DISORDER MENTAL COMORBIDITY: ICD-10-CM

## 2019-08-21 DIAGNOSIS — I10 ESSENTIAL HYPERTENSION: Primary | Chronic | ICD-10-CM

## 2019-08-21 DIAGNOSIS — K21.9 GASTROESOPHAGEAL REFLUX DISEASE WITHOUT ESOPHAGITIS: ICD-10-CM

## 2019-08-21 DIAGNOSIS — I50.32 CHRONIC DIASTOLIC CONGESTIVE HEART FAILURE: Chronic | ICD-10-CM

## 2019-08-21 PROCEDURE — 99214 OFFICE O/P EST MOD 30 MIN: CPT | Performed by: STUDENT IN AN ORGANIZED HEALTH CARE EDUCATION/TRAINING PROGRAM

## 2019-08-21 RX ORDER — OMEPRAZOLE 40 MG/1
40 CAPSULE, DELAYED RELEASE ORAL DAILY
Qty: 30 CAPSULE | Refills: 11 | Status: SHIPPED | OUTPATIENT
Start: 2019-08-21 | End: 2019-08-21

## 2019-08-21 RX ORDER — ESTRADIOL AND NORETHINDRONE ACETATE 1; .5 MG/1; MG/1
TABLET, FILM COATED ORAL
Qty: 84 TABLET | Refills: 0 | Status: SHIPPED | OUTPATIENT
Start: 2019-08-21 | End: 2020-07-07

## 2019-08-21 RX ORDER — AMLODIPINE BESYLATE 5 MG/1
5 TABLET ORAL DAILY
Qty: 90 TABLET | Refills: 3 | Status: SHIPPED | OUTPATIENT
Start: 2019-08-21 | End: 2020-10-01 | Stop reason: SDUPTHER

## 2019-08-21 RX ORDER — FUROSEMIDE 40 MG/1
TABLET ORAL
Refills: 2 | COMMUNITY
Start: 2019-07-31 | End: 2019-08-21 | Stop reason: SDUPTHER

## 2019-08-21 RX ORDER — FUROSEMIDE 40 MG/1
40 TABLET ORAL DAILY
Qty: 90 TABLET | Refills: 3 | Status: SHIPPED | OUTPATIENT
Start: 2019-08-21 | End: 2020-10-01 | Stop reason: SDUPTHER

## 2019-08-21 RX ORDER — OMEPRAZOLE 40 MG/1
40 CAPSULE, DELAYED RELEASE ORAL DAILY
Qty: 30 CAPSULE | Refills: 11 | Status: SHIPPED | OUTPATIENT
Start: 2019-08-21 | End: 2020-10-01 | Stop reason: SDUPTHER

## 2019-08-21 RX ORDER — LISINOPRIL 20 MG/1
TABLET ORAL
Qty: 30 TABLET | Refills: 11 | Status: SHIPPED | OUTPATIENT
Start: 2019-08-21 | End: 2020-03-05 | Stop reason: SDUPTHER

## 2019-08-21 RX ORDER — QUETIAPINE FUMARATE 25 MG/1
25 TABLET, FILM COATED ORAL NIGHTLY
Qty: 90 TABLET | Refills: 1 | Status: SHIPPED | OUTPATIENT
Start: 2019-08-21 | End: 2019-10-14 | Stop reason: SDUPTHER

## 2019-08-21 RX ORDER — FOLIC ACID 1 MG/1
1 TABLET ORAL DAILY
Qty: 30 TABLET | Refills: 11 | Status: SHIPPED | OUTPATIENT
Start: 2019-08-21 | End: 2020-10-01 | Stop reason: SDUPTHER

## 2019-08-21 RX ORDER — SPIRONOLACTONE 25 MG/1
25 TABLET ORAL DAILY
Qty: 90 TABLET | Refills: 1 | Status: SHIPPED | OUTPATIENT
Start: 2019-08-21 | End: 2020-04-13 | Stop reason: SDUPTHER

## 2019-08-21 NOTE — PROGRESS NOTES
Subjective:       Patient ID: Daksha Marie is a 49 y.o. female.    Chief Complaint: Medication Refill    Daksha Marie is a 49 y.o. Female with history of HTN, CHF (EF 30%), GERD and insomnia presents for medication refill. The patient notes compliance with all medications. She denies any adverse effects. She states she is running low on medications used to control her BP, medications for heart failure and medications for GERD. The patient notes improved lifestyle choices     Review of Systems   Constitutional: Negative for activity change, chills and fever.   HENT: Negative for congestion, ear pain, sinus pressure and sinus pain.    Eyes: Negative for visual disturbance.   Respiratory: Negative for cough, choking, chest tightness and shortness of breath.    Cardiovascular: Negative for chest pain and leg swelling.   Gastrointestinal: Negative for abdominal pain, constipation, diarrhea, nausea and vomiting.   Genitourinary: Negative for difficulty urinating, dysuria, flank pain, frequency and urgency.   Musculoskeletal: Negative for back pain, myalgias, neck pain and neck stiffness.   Skin: Negative for wound.   Neurological: Negative for dizziness, light-headedness, numbness and headaches.       Objective:      Vitals:    08/21/19 1458   BP: 106/80   Pulse: 85     Physical Exam   Constitutional: She is oriented to person, place, and time. She appears well-developed and well-nourished. She is cooperative.   HENT:   Head: Normocephalic and atraumatic.   Right Ear: External ear normal.   Left Ear: External ear normal.   Mouth/Throat: Oropharynx is clear and moist.   Eyes: Conjunctivae and EOM are normal.   Neck: Normal range of motion. Neck supple.   Cardiovascular: Normal rate, regular rhythm, normal heart sounds and intact distal pulses.   Pulmonary/Chest: Effort normal and breath sounds normal. No respiratory distress.   Abdominal: Soft. Bowel sounds are normal. She exhibits no distension.  There is no tenderness.   Musculoskeletal: Normal range of motion. She exhibits no edema.   Neurological: She is alert and oriented to person, place, and time.   Skin: Skin is warm and dry.   Psychiatric: She has a normal mood and affect.   Nursing note and vitals reviewed.      Assessment:       1. Essential hypertension    2. Insomnia disorder, with non-sleep disorder mental comorbidity    3. Chronic diastolic congestive heart failure    4. Gastroesophageal reflux disease without esophagitis        Plan:       Essential hypertension  -     amLODIPine (NORVASC) 5 MG tablet; Take 1 tablet (5 mg total) by mouth once daily.  Dispense: 90 tablet; Refill: 3  -     furosemide (LASIX) 40 MG tablet; Take 1 tablet (40 mg total) by mouth once daily.  Dispense: 90 tablet; Refill: 3  -     lisinopril (PRINIVIL,ZESTRIL) 20 MG tablet; TK 1 T PO QD  Dispense: 30 tablet; Refill: 11    Insomnia disorder, with non-sleep disorder mental comorbidity  -     QUEtiapine (SEROQUEL) 25 MG Tab; Take 1 tablet (25 mg total) by mouth every evening.  Dispense: 90 tablet; Refill: 1    Chronic diastolic congestive heart failure  -     furosemide (LASIX) 40 MG tablet; Take 1 tablet (40 mg total) by mouth once daily.  Dispense: 90 tablet; Refill: 3  -     lisinopril (PRINIVIL,ZESTRIL) 20 MG tablet; TK 1 T PO QD  Dispense: 30 tablet; Refill: 11  -     spironolactone (ALDACTONE) 25 MG tablet; Take 1 tablet (25 mg total) by mouth once daily.  Dispense: 90 tablet; Refill: 1    Gastroesophageal reflux disease without esophagitis  -     folic acid (FOLVITE) 1 MG tablet; Take 1 tablet (1 mg total) by mouth once daily.  Dispense: 30 tablet; Refill: 11  -     omeprazole (PRILOSEC) 40 MG capsule; Take 1 capsule (40 mg total) by mouth once daily.  Dispense: 30 capsule; Refill: 11      Follow up in about 6 months (around 2/21/2020).      D'Amico C Johnson, MD  8/21/2019  3:54 PM  [facility]   House Officer -II

## 2019-09-03 ENCOUNTER — CLINICAL SUPPORT (OUTPATIENT)
Dept: OTOLARYNGOLOGY | Facility: CLINIC | Age: 49
End: 2019-09-03
Payer: MEDICARE

## 2019-09-03 ENCOUNTER — OFFICE VISIT (OUTPATIENT)
Dept: OTOLARYNGOLOGY | Facility: CLINIC | Age: 49
End: 2019-09-03
Payer: MEDICARE

## 2019-09-03 VITALS
DIASTOLIC BLOOD PRESSURE: 86 MMHG | SYSTOLIC BLOOD PRESSURE: 111 MMHG | TEMPERATURE: 99 F | HEART RATE: 83 BPM | WEIGHT: 175.13 LBS | BODY MASS INDEX: 29.18 KG/M2 | HEIGHT: 65 IN

## 2019-09-03 DIAGNOSIS — H90.71 MIXED CONDUCTIVE AND SENSORINEURAL HEARING LOSS OF RIGHT EAR WITH UNRESTRICTED HEARING OF LEFT EAR: ICD-10-CM

## 2019-09-03 DIAGNOSIS — H69.93 ETD (EUSTACHIAN TUBE DYSFUNCTION), BILATERAL: ICD-10-CM

## 2019-09-03 DIAGNOSIS — H90.71 MIXED CONDUCTIVE AND SENSORINEURAL HEARING LOSS OF RIGHT EAR WITH UNRESTRICTED HEARING OF LEFT EAR: Primary | ICD-10-CM

## 2019-09-03 DIAGNOSIS — J30.89 NON-SEASONAL ALLERGIC RHINITIS, UNSPECIFIED TRIGGER: ICD-10-CM

## 2019-09-03 PROCEDURE — 99999 PR PBB SHADOW E&M-EST. PATIENT-LVL I: ICD-10-PCS | Mod: PBBFAC,,, | Performed by: AUDIOLOGIST-HEARING AID FITTER

## 2019-09-03 PROCEDURE — 99214 OFFICE O/P EST MOD 30 MIN: CPT | Mod: S$GLB,,, | Performed by: OTOLARYNGOLOGY

## 2019-09-03 PROCEDURE — 92553 PR AUDIOMETRY, AIR & BONE: ICD-10-PCS | Mod: S$GLB,,, | Performed by: AUDIOLOGIST-HEARING AID FITTER

## 2019-09-03 PROCEDURE — 99999 PR PBB SHADOW E&M-EST. PATIENT-LVL III: CPT | Mod: PBBFAC,,, | Performed by: OTOLARYNGOLOGY

## 2019-09-03 PROCEDURE — 99214 PR OFFICE/OUTPT VISIT, EST, LEVL IV, 30-39 MIN: ICD-10-PCS | Mod: S$GLB,,, | Performed by: OTOLARYNGOLOGY

## 2019-09-03 PROCEDURE — 3079F DIAST BP 80-89 MM HG: CPT | Mod: CPTII,S$GLB,, | Performed by: OTOLARYNGOLOGY

## 2019-09-03 PROCEDURE — 3008F PR BODY MASS INDEX (BMI) DOCUMENTED: ICD-10-PCS | Mod: CPTII,S$GLB,, | Performed by: OTOLARYNGOLOGY

## 2019-09-03 PROCEDURE — 92553 AUDIOMETRY AIR & BONE: CPT | Mod: S$GLB,,, | Performed by: AUDIOLOGIST-HEARING AID FITTER

## 2019-09-03 PROCEDURE — 3074F SYST BP LT 130 MM HG: CPT | Mod: CPTII,S$GLB,, | Performed by: OTOLARYNGOLOGY

## 2019-09-03 PROCEDURE — 3079F PR MOST RECENT DIASTOLIC BLOOD PRESSURE 80-89 MM HG: ICD-10-PCS | Mod: CPTII,S$GLB,, | Performed by: OTOLARYNGOLOGY

## 2019-09-03 PROCEDURE — 99999 PR PBB SHADOW E&M-EST. PATIENT-LVL I: CPT | Mod: PBBFAC,,, | Performed by: AUDIOLOGIST-HEARING AID FITTER

## 2019-09-03 PROCEDURE — 3008F BODY MASS INDEX DOCD: CPT | Mod: CPTII,S$GLB,, | Performed by: OTOLARYNGOLOGY

## 2019-09-03 PROCEDURE — 99999 PR PBB SHADOW E&M-EST. PATIENT-LVL III: ICD-10-PCS | Mod: PBBFAC,,, | Performed by: OTOLARYNGOLOGY

## 2019-09-03 PROCEDURE — 3074F PR MOST RECENT SYSTOLIC BLOOD PRESSURE < 130 MM HG: ICD-10-PCS | Mod: CPTII,S$GLB,, | Performed by: OTOLARYNGOLOGY

## 2019-09-03 RX ORDER — LOTEPREDNOL ETABONATE 5 MG/ML
SUSPENSION/ DROPS OPHTHALMIC
Refills: 0 | COMMUNITY
Start: 2019-08-20 | End: 2022-07-29

## 2019-09-03 NOTE — PROGRESS NOTES
Chief Complaint   Patient presents with    Follow-up     hearing loss   .     HPI:  Daksha Marie is a very pleasant 49 y.o. female here to see me today for the first time for evaluation of hearing loss.  She reports hearing loss that has been gradually progressing over the last 2 years.  She has noted any difference in hearing between the ears, with the left ear being the worse hearing ear.  She has not noted any tinnitus in either ear.  She has not had any recent issues with ear pain or ear drainage.  She denies a family history of hearing loss, and has not had any previous otologic surgery.  She denies any history of significant loud noise exposure.She denies issues with dizziness.    Interval HPI 09/03/2019:  Mrs. Stevan Marie follows up today for right hearing loss.  Since her last visit approximately 4 weeks ago, she states since last visit her hearing seems about the same.  She feels that she has continued muffled hearing in the right ear.  She has been taking Flonase and Xyzal as directed at last visit and does not feel this has made any difference.  She denies otalgia, otorrhea, postauricular pain, tinnitus or vertigo.        Past Medical History:   Diagnosis Date    Alcohol abuse 10/7/2015    Arthritis     Breast calcification, left     Pt states this has been worked up, she received a biopsy in the past to confirm calcifications were from scar tissue from when she had breast reduction surgery.      Chronic diastolic congestive heart failure     Encounter for blood transfusion     GERD (gastroesophageal reflux disease)     Glaucoma 2008    Hypertension     Hyponatremia 10/2015    Na [address]. attributed to polydipsia and alcohol abuse.  suffered a seizure in the ICU .    Insomnia     Malingering 1/24/2016    PTSD (post-traumatic stress disorder)     Seizures     Sickle cell trait      Social History     Socioeconomic History    Marital status: Single     Spouse name: Not on file     Number of children: Not on file    Years of education: Not on file    Highest education level: Not on file   Occupational History    Not on file   Social Needs    Financial resource strain: Very hard    Food insecurity:     Worry: Often true     Inability: Often true    Transportation needs:     Medical: Yes     Non-medical: Yes   Tobacco Use    Smoking status: Current Every Day Smoker     Packs/day: 1.00     Years: 4.00     Pack years: 4.00     Types: Cigarettes    Smokeless tobacco: Never Used   Substance and Sexual Activity    Alcohol use: No     Frequency: 2-3 times a week     Drinks per session: 1 or 2     Binge frequency: Never     Comment: former heavy drinker since May 1st 2017    Drug use: No    Sexual activity: Yes     Partners: Male   Lifestyle    Physical activity:     Days per week: 3 days     Minutes per session: 20 min    Stress: To some extent   Relationships    Social connections:     Talks on phone: More than three times a week     Gets together: Never     Attends Orthodoxy service: Not on file     Active member of club or organization: No     Attends meetings of clubs or organizations: Never     Relationship status:    Other Topics Concern    Not on file   Social History Narrative    Not on file     Past Surgical History:   Procedure Laterality Date    bile fistula      BREAST BIOPSY Left     breast reduction  2003     SECTION      CHOLECYSTECTOMY  after     ESOPHAGOGASTRODUODENOSCOPY (EGD) N/A 2016    [facility]    ESOPHAGOGASTRODUODENOSCOPY (EGD) N/A 2015    [facility] (2ND FLR)    ESOPHAGOGASTRODUODENOSCOPY (EGD) N/A 2015    [facility]    GASTRIC BYPASS   and     SIGMOIDOSCOPY-FLEXIBLE N/A 2015    [facility]    TOTAL REDUCTION MAMMOPLASTY Bilateral     xen gel stent implant   08/30/2018    [facility]     Family History   Problem Relation Age of Onset    Diabetes Mother     Lupus Mother     Diabetes Maternal Grandmother     Crohn's disease Cousin          Review of Systems  General: negative for chills, fever or weight loss  Psychological: negative for mood changes or depression  Ophthalmic: negative for blurry vision, photophobia or eye pain  ENT: see HPI  Respiratory: no cough, shortness of breath, or wheezing  Cardiovascular: no chest pain or dyspnea on exertion  Gastrointestinal: no abdominal pain, change in bowel habits, or black/ bloody stools  Musculoskeletal: negative for gait disturbance or muscular weakness  Neurological: no syncope or seizures; no ataxia  Dermatological: negative for puritis,  rash and jaundice  Hematologic/lymphatic: no easy bruising, no new lumps or bumps      Physical Exam:    Vitals:    09/03/19 0904   BP: 111/86   Pulse: 83   Temp: 98.6 °F (37 °C)       Constitutional: Well appearing / communicating without difficutly.  NAD.  Eyes: EOM I Bilaterally  Head/Face: Normocephalic.  Negative paranasal sinus pressure/tenderness.  Salivary glands WNL.  House Brackmann I Bilaterally.    Right Ear: Auricle normal appearance. External Auditory Canal within normal limits no lesions or masses,TM w/o masses/lesions/perforations. TM mobility noted.   Left Ear: Auricle normal appearance. External Auditory Canal within normal limits no lesions or masses,TM w/o masses/lesions/perforations. TM mobility noted.  Rinne Air conduction >bone conduction bilaterally, John midline.   Nose: No gross nasal septal deviation. Inferior Turbinates 3+ bilaterally. No septal perforation. No masses/lesions. External nasal skin appears normal without masses/lesions.  Oral Cavity: Gingiva/lips within normal limits.  Dentition/gingiva healthy appearing. Mucus membranes moist. Floor of mouth soft, no masses palpated. Oral Tongue mobile. Hard Palate appears  normal.    Oropharynx: Base of tongue appears normal. No masses/lesions noted. Tonsillar fossa/pharyngeal wall without lesions. Posterior oropharynx WNL.  Soft palate without masses. Midline uvula.   Neck/Lymphatic: No LAD I-VI bilaterally.  No thyromegaly.  No masses noted on exam.    Mirror laryngoscopy/nasopharyngoscopy: Active gag reflex.  Unable to perform.    Neuro/Psychiatric: AOx3.  Normal mood and affect.   Cardiovascular: Normal carotid pulses bilaterally, no increasing jugular venous distention noted at cervical region bilaterally.    Respiratory: Normal respiratory effort, no stridor, no retractions noted.    Diagnostic studies reviewed:   Audiogram reviewed personally by myself and in detail with the patient today.         Assessment:    ICD-10-CM ICD-9-CM    1. Asymmetrical hearing loss of right ear H91.8X1 389.8 MRI IAC/Temporal Bones W W/O Contrast   2. Mixed conductive and sensorineural hearing loss of right ear with unrestricted hearing of left ear H90.71 389.21    3. Non-seasonal allergic rhinitis, unspecified trigger J30.89 477.8    4. ETD (Eustachian tube dysfunction), bilateral H69.83 381.81      The primary encounter diagnosis was Asymmetrical hearing loss of right ear. Diagnoses of Mixed conductive and sensorineural hearing loss of right ear with unrestricted hearing of left ear, Non-seasonal allergic rhinitis, unspecified trigger, and ETD (Eustachian tube dysfunction), bilateral were also pertinent to this visit.      Plan:  Orders Placed This Encounter   Procedures    MRI IAC/Temporal Bones W W/O Contrast     Will obtain MRI IAC to further evaluate asymmetry.    continue Xyzal and Flonase  Follow up in 1 year with repeat audiogram to re-evaluate hearing Ad.    Thank you kindly for allowing me to participate in the patient's care.       Mariajose Yao MD

## 2019-09-05 ENCOUNTER — TELEPHONE (OUTPATIENT)
Dept: FAMILY MEDICINE | Facility: HOSPITAL | Age: 49
End: 2019-09-05

## 2019-09-05 NOTE — TELEPHONE ENCOUNTER
----- Message from Isaiah Horne sent at 9/5/2019  3:16 PM CDT -----  PT NEED TO SPEAK WITH DR LAU HER PRESSURE HAS BEEN VERY LOW AND SHE WANT TO KNOW IF THERE IS SOME MEDICINE SHE NEEDS TO STOP TAKING ARE WHAT. PLEASE GIVE HER A CALL AS SOON AS POSSIBLE PLEASE.

## 2019-09-17 DIAGNOSIS — F41.0 PANIC DISORDER: ICD-10-CM

## 2019-09-17 RX ORDER — ALPRAZOLAM 1 MG/1
1 TABLET ORAL 2 TIMES DAILY PRN
Qty: 60 TABLET | Refills: 0 | Status: SHIPPED | OUTPATIENT
Start: 2019-09-17 | End: 2019-10-14 | Stop reason: SDUPTHER

## 2019-09-30 ENCOUNTER — TELEPHONE (OUTPATIENT)
Dept: OBSTETRICS AND GYNECOLOGY | Facility: CLINIC | Age: 49
End: 2019-09-30

## 2019-09-30 ENCOUNTER — PATIENT MESSAGE (OUTPATIENT)
Dept: OBSTETRICS AND GYNECOLOGY | Facility: CLINIC | Age: 49
End: 2019-09-30

## 2019-10-14 ENCOUNTER — OFFICE VISIT (OUTPATIENT)
Dept: PSYCHIATRY | Facility: CLINIC | Age: 49
End: 2019-10-14
Payer: MEDICARE

## 2019-10-14 DIAGNOSIS — F41.0 PANIC DISORDER: ICD-10-CM

## 2019-10-14 DIAGNOSIS — F43.10 PTSD (POST-TRAUMATIC STRESS DISORDER): Primary | ICD-10-CM

## 2019-10-14 DIAGNOSIS — F42.2 MIXED OBSESSIONAL THOUGHTS AND ACTS: ICD-10-CM

## 2019-10-14 DIAGNOSIS — F33.41 DEPRESSION, MAJOR, RECURRENT, IN PARTIAL REMISSION: ICD-10-CM

## 2019-10-14 DIAGNOSIS — G47.00 INSOMNIA DISORDER, WITH NON-SLEEP DISORDER MENTAL COMORBIDITY: ICD-10-CM

## 2019-10-14 PROCEDURE — 90834 PR PSYCHOTHERAPY W/PATIENT, 45 MIN: ICD-10-PCS | Mod: S$GLB,,, | Performed by: SOCIAL WORKER

## 2019-10-14 PROCEDURE — 90834 PSYTX W PT 45 MINUTES: CPT | Mod: S$GLB,,, | Performed by: SOCIAL WORKER

## 2019-10-14 RX ORDER — QUETIAPINE FUMARATE 25 MG/1
25 TABLET, FILM COATED ORAL NIGHTLY
Qty: 90 TABLET | Refills: 1 | Status: SHIPPED | OUTPATIENT
Start: 2019-10-14 | End: 2020-06-12 | Stop reason: SDUPTHER

## 2019-10-14 RX ORDER — ALPRAZOLAM 1 MG/1
1 TABLET ORAL 2 TIMES DAILY PRN
Qty: 60 TABLET | Refills: 0 | Status: SHIPPED | OUTPATIENT
Start: 2019-10-14 | End: 2019-11-18 | Stop reason: SDUPTHER
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
## 2019-11-18 DIAGNOSIS — N95.1 PERIMENOPAUSE: ICD-10-CM

## 2019-11-18 DIAGNOSIS — N92.6 IRREGULAR PERIODS/MENSTRUAL CYCLES: ICD-10-CM

## 2019-11-18 DIAGNOSIS — F43.10 PTSD (POST-TRAUMATIC STRESS DISORDER): ICD-10-CM

## 2019-11-18 DIAGNOSIS — F41.0 PANIC DISORDER: ICD-10-CM

## 2019-11-18 RX ORDER — ALPRAZOLAM 1 MG/1
1 TABLET ORAL 2 TIMES DAILY PRN
Qty: 60 TABLET | Refills: 0 | Status: SHIPPED | OUTPATIENT
Start: 2019-11-18 | End: 2019-12-17 | Stop reason: SDUPTHER

## 2019-11-18 RX ORDER — ESTRADIOL AND NORETHINDRONE ACETATE 1; .5 MG/1; MG/1
TABLET, FILM COATED ORAL
Qty: 84 TABLET | Refills: 0 | OUTPATIENT
Start: 2019-11-18

## 2019-11-18 RX ORDER — DULOXETIN HYDROCHLORIDE 60 MG/1
60 CAPSULE, DELAYED RELEASE ORAL DAILY
Qty: 90 CAPSULE | Refills: 3 | Status: SHIPPED | OUTPATIENT
Start: 2019-11-18 | End: 2020-04-13 | Stop reason: SDUPTHER

## 2019-11-20 ENCOUNTER — TELEPHONE (OUTPATIENT)
Dept: OBSTETRICS AND GYNECOLOGY | Facility: CLINIC | Age: 49
End: 2019-11-20

## 2019-11-20 NOTE — TELEPHONE ENCOUNTER
Appointment Request From: Daksha Marie      With Provider: Hong Soni MD [[facility] - OB/GYN]      Preferred Date Range: Any      Preferred Times: Monday Afternoon, Tuesday Afternoon, Wednesday Afternoon, Thursday Afternoon      Reason for visit: Existing Patient      Comments:   That I am healthy.

## 2019-11-21 ENCOUNTER — PATIENT MESSAGE (OUTPATIENT)
Dept: OBSTETRICS AND GYNECOLOGY | Facility: CLINIC | Age: 49
End: 2019-11-21

## 2019-11-22 ENCOUNTER — PATIENT MESSAGE (OUTPATIENT)
Dept: OBSTETRICS AND GYNECOLOGY | Facility: CLINIC | Age: 49
End: 2019-11-22

## 2019-12-02 ENCOUNTER — PATIENT MESSAGE (OUTPATIENT)
Dept: OBSTETRICS AND GYNECOLOGY | Facility: CLINIC | Age: 49
End: 2019-12-02

## 2019-12-10 ENCOUNTER — PATIENT MESSAGE (OUTPATIENT)
Dept: PSYCHIATRY | Facility: CLINIC | Age: 49
End: 2019-12-10

## 2019-12-16 ENCOUNTER — PATIENT MESSAGE (OUTPATIENT)
Dept: PSYCHIATRY | Facility: CLINIC | Age: 49
End: 2019-12-16

## 2019-12-16 DIAGNOSIS — F41.0 PANIC DISORDER: ICD-10-CM

## 2019-12-16 DIAGNOSIS — F10.10 ALCOHOL USE DISORDER, MILD, IN CONTROLLED ENVIRONMENT: ICD-10-CM

## 2019-12-16 RX ORDER — ALPRAZOLAM 1 MG/1
1 TABLET ORAL 2 TIMES DAILY PRN
Qty: 60 TABLET | Refills: 0 | OUTPATIENT
Start: 2019-12-16

## 2019-12-16 RX ORDER — DISULFIRAM 250 MG/1
250 TABLET ORAL DAILY
Qty: 90 TABLET | Refills: 3 | OUTPATIENT
Start: 2019-12-16 | End: 2020-12-15

## 2019-12-17 RX ORDER — ALPRAZOLAM 1 MG/1
1 TABLET ORAL 2 TIMES DAILY PRN
Qty: 60 TABLET | Refills: 3 | Status: SHIPPED | OUTPATIENT
Start: 2019-12-17 | End: 2020-02-21 | Stop reason: SDUPTHER

## 2019-12-18 RX ORDER — CARVEDILOL 3.12 MG/1
3.12 TABLET ORAL 2 TIMES DAILY
Qty: 180 TABLET | Refills: 3 | Status: SHIPPED | OUTPATIENT
Start: 2019-12-18 | End: 2020-09-22 | Stop reason: SDUPTHER

## 2019-12-18 RX ORDER — MONTELUKAST SODIUM 10 MG/1
10 TABLET ORAL NIGHTLY
Qty: 30 TABLET | Refills: 11 | Status: SHIPPED | OUTPATIENT
Start: 2019-12-18 | End: 2020-02-04 | Stop reason: SDUPTHER

## 2020-02-04 RX ORDER — MONTELUKAST SODIUM 10 MG/1
10 TABLET ORAL NIGHTLY
Qty: 30 TABLET | Refills: 11 | Status: SHIPPED | OUTPATIENT
Start: 2020-02-04 | End: 2021-01-19 | Stop reason: SDUPTHER

## 2020-02-17 ENCOUNTER — PATIENT MESSAGE (OUTPATIENT)
Dept: FAMILY MEDICINE | Facility: HOSPITAL | Age: 50
End: 2020-02-17

## 2020-02-19 ENCOUNTER — PATIENT MESSAGE (OUTPATIENT)
Dept: PSYCHIATRY | Facility: CLINIC | Age: 50
End: 2020-02-19

## 2020-02-20 ENCOUNTER — NURSE TRIAGE (OUTPATIENT)
Dept: ADMINISTRATIVE | Facility: CLINIC | Age: 50
End: 2020-02-20

## 2020-02-21 ENCOUNTER — NURSE TRIAGE (OUTPATIENT)
Dept: ADMINISTRATIVE | Facility: CLINIC | Age: 50
End: 2020-02-21

## 2020-02-21 ENCOUNTER — TELEPHONE (OUTPATIENT)
Dept: PSYCHIATRY | Facility: HOSPITAL | Age: 50
End: 2020-02-21

## 2020-02-21 DIAGNOSIS — F41.0 PANIC DISORDER: ICD-10-CM

## 2020-02-21 RX ORDER — ALPRAZOLAM 1 MG/1
1 TABLET ORAL 2 TIMES DAILY PRN
Qty: 60 TABLET | Refills: 3 | Status: SHIPPED | OUTPATIENT
Start: 2020-02-21 | End: 2020-06-12 | Stop reason: SDUPTHER

## 2020-02-21 RX ORDER — POLYETHYLENE GLYCOL 3350 17 G/17G
17 POWDER, FOR SOLUTION ORAL DAILY
Qty: 30 EACH | Refills: 3 | Status: SHIPPED | OUTPATIENT
Start: 2020-02-21 | End: 2020-07-07

## 2020-02-21 NOTE — TELEPHONE ENCOUNTER
"Patient is requesting a refill continuation of her RX to a [facility] pharmacy that is local.    Reason for Disposition   Caller requesting a NON-URGENT new prescription or refill and triager unable to refill per unit policy   Caller has medication question about med not prescribed by PCP and triager unable to answer question (e.g., compatibility with other med, storage)    Additional Information   Negative: Drug overdose and nurse unable to answer question   Negative: Caller requesting information not related to medicine   Negative: Caller requesting a prescription for Strep throat and has a positive culture result   Negative: Rash while taking a medication or within 3 days of stopping it   Negative: Immunization reaction suspected   Negative: [1] Asthma AND [2] having symptoms of asthma (cough, wheezing, etc)   Negative: MORE THAN A DOUBLE DOSE of a prescription or over-the-counter (OTC) drug   Negative: [1] DOUBLE DOSE (an extra dose or lesser amount) of over-the-counter (OTC) drug AND [2] any symptoms (e.g., dizziness, nausea, pain, sleepiness)   Negative: [1] DOUBLE DOSE (an extra dose or lesser amount) of prescription drug AND [2] any symptoms (e.g., dizziness, nausea, pain, sleepiness)   Negative: Took another person's prescription drug   Negative: [1] DOUBLE DOSE (an extra dose or lesser amount) of prescription drug AND [2] NO symptoms (Exception: a double dose of antibiotics)   Negative: Diabetes drug error or overdose (e.g., insulin or extra dose)   Negative: [1] Request for URGENT new prescription or refill of "essential" medication (i.e., likelihood of harm to patient if not taken) AND [2] triager unable to fill per unit policy   Negative: [1] Prescription not at pharmacy AND [2] was prescribed today by PCP   Negative: Pharmacy calling with prescription questions and triager unable to answer question   Negative: Caller has urgent medication question about med that PCP prescribed and " triager unable to answer question   Negative: Caller has NON-URGENT medication question about med that PCP prescribed and triager unable to answer question    Protocols used: MEDICATION QUESTION CALL-A-AH

## 2020-03-05 DIAGNOSIS — I10 ESSENTIAL HYPERTENSION: Chronic | ICD-10-CM

## 2020-03-05 DIAGNOSIS — I50.32 CHRONIC DIASTOLIC CONGESTIVE HEART FAILURE: Chronic | ICD-10-CM

## 2020-03-08 RX ORDER — LISINOPRIL 20 MG/1
TABLET ORAL
Qty: 30 TABLET | Refills: 11 | Status: SHIPPED | OUTPATIENT
Start: 2020-03-08 | End: 2021-01-12 | Stop reason: SDUPTHER

## 2020-04-13 DIAGNOSIS — I50.32 CHRONIC DIASTOLIC CONGESTIVE HEART FAILURE: Chronic | ICD-10-CM

## 2020-04-13 DIAGNOSIS — F43.10 PTSD (POST-TRAUMATIC STRESS DISORDER): ICD-10-CM

## 2020-04-13 DIAGNOSIS — F41.0 PANIC DISORDER: ICD-10-CM

## 2020-04-13 RX ORDER — DULOXETIN HYDROCHLORIDE 60 MG/1
60 CAPSULE, DELAYED RELEASE ORAL DAILY
Qty: 90 CAPSULE | Refills: 3 | Status: SHIPPED | OUTPATIENT
Start: 2020-04-13 | End: 2020-06-12

## 2020-04-13 RX ORDER — SUMATRIPTAN 50 MG/1
TABLET, FILM COATED ORAL
Qty: 12 TABLET | Refills: 3 | OUTPATIENT
Start: 2020-04-13

## 2020-04-13 RX ORDER — CYANOCOBALAMIN 1000 UG/ML
INJECTION, SOLUTION INTRAMUSCULAR; SUBCUTANEOUS
Qty: 1 ML | Refills: 6 | OUTPATIENT
Start: 2020-04-13

## 2020-04-13 RX ORDER — SPIRONOLACTONE 25 MG/1
25 TABLET ORAL DAILY
Qty: 90 TABLET | Refills: 3 | Status: SHIPPED | OUTPATIENT
Start: 2020-04-13 | End: 2021-01-24 | Stop reason: SDUPTHER

## 2020-06-02 ENCOUNTER — TELEPHONE (OUTPATIENT)
Dept: PSYCHIATRY | Facility: CLINIC | Age: 50
End: 2020-06-02

## 2020-06-02 NOTE — TELEPHONE ENCOUNTER
Attempted to return call but wrong number. Pt's phone number needs to be updated. ----- Message from Thalia Gerber sent at 6/1/2020  9:35 AM CDT -----  Contact: Patient  Pt is having side effects from Cymbalta.  Please call her at [phone].

## 2020-06-11 ENCOUNTER — HOSPITAL ENCOUNTER (OUTPATIENT)
Dept: RADIOLOGY | Facility: HOSPITAL | Age: 50
Discharge: HOME | End: 2020-06-11
Attending: FAMILY MEDICINE
Payer: MEDICARE

## 2020-06-11 DIAGNOSIS — R79.1 ABNORMAL COAGULATION PROFILE: ICD-10-CM

## 2020-06-11 PROCEDURE — 71275 CT ANGIOGRAPHY CHEST: CPT | Mod: TC,PO

## 2020-06-11 PROCEDURE — 25500020 PHARM REV CODE 255: Mod: PO | Performed by: FAMILY MEDICINE

## 2020-06-11 RX ADMIN — IOHEXOL 100 ML: 350 INJECTION, SOLUTION INTRAVENOUS at 10:06

## 2020-06-12 ENCOUNTER — OFFICE VISIT (OUTPATIENT)
Dept: PSYCHIATRY | Facility: CLINIC | Age: 50
End: 2020-06-12
Payer: MEDICARE

## 2020-06-12 DIAGNOSIS — F33.0 MAJOR DEPRESSIVE DISORDER, RECURRENT EPISODE, MILD: ICD-10-CM

## 2020-06-12 DIAGNOSIS — F41.0 PANIC DISORDER: Primary | ICD-10-CM

## 2020-06-12 DIAGNOSIS — F10.10 ALCOHOL USE DISORDER, MILD, IN CONTROLLED ENVIRONMENT: ICD-10-CM

## 2020-06-12 DIAGNOSIS — G47.00 INSOMNIA DISORDER, WITH NON-SLEEP DISORDER MENTAL COMORBIDITY: ICD-10-CM

## 2020-06-12 PROCEDURE — 99213 OFFICE O/P EST LOW 20 MIN: CPT | Mod: 95,,, | Performed by: NURSE PRACTITIONER

## 2020-06-12 PROCEDURE — 99213 PR OFFICE/OUTPT VISIT, EST, LEVL III, 20-29 MIN: ICD-10-PCS | Mod: 95,,, | Performed by: NURSE PRACTITIONER

## 2020-06-12 RX ORDER — ALPRAZOLAM 1 MG/1
1 TABLET ORAL 2 TIMES DAILY PRN
Qty: 60 TABLET | Refills: 3 | Status: SHIPPED | OUTPATIENT
Start: 2020-06-12 | End: 2020-10-13 | Stop reason: SDUPTHER

## 2020-06-12 RX ORDER — MIRTAZAPINE 15 MG/1
TABLET, FILM COATED ORAL
Qty: 30 TABLET | Refills: 2 | Status: SHIPPED | OUTPATIENT
Start: 2020-06-12 | End: 2020-07-02

## 2020-06-12 RX ORDER — QUETIAPINE FUMARATE 25 MG/1
25 TABLET, FILM COATED ORAL NIGHTLY
Qty: 90 TABLET | Refills: 1 | Status: SHIPPED | OUTPATIENT
Start: 2020-06-12 | End: 2020-10-13 | Stop reason: SDUPTHER

## 2020-06-12 RX ORDER — DISULFIRAM 250 MG/1
250 TABLET ORAL DAILY
Qty: 90 TABLET | Refills: 3 | Status: SHIPPED | OUTPATIENT
Start: 2020-06-12 | End: 2020-10-13 | Stop reason: SDUPTHER

## 2020-06-12 NOTE — PROGRESS NOTES
Outpatient Psychiatry Follow-Up Visit (MD/NP)    6/12/2020    Clinical Status of Patient:  Outpatient (Ambulatory)    Chief Complaint:  Daksha Marie is a 50 y.o. female who presents today for follow-up of anxiety and PTSD.  Met with patient.      Last visit was: 2/17/2020. Chart and  reviewed.   The patient location is: home  The chief complaint leading to consultation is: depression and anxiety    Visit type: audiovisual    Face to Face time with patient: 25 minutes  30 minutes of total time spent on the encounter, which includes face to face time and non-face to face time preparing to see the patient (eg, review of tests), Obtaining and/or reviewing separately obtained history, Documenting clinical information in the electronic or other health record, Independently interpreting results (not separately reported) and communicating results to the patient/family/caregiver, or Care coordination (not separately reported).     Each patient to whom he or she provides medical services by telemedicine is:  (1) informed of the relationship between the physician and patient and the respective role of any other health care provider with respect to management of the patient; and (2) notified that he or she may decline to receive medical services by telemedicine and may withdraw from such care at any time.    Interval History and Content of Current Session:  Current Psychiatric Medications/changes  · Continue Cymbalta 60 mg po daily  · Restart Antabuse 250 mg po daily  · DC Campral   · Continue Xanax 0.5 mg po BID PRN  · Continue Seroquel 25 mg po q hs PRN insomnia    Virtual Visits:  States that she is having nightmares and wakes up in cold sweats.  Stopped taking Cymbalta a month ago because she thought it was contributing to her sleeping problems.  Encouraged pt to follow-up with OB for possible hot flashes.  Denies SI/HI/AVH. Reports that she has maintained sobriety. Will try Remeron for sleep and depression.      Psychotherapy:  · Target symptoms: anxiety   · Why chosen therapy is appropriate versus another modality: relevant to diagnosis  · Outcome monitoring methods: self-report  · Therapeutic intervention type: insight oriented psychotherapy  · Topics discussed/themes: building skills sets for symptom management, symptom recognition  · The patient's response to the intervention is accepting. The patient's progress toward treatment goals is good.   · Duration of intervention: 11 minutes.    Review of Systems   · PSYCHIATRIC: Pertinant items are noted in the narrative.  · CONSTITUTIONAL: No weight gain or loss.   · MUSCULOSKELETAL: No pain or stiffness of the joints.  · NEUROLOGIC: No weakness, sensory changes, seizures, confusion, memory loss, tremor or other abnormal movements.  · ENDOCRINE: No polydipsia or polyuria.  · INTEGUMENTARY: No rashes or lacerations.  · EYES: No exophthalmos, jaundice or blindness.  · ENT: No dizziness, tinnitus or hearing loss.  · RESPIRATORY: No shortness of breath.  · CARDIOVASCULAR: No tachycardia or chest pain.  · GASTROINTESTINAL: No nausea, vomiting, pain, constipation or diarrhea.  · GENITOURINARY: No frequency, dysuria or sexual dysfunction.  · HEMATOLOGIC/LYMPHATIC: No excessive bleeding, prolonged or excessive bleeding after dental extraction/injury.  · ALLERGIC/IMMUNOLOGIC: No allergic response to materials, foods or animals at this time.    Past Medical, Family and Social History: The patient's past medical, family and social history have been reviewed and updated as appropriate within the electronic medical record - see encounter notes.    Compliance: yes    Side effects: None    Risk Parameters:  Patient reports no suicidal ideation  Patient reports no homicidal ideation  Patient reports no self-injurious behavior  Patient reports no violent behavior    Exam (detailed: at least 9 elements; comprehensive: all 15 elements)   Constitutional  Vitals:  Most recent vital signs,  dated greater than 90 days prior to this appointment, were reviewed.   There were no vitals filed for this visit.     General:  unremarkable, age appropriate     Musculoskeletal  Muscle Strength/Tone:  no tremor, no tic   Gait & Station:  non-ataxic     Psychiatric  Speech:  no latency; no press   Mood & Affect:  dysthymic  congruent and appropriate   Thought Process:  normal and logical   Associations:  intact   Thought Content:  normal, no suicidality, no homicidality, delusions, or paranoia   Insight:  intact   Judgement: behavior is adequate to circumstances   Orientation:  grossly intact   Memory: intact for content of interview   Language: grossly intact   Attention Span & Concentration:  able to focus   Fund of Knowledge:  intact and appropriate to age and level of education     Assessment and Diagnosis   Status/Progress: Based on the examination today, the patient's problem(s) is/are failing to respond as expected to treatment.  New problems have not been presented today.   Co-morbidities and Lack of compliance are not complicating management of the primary condition.  There are no active rule-out diagnoses for this patient at this time.     General Impression:       ICD-10-CM ICD-9-CM   1. Panic disorder  F41.0 300.01   2. Alcohol use disorder, mild, in controlled environment  F10.10 305.03   3. Insomnia disorder, with non-sleep disorder mental comorbidity  G47.00 780.52   4. Major depressive disorder, recurrent episode, mild  F33.0 296.31       Intervention/Counseling/Treatment Plan   · Medication Management: Continue current medications. The risks and benefits of medication were discussed with the patient.  · AA/NA/CA/ACOA/Abstinence   · DC Cymbalta (no longer taking / reports side effects)  · Start Remeron 15 mg po qhs x 10 days then increase to 30 mg hs  · Continue Antabuse 250 mg po daily  · Continue Xanax 0.5 mg po BID PRN  · Continue Seroquel 25 mg po q hs PRN insomnia  · Encouraged pt establish care  with a therapist for 1:1 counseling or IOP. Offered information of community resources.     Return to Clinic: 6 months     Risks, benefits, side effects and alternative treatments discussed with patient. Patient agrees with the current plan as documented.  Encouraged Patient to keep future appointments.  Take medications as prescribed and abstain from substance abuse.  Pt to present to ED for thoughts to harm herself or others

## 2020-07-01 NOTE — PROGRESS NOTES
Outpatient Psychiatry Follow-Up Visit (MD/NP)    7/2/2020    Clinical Status of Patient:  Outpatient (Ambulatory)    Chief Complaint:  Daksha Marie is a 50 y.o. female who presents today for follow-up of anxiety and PTSD.  Met with patient.      Last visit was: 6/12/2020. Chart and  reviewed.   The patient location is: home  The chief complaint leading to consultation is: depression and anxiety    Visit type: audiovisual    Face to Face time with patient: 25 minutes  30 minutes of total time spent on the encounter, which includes face to face time and non-face to face time preparing to see the patient (eg, review of tests), Obtaining and/or reviewing separately obtained history, Documenting clinical information in the electronic or other health record, Independently interpreting results (not separately reported) and communicating results to the patient/family/caregiver, or Care coordination (not separately reported).     Each patient to whom he or she provides medical services by telemedicine is:  (1) informed of the relationship between the physician and patient and the respective role of any other health care provider with respect to management of the patient; and (2) notified that he or she may decline to receive medical services by telemedicine and may withdraw from such care at any time.    Interval History and Content of Current Session:  Current Psychiatric Medications/changes  · DC Cymbalta (no longer taking / reports side effects)  · Start Remeron 15 mg po qhs x 10 days then increase to 30 mg hs  · Continue Antabuse 250 mg po daily  · Continue Xanax 0.5 mg po BID PRN  · Continue Seroquel 25 mg po q hs PRN insomnia    Virtual Visit: Pt presents bright affect; smiling. Pt reports that she feels better with Remeron; improved sleep and mood.  Will incerease to 45 mg at bedtime and continue other meds.  Denies SI/HI/AVH.     Psychotherapy:  · Target symptoms: anxiety   · Why chosen therapy is  appropriate versus another modality: relevant to diagnosis  · Outcome monitoring methods: self-report  · Therapeutic intervention type: insight oriented psychotherapy  · Topics discussed/themes: building skills sets for symptom management, symptom recognition  · The patient's response to the intervention is accepting. The patient's progress toward treatment goals is good.   · Duration of intervention: 11 minutes.    Review of Systems   · PSYCHIATRIC: Pertinant items are noted in the narrative.  · CONSTITUTIONAL: No weight gain or loss.   · MUSCULOSKELETAL: No pain or stiffness of the joints.  · NEUROLOGIC: No weakness, sensory changes, seizures, confusion, memory loss, tremor or other abnormal movements.  · ENDOCRINE: No polydipsia or polyuria.  · INTEGUMENTARY: No rashes or lacerations.  · EYES: No exophthalmos, jaundice or blindness.  · ENT: No dizziness, tinnitus or hearing loss.  · RESPIRATORY: No shortness of breath.  · CARDIOVASCULAR: No tachycardia or chest pain.  · GASTROINTESTINAL: No nausea, vomiting, pain, constipation or diarrhea.  · GENITOURINARY: No frequency, dysuria or sexual dysfunction.  · HEMATOLOGIC/LYMPHATIC: No excessive bleeding, prolonged or excessive bleeding after dental extraction/injury.  · ALLERGIC/IMMUNOLOGIC: No allergic response to materials, foods or animals at this time.    Past Medical, Family and Social History: The patient's past medical, family and social history have been reviewed and updated as appropriate within the electronic medical record - see encounter notes.    Compliance: yes    Side effects: None    Risk Parameters:  Patient reports no suicidal ideation  Patient reports no homicidal ideation  Patient reports no self-injurious behavior  Patient reports no violent behavior    Exam (detailed: at least 9 elements; comprehensive: all 15 elements)   Constitutional  Vitals:  Most recent vital signs, dated greater than 90 days prior to this appointment, were reviewed.   There  were no vitals filed for this visit.     General:  unremarkable, age appropriate     Musculoskeletal  Muscle Strength/Tone:  no tremor, no tic   Gait & Station:  non-ataxic     Psychiatric  Speech:  no latency; no press   Mood & Affect:  dysthymic  congruent and appropriate   Thought Process:  normal and logical   Associations:  intact   Thought Content:  normal, no suicidality, no homicidality, delusions, or paranoia   Insight:  intact   Judgement: behavior is adequate to circumstances   Orientation:  grossly intact   Memory: intact for content of interview   Language: grossly intact   Attention Span & Concentration:  able to focus   Fund of Knowledge:  intact and appropriate to age and level of education     Assessment and Diagnosis   Status/Progress: Based on the examination today, the patient's problem(s) is/are failing to respond as expected to treatment.  New problems have not been presented today.   Co-morbidities and Lack of compliance are not complicating management of the primary condition.  There are no active rule-out diagnoses for this patient at this time.     General Impression:       ICD-10-CM ICD-9-CM   1. Major depressive disorder, recurrent episode, mild  F33.0 296.31   2. Insomnia disorder, with non-sleep disorder mental comorbidity  G47.00 780.52   3. Panic disorder  F41.0 300.01   4. Alcohol use disorder, moderate, in sustained remission  F10.21 305.03       Intervention/Counseling/Treatment Plan   · Medication Management: Continue current medications. The risks and benefits of medication were discussed with the patient.  · AA/NA/CA/ACOA/Abstinence   · Increase to Remeron 45 mg po qhs   · Continue Antabuse 250 mg po daily  · Continue Xanax 0.5 mg po BID PRN  · Continue Seroquel 25 mg po q hs PRN insomnia  · Encouraged pt establish care with a therapist for 1:1 counseling or IOP. Offered information of community resources.     Return to Clinic: 6 months     Risks, benefits, side effects and  alternative treatments discussed with patient. Patient agrees with the current plan as documented.  Encouraged Patient to keep future appointments.  Take medications as prescribed and abstain from substance abuse.  Pt to present to ED for thoughts to harm herself or others

## 2020-07-02 ENCOUNTER — OFFICE VISIT (OUTPATIENT)
Dept: PSYCHIATRY | Facility: CLINIC | Age: 50
End: 2020-07-02
Payer: MEDICARE

## 2020-07-02 DIAGNOSIS — F10.21 ALCOHOL USE DISORDER, MODERATE, IN SUSTAINED REMISSION: ICD-10-CM

## 2020-07-02 DIAGNOSIS — F41.0 PANIC DISORDER: ICD-10-CM

## 2020-07-02 DIAGNOSIS — G47.00 INSOMNIA DISORDER, WITH NON-SLEEP DISORDER MENTAL COMORBIDITY: ICD-10-CM

## 2020-07-02 DIAGNOSIS — F33.0 MAJOR DEPRESSIVE DISORDER, RECURRENT EPISODE, MILD: Primary | ICD-10-CM

## 2020-07-02 PROCEDURE — 99213 PR OFFICE/OUTPT VISIT, EST, LEVL III, 20-29 MIN: ICD-10-PCS | Mod: 95,,, | Performed by: NURSE PRACTITIONER

## 2020-07-02 PROCEDURE — 99213 OFFICE O/P EST LOW 20 MIN: CPT | Mod: 95,,, | Performed by: NURSE PRACTITIONER

## 2020-07-03 ENCOUNTER — NURSE TRIAGE (OUTPATIENT)
Dept: ADMINISTRATIVE | Facility: CLINIC | Age: 50
End: 2020-07-03

## 2020-07-03 RX ORDER — MIRTAZAPINE 45 MG/1
45 TABLET, FILM COATED ORAL NIGHTLY
Qty: 90 TABLET | Refills: 3 | Status: SHIPPED | OUTPATIENT
Start: 2020-07-03 | End: 2021-06-08

## 2020-07-03 NOTE — TELEPHONE ENCOUNTER
"  Reason for Disposition   [1] Caller has medication question about med not prescribed by PCP AND [2] triager unable to answer question (e.g., compatibility with other med, storage)    Additional Information   Negative: Drug overdose and triager unable to answer question   Negative: Caller requesting information unrelated to medicine   Negative: Caller requesting a prescription for Strep throat and has a positive culture result   Negative: Rash while taking a medication or within 3 days of stopping it   Negative: Immunization reaction suspected   Negative: [1] Asthma and [2] having symptoms of asthma (cough, wheezing, etc.)   Negative: [1] Influenza symptoms AND [2] anti-viral med prescription request, such as Tamiflu   Negative: [1] Symptom of illness (e.g., headache, abdominal pain, earache, vomiting) AND [2] more than mild   Negative: MORE THAN A DOUBLE DOSE of a prescription or over-the-counter (OTC) drug   Negative: [1] DOUBLE DOSE (an extra dose or lesser amount) of over-the-counter (OTC) drug AND [2] any symptoms (e.g., dizziness, nausea, pain, sleepiness)   Negative: [1] DOUBLE DOSE (an extra dose or lesser amount) of prescription drug AND [2] any symptoms (e.g., dizziness, nausea, pain, sleepiness)   Negative: Took another person's prescription drug   Negative: [1] DOUBLE DOSE (an extra dose or lesser amount) of prescription drug AND [2] NO symptoms (Exception: a double dose of antibiotics)   Negative: Diabetes drug error or overdose (e.g., took wrong type of insulin or took extra dose)   Negative: [1] Request for URGENT new prescription or refill of "essential" medication (i.e., likelihood of harm to patient if not taken) AND [2] triager unable to fill per unit policy   Negative: [1] Prescription not at pharmacy AND [2] was prescribed by PCP recently   Negative: [1] Pharmacy calling with prescription questions AND [2] triager unable to answer question   Negative: [1] Caller has URGENT " medication question about med that PCP or specialist prescribed AND [2] triager unable to answer question   Negative: [1] Caller has NON-URGENT medication question about med that PCP prescribed AND [2] triager unable to answer question   Negative: [1] Caller requesting a NON-URGENT new prescription or refill AND [2] triager unable to refill per unit policy    Protocols used: MEDICATION QUESTION CALL-A-AH

## 2020-07-03 NOTE — TELEPHONE ENCOUNTER
Takes Remeron 45mg nightly, seen by Dr. Farr yesterday and pt tried to  rx yesterday but they did not have rx. Rx was placed today per MAR. Called pharmacy to verify they received rx. Informed pt. Verb understanding. Per MAR, pharmacy confirmed e-script at 10:50 am, today.

## 2020-07-07 ENCOUNTER — LAB VISIT (OUTPATIENT)
Dept: LAB | Facility: HOSPITAL | Age: 50
End: 2020-07-07
Attending: FAMILY MEDICINE
Payer: MEDICARE

## 2020-07-07 ENCOUNTER — OFFICE VISIT (OUTPATIENT)
Dept: FAMILY MEDICINE | Facility: HOSPITAL | Age: 50
End: 2020-07-07
Attending: FAMILY MEDICINE
Payer: MEDICARE

## 2020-07-07 VITALS
BODY MASS INDEX: 32.51 KG/M2 | WEIGHT: 195.13 LBS | SYSTOLIC BLOOD PRESSURE: 89 MMHG | HEIGHT: 65 IN | HEART RATE: 71 BPM | DIASTOLIC BLOOD PRESSURE: 70 MMHG

## 2020-07-07 DIAGNOSIS — N93.9 ABNORMAL UTERINE BLEEDING (AUB): Primary | ICD-10-CM

## 2020-07-07 DIAGNOSIS — R73.03 PREDIABETES: ICD-10-CM

## 2020-07-07 DIAGNOSIS — Z11.4 ENCOUNTER FOR SCREENING FOR HUMAN IMMUNODEFICIENCY VIRUS (HIV): ICD-10-CM

## 2020-07-07 DIAGNOSIS — N93.9 ABNORMAL UTERINE BLEEDING (AUB): ICD-10-CM

## 2020-07-07 LAB
BASOPHILS # BLD AUTO: 0.02 K/UL (ref 0–0.2)
BASOPHILS NFR BLD: 0.4 % (ref 0–1.9)
DIFFERENTIAL METHOD: ABNORMAL
EOSINOPHIL # BLD AUTO: 0 K/UL (ref 0–0.5)
EOSINOPHIL NFR BLD: 0.6 % (ref 0–8)
ERYTHROCYTE [DISTWIDTH] IN BLOOD BY AUTOMATED COUNT: 13 % (ref 11.5–14.5)
FSH SERPL-ACNC: 17.5 MIU/ML
HCT VFR BLD AUTO: 34.8 % (ref 37–48.5)
HGB BLD-MCNC: 11.2 G/DL (ref 12–16)
IMM GRANULOCYTES # BLD AUTO: 0.01 K/UL (ref 0–0.04)
IMM GRANULOCYTES NFR BLD AUTO: 0.2 % (ref 0–0.5)
LH SERPL-ACNC: 12.7 MIU/ML
LYMPHOCYTES # BLD AUTO: 1.5 K/UL (ref 1–4.8)
LYMPHOCYTES NFR BLD: 27.8 % (ref 18–48)
MCH RBC QN AUTO: 30.6 PG (ref 27–31)
MCHC RBC AUTO-ENTMCNC: 32.2 G/DL (ref 32–36)
MCV RBC AUTO: 95 FL (ref 82–98)
MONOCYTES # BLD AUTO: 0.4 K/UL (ref 0.3–1)
MONOCYTES NFR BLD: 6.4 % (ref 4–15)
NEUTROPHILS # BLD AUTO: 3.5 K/UL (ref 1.8–7.7)
NEUTROPHILS NFR BLD: 64.6 % (ref 38–73)
NRBC BLD-RTO: 0 /100 WBC
PLATELET # BLD AUTO: 276 K/UL (ref 150–350)
PMV BLD AUTO: 9.6 FL (ref 9.2–12.9)
RBC # BLD AUTO: 3.66 M/UL (ref 4–5.4)
TSH SERPL DL<=0.005 MIU/L-ACNC: 1 UIU/ML (ref 0.4–4)
WBC # BLD AUTO: 5.43 K/UL (ref 3.9–12.7)

## 2020-07-07 PROCEDURE — 85025 COMPLETE CBC W/AUTO DIFF WBC: CPT

## 2020-07-07 PROCEDURE — 86703 HIV-1/HIV-2 1 RESULT ANTBDY: CPT

## 2020-07-07 PROCEDURE — 84443 ASSAY THYROID STIM HORMONE: CPT

## 2020-07-07 PROCEDURE — 36415 COLL VENOUS BLD VENIPUNCTURE: CPT

## 2020-07-07 PROCEDURE — 83001 ASSAY OF GONADOTROPIN (FSH): CPT

## 2020-07-07 PROCEDURE — 99215 OFFICE O/P EST HI 40 MIN: CPT | Performed by: STUDENT IN AN ORGANIZED HEALTH CARE EDUCATION/TRAINING PROGRAM

## 2020-07-07 PROCEDURE — 86592 SYPHILIS TEST NON-TREP QUAL: CPT

## 2020-07-07 PROCEDURE — 83002 ASSAY OF GONADOTROPIN (LH): CPT

## 2020-07-07 NOTE — PROGRESS NOTES
Subjective:       Patient ID: Daksha Marie is a 50 y.o. female.    Chief Complaint: Abdominal Cramping    Daksha Marie is a 50 y.o. female with history of alcohol abuse, HTN, and CHF who presents for evaluation of abnormal uterine bleeding. The patient was under the assumption she started menopause a year ago. She states last period was almost a year ago, before her periods stopped, they were abnormal. She started bleeding 06/27 until 07/0. She states it was light bleeding. She also noted to have some cramping. She denies any vaginal discharge or lesions. No urinary complaints. She is sexually active with one male partner. They do not use condoms     Review of Systems   Constitutional: Negative for activity change, chills and fever.   HENT: Negative for congestion, ear pain, sinus pressure and sinus pain.    Eyes: Negative for visual disturbance.   Respiratory: Negative for cough, choking, chest tightness and shortness of breath.    Cardiovascular: Negative for chest pain and leg swelling.   Gastrointestinal: Negative for abdominal pain, constipation, diarrhea, nausea and vomiting.   Genitourinary: Positive for menstrual problem. Negative for difficulty urinating, dysuria, flank pain, frequency and urgency.   Musculoskeletal: Negative for back pain, myalgias, neck pain and neck stiffness.   Skin: Negative for wound.   Neurological: Negative for dizziness, light-headedness, numbness and headaches.       Objective:      Vitals:    07/07/20 1114   BP: (!) 89/70   Pulse: 71     Physical Exam  Vitals signs and nursing note reviewed.   Constitutional:       Appearance: She is well-developed.   HENT:      Head: Normocephalic and atraumatic.      Right Ear: External ear normal.      Left Ear: External ear normal.      Nose: Nose normal.      Mouth/Throat:      Mouth: Mucous membranes are moist.   Eyes:      Conjunctiva/sclera: Conjunctivae normal.   Neck:      Musculoskeletal: Normal range of motion  and neck supple.   Cardiovascular:      Rate and Rhythm: Normal rate and regular rhythm.      Heart sounds: Normal heart sounds.   Pulmonary:      Effort: Pulmonary effort is normal. No respiratory distress.      Breath sounds: Normal breath sounds.   Abdominal:      General: Bowel sounds are normal. There is no distension.      Palpations: Abdomen is soft.      Tenderness: There is no abdominal tenderness.   Musculoskeletal: Normal range of motion.   Skin:     General: Skin is warm and dry.      Capillary Refill: Capillary refill takes less than 2 seconds.   Neurological:      Mental Status: She is alert and oriented to person, place, and time.         Assessment:       1. Abnormal uterine bleeding (AUB)    2. Prediabetes     3. Encounter for screening for human immunodeficiency virus (HIV)         Plan:       Abnormal uterine bleeding (AUB)  -     US Transvaginal Non OB; Future; Expected date: 07/07/2020  -     POCT urine pregnancy  -     TSH; Future; Expected date: 07/07/2020  -     Luteinizing hormone; Future; Expected date: 07/07/2020  -     Follicle stimulating hormone; Future; Expected date: 07/07/2020  -     CBC auto differential; Future; Expected date: 07/07/2020  -     HIV 1/2 Ag/Ab (4th Gen); Future; Expected date: 07/07/2020  -     RPR; Future; Expected date: 07/07/2020    Prediabetes   -     TSH; Future; Expected date: 07/07/2020    Encounter for screening for human immunodeficiency virus (HIV)   -     HIV 1/2 Ag/Ab (4th Gen); Future; Expected date: 07/07/2020      Follow up in about 6 months (around 1/7/2021).      D'Amico C Johnson, MD  7/7/2020  11:45 AM  [facility]   House Officer -III

## 2020-07-08 LAB
HIV 1+2 AB+HIV1 P24 AG SERPL QL IA: NEGATIVE
RPR SER QL: NORMAL

## 2020-07-21 ENCOUNTER — OFFICE VISIT (OUTPATIENT)
Dept: PSYCHIATRY | Facility: CLINIC | Age: 50
End: 2020-07-21
Payer: MEDICARE

## 2020-07-21 DIAGNOSIS — F43.10 PTSD (POST-TRAUMATIC STRESS DISORDER): ICD-10-CM

## 2020-07-21 DIAGNOSIS — F10.21 ALCOHOL USE DISORDER, MODERATE, IN SUSTAINED REMISSION: ICD-10-CM

## 2020-07-21 DIAGNOSIS — F42.2 MIXED OBSESSIONAL THOUGHTS AND ACTS: ICD-10-CM

## 2020-07-21 DIAGNOSIS — F41.0 PANIC DISORDER: ICD-10-CM

## 2020-07-21 DIAGNOSIS — F33.0 MAJOR DEPRESSIVE DISORDER, RECURRENT EPISODE, MILD: Primary | ICD-10-CM

## 2020-07-21 PROCEDURE — 90834 PR PSYCHOTHERAPY W/PATIENT, 45 MIN: ICD-10-PCS | Mod: 95,,, | Performed by: SOCIAL WORKER

## 2020-07-21 PROCEDURE — 90834 PSYTX W PT 45 MINUTES: CPT | Mod: 95,,, | Performed by: SOCIAL WORKER

## 2020-07-21 NOTE — PROGRESS NOTES
Individual Psychotherapy (PhD/LCSW)    7/21/2020    Site:  [facility]         Therapeutic Intervention: Met with patient.  Outpatient - Insight oriented psychotherapy 45 min - CPT code 59359    Chief complaint/reason for encounter: depression and anxiety     Interval history and content of current session:   The patient location is: home  The chief complaint leading to consultation is: depression    Visit type: audiovisual    Face to Face time with patient: 45   minutes of total time spent on the encounter, which includes face to face time and non-face to face time preparing to see the patient (eg, review of tests), Obtaining and/or reviewing separately obtained history, Documenting clinical information in the electronic or other health record, Independently interpreting results (not separately reported) and communicating results to the patient/family/caregiver, or Care coordination (not separately reported).         Each patient to whom he or she provides medical services by telemedicine is:  (1) informed of the relationship between the physician and patient and the respective role of any other health care provider with respect to management of the patient; and (2) notified that he or she may decline to receive medical services by telemedicine and may withdraw from such care at any time.    Notes:   since her last appointment she moved out of friend's house as his girlfriend moved in and got jealous.  However she still maintains the friendship with her and her mother.  In fact she spent several day in her house last week.  [place] but because of the strict rules she came back to [place] and moved with cousin.   She is living in the living room and sleeps in the couch.  Her medication was switch to remeron and she reports improvements in sleep and mood.   Nevertheless she continues to crave for her own place which has been restrictive due to her income.  A neighbor who is a   is trying to help her apply for living assistance.     She reports not drinking.  She has also cut back on cigarettes.    She denies suicidal ideation.    She is cleaning and cooking for the family.  This she recognizes overlaps with her compulsion to clean.     Treatment plan:  · Target symptoms: depression, anxiety   · Why chosen therapy is appropriate versus another modality: relevant to diagnosis  · Outcome monitoring methods: self-report, observation  · Therapeutic intervention type: insight oriented psychotherapy, behavior modifying psychotherapy, supportive psychotherapy    Risk parameters:  Patient reports no suicidal ideation  Patient reports no homicidal ideation  Patient reports no self-injurious behavior  Patient reports no violent behavior    Verbal deficits: None    Patient's response to intervention:  The patient's response to intervention is accepting.    Progress toward goals and other mental status changes:  The patient's progress toward goals is fair .    Diagnosis:     ICD-10-CM ICD-9-CM   1. Major depressive disorder, recurrent episode, mild  F33.0 296.31   2. Panic disorder  F41.0 300.01   3. PTSD (post-traumatic stress disorder)  F43.10 309.81   4. Mixed obsessional thoughts and acts  F42.2 300.3   5. Alcohol use disorder, moderate, in sustained remission  F10.21 305.03       Plan:  individual psychotherapy    Return to clinic: 1 month    Length of Service (minutes): 45

## 2020-07-22 ENCOUNTER — PATIENT MESSAGE (OUTPATIENT)
Dept: FAMILY MEDICINE | Facility: HOSPITAL | Age: 50
End: 2020-07-22

## 2020-07-22 DIAGNOSIS — Z12.39 BREAST CANCER SCREENING: Primary | ICD-10-CM

## 2020-07-23 ENCOUNTER — DOCUMENTATION ONLY (OUTPATIENT)
Dept: FAMILY MEDICINE | Facility: HOSPITAL | Age: 50
End: 2020-07-23

## 2020-07-23 DIAGNOSIS — Z12.31 ENCOUNTER FOR SCREENING MAMMOGRAM FOR MALIGNANT NEOPLASM OF BREAST: ICD-10-CM

## 2020-07-23 DIAGNOSIS — Z12.39 BREAST CANCER SCREENING: Primary | ICD-10-CM

## 2020-07-24 NOTE — PROGRESS NOTES
Subjective   58 y/o male was sent in by PMD for slightly elevated potassium noted on labs yesterday of 5.5. He states he drinks plenty of water and generally micturates >4 times per day without issue. He denies any fevers or chills, nausea or vomiting. He does have history of DM and also uses voltarn for chronic knee pain. He denies any issues or previous kidney issues. He huseyin any vomiting, diarrhea or weakness. He arrives in [facility].         Family, social and past history reviewed as below, prior documentation of H and Ps and other documentation are reviewed:    Past Medical History:  No date: DM (diabetes mellitus) (CMS/HCC)  No date: ED (erectile dysfunction)  No date: ETOH abuse  No date: GERD (gastroesophageal reflux disease)  No date: Gout  No date: H. pylori infection  No date: Hepatic steatosis  1/6/2017: History of pancreatitis      Comment:  secondary to etoh.   No date: Hyperlipidemia  No date: Hypertension  No date: Mixed hyperlipidemia  No date: Pancreatitis, alcoholic, acute    Past Surgical History:  03/2016: UPPER GASTROINTESTINAL ENDOSCOPY      Comment:  noting gastritis, duodenitis, esophagitis. dr whitten     Social History    Socioeconomic History      Marital status:       Spouse name: Not on file      Number of children: Not on file      Years of education: Not on file      Highest education level: Not on file    Tobacco Use      Smoking status: Never Smoker      Smokeless tobacco: Never Used    Substance and Sexual Activity      Alcohol use: Yes        Comment: occ      Drug use: No      Sexual activity: Defer      Family history: reviewed and noncontributory           Review of Systems   All other systems reviewed and are negative.      Past Medical History:   Diagnosis Date   • DM (diabetes mellitus) (CMS/HCC)    • ED (erectile dysfunction)    • ETOH abuse    • GERD (gastroesophageal reflux disease)    • Gout    • H. pylori infection    • Hepatic steatosis    • History of pancreatitis  Subjective:       Patient ID: Daksha Marie is a 48 y.o. female.    Chief Complaint: Follow-up (hospital )    HPI Daksha Marie is a 48 y.o. female with  PMHx of CHF, HTN, alcohol abuse, GERD, glaucoma, seizures, sickle cell trait to the ED on 10/12/18 with a 3 day history of dyspnea with chest pain.  BNP in the ED 1,064, elevated D-dimer, and a CTA that was negative for PE but revealed a 4 cm dilated fusiform portion of her ascending thoracic aorta.  BP was elevated in the ED but came down with medications. Additionally, she was given an 80 mg dose of lasix.   She was admitted to [facility] inpatient service.   Cardiology was consulted. Troponin levels were trended (0.514 => 0.456 => 0.399) and since trended downward, follow up labs were unnecessary.    Echo revealed EF of 30% and patient was diagnosed with acute on chronic heart failure.  For blood pressure control, patient was started on hydralazine and labetolol PRN. On day 2 of admission, patient doing well on room air and dobutamine stress test was scheduled, which was unremarkable.    Cardiology recommended  witholding further diuresis and optimizing medical regimen by  stopping clonidine and HCTZ, starting Coreg post nuclear scan, maximizing lisinopril, adding spironolactone, and placing patient on PRN PO lasix.       Today the patient is doing very well.   She is unaccompanied in the room today, sitting with no acute complaints. She endorses compliance with medications.      Review of Systems   Constitutional: Negative for activity change and fatigue.   HENT: Negative for congestion.    Respiratory: Negative for cough, chest tightness and shortness of breath.    Cardiovascular: Negative for chest pain and leg swelling.   Gastrointestinal: Negative for abdominal pain, constipation, diarrhea, nausea and vomiting.   Genitourinary: Negative for flank pain.   Musculoskeletal: Negative for back pain, myalgias and neck pain.    Neurological: Negative for syncope and numbness.   Psychiatric/Behavioral: Negative for agitation.       Objective:      Vitals:    11/20/18 0856   BP: 123/88   Pulse: 93     Physical Exam   Constitutional: She is oriented to person, place, and time. She appears well-developed and well-nourished. She is cooperative.   HENT:   Head: Normocephalic and atraumatic.   Eyes: Conjunctivae and EOM are normal.   Neck: Normal range of motion. Neck supple.   Cardiovascular: Normal rate, regular rhythm, normal heart sounds and intact distal pulses.   Pulmonary/Chest: Effort normal and breath sounds normal. No respiratory distress.   Abdominal: Soft. Bowel sounds are normal. She exhibits no distension. There is no tenderness.   Musculoskeletal: Normal range of motion. She exhibits no edema.   Neurological: She is alert and oriented to person, place, and time.   Skin: Skin is warm and dry.   Psychiatric: She has a normal mood and affect.   Nursing note and vitals reviewed.      Assessment:       1. Congestive heart failure, unspecified HF chronicity, unspecified heart failure type    2. Alcohol abuse    3. PTSD (post-traumatic stress disorder)    4. Microcytic anemia        Plan:       Congestive heart failure, unspecified HF chronicity, unspecified heart failure type  - EF ~30% with diastolic dysfunction; no ischemia noted on NM stress test        -Cardiology recommended discontinue clonidine and HCTZ        -Now taking lisinopril 20mg, Coreg 3.125mg BID, and PRN lasix        -Monitor salt and fluid intake    Alcohol abuse  - Currently on Antabuse     PTSD (post-traumatic stress disorder)  - Currently being followed by psychiatry  - will see psychiatry 11/25/2018     Microcytic anemia  -     cyanocobalamin injection 1,000 mcg in clinic every 30 days     Follow-up in about 3 months (around 2/20/2019).        D'Amico C Johnson, MD  11/20/2018  \[facility] [place] [facility]\"" Family Medicine HO-1     1/6/2017    secondary to etoh.    • Hyperlipidemia    • Hypertension    • Mixed hyperlipidemia    • Pancreatitis, alcoholic, acute        No Known Allergies    Past Surgical History:   Procedure Laterality Date   • UPPER GASTROINTESTINAL ENDOSCOPY  03/2016    noting gastritis, duodenitis, esophagitis. dr whitten        Family History   Problem Relation Age of Onset   • No Known Problems Mother    • No Known Problems Father        Social History     Socioeconomic History   • Marital status:      Spouse name: Not on file   • Number of children: Not on file   • Years of education: Not on file   • Highest education level: Not on file   Tobacco Use   • Smoking status: Never Smoker   • Smokeless tobacco: Never Used   Substance and Sexual Activity   • Alcohol use: Yes     Comment: occ   • Drug use: No   • Sexual activity: Defer           Objective   Physical Exam   Constitutional: He is oriented to person, place, and time. He appears well-developed and well-nourished.   HENT:   Head: Normocephalic and atraumatic.   Eyes: Pupils are equal, round, and reactive to light. Conjunctivae and EOM are normal.   Neck: Normal range of motion. Neck supple.   Cardiovascular: Normal rate, regular rhythm, normal heart sounds and intact distal pulses.   Pulmonary/Chest: Effort normal and breath sounds normal.   Abdominal: Soft. Bowel sounds are normal. He exhibits no distension. There is no tenderness. There is no CVA tenderness.   Musculoskeletal: Normal range of motion.   Neurological: He is alert and oriented to person, place, and time.   Skin: Skin is warm. Capillary refill takes less than 2 seconds.   Psychiatric: He has a normal mood and affect. His behavior is normal.   Vitals reviewed.      Procedures           ED Course  ED Course as of Jul 23 2318   Thu Jul 23, 2020 2312 I explained to the patient that his potassium was okay but that his kidney function was off. Given he is taking volatern and has DM this could be a  source of his Juanjo. He is able to drink and micturate without issue. Further his CT was without acute findings. Given this I feel he is safe for dc with short term follow up with his PMD as well as follow up with Dr. De Luna. All questions answered.     []      ED Course User Index  [] Parth Cerda MD            CT Abdomen Pelvis Without Contrast    (Results Pending)     Labs Reviewed   COMPREHENSIVE METABOLIC PANEL - Abnormal; Notable for the following components:       Result Value    Glucose 368 (*)     BUN 39 (*)     Creatinine 1.81 (*)     eGFR Non  Amer 39 (*)     All other components within normal limits    Narrative:     GFR Normal >60  Chronic Kidney Disease <60  Kidney Failure <15     CBC WITH AUTO DIFFERENTIAL - Abnormal; Notable for the following components:    RBC 3.77 (*)     Hemoglobin 12.0 (*)     Hematocrit 35.6 (*)     Eosinophil % 8.5 (*)     Monocytes, Absolute 0.97 (*)     Eosinophils, Absolute 0.74 (*)     All other components within normal limits   URINALYSIS W/ MICROSCOPIC IF INDICATED (NO CULTURE) - Abnormal; Notable for the following components:    Glucose, UA >=1000 mg/dL (3+) (*)     Protein, UA 30 mg/dL (1+) (*)     All other components within normal limits   URINALYSIS, MICROSCOPIC ONLY - Abnormal; Notable for the following components:    RBC, UA 0-2 (*)     All other components within normal limits   RAINBOW DRAW    Narrative:     The following orders were created for panel order [place] Draw.  Procedure                               Abnormality         Status                     ---------                               -----------         ------                     Light Blue Top[[id number]]                                   Final result               Green Top (Gel)[[id number]]                                  Final result               Lavender Top[[id number]]                                     Final result               Red Top[[id number]]                                           Final result                 Please view results for these tests on the individual orders.   CBC AND DIFFERENTIAL    Narrative:     The following orders were created for panel order CBC & Differential.  Procedure                               Abnormality         Status                     ---------                               -----------         ------                     CBC Auto Differential[[id number]]        Abnormal            Final result                 Please view results for these tests on the individual orders.   LIGHT BLUE TOP   GREEN TOP   LAVENDER TOP   RED TOP                                       MDM    Final diagnoses:   GANESH (acute kidney injury) (CMS/[facility])            Parth Cerda MD  07/23/20 8841

## 2020-07-29 ENCOUNTER — HOSPITAL ENCOUNTER (OUTPATIENT)
Dept: RADIOLOGY | Facility: HOSPITAL | Age: 50
Discharge: HOME | End: 2020-07-29
Attending: STUDENT IN AN ORGANIZED HEALTH CARE EDUCATION/TRAINING PROGRAM
Payer: MEDICARE

## 2020-07-29 DIAGNOSIS — N93.9 ABNORMAL UTERINE BLEEDING (AUB): ICD-10-CM

## 2020-07-29 PROCEDURE — 76830 TRANSVAGINAL US NON-OB: CPT | Mod: TC,PO

## 2020-07-30 RX ORDER — SUMATRIPTAN 50 MG/1
TABLET, FILM COATED ORAL
Qty: 12 TABLET | Refills: 3 | OUTPATIENT
Start: 2020-07-30

## 2020-07-30 RX ORDER — FLUTICASONE PROPIONATE 50 MCG
2 SPRAY, SUSPENSION (ML) NASAL DAILY
Qty: 16 G | Refills: 11 | OUTPATIENT
Start: 2020-07-30 | End: 2020-12-10 | Stop reason: SDUPTHER

## 2020-08-03 ENCOUNTER — LAB VISIT (OUTPATIENT)
Dept: PRIMARY CARE CLINIC | Facility: OTHER | Age: 50
End: 2020-08-03
Attending: INTERNAL MEDICINE
Payer: MEDICARE

## 2020-08-03 DIAGNOSIS — Z03.818 ENCOUNTER FOR OBSERVATION FOR SUSPECTED EXPOSURE TO OTHER BIOLOGICAL AGENTS RULED OUT: ICD-10-CM

## 2020-08-03 PROCEDURE — U0003 INFECTIOUS AGENT DETECTION BY NUCLEIC ACID (DNA OR RNA); SEVERE ACUTE RESPIRATORY SYNDROME CORONAVIRUS 2 (SARS-COV-2) (CORONAVIRUS DISEASE [COVID-19]), AMPLIFIED PROBE TECHNIQUE, MAKING USE OF HIGH THROUGHPUT TECHNOLOGIES AS DESCRIBED BY CMS-2020-01-R: HCPCS

## 2020-08-05 LAB — SARS-COV-2 RNA RESP QL NAA+PROBE: NOT DETECTED

## 2020-08-11 ENCOUNTER — TELEPHONE (OUTPATIENT)
Dept: FAMILY MEDICINE | Facility: HOSPITAL | Age: 50
End: 2020-08-11

## 2020-08-13 ENCOUNTER — HOSPITAL ENCOUNTER (OUTPATIENT)
Dept: RADIOLOGY | Facility: HOSPITAL | Age: 50
Discharge: HOME | End: 2020-08-13
Attending: STUDENT IN AN ORGANIZED HEALTH CARE EDUCATION/TRAINING PROGRAM
Payer: MEDICARE

## 2020-08-13 DIAGNOSIS — Z12.31 ENCOUNTER FOR SCREENING MAMMOGRAM FOR MALIGNANT NEOPLASM OF BREAST: ICD-10-CM

## 2020-08-13 DIAGNOSIS — Z12.39 BREAST CANCER SCREENING: ICD-10-CM

## 2020-08-13 PROCEDURE — 77067 SCR MAMMO BI INCL CAD: CPT | Mod: TC,PO

## 2020-08-19 ENCOUNTER — OFFICE VISIT (OUTPATIENT)
Dept: OBSTETRICS AND GYNECOLOGY | Facility: CLINIC | Age: 50
End: 2020-08-19
Payer: MEDICARE

## 2020-08-19 VITALS
HEIGHT: 65 IN | WEIGHT: 199.63 LBS | SYSTOLIC BLOOD PRESSURE: 100 MMHG | DIASTOLIC BLOOD PRESSURE: 60 MMHG | BODY MASS INDEX: 33.26 KG/M2

## 2020-08-19 DIAGNOSIS — N95.1 PERIMENOPAUSAL: ICD-10-CM

## 2020-08-19 DIAGNOSIS — N92.6 MENSES, IRREGULAR: ICD-10-CM

## 2020-08-19 DIAGNOSIS — Z01.419 WELL WOMAN EXAM WITH ROUTINE GYNECOLOGICAL EXAM: Primary | ICD-10-CM

## 2020-08-19 PROCEDURE — G0101 PR CA SCREEN;PELVIC/BREAST EXAM: ICD-10-PCS | Mod: S$GLB,,, | Performed by: OBSTETRICS & GYNECOLOGY

## 2020-08-19 PROCEDURE — G0101 CA SCREEN;PELVIC/BREAST EXAM: HCPCS | Mod: S$GLB,,, | Performed by: OBSTETRICS & GYNECOLOGY

## 2020-08-19 PROCEDURE — 88175 CYTOPATH C/V AUTO FLUID REDO: CPT

## 2020-08-19 PROCEDURE — 99999 PR PBB SHADOW E&M-EST. PATIENT-LVL IV: ICD-10-PCS | Mod: PBBFAC,,, | Performed by: OBSTETRICS & GYNECOLOGY

## 2020-08-19 PROCEDURE — 99999 PR PBB SHADOW E&M-EST. PATIENT-LVL IV: CPT | Mod: PBBFAC,,, | Performed by: OBSTETRICS & GYNECOLOGY

## 2020-08-19 NOTE — PROGRESS NOTES
CC: Annual check-up    SUBJECTIVE:   50 y.o. female   for annual routine Pap and checkup. Patient's last menstrual period was 2020..  She has no unusual complaints and had a cycle -, 1sdt time in 2 yrs  PCP did labs and u/s which verified not fully menopausal yet.        Past Medical History:   Diagnosis Date    Alcohol abuse 10/7/2015    Arthritis     Breast calcification, left     Pt states this has been worked up, she received a biopsy in the past to confirm calcifications were from scar tissue from when she had breast reduction surgery.      Chronic diastolic congestive heart failure     Encounter for blood transfusion     GERD (gastroesophageal reflux disease)     Glaucoma     Hypertension     Hyponatremia 10/2015    Na 109. attributed to polydipsia and alcohol abuse.  suffered a seizure in the ICU .    Insomnia     Malingering 2016    PTSD (post-traumatic stress disorder)     Seizures     Sickle cell trait      Past Surgical History:   Procedure Laterality Date    bile fistula      BREAST BIOPSY Left     breast reduction      carpal tunnel sugery   2020     SECTION      CHOLECYSTECTOMY  after     GASTRIC BYPASS   and     TOTAL REDUCTION MAMMOPLASTY Bilateral     xen gel stent implant  2018    Performed at [facility]     Social History     Socioeconomic History    Marital status: Single     Spouse name: Not on file    Number of children: Not on file    Years of education: Not on file    Highest education level: Not on file   Occupational History    Not on file   Social Needs    Financial resource strain: Very hard    Food insecurity     Worry: Often true     Inability: Often true    Transportation needs     Medical: Yes     Non-medical: Yes   Tobacco Use    Smoking status: Current Every Day Smoker     Packs/day: 1.00     Years: 4.00     Pack years: 4.00     Types: Cigarettes    Smokeless  tobacco: Never Used   Substance and Sexual Activity    Alcohol use: No     Frequency: 2-3 times a week     Drinks per session: 1 or 2     Binge frequency: Never     Comment: former heavy drinker since May 1st 2017    Drug use: No    Sexual activity: Yes     Partners: Male   Lifestyle    Physical activity     Days per week: 3 days     Minutes per session: 20 min    Stress: To some extent   Relationships    Social connections     Talks on phone: More than three times a week     Gets together: Never     Attends Mosque service: Not on file     Active member of club or organization: No     Attends meetings of clubs or organizations: Never     Relationship status:    Other Topics Concern    Not on file   Social History Narrative    Not on file     Family History   Problem Relation Age of Onset    Diabetes Mother     Lupus Mother     Diabetes Maternal Grandmother     Crohn's disease Cousin      OB History    Para Term  AB Living   6 2 0 2 4 4   SAB TAB Ectopic Multiple Live Births   3 0 1   2      # Outcome Date GA Lbr Juan Alberto/2nd Weight Sex Delivery Anes PTL Lv   6      M CS-LTranv  Y DECLAN   5      M Vag-Spont  Y DECLAN   4 Ectopic            3 SAB            2 SAB            1 SAB                  Current Outpatient Medications   Medication Sig Dispense Refill    ALPRAZolam (XANAX) 1 MG tablet Take 1 tablet (1 mg total) by mouth 2 (two) times daily as needed for Anxiety. 60 tablet 3    amLODIPine (NORVASC) 5 MG tablet Take 1 tablet (5 mg total) by mouth once daily. 90 tablet 3    brimonidine-timolol (COMBIGAN) 0.2-0.5 % Drop Place 1 drop into both eyes 2 (two) times daily.      calcium citrate 250 mg calcium Tab       carvedilol (COREG) 3.125 MG tablet Take 1 tablet (3.125 mg total) by mouth 2 (two) times daily. 180 tablet 3    cyanocobalamin 1,000 mcg/mL injection inject  1000mcg ( 1 vial ) as directed  every 28 days ( the 15th of every month ) 1 mL 6     disulfiram (ANTABUSE) 250 mg tablet Take 1 tablet (250 mg total) by mouth once daily. 90 tablet 3    fluticasone propionate (FLONASE) 50 mcg/actuation nasal spray 2 sprays (100 mcg total) by Each Nostril route once daily. 16 g 11    folic acid (FOLVITE) 1 MG tablet Take 1 tablet (1 mg total) by mouth once daily. 30 tablet 11    furosemide (LASIX) 40 MG tablet Take 1 tablet (40 mg total) by mouth once daily. 90 tablet 3    HYDROcodone-acetaminophen (NORCO) 7.5-325 mg per tablet TAKE 1 T PO TID PRN FOR 30 DAYS  0    lactulose (CHRONULAC) 10 gram/15 mL solution TK 15 TO 30 ML PO ONCE TO BID PRF CONSTIPATION  0    lisinopril (PRINIVIL,ZESTRIL) 20 MG tablet TK 1 T PO QD 30 tablet 11    mirtazapine (REMERON) 45 MG tablet Take 1 tablet (45 mg total) by mouth every evening. 90 tablet 3    montelukast (SINGULAIR) 10 mg tablet Take 1 tablet (10 mg total) by mouth every evening. 30 tablet 11    multivitamin (THERAGRAN) tablet Take 1 tablet by mouth once daily. 30 tablet 5    omeprazole (PRILOSEC) 40 MG capsule Take 1 capsule (40 mg total) by mouth once daily. 30 capsule 11    promethazine (PHENERGAN) 25 MG tablet Take 1 tablet (25 mg total) by mouth every 6 (six) hours as needed for Nausea. 15 tablet 0    QUEtiapine (SEROQUEL) 25 MG Tab Take 1 tablet (25 mg total) by mouth every evening. 90 tablet 1    spironolactone (ALDACTONE) 25 MG tablet Take 1 tablet (25 mg total) by mouth once daily. 90 tablet 3    sumatriptan (IMITREX) 50 MG tablet Once for severe headache. May repeat once after 2 hours. Do not exceed 3-4 doses in one week. 12 tablet 3    TRAVATAN Z 0.004 % ophthalmic solution INT 1 GTT IN OU QD  3    levocetirizine (XYZAL) 5 MG tablet Take 1 tablet (5 mg total) by mouth every evening. 30 tablet 11    LOTEMAX 0.5 % ophthalmic suspension PLACE 1 DROP IN RIGHT EYE BID  0     Current Facility-Administered Medications   Medication Dose Route Frequency Provider Last Rate Last Dose    cyanocobalamin  "injection 1,000 mcg  1,000 mcg Intramuscular Q30 Days D'Amico C. Johnson, MD         Allergies: Penicillins     ROS:  Constitutional: no weight loss, weight gain, fever, fatigue  Eyes:  No vision changes, glasses/contacts  ENT/Mouth: No ulcers, sinus problems, ears ringing, headache  Cardiovascular: No inability to lie flat, chest pain, exercise intolerance, swelling, heart palpitations  Respiratory: No wheezing, coughing blood, shortness of breath, or cough  Gastrointestinal: No diarrhea, bloody stool, nausea/vomiting, constipation, gas, hemorrhoids  Genitourinary: No blood in urine, painful urination, urgency of urination, frequency of urination, incomplete emptying, incontinence, abnormal bleeding, painful periods, heavy periods, vaginal discharge, vaginal odor, painful intercourse, sexual problems, bleeding after intercourse.  Musculoskeletal: No muscle weakness  Skin/Breast: No painful breasts, nipple discharge, masses, rash, ulcers  Neurological: No passing out, seizures, numbness, headache  Endocrine: No diabetes, hypothyroid, hyperthyroid, hot flashes, hair loss, abnormal hair growth, ance  Psychiatric: No depression, crying  Hematologic: No bruises, bleeding, swollen lymph nodes, anemia.       OBJECTIVE:   The patient appears well, alert, oriented x 3, in no distress.  /60   Ht 5' 5" (1.651 m)   Wt 90.5 kg (199 lb 9.6 oz)   LMP 06/27/2020   BMI 33.22 kg/m²   NECK: no thyromegaly, trachea midline  SKIN: no acne, striae, hirsutism  BREAST EXAM: not examined  ABDOMEN: no hernias, masses, or hepatosplenomegaly  GENITALIA: normal external genitalia, no erythema, no discharge  URETHRA: normal urethra, normal urethral meatus  VAGINA: Normal  CERVIX: no lesions or cervical motion tenderness  UTERUS: normal  ADNEXA: normal adnexa   EXAMINATION:  US PELVIS COMP WITH TRANSVAG NON-OB (XPD)     CLINICAL HISTORY:  Abnormal uterine bleeding     TECHNIQUE:  Multiple static ultrasound images are submitted for " interpretation with color flow and spectral Doppler imaging.     COMPARISON:  None     FINDINGS:  The uterus measures 10.8 cm in craniocaudal dimension by 3.8 cm in AP dimension by 4.5 cm in mediolateral dimension.  The endometrial stripe thickness measures 3 mm.  There is a 5 mm anechoic cyst in the cervical portion of the uterus.     The ovaries are normal in appearance.  The left ovary measures 2.9 cm by 1.6 cm x 2.0 cm.  It has arterial flow with a peak systolic velocity of 7 centimeters/second.  The right ovary measures 3.1 cm x 1.8 cm x 1.8 cm.  It has arterial flow with a peak systolic velocity of 8 centimeters/second.     The urinary bladder is normal in appearance.  There is no free fluid visualized within the pelvis.     Impression:     1. The endometrial stripe thickness measures 3 mm.  This is within normal limits.  2. There is a 5 mm anechoic cyst in the cervical portion of the uterus. This is characteristic of a nabothian cyst.  3. The ovaries and urinary bladder are normal in appearance.        Electronically signed by: Dante Jonas MD  Date:                                            07/29/2020  Time:                                           14:31  FSH-17    ASSESSMENT:   well woman  No diagnosis found.    PLAN:   Mammogram done  pap smear  return annually or prn

## 2020-09-02 LAB
FINAL PATHOLOGIC DIAGNOSIS: NORMAL
Lab: NORMAL

## 2020-09-10 ENCOUNTER — PATIENT MESSAGE (OUTPATIENT)
Dept: FAMILY MEDICINE | Facility: HOSPITAL | Age: 50
End: 2020-09-10

## 2020-09-24 ENCOUNTER — OFFICE VISIT (OUTPATIENT)
Dept: PSYCHIATRY | Facility: CLINIC | Age: 50
End: 2020-09-24
Payer: MEDICARE

## 2020-09-24 DIAGNOSIS — F42.8 OTHER OBSESSIVE-COMPULSIVE DISORDERS: ICD-10-CM

## 2020-09-24 DIAGNOSIS — F33.41 DEPRESSION, MAJOR, RECURRENT, IN PARTIAL REMISSION: Primary | ICD-10-CM

## 2020-09-24 PROCEDURE — 90834 PSYTX W PT 45 MINUTES: CPT | Mod: 95,,, | Performed by: SOCIAL WORKER

## 2020-09-24 PROCEDURE — 90834 PR PSYCHOTHERAPY W/PATIENT, 45 MIN: ICD-10-PCS | Mod: 95,,, | Performed by: SOCIAL WORKER

## 2020-09-24 NOTE — Clinical Note
At one point she volunteer to tell me she is hearing voices.  When asked to clarify she said it is her own voice.  This may be related to her fears others do not want her around.  The extent that this may have a touch of delusion and hallucinations is unknown.   Thought you may want to keep this in mind.  I encouraged her to set appointment with you.   Her depression is worse.

## 2020-09-24 NOTE — PROGRESS NOTES
Individual Psychotherapy (PhD/LCSW)    9/24/2020    Site:  Telemed         Therapeutic Intervention: Met with patient.  Outpatient - Insight oriented psychotherapy 45 min - CPT code 38723    Chief complaint/reason for encounter: depression and anxiety     Interval history and content of current session:   The patient location is: home  The chief complaint leading to consultation is: depression    Visit type: audiovisual    Face to Face time with patient: 45   minutes of total time spent on the encounter, which includes face to face time and non-face to face time preparing to see the patient (eg, review of tests), Obtaining and/or reviewing separately obtained history, Documenting clinical information in the electronic or other health record, Independently interpreting results (not separately reported) and communicating results to the patient/family/caregiver, or Care coordination (not separately reported).         Each patient to whom he or she provides medical services by telemedicine is:  (1) informed of the relationship between the physician and patient and the respective role of any other health care provider with respect to management of the patient; and (2) notified that he or she may decline to receive medical services by telemedicine and may withdraw from such care at any time.    Notes:   Living with a cousin in the living room.    One son lives with his father and the other with his brother.    Has felt like not living at times.  She has not had suicidal ideations however.     She is taking the remeron every night she reports.   Does not help her sleep as much as it used to.    Has placed her name in the waiting list of 3 apartment assistant programs.  Continues sobriety.  Is doing the exercises to improve her physical problems.  In addition she is eating very healthy.     She feels her cousin might kick her out.  However cousin has told her she has no intention of doing that.  In addition pt contributes  financially.    Has been reading in the internet (finds this pleasant).  Still resists any therapeutic milieu that has group a setting.   Encouraged her to set more appointments with me and with Mr. Urbanleur.    At one point said she hears voices.  When questioned said this are her own thoughts.  This thoughts are likely related to others not wanting her around.   She is not currently taking seroquel.  I encouraged her to set an appointment with Dr. Farr.  IT is possible that if she hears voices she wants to conceal this.  It is also possible she does not hear voices.   Then again there may be a little touch of delusion as well, or not?    I have forwarded my notes to Dom Yordan.   She is logical.  Voice rate and tone is normal.   There is NOT any gross delusions and no sign of psychosis.  In many ways she is actually functioning quite well given the trajectory of her history.  For instance, her OCD is improved.  Nevertheless she complains of worsening depression.      Treatment plan:  · Target symptoms: depression, anxiety   · Why chosen therapy is appropriate versus another modality: relevant to diagnosis  · Outcome monitoring methods: self-report, observation  · Therapeutic intervention type: insight oriented psychotherapy, behavior modifying psychotherapy, supportive psychotherapy    Risk parameters:  Patient reports no suicidal ideation  Patient reports no homicidal ideation  Patient reports no self-injurious behavior  Patient reports no violent behavior    Verbal deficits: None    Patient's response to intervention:  The patient's response to intervention is accepting.    Progress toward goals and other mental status changes:  The patient's progress toward goals is fair .    Diagnosis:     ICD-10-CM ICD-9-CM   1. Depression, major, recurrent, in partial remission  F33.41 296.35   2. Other obsessive-compulsive disorders  F42.8 300.3       Plan:  individual psychotherapy    Return to clinic: 1 month    Length  of Service (minutes): 45

## 2020-10-04 ENCOUNTER — PATIENT MESSAGE (OUTPATIENT)
Dept: PSYCHIATRY | Facility: CLINIC | Age: 50
End: 2020-10-04

## 2020-10-13 ENCOUNTER — OFFICE VISIT (OUTPATIENT)
Dept: PSYCHIATRY | Facility: CLINIC | Age: 50
End: 2020-10-13
Payer: MEDICARE

## 2020-10-13 DIAGNOSIS — F10.10 ALCOHOL USE DISORDER, MILD, IN CONTROLLED ENVIRONMENT: ICD-10-CM

## 2020-10-13 DIAGNOSIS — F32.A DEPRESSION, UNSPECIFIED DEPRESSION TYPE: Primary | ICD-10-CM

## 2020-10-13 DIAGNOSIS — G47.00 INSOMNIA DISORDER, WITH NON-SLEEP DISORDER MENTAL COMORBIDITY: ICD-10-CM

## 2020-10-13 DIAGNOSIS — F41.0 PANIC DISORDER: ICD-10-CM

## 2020-10-13 DIAGNOSIS — F43.10 PTSD (POST-TRAUMATIC STRESS DISORDER): ICD-10-CM

## 2020-10-13 PROCEDURE — 99213 OFFICE O/P EST LOW 20 MIN: CPT | Mod: 95,,, | Performed by: NURSE PRACTITIONER

## 2020-10-13 PROCEDURE — 99213 PR OFFICE/OUTPT VISIT, EST, LEVL III, 20-29 MIN: ICD-10-PCS | Mod: 95,,, | Performed by: NURSE PRACTITIONER

## 2020-10-13 RX ORDER — DISULFIRAM 250 MG/1
250 TABLET ORAL DAILY
Qty: 90 TABLET | Refills: 3 | Status: SHIPPED | OUTPATIENT
Start: 2020-10-13 | End: 2021-06-08 | Stop reason: SDUPTHER

## 2020-10-13 RX ORDER — QUETIAPINE FUMARATE 25 MG/1
25 TABLET, FILM COATED ORAL NIGHTLY
Qty: 90 TABLET | Refills: 1 | Status: SHIPPED | OUTPATIENT
Start: 2020-10-13 | End: 2020-12-31

## 2020-10-13 RX ORDER — ALPRAZOLAM 1 MG/1
1 TABLET ORAL 2 TIMES DAILY PRN
Qty: 60 TABLET | Refills: 3 | Status: SHIPPED | OUTPATIENT
Start: 2020-10-13 | End: 2021-01-24 | Stop reason: SDUPTHER

## 2020-10-13 RX ORDER — ESCITALOPRAM OXALATE 10 MG/1
10 TABLET ORAL DAILY
Qty: 90 TABLET | Refills: 1 | Status: SHIPPED | OUTPATIENT
Start: 2020-10-13 | End: 2021-01-24 | Stop reason: SDUPTHER

## 2020-10-13 NOTE — PROGRESS NOTES
Outpatient Psychiatry Follow-Up Visit (MD/NP)    10/13/2020    Clinical Status of Patient:  Outpatient (Ambulatory)    Chief Complaint:  Daksha Marie is a 50 y.o. female who presents today for follow-up of anxiety and PTSD.  Met with patient.      Last visit was: 7/02/2020. Chart and  reviewed.   The patient location is: home  The chief complaint leading to consultation is: depression and anxiety    Visit type: audiovisual    Face to Face time with patient: 25 minutes  30 minutes of total time spent on the encounter, which includes face to face time and non-face to face time preparing to see the patient (eg, review of tests), Obtaining and/or reviewing separately obtained history, Documenting clinical information in the electronic or other health record, Independently interpreting results (not separately reported) and communicating results to the patient/family/caregiver, or Care coordination (not separately reported).     Each patient to whom he or she provides medical services by telemedicine is:  (1) informed of the relationship between the physician and patient and the respective role of any other health care provider with respect to management of the patient; and (2) notified that he or she may decline to receive medical services by telemedicine and may withdraw from such care at any time.    Interval History and Content of Current Session:  Current Psychiatric Medications/changes  · Increase to Remeron 45 mg po qhs   · Continue Antabuse 250 mg po daily  · Continue Xanax 0.5 mg po BID PRN  · Continue Seroquel 25 mg po q hs PRN insomnia    Virtual Visit: reports too much weight gain on Remeron due to increased appetite. Will try lexapro.  Denies SI/HI/AVH.     Psychotherapy:  · Target symptoms: anxiety   · Why chosen therapy is appropriate versus another modality: relevant to diagnosis  · Outcome monitoring methods: self-report  · Therapeutic intervention type: insight oriented  psychotherapy  · Topics discussed/themes: building skills sets for symptom management, symptom recognition  · The patient's response to the intervention is accepting. The patient's progress toward treatment goals is good.   · Duration of intervention: 11 minutes.    Review of Systems   · PSYCHIATRIC: Pertinant items are noted in the narrative.  · CONSTITUTIONAL: No weight gain or loss.   · MUSCULOSKELETAL: No pain or stiffness of the joints.  · NEUROLOGIC: No weakness, sensory changes, seizures, confusion, memory loss, tremor or other abnormal movements.  · ENDOCRINE: No polydipsia or polyuria.  · INTEGUMENTARY: No rashes or lacerations.  · EYES: No exophthalmos, jaundice or blindness.  · ENT: No dizziness, tinnitus or hearing loss.  · RESPIRATORY: No shortness of breath.  · CARDIOVASCULAR: No tachycardia or chest pain.  · GASTROINTESTINAL: No nausea, vomiting, pain, constipation or diarrhea.  · GENITOURINARY: No frequency, dysuria or sexual dysfunction.  · HEMATOLOGIC/LYMPHATIC: No excessive bleeding, prolonged or excessive bleeding after dental extraction/injury.  · ALLERGIC/IMMUNOLOGIC: No allergic response to materials, foods or animals at this time.    Past Medical, Family and Social History: The patient's past medical, family and social history have been reviewed and updated as appropriate within the electronic medical record - see encounter notes.    Compliance: yes    Side effects: None    Risk Parameters:  Patient reports no suicidal ideation  Patient reports no homicidal ideation  Patient reports no self-injurious behavior  Patient reports no violent behavior    Exam (detailed: at least 9 elements; comprehensive: all 15 elements)   Constitutional  Vitals:  Most recent vital signs, dated greater than 90 days prior to this appointment, were reviewed.   There were no vitals filed for this visit.     General:  unremarkable, age appropriate     Musculoskeletal  Muscle Strength/Tone:  no tremor, no tic   Gait &  Station:  non-ataxic     Psychiatric  Speech:  no latency; no press   Mood & Affect:  dysthymic  congruent and appropriate   Thought Process:  normal and logical   Associations:  intact   Thought Content:  normal, no suicidality, no homicidality, delusions, or paranoia   Insight:  intact   Judgement: behavior is adequate to circumstances   Orientation:  grossly intact   Memory: intact for content of interview   Language: grossly intact   Attention Span & Concentration:  able to focus   Fund of Knowledge:  intact and appropriate to age and level of education     Assessment and Diagnosis   Status/Progress: Based on the examination today, the patient's problem(s) is/are failing to respond as expected to treatment.  New problems have not been presented today.   Co-morbidities and Lack of compliance are not complicating management of the primary condition.  There are no active rule-out diagnoses for this patient at this time.     General Impression:       ICD-10-CM ICD-9-CM   1. Depression, unspecified depression type  F32.9 311   2. Insomnia disorder, with non-sleep disorder mental comorbidity  G47.00 780.52   3. Alcohol use disorder, mild, in controlled environment  F10.10 305.03   4. Panic disorder  F41.0 300.01   5. PTSD (post-traumatic stress disorder)  F43.10 309.81       Intervention/Counseling/Treatment Plan   · Medication Management: Continue current medications. The risks and benefits of medication were discussed with the patient.  · AA/NA/CA/ACOA/Abstinence   · DC Remeron   · Start Lexapro 10 mg po daily  · Continue Antabuse 250 mg po daily  · Continue Xanax 0.5 mg po BID PRN  · Continue Seroquel 25 mg po q hs PRN insomnia  · Encouraged pt establish care with a therapist for 1:1 counseling or IOP. Offered information of community resources.     Return to Clinic: 6 months     Risks, benefits, side effects and alternative treatments discussed with patient. Patient agrees with the current plan as  documented.  Encouraged Patient to keep future appointments.  Take medications as prescribed and abstain from substance abuse.  Pt to present to ED for thoughts to harm herself or others

## 2020-11-05 RX ORDER — LEVOCETIRIZINE DIHYDROCHLORIDE 5 MG/1
5 TABLET, FILM COATED ORAL NIGHTLY
Qty: 30 TABLET | Refills: 11 | Status: SHIPPED | OUTPATIENT
Start: 2020-11-05 | End: 2020-11-23 | Stop reason: SDUPTHER

## 2020-11-27 RX ORDER — LEVOCETIRIZINE DIHYDROCHLORIDE 5 MG/1
5 TABLET, FILM COATED ORAL NIGHTLY
Qty: 30 TABLET | Refills: 11 | OUTPATIENT
Start: 2020-11-27 | End: 2021-01-04 | Stop reason: SDUPTHER

## 2020-12-08 ENCOUNTER — OFFICE VISIT (OUTPATIENT)
Dept: PSYCHIATRY | Facility: CLINIC | Age: 50
End: 2020-12-08
Payer: MEDICARE

## 2020-12-08 DIAGNOSIS — F43.10 PTSD (POST-TRAUMATIC STRESS DISORDER): ICD-10-CM

## 2020-12-08 DIAGNOSIS — F33.41 DEPRESSION, MAJOR, RECURRENT, IN PARTIAL REMISSION: Primary | ICD-10-CM

## 2020-12-08 PROCEDURE — 90834 PR PSYCHOTHERAPY W/PATIENT, 45 MIN: ICD-10-PCS | Mod: 95,,, | Performed by: SOCIAL WORKER

## 2020-12-08 PROCEDURE — 90834 PSYTX W PT 45 MINUTES: CPT | Mod: 95,,, | Performed by: SOCIAL WORKER

## 2020-12-08 NOTE — PROGRESS NOTES
Individual Psychotherapy (PhD/LCSW)    12/8/2020    Site:  Telemed         Therapeutic Intervention: Met with patient.  Outpatient - Insight oriented psychotherapy 45 min - CPT code 62213    Chief complaint/reason for encounter: depression and anxiety     Interval history and content of current session:   The patient location is: home  The chief complaint leading to consultation is: depression    Visit type: audiovisual    Face to Face time with patient: 45   minutes of total time spent on the encounter, which includes face to face time and non-face to face time preparing to see the patient (eg, review of tests), Obtaining and/or reviewing separately obtained history, Documenting clinical information in the electronic or other health record, Independently interpreting results (not separately reported) and communicating results to the patient/family/caregiver, or Care coordination (not separately reported).         Each patient to whom he or she provides medical services by telemedicine is:  (1) informed of the relationship between the physician and patient and the respective role of any other health care provider with respect to management of the patient; and (2) notified that he or she may decline to receive medical services by telemedicine and may withdraw from such care at any time.        Notes:   Reporting significant improvements.   She started escitalopram and discontinued remeron.   Has not been feeling dysphoria.  Is walking several miles 3 times a week.   She is cleaning less compulsively.  She is worrying less.  No hallucinations.  No nightmares.   Watching her diet.    She has more hope regarding finding an apartment as an apartment complex called her (but the apart. was specifically designed for the elder).  She continues abstinence.  She continues compliance.     Living with a cousin in the living room.    One son lives with his father and the other with his brother.        Treatment plan:  · Target  symptoms: depression, anxiety   · Why chosen therapy is appropriate versus another modality: relevant to diagnosis  · Outcome monitoring methods: self-report, observation  · Therapeutic intervention type: insight oriented psychotherapy, behavior modifying psychotherapy, supportive psychotherapy    Risk parameters:  Patient reports no suicidal ideation  Patient reports no homicidal ideation  Patient reports no self-injurious behavior  Patient reports no violent behavior    Verbal deficits: None    Patient's response to intervention:  The patient's response to intervention is accepting.    Progress toward goals and other mental status changes:  The patient's progress toward goals is fair .    Diagnosis:     ICD-10-CM ICD-9-CM   1. Depression, major, recurrent, in partial remission  F33.41 296.35   2. PTSD (post-traumatic stress disorder)  F43.10 309.81       Plan:  individual psychotherapy    Return to clinic: 1 month    Length of Service (minutes): 45

## 2020-12-14 RX ORDER — FLUTICASONE PROPIONATE 50 MCG
2 SPRAY, SUSPENSION (ML) NASAL DAILY
Qty: 16 G | Refills: 11 | Status: SHIPPED | OUTPATIENT
Start: 2020-12-14 | End: 2020-12-31 | Stop reason: SDUPTHER

## 2020-12-23 ENCOUNTER — PATIENT MESSAGE (OUTPATIENT)
Dept: PSYCHIATRY | Facility: CLINIC | Age: 50
End: 2020-12-23

## 2020-12-23 RX ORDER — PROMETHAZINE HYDROCHLORIDE 25 MG/1
25 TABLET ORAL EVERY 6 HOURS PRN
Qty: 30 TABLET | Refills: 1 | Status: CANCELLED | OUTPATIENT
Start: 2020-12-23

## 2020-12-31 ENCOUNTER — PATIENT MESSAGE (OUTPATIENT)
Dept: FAMILY MEDICINE | Facility: HOSPITAL | Age: 50
End: 2020-12-31

## 2020-12-31 ENCOUNTER — OFFICE VISIT (OUTPATIENT)
Dept: PSYCHIATRY | Facility: CLINIC | Age: 50
End: 2020-12-31
Payer: MEDICARE

## 2020-12-31 DIAGNOSIS — F33.41 DEPRESSION, MAJOR, RECURRENT, IN PARTIAL REMISSION: Primary | ICD-10-CM

## 2020-12-31 DIAGNOSIS — F43.10 PTSD (POST-TRAUMATIC STRESS DISORDER): ICD-10-CM

## 2020-12-31 DIAGNOSIS — G47.00 INSOMNIA DISORDER, WITH NON-SLEEP DISORDER MENTAL COMORBIDITY: ICD-10-CM

## 2020-12-31 DIAGNOSIS — F41.0 PANIC DISORDER: ICD-10-CM

## 2020-12-31 DIAGNOSIS — F10.10 ALCOHOL USE DISORDER, MILD, IN CONTROLLED ENVIRONMENT: ICD-10-CM

## 2020-12-31 PROCEDURE — 99213 PR OFFICE/OUTPT VISIT, EST, LEVL III, 20-29 MIN: ICD-10-PCS | Mod: 95,,, | Performed by: NURSE PRACTITIONER

## 2020-12-31 PROCEDURE — 99213 OFFICE O/P EST LOW 20 MIN: CPT | Mod: 95,,, | Performed by: NURSE PRACTITIONER

## 2020-12-31 RX ORDER — QUETIAPINE FUMARATE 50 MG/1
TABLET, FILM COATED ORAL
Qty: 180 TABLET | Refills: 3 | Status: SHIPPED | OUTPATIENT
Start: 2020-12-31 | End: 2021-01-24 | Stop reason: SDUPTHER

## 2020-12-31 RX ORDER — LEVOCETIRIZINE DIHYDROCHLORIDE 5 MG/1
5 TABLET, FILM COATED ORAL NIGHTLY
Qty: 30 TABLET | Refills: 11 | Status: CANCELLED | OUTPATIENT
Start: 2020-12-31 | End: 2021-12-31

## 2020-12-31 NOTE — PROGRESS NOTES
Outpatient Psychiatry Follow-Up Visit (MD/NP)    12/31/2020    Clinical Status of Patient:  Outpatient (Ambulatory)    Chief Complaint:  Daskha Marie is a 50 y.o. female who presents today for follow-up of anxiety and PTSD.  Met with patient.      Last visit was: 10/13/2020. Chart and  reviewed.   The patient location is: home  The chief complaint leading to consultation is: depression and anxiety    Visit type: audiovisual    Face to Face time with patient: 27 minutes  30 minutes of total time spent on the encounter, which includes face to face time and non-face to face time preparing to see the patient (eg, review of tests), Obtaining and/or reviewing separately obtained history, Documenting clinical information in the electronic or other health record, Independently interpreting results (not separately reported) and communicating results to the patient/family/caregiver, or Care coordination (not separately reported).     Each patient to whom he or she provides medical services by telemedicine is:  (1) informed of the relationship between the physician and patient and the respective role of any other health care provider with respect to management of the patient; and (2) notified that he or she may decline to receive medical services by telemedicine and may withdraw from such care at any time.    Interval History and Content of Current Session:  Current Psychiatric Medications/changes  · DC Remeron   · Start Lexapro 10 mg po daily  · Continue Antabuse 250 mg po daily  · Continue Xanax 0.5 mg po BID PRN  · Continue Seroquel 25 mg po q hs PRN insomnia    Virtual Visit: Pt presents bright affect and euthymic mood.  Thought processes are clear and organized. Denies SI/HI/AVH. Doing well but having trouble with sleep. Likes Seroquel but losing effectiveness. Will increase to 50 mg     Psychotherapy:  · Target symptoms: anxiety   · Why chosen therapy is appropriate versus another modality: relevant to  diagnosis  · Outcome monitoring methods: self-report  · Therapeutic intervention type: insight oriented psychotherapy  · Topics discussed/themes: building skills sets for symptom management, symptom recognition  · The patient's response to the intervention is accepting. The patient's progress toward treatment goals is good.   · Duration of intervention: 11 minutes.    Review of Systems   · PSYCHIATRIC: Pertinant items are noted in the narrative.  · CONSTITUTIONAL: No weight gain or loss.   · MUSCULOSKELETAL: No pain or stiffness of the joints.  · NEUROLOGIC: No weakness, sensory changes, seizures, confusion, memory loss, tremor or other abnormal movements.  · ENDOCRINE: No polydipsia or polyuria.  · INTEGUMENTARY: No rashes or lacerations.  · EYES: No exophthalmos, jaundice or blindness.  · ENT: No dizziness, tinnitus or hearing loss.  · RESPIRATORY: No shortness of breath.  · CARDIOVASCULAR: No tachycardia or chest pain.  · GASTROINTESTINAL: No nausea, vomiting, pain, constipation or diarrhea.  · GENITOURINARY: No frequency, dysuria or sexual dysfunction.  · HEMATOLOGIC/LYMPHATIC: No excessive bleeding, prolonged or excessive bleeding after dental extraction/injury.  · ALLERGIC/IMMUNOLOGIC: No allergic response to materials, foods or animals at this time.    Past Medical, Family and Social History: The patient's past medical, family and social history have been reviewed and updated as appropriate within the electronic medical record - see encounter notes.    Compliance: yes    Side effects: None    Risk Parameters:  Patient reports no suicidal ideation  Patient reports no homicidal ideation  Patient reports no self-injurious behavior  Patient reports no violent behavior    Exam (detailed: at least 9 elements; comprehensive: all 15 elements)   Constitutional  Vitals:  Most recent vital signs, dated greater than 90 days prior to this appointment, were reviewed.   There were no vitals filed for this visit.      General:  unremarkable, age appropriate     Musculoskeletal  Muscle Strength/Tone:  no tremor, no tic   Gait & Station:  non-ataxic     Psychiatric  Speech:  no latency; no press   Mood & Affect:  dysthymic  congruent and appropriate   Thought Process:  normal and logical   Associations:  intact   Thought Content:  normal, no suicidality, no homicidality, delusions, or paranoia   Insight:  intact   Judgement: behavior is adequate to circumstances   Orientation:  grossly intact   Memory: intact for content of interview   Language: grossly intact   Attention Span & Concentration:  able to focus   Fund of Knowledge:  intact and appropriate to age and level of education     Assessment and Diagnosis   Status/Progress: Based on the examination today, the patient's problem(s) is/are improved and adequately but not ideally controlled.  New problems have not been presented today.   Co-morbidities and Lack of compliance are not complicating management of the primary condition.  There are no active rule-out diagnoses for this patient at this time.     General Impression:       ICD-10-CM ICD-9-CM   1. Depression, major, recurrent, in partial remission  F33.41 296.35   2. Insomnia disorder, with non-sleep disorder mental comorbidity  G47.00 780.52   3. Panic disorder  F41.0 300.01   4. Alcohol use disorder, mild, in controlled environment  F10.10 305.03   5. PTSD (post-traumatic stress disorder)  F43.10 309.81       Intervention/Counseling/Treatment Plan   · Medication Management: Continue current medications. The risks and benefits of medication were discussed with the patient.  · AA/NA/CA/ACOA/Abstinence   · Continue Lexapro 10 mg po daily  · Continue Antabuse 250 mg po daily  · Continue Xanax 0.5 mg po BID PRN  · Increase to Seroquel 50 mg po q hs PRN insomnia  · Encouraged pt establish care with a therapist for 1:1 counseling or IOP. Offered information of community resources.     Return to Clinic: 6 months     Risks,  benefits, side effects and alternative treatments discussed with patient. Patient agrees with the current plan as documented.  Encouraged Patient to keep future appointments.  Take medications as prescribed and abstain from substance abuse.  Pt to present to ED for thoughts to harm herself or others

## 2021-01-04 RX ORDER — LACTULOSE 10 G/15ML
SOLUTION ORAL; RECTAL
Qty: 236 ML | Refills: 3 | Status: SHIPPED | OUTPATIENT
Start: 2021-01-04 | End: 2021-01-19 | Stop reason: SDUPTHER

## 2021-01-04 RX ORDER — LEVOCETIRIZINE DIHYDROCHLORIDE 5 MG/1
5 TABLET, FILM COATED ORAL NIGHTLY
Qty: 90 TABLET | Refills: 3 | Status: SHIPPED | OUTPATIENT
Start: 2021-01-04 | End: 2021-01-19 | Stop reason: SDUPTHER

## 2021-01-04 RX ORDER — SUMATRIPTAN 50 MG/1
TABLET, FILM COATED ORAL
Qty: 12 TABLET | Refills: 3 | Status: SHIPPED | OUTPATIENT
Start: 2021-01-04 | End: 2021-02-15 | Stop reason: SDUPTHER

## 2021-01-05 RX ORDER — FLUTICASONE PROPIONATE 50 MCG
2 SPRAY, SUSPENSION (ML) NASAL DAILY
Qty: 16 G | Refills: 11 | Status: SHIPPED | OUTPATIENT
Start: 2021-01-05 | End: 2021-01-24 | Stop reason: SDUPTHER

## 2021-01-19 DIAGNOSIS — I10 ESSENTIAL HYPERTENSION: Chronic | ICD-10-CM

## 2021-01-19 DIAGNOSIS — I50.32 CHRONIC DIASTOLIC CONGESTIVE HEART FAILURE: Chronic | ICD-10-CM

## 2021-01-19 RX ORDER — FUROSEMIDE 40 MG/1
40 TABLET ORAL DAILY
Qty: 90 TABLET | Refills: 1 | Status: SHIPPED | OUTPATIENT
Start: 2021-01-19 | End: 2021-01-26 | Stop reason: SDUPTHER

## 2021-01-19 RX ORDER — LEVOCETIRIZINE DIHYDROCHLORIDE 5 MG/1
5 TABLET, FILM COATED ORAL NIGHTLY
Qty: 90 TABLET | Refills: 1 | Status: SHIPPED | OUTPATIENT
Start: 2021-01-19 | End: 2021-01-24 | Stop reason: SDUPTHER

## 2021-01-19 RX ORDER — MONTELUKAST SODIUM 10 MG/1
10 TABLET ORAL NIGHTLY
Qty: 90 TABLET | Refills: 1 | Status: SHIPPED | OUTPATIENT
Start: 2021-01-19 | End: 2021-02-15 | Stop reason: SDUPTHER

## 2021-01-19 RX ORDER — CARVEDILOL 3.12 MG/1
3.12 TABLET ORAL 2 TIMES DAILY
Qty: 180 TABLET | Refills: 1 | Status: SHIPPED | OUTPATIENT
Start: 2021-01-19 | End: 2021-01-24 | Stop reason: SDUPTHER

## 2021-01-19 RX ORDER — LACTULOSE 10 G/15ML
SOLUTION ORAL; RECTAL
Qty: 236 ML | Refills: 1 | Status: SHIPPED | OUTPATIENT
Start: 2021-01-19 | End: 2021-08-24 | Stop reason: SDUPTHER

## 2021-01-20 ENCOUNTER — TELEPHONE (OUTPATIENT)
Dept: FAMILY MEDICINE | Facility: HOSPITAL | Age: 51
End: 2021-01-20

## 2021-01-20 DIAGNOSIS — I50.32 CHRONIC DIASTOLIC CONGESTIVE HEART FAILURE: Chronic | ICD-10-CM

## 2021-01-20 DIAGNOSIS — I10 ESSENTIAL HYPERTENSION: Chronic | ICD-10-CM

## 2021-01-20 RX ORDER — LISINOPRIL 20 MG/1
TABLET ORAL
Qty: 30 TABLET | Refills: 11 | Status: CANCELLED | OUTPATIENT
Start: 2021-01-20

## 2021-01-26 DIAGNOSIS — I50.32 CHRONIC DIASTOLIC CONGESTIVE HEART FAILURE: Chronic | ICD-10-CM

## 2021-01-26 DIAGNOSIS — I10 ESSENTIAL HYPERTENSION: Chronic | ICD-10-CM

## 2021-01-26 RX ORDER — FUROSEMIDE 40 MG/1
40 TABLET ORAL DAILY
Qty: 90 TABLET | Refills: 3 | Status: SHIPPED | OUTPATIENT
Start: 2021-01-26 | End: 2021-02-15 | Stop reason: SDUPTHER

## 2021-01-26 RX ORDER — FLUTICASONE PROPIONATE 50 MCG
2 SPRAY, SUSPENSION (ML) NASAL DAILY
Qty: 16 G | Refills: 11 | Status: SHIPPED | OUTPATIENT
Start: 2021-01-26 | End: 2021-02-15 | Stop reason: SDUPTHER

## 2021-02-13 ENCOUNTER — PATIENT MESSAGE (OUTPATIENT)
Dept: PSYCHIATRY | Facility: CLINIC | Age: 51
End: 2021-02-13

## 2021-02-13 ENCOUNTER — PATIENT MESSAGE (OUTPATIENT)
Dept: FAMILY MEDICINE | Facility: HOSPITAL | Age: 51
End: 2021-02-13

## 2021-02-13 DIAGNOSIS — I50.32 CHRONIC DIASTOLIC CONGESTIVE HEART FAILURE: Chronic | ICD-10-CM

## 2021-02-13 DIAGNOSIS — K21.9 GASTROESOPHAGEAL REFLUX DISEASE WITHOUT ESOPHAGITIS: ICD-10-CM

## 2021-02-13 DIAGNOSIS — I10 ESSENTIAL HYPERTENSION: Chronic | ICD-10-CM

## 2021-02-15 ENCOUNTER — PATIENT MESSAGE (OUTPATIENT)
Dept: FAMILY MEDICINE | Facility: HOSPITAL | Age: 51
End: 2021-02-15

## 2021-02-15 RX ORDER — LEVOCETIRIZINE DIHYDROCHLORIDE 5 MG/1
5 TABLET, FILM COATED ORAL NIGHTLY
Qty: 90 TABLET | Refills: 1 | OUTPATIENT
Start: 2021-02-15 | End: 2021-05-26 | Stop reason: SDUPTHER

## 2021-02-15 RX ORDER — PROMETHAZINE HYDROCHLORIDE 25 MG/1
25 TABLET ORAL EVERY 6 HOURS PRN
Qty: 30 TABLET | Refills: 1 | OUTPATIENT
Start: 2021-02-15 | End: 2021-05-26 | Stop reason: SDUPTHER

## 2021-02-15 RX ORDER — MONTELUKAST SODIUM 10 MG/1
10 TABLET ORAL NIGHTLY
Qty: 90 TABLET | Refills: 1 | Status: SHIPPED | OUTPATIENT
Start: 2021-02-15 | End: 2022-01-21 | Stop reason: SDUPTHER

## 2021-02-15 RX ORDER — SPIRONOLACTONE 25 MG/1
25 TABLET ORAL DAILY
Qty: 90 TABLET | Refills: 3 | Status: SHIPPED | OUTPATIENT
Start: 2021-02-15 | End: 2022-05-11 | Stop reason: SDUPTHER

## 2021-02-15 RX ORDER — FLUTICASONE PROPIONATE 50 MCG
2 SPRAY, SUSPENSION (ML) NASAL DAILY
Qty: 16 G | Refills: 11 | OUTPATIENT
Start: 2021-02-15 | End: 2021-08-24 | Stop reason: SDUPTHER

## 2021-02-15 RX ORDER — SUMATRIPTAN 50 MG/1
TABLET, FILM COATED ORAL
Qty: 12 TABLET | Refills: 1 | Status: SHIPPED | OUTPATIENT
Start: 2021-02-15 | End: 2021-05-26 | Stop reason: SDUPTHER

## 2021-02-15 RX ORDER — AMLODIPINE BESYLATE 5 MG/1
5 TABLET ORAL DAILY
Qty: 90 TABLET | Refills: 1 | Status: SHIPPED | OUTPATIENT
Start: 2021-02-15 | End: 2021-08-17 | Stop reason: SDUPTHER

## 2021-02-15 RX ORDER — FUROSEMIDE 40 MG/1
40 TABLET ORAL DAILY
Qty: 90 TABLET | Refills: 1 | OUTPATIENT
Start: 2021-02-15 | End: 2021-12-06 | Stop reason: SDUPTHER

## 2021-02-15 RX ORDER — CYANOCOBALAMIN 1000 UG/ML
INJECTION, SOLUTION INTRAMUSCULAR; SUBCUTANEOUS
Qty: 1 ML | Refills: 6 | OUTPATIENT
Start: 2021-02-15 | End: 2023-12-11

## 2021-02-15 RX ORDER — OMEPRAZOLE 40 MG/1
40 CAPSULE, DELAYED RELEASE ORAL DAILY
Qty: 90 CAPSULE | Refills: 1 | Status: SHIPPED | OUTPATIENT
Start: 2021-02-15 | End: 2021-08-24 | Stop reason: SDUPTHER

## 2021-03-14 ENCOUNTER — PATIENT MESSAGE (OUTPATIENT)
Dept: FAMILY MEDICINE | Facility: HOSPITAL | Age: 51
End: 2021-03-14

## 2021-03-16 ENCOUNTER — PATIENT MESSAGE (OUTPATIENT)
Dept: PSYCHIATRY | Facility: CLINIC | Age: 51
End: 2021-03-16

## 2021-03-27 RX ORDER — MIRTAZAPINE 45 MG/1
TABLET, FILM COATED ORAL
Qty: 90 TABLET | OUTPATIENT
Start: 2021-03-27

## 2021-03-29 ENCOUNTER — PATIENT MESSAGE (OUTPATIENT)
Dept: PSYCHIATRY | Facility: CLINIC | Age: 51
End: 2021-03-29

## 2021-04-06 ENCOUNTER — OFFICE VISIT (OUTPATIENT)
Dept: PSYCHIATRY | Facility: CLINIC | Age: 51
End: 2021-04-06
Payer: MEDICARE

## 2021-04-06 DIAGNOSIS — F42.8 OTHER OBSESSIVE-COMPULSIVE DISORDERS: ICD-10-CM

## 2021-04-06 DIAGNOSIS — F41.0 PANIC DISORDER: ICD-10-CM

## 2021-04-06 DIAGNOSIS — F33.41 DEPRESSION, MAJOR, RECURRENT, IN PARTIAL REMISSION: Primary | ICD-10-CM

## 2021-04-06 PROCEDURE — 90834 PR PSYCHOTHERAPY W/PATIENT, 45 MIN: ICD-10-PCS | Mod: 95,,, | Performed by: SOCIAL WORKER

## 2021-04-06 PROCEDURE — 90834 PSYTX W PT 45 MINUTES: CPT | Mod: 95,,, | Performed by: SOCIAL WORKER

## 2021-05-18 ENCOUNTER — PATIENT MESSAGE (OUTPATIENT)
Dept: PSYCHIATRY | Facility: CLINIC | Age: 51
End: 2021-05-18

## 2021-05-19 ENCOUNTER — PATIENT MESSAGE (OUTPATIENT)
Dept: PSYCHIATRY | Facility: CLINIC | Age: 51
End: 2021-05-19

## 2021-05-26 ENCOUNTER — PATIENT MESSAGE (OUTPATIENT)
Dept: PSYCHIATRY | Facility: CLINIC | Age: 51
End: 2021-05-26

## 2021-05-26 DIAGNOSIS — F41.0 PANIC DISORDER: ICD-10-CM

## 2021-05-26 DIAGNOSIS — G47.00 INSOMNIA DISORDER, WITH NON-SLEEP DISORDER MENTAL COMORBIDITY: ICD-10-CM

## 2021-05-26 RX ORDER — ESCITALOPRAM OXALATE 10 MG/1
10 TABLET ORAL DAILY
Qty: 90 TABLET | Refills: 1 | Status: CANCELLED | OUTPATIENT
Start: 2021-05-26

## 2021-05-26 RX ORDER — QUETIAPINE FUMARATE 50 MG/1
TABLET, FILM COATED ORAL
Qty: 180 TABLET | Refills: 3 | Status: CANCELLED | OUTPATIENT
Start: 2021-05-26

## 2021-05-26 RX ORDER — ALPRAZOLAM 1 MG/1
1 TABLET ORAL 2 TIMES DAILY PRN
Qty: 60 TABLET | Refills: 3 | Status: SHIPPED | OUTPATIENT
Start: 2021-05-26 | End: 2021-06-08 | Stop reason: SDUPTHER

## 2021-06-08 ENCOUNTER — OFFICE VISIT (OUTPATIENT)
Dept: PSYCHIATRY | Facility: CLINIC | Age: 51
End: 2021-06-08
Payer: MEDICARE

## 2021-06-08 DIAGNOSIS — G47.00 INSOMNIA DISORDER, WITH NON-SLEEP DISORDER MENTAL COMORBIDITY: ICD-10-CM

## 2021-06-08 DIAGNOSIS — F33.41 DEPRESSION, MAJOR, RECURRENT, IN PARTIAL REMISSION: Primary | ICD-10-CM

## 2021-06-08 DIAGNOSIS — F41.0 PANIC DISORDER: ICD-10-CM

## 2021-06-08 DIAGNOSIS — F43.10 PTSD (POST-TRAUMATIC STRESS DISORDER): ICD-10-CM

## 2021-06-08 DIAGNOSIS — F10.10 ALCOHOL USE DISORDER, MILD, IN CONTROLLED ENVIRONMENT: ICD-10-CM

## 2021-06-08 PROCEDURE — 99213 OFFICE O/P EST LOW 20 MIN: CPT | Mod: 95,,, | Performed by: NURSE PRACTITIONER

## 2021-06-08 PROCEDURE — 99213 PR OFFICE/OUTPT VISIT, EST, LEVL III, 20-29 MIN: ICD-10-PCS | Mod: 95,,, | Performed by: NURSE PRACTITIONER

## 2021-06-08 RX ORDER — DISULFIRAM 250 MG/1
250 TABLET ORAL DAILY
Qty: 90 TABLET | Refills: 3 | Status: SHIPPED | OUTPATIENT
Start: 2021-06-08 | End: 2021-06-26

## 2021-06-08 RX ORDER — ALPRAZOLAM 1 MG/1
1 TABLET ORAL 2 TIMES DAILY PRN
Qty: 60 TABLET | Refills: 3 | Status: SHIPPED | OUTPATIENT
Start: 2021-06-08 | End: 2021-08-17 | Stop reason: SDUPTHER

## 2021-06-08 RX ORDER — QUETIAPINE FUMARATE 50 MG/1
TABLET, FILM COATED ORAL
Qty: 180 TABLET | Refills: 3 | Status: SHIPPED | OUTPATIENT
Start: 2021-06-08 | End: 2021-08-17 | Stop reason: SDUPTHER

## 2021-06-08 RX ORDER — ESCITALOPRAM OXALATE 20 MG/1
20 TABLET ORAL DAILY
Qty: 90 TABLET | Refills: 3 | Status: SHIPPED | OUTPATIENT
Start: 2021-06-08 | End: 2021-08-17 | Stop reason: SDUPTHER

## 2021-06-11 ENCOUNTER — PATIENT MESSAGE (OUTPATIENT)
Dept: PSYCHIATRY | Facility: CLINIC | Age: 51
End: 2021-06-11

## 2021-07-03 ENCOUNTER — HOSPITAL ENCOUNTER (EMERGENCY)
Facility: OTHER | Age: 51
Discharge: HOME | End: 2021-07-03
Attending: EMERGENCY MEDICINE
Payer: COMMERCIAL

## 2021-07-03 VITALS
WEIGHT: 200 LBS | HEART RATE: 61 BPM | TEMPERATURE: 99 F | DIASTOLIC BLOOD PRESSURE: 80 MMHG | SYSTOLIC BLOOD PRESSURE: 131 MMHG | RESPIRATION RATE: 16 BRPM | OXYGEN SATURATION: 99 % | BODY MASS INDEX: 33.28 KG/M2

## 2021-07-03 DIAGNOSIS — T50.901A OVERDOSE: ICD-10-CM

## 2021-07-03 DIAGNOSIS — E87.5 HYPERKALEMIA: Primary | ICD-10-CM

## 2021-07-03 LAB
ALBUMIN SERPL BCP-MCNC: 3 G/DL (ref 3.5–5.2)
ALP SERPL-CCNC: 86 U/L (ref 55–135)
ALT SERPL W/O P-5'-P-CCNC: 21 U/L (ref 10–44)
ANION GAP SERPL CALC-SCNC: 4 MMOL/L (ref 8–16)
ANION GAP SERPL CALC-SCNC: 9 MMOL/L (ref 8–16)
AST SERPL-CCNC: 52 U/L (ref 10–40)
BASOPHILS # BLD AUTO: 0.02 K/UL (ref 0–0.2)
BASOPHILS NFR BLD: 0.4 % (ref 0–1.9)
BILIRUB SERPL-MCNC: 0.3 MG/DL (ref 0.1–1)
BUN SERPL-MCNC: 17 MG/DL (ref 6–20)
BUN SERPL-MCNC: 19 MG/DL (ref 6–20)
CALCIUM SERPL-MCNC: 8.1 MG/DL (ref 8.7–10.5)
CALCIUM SERPL-MCNC: 8.6 MG/DL (ref 8.7–10.5)
CHLORIDE SERPL-SCNC: 104 MMOL/L (ref 95–110)
CHLORIDE SERPL-SCNC: 108 MMOL/L (ref 95–110)
CO2 SERPL-SCNC: 21 MMOL/L (ref 23–29)
CO2 SERPL-SCNC: 23 MMOL/L (ref 23–29)
CREAT SERPL-MCNC: 1.4 MG/DL (ref 0.5–1.4)
CREAT SERPL-MCNC: 1.5 MG/DL (ref 0.5–1.4)
DIFFERENTIAL METHOD: ABNORMAL
EOSINOPHIL # BLD AUTO: 0.1 K/UL (ref 0–0.5)
EOSINOPHIL NFR BLD: 1.3 % (ref 0–8)
ERYTHROCYTE [DISTWIDTH] IN BLOOD BY AUTOMATED COUNT: 13.2 % (ref 11.5–14.5)
EST. GFR  (AFRICAN AMERICAN): 46 ML/MIN/1.73 M^2
EST. GFR  (AFRICAN AMERICAN): 51 ML/MIN/1.73 M^2
EST. GFR  (NON AFRICAN AMERICAN): 40 ML/MIN/1.73 M^2
EST. GFR  (NON AFRICAN AMERICAN): 44 ML/MIN/1.73 M^2
GLUCOSE SERPL-MCNC: 73 MG/DL (ref 70–110)
GLUCOSE SERPL-MCNC: 86 MG/DL (ref 70–110)
HCT VFR BLD AUTO: 32.5 % (ref 37–48.5)
HGB BLD-MCNC: 10.8 G/DL (ref 12–16)
IMM GRANULOCYTES # BLD AUTO: 0.02 K/UL (ref 0–0.04)
IMM GRANULOCYTES NFR BLD AUTO: 0.4 % (ref 0–0.5)
LYMPHOCYTES # BLD AUTO: 1.7 K/UL (ref 1–4.8)
LYMPHOCYTES NFR BLD: 32.4 % (ref 18–48)
MCH RBC QN AUTO: 31.4 PG (ref 27–31)
MCHC RBC AUTO-ENTMCNC: 33.2 G/DL (ref 32–36)
MCV RBC AUTO: 95 FL (ref 82–98)
MONOCYTES # BLD AUTO: 0.4 K/UL (ref 0.3–1)
MONOCYTES NFR BLD: 8 % (ref 4–15)
NEUTROPHILS # BLD AUTO: 3 K/UL (ref 1.8–7.7)
NEUTROPHILS NFR BLD: 57.5 % (ref 38–73)
NRBC BLD-RTO: 0 /100 WBC
PLATELET # BLD AUTO: 206 K/UL (ref 150–450)
PMV BLD AUTO: 10.1 FL (ref 9.2–12.9)
POTASSIUM SERPL-SCNC: 5.7 MMOL/L (ref 3.5–5.1)
POTASSIUM SERPL-SCNC: 6.2 MMOL/L (ref 3.5–5.1)
PROT SERPL-MCNC: 6.6 G/DL (ref 6–8.4)
RBC # BLD AUTO: 3.44 M/UL (ref 4–5.4)
SODIUM SERPL-SCNC: 134 MMOL/L (ref 136–145)
SODIUM SERPL-SCNC: 135 MMOL/L (ref 136–145)
WBC # BLD AUTO: 5.24 K/UL (ref 3.9–12.7)

## 2021-07-03 PROCEDURE — 93010 EKG 12-LEAD: ICD-10-PCS | Mod: ,,, | Performed by: INTERNAL MEDICINE

## 2021-07-03 PROCEDURE — 36415 COLL VENOUS BLD VENIPUNCTURE: CPT | Performed by: EMERGENCY MEDICINE

## 2021-07-03 PROCEDURE — 80048 BASIC METABOLIC PNL TOTAL CA: CPT | Performed by: EMERGENCY MEDICINE

## 2021-07-03 PROCEDURE — 80053 COMPREHEN METABOLIC PANEL: CPT | Performed by: EMERGENCY MEDICINE

## 2021-07-03 PROCEDURE — 25000003 PHARM REV CODE 250: Performed by: EMERGENCY MEDICINE

## 2021-07-03 PROCEDURE — 99284 EMERGENCY DEPT VISIT MOD MDM: CPT | Mod: 25

## 2021-07-03 PROCEDURE — 85025 COMPLETE CBC W/AUTO DIFF WBC: CPT | Performed by: EMERGENCY MEDICINE

## 2021-07-03 PROCEDURE — 93010 ELECTROCARDIOGRAM REPORT: CPT | Mod: ,,, | Performed by: INTERNAL MEDICINE

## 2021-07-03 PROCEDURE — 93005 ELECTROCARDIOGRAM TRACING: CPT

## 2021-07-03 PROCEDURE — 96361 HYDRATE IV INFUSION ADD-ON: CPT

## 2021-07-03 PROCEDURE — 96360 HYDRATION IV INFUSION INIT: CPT

## 2021-07-03 RX ORDER — METHOCARBAMOL 750 MG/1
750 TABLET, FILM COATED ORAL 3 TIMES DAILY
COMMUNITY
Start: 2021-07-02

## 2021-07-03 RX ORDER — BUPRENORPHINE HYDROCHLORIDE 150 UG/1
FILM, SOLUBLE BUCCAL
COMMUNITY
Start: 2021-06-09

## 2021-07-03 RX ADMIN — SODIUM ZIRCONIUM CYCLOSILICATE 10 G: 10 POWDER, FOR SUSPENSION ORAL at 12:07

## 2021-07-03 RX ADMIN — SODIUM CHLORIDE 500 ML: 0.9 INJECTION, SOLUTION INTRAVENOUS at 12:07

## 2021-07-03 RX ADMIN — SODIUM CHLORIDE 1000 ML: 0.9 INJECTION, SOLUTION INTRAVENOUS at 08:07

## 2021-08-10 RX ORDER — SUMATRIPTAN 50 MG/1
TABLET, FILM COATED ORAL
Qty: 12 TABLET | Refills: 0 | Status: SHIPPED | OUTPATIENT
Start: 2021-08-10 | End: 2021-08-23 | Stop reason: SDUPTHER

## 2021-08-16 RX ORDER — SUMATRIPTAN 50 MG/1
TABLET, FILM COATED ORAL
Qty: 12 TABLET | Refills: 0 | OUTPATIENT
Start: 2021-08-16

## 2021-08-17 ENCOUNTER — OFFICE VISIT (OUTPATIENT)
Dept: PSYCHIATRY | Facility: CLINIC | Age: 51
End: 2021-08-17
Payer: MEDICARE

## 2021-08-17 DIAGNOSIS — F41.0 PANIC DISORDER: Primary | ICD-10-CM

## 2021-08-17 DIAGNOSIS — F33.41 DEPRESSION, MAJOR, RECURRENT, IN PARTIAL REMISSION: ICD-10-CM

## 2021-08-17 DIAGNOSIS — F10.10 ALCOHOL USE DISORDER, MILD, IN CONTROLLED ENVIRONMENT: ICD-10-CM

## 2021-08-17 DIAGNOSIS — I10 ESSENTIAL HYPERTENSION: Chronic | ICD-10-CM

## 2021-08-17 DIAGNOSIS — G47.00 INSOMNIA DISORDER, WITH NON-SLEEP DISORDER MENTAL COMORBIDITY: ICD-10-CM

## 2021-08-17 DIAGNOSIS — F43.10 PTSD (POST-TRAUMATIC STRESS DISORDER): ICD-10-CM

## 2021-08-17 PROCEDURE — 99214 PR OFFICE/OUTPT VISIT, EST, LEVL IV, 30-39 MIN: ICD-10-PCS | Mod: 95,,, | Performed by: NURSE PRACTITIONER

## 2021-08-17 PROCEDURE — 1160F RVW MEDS BY RX/DR IN RCRD: CPT | Mod: CPTII,95,, | Performed by: NURSE PRACTITIONER

## 2021-08-17 PROCEDURE — 1159F PR MEDICATION LIST DOCUMENTED IN MEDICAL RECORD: ICD-10-PCS | Mod: CPTII,95,, | Performed by: NURSE PRACTITIONER

## 2021-08-17 PROCEDURE — 1159F MED LIST DOCD IN RCRD: CPT | Mod: CPTII,95,, | Performed by: NURSE PRACTITIONER

## 2021-08-17 PROCEDURE — 1160F PR REVIEW ALL MEDS BY PRESCRIBER/CLIN PHARMACIST DOCUMENTED: ICD-10-PCS | Mod: CPTII,95,, | Performed by: NURSE PRACTITIONER

## 2021-08-17 PROCEDURE — 90833 PR PSYCHOTHERAPY W/PATIENT W/E&M, 30 MIN (ADD ON): ICD-10-PCS | Mod: 95,,, | Performed by: NURSE PRACTITIONER

## 2021-08-17 PROCEDURE — 90833 PSYTX W PT W E/M 30 MIN: CPT | Mod: 95,,, | Performed by: NURSE PRACTITIONER

## 2021-08-17 PROCEDURE — 99214 OFFICE O/P EST MOD 30 MIN: CPT | Mod: 95,,, | Performed by: NURSE PRACTITIONER

## 2021-08-17 RX ORDER — HYDROXYZINE PAMOATE 25 MG/1
25 CAPSULE ORAL 4 TIMES DAILY PRN
Qty: 120 CAPSULE | Refills: 11 | Status: SHIPPED | OUTPATIENT
Start: 2021-08-17 | End: 2021-12-29

## 2021-08-17 RX ORDER — ALPRAZOLAM 1 MG/1
1 TABLET ORAL 2 TIMES DAILY PRN
Qty: 60 TABLET | Refills: 5 | Status: SHIPPED | OUTPATIENT
Start: 2021-08-17 | End: 2022-01-24 | Stop reason: SDUPTHER

## 2021-08-17 RX ORDER — ESCITALOPRAM OXALATE 20 MG/1
20 TABLET ORAL DAILY
Qty: 90 TABLET | Refills: 3 | Status: SHIPPED | OUTPATIENT
Start: 2021-08-17 | End: 2022-01-24 | Stop reason: SDUPTHER

## 2021-08-17 RX ORDER — DISULFIRAM 250 MG/1
250 TABLET ORAL DAILY
Qty: 90 TABLET | Refills: 3 | Status: SHIPPED | OUTPATIENT
Start: 2021-08-17 | End: 2022-01-24 | Stop reason: SDUPTHER

## 2021-08-17 RX ORDER — QUETIAPINE FUMARATE 50 MG/1
TABLET, FILM COATED ORAL
Qty: 180 TABLET | Refills: 3 | Status: SHIPPED | OUTPATIENT
Start: 2021-08-17 | End: 2022-01-24

## 2021-08-20 RX ORDER — PROMETHAZINE HYDROCHLORIDE 25 MG/1
25 TABLET ORAL EVERY 6 HOURS PRN
Qty: 15 TABLET | Refills: 0 | Status: CANCELLED | OUTPATIENT
Start: 2021-08-20

## 2021-08-22 RX ORDER — AMLODIPINE BESYLATE 5 MG/1
5 TABLET ORAL DAILY
Qty: 90 TABLET | Refills: 1 | Status: SHIPPED | OUTPATIENT
Start: 2021-08-22 | End: 2021-08-24

## 2021-08-23 RX ORDER — SUMATRIPTAN 50 MG/1
TABLET, FILM COATED ORAL
Qty: 12 TABLET | Refills: 0 | Status: SHIPPED | OUTPATIENT
Start: 2021-08-23 | End: 2021-08-24 | Stop reason: SDUPTHER

## 2021-08-24 ENCOUNTER — TELEPHONE (OUTPATIENT)
Dept: FAMILY MEDICINE | Facility: CLINIC | Age: 51
End: 2021-08-24

## 2021-08-24 ENCOUNTER — OFFICE VISIT (OUTPATIENT)
Dept: FAMILY MEDICINE | Facility: HOSPITAL | Age: 51
End: 2021-08-24
Payer: COMMERCIAL

## 2021-08-24 DIAGNOSIS — R09.81 CHRONIC NASAL CONGESTION: ICD-10-CM

## 2021-08-24 DIAGNOSIS — R11.2 NON-INTRACTABLE VOMITING WITH NAUSEA, UNSPECIFIED VOMITING TYPE: Primary | ICD-10-CM

## 2021-08-24 DIAGNOSIS — G44.001 INTRACTABLE CLUSTER HEADACHE SYNDROME, UNSPECIFIED CHRONICITY PATTERN: ICD-10-CM

## 2021-08-24 DIAGNOSIS — I10 ESSENTIAL HYPERTENSION: Chronic | ICD-10-CM

## 2021-08-24 DIAGNOSIS — K21.9 GASTROESOPHAGEAL REFLUX DISEASE WITHOUT ESOPHAGITIS: ICD-10-CM

## 2021-08-24 RX ORDER — LACTULOSE 10 G/15ML
SOLUTION ORAL; RECTAL
Qty: 236 ML | Refills: 1 | Status: SHIPPED | OUTPATIENT
Start: 2021-08-24 | End: 2022-01-26 | Stop reason: SDUPTHER

## 2021-08-24 RX ORDER — FLUTICASONE PROPIONATE 50 MCG
2 SPRAY, SUSPENSION (ML) NASAL DAILY
Qty: 16 G | Refills: 11 | Status: SHIPPED | OUTPATIENT
Start: 2021-08-24 | End: 2021-08-24 | Stop reason: SDUPTHER

## 2021-08-24 RX ORDER — FLUTICASONE PROPIONATE 50 MCG
2 SPRAY, SUSPENSION (ML) NASAL DAILY
Qty: 16 G | Refills: 11 | Status: SHIPPED | OUTPATIENT
Start: 2021-08-24 | End: 2021-08-24

## 2021-08-24 RX ORDER — OMEPRAZOLE 40 MG/1
40 CAPSULE, DELAYED RELEASE ORAL DAILY
Qty: 90 CAPSULE | Refills: 1 | Status: SHIPPED | OUTPATIENT
Start: 2021-08-24 | End: 2021-08-24 | Stop reason: SDUPTHER

## 2021-08-24 RX ORDER — AMLODIPINE BESYLATE 10 MG/1
10 TABLET ORAL DAILY
Qty: 90 TABLET | Refills: 3 | Status: SHIPPED | OUTPATIENT
Start: 2021-08-24 | End: 2021-08-24 | Stop reason: SDUPTHER

## 2021-08-24 RX ORDER — LEVOCETIRIZINE DIHYDROCHLORIDE 5 MG/1
5 TABLET, FILM COATED ORAL NIGHTLY
Qty: 30 TABLET | Refills: 0 | Status: SHIPPED | OUTPATIENT
Start: 2021-08-24 | End: 2021-09-07 | Stop reason: SDUPTHER

## 2021-08-25 RX ORDER — FLUTICASONE PROPIONATE 50 MCG
2 SPRAY, SUSPENSION (ML) NASAL DAILY
Qty: 16 G | Refills: 11 | Status: SHIPPED | OUTPATIENT
Start: 2021-08-25 | End: 2021-08-26 | Stop reason: SDUPTHER

## 2021-08-25 RX ORDER — AMLODIPINE BESYLATE 10 MG/1
10 TABLET ORAL DAILY
Qty: 90 TABLET | Refills: 3 | Status: SHIPPED | OUTPATIENT
Start: 2021-08-25 | End: 2021-12-09 | Stop reason: SDUPTHER

## 2021-08-25 RX ORDER — SUMATRIPTAN 50 MG/1
TABLET, FILM COATED ORAL
Qty: 12 TABLET | Refills: 0 | Status: SHIPPED | OUTPATIENT
Start: 2021-08-25 | End: 2021-12-04 | Stop reason: SDUPTHER

## 2021-08-25 RX ORDER — OMEPRAZOLE 40 MG/1
40 CAPSULE, DELAYED RELEASE ORAL DAILY
Qty: 90 CAPSULE | Refills: 1 | Status: SHIPPED | OUTPATIENT
Start: 2021-08-25 | End: 2021-12-28 | Stop reason: SDUPTHER

## 2021-08-25 RX ORDER — PROMETHAZINE HYDROCHLORIDE 25 MG/1
25 TABLET ORAL EVERY 6 HOURS PRN
Qty: 15 TABLET | Refills: 0 | Status: SHIPPED | OUTPATIENT
Start: 2021-08-25 | End: 2023-01-06 | Stop reason: SDUPTHER

## 2021-08-26 DIAGNOSIS — R09.81 CHRONIC NASAL CONGESTION: ICD-10-CM

## 2021-08-26 RX ORDER — FLUTICASONE PROPIONATE 50 MCG
2 SPRAY, SUSPENSION (ML) NASAL DAILY
Qty: 16 G | Refills: 11 | Status: SHIPPED | OUTPATIENT
Start: 2021-08-26 | End: 2021-12-28 | Stop reason: SDUPTHER

## 2021-09-07 ENCOUNTER — TELEPHONE (OUTPATIENT)
Dept: FAMILY MEDICINE | Facility: CLINIC | Age: 51
End: 2021-09-07

## 2021-09-07 DIAGNOSIS — I10 ESSENTIAL HYPERTENSION: Chronic | ICD-10-CM

## 2021-09-07 DIAGNOSIS — I50.32 CHRONIC DIASTOLIC CONGESTIVE HEART FAILURE: Chronic | ICD-10-CM

## 2021-09-07 RX ORDER — LEVOCETIRIZINE DIHYDROCHLORIDE 5 MG/1
5 TABLET, FILM COATED ORAL NIGHTLY
Qty: 30 TABLET | Refills: 11 | Status: SHIPPED | OUTPATIENT
Start: 2021-09-07 | End: 2022-08-24

## 2021-09-07 RX ORDER — LISINOPRIL 20 MG/1
TABLET ORAL
Qty: 30 TABLET | Refills: 11 | Status: SHIPPED | OUTPATIENT
Start: 2021-09-07 | End: 2022-06-07 | Stop reason: SDUPTHER

## 2021-09-16 ENCOUNTER — OFFICE VISIT (OUTPATIENT)
Dept: PSYCHIATRY | Facility: CLINIC | Age: 51
End: 2021-09-16
Payer: MEDICARE

## 2021-09-16 DIAGNOSIS — F43.10 PTSD (POST-TRAUMATIC STRESS DISORDER): ICD-10-CM

## 2021-09-16 DIAGNOSIS — F33.41 DEPRESSION, MAJOR, RECURRENT, IN PARTIAL REMISSION: Primary | ICD-10-CM

## 2021-09-16 DIAGNOSIS — F42.8 OTHER OBSESSIVE-COMPULSIVE DISORDERS: ICD-10-CM

## 2021-09-16 PROCEDURE — 4010F PR ACE/ARB THEARPY RXD/TAKEN: ICD-10-PCS | Mod: CPTII,95,, | Performed by: SOCIAL WORKER

## 2021-09-16 PROCEDURE — 90834 PSYTX W PT 45 MINUTES: CPT | Mod: 95,,, | Performed by: SOCIAL WORKER

## 2021-09-16 PROCEDURE — 90834 PR PSYCHOTHERAPY W/PATIENT, 45 MIN: ICD-10-PCS | Mod: 95,,, | Performed by: SOCIAL WORKER

## 2021-09-16 PROCEDURE — 4010F ACE/ARB THERAPY RXD/TAKEN: CPT | Mod: CPTII,95,, | Performed by: SOCIAL WORKER

## 2021-09-28 NOTE — TELEPHONE ENCOUNTER
Patient called stating Pharmacy needed approval to refill Xanax.  Dr. Pratt approved refill on 2/20/20  Per Chart review.  Pharmacy advised and question if MD is aware that patient is taking Norco and Xanax together.  Pharmacist chart review that patient has been taking both together a while, patient states she has been taking them together a while and MD aware. Will send message to MD. Medication at pharmacy and ready for .     Reason for Disposition   Caller requesting a refill, no triage required, and triager able to refill per unit policy    Protocols used: MEDICATION QUESTION CALL-A-       Cosentyx Counseling:  I discussed with the patient the risks of Cosentyx including but not limited to worsening of Crohn's disease, immunosuppression, allergic reactions and infections.  The patient understands that monitoring is required including a PPD at baseline and must alert us or the primary physician if symptoms of infection or other concerning signs are noted.

## 2021-11-16 ENCOUNTER — PATIENT MESSAGE (OUTPATIENT)
Dept: PSYCHIATRY | Facility: CLINIC | Age: 51
End: 2021-11-16
Payer: MEDICARE

## 2021-12-04 ENCOUNTER — PATIENT MESSAGE (OUTPATIENT)
Dept: FAMILY MEDICINE | Facility: HOSPITAL | Age: 51
End: 2021-12-04
Payer: MEDICARE

## 2021-12-04 DIAGNOSIS — G44.001 INTRACTABLE CLUSTER HEADACHE SYNDROME, UNSPECIFIED CHRONICITY PATTERN: ICD-10-CM

## 2021-12-06 DIAGNOSIS — K21.9 GASTROESOPHAGEAL REFLUX DISEASE WITHOUT ESOPHAGITIS: ICD-10-CM

## 2021-12-06 DIAGNOSIS — I50.32 CHRONIC DIASTOLIC CONGESTIVE HEART FAILURE: Chronic | ICD-10-CM

## 2021-12-06 DIAGNOSIS — I10 ESSENTIAL HYPERTENSION: Chronic | ICD-10-CM

## 2021-12-06 RX ORDER — SUMATRIPTAN 50 MG/1
TABLET, FILM COATED ORAL
Qty: 12 TABLET | Refills: 0 | Status: SHIPPED | OUTPATIENT
Start: 2021-12-06 | End: 2021-12-23 | Stop reason: SDUPTHER

## 2021-12-06 RX ORDER — FUROSEMIDE 40 MG/1
40 TABLET ORAL DAILY
Qty: 90 TABLET | Refills: 1 | OUTPATIENT
Start: 2021-12-06 | End: 2021-12-09 | Stop reason: SDUPTHER

## 2021-12-06 RX ORDER — FOLIC ACID 1 MG/1
1 TABLET ORAL DAILY
Qty: 30 TABLET | Refills: 11 | OUTPATIENT
Start: 2021-12-06 | End: 2021-12-09 | Stop reason: SDUPTHER

## 2021-12-09 DIAGNOSIS — I50.32 CHRONIC DIASTOLIC CONGESTIVE HEART FAILURE: Chronic | ICD-10-CM

## 2021-12-09 DIAGNOSIS — I10 ESSENTIAL HYPERTENSION: Chronic | ICD-10-CM

## 2021-12-09 RX ORDER — FUROSEMIDE 40 MG/1
40 TABLET ORAL DAILY
Qty: 90 TABLET | Refills: 1 | Status: SHIPPED | OUTPATIENT
Start: 2021-12-09 | End: 2021-12-28 | Stop reason: SDUPTHER

## 2021-12-09 RX ORDER — AMLODIPINE BESYLATE 10 MG/1
10 TABLET ORAL DAILY
Qty: 90 TABLET | Refills: 3 | Status: SHIPPED | OUTPATIENT
Start: 2021-12-09 | End: 2022-01-21 | Stop reason: SDUPTHER

## 2021-12-23 ENCOUNTER — PATIENT MESSAGE (OUTPATIENT)
Dept: FAMILY MEDICINE | Facility: HOSPITAL | Age: 51
End: 2021-12-23
Payer: MEDICARE

## 2021-12-24 ENCOUNTER — PATIENT MESSAGE (OUTPATIENT)
Dept: PSYCHIATRY | Facility: CLINIC | Age: 51
End: 2021-12-24
Payer: MEDICARE

## 2021-12-29 RX ORDER — BUSPIRONE HYDROCHLORIDE 15 MG/1
15 TABLET ORAL 2 TIMES DAILY PRN
Qty: 60 TABLET | Refills: 11 | Status: SHIPPED | OUTPATIENT
Start: 2021-12-29 | End: 2022-12-12 | Stop reason: SDUPTHER

## 2022-01-21 ENCOUNTER — PATIENT MESSAGE (OUTPATIENT)
Dept: FAMILY MEDICINE | Facility: HOSPITAL | Age: 52
End: 2022-01-21
Payer: MEDICARE

## 2022-01-21 DIAGNOSIS — I10 ESSENTIAL HYPERTENSION: Chronic | ICD-10-CM

## 2022-01-21 DIAGNOSIS — R09.81 CHRONIC NASAL CONGESTION: ICD-10-CM

## 2022-01-21 DIAGNOSIS — K21.9 GASTROESOPHAGEAL REFLUX DISEASE WITHOUT ESOPHAGITIS: ICD-10-CM

## 2022-01-24 ENCOUNTER — OFFICE VISIT (OUTPATIENT)
Dept: PSYCHIATRY | Facility: CLINIC | Age: 52
End: 2022-01-24
Payer: MEDICARE

## 2022-01-24 DIAGNOSIS — F41.0 PANIC DISORDER: Primary | ICD-10-CM

## 2022-01-24 DIAGNOSIS — F33.41 DEPRESSION, MAJOR, RECURRENT, IN PARTIAL REMISSION: ICD-10-CM

## 2022-01-24 DIAGNOSIS — F43.10 PTSD (POST-TRAUMATIC STRESS DISORDER): ICD-10-CM

## 2022-01-24 DIAGNOSIS — F10.10 ALCOHOL USE DISORDER, MILD, IN CONTROLLED ENVIRONMENT: ICD-10-CM

## 2022-01-24 PROCEDURE — 99213 OFFICE O/P EST LOW 20 MIN: CPT | Mod: 95,,, | Performed by: NURSE PRACTITIONER

## 2022-01-24 PROCEDURE — 99213 PR OFFICE/OUTPT VISIT, EST, LEVL III, 20-29 MIN: ICD-10-PCS | Mod: 95,,, | Performed by: NURSE PRACTITIONER

## 2022-01-24 RX ORDER — DISULFIRAM 250 MG/1
250 TABLET ORAL DAILY
Qty: 90 TABLET | Refills: 3 | Status: SHIPPED | OUTPATIENT
Start: 2022-01-24 | End: 2022-04-22 | Stop reason: SDUPTHER

## 2022-01-24 RX ORDER — ESCITALOPRAM OXALATE 20 MG/1
20 TABLET ORAL DAILY
Qty: 90 TABLET | Refills: 3 | Status: SHIPPED | OUTPATIENT
Start: 2022-01-24 | End: 2022-04-22

## 2022-01-24 RX ORDER — ALPRAZOLAM 1 MG/1
1 TABLET ORAL 2 TIMES DAILY PRN
Qty: 60 TABLET | Refills: 5 | Status: SHIPPED | OUTPATIENT
Start: 2022-01-24 | End: 2022-03-11

## 2022-01-24 NOTE — PROGRESS NOTES
"  Outpatient Psychiatry Follow-Up Visit (MD/NP)    1/24/2022    Clinical Status of Patient:  Outpatient (Ambulatory)    Chief Complaint:  Daksha Marie is a 51 y.o. female who presents today for follow-up of anxiety and PTSD.  Met with patient.      Last visit was: 8/17/2021. Chart and  reviewed.   The patient location is: home  The chief complaint leading to consultation is: depression and anxiety    Visit type: audiovisual    Face to Face time with patient: 28 minutes  30 minutes of total time spent on the encounter, which includes face to face time and non-face to face time preparing to see the patient (eg, review of tests), Obtaining and/or reviewing separately obtained history, Documenting clinical information in the electronic or other health record, Independently interpreting results (not separately reported) and communicating results to the patient/family/caregiver, or Care coordination (not separately reported).     Each patient to whom he or she provides medical services by telemedicine is:  (1) informed of the relationship between the physician and patient and the respective role of any other health care provider with respect to management of the patient; and (2) notified that he or she may decline to receive medical services by telemedicine and may withdraw from such care at any time.    Interval History and Content of Current Session:  Current Psychiatric Medications/changes  · Continue Lexapro 20 mg po daily  · Continue Antabuse 250 mg po daily  · Continue Xanax 0.5 mg po BID PRN  · Conitnue Seroquel 50 mg po q hs PRN insomnia  · Try Vistaril PRN anxiety    Virtual Visit:   Pt reports having adverse reaction to Vistaril.  Also reports that Seroquel "makes me feel weird".  Denies any current problems with sleep.  No current complaints of unmanaged depression.  Continues to take Lexapro and Xanax for panic attacks. Remains sober. Plans to restart therapy with [facility].  Denies SI/HI/AVH. "     Psychotherapy:  · Target symptoms: anxiety   · Why chosen therapy is appropriate versus another modality: relevant to diagnosis  · Outcome monitoring methods: self-report  · Therapeutic intervention type: insight oriented psychotherapy  · Topics discussed/themes: building skills sets for symptom management, symptom recognition  · The patient's response to the intervention is accepting. The patient's progress toward treatment goals is good.   · Duration of intervention: 13 minutes.    Review of Systems   · PSYCHIATRIC: Pertinant items are noted in the narrative.  · CONSTITUTIONAL: No weight gain or loss.   · MUSCULOSKELETAL: No pain or stiffness of the joints.  · NEUROLOGIC: No weakness, sensory changes, seizures, confusion, memory loss, tremor or other abnormal movements.  · ENDOCRINE: No polydipsia or polyuria.  · INTEGUMENTARY: No rashes or lacerations.  · EYES: No exophthalmos, jaundice or blindness.  · ENT: No dizziness, tinnitus or hearing loss.  · RESPIRATORY: No shortness of breath.  · CARDIOVASCULAR: No tachycardia or chest pain.  · GASTROINTESTINAL: No nausea, vomiting, pain, constipation or diarrhea.  · GENITOURINARY: No frequency, dysuria or sexual dysfunction.  · HEMATOLOGIC/LYMPHATIC: No excessive bleeding, prolonged or excessive bleeding after dental extraction/injury.  · ALLERGIC/IMMUNOLOGIC: No allergic response to materials, foods or animals at this time.    Past Medical, Family and Social History: The patient's past medical, family and social history have been reviewed and updated as appropriate within the electronic medical record - see encounter notes.    Compliance: yes    Side effects: None    Risk Parameters:  Patient reports no suicidal ideation  Patient reports no homicidal ideation  Patient reports no self-injurious behavior  Patient reports no violent behavior    Exam (detailed: at least 9 elements; comprehensive: all 15 elements)   Constitutional  Vitals:  Most recent vital signs,  dated greater than 90 days prior to this appointment, were reviewed.   There were no vitals filed for this visit.     General:  unremarkable, age appropriate     Musculoskeletal  Muscle Strength/Tone:  no tremor, no tic   Gait & Station:  non-ataxic     Psychiatric  Speech:  no latency; no press   Mood & Affect:  dysthymic  congruent and appropriate   Thought Process:  normal and logical   Associations:  intact   Thought Content:  normal, no suicidality, no homicidality, delusions, or paranoia   Insight:  intact   Judgement: behavior is adequate to circumstances   Orientation:  grossly intact   Memory: intact for content of interview   Language: grossly intact   Attention Span & Concentration:  able to focus   Fund of Knowledge:  intact and appropriate to age and level of education     Assessment and Diagnosis   Status/Progress: Based on the examination today, the patient's problem(s) is/are adequately but not ideally controlled.  New problems have not been presented today.   Co-morbidities and Lack of compliance are not complicating management of the primary condition.  There are no active rule-out diagnoses for this patient at this time.     General Impression:       ICD-10-CM ICD-9-CM   1. Panic disorder  F41.0 300.01   2. Depression, major, recurrent, in partial remission  F33.41 296.35   3. Alcohol use disorder, mild, in controlled environment  F10.10 305.03   4. PTSD (post-traumatic stress disorder)  F43.10 309.81       Intervention/Counseling/Treatment Plan   · Medication Management: Continue current medications. The risks and benefits of medication were discussed with the patient.  · AA/NA/CA/ACOA/Abstinence   · DC  Vistaril 25 mg po QID PRN anxiety (adverse reaction  · Continue Lexapro 20 mg po daily  · Continue Antabuse 250 mg po daily  · Continue Xanax 0.5 mg po BID PRN  · DC Seroquel (made me feel worse)  · Continue with psychotherapy    Return to Clinic: 6 months     Risks, benefits, side effects and  alternative treatments discussed with patient. Patient agrees with the current plan as documented.  Encouraged Patient to keep future appointments.  Take medications as prescribed and abstain from substance abuse.  Pt to present to ED for thoughts to harm herself or others

## 2022-01-25 RX ORDER — CARVEDILOL 3.12 MG/1
3.12 TABLET ORAL 2 TIMES DAILY
Qty: 180 TABLET | Refills: 3 | Status: SHIPPED | OUTPATIENT
Start: 2022-01-25 | End: 2023-01-12 | Stop reason: SDUPTHER

## 2022-01-25 RX ORDER — MONTELUKAST SODIUM 10 MG/1
10 TABLET ORAL NIGHTLY
Qty: 90 TABLET | Refills: 1 | Status: SHIPPED | OUTPATIENT
Start: 2022-01-25 | End: 2023-01-06 | Stop reason: SDUPTHER

## 2022-01-25 RX ORDER — FLUTICASONE PROPIONATE 50 MCG
2 SPRAY, SUSPENSION (ML) NASAL DAILY
Qty: 16 G | Refills: 11 | Status: SHIPPED | OUTPATIENT
Start: 2022-01-25 | End: 2023-01-06 | Stop reason: SDUPTHER

## 2022-01-25 RX ORDER — OMEPRAZOLE 40 MG/1
40 CAPSULE, DELAYED RELEASE ORAL DAILY
Qty: 90 CAPSULE | Refills: 1 | Status: SHIPPED | OUTPATIENT
Start: 2022-01-25 | End: 2022-01-26 | Stop reason: SDUPTHER

## 2022-01-25 RX ORDER — AMLODIPINE BESYLATE 10 MG/1
10 TABLET ORAL DAILY
Qty: 90 TABLET | Refills: 3 | OUTPATIENT
Start: 2022-01-25 | End: 2023-01-06 | Stop reason: SDUPTHER

## 2022-01-26 ENCOUNTER — PATIENT MESSAGE (OUTPATIENT)
Dept: FAMILY MEDICINE | Facility: HOSPITAL | Age: 52
End: 2022-01-26
Payer: MEDICARE

## 2022-02-11 DIAGNOSIS — G44.001 INTRACTABLE CLUSTER HEADACHE SYNDROME, UNSPECIFIED CHRONICITY PATTERN: ICD-10-CM

## 2022-02-11 RX ORDER — SUMATRIPTAN 50 MG/1
TABLET, FILM COATED ORAL
Qty: 12 TABLET | Refills: 0 | Status: SHIPPED | OUTPATIENT
Start: 2022-02-11 | End: 2022-05-11 | Stop reason: SDUPTHER

## 2022-03-09 ENCOUNTER — PATIENT MESSAGE (OUTPATIENT)
Dept: PSYCHIATRY | Facility: CLINIC | Age: 52
End: 2022-03-09
Payer: MEDICARE

## 2022-03-11 RX ORDER — DIAZEPAM 5 MG/1
5 TABLET ORAL EVERY 12 HOURS PRN
Qty: 60 TABLET | Refills: 0 | Status: SHIPPED | OUTPATIENT
Start: 2022-03-11 | End: 2022-04-22 | Stop reason: SDUPTHER

## 2022-03-21 ENCOUNTER — OFFICE VISIT (OUTPATIENT)
Dept: PSYCHIATRY | Facility: CLINIC | Age: 52
End: 2022-03-21
Payer: MEDICARE

## 2022-03-21 DIAGNOSIS — F43.10 PTSD (POST-TRAUMATIC STRESS DISORDER): ICD-10-CM

## 2022-03-21 DIAGNOSIS — F33.41 DEPRESSION, MAJOR, RECURRENT, IN PARTIAL REMISSION: ICD-10-CM

## 2022-03-21 DIAGNOSIS — F41.0 PANIC DISORDER: Primary | ICD-10-CM

## 2022-03-21 DIAGNOSIS — F42.8 OTHER OBSESSIVE-COMPULSIVE DISORDERS: ICD-10-CM

## 2022-03-21 PROCEDURE — 4010F ACE/ARB THERAPY RXD/TAKEN: CPT | Mod: CPTII,95,, | Performed by: SOCIAL WORKER

## 2022-03-21 PROCEDURE — 90834 PR PSYCHOTHERAPY W/PATIENT, 45 MIN: ICD-10-PCS | Mod: 95,,, | Performed by: SOCIAL WORKER

## 2022-03-21 PROCEDURE — 90834 PSYTX W PT 45 MINUTES: CPT | Mod: 95,,, | Performed by: SOCIAL WORKER

## 2022-03-21 PROCEDURE — 4010F PR ACE/ARB THEARPY RXD/TAKEN: ICD-10-PCS | Mod: CPTII,95,, | Performed by: SOCIAL WORKER

## 2022-03-21 NOTE — PROGRESS NOTES
Individual Psychotherapy (PhD/LCSW)    3/21/2022    Site:  Telemed         Therapeutic Intervention: Met with patient.  Outpatient - Insight oriented psychotherapy 45 min - CPT code 85554    Chief complaint/reason for encounter: depression and anxiety     Interval history and content of current session:   The patient location is: [facility].  The chief complaint leading to consultation is: depression    Visit type: audiovisual    Face to Face time with patient: 45   minutes of total time spent on the encounter, which includes face to face time and non-face to face time preparing to see the patient (eg, review of tests), Obtaining and/or reviewing separately obtained history, Documenting clinical information in the electronic or other health record, Independently interpreting results (not separately reported) and communicating results to the patient/family/caregiver, or Care coordination (not separately reported).         Each patient to whom he or she provides medical services by telemedicine is:  (1) informed of the relationship between the physician and patient and the respective role of any other health care provider with respect to management of the patient; and (2) notified that he or she may decline to receive medical services by telemedicine and may withdraw from such care at any time.        Notes: she is in a much better place.  Found an affordable apartment for people with disabilities.  She has her own privacy and likes it this way.   She broke up relation with boyfriend after found him talking to another lady and lying about his relationship with her.    She feels guilt for hurting her children for the years of heavy drinking.  They have forgiven her and she was encouraged to do the same.   She sound calmer.  She was invited by her children to come visit them in [place] and she is thinking about it although fears covid.  She has not gotten the vaccine and she was encouraged to get this.   She continues to  struggle with OCD.  She has not gone walking per fears of heights and was encouraged to confront this fear with the expectation she will feel better by doing so.   She has apprehension to the height of the mooney.   This mooney is used by others for walking.        Son:    In [place]  26  Randi                                 19  Ricky                              [place]  Arielle   28                                         excessive  Drinking  2013- 2019  Treatment plan:  · Target symptoms: depression, anxiety   · Why chosen therapy is appropriate versus another modality: relevant to diagnosis  · Outcome monitoring methods: self-report, observation  · Therapeutic intervention type: insight oriented psychotherapy, behavior modifying psychotherapy, supportive psychotherapy    Risk parameters:  Patient reports no suicidal ideation  Patient reports no homicidal ideation  Patient reports no self-injurious behavior  Patient reports no violent behavior    Verbal deficits: None    Patient's response to intervention:  The patient's response to intervention is accepting.    Progress toward goals and other mental status changes:  The patient's progress toward goals is fair .    Diagnosis:     ICD-10-CM ICD-9-CM   1. Depression, major, recurrent, in partial remission  F33.41 296.35   2. Panic disorder  F41.0 300.01   3. Other obsessive-compulsive disorders  F42.8 300.3       Plan:  individual psychotherapy    Return to clinic: 1 month    Length of Service (minutes): 45

## 2022-04-15 ENCOUNTER — PATIENT MESSAGE (OUTPATIENT)
Dept: PSYCHIATRY | Facility: CLINIC | Age: 52
End: 2022-04-15
Payer: MEDICARE

## 2022-04-22 ENCOUNTER — OFFICE VISIT (OUTPATIENT)
Dept: PSYCHIATRY | Facility: CLINIC | Age: 52
End: 2022-04-22
Payer: MEDICARE

## 2022-04-22 DIAGNOSIS — F42.8 OTHER OBSESSIVE-COMPULSIVE DISORDERS: ICD-10-CM

## 2022-04-22 DIAGNOSIS — F10.10 ALCOHOL USE DISORDER, MILD, IN CONTROLLED ENVIRONMENT: ICD-10-CM

## 2022-04-22 DIAGNOSIS — F41.0 PANIC DISORDER: Primary | ICD-10-CM

## 2022-04-22 DIAGNOSIS — G47.00 INSOMNIA DISORDER, WITH NON-SLEEP DISORDER MENTAL COMORBIDITY: ICD-10-CM

## 2022-04-22 DIAGNOSIS — F33.41 DEPRESSION, MAJOR, RECURRENT, IN PARTIAL REMISSION: ICD-10-CM

## 2022-04-22 PROCEDURE — 1160F RVW MEDS BY RX/DR IN RCRD: CPT | Mod: CPTII,95,, | Performed by: NURSE PRACTITIONER

## 2022-04-22 PROCEDURE — 1159F PR MEDICATION LIST DOCUMENTED IN MEDICAL RECORD: ICD-10-PCS | Mod: CPTII,95,, | Performed by: NURSE PRACTITIONER

## 2022-04-22 PROCEDURE — 99213 OFFICE O/P EST LOW 20 MIN: CPT | Mod: 95,,, | Performed by: NURSE PRACTITIONER

## 2022-04-22 PROCEDURE — 99213 PR OFFICE/OUTPT VISIT, EST, LEVL III, 20-29 MIN: ICD-10-PCS | Mod: 95,,, | Performed by: NURSE PRACTITIONER

## 2022-04-22 PROCEDURE — 4010F ACE/ARB THERAPY RXD/TAKEN: CPT | Mod: CPTII,95,, | Performed by: NURSE PRACTITIONER

## 2022-04-22 PROCEDURE — 1160F PR REVIEW ALL MEDS BY PRESCRIBER/CLIN PHARMACIST DOCUMENTED: ICD-10-PCS | Mod: CPTII,95,, | Performed by: NURSE PRACTITIONER

## 2022-04-22 PROCEDURE — 4010F PR ACE/ARB THEARPY RXD/TAKEN: ICD-10-PCS | Mod: CPTII,95,, | Performed by: NURSE PRACTITIONER

## 2022-04-22 PROCEDURE — 1159F MED LIST DOCD IN RCRD: CPT | Mod: CPTII,95,, | Performed by: NURSE PRACTITIONER

## 2022-04-22 RX ORDER — VENLAFAXINE HYDROCHLORIDE 37.5 MG/1
37.5 CAPSULE, EXTENDED RELEASE ORAL DAILY
Qty: 7 CAPSULE | Refills: 0 | Status: SHIPPED | OUTPATIENT
Start: 2022-04-22 | End: 2022-04-29

## 2022-04-22 RX ORDER — DISULFIRAM 250 MG/1
250 TABLET ORAL DAILY
Qty: 90 TABLET | Refills: 3 | Status: SHIPPED | OUTPATIENT
Start: 2022-04-22 | End: 2022-06-05

## 2022-04-22 RX ORDER — VENLAFAXINE HYDROCHLORIDE 75 MG/1
75 CAPSULE, EXTENDED RELEASE ORAL DAILY
Qty: 30 CAPSULE | Refills: 11 | Status: SHIPPED | OUTPATIENT
Start: 2022-04-22 | End: 2022-06-13

## 2022-04-22 RX ORDER — DIAZEPAM 5 MG/1
5 TABLET ORAL EVERY 12 HOURS PRN
Qty: 60 TABLET | Refills: 2 | Status: SHIPPED | OUTPATIENT
Start: 2022-04-22 | End: 2022-05-23 | Stop reason: SDUPTHER

## 2022-04-22 NOTE — PROGRESS NOTES
Outpatient Psychiatry Follow-Up Visit (MD/NP)    4/22/2022    Clinical Status of Patient:  Outpatient (Ambulatory)    Chief Complaint:  Daksha Marie is a 51 y.o. female who presents today for follow-up of anxiety and PTSD.  Met with patient.      Last visit was: 1/24/22. Chart and  reviewed.   The patient location is: home  The chief complaint leading to consultation is: depression and anxiety    Visit type: audiovisual    Face to Face time with patient: 28 minutes  30 minutes of total time spent on the encounter, which includes face to face time and non-face to face time preparing to see the patient (eg, review of tests), Obtaining and/or reviewing separately obtained history, Documenting clinical information in the electronic or other health record, Independently interpreting results (not separately reported) and communicating results to the patient/family/caregiver, or Care coordination (not separately reported).     Each patient to whom he or she provides medical services by telemedicine is:  (1) informed of the relationship between the physician and patient and the respective role of any other health care provider with respect to management of the patient; and (2) notified that he or she may decline to receive medical services by telemedicine and may withdraw from such care at any time.    Interval History and Content of Current Session:  Current Psychiatric Medications/changes  · DC  Vistaril 25 mg po QID PRN anxiety (adverse reaction  · Continue Lexapro 20 mg po daily  · Continue Antabuse 250 mg po daily  · Continue Xanax 0.5 mg po BID PRN  · DC Seroquel (made me feel worse)    Virtual Visit:   Pt reports she is still having depression symptoms; isolating, sadness, grief symptoms. Wishes to try a different antidepressant. Will try Effexor. Reports that she wanted to switch from Xanax to Valium due to feeling overly dependent.   Denies SI/HI/AVH.     Psychotherapy:  · Target symptoms: anxiety    · Why chosen therapy is appropriate versus another modality: relevant to diagnosis  · Outcome monitoring methods: self-report  · Therapeutic intervention type: insight oriented psychotherapy  · Topics discussed/themes: building skills sets for symptom management, symptom recognition  · The patient's response to the intervention is accepting. The patient's progress toward treatment goals is good.   · Duration of intervention: 13 minutes.    Review of Systems   · PSYCHIATRIC: Pertinant items are noted in the narrative.  · CONSTITUTIONAL: No weight gain or loss.   · MUSCULOSKELETAL: No pain or stiffness of the joints.  · NEUROLOGIC: No weakness, sensory changes, seizures, confusion, memory loss, tremor or other abnormal movements.  · ENDOCRINE: No polydipsia or polyuria.  · INTEGUMENTARY: No rashes or lacerations.  · EYES: No exophthalmos, jaundice or blindness.  · ENT: No dizziness, tinnitus or hearing loss.  · RESPIRATORY: No shortness of breath.  · CARDIOVASCULAR: No tachycardia or chest pain.  · GASTROINTESTINAL: No nausea, vomiting, pain, constipation or diarrhea.  · GENITOURINARY: No frequency, dysuria or sexual dysfunction.  · HEMATOLOGIC/LYMPHATIC: No excessive bleeding, prolonged or excessive bleeding after dental extraction/injury.  · ALLERGIC/IMMUNOLOGIC: No allergic response to materials, foods or animals at this time.    Past Medical, Family and Social History: The patient's past medical, family and social history have been reviewed and updated as appropriate within the electronic medical record - see encounter notes.    Compliance: yes    Side effects: None    Risk Parameters:  Patient reports no suicidal ideation  Patient reports no homicidal ideation  Patient reports no self-injurious behavior  Patient reports no violent behavior    Exam (detailed: at least 9 elements; comprehensive: all 15 elements)   Constitutional  Vitals:  Most recent vital signs, dated greater than 90 days prior to this  appointment, were reviewed.   There were no vitals filed for this visit.     General:  unremarkable, age appropriate     Musculoskeletal  Muscle Strength/Tone:  no tremor, no tic   Gait & Station:  non-ataxic     Psychiatric  Speech:  no latency; no press   Mood & Affect:  dysthymic  congruent and appropriate   Thought Process:  normal and logical   Associations:  intact   Thought Content:  normal, no suicidality, no homicidality, delusions, or paranoia   Insight:  intact   Judgement: behavior is adequate to circumstances   Orientation:  grossly intact   Memory: intact for content of interview   Language: grossly intact   Attention Span & Concentration:  able to focus   Fund of Knowledge:  intact and appropriate to age and level of education     Assessment and Diagnosis   Status/Progress: Based on the examination today, the patient's problem(s) is/are adequately but not ideally controlled.  New problems have not been presented today.   Co-morbidities and Lack of compliance are not complicating management of the primary condition.  There are no active rule-out diagnoses for this patient at this time.     General Impression:       ICD-10-CM ICD-9-CM   1. Panic disorder  F41.0 300.01   2. Alcohol use disorder, mild, in controlled environment  F10.10 305.03   3. Depression, major, recurrent, in partial remission  F33.41 296.35   4. Other obsessive-compulsive disorders  F42.8 300.3   5. Insomnia disorder, with non-sleep disorder mental comorbidity  G47.00 780.52       Intervention/Counseling/Treatment Plan   · Medication Management: Continue current medications. The risks and benefits of medication were discussed with the patient.  · AA/NA/CA/ACOA/Abstinence   · Start Effexor XR 37.5 mg po daily x 7 days then increase to 75 mg daily  · DC Lexapro 20 mg po daily  · Continue Antabuse 250 mg po daily  · Continue Valium 5 mg po BID PRN   · Continue with psychotherapy    Return to Clinic: 1 month     Risks, benefits, side  effects and alternative treatments discussed with patient. Patient agrees with the current plan as documented.  Encouraged Patient to keep future appointments.  Take medications as prescribed and abstain from substance abuse.  Pt to present to ED for thoughts to harm herself or others     Intermediate Repair Preamble Text (Leave Blank If You Do Not Want): Undermining was performed with blunt dissection.

## 2022-05-11 DIAGNOSIS — I50.32 CHRONIC DIASTOLIC CONGESTIVE HEART FAILURE: Chronic | ICD-10-CM

## 2022-05-11 RX ORDER — SPIRONOLACTONE 25 MG/1
25 TABLET ORAL DAILY
Qty: 90 TABLET | Refills: 3 | Status: CANCELLED | OUTPATIENT
Start: 2022-05-11 | End: 2023-05-11

## 2022-05-18 ENCOUNTER — OFFICE VISIT (OUTPATIENT)
Dept: FAMILY MEDICINE | Facility: HOSPITAL | Age: 52
End: 2022-05-18
Attending: STUDENT IN AN ORGANIZED HEALTH CARE EDUCATION/TRAINING PROGRAM
Payer: MEDICARE

## 2022-05-18 DIAGNOSIS — Z13.1 ENCOUNTER FOR SCREENING FOR DIABETES MELLITUS: ICD-10-CM

## 2022-05-18 DIAGNOSIS — Z12.31 ENCOUNTER FOR SCREENING MAMMOGRAM FOR MALIGNANT NEOPLASM OF BREAST: ICD-10-CM

## 2022-05-18 DIAGNOSIS — K21.9 GASTROESOPHAGEAL REFLUX DISEASE WITHOUT ESOPHAGITIS: ICD-10-CM

## 2022-05-18 DIAGNOSIS — Z12.39 ENCOUNTER FOR SCREENING FOR MALIGNANT NEOPLASM OF BREAST, UNSPECIFIED SCREENING MODALITY: Primary | ICD-10-CM

## 2022-05-18 DIAGNOSIS — I50.32 CHRONIC DIASTOLIC CONGESTIVE HEART FAILURE: Chronic | ICD-10-CM

## 2022-05-18 DIAGNOSIS — G44.001 INTRACTABLE CLUSTER HEADACHE SYNDROME, UNSPECIFIED CHRONICITY PATTERN: ICD-10-CM

## 2022-05-18 DIAGNOSIS — I10 ESSENTIAL HYPERTENSION: Chronic | ICD-10-CM

## 2022-05-18 RX ORDER — OMEPRAZOLE 40 MG/1
40 CAPSULE, DELAYED RELEASE ORAL DAILY
Qty: 90 CAPSULE | Refills: 3 | Status: SHIPPED | OUTPATIENT
Start: 2022-05-18 | End: 2022-09-21

## 2022-05-18 RX ORDER — SUMATRIPTAN 50 MG/1
TABLET, FILM COATED ORAL
Qty: 12 TABLET | Refills: 0 | Status: SHIPPED | OUTPATIENT
Start: 2022-05-18 | End: 2023-01-06 | Stop reason: SDUPTHER

## 2022-05-18 RX ORDER — SPIRONOLACTONE 25 MG/1
25 TABLET ORAL DAILY
Qty: 90 TABLET | Refills: 3 | Status: SHIPPED | OUTPATIENT
Start: 2022-05-18 | End: 2022-08-22 | Stop reason: SDUPTHER

## 2022-05-18 RX ORDER — FUROSEMIDE 40 MG/1
40 TABLET ORAL DAILY
Qty: 90 TABLET | Refills: 3 | Status: SHIPPED | OUTPATIENT
Start: 2022-05-18 | End: 2022-06-24 | Stop reason: SDUPTHER

## 2022-05-18 NOTE — PROGRESS NOTES
*This is a telephone encounter that is replacing a normal clinic visit. Due to COVID-19, we are limiting non-urgent clinic visits and I will address patient's concerns to the best of my ability via telephone. If I feel a clinic visit is necessary, proper arrangements will be made to do so.     Daksha Marie is a 51 y.o. female televisit for med refill    Assessment/Plan:    Diagnoses and all orders for this visit:    Encounter for screening for malignant neoplasm of breast, unspecified screening modality  -     Mammo Digital Screening Bilat w/ John; Future    Chronic diastolic congestive heart failure  -     spironolactone (ALDACTONE) 25 MG tablet; Take 1 tablet (25 mg total) by mouth once daily.  -     furosemide (LASIX) 40 MG tablet; Take 1 tablet (40 mg total) by mouth once daily.    Essential hypertension  -     furosemide (LASIX) 40 MG tablet; Take 1 tablet (40 mg total) by mouth once daily.    Intractable cluster headache syndrome, unspecified chronicity pattern  -     sumatriptan (IMITREX) 50 MG tablet; Once for severe headache. May repeat once after 2 hours. Do not exceed 3-4 doses in one week.    Gastroesophageal reflux disease without esophagitis  -     omeprazole (PRILOSEC) 40 MG capsule; Take 1 capsule (40 mg total) by mouth once daily.    Encounter for screening for diabetes mellitus  -     Hemoglobin A1C; Future    Encounter for screening mammogram for malignant neoplasm of breast   -     Mammo Digital Screening Bilat w/ John; Future        Patient instructed to contact the clinic at [phone] with any additional questions or concerns.    Case discussed with staff.      Loco Lynne MD   [facility]   05/18/2022 1:48 PM

## 2022-05-19 NOTE — PROGRESS NOTES
I assume primary medical responsibility for this patient. I have reviewed the history, physical, and assessement & treatment plan with the resident and agree that the care is reasonable and necessary. This service has been performed by a resident without the presence of a teaching physician under the primary care exception. If necessary, an addendum of additional findings or evaluation beyond the resident documentation will be noted below.      Ave Diana MD    [facility]

## 2022-05-23 ENCOUNTER — OFFICE VISIT (OUTPATIENT)
Dept: PSYCHIATRY | Facility: CLINIC | Age: 52
End: 2022-05-23
Payer: MEDICARE

## 2022-05-23 DIAGNOSIS — F42.8 OTHER OBSESSIVE-COMPULSIVE DISORDERS: ICD-10-CM

## 2022-05-23 DIAGNOSIS — F33.41 DEPRESSION, MAJOR, RECURRENT, IN PARTIAL REMISSION: ICD-10-CM

## 2022-05-23 DIAGNOSIS — F43.10 PTSD (POST-TRAUMATIC STRESS DISORDER): ICD-10-CM

## 2022-05-23 DIAGNOSIS — F41.0 PANIC DISORDER: Primary | ICD-10-CM

## 2022-05-23 DIAGNOSIS — G47.00 INSOMNIA DISORDER, WITH NON-SLEEP DISORDER MENTAL COMORBIDITY: ICD-10-CM

## 2022-05-23 DIAGNOSIS — F10.21 ALCOHOL USE DISORDER, MODERATE, IN SUSTAINED REMISSION: ICD-10-CM

## 2022-05-23 PROCEDURE — 99213 OFFICE O/P EST LOW 20 MIN: CPT | Mod: 95,,, | Performed by: NURSE PRACTITIONER

## 2022-05-23 PROCEDURE — 4010F PR ACE/ARB THEARPY RXD/TAKEN: ICD-10-PCS | Mod: CPTII,95,, | Performed by: NURSE PRACTITIONER

## 2022-05-23 PROCEDURE — 99213 PR OFFICE/OUTPT VISIT, EST, LEVL III, 20-29 MIN: ICD-10-PCS | Mod: 95,,, | Performed by: NURSE PRACTITIONER

## 2022-05-23 PROCEDURE — 4010F ACE/ARB THERAPY RXD/TAKEN: CPT | Mod: CPTII,95,, | Performed by: NURSE PRACTITIONER

## 2022-05-23 RX ORDER — DIAZEPAM 5 MG/1
5 TABLET ORAL EVERY 12 HOURS PRN
Qty: 60 TABLET | Refills: 5 | Status: SHIPPED | OUTPATIENT
Start: 2022-05-23 | End: 2022-12-12 | Stop reason: SDUPTHER

## 2022-05-23 NOTE — PROGRESS NOTES
Outpatient Psychiatry Follow-Up Visit (MD/NP)    5/23/2022    Clinical Status of Patient:  Outpatient (Ambulatory)    Chief Complaint:  Daksha Marie is a 51 y.o. female who presents today for follow-up of anxiety and PTSD.  Met with patient.      Last visit was: 4/22/22. Chart and  reviewed.   The patient location is: home  The chief complaint leading to consultation is: depression and anxiety    Visit type: audiovisual    Face to Face time with patient: 28 minutes  30 minutes of total time spent on the encounter, which includes face to face time and non-face to face time preparing to see the patient (eg, review of tests), Obtaining and/or reviewing separately obtained history, Documenting clinical information in the electronic or other health record, Independently interpreting results (not separately reported) and communicating results to the patient/family/caregiver, or Care coordination (not separately reported).     Each patient to whom he or she provides medical services by telemedicine is:  (1) informed of the relationship between the physician and patient and the respective role of any other health care provider with respect to management of the patient; and (2) notified that he or she may decline to receive medical services by telemedicine and may withdraw from such care at any time.    Interval History and Content of Current Session:  Current Psychiatric Medications/changes  · Start Effexor XR 37.5 mg po daily x 7 days then increase to 75 mg daily  · DC Lexapro 20 mg po daily  · Continue Antabuse 250 mg po daily  · Continue Valium 5 mg po BID PRN     Virtual Visit:  Reports good response to Effexor and denies side effects.  Pt denies any further depression symptoms; isolating, sadness, or grief symptoms. Wishes to continue current dose of Effexor. Reports that Valium has been more effective than Xanax.  Denies SI/HI/AVH.     Psychotherapy:  · Target symptoms: anxiety   · Why chosen therapy  is appropriate versus another modality: relevant to diagnosis  · Outcome monitoring methods: self-report  · Therapeutic intervention type: insight oriented psychotherapy  · Topics discussed/themes: building skills sets for symptom management, symptom recognition  · The patient's response to the intervention is accepting. The patient's progress toward treatment goals is good.   · Duration of intervention: 13 minutes.    Review of Systems   · PSYCHIATRIC: Pertinant items are noted in the narrative.  · CONSTITUTIONAL: No weight gain or loss.   · MUSCULOSKELETAL: No pain or stiffness of the joints.  · NEUROLOGIC: No weakness, sensory changes, seizures, confusion, memory loss, tremor or other abnormal movements.  · ENDOCRINE: No polydipsia or polyuria.  · INTEGUMENTARY: No rashes or lacerations.  · EYES: No exophthalmos, jaundice or blindness.  · ENT: No dizziness, tinnitus or hearing loss.  · RESPIRATORY: No shortness of breath.  · CARDIOVASCULAR: No tachycardia or chest pain.  · GASTROINTESTINAL: No nausea, vomiting, pain, constipation or diarrhea.  · GENITOURINARY: No frequency, dysuria or sexual dysfunction.  · HEMATOLOGIC/LYMPHATIC: No excessive bleeding, prolonged or excessive bleeding after dental extraction/injury.  · ALLERGIC/IMMUNOLOGIC: No allergic response to materials, foods or animals at this time.    Past Medical, Family and Social History: The patient's past medical, family and social history have been reviewed and updated as appropriate within the electronic medical record - see encounter notes.    Compliance: yes    Side effects: None    Risk Parameters:  Patient reports no suicidal ideation  Patient reports no homicidal ideation  Patient reports no self-injurious behavior  Patient reports no violent behavior    Exam (detailed: at least 9 elements; comprehensive: all 15 elements)   Constitutional  Vitals:  Most recent vital signs, dated greater than 90 days prior to this appointment, were reviewed.    There were no vitals filed for this visit.     General:  unremarkable, age appropriate     Musculoskeletal  Muscle Strength/Tone:  no tremor, no tic   Gait & Station:  non-ataxic     Psychiatric  Speech:  no latency; no press   Mood & Affect:  dysthymic  congruent and appropriate   Thought Process:  normal and logical   Associations:  intact   Thought Content:  normal, no suicidality, no homicidality, delusions, or paranoia   Insight:  intact   Judgement: behavior is adequate to circumstances   Orientation:  grossly intact   Memory: intact for content of interview   Language: grossly intact   Attention Span & Concentration:  able to focus   Fund of Knowledge:  intact and appropriate to age and level of education     Assessment and Diagnosis   Status/Progress: Based on the examination today, the patient's problem(s) is/are adequately but not ideally controlled.  New problems have not been presented today.   Co-morbidities and Lack of compliance are not complicating management of the primary condition.  There are no active rule-out diagnoses for this patient at this time.     General Impression:       ICD-10-CM ICD-9-CM   1. Panic disorder  F41.0 300.01   2. Depression, major, recurrent, in partial remission  F33.41 296.35   3. PTSD (post-traumatic stress disorder)  F43.10 309.81   4. Other obsessive-compulsive disorders  F42.8 300.3   5. Insomnia disorder, with non-sleep disorder mental comorbidity  G47.00 780.52   6. Alcohol use disorder, moderate, in sustained remission  F10.21 305.03       Intervention/Counseling/Treatment Plan   · Medication Management: Continue current medications. The risks and benefits of medication were discussed with the patient.  · AA/NA/CA/ACOA/Abstinence   · Continue Effexor XR 75 mg po daily  · Continue Antabuse 250 mg po daily  · Continue Valium 5 mg po BID PRN   · Continue with psychotherapy    Return to Clinic: 6 months     Risks, benefits, side effects and alternative treatments  discussed with patient. Patient agrees with the current plan as documented.  Encouraged Patient to keep future appointments.  Take medications as prescribed and abstain from substance abuse.  Pt to present to ED for thoughts to harm herself or others

## 2022-06-07 ENCOUNTER — PATIENT MESSAGE (OUTPATIENT)
Dept: PSYCHIATRY | Facility: CLINIC | Age: 52
End: 2022-06-07
Payer: MEDICARE

## 2022-06-07 DIAGNOSIS — I50.32 CHRONIC DIASTOLIC CONGESTIVE HEART FAILURE: Chronic | ICD-10-CM

## 2022-06-07 DIAGNOSIS — I10 ESSENTIAL HYPERTENSION: Chronic | ICD-10-CM

## 2022-06-07 RX ORDER — LISINOPRIL 20 MG/1
TABLET ORAL
Qty: 30 TABLET | Refills: 11 | Status: SHIPPED | OUTPATIENT
Start: 2022-06-07 | End: 2022-07-29

## 2022-06-13 ENCOUNTER — PATIENT MESSAGE (OUTPATIENT)
Dept: SMOKING CESSATION | Facility: CLINIC | Age: 52
End: 2022-06-13
Payer: MEDICARE

## 2022-06-13 RX ORDER — VENLAFAXINE HYDROCHLORIDE 150 MG/1
150 CAPSULE, EXTENDED RELEASE ORAL DAILY
Qty: 90 CAPSULE | Refills: 3 | Status: SHIPPED | OUTPATIENT
Start: 2022-06-13 | End: 2022-12-12 | Stop reason: SDUPTHER

## 2022-06-22 ENCOUNTER — PATIENT MESSAGE (OUTPATIENT)
Dept: PSYCHIATRY | Facility: CLINIC | Age: 52
End: 2022-06-22
Payer: MEDICARE

## 2022-06-22 ENCOUNTER — TELEPHONE (OUTPATIENT)
Dept: PSYCHIATRY | Facility: CLINIC | Age: 52
End: 2022-06-22
Payer: MEDICARE

## 2022-06-22 RX ORDER — TRAZODONE HYDROCHLORIDE 100 MG/1
TABLET ORAL
Qty: 60 TABLET | Refills: 11 | Status: SHIPPED | OUTPATIENT
Start: 2022-06-22 | End: 2022-09-21

## 2022-06-23 ENCOUNTER — PATIENT MESSAGE (OUTPATIENT)
Dept: FAMILY MEDICINE | Facility: HOSPITAL | Age: 52
End: 2022-06-23
Payer: MEDICARE

## 2022-06-24 DIAGNOSIS — I10 ESSENTIAL HYPERTENSION: Chronic | ICD-10-CM

## 2022-06-24 DIAGNOSIS — I50.32 CHRONIC DIASTOLIC CONGESTIVE HEART FAILURE: Chronic | ICD-10-CM

## 2022-06-24 RX ORDER — FUROSEMIDE 40 MG/1
40 TABLET ORAL DAILY
Qty: 90 TABLET | Refills: 3 | OUTPATIENT
Start: 2022-06-24 | End: 2022-07-07 | Stop reason: SDUPTHER

## 2022-07-03 ENCOUNTER — PATIENT MESSAGE (OUTPATIENT)
Dept: PSYCHIATRY | Facility: CLINIC | Age: 52
End: 2022-07-03
Payer: MEDICARE

## 2022-07-05 DIAGNOSIS — I50.32 CHRONIC DIASTOLIC CONGESTIVE HEART FAILURE: Chronic | ICD-10-CM

## 2022-07-05 DIAGNOSIS — I10 ESSENTIAL HYPERTENSION: Chronic | ICD-10-CM

## 2022-07-05 RX ORDER — FUROSEMIDE 40 MG/1
40 TABLET ORAL DAILY
Qty: 90 TABLET | Refills: 3 | Status: CANCELLED | OUTPATIENT
Start: 2022-07-05 | End: 2023-07-05

## 2022-07-13 DIAGNOSIS — I10 ESSENTIAL HYPERTENSION: Chronic | ICD-10-CM

## 2022-07-13 DIAGNOSIS — I50.32 CHRONIC DIASTOLIC CONGESTIVE HEART FAILURE: Chronic | ICD-10-CM

## 2022-07-15 RX ORDER — FUROSEMIDE 40 MG/1
40 TABLET ORAL DAILY
Qty: 90 TABLET | Refills: 3 | Status: SHIPPED | OUTPATIENT
Start: 2022-07-15 | End: 2022-07-25 | Stop reason: SDUPTHER

## 2022-07-16 ENCOUNTER — PATIENT MESSAGE (OUTPATIENT)
Dept: FAMILY MEDICINE | Facility: HOSPITAL | Age: 52
End: 2022-07-16
Payer: MEDICARE

## 2022-07-16 DIAGNOSIS — I50.32 CHRONIC DIASTOLIC CONGESTIVE HEART FAILURE: Chronic | ICD-10-CM

## 2022-07-16 DIAGNOSIS — I10 ESSENTIAL HYPERTENSION: Chronic | ICD-10-CM

## 2022-07-24 ENCOUNTER — PATIENT MESSAGE (OUTPATIENT)
Dept: PSYCHIATRY | Facility: CLINIC | Age: 52
End: 2022-07-24
Payer: MEDICARE

## 2022-07-27 DIAGNOSIS — I50.32 CHRONIC DIASTOLIC CONGESTIVE HEART FAILURE: Chronic | ICD-10-CM

## 2022-07-27 DIAGNOSIS — I10 ESSENTIAL HYPERTENSION: Chronic | ICD-10-CM

## 2022-07-27 RX ORDER — FUROSEMIDE 40 MG/1
40 TABLET ORAL DAILY
Qty: 90 TABLET | Refills: 3 | Status: SHIPPED | OUTPATIENT
Start: 2022-07-27 | End: 2022-07-27 | Stop reason: SDUPTHER

## 2022-07-27 RX ORDER — FUROSEMIDE 40 MG/1
40 TABLET ORAL DAILY
Qty: 90 TABLET | Refills: 3 | Status: SHIPPED | OUTPATIENT
Start: 2022-07-27 | End: 2023-01-06 | Stop reason: SDUPTHER

## 2022-07-29 ENCOUNTER — OFFICE VISIT (OUTPATIENT)
Dept: CARDIOLOGY | Facility: CLINIC | Age: 52
End: 2022-07-29
Payer: MEDICARE

## 2022-07-29 VITALS
HEART RATE: 78 BPM | BODY MASS INDEX: 36.27 KG/M2 | WEIGHT: 217.69 LBS | OXYGEN SATURATION: 95 % | SYSTOLIC BLOOD PRESSURE: 110 MMHG | HEIGHT: 65 IN | DIASTOLIC BLOOD PRESSURE: 84 MMHG

## 2022-07-29 DIAGNOSIS — E78.5 HYPERLIPIDEMIA: ICD-10-CM

## 2022-07-29 DIAGNOSIS — I63.9 CEREBROVASCULAR ACCIDENT (CVA), UNSPECIFIED MECHANISM: ICD-10-CM

## 2022-07-29 DIAGNOSIS — F10.10 ALCOHOL ABUSE: Primary | Chronic | ICD-10-CM

## 2022-07-29 DIAGNOSIS — I42.6 ALCOHOLIC CARDIOMYOPATHY: ICD-10-CM

## 2022-07-29 DIAGNOSIS — R06.00 DYSPNEA, UNSPECIFIED: ICD-10-CM

## 2022-07-29 DIAGNOSIS — I10 ESSENTIAL HYPERTENSION: Chronic | ICD-10-CM

## 2022-07-29 PROCEDURE — 3079F PR MOST RECENT DIASTOLIC BLOOD PRESSURE 80-89 MM HG: ICD-10-PCS | Mod: CPTII,S$GLB,, | Performed by: INTERNAL MEDICINE

## 2022-07-29 PROCEDURE — 99999 PR PBB SHADOW E&M-EST. PATIENT-LVL III: ICD-10-PCS | Mod: PBBFAC,,, | Performed by: INTERNAL MEDICINE

## 2022-07-29 PROCEDURE — 99204 OFFICE O/P NEW MOD 45 MIN: CPT | Mod: S$GLB,,, | Performed by: INTERNAL MEDICINE

## 2022-07-29 PROCEDURE — 1159F MED LIST DOCD IN RCRD: CPT | Mod: CPTII,S$GLB,, | Performed by: INTERNAL MEDICINE

## 2022-07-29 PROCEDURE — 3008F PR BODY MASS INDEX (BMI) DOCUMENTED: ICD-10-PCS | Mod: CPTII,S$GLB,, | Performed by: INTERNAL MEDICINE

## 2022-07-29 PROCEDURE — 3074F SYST BP LT 130 MM HG: CPT | Mod: CPTII,S$GLB,, | Performed by: INTERNAL MEDICINE

## 2022-07-29 PROCEDURE — 93010 ELECTROCARDIOGRAM REPORT: CPT | Mod: S$GLB,,, | Performed by: INTERNAL MEDICINE

## 2022-07-29 PROCEDURE — 1159F PR MEDICATION LIST DOCUMENTED IN MEDICAL RECORD: ICD-10-PCS | Mod: CPTII,S$GLB,, | Performed by: INTERNAL MEDICINE

## 2022-07-29 PROCEDURE — 3008F BODY MASS INDEX DOCD: CPT | Mod: CPTII,S$GLB,, | Performed by: INTERNAL MEDICINE

## 2022-07-29 PROCEDURE — 93005 ELECTROCARDIOGRAM TRACING: CPT

## 2022-07-29 PROCEDURE — 4010F PR ACE/ARB THEARPY RXD/TAKEN: ICD-10-PCS | Mod: CPTII,S$GLB,, | Performed by: INTERNAL MEDICINE

## 2022-07-29 PROCEDURE — 3074F PR MOST RECENT SYSTOLIC BLOOD PRESSURE < 130 MM HG: ICD-10-PCS | Mod: CPTII,S$GLB,, | Performed by: INTERNAL MEDICINE

## 2022-07-29 PROCEDURE — 93010 EKG 12-LEAD: ICD-10-PCS | Mod: S$GLB,,, | Performed by: INTERNAL MEDICINE

## 2022-07-29 PROCEDURE — 99204 PR OFFICE/OUTPT VISIT, NEW, LEVL IV, 45-59 MIN: ICD-10-PCS | Mod: S$GLB,,, | Performed by: INTERNAL MEDICINE

## 2022-07-29 PROCEDURE — 3079F DIAST BP 80-89 MM HG: CPT | Mod: CPTII,S$GLB,, | Performed by: INTERNAL MEDICINE

## 2022-07-29 PROCEDURE — 99999 PR PBB SHADOW E&M-EST. PATIENT-LVL III: CPT | Mod: PBBFAC,,, | Performed by: INTERNAL MEDICINE

## 2022-07-29 PROCEDURE — 4010F ACE/ARB THERAPY RXD/TAKEN: CPT | Mod: CPTII,S$GLB,, | Performed by: INTERNAL MEDICINE

## 2022-07-29 NOTE — PROGRESS NOTES
Cardiology    7/29/2022  11:57 AM    Problem list  Patient Active Problem List   Diagnosis    Essential hypertension    Chronic diastolic congestive heart failure    PTSD (post-traumatic stress disorder)    Glaucoma    History of Haylee-en-Y gastric bypass    Alcohol abuse    Convulsions    Generalized abdominal pain    Gastroesophageal reflux disease without esophagitis    Enteritis    Microcytic anemia    Depression    Chronic headache    Generalized anxiety disorder    Panic disorder    Acute on chronic diastolic heart failure    Congestive heart failure    H/O alcohol dependence    Chronic recurrent major depressive disorder    Alcohol use disorder, moderate, in sustained remission    CVA (cerebral vascular accident)       CC:  Cardiac f/u    HPI:  Patient was recommend to follow up with cardiologist.  She was previously seen Dr. Watson at [facility].  She has a history of alcoholic cardiomyopathy.  In January, she had slurred speech and was diagnosed with a CVA.  She went to [facility] where she received thrombolytics.  Her symptoms resolved.  She was told to go follow-up with cardiologist.  She went to emergency room in June it was discovered that she has hyperkalemia (lisinopril stopped.  Did not f/u with Dr Farias with renal).  She was treated with IV fluids and Lokelma.  She was a heavy drinker (1/2 galloon of vodka daily) and quit in 2017. She smokes 1/2 pack per day.    Medications  Current Outpatient Medications   Medication Sig Dispense Refill    amLODIPine (NORVASC) 10 MG tablet Take 1 tablet (10 mg total) by mouth once daily. 90 tablet 3    BELBUCA 150 mcg Film DISSOLVE 1 FILM TO THE GUM EVERY 12 HOURS      brimonidine-timoloL (COMBIGAN) 0.2-0.5 % Drop Place 1 drop into both eyes 2 (two) times daily.      busPIRone (BUSPAR) 15 MG tablet Take 1 tablet (15 mg total) by mouth 2 (two) times daily as needed (breakthrough anxiety). 60 tablet 11    calcium citrate 250 mg  calcium Tab       carvediloL (COREG) 3.125 MG tablet Take 1 tablet (3.125 mg total) by mouth 2 (two) times daily. 180 tablet 3    diazePAM (VALIUM) 5 MG tablet Take 1 tablet (5 mg total) by mouth every 12 (twelve) hours as needed for Anxiety. 60 tablet 5    disulfiram (ANTABUSE) 250 mg tablet TAKE 1 TABLET EVERY DAY 90 tablet 3    fluticasone propionate (FLONASE) 50 mcg/actuation nasal spray 2 sprays (100 mcg total) by Each Nostril route once daily. 16 g 11    folic acid (FOLVITE) 1 MG tablet Take 1 tablet (1 mg total) by mouth once daily. 30 tablet 11    furosemide (LASIX) 40 MG tablet Take 1 tablet (40 mg total) by mouth once daily. 90 tablet 3    HYDROcodone-acetaminophen (NORCO) 7.5-325 mg per tablet TAKE 1 T PO TID PRN FOR 30 DAYS  0    lactulose (CHRONULAC) 10 gram/15 mL solution TK 15 TO 30 ML PO ONCE TO BID PRF CONSTIPATION 236 mL 1    levocetirizine (XYZAL) 5 MG tablet Take 1 tablet (5 mg total) by mouth every evening. 30 tablet 11    methocarbamoL (ROBAXIN) 750 MG Tab Take 750 mg by mouth 3 (three) times daily.      montelukast (SINGULAIR) 10 mg tablet Take 1 tablet (10 mg total) by mouth every evening. 90 tablet 1    multivitamin (THERAGRAN) tablet Take 1 tablet by mouth once daily. 30 tablet 5    omeprazole (PRILOSEC) 40 MG capsule Take 1 capsule (40 mg total) by mouth once daily. 90 capsule 3    promethazine (PHENERGAN) 25 MG tablet Take 1 tablet (25 mg total) by mouth every 6 (six) hours as needed for Nausea. 15 tablet 0    spironolactone (ALDACTONE) 25 MG tablet Take 1 tablet (25 mg total) by mouth once daily. 90 tablet 3    sumatriptan (IMITREX) 50 MG tablet Once for severe headache. May repeat once after 2 hours. Do not exceed 3-4 doses in one week. 12 tablet 0    TRAVATAN Z 0.004 % ophthalmic solution INT 1 GTT IN OU QD  3    traZODone (DESYREL) 100 MG tablet Take 1 or 2 tablets before bed as needed for insomnia 60 tablet 11    venlafaxine (EFFEXOR-XR) 150 MG Cp24 Take 1 capsule  (150 mg total) by mouth once daily. Start on Week 2 90 capsule 3    cyanocobalamin 1,000 mcg/mL injection inject  1000mcg ( 1 vial ) as directed  every 28 days ( the 15th of every month ) (Patient not taking: Reported on 7/29/2022) 1 mL 6     Current Facility-Administered Medications   Medication Dose Route Frequency Provider Last Rate Last Admin    cyanocobalamin injection 1,000 mcg  1,000 mcg Intramuscular Q30 Days D'Amico C. Johnson, MD          Prior to Admission medications    Medication Sig Start Date End Date Taking? Authorizing Provider   amLODIPine (NORVASC) 10 MG tablet Take 1 tablet (10 mg total) by mouth once daily. 1/25/22 1/25/23 Yes Loco Lynne MD   BELBUCA 150 mcg Film DISSOLVE 1 FILM TO THE GUM EVERY 12 HOURS 6/9/21  Yes Historical Provider   brimonidine-timoloL (COMBIGAN) 0.2-0.5 % Drop Place 1 drop into both eyes 2 (two) times daily.   Yes Historical Provider   busPIRone (BUSPAR) 15 MG tablet Take 1 tablet (15 mg total) by mouth 2 (two) times daily as needed (breakthrough anxiety). 12/29/21  Yes Ceferino Farr III, NP   calcium citrate 250 mg calcium Tab  3/20/07  Yes Historical Provider   carvediloL (COREG) 3.125 MG tablet Take 1 tablet (3.125 mg total) by mouth 2 (two) times daily. 1/25/22 1/25/23 Yes Loco Lynne MD   diazePAM (VALIUM) 5 MG tablet Take 1 tablet (5 mg total) by mouth every 12 (twelve) hours as needed for Anxiety. 5/23/22  Yes Ceferino Farr III, NP   disulfiram (ANTABUSE) 250 mg tablet TAKE 1 TABLET EVERY DAY 6/5/22  Yes Ceferino Farr III, NP   fluticasone propionate (FLONASE) 50 mcg/actuation nasal spray 2 sprays (100 mcg total) by Each Nostril route once daily. 1/25/22  Yes Loco Lynne MD   folic acid (FOLVITE) 1 MG tablet Take 1 tablet (1 mg total) by mouth once daily. 12/9/21 12/9/22 Yes Loco Lynne MD   furosemide (LASIX) 40 MG tablet Take 1 tablet (40 mg total) by mouth once daily. 7/27/22 7/27/23 Yes Tucker Wright MD   HYDROcodone-acetaminophen (NORCO)  7.5-325 mg per tablet TAKE 1 T PO TID PRN FOR 30 DAYS 7/15/19  Yes Historical Provider   lactulose (CHRONULAC) 10 gram/15 mL solution TK 15 TO 30 ML PO ONCE TO BID PRF CONSTIPATION 5/11/22  Yes Loco Lynne MD   levocetirizine (XYZAL) 5 MG tablet Take 1 tablet (5 mg total) by mouth every evening. 9/7/21 9/7/22 Yes Loco Arzola MD   methocarbamoL (ROBAXIN) 750 MG Tab Take 750 mg by mouth 3 (three) times daily. 7/2/21  Yes Historical Provider   montelukast (SINGULAIR) 10 mg tablet Take 1 tablet (10 mg total) by mouth every evening. 1/25/22 1/25/23 Yes Loco Lynne MD   multivitamin (THERAGRAN) tablet Take 1 tablet by mouth once daily. 10/10/15  Yes Karla Hathaway MD   omeprazole (PRILOSEC) 40 MG capsule Take 1 capsule (40 mg total) by mouth once daily. 5/18/22 5/18/23 Yes Loco Lynne MD   promethazine (PHENERGAN) 25 MG tablet Take 1 tablet (25 mg total) by mouth every 6 (six) hours as needed for Nausea. 8/25/21  Yes Loco Lynne MD   spironolactone (ALDACTONE) 25 MG tablet Take 1 tablet (25 mg total) by mouth once daily. 5/18/22 5/18/23 Yes Loco Lynne MD   sumatriptan (IMITREX) 50 MG tablet Once for severe headache. May repeat once after 2 hours. Do not exceed 3-4 doses in one week. 5/18/22  Yes Loco Lynne MD   TRAVATAN Z 0.004 % ophthalmic solution INT 1 GTT IN OU QD 6/4/19  Yes Historical Provider   traZODone (DESYREL) 100 MG tablet Take 1 or 2 tablets before bed as needed for insomnia 6/22/22  Yes Ceferino Farr III, NP   venlafaxine (EFFEXOR-XR) 150 MG Cp24 Take 1 capsule (150 mg total) by mouth once daily. Start on Week 2 6/13/22  Yes Ceferino Farr III, NP   cyanocobalamin 1,000 mcg/mL injection inject  1000mcg ( 1 vial ) as directed  every 28 days ( the 15th of every month )  Patient not taking: Reported on 7/29/2022 2/15/21   D'Amico C. Johnson, MD   acamprosate (CAMPRAL) 333 mg tablet Take 2 tablets (666 mg total) by mouth 3 (three) times daily. 4/1/19 8/12/19  Ceferino Farr III, NP    ALPRAZolam (XANAX) 1 MG tablet Take 1 tablet (1 mg total) by mouth 2 (two) times daily as needed for Anxiety. 1/24/22 3/11/22  Ceferino Farr III, BEHZAD   lisinopriL (PRINIVIL,ZESTRIL) 20 MG tablet TK 1 T PO QD 22  Loco Lynne MD   LOTEMAX 0.5 % ophthalmic suspension PLACE 1 DROP IN RIGHT EYE BID 19  Historical Provider         History  Past Medical History:   Diagnosis Date    Alcohol abuse 10/7/2015    Arthritis     Breast calcification, left     Pt states this has been worked up, she received a biopsy in the past to confirm calcifications were from scar tissue from when she had breast reduction surgery.      Chronic diastolic congestive heart failure     CVA (cerebral vascular accident) 2022    Encounter for blood transfusion     GERD (gastroesophageal reflux disease)     Glaucoma     Hypertension     Hyponatremia 10/2015    Na 109. attributed to polydipsia and alcohol abuse.  suffered a seizure in the ICU .    Insomnia     Malingering 2016    PTSD (post-traumatic stress disorder)     Seizures     Sickle cell trait      Past Surgical History:   Procedure Laterality Date    bile fistula      BREAST BIOPSY Left     breast reduction  2003    carpal tunnel sugery   2020     SECTION      CHOLECYSTECTOMY  after     GASTRIC BYPASS   and     TOTAL REDUCTION MAMMOPLASTY Bilateral 2003    xen gel stent implant  2018    Performed at [facility]     Social History     Socioeconomic History    Marital status: Single   Tobacco Use    Smoking status: Current Every Day Smoker     Packs/day: 1.00     Years: 4.00     Pack years: 4.00     Types: Cigarettes    Smokeless tobacco: Never Used   Substance and Sexual Activity    Alcohol use: No     Comment: former heavy drinker since May 1st 2017    Drug use: No    Sexual activity: Yes     Partners: Male     Social Determinants of Health     Financial Resource Strain:  High Risk    Difficulty of Paying Living Expenses: Very hard   Food Insecurity: Food Insecurity Present    Worried About Running Out of Food in the Last Year: Often true    Ran Out of Food in the Last Year: Often true   Transportation Needs: Unmet Transportation Needs    Lack of Transportation (Medical): Yes    Lack of Transportation (Non-Medical): Yes   Physical Activity: Inactive    Days of Exercise per Week: 0 days    Minutes of Exercise per Session: 0 min   Stress: Stress Concern Present    Feeling of Stress : To some extent   Social Connections: Unknown    Frequency of Communication with Friends and Family: More than three times a week    Frequency of Social Gatherings with Friends and Family: Never    Active Member of Clubs or Organizations: No    Attends Club or Organization Meetings: Never    Marital Status:    Housing Stability: High Risk    Unable to Pay for Housing in the Last Year: Yes    Number of Places Lived in the Last Year: 4    Unstable Housing in the Last Year: No         Allergies  Review of patient's allergies indicates:   Allergen Reactions    Penicillins Hives    Hydroxyzine Anxiety, Hallucinations and Palpitations         Review of Systems   Review of Systems   Constitutional: Negative for decreased appetite, fever and weight loss.   HENT: Negative for congestion and nosebleeds.    Eyes: Negative for double vision, vision loss in left eye, vision loss in right eye and visual disturbance.   Cardiovascular: Negative for chest pain, claudication, cyanosis, dyspnea on exertion, irregular heartbeat, leg swelling, near-syncope, orthopnea, palpitations, paroxysmal nocturnal dyspnea and syncope.   Respiratory: Negative for cough, hemoptysis, shortness of breath, sleep disturbances due to breathing, snoring, sputum production and wheezing.    Endocrine: Negative for cold intolerance and heat intolerance.   Skin: Negative for nail changes and rash.   Musculoskeletal: Negative  for joint pain, muscle cramps, muscle weakness and myalgias.   Gastrointestinal: Positive for abdominal pain and constipation. Negative for change in bowel habit, heartburn, hematemesis, hematochezia, hemorrhoids and melena.   Neurological: Negative for dizziness, focal weakness and headaches.         Physical Exam  Wt Readings from Last 1 Encounters:   07/29/22 98.7 kg (217 lb 11.2 oz)     BP Readings from Last 3 Encounters:   07/29/22 110/84   07/03/21 131/80   08/19/20 100/60     Pulse Readings from Last 1 Encounters:   07/29/22 78     Body mass index is 36.23 kg/m².    Physical Exam  Vitals reviewed.   Constitutional:       Appearance: She is well-developed. She is obese.   HENT:      Head: Atraumatic.   Eyes:      General: No scleral icterus.  Neck:      Vascular: Normal carotid pulses. No carotid bruit, hepatojugular reflux or JVD.   Cardiovascular:      Rate and Rhythm: Normal rate and regular rhythm.      Chest Wall: PMI is not displaced.      Pulses: Intact distal pulses.           Carotid pulses are 2+ on the right side and 2+ on the left side.       Radial pulses are 2+ on the right side and 2+ on the left side.        Dorsalis pedis pulses are 2+ on the right side and 2+ on the left side.      Heart sounds: Normal heart sounds, S1 normal and S2 normal. No murmur heard.    No friction rub.   Pulmonary:      Effort: Pulmonary effort is normal. No respiratory distress.      Breath sounds: Normal breath sounds. No stridor. No wheezing or rales.   Chest:      Chest wall: No tenderness.   Abdominal:      General: Bowel sounds are normal.      Palpations: Abdomen is soft.   Musculoskeletal:      Cervical back: Neck supple. No edema.   Skin:     General: Skin is warm and dry.      Nails: There is no clubbing.   Neurological:      Mental Status: She is alert and oriented to person, place, and time.   Psychiatric:         Behavior: Behavior normal.         Thought Content: Thought content normal.              Assessment  1. Alcohol abuse  Stable    2. Essential hypertension  Well controlled    3. Alcoholic cardiomyopathy  Stable  - EKG 12-lead  - Echo; Future    4. Cerebrovascular accident (CVA), unspecified mechanism  Stable        Plan and Discussion  Discussed her diagnosis.  She has a history of alcoholic cardiomyopathy.  She last saw her cardiologist 2018 where was noted she had negative ischemic workup but poor ejection fraction.  Currently her blood pressure is well controlled.  She was taken off lisinopril for probable hyperkalemia.  Given that she is currently well controlled, will continue same regimen.  Will get echocardiogram and labs.  Discussed smoking cessation.    Follow Up  1-2 months      Tab Padilla MD, F.A.C.C, F.S.C.A.I.

## 2022-08-10 ENCOUNTER — OFFICE VISIT (OUTPATIENT)
Dept: FAMILY MEDICINE | Facility: HOSPITAL | Age: 52
End: 2022-08-10
Payer: MEDICARE

## 2022-08-10 ENCOUNTER — HOSPITAL ENCOUNTER (EMERGENCY)
Facility: HOSPITAL | Age: 52
Discharge: HOME | End: 2022-08-10
Attending: EMERGENCY MEDICINE
Payer: MEDICARE

## 2022-08-10 VITALS
DIASTOLIC BLOOD PRESSURE: 83 MMHG | HEART RATE: 77 BPM | TEMPERATURE: 98 F | RESPIRATION RATE: 18 BRPM | OXYGEN SATURATION: 100 % | SYSTOLIC BLOOD PRESSURE: 117 MMHG

## 2022-08-10 VITALS — BODY MASS INDEX: 34.2 KG/M2 | WEIGHT: 205.25 LBS | HEIGHT: 65 IN

## 2022-08-10 DIAGNOSIS — Z71.1 FEARED CONDITION NOT DEMONSTRATED: Primary | ICD-10-CM

## 2022-08-10 DIAGNOSIS — I95.9 HYPOTENSION, UNSPECIFIED HYPOTENSION TYPE: Primary | ICD-10-CM

## 2022-08-10 PROCEDURE — 99212 OFFICE O/P EST SF 10 MIN: CPT | Performed by: PHYSICIAN ASSISTANT

## 2022-08-10 PROCEDURE — 99282 EMERGENCY DEPT VISIT SF MDM: CPT | Mod: 27

## 2022-08-10 NOTE — PROGRESS NOTES
Ms. Marie was seen in clinic today for hospital follow up. Multiple attempts were made by different staff members to check her vitals, but no one was able to get a blood pressure reading on the patient. Patient reports low blood pressure is one of the reasons that she was taken to the hospital a couple weeks ago. It was decided that she should be evaluated in the ED and was escorted.

## 2022-08-10 NOTE — DISCHARGE INSTRUCTIONS

## 2022-08-10 NOTE — ED TRIAGE NOTES
Patient identifiers verified and correct.     APPEARANCE: Patient not in acute distress.  NEURO: Awake, alert, appropriate for age, condition, and situation, pupils equal, round, and reactive.   HEENT: Head symmetrical. Eyes bilateral.  Bilateral ears without drainage. Bilateral nares patent, throat clear.  CARDIAC: Regular rate and rhythm  RESPIRATORY: Airway is open and patent. Respirations are normal and spontaneous on room air.   NEUROVASCULAR: All extremities are warm and pink. .  MUSCULOSKELETAL: Moves all extremities.   SKIN: Warm and dry, adequate turgor, mucus membranes moist and pink; no breakdown, lesions, or ecchymosis noted.   SOCIAL: Patient is accompanied by . Will continue to monitor.

## 2022-08-10 NOTE — ED PROVIDER NOTES
"Encounter Date: 8/10/2022       History     Chief Complaint   Patient presents with    Follow-up     Pt was sent here from clinic for " not able to obtain blood pressure" pt denies any complaints at this time, and b/p in triage-127/88     52 yr old presents to the ER with reports of being at her pcp appmt and they were not able to obtain BP readings. Pt denies any complaints at present. Denies chest pain or sob. No nausea, vomiting or diarrhea. Not toxic in appearance. BP readings times 3 taken and all WNL.     The history is provided by the patient.     Review of patient's allergies indicates:   Allergen Reactions    Penicillins Hives    Hydroxyzine Anxiety, Hallucinations and Palpitations     Past Medical History:   Diagnosis Date    Alcohol abuse 10/7/2015    Arthritis     Breast calcification, left     Pt states this has been worked up, she received a biopsy in the past to confirm calcifications were from scar tissue from when she had breast reduction surgery.      Chronic diastolic congestive heart failure     CVA (cerebral vascular accident) 2022    Encounter for blood transfusion     GERD (gastroesophageal reflux disease)     Glaucoma     Hypertension     Hyponatremia 10/2015    Na 109. attributed to polydipsia and alcohol abuse.  suffered a seizure in the ICU .    Insomnia     Malingering 2016    PTSD (post-traumatic stress disorder)     Seizures     Sickle cell trait      Past Surgical History:   Procedure Laterality Date    bile fistula      BREAST BIOPSY Left     breast reduction      carpal tunnel sugery   2020     SECTION      CHOLECYSTECTOMY  after 2007    GASTRIC BYPASS  2007 and     TOTAL REDUCTION MAMMOPLASTY Bilateral 2003    xen gel stent implant  2018    Performed at [facility]     Family History   Problem Relation Age of Onset    Diabetes Mother     Lupus Mother     Hypertension Mother     Heart attack " Mother     Diabetes Maternal Grandmother     Crohn's disease Cousin     Heart failure Father     Hypertension Brother     Asthma Brother      Social History     Tobacco Use    Smoking status: Current Every Day Smoker     Packs/day: 1.00     Years: 4.00     Pack years: 4.00     Types: Cigarettes    Smokeless tobacco: Never Used   Substance Use Topics    Alcohol use: No     Comment: former heavy drinker since May 1st 2017    Drug use: No     Review of Systems   Constitutional: Negative for fever.   Respiratory: Negative for shortness of breath.    Cardiovascular: Negative for chest pain.   Gastrointestinal: Negative for diarrhea, nausea and vomiting.   Genitourinary: Negative for dysuria.   Musculoskeletal: Negative for neck pain and neck stiffness.   Skin: Negative for rash and wound.       Physical Exam     Initial Vitals [08/10/22 1204]   BP Pulse Resp Temp SpO2   127/88 85 18 98.4 °F (36.9 °C) 97 %      MAP       --         Physical Exam    Constitutional: She appears well-developed and well-nourished.   HENT:   Head: Normocephalic.   Right Ear: Hearing normal.   Left Ear: Hearing normal.   Nose: Nose normal.   Mouth/Throat: Oropharynx is clear and moist.   Eyes: Lids are normal. Right eye exhibits no discharge. Left eye exhibits no discharge.   Neck:   Normal range of motion.  Cardiovascular: Normal rate.   Pulmonary/Chest: No respiratory distress.   Abdominal: Abdomen is soft. There is no abdominal tenderness.   Musculoskeletal:         General: Normal range of motion.      Cervical back: Normal range of motion. No rigidity.     Neurological: She is alert and oriented to person, place, and time.   Skin: Skin is warm and dry. No rash noted.   Psychiatric: She has a normal mood and affect. Her behavior is normal. Judgment and thought content normal.         ED Course   Procedures  Labs Reviewed - No data to display       Imaging Results    None          Medications - No data to display              ED  Course as of 08/10/22 1243   Wed Aug 10, 2022   1232 3 repeat readings all WNL. No complaints. Her PCP was messaged to notify him of normal readings. John Broderick.  [DT]      ED Course User Index  [DT] Carmencita Burns NP             Clinical Impression:   Final diagnoses:  [Z71.1] Feared condition not demonstrated (Primary)          ED Disposition Condition    Discharge Stable        ED Prescriptions     None        Follow-up Information     Follow up With Specialties Details Why Contact Info    Caridad Pablo) MD [facility] Schedule an appointment as soon as possible for a visit in 2 days  [address] [facility] [address]  [phone]             Carmencita Burns NP  08/10/22 1246

## 2022-08-17 ENCOUNTER — PATIENT MESSAGE (OUTPATIENT)
Dept: FAMILY MEDICINE | Facility: HOSPITAL | Age: 52
End: 2022-08-17
Payer: MEDICARE

## 2022-08-22 ENCOUNTER — PATIENT MESSAGE (OUTPATIENT)
Dept: FAMILY MEDICINE | Facility: HOSPITAL | Age: 52
End: 2022-08-22
Payer: MEDICARE

## 2022-08-22 DIAGNOSIS — I50.32 CHRONIC DIASTOLIC CONGESTIVE HEART FAILURE: Chronic | ICD-10-CM

## 2022-08-22 RX ORDER — SPIRONOLACTONE 25 MG/1
25 TABLET ORAL DAILY
Qty: 90 TABLET | Refills: 0 | Status: SHIPPED | OUTPATIENT
Start: 2022-08-22 | End: 2023-01-06 | Stop reason: SDUPTHER

## 2022-08-24 RX ORDER — CETIRIZINE HYDROCHLORIDE 10 MG/1
10 TABLET ORAL DAILY
Qty: 90 TABLET | Refills: 3 | Status: SHIPPED | OUTPATIENT
Start: 2022-08-24 | End: 2023-01-12 | Stop reason: SDUPTHER

## 2022-08-24 RX ORDER — CETIRIZINE HYDROCHLORIDE 10 MG/1
10 TABLET ORAL DAILY PRN
COMMUNITY
Start: 2022-06-14 | End: 2022-08-24 | Stop reason: SDUPTHER

## 2022-09-13 ENCOUNTER — OFFICE VISIT (OUTPATIENT)
Dept: PSYCHIATRY | Facility: CLINIC | Age: 52
End: 2022-09-13
Payer: MEDICARE

## 2022-09-13 DIAGNOSIS — F33.41 DEPRESSION, MAJOR, RECURRENT, IN PARTIAL REMISSION: Primary | ICD-10-CM

## 2022-09-13 DIAGNOSIS — G47.00 INSOMNIA DISORDER, WITH NON-SLEEP DISORDER MENTAL COMORBIDITY: ICD-10-CM

## 2022-09-13 DIAGNOSIS — F10.10 ALCOHOL USE DISORDER, MILD, IN CONTROLLED ENVIRONMENT: ICD-10-CM

## 2022-09-13 DIAGNOSIS — F41.0 PANIC DISORDER: ICD-10-CM

## 2022-09-13 PROCEDURE — 1159F MED LIST DOCD IN RCRD: CPT | Mod: CPTII,95,, | Performed by: NURSE PRACTITIONER

## 2022-09-13 PROCEDURE — 4010F PR ACE/ARB THEARPY RXD/TAKEN: ICD-10-PCS | Mod: CPTII,95,, | Performed by: NURSE PRACTITIONER

## 2022-09-13 PROCEDURE — 99213 OFFICE O/P EST LOW 20 MIN: CPT | Mod: 95,,, | Performed by: NURSE PRACTITIONER

## 2022-09-13 PROCEDURE — 1160F RVW MEDS BY RX/DR IN RCRD: CPT | Mod: CPTII,95,, | Performed by: NURSE PRACTITIONER

## 2022-09-13 PROCEDURE — 90833 PSYTX W PT W E/M 30 MIN: CPT | Mod: 95,,, | Performed by: NURSE PRACTITIONER

## 2022-09-13 PROCEDURE — 90833 PR PSYCHOTHERAPY W/PATIENT W/E&M, 30 MIN (ADD ON): ICD-10-PCS | Mod: 95,,, | Performed by: NURSE PRACTITIONER

## 2022-09-13 PROCEDURE — 99213 PR OFFICE/OUTPT VISIT, EST, LEVL III, 20-29 MIN: ICD-10-PCS | Mod: 95,,, | Performed by: NURSE PRACTITIONER

## 2022-09-13 PROCEDURE — 4010F ACE/ARB THERAPY RXD/TAKEN: CPT | Mod: CPTII,95,, | Performed by: NURSE PRACTITIONER

## 2022-09-13 PROCEDURE — 1159F PR MEDICATION LIST DOCUMENTED IN MEDICAL RECORD: ICD-10-PCS | Mod: CPTII,95,, | Performed by: NURSE PRACTITIONER

## 2022-09-13 PROCEDURE — 1160F PR REVIEW ALL MEDS BY PRESCRIBER/CLIN PHARMACIST DOCUMENTED: ICD-10-PCS | Mod: CPTII,95,, | Performed by: NURSE PRACTITIONER

## 2022-09-13 RX ORDER — QUETIAPINE FUMARATE 25 MG/1
TABLET, FILM COATED ORAL
Qty: 60 TABLET | Refills: 5 | Status: SHIPPED | OUTPATIENT
Start: 2022-09-13 | End: 2022-12-12 | Stop reason: SDUPTHER

## 2022-09-13 NOTE — PROGRESS NOTES
Outpatient Psychiatry Follow-Up Visit (MD/NP)    9/13/2022    Clinical Status of Patient:  Outpatient (Ambulatory)    Chief Complaint:  Daksha Marie is a 52 y.o. female who presents today for follow-up of anxiety and PTSD .  Met with patient.      Last visit was: 5/23/22. Chart and  reviewed.   The patient location is: home  The chief complaint leading to consultation is: depression and anxiety    Visit type: audiovisual    Face to Face time with patient: 30 minutes  30 minutes of total time spent on the encounter, which includes face to face time and non-face to face time preparing to see the patient (eg, review of tests), Obtaining and/or reviewing separately obtained history, Documenting clinical information in the electronic or other health record, Independently interpreting results (not separately reported) and communicating results to the patient/family/caregiver, or Care coordination (not separately reported).     Each patient to whom he or she provides medical services by telemedicine is:  (1) informed of the relationship between the physician and patient and the respective role of any other health care provider with respect to management of the patient; and (2) notified that he or she may decline to receive medical services by telemedicine and may withdraw from such care at any time.    Interval History and Content of Current Session:  Current Psychiatric Medications/changes  Continue Effexor XR 75 mg po daily  Continue Antabuse 250 mg po daily  Continue Valium 5 mg po BID PRN     Virtual Visit:  PT reports having nightmares and feeling depressed. Also reports trouble sleep. Tried Trazodone but states it made her ngihtmares worse. Will try seroquel. Pt has therapy appot with Keily buckley.  Denies SI/HI/AVH.       Psychotherapy:  Target symptoms: anxiety   Why chosen therapy is appropriate versus another modality: relevant to diagnosis  Outcome monitoring methods:  self-report  Therapeutic intervention type: insight oriented psychotherapy  Topics discussed/themes: building skills sets for symptom management, symptom recognition  The patient's response to the intervention is accepting. The patient's progress toward treatment goals is good.   Duration of intervention: 18 minutes.    Review of Systems   PSYCHIATRIC: Pertinant items are noted in the narrative.  CONSTITUTIONAL: No weight gain or loss.   MUSCULOSKELETAL: No pain or stiffness of the joints.  NEUROLOGIC: No weakness, sensory changes, seizures, confusion, memory loss, tremor or other abnormal movements.  ENDOCRINE: No polydipsia or polyuria.  INTEGUMENTARY: No rashes or lacerations.  EYES: No exophthalmos, jaundice or blindness.  ENT: No dizziness, tinnitus or hearing loss.  RESPIRATORY: No shortness of breath.  CARDIOVASCULAR: No tachycardia or chest pain.  GASTROINTESTINAL: No nausea, vomiting, pain, constipation or diarrhea.  GENITOURINARY: No frequency, dysuria or sexual dysfunction.  HEMATOLOGIC/LYMPHATIC: No excessive bleeding, prolonged or excessive bleeding after dental extraction/injury.  ALLERGIC/IMMUNOLOGIC: No allergic response to materials, foods or animals at this time.    Past Medical, Family and Social History: The patient's past medical, family and social history have been reviewed and updated as appropriate within the electronic medical record - see encounter notes.    Compliance: yes    Side effects: None    Risk Parameters:  Patient reports no suicidal ideation  Patient reports no homicidal ideation  Patient reports no self-injurious behavior  Patient reports no violent behavior    Exam (detailed: at least 9 elements; comprehensive: all 15 elements)   Constitutional  Vitals:  Most recent vital signs, dated greater than 90 days prior to this appointment, were reviewed.   There were no vitals filed for this visit.     General:  unremarkable, age appropriate     Musculoskeletal  Muscle Strength/Tone:   no tremor, no tic   Gait & Station:  non-ataxic     Psychiatric  Speech:  no latency; no press   Mood & Affect:  dysthymic  congruent and appropriate   Thought Process:  normal and logical   Associations:  intact   Thought Content:  normal, no suicidality, no homicidality, delusions, or paranoia   Insight:  intact   Judgement: behavior is adequate to circumstances   Orientation:  grossly intact   Memory: intact for content of interview   Language: grossly intact   Attention Span & Concentration:  able to focus   Fund of Knowledge:  intact and appropriate to age and level of education     Assessment and Diagnosis   Status/Progress: Based on the examination today, the patient's problem(s) is/are adequately but not ideally controlled.  New problems have not been presented today.   Co-morbidities and Lack of compliance are not complicating management of the primary condition.  There are no active rule-out diagnoses for this patient at this time.     General Impression:       ICD-10-CM ICD-9-CM   1. Depression, major, recurrent, in partial remission  F33.41 296.35   2. Insomnia disorder, with non-sleep disorder mental comorbidity  G47.00 780.52   3. Panic disorder  F41.0 300.01   4. Alcohol use disorder, mild, in controlled environment  F10.10 305.03         Intervention/Counseling/Treatment Plan   Medication Management: Continue current medications. The risks and benefits of medication were discussed with the patient.  AA/NA/CA/ACOA/Abstinence   Continue Effexor XR 75 mg po daily  Continue Antabuse 250 mg po daily  Continue Valium 5 mg po BID PRN   Try Seroquel 25-50 mg before bed  Buspar 15 gm BID  Continue with psychotherapy    Return to Clinic: 6 months     Risks, benefits, side effects and alternative treatments discussed with patient. Patient agrees with the current plan as documented.  Encouraged Patient to keep future appointments.  Take medications as prescribed and abstain from substance abuse.  Pt to present  to ED for thoughts to harm herself or others

## 2022-09-21 ENCOUNTER — OFFICE VISIT (OUTPATIENT)
Dept: SURGERY | Facility: CLINIC | Age: 52
End: 2022-09-21
Payer: MEDICARE

## 2022-09-21 VITALS
WEIGHT: 204.5 LBS | HEART RATE: 80 BPM | SYSTOLIC BLOOD PRESSURE: 135 MMHG | HEIGHT: 65 IN | DIASTOLIC BLOOD PRESSURE: 97 MMHG | BODY MASS INDEX: 34.07 KG/M2

## 2022-09-21 DIAGNOSIS — R10.13 POSTPRANDIAL EPIGASTRIC PAIN: Primary | ICD-10-CM

## 2022-09-21 DIAGNOSIS — K90.89 OTHER INTESTINAL MALABSORPTION: ICD-10-CM

## 2022-09-21 DIAGNOSIS — E55.9 VITAMIN D DEFICIENCY, UNSPECIFIED: ICD-10-CM

## 2022-09-21 DIAGNOSIS — K28.9 ANASTOMOTIC ULCER S/P GASTRIC BYPASS: ICD-10-CM

## 2022-09-21 DIAGNOSIS — E66.09 CLASS 1 OBESITY DUE TO EXCESS CALORIES WITH SERIOUS COMORBIDITY AND BODY MASS INDEX (BMI) OF 34.0 TO 34.9 IN ADULT: ICD-10-CM

## 2022-09-21 DIAGNOSIS — K44.9 HIATAL HERNIA: ICD-10-CM

## 2022-09-21 DIAGNOSIS — F17.210 CIGARETTE SMOKER: ICD-10-CM

## 2022-09-21 DIAGNOSIS — E46 PROTEIN-CALORIE MALNUTRITION, UNSPECIFIED SEVERITY: ICD-10-CM

## 2022-09-21 PROCEDURE — 3008F PR BODY MASS INDEX (BMI) DOCUMENTED: ICD-10-PCS | Mod: CPTII,S$GLB,, | Performed by: SURGERY

## 2022-09-21 PROCEDURE — 1160F RVW MEDS BY RX/DR IN RCRD: CPT | Mod: CPTII,S$GLB,, | Performed by: SURGERY

## 2022-09-21 PROCEDURE — 1159F MED LIST DOCD IN RCRD: CPT | Mod: CPTII,S$GLB,, | Performed by: SURGERY

## 2022-09-21 PROCEDURE — 1160F PR REVIEW ALL MEDS BY PRESCRIBER/CLIN PHARMACIST DOCUMENTED: ICD-10-PCS | Mod: CPTII,S$GLB,, | Performed by: SURGERY

## 2022-09-21 PROCEDURE — 3080F DIAST BP >= 90 MM HG: CPT | Mod: CPTII,S$GLB,, | Performed by: SURGERY

## 2022-09-21 PROCEDURE — 3075F PR MOST RECENT SYSTOLIC BLOOD PRESS GE 130-139MM HG: ICD-10-PCS | Mod: CPTII,S$GLB,, | Performed by: SURGERY

## 2022-09-21 PROCEDURE — 4010F ACE/ARB THERAPY RXD/TAKEN: CPT | Mod: CPTII,S$GLB,, | Performed by: SURGERY

## 2022-09-21 PROCEDURE — 3075F SYST BP GE 130 - 139MM HG: CPT | Mod: CPTII,S$GLB,, | Performed by: SURGERY

## 2022-09-21 PROCEDURE — 1159F PR MEDICATION LIST DOCUMENTED IN MEDICAL RECORD: ICD-10-PCS | Mod: CPTII,S$GLB,, | Performed by: SURGERY

## 2022-09-21 PROCEDURE — 99999 PR PBB SHADOW E&M-EST. PATIENT-LVL V: CPT | Mod: PBBFAC,,, | Performed by: SURGERY

## 2022-09-21 PROCEDURE — 99999 PR PBB SHADOW E&M-EST. PATIENT-LVL V: ICD-10-PCS | Mod: PBBFAC,,, | Performed by: SURGERY

## 2022-09-21 PROCEDURE — 3008F BODY MASS INDEX DOCD: CPT | Mod: CPTII,S$GLB,, | Performed by: SURGERY

## 2022-09-21 PROCEDURE — 3080F PR MOST RECENT DIASTOLIC BLOOD PRESSURE >= 90 MM HG: ICD-10-PCS | Mod: CPTII,S$GLB,, | Performed by: SURGERY

## 2022-09-21 PROCEDURE — 99205 PR OFFICE/OUTPT VISIT, NEW, LEVL V, 60-74 MIN: ICD-10-PCS | Mod: S$GLB,,, | Performed by: SURGERY

## 2022-09-21 PROCEDURE — 99205 OFFICE O/P NEW HI 60 MIN: CPT | Mod: S$GLB,,, | Performed by: SURGERY

## 2022-09-21 PROCEDURE — 4010F PR ACE/ARB THEARPY RXD/TAKEN: ICD-10-PCS | Mod: CPTII,S$GLB,, | Performed by: SURGERY

## 2022-09-21 RX ORDER — SUCRALFATE 1 G/10ML
1 SUSPENSION ORAL
Qty: 1200 ML | Refills: 11 | Status: SHIPPED | OUTPATIENT
Start: 2022-09-21 | End: 2022-12-12 | Stop reason: SDUPTHER

## 2022-09-21 RX ORDER — OMEPRAZOLE 40 MG/1
40 CAPSULE, DELAYED RELEASE ORAL
Qty: 60 CAPSULE | Refills: 11 | Status: SHIPPED | OUTPATIENT
Start: 2022-09-21 | End: 2022-12-12 | Stop reason: SDUPTHER

## 2022-09-21 NOTE — PROGRESS NOTES
History & Physical    SUBJECTIVE:     History of Present Illness:  Patient is a 52 y.o. female presents accompanied with son with complaints of ongoing epigastric abdominal pain. Patient has a history of gastric bypass (2007, in [place]). Onset of symptoms were gradual starting May-June 2022 going with gradually worsening course since that time. Has lost 15lbs since Sep 3rd (states she weighed 219lbs). States unable to tolerate meals and has pain immediately following meals. Patient denies radiating chest pain, palpitations or shortness of breath. Symptoms are aggravated by certain foods. Symptoms improve with a lidocaine patch, and heating pads. She is on daily Prilosec with minimal relief.     Reports of nausea and vomiting, most recently vomited yesterday. States pain is 10 out of 10, constant, sharp pain localized to the mid-epigastric region. No blood in stool or vomitus. Patient smokes 1/2 pack of cigarettes per day. Does not use OTC NSAIDs. Previous heavy alcohol use, stopped in 2017 and on antabuse.     No chief complaint on file.      Review of patient's allergies indicates:   Allergen Reactions    Penicillins Hives    Hydroxyzine Anxiety, Hallucinations and Palpitations       Current Outpatient Medications   Medication Sig Dispense Refill    amLODIPine (NORVASC) 10 MG tablet Take 1 tablet (10 mg total) by mouth once daily. 90 tablet 3    BELBUCA 150 mcg Film DISSOLVE 1 FILM TO THE GUM EVERY 12 HOURS      brimonidine-timoloL (COMBIGAN) 0.2-0.5 % Drop Place 1 drop into both eyes 2 (two) times daily.      busPIRone (BUSPAR) 15 MG tablet Take 1 tablet (15 mg total) by mouth 2 (two) times daily as needed (breakthrough anxiety). 60 tablet 11    calcium citrate 250 mg calcium Tab       carvediloL (COREG) 3.125 MG tablet Take 1 tablet (3.125 mg total) by mouth 2 (two) times daily. 180 tablet 3    cetirizine (ZYRTEC) 10 MG tablet Take 1 tablet (10 mg total) by mouth once daily. 90 tablet 3    cyanocobalamin 1,000  mcg/mL injection inject  1000mcg ( 1 vial ) as directed  every 28 days ( the 15th of every month ) 1 mL 6    diazePAM (VALIUM) 5 MG tablet Take 1 tablet (5 mg total) by mouth every 12 (twelve) hours as needed for Anxiety. 60 tablet 5    disulfiram (ANTABUSE) 250 mg tablet TAKE 1 TABLET EVERY DAY 90 tablet 3    fluticasone propionate (FLONASE) 50 mcg/actuation nasal spray 2 sprays (100 mcg total) by Each Nostril route once daily. 16 g 11    folic acid (FOLVITE) 1 MG tablet Take 1 tablet (1 mg total) by mouth once daily. 30 tablet 11    furosemide (LASIX) 40 MG tablet Take 1 tablet (40 mg total) by mouth once daily. 90 tablet 3    HYDROcodone-acetaminophen (NORCO) 7.5-325 mg per tablet TAKE 1 T PO TID PRN FOR 30 DAYS  0    methocarbamoL (ROBAXIN) 750 MG Tab Take 750 mg by mouth 3 (three) times daily.      montelukast (SINGULAIR) 10 mg tablet Take 1 tablet (10 mg total) by mouth every evening. 90 tablet 1    multivitamin (THERAGRAN) tablet Take 1 tablet by mouth once daily. 30 tablet 5    omeprazole (PRILOSEC) 40 MG capsule Take 1 capsule (40 mg total) by mouth once daily. 90 capsule 3    promethazine (PHENERGAN) 25 MG tablet Take 1 tablet (25 mg total) by mouth every 6 (six) hours as needed for Nausea. 15 tablet 0    QUEtiapine (SEROQUEL) 25 MG Tab Take 1 or 2 tablets by mouth each evening before bed 60 tablet 5    spironolactone (ALDACTONE) 25 MG tablet Take 1 tablet (25 mg total) by mouth once daily. 90 tablet 0    sumatriptan (IMITREX) 50 MG tablet Once for severe headache. May repeat once after 2 hours. Do not exceed 3-4 doses in one week. 12 tablet 0    TRAVATAN Z 0.004 % ophthalmic solution INT 1 GTT IN OU QD  3    venlafaxine (EFFEXOR-XR) 150 MG Cp24 Take 1 capsule (150 mg total) by mouth once daily. Start on Week 2 90 capsule 3     Current Facility-Administered Medications   Medication Dose Route Frequency Provider Last Rate Last Admin    cyanocobalamin injection 1,000 mcg  1,000 mcg Intramuscular Q30 Days  D'Amico C. Johnson, MD           Past Medical History:   Diagnosis Date    Alcohol abuse 10/7/2015    Arthritis     Breast calcification, left     Pt states this has been worked up, she received a biopsy in the past to confirm calcifications were from scar tissue from when she had breast reduction surgery.      Chronic diastolic congestive heart failure     CVA (cerebral vascular accident) 2022    Encounter for blood transfusion     GERD (gastroesophageal reflux disease)     Glaucoma     Hypertension     Hyponatremia 10/2015    Na 109. attributed to polydipsia and alcohol abuse.  suffered a seizure in the ICU .    Insomnia     Malingering 2016    PTSD (post-traumatic stress disorder)     Seizures     Sickle cell trait      Past Surgical History:   Procedure Laterality Date    bile fistula      BREAST BIOPSY Left     breast reduction      carpal tunnel sugery   2020     SECTION      CHOLECYSTECTOMY  after     GASTRIC BYPASS   and     TOTAL REDUCTION MAMMOPLASTY Bilateral     xen gel stent implant  2018    Performed at [facility]     Family History   Problem Relation Age of Onset    Diabetes Mother     Lupus Mother     Hypertension Mother     Heart attack Mother     Diabetes Maternal Grandmother     Crohn's disease Cousin     Heart failure Father     Hypertension Brother     Asthma Brother      Social History     Tobacco Use    Smoking status: Every Day     Packs/day: 0.50     Years: 4.00     Pack years: 2.00     Types: Cigarettes    Smokeless tobacco: Never   Substance Use Topics    Alcohol use: No     Comment: former heavy drinker since May 1st 2017    Drug use: No        Review of Systems:  Review of Systems   Constitutional:  Positive for appetite change and fatigue.        Decreased appetite (due to fear of abdominal pain)   Eyes: Negative.         + sunglasses  Right eye blindness   Respiratory: Negative.     Cardiovascular: Negative.   "  Gastrointestinal:  Positive for abdominal pain, nausea and vomiting.        (Central/mid epigastric abdominal pain)   Endocrine: Negative.      OBJECTIVE:     Vital Signs (Most Recent)  Pulse: 80 (09/21/22 1305)  BP: (!) 135/97 (09/21/22 1305)  5' 5" (1.651 m)  92.8 kg (204 lb 7.6 oz)     Physical Exam:  Physical Exam  HENT:      Head: Normocephalic and atraumatic.   Cardiovascular:      Rate and Rhythm: Normal rate and regular rhythm.      Pulses: Normal pulses.      Heart sounds: Normal heart sounds.   Pulmonary:      Effort: Pulmonary effort is normal.      Breath sounds: Normal breath sounds.   Abdominal:      Tenderness: There is abdominal tenderness.      Comments: Midepigastric pain    Musculoskeletal:         General: Normal range of motion.      Cervical back: Normal range of motion and neck supple.   Skin:     General: Skin is warm and dry.      Capillary Refill: Capillary refill takes less than 2 seconds.   Neurological:      Mental Status: She is alert.       Laboratory  CBC: Reviewed  BMP: Reviewed    Diagnostic Results:  Labs: Reviewed  CT: Reviewed  Upper GI: Reviewed      Upper GI:    2015   Impression:    - A single 5mm [place] of salmon-colored mucosa were                         present. Biopsied.                         - Congestive gastropathy.                         - Gastric ulcers with clean base at gastro-jejunal                         anastomosis.                         - Erythematous mucosa in the gastric body. Biopsied.                         - Erythematous (hyperemic) jejunal mucosa. Biopsied.     2015   Impression:   - Normal esophagus.                         - Gastric bypass with a pouch 5 cm in length and                         intact staple line. Small area of pigmentation in                         the gastric pouch, unknown etiology. Gastrojejunal                         anastomosis characterized by friable mucosa,                         inflammation, ulceration and visible " sutures.                         - Ulcers appear similar to last exam, chronic and                         non-healing.                         - No specimens collected.   04/2016  - Hematochezia due to bleeding from persistent                         anastomotic ulcer endoscopically treated as above     ASSESSMENT/PLAN:     51 yo female with history of gastric bypass (2007, in [place]) presents with complaints of midepigastric pain 10/10, constant. EGDs in 2015 and 2016 significant for gastric ulcers. Her history and presentation are consistent with gastric ulcer.     PLAN:Plan     Order EGD and swallow study.  Request medical records.  Discussed lifestyle and medication management of gastric ulcers.   Refer for smoking cessation.   Increase Omperazole, start Carafate.  Labs.

## 2022-09-22 ENCOUNTER — TELEPHONE (OUTPATIENT)
Dept: SURGERY | Facility: CLINIC | Age: 52
End: 2022-09-22
Payer: MEDICARE

## 2022-09-22 NOTE — TELEPHONE ENCOUNTER
Called and left a phone message.  Explained that I have added the labs that Dr. Street requested to her existing lab appointment on 9-27 at the [facility].  Explained that I schedule a [employer] phone appointment at 1:30 pm on 10-5.  This will be a phone call to determine if she has Bariatric Coverage and can establish care in our Bariatric Surgery Department.  Appointment slip mailed.  Also calling to schedule the UGI with tablet.  Voice mail cut off.  Recalled and left the Office phone number for a return phone call.

## 2022-09-27 ENCOUNTER — HOSPITAL ENCOUNTER (OUTPATIENT)
Dept: CARDIOLOGY | Facility: HOSPITAL | Age: 52
Discharge: HOME | End: 2022-09-27
Attending: INTERNAL MEDICINE
Payer: MEDICARE

## 2022-09-27 VITALS
HEIGHT: 65 IN | DIASTOLIC BLOOD PRESSURE: 97 MMHG | WEIGHT: 204 LBS | BODY MASS INDEX: 33.99 KG/M2 | SYSTOLIC BLOOD PRESSURE: 135 MMHG

## 2022-09-27 DIAGNOSIS — I42.6 ALCOHOLIC CARDIOMYOPATHY: ICD-10-CM

## 2022-09-27 DIAGNOSIS — I10 ESSENTIAL HYPERTENSION: Chronic | ICD-10-CM

## 2022-09-27 DIAGNOSIS — I50.32 CHRONIC DIASTOLIC CONGESTIVE HEART FAILURE: Primary | Chronic | ICD-10-CM

## 2022-09-27 PROBLEM — E66.09 CLASS 1 OBESITY DUE TO EXCESS CALORIES WITH SERIOUS COMORBIDITY AND BODY MASS INDEX (BMI) OF 34.0 TO 34.9 IN ADULT: Status: ACTIVE | Noted: 2022-09-27

## 2022-09-27 PROBLEM — I50.9 CONGESTIVE HEART FAILURE: Status: RESOLVED | Noted: 2018-10-14 | Resolved: 2022-09-27

## 2022-09-27 PROBLEM — E66.811 CLASS 1 OBESITY DUE TO EXCESS CALORIES WITH SERIOUS COMORBIDITY AND BODY MASS INDEX (BMI) OF 34.0 TO 34.9 IN ADULT: Status: ACTIVE | Noted: 2022-09-27

## 2022-09-27 PROBLEM — I63.9 CVA (CEREBRAL VASCULAR ACCIDENT): Status: RESOLVED | Noted: 2022-07-29 | Resolved: 2022-09-27

## 2022-09-27 PROBLEM — Z98.83 STATUS POST GLAUCOMA SURGERY: Status: ACTIVE | Noted: 2018-05-30

## 2022-09-27 PROBLEM — I50.33 ACUTE ON CHRONIC DIASTOLIC HEART FAILURE: Status: RESOLVED | Noted: 2018-10-13 | Resolved: 2022-09-27

## 2022-09-27 PROBLEM — F32.A DEPRESSION: Status: RESOLVED | Noted: 2017-10-11 | Resolved: 2022-09-27

## 2022-09-27 PROBLEM — R10.13 POSTPRANDIAL EPIGASTRIC PAIN: Status: ACTIVE | Noted: 2022-09-21

## 2022-09-27 LAB
AV INDEX (PROSTH): 0.68
AV MEAN GRADIENT: 3 MMHG
AV PEAK GRADIENT: 7 MMHG
AV REGURGITATION PRESSURE HALF TIME: 741.38 MS
AV VALVE AREA: 1.78 CM2
AV VELOCITY RATIO: 0.64
BSA FOR ECHO PROCEDURE: 2.06 M2
CV ECHO LV RWT: 0.57 CM
DOP CALC AO PEAK VEL: 1.3 M/S
DOP CALC AO VTI: 25.6 CM
DOP CALC LVOT AREA: 2.6 CM2
DOP CALC LVOT DIAMETER: 1.83 CM
DOP CALC LVOT PEAK VEL: 0.83 M/S
DOP CALC LVOT STROKE VOLUME: 45.48 CM3
DOP CALC RVOT PEAK VEL: 0.62 M/S
DOP CALC RVOT VTI: 13.5 CM
DOP CALCLVOT PEAK VEL VTI: 17.3 CM
E WAVE DECELERATION TIME: 255.34 MSEC
E/A RATIO: 0.55
E/E' RATIO: 12 M/S
ECHO LV POSTERIOR WALL: 1.29 CM (ref 0.6–1.1)
EJECTION FRACTION: 53 %
FRACTIONAL SHORTENING: 27 % (ref 28–44)
INTERVENTRICULAR SEPTUM: 1.38 CM (ref 0.6–1.1)
IVRT: 125.59 MSEC
LA MAJOR: 5.23 CM
LA MINOR: 5.06 CM
LA WIDTH: 3.8 CM
LEFT ATRIUM SIZE: 3.87 CM
LEFT ATRIUM VOLUME INDEX MOD: 28.5 ML/M2
LEFT ATRIUM VOLUME INDEX: 32.3 ML/M2
LEFT ATRIUM VOLUME MOD: 56.81 CM3
LEFT ATRIUM VOLUME: 64.3 CM3
LEFT INTERNAL DIMENSION IN SYSTOLE: 3.27 CM (ref 2.1–4)
LEFT VENTRICLE DIASTOLIC VOLUME INDEX: 46.25 ML/M2
LEFT VENTRICLE DIASTOLIC VOLUME: 92.03 ML
LEFT VENTRICLE MASS INDEX: 116 G/M2
LEFT VENTRICLE SYSTOLIC VOLUME INDEX: 21.7 ML/M2
LEFT VENTRICLE SYSTOLIC VOLUME: 43.16 ML
LEFT VENTRICULAR INTERNAL DIMENSION IN DIASTOLE: 4.49 CM (ref 3.5–6)
LEFT VENTRICULAR MASS: 230.68 G
LV LATERAL E/E' RATIO: 9.6 M/S
LV SEPTAL E/E' RATIO: 16 M/S
LVOT MG: 1.61 MMHG
LVOT MV: 0.61 CM/S
MV PEAK A VEL: 0.87 M/S
MV PEAK E VEL: 0.48 M/S
MV STENOSIS PRESSURE HALF TIME: 74.05 MS
MV VALVE AREA P 1/2 METHOD: 2.97 CM2
PISA AR MAX VEL: 3.3 M/S
PISA TR MAX VEL: 2.33 M/S
PULM VEIN S/D RATIO: 1.4
PV MEAN GRADIENT: 0.69 MMHG
PV MV: 0.51 M/S
PV PEAK D VEL: 0.4 M/S
PV PEAK S VEL: 0.56 M/S
PV PEAK VELOCITY: 0.79 CM/S
RA MAJOR: 3.83 CM
RA WIDTH: 3.4 CM
SINUS: 3.3 CM
STJ: 2.29 CM
TDI LATERAL: 0.05 M/S
TDI SEPTAL: 0.03 M/S
TDI: 0.04 M/S
TR MAX PG: 22 MMHG
TRICUSPID ANNULAR PLANE SYSTOLIC EXCURSION: 1.7 CM

## 2022-09-27 PROCEDURE — 93306 TTE W/DOPPLER COMPLETE: CPT | Mod: PN

## 2022-09-27 PROCEDURE — 93306 ECHO (CUPID ONLY): ICD-10-PCS | Mod: 26,,, | Performed by: INTERNAL MEDICINE

## 2022-09-27 PROCEDURE — 93306 TTE W/DOPPLER COMPLETE: CPT | Mod: 26,,, | Performed by: INTERNAL MEDICINE

## 2022-09-27 NOTE — PROGRESS NOTES
History & Physical    SUBJECTIVE:     History of Present Illness:  Patient is a 52 year-old obese woman with HTN, CHF, MDD/GUY, chronic anemia (Hgb range 10-11 last 3 years), h/o EtOH abuse in remission since 2017 on antabuse, and history of laparoscopic cortes-en-y gastric bypass by Dr. Tuan Saldana in 2007, who presents for evaluation of severe epigastric abdominal pain up to 10/10. She has experienced progressively worsening epigastric abdominal pain over the last 4 months with associated 15-lb weight loss. The pain is exacerbated by eating and associated with nausea and vomiting. She takes Prilosec daily with some relief. Topical lidocaine patches and heating pads also help to some degree. She denies fevers, chills, chest pain, shortness of breath, GARCIA or hematemesis. She is a current smoker of 1/2 pack cigarettes per day. Denies NSAID or steroid use. She does have a history of marginal ulcers in 2015 - 2016. She has not had any routine Bariatric Surgery follow-up in years. She presented to an [facility] ED recently and was told she has a hiatal hernia on CT. I do not have these records.    Chief Complaint   Patient presents with    Hiatal Hernia    Abdominal Pain     epigastric     Review of patient's allergies indicates:   Allergen Reactions    Penicillins Hives    Hydroxyzine Anxiety, Hallucinations and Palpitations     Current Outpatient Medications   Medication Sig Dispense Refill    amLODIPine (NORVASC) 10 MG tablet Take 1 tablet (10 mg total) by mouth once daily. 90 tablet 3    BELBUCA 150 mcg Film DISSOLVE 1 FILM TO THE GUM EVERY 12 HOURS      brimonidine-timoloL (COMBIGAN) 0.2-0.5 % Drop Place 1 drop into both eyes 2 (two) times daily.      busPIRone (BUSPAR) 15 MG tablet Take 1 tablet (15 mg total) by mouth 2 (two) times daily as needed (breakthrough anxiety). 60 tablet 11    calcium citrate 250 mg calcium Tab       carvediloL (COREG) 3.125 MG tablet Take 1 tablet (3.125 mg total) by mouth 2 (two) times  daily. 180 tablet 3    cetirizine (ZYRTEC) 10 MG tablet Take 1 tablet (10 mg total) by mouth once daily. 90 tablet 3    cyanocobalamin 1,000 mcg/mL injection inject  1000mcg ( 1 vial ) as directed  every 28 days ( the 15th of every month ) 1 mL 6    diazePAM (VALIUM) 5 MG tablet Take 1 tablet (5 mg total) by mouth every 12 (twelve) hours as needed for Anxiety. 60 tablet 5    disulfiram (ANTABUSE) 250 mg tablet TAKE 1 TABLET EVERY DAY 90 tablet 3    fluticasone propionate (FLONASE) 50 mcg/actuation nasal spray 2 sprays (100 mcg total) by Each Nostril route once daily. 16 g 11    folic acid (FOLVITE) 1 MG tablet Take 1 tablet (1 mg total) by mouth once daily. 30 tablet 11    furosemide (LASIX) 40 MG tablet Take 1 tablet (40 mg total) by mouth once daily. 90 tablet 3    HYDROcodone-acetaminophen (NORCO) 7.5-325 mg per tablet TAKE 1 T PO TID PRN FOR 30 DAYS  0    methocarbamoL (ROBAXIN) 750 MG Tab Take 750 mg by mouth 3 (three) times daily.      montelukast (SINGULAIR) 10 mg tablet Take 1 tablet (10 mg total) by mouth every evening. 90 tablet 1    multivitamin (THERAGRAN) tablet Take 1 tablet by mouth once daily. 30 tablet 5    promethazine (PHENERGAN) 25 MG tablet Take 1 tablet (25 mg total) by mouth every 6 (six) hours as needed for Nausea. 15 tablet 0    QUEtiapine (SEROQUEL) 25 MG Tab Take 1 or 2 tablets by mouth each evening before bed 60 tablet 5    spironolactone (ALDACTONE) 25 MG tablet Take 1 tablet (25 mg total) by mouth once daily. 90 tablet 0    sumatriptan (IMITREX) 50 MG tablet Once for severe headache. May repeat once after 2 hours. Do not exceed 3-4 doses in one week. 12 tablet 0    TRAVATAN Z 0.004 % ophthalmic solution INT 1 GTT IN OU QD  3    venlafaxine (EFFEXOR-XR) 150 MG Cp24 Take 1 capsule (150 mg total) by mouth once daily. Start on Week 2 90 capsule 3    omeprazole (PRILOSEC) 40 MG capsule Take 1 capsule (40 mg total) by mouth 2 (two) times daily before meals. 15-30 minutes before breakfast and  dinner 60 capsule 11    sucralfate (CARAFATE) 100 mg/mL suspension Take 10 mLs (1 g total) by mouth 4 (four) times daily before meals and nightly. 1200 mL 11     Current Facility-Administered Medications   Medication Dose Route Frequency Provider Last Rate Last Admin    cyanocobalamin injection 1,000 mcg  1,000 mcg Intramuscular Q30 Days D'Amico C. Johnson, MD         Past Medical History:   Diagnosis Date    Alcohol abuse 10/07/2015    In remission since 18    Anemia 2015    Arthritis     Blindness of right eye     only sees light    Breast calcification, left     biopsied: scar tissue from breast reduction surgery    Chronic diastolic congestive heart failure     Chronic gastrojejunal anastomotic ulcer      -     CVA (cerebral vascular accident) 2022    TPA    Encounter for blood transfusion     GERD (gastroesophageal reflux disease)     Glaucoma     Hypertension     Hypoalbuminemia     Hyponatremia 10/2015    alcohol abuse, polydipsia, Na 109, seizure in ICU    Insomnia     Malingering 2016    Morbid obesity 2017    PTSD (post-traumatic stress disorder)     Sickle cell trait     Stage 3a chronic kidney disease 10/14/2018     Past Surgical History:   Procedure Laterality Date    BREAST BIOPSY Left     CARPAL TUNNEL RELEASE  2020     SECTION      CHOLECYSTECTOMY  after     bile fistula    GASTRIC BYPASS  2007    TOTAL REDUCTION MAMMOPLASTY Bilateral     xen gel stent implant  2018    Performed at [facility]     Family History   Problem Relation Age of Onset    Diabetes Mother     Lupus Mother     Hypertension Mother     Heart attack Mother     Diabetes Maternal Grandmother     Crohn's disease Cousin     Heart failure Father     Hypertension Brother     Asthma Brother      Social History     Tobacco Use    Smoking status: Every Day     Packs/day: 0.50     Years: 4.00     Pack years: 2.00     Types: Cigarettes    Smokeless tobacco: Never  "  Substance Use Topics    Alcohol use: No     Comment: former heavy drinker since May 1st 2017    Drug use: No      Review of Systems:  Review of Systems   Constitutional:  Positive for appetite change (decreased due to fear of pain), fatigue and unexpected weight change. Negative for chills, diaphoresis and fever.   HENT: Negative.     Eyes:         +right eye blindness   Respiratory: Negative.     Cardiovascular: Negative.    Gastrointestinal:  Positive for abdominal pain (epigastric), nausea and vomiting. Negative for blood in stool.   Endocrine: Negative.    Genitourinary: Negative.    Musculoskeletal: Negative.    Skin: Negative.    Neurological:  Positive for weakness. Negative for light-headedness.   Hematological: Negative.    Psychiatric/Behavioral:  Positive for dysphoric mood. The patient is nervous/anxious.      OBJECTIVE:     Vital Signs (Most Recent)  Pulse: 80 (09/21/22 1305)  BP: (!) 135/97 (09/21/22 1305)  5' 5" (1.651 m)  92.8 kg (204 lb 7.6 oz)     Physical Exam:  Physical Exam  Vitals reviewed.   Constitutional:       General: She is not in acute distress.     Appearance: Normal appearance. She is well-developed. She is not ill-appearing.   HENT:      Head: Normocephalic and atraumatic.   Eyes:      General: No scleral icterus.     Conjunctiva/sclera: Conjunctivae normal.   Cardiovascular:      Rate and Rhythm: Normal rate and regular rhythm.      Heart sounds: Normal heart sounds.   Pulmonary:      Effort: Pulmonary effort is normal. No respiratory distress.      Breath sounds: Normal breath sounds.   Abdominal:      General: There is no distension.      Palpations: Abdomen is soft.      Tenderness: There is abdominal tenderness (epigastric). There is no guarding.   Musculoskeletal:         General: Normal range of motion.      Cervical back: Normal range of motion and neck supple.   Skin:     General: Skin is warm and dry.      Coloration: Skin is not jaundiced.   Neurological:      General: No " focal deficit present.      Mental Status: She is alert and oriented to person, place, and time.   Psychiatric:         Behavior: Behavior normal.      Comments: flat affect     Laboratory  Lab history personally reviewed: significant for chronic anemia (hgb 10-11 x3 years) and CKD stage 3a (since 10/14/18)    Diagnostic Results - Personally reviewed:    EGD 6/25/15: Single 5-mm island of salmon-colored mucosa, localized mildly congested mucosa at gastrojejunostomy with three non-bleeding superficial ulcers without stigmata of bleeding - largest 20-mm in diameter, localized mildly erythematous mucosa w/o e/o bleeding in jejunum; path: squamous glandular mucosa with chronic inflammation, no intestinal metaplasia or dysplasia    EGD 12/16/15: Normal esophagus, 5-cm gastric pouch, gastrojejunal anastomosis characterized by friable mucosa, inflammation, ulceration and visible sutures, 3-cm blind pouch    EGD 4/19/16: Normal esophagus, one non-bleeding cratered ulcer with adherent clot found at the gastrojejunostomy anastomosis 12-mm in largest dimension - injected with epinephrine (hematochezia due to bleeding)    ASSESSMENT/PLAN:   Ms. Marie is a 52 year-old woman who is currently smoking cigarettes with history of cortes-en-y gastric bypass c/b marginal ulcers in 2015 and 2016 who presents with severe epigastric pain exacerbated by eating. I suspect she has recurrent marginal ulcers given her history and risk factors. As such, I will preemptively treat her with BID PPI and carafate QID, and will consider adding a nightly H2B if further medical therapy is needed. We will obtain an EGD and UGI for confirmation of diagnosis and to better assess her anatomy, and try to obtain her [facility] ED records to review - TABBY was signed. We discussed the need for smoking cessation as this is a modifiable factor contributing to MU, and she feels she can be successful with this as she had quit in the past. I offered her nicotine patches  which she declined, and a referral to our Smoking [facility] was placed. We will also check her routine annual bariatric labs as she has not had these followed in years. I will refer her to our Bariatric Surgery clinic to establish care with our RD and JOSE which she would also like. I will follow-up with her regarding the results of her work-up. It was a pleasure meeting MsDom Cynthia and thank you for this consultation.    [address]  9/21/22

## 2022-10-04 ENCOUNTER — TELEPHONE (OUTPATIENT)
Dept: BARIATRICS | Facility: CLINIC | Age: 52
End: 2022-10-04
Payer: MEDICARE

## 2022-10-05 ENCOUNTER — LAB VISIT (OUTPATIENT)
Dept: PRIMARY CARE CLINIC | Facility: CLINIC | Age: 52
End: 2022-10-05
Payer: MEDICARE

## 2022-10-05 ENCOUNTER — OFFICE VISIT (OUTPATIENT)
Dept: CARDIOLOGY | Facility: CLINIC | Age: 52
End: 2022-10-05
Payer: MEDICARE

## 2022-10-05 VITALS
SYSTOLIC BLOOD PRESSURE: 134 MMHG | OXYGEN SATURATION: 97 % | WEIGHT: 205.38 LBS | HEART RATE: 81 BPM | BODY MASS INDEX: 34.22 KG/M2 | HEIGHT: 65 IN | DIASTOLIC BLOOD PRESSURE: 96 MMHG

## 2022-10-05 DIAGNOSIS — I10 ESSENTIAL HYPERTENSION: Chronic | ICD-10-CM

## 2022-10-05 DIAGNOSIS — I50.32 CHRONIC DIASTOLIC CONGESTIVE HEART FAILURE: Chronic | ICD-10-CM

## 2022-10-05 DIAGNOSIS — I10 ESSENTIAL HYPERTENSION: Primary | Chronic | ICD-10-CM

## 2022-10-05 LAB
ALBUMIN SERPL BCP-MCNC: 3.9 G/DL (ref 3.5–5.2)
ALP SERPL-CCNC: 122 U/L (ref 55–135)
ALT SERPL W/O P-5'-P-CCNC: 10 U/L (ref 10–44)
ANION GAP SERPL CALC-SCNC: 15 MMOL/L (ref 8–16)
AST SERPL-CCNC: 16 U/L (ref 10–40)
BASOPHILS # BLD AUTO: 0.03 K/UL (ref 0–0.2)
BASOPHILS NFR BLD: 0.4 % (ref 0–1.9)
BILIRUB SERPL-MCNC: 0.4 MG/DL (ref 0.1–1)
BUN SERPL-MCNC: 20 MG/DL (ref 6–20)
CALCIUM SERPL-MCNC: 9.8 MG/DL (ref 8.7–10.5)
CHLORIDE SERPL-SCNC: 97 MMOL/L (ref 95–110)
CHOLEST SERPL-MCNC: 162 MG/DL (ref 120–199)
CHOLEST/HDLC SERPL: 2.2 {RATIO} (ref 2–5)
CO2 SERPL-SCNC: 24 MMOL/L (ref 23–29)
CREAT SERPL-MCNC: 1.6 MG/DL (ref 0.5–1.4)
DIFFERENTIAL METHOD: ABNORMAL
EOSINOPHIL # BLD AUTO: 0.1 K/UL (ref 0–0.5)
EOSINOPHIL NFR BLD: 1.4 % (ref 0–8)
ERYTHROCYTE [DISTWIDTH] IN BLOOD BY AUTOMATED COUNT: 12.8 % (ref 11.5–14.5)
EST. GFR  (NO RACE VARIABLE): 38.8 ML/MIN/1.73 M^2
GLUCOSE SERPL-MCNC: 69 MG/DL (ref 70–110)
HCT VFR BLD AUTO: 39.6 % (ref 37–48.5)
HDLC SERPL-MCNC: 75 MG/DL (ref 40–75)
HDLC SERPL: 46.3 % (ref 20–50)
HGB BLD-MCNC: 12.7 G/DL (ref 12–16)
IMM GRANULOCYTES # BLD AUTO: 0.02 K/UL (ref 0–0.04)
IMM GRANULOCYTES NFR BLD AUTO: 0.2 % (ref 0–0.5)
LDLC SERPL CALC-MCNC: 73.8 MG/DL (ref 63–159)
LYMPHOCYTES # BLD AUTO: 4.1 K/UL (ref 1–4.8)
LYMPHOCYTES NFR BLD: 48.6 % (ref 18–48)
MCH RBC QN AUTO: 31.2 PG (ref 27–31)
MCHC RBC AUTO-ENTMCNC: 32.1 G/DL (ref 32–36)
MCV RBC AUTO: 97 FL (ref 82–98)
MONOCYTES # BLD AUTO: 0.5 K/UL (ref 0.3–1)
MONOCYTES NFR BLD: 5.4 % (ref 4–15)
NEUTROPHILS # BLD AUTO: 3.7 K/UL (ref 1.8–7.7)
NEUTROPHILS NFR BLD: 44 % (ref 38–73)
NONHDLC SERPL-MCNC: 87 MG/DL
NRBC BLD-RTO: 0 /100 WBC
PLATELET # BLD AUTO: 220 K/UL (ref 150–450)
PMV BLD AUTO: 10.8 FL (ref 9.2–12.9)
POTASSIUM SERPL-SCNC: 4.8 MMOL/L (ref 3.5–5.1)
PROT SERPL-MCNC: 6.9 G/DL (ref 6–8.4)
RBC # BLD AUTO: 4.07 M/UL (ref 4–5.4)
SODIUM SERPL-SCNC: 136 MMOL/L (ref 136–145)
TRIGL SERPL-MCNC: 66 MG/DL (ref 30–150)
TSH SERPL DL<=0.005 MIU/L-ACNC: 1.51 UIU/ML (ref 0.4–4)
WBC # BLD AUTO: 8.37 K/UL (ref 3.9–12.7)

## 2022-10-05 PROCEDURE — 4010F PR ACE/ARB THEARPY RXD/TAKEN: ICD-10-PCS | Mod: CPTII,S$GLB,, | Performed by: INTERNAL MEDICINE

## 2022-10-05 PROCEDURE — 99214 PR OFFICE/OUTPT VISIT, EST, LEVL IV, 30-39 MIN: ICD-10-PCS | Mod: S$GLB,,, | Performed by: INTERNAL MEDICINE

## 2022-10-05 PROCEDURE — 4010F ACE/ARB THERAPY RXD/TAKEN: CPT | Mod: CPTII,S$GLB,, | Performed by: INTERNAL MEDICINE

## 2022-10-05 PROCEDURE — 80053 COMPREHEN METABOLIC PANEL: CPT | Performed by: INTERNAL MEDICINE

## 2022-10-05 PROCEDURE — 3080F PR MOST RECENT DIASTOLIC BLOOD PRESSURE >= 90 MM HG: ICD-10-PCS | Mod: CPTII,S$GLB,, | Performed by: INTERNAL MEDICINE

## 2022-10-05 PROCEDURE — 80061 LIPID PANEL: CPT | Performed by: INTERNAL MEDICINE

## 2022-10-05 PROCEDURE — 99999 PR PBB SHADOW E&M-EST. PATIENT-LVL III: CPT | Mod: PBBFAC,,, | Performed by: INTERNAL MEDICINE

## 2022-10-05 PROCEDURE — 99999 PR PBB SHADOW E&M-EST. PATIENT-LVL III: ICD-10-PCS | Mod: PBBFAC,,, | Performed by: INTERNAL MEDICINE

## 2022-10-05 PROCEDURE — 99214 OFFICE O/P EST MOD 30 MIN: CPT | Mod: S$GLB,,, | Performed by: INTERNAL MEDICINE

## 2022-10-05 PROCEDURE — 3008F BODY MASS INDEX DOCD: CPT | Mod: CPTII,S$GLB,, | Performed by: INTERNAL MEDICINE

## 2022-10-05 PROCEDURE — 84443 ASSAY THYROID STIM HORMONE: CPT | Performed by: INTERNAL MEDICINE

## 2022-10-05 PROCEDURE — 85025 COMPLETE CBC W/AUTO DIFF WBC: CPT | Performed by: INTERNAL MEDICINE

## 2022-10-05 PROCEDURE — 3080F DIAST BP >= 90 MM HG: CPT | Mod: CPTII,S$GLB,, | Performed by: INTERNAL MEDICINE

## 2022-10-05 PROCEDURE — 3008F PR BODY MASS INDEX (BMI) DOCUMENTED: ICD-10-PCS | Mod: CPTII,S$GLB,, | Performed by: INTERNAL MEDICINE

## 2022-10-05 PROCEDURE — 3075F PR MOST RECENT SYSTOLIC BLOOD PRESS GE 130-139MM HG: ICD-10-PCS | Mod: CPTII,S$GLB,, | Performed by: INTERNAL MEDICINE

## 2022-10-05 PROCEDURE — 36415 COLL VENOUS BLD VENIPUNCTURE: CPT | Performed by: INTERNAL MEDICINE

## 2022-10-05 PROCEDURE — 1159F MED LIST DOCD IN RCRD: CPT | Mod: CPTII,S$GLB,, | Performed by: INTERNAL MEDICINE

## 2022-10-05 PROCEDURE — 1159F PR MEDICATION LIST DOCUMENTED IN MEDICAL RECORD: ICD-10-PCS | Mod: CPTII,S$GLB,, | Performed by: INTERNAL MEDICINE

## 2022-10-05 PROCEDURE — 3075F SYST BP GE 130 - 139MM HG: CPT | Mod: CPTII,S$GLB,, | Performed by: INTERNAL MEDICINE

## 2022-10-05 RX ORDER — LIDOCAINE 50 MG/G
1 PATCH TOPICAL
COMMUNITY

## 2022-10-05 NOTE — PROGRESS NOTES
Cardiology    10/5/2022  10:53 AM    Problem list  Patient Active Problem List   Diagnosis    Essential hypertension    Chronic diastolic congestive heart failure    PTSD (post-traumatic stress disorder)    Glaucoma    History of Haylee-en-Y gastric bypass    Gastroesophageal reflux disease without esophagitis    Microcytic anemia    Chronic headache    Generalized anxiety disorder    Panic disorder    Chronic recurrent major depressive disorder    Alcohol use disorder, moderate, in sustained remission    Status post glaucoma surgery    Postprandial epigastric pain    Class 1 obesity due to excess calories with serious comorbidity and body mass index (BMI) of 34.0 to 34.9 in adult       CC:  F/u    HPI:  She was last seen in July since her appointment, she had 1 fall because she slipped on water that was leaking from her air conditioning unit.  She has seen pain management for her back pain.  She is doing well with a blood pressure medication.  Her echocardiogram showed improved ejection fraction to 53% from 30-35% in 2018.  No alcohol.    Medications  Current Outpatient Medications   Medication Sig Dispense Refill    amLODIPine (NORVASC) 10 MG tablet Take 1 tablet (10 mg total) by mouth once daily. 90 tablet 3    BELBUCA 150 mcg Film DISSOLVE 1 FILM TO THE GUM EVERY 12 HOURS      brimonidine-timoloL (COMBIGAN) 0.2-0.5 % Drop Place 1 drop into both eyes 2 (two) times daily.      busPIRone (BUSPAR) 15 MG tablet Take 1 tablet (15 mg total) by mouth 2 (two) times daily as needed (breakthrough anxiety). 60 tablet 11    calcium citrate 250 mg calcium Tab       carvediloL (COREG) 3.125 MG tablet Take 1 tablet (3.125 mg total) by mouth 2 (two) times daily. 180 tablet 3    cetirizine (ZYRTEC) 10 MG tablet Take 1 tablet (10 mg total) by mouth once daily. 90 tablet 3    diazePAM (VALIUM) 5 MG tablet Take 1 tablet (5 mg total) by mouth every 12 (twelve) hours as needed for Anxiety. 60 tablet 5    disulfiram (ANTABUSE)  250 mg tablet TAKE 1 TABLET EVERY DAY 90 tablet 3    fluticasone propionate (FLONASE) 50 mcg/actuation nasal spray 2 sprays (100 mcg total) by Each Nostril route once daily. 16 g 11    folic acid (FOLVITE) 1 MG tablet Take 1 tablet (1 mg total) by mouth once daily. 30 tablet 11    furosemide (LASIX) 40 MG tablet Take 1 tablet (40 mg total) by mouth once daily. 90 tablet 3    HYDROcodone-acetaminophen (NORCO) 7.5-325 mg per tablet TAKE 1 T PO TID PRN FOR 30 DAYS  0    LIDOcaine (LIDODERM) 5 % Place 1 patch onto the skin every 24 hours. Remove & Discard patch within 12 hours or as directed by MD      methocarbamoL (ROBAXIN) 750 MG Tab Take 750 mg by mouth 3 (three) times daily.      montelukast (SINGULAIR) 10 mg tablet Take 1 tablet (10 mg total) by mouth every evening. 90 tablet 1    multivitamin (THERAGRAN) tablet Take 1 tablet by mouth once daily. 30 tablet 5    omeprazole (PRILOSEC) 40 MG capsule Take 1 capsule (40 mg total) by mouth 2 (two) times daily before meals. 15-30 minutes before breakfast and dinner 60 capsule 11    promethazine (PHENERGAN) 25 MG tablet Take 1 tablet (25 mg total) by mouth every 6 (six) hours as needed for Nausea. 15 tablet 0    spironolactone (ALDACTONE) 25 MG tablet Take 1 tablet (25 mg total) by mouth once daily. 90 tablet 0    sucralfate (CARAFATE) 100 mg/mL suspension Take 10 mLs (1 g total) by mouth 4 (four) times daily before meals and nightly. 1200 mL 11    TRAVATAN Z 0.004 % ophthalmic solution INT 1 GTT IN OU QD  3    venlafaxine (EFFEXOR-XR) 150 MG Cp24 Take 1 capsule (150 mg total) by mouth once daily. Start on Week 2 90 capsule 3    cyanocobalamin 1,000 mcg/mL injection inject  1000mcg ( 1 vial ) as directed  every 28 days ( the 15th of every month ) (Patient not taking: Reported on 10/5/2022) 1 mL 6    QUEtiapine (SEROQUEL) 25 MG Tab Take 1 or 2 tablets by mouth each evening before bed (Patient not taking: Reported on 10/5/2022) 60 tablet 5    sumatriptan (IMITREX) 50 MG  tablet Once for severe headache. May repeat once after 2 hours. Do not exceed 3-4 doses in one week. (Patient not taking: Reported on 10/5/2022) 12 tablet 0     Current Facility-Administered Medications   Medication Dose Route Frequency Provider Last Rate Last Admin    cyanocobalamin injection 1,000 mcg  1,000 mcg Intramuscular Q30 Days D'Amico C. Johnson, MD          Prior to Admission medications    Medication Sig Start Date End Date Taking? Authorizing Provider   amLODIPine (NORVASC) 10 MG tablet Take 1 tablet (10 mg total) by mouth once daily. 1/25/22 1/25/23 Yes Loco Lynne MD   BELBUCA 150 mcg Film DISSOLVE 1 FILM TO THE GUM EVERY 12 HOURS 6/9/21  Yes Historical Provider   brimonidine-timoloL (COMBIGAN) 0.2-0.5 % Drop Place 1 drop into both eyes 2 (two) times daily.   Yes Historical Provider   busPIRone (BUSPAR) 15 MG tablet Take 1 tablet (15 mg total) by mouth 2 (two) times daily as needed (breakthrough anxiety). 12/29/21  Yes Ceferino Farr III, NP   calcium citrate 250 mg calcium Tab  3/20/07  Yes Historical Provider   carvediloL (COREG) 3.125 MG tablet Take 1 tablet (3.125 mg total) by mouth 2 (two) times daily. 1/25/22 1/25/23 Yes Loco Lynne MD   cetirizine (ZYRTEC) 10 MG tablet Take 1 tablet (10 mg total) by mouth once daily. 8/24/22 8/24/23 Yes Tucker Wright MD   diazePAM (VALIUM) 5 MG tablet Take 1 tablet (5 mg total) by mouth every 12 (twelve) hours as needed for Anxiety. 5/23/22  Yes Ceferino Farr III, NP   disulfiram (ANTABUSE) 250 mg tablet TAKE 1 TABLET EVERY DAY 6/5/22  Yes Ceferino Farr III, NP   fluticasone propionate (FLONASE) 50 mcg/actuation nasal spray 2 sprays (100 mcg total) by Each Nostril route once daily. 1/25/22  Yes Loco Lynne MD   folic acid (FOLVITE) 1 MG tablet Take 1 tablet (1 mg total) by mouth once daily. 12/9/21 12/9/22 Yes Loco Lynne MD   furosemide (LASIX) 40 MG tablet Take 1 tablet (40 mg total) by mouth once daily. 7/27/22 7/27/23 Yes Tucker Wright MD    HYDROcodone-acetaminophen (NORCO) 7.5-325 mg per tablet TAKE 1 T PO TID PRN FOR 30 DAYS 7/15/19  Yes Historical Provider   LIDOcaine (LIDODERM) 5 % Place 1 patch onto the skin every 24 hours. Remove & Discard patch within 12 hours or as directed by MD   Yes Historical Provider   methocarbamoL (ROBAXIN) 750 MG Tab Take 750 mg by mouth 3 (three) times daily. 7/2/21  Yes Historical Provider   montelukast (SINGULAIR) 10 mg tablet Take 1 tablet (10 mg total) by mouth every evening. 1/25/22 1/25/23 Yes Loco Lynne MD   multivitamin (THERAGRAN) tablet Take 1 tablet by mouth once daily. 10/10/15  Yes Karla Hathaway MD   omeprazole (PRILOSEC) 40 MG capsule Take 1 capsule (40 mg total) by mouth 2 (two) times daily before meals. 15-30 minutes before breakfast and dinner 9/21/22 9/21/23 Yes Fern Street MD   promethazine (PHENERGAN) 25 MG tablet Take 1 tablet (25 mg total) by mouth every 6 (six) hours as needed for Nausea. 8/25/21  Yes Loco Lynne MD   spironolactone (ALDACTONE) 25 MG tablet Take 1 tablet (25 mg total) by mouth once daily. 8/22/22 8/22/23 Yes Tucker Wright MD   sucralfate (CARAFATE) 100 mg/mL suspension Take 10 mLs (1 g total) by mouth 4 (four) times daily before meals and nightly. 9/21/22 9/21/23 Yes eFrn Street MD   TRAVATAN Z 0.004 % ophthalmic solution INT 1 GTT IN OU QD 6/4/19  Yes Historical Provider   venlafaxine (EFFEXOR-XR) 150 MG Cp24 Take 1 capsule (150 mg total) by mouth once daily. Start on Week 2 6/13/22  Yes Ceferino Farr III, NP   cyanocobalamin 1,000 mcg/mL injection inject  1000mcg ( 1 vial ) as directed  every 28 days ( the 15th of every month )  Patient not taking: Reported on 10/5/2022 2/15/21   D'Amico C. Johnson, MD   QUEtiapine (SEROQUEL) 25 MG Tab Take 1 or 2 tablets by mouth each evening before bed  Patient not taking: Reported on 10/5/2022 9/13/22   Ceferino Farr III, NP   sumatriptan (IMITREX) 50 MG tablet Once for severe headache. May repeat  once after 2 hours. Do not exceed 3-4 doses in one week.  Patient not taking: Reported on 10/5/2022 5/18/22   Loco Lynne MD   acamprosate (CAMPRAL) 333 mg tablet Take 2 tablets (666 mg total) by mouth 3 (three) times daily. 19  Ceferino Farr III, NP   ALPRAZolam (XANAX) 1 MG tablet Take 1 tablet (1 mg total) by mouth 2 (two) times daily as needed for Anxiety. 1/24/22 3/11/22  Ceferino Farr III, NP         History  Past Medical History:   Diagnosis Date    Alcohol abuse 10/07/2015    In remission since 18    Anemia 2015    Arthritis     Blindness of right eye     only sees light    Breast calcification, left     biopsied: scar tissue from breast reduction surgery    Chronic diastolic congestive heart failure     Chronic gastrojejunal anastomotic ulcer      -     CVA (cerebral vascular accident) 2022    TPA    Encounter for blood transfusion     GERD (gastroesophageal reflux disease)     Glaucoma     Hypertension     Hypoalbuminemia     Hyponatremia 10/2015    alcohol abuse, polydipsia, Na 109, seizure in ICU    Insomnia     Malingering 2016    Morbid obesity 2017    PTSD (post-traumatic stress disorder)     Sickle cell trait     Stage 3a chronic kidney disease 10/14/2018     Past Surgical History:   Procedure Laterality Date    BREAST BIOPSY Left     CARPAL TUNNEL RELEASE  2020     SECTION      CHOLECYSTECTOMY  after 2007    bile fistula    GASTRIC BYPASS  2007    TOTAL REDUCTION MAMMOPLASTY Bilateral 2003    xen gel stent implant  2018    Performed at [facility]     Social History     Socioeconomic History    Marital status: Single   Tobacco Use    Smoking status: Every Day     Packs/day: 0.50     Years: 4.00     Pack years: 2.00     Types: Cigarettes    Smokeless tobacco: Never   Substance and Sexual Activity    Alcohol use: No     Comment: former heavy drinker since May 1st 2017    Drug use: No    Sexual activity: Yes      Partners: Male     Social Determinants of Health     Financial Resource Strain: High Risk    Difficulty of Paying Living Expenses: Very hard   Food Insecurity: Food Insecurity Present    Worried About Running Out of Food in the Last Year: Often true    Ran Out of Food in the Last Year: Often true   Transportation Needs: Unmet Transportation Needs    Lack of Transportation (Medical): Yes    Lack of Transportation (Non-Medical): Yes   Physical Activity: Inactive    Days of Exercise per Week: 0 days    Minutes of Exercise per Session: 0 min   Stress: Stress Concern Present    Feeling of Stress : Very much   Social Connections: Unknown    Frequency of Communication with Friends and Family: More than three times a week    Frequency of Social Gatherings with Friends and Family: Never    Active Member of Clubs or Organizations: No    Attends Club or Organization Meetings: Never    Marital Status: Patient refused   Housing Stability: High Risk    Unable to Pay for Housing in the Last Year: Yes    Number of Places Lived in the Last Year: 3    Unstable Housing in the Last Year: Patient refused         Allergies  Review of patient's allergies indicates:   Allergen Reactions    Penicillins Hives    Hydroxyzine Anxiety, Hallucinations and Palpitations         Review of Systems   Review of Systems   Constitutional: Negative for decreased appetite, fever and weight loss.   HENT:  Negative for congestion and nosebleeds.    Eyes:  Negative for double vision, vision loss in left eye, vision loss in right eye and visual disturbance.   Cardiovascular:  Negative for chest pain, claudication, cyanosis, dyspnea on exertion, irregular heartbeat, leg swelling, near-syncope, orthopnea, palpitations, paroxysmal nocturnal dyspnea and syncope.   Respiratory:  Negative for cough, hemoptysis, shortness of breath, sleep disturbances due to breathing, snoring, sputum production and wheezing.    Endocrine: Negative for cold intolerance and heat  intolerance.   Skin:  Negative for nail changes and rash.   Musculoskeletal:  Negative for joint pain, muscle cramps, muscle weakness and myalgias.   Gastrointestinal:  Negative for abdominal pain, change in bowel habit, constipation, heartburn, hematemesis, hematochezia, hemorrhoids and melena.   Neurological:  Negative for dizziness, focal weakness and headaches.       Physical Exam  Wt Readings from Last 1 Encounters:   10/05/22 93.2 kg (205 lb 5.7 oz)     BP Readings from Last 3 Encounters:   10/05/22 (!) 134/96   09/27/22 (!) 135/97   09/21/22 (!) 135/97     Pulse Readings from Last 1 Encounters:   10/05/22 81     Body mass index is 34.17 kg/m².    Physical Exam  Vitals reviewed.   Constitutional:       Appearance: She is well-developed. She is obese.   HENT:      Head: Atraumatic.   Eyes:      General: No scleral icterus.  Neck:      Vascular: Normal carotid pulses. No carotid bruit, hepatojugular reflux or JVD.   Cardiovascular:      Rate and Rhythm: Normal rate and regular rhythm.      Chest Wall: PMI is not displaced.      Pulses: Intact distal pulses.           Carotid pulses are 2+ on the right side and 2+ on the left side.       Radial pulses are 2+ on the right side and 2+ on the left side.        Dorsalis pedis pulses are 2+ on the right side and 2+ on the left side.      Heart sounds: Normal heart sounds, S1 normal and S2 normal. No murmur heard.    No friction rub.   Pulmonary:      Effort: Pulmonary effort is normal. No respiratory distress.      Breath sounds: Normal breath sounds. No stridor. No wheezing or rales.   Chest:      Chest wall: No tenderness.   Abdominal:      General: Bowel sounds are normal.      Palpations: Abdomen is soft.   Musculoskeletal:      Cervical back: Neck supple. No edema.   Skin:     General: Skin is warm and dry.      Nails: There is no clubbing.   Neurological:      Mental Status: She is alert and oriented to person, place, and time.   Psychiatric:         Behavior:  Behavior normal.         Thought Content: Thought content normal.           Assessment  1. Essential hypertension  controlled    2. Chronic diastolic congestive heart failure  Improved EF to 53% from 30-35%.        Plan and Discussion  Continue current meds.  Continue to abstain from ETOH.    Follow Up  6 months      Tab Padilla MD, F.A.C.C, F.S.C.A.I.

## 2022-10-06 ENCOUNTER — PATIENT MESSAGE (OUTPATIENT)
Dept: BARIATRICS | Facility: CLINIC | Age: 52
End: 2022-10-06
Payer: MEDICARE

## 2022-10-10 ENCOUNTER — TELEPHONE (OUTPATIENT)
Dept: BARIATRICS | Facility: CLINIC | Age: 52
End: 2022-10-10
Payer: MEDICARE

## 2022-10-10 NOTE — TELEPHONE ENCOUNTER
Attempted to reach pt, LVM to call [phone] to schedule consultation appointments with JOSE and dietician per Dr. Street's Gen Surg note, to establish care with our bariatric program.  Pt s/p RNY 2007 in [place] with hx ulcers and smoking. [place] labs, UGI and EGD results pending, ordered 9/21/2022. Bariatric dashboard status updated to financial services phone appointment completed. Guarantor note copied into bariatric dashboard.   Portal message sent to patient.

## 2022-11-03 ENCOUNTER — TELEPHONE (OUTPATIENT)
Dept: SURGERY | Facility: CLINIC | Age: 52
End: 2022-11-03

## 2022-11-03 ENCOUNTER — TELEPHONE (OUTPATIENT)
Dept: BARIATRICS | Facility: CLINIC | Age: 52
End: 2022-11-03
Payer: MEDICARE

## 2022-11-03 NOTE — TELEPHONE ENCOUNTER
----- Message from Juan Manuel Richards MA sent at 11/3/2022  1:37 PM CDT -----  Contact: [phone]    ----- Message -----  From: Hair Tate  Sent: 11/3/2022   1:00 PM CDT  To: [facility] Kidd Staff    Pt is calling in ref to her new insurance -- wants to know if Dr Street accepts [employer] -- please give pt a call back to confirm [phone]    Pt also wants to know if she have to have a Endo Procedure dont before her appt

## 2022-11-03 NOTE — TELEPHONE ENCOUNTER
----- Message from Hair Tate sent at 11/3/2022 12:56 PM CDT -----  Contact: [phone]  Pt is calling in ref to her new insurance -- wants to know if Dr Street accepts [employer] -- please give pt a call back to confirm [phone]    Pt also wants to know if she have to have a Endo Procedure dont before her appt

## 2022-11-03 NOTE — TELEPHONE ENCOUNTER
Attempt to contact patient regarding questions about insurance, no answer. Voicemail not an option

## 2022-11-11 ENCOUNTER — CLINICAL SUPPORT (OUTPATIENT)
Dept: ENDOSCOPY | Facility: HOSPITAL | Age: 52
End: 2022-11-11
Attending: SURGERY
Payer: MEDICARE

## 2022-11-11 VITALS — HEIGHT: 65 IN | BODY MASS INDEX: 34.49 KG/M2 | WEIGHT: 207 LBS

## 2022-11-11 DIAGNOSIS — K28.9 ANASTOMOTIC ULCER S/P GASTRIC BYPASS: ICD-10-CM

## 2022-11-14 ENCOUNTER — TELEPHONE (OUTPATIENT)
Dept: SMOKING CESSATION | Facility: CLINIC | Age: 52
End: 2022-11-14
Payer: MEDICARE

## 2022-11-14 NOTE — TELEPHONE ENCOUNTER
[facility]-called patient regarding canceled intake appointment.  Patient is rescheduled on 11- at 1:00 pm.

## 2022-11-19 DIAGNOSIS — K28.9 ANASTOMOTIC ULCER S/P GASTRIC BYPASS: Primary | ICD-10-CM

## 2022-11-30 ENCOUNTER — TELEPHONE (OUTPATIENT)
Dept: SMOKING CESSATION | Facility: CLINIC | Age: 52
End: 2022-11-30
Payer: MEDICARE

## 2022-11-30 NOTE — TELEPHONE ENCOUNTER
[facility]-called patient regarding canceled intake appointment, no answer.  Left message and contact numbers were given.

## 2022-12-01 ENCOUNTER — ANESTHESIA EVENT (OUTPATIENT)
Dept: ENDOSCOPY | Facility: HOSPITAL | Age: 52
End: 2022-12-01
Payer: MEDICARE

## 2022-12-01 ENCOUNTER — ANESTHESIA (OUTPATIENT)
Dept: ENDOSCOPY | Facility: HOSPITAL | Age: 52
End: 2022-12-01
Payer: MEDICARE

## 2022-12-01 ENCOUNTER — HOSPITAL ENCOUNTER (OUTPATIENT)
Facility: HOSPITAL | Age: 52
Discharge: HOME | End: 2022-12-01
Attending: INTERNAL MEDICINE | Admitting: INTERNAL MEDICINE
Payer: MEDICARE

## 2022-12-01 VITALS
TEMPERATURE: 98 F | BODY MASS INDEX: 34.49 KG/M2 | SYSTOLIC BLOOD PRESSURE: 148 MMHG | RESPIRATION RATE: 17 BRPM | WEIGHT: 207 LBS | DIASTOLIC BLOOD PRESSURE: 101 MMHG | OXYGEN SATURATION: 99 % | HEART RATE: 67 BPM | HEIGHT: 65 IN

## 2022-12-01 DIAGNOSIS — K28.9 ANASTOMOTIC ULCER S/P GASTRIC BYPASS: ICD-10-CM

## 2022-12-01 PROCEDURE — 88342 IMHCHEM/IMCYTCHM 1ST ANTB: CPT | Mod: 26,,, | Performed by: PATHOLOGY

## 2022-12-01 PROCEDURE — 88305 TISSUE EXAM BY PATHOLOGIST: ICD-10-PCS | Mod: 26,,, | Performed by: PATHOLOGY

## 2022-12-01 PROCEDURE — E9220 PRA ENDO ANESTHESIA: HCPCS | Mod: ,,, | Performed by: NURSE ANESTHETIST, CERTIFIED REGISTERED

## 2022-12-01 PROCEDURE — 63600175 PHARM REV CODE 636 W HCPCS: Performed by: NURSE ANESTHETIST, CERTIFIED REGISTERED

## 2022-12-01 PROCEDURE — 43239 EGD BIOPSY SINGLE/MULTIPLE: CPT | Performed by: INTERNAL MEDICINE

## 2022-12-01 PROCEDURE — 88305 TISSUE EXAM BY PATHOLOGIST: CPT | Mod: 26,,, | Performed by: PATHOLOGY

## 2022-12-01 PROCEDURE — 37000009 HC ANESTHESIA EA ADD 15 MINS: Performed by: INTERNAL MEDICINE

## 2022-12-01 PROCEDURE — 37000008 HC ANESTHESIA 1ST 15 MINUTES: Performed by: INTERNAL MEDICINE

## 2022-12-01 PROCEDURE — 25000003 PHARM REV CODE 250: Performed by: NURSE ANESTHETIST, CERTIFIED REGISTERED

## 2022-12-01 PROCEDURE — 43239 PR EGD, FLEX, W/BIOPSY, SGL/MULTI: ICD-10-PCS | Mod: ,,, | Performed by: INTERNAL MEDICINE

## 2022-12-01 PROCEDURE — E9220 PRA ENDO ANESTHESIA: ICD-10-PCS | Mod: ,,, | Performed by: NURSE ANESTHETIST, CERTIFIED REGISTERED

## 2022-12-01 PROCEDURE — 43239 EGD BIOPSY SINGLE/MULTIPLE: CPT | Mod: ,,, | Performed by: INTERNAL MEDICINE

## 2022-12-01 PROCEDURE — 27201012 HC FORCEPS, HOT/COLD, DISP: Performed by: INTERNAL MEDICINE

## 2022-12-01 PROCEDURE — 25000003 PHARM REV CODE 250: Performed by: INTERNAL MEDICINE

## 2022-12-01 PROCEDURE — 88342 IMHCHEM/IMCYTCHM 1ST ANTB: CPT | Performed by: PATHOLOGY

## 2022-12-01 PROCEDURE — 88342 CHG IMMUNOCYTOCHEMISTRY: ICD-10-PCS | Mod: 26,,, | Performed by: PATHOLOGY

## 2022-12-01 PROCEDURE — 88305 TISSUE EXAM BY PATHOLOGIST: CPT | Performed by: PATHOLOGY

## 2022-12-01 RX ORDER — LIDOCAINE HYDROCHLORIDE 20 MG/ML
INJECTION INTRAVENOUS
Status: DISCONTINUED | OUTPATIENT
Start: 2022-12-01 | End: 2022-12-01

## 2022-12-01 RX ORDER — PROPOFOL 10 MG/ML
VIAL (ML) INTRAVENOUS
Status: DISCONTINUED | OUTPATIENT
Start: 2022-12-01 | End: 2022-12-01

## 2022-12-01 RX ORDER — PROPOFOL 10 MG/ML
VIAL (ML) INTRAVENOUS CONTINUOUS PRN
Status: DISCONTINUED | OUTPATIENT
Start: 2022-12-01 | End: 2022-12-01

## 2022-12-01 RX ORDER — SODIUM CHLORIDE 9 MG/ML
INJECTION, SOLUTION INTRAVENOUS CONTINUOUS
Status: DISCONTINUED | OUTPATIENT
Start: 2022-12-01 | End: 2022-12-01 | Stop reason: HOSPADM

## 2022-12-01 RX ADMIN — SODIUM CHLORIDE: 0.9 INJECTION, SOLUTION INTRAVENOUS at 01:12

## 2022-12-01 RX ADMIN — LIDOCAINE HYDROCHLORIDE 100 MG: 20 INJECTION INTRAVENOUS at 01:12

## 2022-12-01 RX ADMIN — PROPOFOL 100 MG: 10 INJECTION, EMULSION INTRAVENOUS at 01:12

## 2022-12-01 RX ADMIN — Medication 150 MCG/KG/MIN: at 01:12

## 2022-12-01 NOTE — ANESTHESIA PREPROCEDURE EVALUATION
12/01/2022  Daksha Marie is a 52 y.o., female.      Patient Name: Daksha Marie  YOB: 1970  MRN: [id number]  CSN: [id number]      Code Status: Prior   Date of Procedure: 12/1/2022  Anesthesia: Choice Procedure: Procedure(s) (LRB):  ESOPHAGOGASTRODUODENOSCOPY (EGD) (N/A)  Pre-Operative Diagnosis: Anastomotic ulcer S/P gastric bypass [T85.898A, K28.9]  Proceduralist: Surgeon(s) and Role:     * Rosendo Leos MD - Primary        SUBJECTIVE:   Daksha Marie is a 52 y.o. female who  has a past medical history of Alcohol abuse (10/07/2015), Anemia (06/17/2015), Arthritis, Blindness of right eye, Breast calcification, left, Chronic diastolic congestive heart failure, Chronic gastrojejunal anastomotic ulcer, CVA (cerebral vascular accident) (01/2022), Encounter for blood transfusion, GERD (gastroesophageal reflux disease), Glaucoma (2008), Hypertension, Hypoalbuminemia, Hyponatremia (10/2015), Insomnia, Malingering (01/24/2016), Morbid obesity (2017), PTSD (post-traumatic stress disorder), Sickle cell trait, and Stage 3a chronic kidney disease (10/14/2018). No notes on file    ALLERGIES:     Review of patient's allergies indicates:   Allergen Reactions    Penicillins Hives    Hydroxyzine Anxiety, Hallucinations and Palpitations     MEDICATIONS:     Current Facility-Administered Medications   Medication Dose Route Frequency Provider Last Rate Last Admin    0.9%  NaCl infusion   Intravenous Continuous Rosendo Leos MD              History:     Patient Active Problem List   Diagnosis    Essential hypertension    Chronic diastolic congestive heart failure    PTSD (post-traumatic stress disorder)    Glaucoma    History of Haylee-en-Y gastric bypass    Gastroesophageal reflux disease without esophagitis    Microcytic anemia    Chronic headache    Generalized  anxiety disorder    Panic disorder    Chronic recurrent major depressive disorder    Alcohol use disorder, moderate, in sustained remission    Status post glaucoma surgery    Postprandial epigastric pain    Class 1 obesity due to excess calories with serious comorbidity and body mass index (BMI) of 34.0 to 34.9 in adult     Surgical History:    has a past surgical history that includes Gastric bypass ();  section; Cholecystectomy (after ); Breast biopsy (Left); Total Reduction Mammoplasty (Bilateral, ); xen gel stent implant (2018); and Carpal tunnel release (2020).   Social History:    reports being sexually active and has had partner(s) who are male.  reports that she has been smoking cigarettes. She has a 2.00 pack-year smoking history. She has never used smokeless tobacco. She reports that she does not drink alcohol and does not use drugs.       Pre-op Assessment    I have reviewed the Patient Summary Reports.     I have reviewed the Nursing Notes. I have reviewed the NPO Status.   I have reviewed the Medications.     Review of Systems  Anesthesia Hx:  No problems with previous Anesthesia  Denies Family Hx of Anesthesia complications.   Denies Personal Hx of Anesthesia complications.   Hematology/Oncology:  Hematology Normal        Cardiovascular:   Hypertension CHF    Renal/:   Chronic Renal Disease, CKD    Hepatic/GI:   Bowel Prep. PUD, GERD    Musculoskeletal:  Musculoskeletal Normal    Neurological:   CVA    Dermatological:  Skin Normal        Physical Exam  General: Cooperative, Alert and Oriented    Airway:  Mallampati: II   Mouth Opening: Normal  TM Distance: Normal  Tongue: Normal  Neck ROM: Normal ROM    Dental:  Intact        Anesthesia Plan  Type of Anesthesia, risks & benefits discussed:    Anesthesia Type: Gen Natural Airway  Intra-op Monitoring Plan: Standard ASA Monitors  Induction:  IV  Informed Consent: Informed consent signed with the Patient and all  parties understand the risks and agree with anesthesia plan.  All questions answered.   ASA Score: 3  Day of Surgery Review of History & Physical: H&P Update referred to the surgeon/provider.I have interviewed and examined the patient. I have reviewed the patient's H&P dated: There are no significant changes.     Ready For Surgery From Anesthesia Perspective.     .

## 2022-12-01 NOTE — PROVATION PATIENT INSTRUCTIONS
Discharge Summary/Instructions after an Endoscopic Procedure  Patient Name: Daksha Marie  Patient MRN: [id number]  Patient   YOB: 1970 Thursday, December 1, 2022  Trey Anderson MD  Dear patient,  As a result of recent federal legislation (The Federal Cures Act), you may   receive lab or pathology results from your procedure in your [facility]   account before your physician is able to contact you. Your physician or   their representative will relay the results to you with their   recommendations at their soonest availability.  Thank you,  RESTRICTIONS:  During your procedure today, you received medications for sedation.  These   medications may affect your judgment, balance and coordination.  Therefore,   for 24 hours, you have the following restrictions:   - DO NOT drive a car, operate machinery, make legal/financial decisions,   sign important papers or drink alcohol.    ACTIVITY:  Today: no heavy lifting, straining or running due to procedural   sedation/anesthesia.  The following day: return to full activity including work.  DIET:  Eat and drink normally unless instructed otherwise.     TREATMENT FOR COMMON SIDE EFFECTS:  - Mild abdominal pain, nausea, belching, bloating or excessive gas:  rest,   eat lightly and use a heating pad.  - Sore Throat: treat with throat lozenges and/or gargle with warm salt   water.  - Because air was used during the procedure, expelling large amounts of air   from your rectum or belching is normal.  - If a bowel prep was taken, you may not have a bowel movement for 1-3 days.    This is normal.  SYMPTOMS TO WATCH FOR AND REPORT TO YOUR PHYSICIAN:  1. Abdominal pain or bloating, other than gas cramps.  2. Chest pain.  3. Back pain.  4. Signs of infection such as: chills or fever occurring within 24 hours   after the procedure.  5. Rectal bleeding, which would show as bright red, maroon, or black stools.   (A tablespoon of blood from the rectum is not serious,  especially if   hemorrhoids are present.)  6. Vomiting.  7. Weakness or dizziness.  GO DIRECTLY TO THE NEAREST EMERGENCY ROOM IF YOU HAVE ANY OF THE FOLLOWING:      Difficulty breathing              Chills and/or fever over 101 F   Persistent vomiting and/or vomiting blood   Severe abdominal pain   Severe chest pain   Black, tarry stools   Bleeding- more than one tablespoon   Any other symptom or condition that you feel may need urgent attention  Your doctor recommends these additional instructions:  If any biopsies were taken, your doctors clinic will contact you in 1 to 2   weeks with any results.  - Discharge patient to home.   - Follow an antireflux regimen indefinitely.   - Continue present medications. Omeprazole 40mg every 12 hours  - Consider avoiding all non-steroidal anti-inflammatory drugs (aspirin,   ibuprofen, naproxen, etc.), unless needed for cardiovascular protection.    Recommend you discuss with your prescribing doctor (of your aspirin) to see   if cardiovascular benefits of your aspirin outweigh the risks of GI   bleeding.  - Await pathology results.   - Telephone endoscopist for pathology results in 3 weeks.   - Discontinue the use of any products containing nicotine indefinitely or   your ulcer will likely not heal.   - Mechanical soft diet.   - Return to referring physician.   - The findings and recommendations were discussed with the patient.  For questions, problems or results please call your physician - Trey Anderson MD at Work:  [phone].  [facility], EMERGENCY ROOM PHONE NUMBER: [phone]  IF A COMPLICATION OR EMERGENCY SITUATION ARISES AND YOU ARE UNABLE TO REACH   YOUR PHYSICIAN - GO DIRECTLY TO THE EMERGENCY ROOM.  Trey Anderson MD  12/1/2022 2:30:19 PM  This report has been verified and signed electronically.  Dear patient,  As a result of recent federal legislation (The Federal Cures Act), you may   receive lab or pathology results from your procedure in  your [facility]   account before your physician is able to contact you. Your physician or   their representative will relay the results to you with their   recommendations at their soonest availability.  Thank you,  PROVATION

## 2022-12-01 NOTE — TRANSFER OF CARE
"Anesthesia Transfer of Care Note    Patient: Daksha Marie    Procedure(s) Performed: Procedure(s) (LRB):  ESOPHAGOGASTRODUODENOSCOPY (EGD) (N/A)    Patient location: PACU    Anesthesia Type: general    Transport from OR: Transported from OR on room air with adequate spontaneous ventilation    Post pain: adequate analgesia    Post assessment: no apparent anesthetic complications and tolerated procedure well    Post vital signs: stable    Level of consciousness: awake, alert and oriented    Nausea/Vomiting: no nausea/vomiting    Complications: none    Transfer of care protocol was followed      Last vitals:   Visit Vitals  BP (!) 141/100(BP Location: Left arm, Patient Position: Lying)   Pulse 81   Temp 98   Resp 16   Ht 5' 5" (1.651 m)   Wt 93.9 kg (207 lb)   SpO2 99%   Breastfeeding No   BMI 34.45 kg/m²     "

## 2022-12-01 NOTE — ANESTHESIA POSTPROCEDURE EVALUATION
Anesthesia Post Evaluation    Patient: Daksha Marie    Procedure(s) Performed: Procedure(s) (LRB):  ESOPHAGOGASTRODUODENOSCOPY (EGD) (N/A)    Final Anesthesia Type: general      Patient location during evaluation: GI PACU  Patient participation: Yes- Able to Participate  Level of consciousness: awake and alert and awake  Post-procedure vital signs: reviewed and stable  Pain management: adequate  Airway patency: patent  TAMIKO mitigation strategies: Postoperative administration of CPAP, nasopharyngeal airway, or oral appliance in the postanesthesia care unit (PACU)  PONV status at discharge: No PONV  Anesthetic complications: no      Cardiovascular status: blood pressure returned to baseline  Respiratory status: spontaneous ventilation, unassisted and room air  Hydration status: euvolemic  Follow-up not needed.          Vitals Value Taken Time   /101 12/01/22 1448   Temp 36.6 °C (97.9 °F) 12/01/22 1418   Pulse 67 12/01/22 1448   Resp 17 12/01/22 1448   SpO2 99 % 12/01/22 1448         No case tracking events are documented in the log.      Pain/Batsheva Score: Batsheva Score: 10 (12/1/2022  2:48 PM)

## 2022-12-01 NOTE — H&P
[facility]  History & Physical    Subjective:      Chief Complaint/Reason for Admission:    Direct access EGD for follow up anastomotic ulcer     Daksha Marie is a 52 y.o. female.    Past Medical History:   Diagnosis Date    Alcohol abuse 10/07/2015    In remission since 18    Anemia 2015    Arthritis     Blindness of right eye     only sees light    Breast calcification, left     biopsied: scar tissue from breast reduction surgery    Chronic diastolic congestive heart failure     Chronic gastrojejunal anastomotic ulcer      -     CVA (cerebral vascular accident) 2022    TPA    Encounter for blood transfusion     GERD (gastroesophageal reflux disease)     Glaucoma     Hypertension     Hypoalbuminemia     Hyponatremia 10/2015    alcohol abuse, polydipsia, Na 109, seizure in ICU    Insomnia     Malingering 2016    Morbid obesity 2017    PTSD (post-traumatic stress disorder)     Sickle cell trait     Stage 3a chronic kidney disease 10/14/2018     Past Surgical History:   Procedure Laterality Date    BREAST BIOPSY Left     CARPAL TUNNEL RELEASE  2020     SECTION      CHOLECYSTECTOMY  after     bile fistula    GASTRIC BYPASS  2007    TOTAL REDUCTION MAMMOPLASTY Bilateral     xen gel stent implant  2018    Performed at [facility]     Family History   Problem Relation Age of Onset    Diabetes Mother     Lupus Mother     Hypertension Mother     Heart attack Mother     Diabetes Maternal Grandmother     Crohn's disease Cousin     Heart failure Father     Hypertension Brother     Asthma Brother      Social History     Tobacco Use    Smoking status: Every Day     Packs/day: 0.50     Years: 4.00     Pack years: 2.00     Types: Cigarettes    Smokeless tobacco: Never   Substance Use Topics    Alcohol use: No     Comment: former heavy drinker since May 1st 2017    Drug use: No       Facility-Administered Medications Prior  to Admission   Medication    cyanocobalamin injection 1,000 mcg     PTA Medications   Medication Sig    amLODIPine (NORVASC) 10 MG tablet Take 1 tablet (10 mg total) by mouth once daily.    BELBUCA 150 mcg Film DISSOLVE 1 FILM TO THE GUM EVERY 12 HOURS    brimonidine-timoloL (COMBIGAN) 0.2-0.5 % Drop Place 1 drop into both eyes 2 (two) times daily.    busPIRone (BUSPAR) 15 MG tablet Take 1 tablet (15 mg total) by mouth 2 (two) times daily as needed (breakthrough anxiety).    calcium citrate 250 mg calcium Tab     carvediloL (COREG) 3.125 MG tablet Take 1 tablet (3.125 mg total) by mouth 2 (two) times daily.    cetirizine (ZYRTEC) 10 MG tablet Take 1 tablet (10 mg total) by mouth once daily.    cyanocobalamin 1,000 mcg/mL injection inject  1000mcg ( 1 vial ) as directed  every 28 days ( the 15th of every month ) (Patient not taking: Reported on 10/5/2022)    diazePAM (VALIUM) 5 MG tablet Take 1 tablet (5 mg total) by mouth every 12 (twelve) hours as needed for Anxiety.    disulfiram (ANTABUSE) 250 mg tablet TAKE 1 TABLET EVERY DAY    fluticasone propionate (FLONASE) 50 mcg/actuation nasal spray 2 sprays (100 mcg total) by Each Nostril route once daily.    folic acid (FOLVITE) 1 MG tablet Take 1 tablet (1 mg total) by mouth once daily.    furosemide (LASIX) 40 MG tablet Take 1 tablet (40 mg total) by mouth once daily.    HYDROcodone-acetaminophen (NORCO) 7.5-325 mg per tablet TAKE 1 T PO TID PRN FOR 30 DAYS    LIDOcaine (LIDODERM) 5 % Place 1 patch onto the skin every 24 hours. Remove & Discard patch within 12 hours or as directed by MD    methocarbamoL (ROBAXIN) 750 MG Tab Take 750 mg by mouth 3 (three) times daily.    montelukast (SINGULAIR) 10 mg tablet Take 1 tablet (10 mg total) by mouth every evening.    multivitamin (THERAGRAN) tablet Take 1 tablet by mouth once daily.    omeprazole (PRILOSEC) 40 MG capsule Take 1 capsule (40 mg total) by mouth 2 (two) times daily before meals. 15-30 minutes before breakfast  and dinner    promethazine (PHENERGAN) 25 MG tablet Take 1 tablet (25 mg total) by mouth every 6 (six) hours as needed for Nausea.    QUEtiapine (SEROQUEL) 25 MG Tab Take 1 or 2 tablets by mouth each evening before bed (Patient not taking: Reported on 10/5/2022)    spironolactone (ALDACTONE) 25 MG tablet Take 1 tablet (25 mg total) by mouth once daily.    sucralfate (CARAFATE) 100 mg/mL suspension Take 10 mLs (1 g total) by mouth 4 (four) times daily before meals and nightly.    sumatriptan (IMITREX) 50 MG tablet Once for severe headache. May repeat once after 2 hours. Do not exceed 3-4 doses in one week. (Patient not taking: Reported on 10/5/2022)    TRAVATAN Z 0.004 % ophthalmic solution INT 1 GTT IN OU QD    venlafaxine (EFFEXOR-XR) 150 MG Cp24 Take 1 capsule (150 mg total) by mouth once daily. Start on Week 2     Review of patient's allergies indicates:   Allergen Reactions    Penicillins Hives    Hydroxyzine Anxiety, Hallucinations and Palpitations        Review of Systems   Constitutional:  Negative for fever.   Cardiovascular:  Negative for chest pain.     Objective:      Vital Signs (Most Recent)       Vital Signs Range (Last 24H):       Physical Exam  Neurological:      Mental Status: She is alert and oriented to person, place, and time.         Assessment:      Direct access EGD for follow up anastomotic ulcer       Plan:    Direct access EGD for follow up anastomotic ulcer

## 2022-12-12 RX ORDER — DIAZEPAM 5 MG/1
5 TABLET ORAL EVERY 12 HOURS PRN
Qty: 60 TABLET | Refills: 5 | Status: SHIPPED | OUTPATIENT
Start: 2022-12-12 | End: 2023-02-14 | Stop reason: SDUPTHER

## 2022-12-16 ENCOUNTER — PATIENT MESSAGE (OUTPATIENT)
Dept: SURGERY | Facility: CLINIC | Age: 52
End: 2022-12-16
Payer: MEDICARE

## 2022-12-16 LAB
FINAL PATHOLOGIC DIAGNOSIS: NORMAL
GROSS: NORMAL
Lab: NORMAL
MICROSCOPIC EXAM: NORMAL

## 2022-12-17 DIAGNOSIS — F17.200 TOBACCO USE DISORDER: ICD-10-CM

## 2022-12-17 DIAGNOSIS — K28.9 ANASTOMOTIC ULCER S/P GASTRIC BYPASS: Primary | ICD-10-CM

## 2022-12-17 NOTE — PROGRESS NOTES
Daksha your jejunal anastomotic ulcer will likely never heal if you continue to smoke cigarettes please do everything you can to stop smoking recommend you take your acid suppressive medicine Omeprazole 40mg every 12 hours and recommend you consider a follow-up EGD in 8-10 weeks to check for ulcer healing.  Ulcer unlikely to heal if you continue to smoke cigarettes.    Your EGD pathology was benign no evidence of celiac sprue no evidence of H pylori no evidence of Barretts esophagus.    Diagnosis 1. Jejunum (biopsy):   Small intestine mucosa with no significant histopathologic changes   No support for celiac disease   2. Stomach (biopsy):   Oxyntic mucosa with reactive/regenerative changes   Considerations include erosive/chemical type gastropathy   No Helicobacter organisms (routine and immunostain)   3. Distal esophagus (biopsy):   No intestinal metaplasia, no dysplasia   Squamous glandular mucosa with chronic inflammation   Glandular mucosa comprised of surface foveolar epithelium and mucus and   oxyntic type glands   Multiple deeper levels examined   Comment: Interp By Ericka Ricketts M.D., Signed on 12/16/2022

## 2022-12-27 ENCOUNTER — PATIENT MESSAGE (OUTPATIENT)
Dept: SURGERY | Facility: CLINIC | Age: 52
End: 2022-12-27
Payer: MEDICARE

## 2023-01-03 ENCOUNTER — PATIENT MESSAGE (OUTPATIENT)
Dept: SURGERY | Facility: CLINIC | Age: 53
End: 2023-01-03
Payer: MEDICARE

## 2023-01-05 ENCOUNTER — TELEPHONE (OUTPATIENT)
Dept: BARIATRICS | Facility: CLINIC | Age: 53
End: 2023-01-05
Payer: MEDICARE

## 2023-01-05 DIAGNOSIS — Z87.891 SMOKING HISTORY: ICD-10-CM

## 2023-01-05 DIAGNOSIS — Z98.84 HISTORY OF ROUX-EN-Y GASTRIC BYPASS: Primary | Chronic | ICD-10-CM

## 2023-01-05 DIAGNOSIS — R79.9 ABNORMAL FINDING OF BLOOD CHEMISTRY, UNSPECIFIED: ICD-10-CM

## 2023-01-05 DIAGNOSIS — Z79.899 ENCOUNTER FOR LONG-TERM CURRENT USE OF HIGH RISK MEDICATION: ICD-10-CM

## 2023-01-05 NOTE — TELEPHONE ENCOUNTER
Took call from patient directly from phone room and discussed the portal message that I sent her.  Scheduled a follow up with Dr Street for 1/25/2023 at 1030, she will get fasting labs at 0800, CXR 0815, UGI at 0830. For possible bypass revision.  She is still smoking and will check nicotine as well.

## 2023-01-06 DIAGNOSIS — R11.2 NON-INTRACTABLE VOMITING WITH NAUSEA: ICD-10-CM

## 2023-01-06 DIAGNOSIS — I50.32 CHRONIC DIASTOLIC CONGESTIVE HEART FAILURE: Chronic | ICD-10-CM

## 2023-01-06 DIAGNOSIS — I10 ESSENTIAL HYPERTENSION: Chronic | ICD-10-CM

## 2023-01-06 DIAGNOSIS — R09.81 CHRONIC NASAL CONGESTION: ICD-10-CM

## 2023-01-06 DIAGNOSIS — G44.001 INTRACTABLE CLUSTER HEADACHE SYNDROME, UNSPECIFIED CHRONICITY PATTERN: ICD-10-CM

## 2023-01-07 RX ORDER — FUROSEMIDE 40 MG/1
40 TABLET ORAL DAILY
Qty: 90 TABLET | Refills: 3 | Status: SHIPPED | OUTPATIENT
Start: 2023-01-07 | End: 2023-01-12 | Stop reason: SDUPTHER

## 2023-01-07 RX ORDER — PROMETHAZINE HYDROCHLORIDE 25 MG/1
25 TABLET ORAL EVERY 6 HOURS PRN
Qty: 15 TABLET | Refills: 0 | Status: SHIPPED | OUTPATIENT
Start: 2023-01-07 | End: 2023-01-12 | Stop reason: SDUPTHER

## 2023-01-07 RX ORDER — SPIRONOLACTONE 25 MG/1
25 TABLET ORAL DAILY
Qty: 90 TABLET | Refills: 0 | Status: SHIPPED | OUTPATIENT
Start: 2023-01-07 | End: 2023-01-12 | Stop reason: SDUPTHER

## 2023-01-07 RX ORDER — SUMATRIPTAN 50 MG/1
TABLET, FILM COATED ORAL
Qty: 12 TABLET | Refills: 0 | Status: SHIPPED | OUTPATIENT
Start: 2023-01-07 | End: 2023-01-12 | Stop reason: SDUPTHER

## 2023-01-07 RX ORDER — AMLODIPINE BESYLATE 10 MG/1
10 TABLET ORAL DAILY
Qty: 90 TABLET | Refills: 3 | Status: SHIPPED | OUTPATIENT
Start: 2023-01-07 | End: 2023-01-12 | Stop reason: SDUPTHER

## 2023-01-07 RX ORDER — FLUTICASONE PROPIONATE 50 MCG
2 SPRAY, SUSPENSION (ML) NASAL DAILY
Qty: 16 G | Refills: 11 | Status: SHIPPED | OUTPATIENT
Start: 2023-01-07 | End: 2023-01-12 | Stop reason: SDUPTHER

## 2023-01-07 RX ORDER — MONTELUKAST SODIUM 10 MG/1
10 TABLET ORAL NIGHTLY
Qty: 90 TABLET | Refills: 1 | Status: SHIPPED | OUTPATIENT
Start: 2023-01-07 | End: 2023-01-12 | Stop reason: SDUPTHER

## 2023-01-12 ENCOUNTER — OFFICE VISIT (OUTPATIENT)
Dept: FAMILY MEDICINE | Facility: HOSPITAL | Age: 53
End: 2023-01-12
Payer: MEDICARE

## 2023-01-12 DIAGNOSIS — I10 ESSENTIAL HYPERTENSION: Primary | Chronic | ICD-10-CM

## 2023-01-12 DIAGNOSIS — R11.2 NON-INTRACTABLE VOMITING WITH NAUSEA: ICD-10-CM

## 2023-01-12 DIAGNOSIS — J30.9 ALLERGIC RHINITIS, UNSPECIFIED SEASONALITY, UNSPECIFIED TRIGGER: ICD-10-CM

## 2023-01-12 DIAGNOSIS — R09.81 CHRONIC NASAL CONGESTION: ICD-10-CM

## 2023-01-12 DIAGNOSIS — I50.32 CHRONIC DIASTOLIC CONGESTIVE HEART FAILURE: Chronic | ICD-10-CM

## 2023-01-12 DIAGNOSIS — G44.001 INTRACTABLE CLUSTER HEADACHE SYNDROME, UNSPECIFIED CHRONICITY PATTERN: ICD-10-CM

## 2023-01-12 RX ORDER — CETIRIZINE HYDROCHLORIDE 10 MG/1
10 TABLET ORAL DAILY
Qty: 90 TABLET | Refills: 3 | Status: SHIPPED | OUTPATIENT
Start: 2023-01-12 | End: 2024-01-12

## 2023-01-12 RX ORDER — AMLODIPINE BESYLATE 10 MG/1
10 TABLET ORAL DAILY
Qty: 90 TABLET | Refills: 3 | Status: SHIPPED | OUTPATIENT
Start: 2023-01-12 | End: 2024-01-12

## 2023-01-12 RX ORDER — MONTELUKAST SODIUM 10 MG/1
10 TABLET ORAL NIGHTLY
Qty: 90 TABLET | Refills: 1 | Status: SHIPPED | OUTPATIENT
Start: 2023-01-12 | End: 2023-07-03 | Stop reason: SDUPTHER

## 2023-01-12 RX ORDER — FUROSEMIDE 40 MG/1
40 TABLET ORAL DAILY
Qty: 90 TABLET | Refills: 3 | Status: SHIPPED | OUTPATIENT
Start: 2023-01-12 | End: 2024-01-12

## 2023-01-12 RX ORDER — LEVOCETIRIZINE DIHYDROCHLORIDE 5 MG/1
2.5 TABLET, FILM COATED ORAL NIGHTLY
Qty: 15 TABLET | Refills: 11 | Status: SHIPPED | OUTPATIENT
Start: 2023-01-12 | End: 2024-01-12

## 2023-01-12 RX ORDER — SUMATRIPTAN 50 MG/1
TABLET, FILM COATED ORAL
Qty: 12 TABLET | Refills: 0 | Status: SHIPPED | OUTPATIENT
Start: 2023-01-12 | End: 2023-07-27 | Stop reason: SDUPTHER

## 2023-01-12 RX ORDER — SPIRONOLACTONE 25 MG/1
25 TABLET ORAL DAILY
Qty: 90 TABLET | Refills: 0 | Status: SHIPPED | OUTPATIENT
Start: 2023-01-12 | End: 2024-01-12

## 2023-01-12 RX ORDER — PROMETHAZINE HYDROCHLORIDE 25 MG/1
25 TABLET ORAL EVERY 6 HOURS PRN
Qty: 15 TABLET | Refills: 0 | Status: SHIPPED | OUTPATIENT
Start: 2023-01-12 | End: 2023-01-12

## 2023-01-12 RX ORDER — CARVEDILOL 3.12 MG/1
3.12 TABLET ORAL 2 TIMES DAILY
Qty: 180 TABLET | Refills: 3 | Status: SHIPPED | OUTPATIENT
Start: 2023-01-12 | End: 2024-01-12

## 2023-01-12 RX ORDER — PROMETHAZINE HYDROCHLORIDE 25 MG/1
25 TABLET ORAL EVERY 6 HOURS PRN
Qty: 15 TABLET | Refills: 0 | Status: SHIPPED | OUTPATIENT
Start: 2023-01-12

## 2023-01-12 RX ORDER — AMLODIPINE BESYLATE 10 MG/1
10 TABLET ORAL DAILY
Qty: 90 TABLET | Refills: 3 | Status: SHIPPED | OUTPATIENT
Start: 2023-01-12 | End: 2023-01-12

## 2023-01-12 RX ORDER — FLUTICASONE PROPIONATE 50 MCG
2 SPRAY, SUSPENSION (ML) NASAL DAILY
Qty: 16 G | Refills: 11 | Status: SHIPPED | OUTPATIENT
Start: 2023-01-12

## 2023-01-16 NOTE — PROGRESS NOTES
I have reviewed the notes, assessments, and/or procedures performed by Dr. Tolbert, I concur with her/his documentation of Daksha Marie.

## 2023-01-25 ENCOUNTER — OFFICE VISIT (OUTPATIENT)
Dept: BARIATRICS | Facility: CLINIC | Age: 53
End: 2023-01-25
Payer: MEDICARE

## 2023-01-25 ENCOUNTER — HOSPITAL ENCOUNTER (OUTPATIENT)
Dept: RADIOLOGY | Facility: HOSPITAL | Age: 53
Discharge: HOME | End: 2023-01-25
Attending: SURGERY
Payer: MEDICARE

## 2023-01-25 VITALS
DIASTOLIC BLOOD PRESSURE: 76 MMHG | BODY MASS INDEX: 36.54 KG/M2 | SYSTOLIC BLOOD PRESSURE: 125 MMHG | WEIGHT: 219.56 LBS | HEART RATE: 78 BPM | OXYGEN SATURATION: 98 %

## 2023-01-25 DIAGNOSIS — F33.9 CHRONIC RECURRENT MAJOR DEPRESSIVE DISORDER: ICD-10-CM

## 2023-01-25 DIAGNOSIS — F41.1 GENERALIZED ANXIETY DISORDER: ICD-10-CM

## 2023-01-25 DIAGNOSIS — I10 ESSENTIAL HYPERTENSION: Chronic | ICD-10-CM

## 2023-01-25 DIAGNOSIS — K28.7 CHRONIC ANASTOMOTIC ULCER OF GASTROJEJUNAL REGION: Primary | ICD-10-CM

## 2023-01-25 DIAGNOSIS — Z98.84 HISTORY OF ROUX-EN-Y GASTRIC BYPASS: ICD-10-CM

## 2023-01-25 DIAGNOSIS — E66.01 CLASS 2 SEVERE OBESITY DUE TO EXCESS CALORIES WITH SERIOUS COMORBIDITY AND BODY MASS INDEX (BMI) OF 36.0 TO 36.9 IN ADULT: ICD-10-CM

## 2023-01-25 DIAGNOSIS — Z79.899 ENCOUNTER FOR LONG-TERM CURRENT USE OF HIGH RISK MEDICATION: ICD-10-CM

## 2023-01-25 DIAGNOSIS — F17.210 TOBACCO DEPENDENCE DUE TO CIGARETTES: ICD-10-CM

## 2023-01-25 DIAGNOSIS — K44.9 HIATAL HERNIA: ICD-10-CM

## 2023-01-25 DIAGNOSIS — F10.21 ALCOHOL USE DISORDER, MODERATE, IN SUSTAINED REMISSION: ICD-10-CM

## 2023-01-25 DIAGNOSIS — F43.10 PTSD (POST-TRAUMATIC STRESS DISORDER): Chronic | ICD-10-CM

## 2023-01-25 PROCEDURE — 74240 X-RAY XM UPR GI TRC 1CNTRST: CPT | Mod: 26,,, | Performed by: INTERNAL MEDICINE

## 2023-01-25 PROCEDURE — 1159F MED LIST DOCD IN RCRD: CPT | Mod: CPTII,S$GLB,, | Performed by: SURGERY

## 2023-01-25 PROCEDURE — 1160F PR REVIEW ALL MEDS BY PRESCRIBER/CLIN PHARMACIST DOCUMENTED: ICD-10-PCS | Mod: CPTII,S$GLB,, | Performed by: SURGERY

## 2023-01-25 PROCEDURE — 3074F SYST BP LT 130 MM HG: CPT | Mod: CPTII,S$GLB,, | Performed by: SURGERY

## 2023-01-25 PROCEDURE — 3044F PR MOST RECENT HEMOGLOBIN A1C LEVEL <7.0%: ICD-10-PCS | Mod: CPTII,S$GLB,, | Performed by: SURGERY

## 2023-01-25 PROCEDURE — 3078F PR MOST RECENT DIASTOLIC BLOOD PRESSURE < 80 MM HG: ICD-10-PCS | Mod: CPTII,S$GLB,, | Performed by: SURGERY

## 2023-01-25 PROCEDURE — 3074F PR MOST RECENT SYSTOLIC BLOOD PRESSURE < 130 MM HG: ICD-10-PCS | Mod: CPTII,S$GLB,, | Performed by: SURGERY

## 2023-01-25 PROCEDURE — 4010F PR ACE/ARB THEARPY RXD/TAKEN: ICD-10-PCS | Mod: CPTII,S$GLB,, | Performed by: SURGERY

## 2023-01-25 PROCEDURE — 99999 PR PBB SHADOW E&M-EST. PATIENT-LVL V: ICD-10-PCS | Mod: PBBFAC,,, | Performed by: SURGERY

## 2023-01-25 PROCEDURE — 25500020 PHARM REV CODE 255: Performed by: SURGERY

## 2023-01-25 PROCEDURE — A9698 NON-RAD CONTRAST MATERIALNOC: HCPCS | Performed by: SURGERY

## 2023-01-25 PROCEDURE — 71046 XR CHEST PA AND LATERAL: ICD-10-PCS | Mod: 26,,, | Performed by: RADIOLOGY

## 2023-01-25 PROCEDURE — 4010F ACE/ARB THERAPY RXD/TAKEN: CPT | Mod: CPTII,S$GLB,, | Performed by: SURGERY

## 2023-01-25 PROCEDURE — 74240 FL UPPER GI: ICD-10-PCS | Mod: 26,,, | Performed by: INTERNAL MEDICINE

## 2023-01-25 PROCEDURE — 99999 PR PBB SHADOW E&M-EST. PATIENT-LVL V: CPT | Mod: PBBFAC,,, | Performed by: SURGERY

## 2023-01-25 PROCEDURE — 99215 PR OFFICE/OUTPT VISIT, EST, LEVL V, 40-54 MIN: ICD-10-PCS | Mod: S$GLB,,, | Performed by: SURGERY

## 2023-01-25 PROCEDURE — 3008F PR BODY MASS INDEX (BMI) DOCUMENTED: ICD-10-PCS | Mod: CPTII,S$GLB,, | Performed by: SURGERY

## 2023-01-25 PROCEDURE — 1159F PR MEDICATION LIST DOCUMENTED IN MEDICAL RECORD: ICD-10-PCS | Mod: CPTII,S$GLB,, | Performed by: SURGERY

## 2023-01-25 PROCEDURE — 74240 X-RAY XM UPR GI TRC 1CNTRST: CPT | Mod: TC,FY

## 2023-01-25 PROCEDURE — 3078F DIAST BP <80 MM HG: CPT | Mod: CPTII,S$GLB,, | Performed by: SURGERY

## 2023-01-25 PROCEDURE — 71046 X-RAY EXAM CHEST 2 VIEWS: CPT | Mod: TC,FY

## 2023-01-25 PROCEDURE — 71046 X-RAY EXAM CHEST 2 VIEWS: CPT | Mod: 26,,, | Performed by: RADIOLOGY

## 2023-01-25 PROCEDURE — 99215 OFFICE O/P EST HI 40 MIN: CPT | Mod: S$GLB,,, | Performed by: SURGERY

## 2023-01-25 PROCEDURE — 3008F BODY MASS INDEX DOCD: CPT | Mod: CPTII,S$GLB,, | Performed by: SURGERY

## 2023-01-25 PROCEDURE — 3044F HG A1C LEVEL LT 7.0%: CPT | Mod: CPTII,S$GLB,, | Performed by: SURGERY

## 2023-01-25 PROCEDURE — 1160F RVW MEDS BY RX/DR IN RCRD: CPT | Mod: CPTII,S$GLB,, | Performed by: SURGERY

## 2023-01-25 RX ORDER — FAMOTIDINE 40 MG/5ML
40 POWDER, FOR SUSPENSION ORAL NIGHTLY
Qty: 150 ML | Refills: 11 | Status: ON HOLD | OUTPATIENT
Start: 2023-01-25 | End: 2023-03-02 | Stop reason: HOSPADM

## 2023-01-25 RX ADMIN — BARIUM SULFATE 25 ML: 0.6 SUSPENSION ORAL at 08:01

## 2023-01-25 NOTE — PROGRESS NOTES
BARIATRIC FOLLOW UP:    Chief Complaint   Patient presents with    Abdominal Pain     History of Present Illness 9/21/22:  Patient is a 52 year-old obese woman with HTN, CHF, MDD/GUY, chronic anemia (Hgb range 10-11 last 3 years), h/o EtOH abuse in remission since 2017 on antabuse, and history of laparoscopic cortes-en-y gastric bypass by Dr. Tuan Saldana in 2007, who presents for evaluation of severe epigastric abdominal pain up to 10/10. She has experienced progressively worsening epigastric abdominal pain over the last 4 months with associated 15-lb weight loss. The pain is exacerbated by eating and associated with nausea and vomiting. She takes Prilosec daily with some relief. Topical lidocaine patches and heating pads also help to some degree. She denies fevers, chills, chest pain, shortness of breath, GARCIA or hematemesis. She is a current smoker of 1/2 pack cigarettes per day. Denies NSAID or steroid use. She does have a history of marginal ulcers in 2015 - 2016. She has not had any routine Bariatric Surgery follow-up in years. She presented to an [facility] ED recently and was told she has a hiatal hernia on CT. I do not have these records.    Interval History 1/25/23:  Patient continues to complain of postprandial epigastric pain, nausea, and vomiting. She underwent EGD 12/1/22 which was personally reviewed and confirmed a 1-cm marginal ulcer with associated benign stricture at the gastrojejunostomy with 13-14mm lumen through which the endoscopy easily passed. Her pouch was measured at 4-cm and did have some gastritis. She has been taking the omeprazole 40mg BID and carafate suspection QID. She has cut back on smoking to 5-6 cigarettes/day but hasn't been able to quit. She missed two phone calls from [facility] and as such the referral was cancelled. She has new right flank/subcostal pain with deep inspiration that started acutely on 1/19 and has persisted since; it is also aggravated by certain  positions, activities, or moving too quickly. The Norco she takes for chronic pain doesn't help. Using a compression device provides some relief. She asks for Phenergan refill; this was prescribed by Dr. Tolbert 13 days ago. She has gained 10 lbs since her last visit and would like to discuss options for weight loss including surgical revision.    Review of Systems   Constitutional:  Negative for chills, fever and weight loss.   HENT: Negative.     Eyes:  Positive for photophobia.   Respiratory: Negative.     Cardiovascular: Negative.    Gastrointestinal:  Positive for abdominal pain, nausea and vomiting.   Genitourinary: Negative.    Neurological:  Positive for weakness.   Endo/Heme/Allergies: Negative.    Psychiatric/Behavioral:  Positive for depression.      EXERCISE:  None     DIET & VITAMINS:  Oral MTV  B12 injections     MEDICATIONS/ALLERGIES:  Have been reviewed and updated    Vitals:    01/25/23 0926   BP: 125/76   Pulse: 78     Physical Exam  Vitals reviewed.   Constitutional:       General: She is not in acute distress.     Appearance: She is not toxic-appearing.   Eyes:      Comments: Wearing dark glasses throughout encounter   Cardiovascular:      Rate and Rhythm: Normal rate and regular rhythm.   Pulmonary:      Effort: Pulmonary effort is normal. No respiratory distress.   Abdominal:      General: There is no distension.      Palpations: Abdomen is soft.      Tenderness: There is abdominal tenderness (epigastric, RUQ, R subcostal). There is no guarding or rebound.   Musculoskeletal:      Cervical back: Neck supple.      Comments: In wheelchair   Skin:     General: Skin is warm.   Neurological:      Mental Status: She is alert and oriented to person, place, and time.     ASSESSMENT:  - Class 2 obesity with current BMI 36.54 kg/m² s/p  laparoscopic cortes-en-y gastric bypass  in 2007  - Chronic marginal ulcer   - Tobacco dependence  - History of alcohol abuse on disulfiram in remission since 2017  -  Co-morbidities: Hypertension, diastolic heart failure, MDD, GUY with panic disorder, PTSD  - Small hiatal hernia  - Trigger finger of R 3th finger and L thumb    PLAN:  - Emphasized the importance of smoking cessation - reiterated that patient will not heal her marginal ulcer until she has quit completely. Discussed possible complications from MU including gastrointestinal hemorrhage, perforation with sepsis requiring emergency surgery, and worsening stricture. Add famotidine QHS to antacid regimen - Rx sent. [facility]erral to [facility] resent. Patient agrees to schedule consultation.  - Will schedule repeat EGD to assess for MU healing and possible GJ dilation once she has quit smoking for approx 8 weeks. Discussed the MU is the cause of her stricture.   - Virtual RD visit to reinforce post-bariatric surgery diet recommendations and multivitamins.  - Patient is not a candidate for surgical revision for weight loss. Refer to Bariatric Medicine.  - Declined to refill patient's other medications to avoid exacerbating polypharmacy and multiple providers prescribing the same medications. I will prescribe only her antacid regimen for treatment of her MU.  - Patient and her son expressed understanding of pathology and plan.    Fern Street  1/25/23

## 2023-02-02 ENCOUNTER — TELEPHONE (OUTPATIENT)
Dept: BARIATRICS | Facility: CLINIC | Age: 53
End: 2023-02-02
Payer: MEDICARE

## 2023-02-02 NOTE — TELEPHONE ENCOUNTER
Bariatric nutrition  Called re: 20 min late for nutrition cons. Pt answered and states that she is drowsy after taking some medicine. Asked to reschedule. Scheduled same day as visit with [facility] for convenience.

## 2023-02-13 ENCOUNTER — CLINICAL SUPPORT (OUTPATIENT)
Dept: ENDOSCOPY | Facility: HOSPITAL | Age: 53
End: 2023-02-13
Attending: INTERNAL MEDICINE
Payer: MEDICARE

## 2023-02-13 ENCOUNTER — PATIENT MESSAGE (OUTPATIENT)
Dept: FAMILY MEDICINE | Facility: HOSPITAL | Age: 53
End: 2023-02-13
Payer: MEDICARE

## 2023-02-13 DIAGNOSIS — F17.200 TOBACCO USE DISORDER: ICD-10-CM

## 2023-02-13 DIAGNOSIS — K28.9 ANASTOMOTIC ULCER S/P GASTRIC BYPASS: ICD-10-CM

## 2023-02-14 ENCOUNTER — TELEPHONE (OUTPATIENT)
Dept: PSYCHIATRY | Facility: CLINIC | Age: 53
End: 2023-02-14
Payer: MEDICARE

## 2023-02-14 RX ORDER — DIAZEPAM 5 MG/1
5 TABLET ORAL EVERY 12 HOURS PRN
Qty: 60 TABLET | Refills: 5 | Status: SHIPPED | OUTPATIENT
Start: 2023-02-14 | End: 2023-02-16 | Stop reason: SDUPTHER

## 2023-02-14 NOTE — TELEPHONE ENCOUNTER
----- Message from Norma Zarate sent at 2/14/2023  8:47 AM CST -----  Regarding: Refill  Pt is requesting a refill of diazePAM (VALIUM) 5 MG tablet from [facility] on [address] [facility], [phone].  Pt says she is out of the medication and has been out since 2/10/23.    She can be reached at [phone].    Thank you.

## 2023-02-16 ENCOUNTER — OFFICE VISIT (OUTPATIENT)
Dept: PSYCHIATRY | Facility: CLINIC | Age: 53
End: 2023-02-16
Payer: MEDICARE

## 2023-02-16 DIAGNOSIS — F41.0 PANIC DISORDER: Primary | ICD-10-CM

## 2023-02-16 DIAGNOSIS — F43.10 PTSD (POST-TRAUMATIC STRESS DISORDER): ICD-10-CM

## 2023-02-16 DIAGNOSIS — F33.41 DEPRESSION, MAJOR, RECURRENT, IN PARTIAL REMISSION: ICD-10-CM

## 2023-02-16 DIAGNOSIS — G47.00 INSOMNIA DISORDER, WITH NON-SLEEP DISORDER MENTAL COMORBIDITY: ICD-10-CM

## 2023-02-16 DIAGNOSIS — F10.21 ALCOHOL USE DISORDER, MODERATE, IN SUSTAINED REMISSION: ICD-10-CM

## 2023-02-16 PROCEDURE — 3044F PR MOST RECENT HEMOGLOBIN A1C LEVEL <7.0%: ICD-10-PCS | Mod: CPTII,95,, | Performed by: NURSE PRACTITIONER

## 2023-02-16 PROCEDURE — 99213 OFFICE O/P EST LOW 20 MIN: CPT | Mod: 95,,, | Performed by: NURSE PRACTITIONER

## 2023-02-16 PROCEDURE — 4010F ACE/ARB THERAPY RXD/TAKEN: CPT | Mod: CPTII,95,, | Performed by: NURSE PRACTITIONER

## 2023-02-16 PROCEDURE — 4010F PR ACE/ARB THEARPY RXD/TAKEN: ICD-10-PCS | Mod: CPTII,95,, | Performed by: NURSE PRACTITIONER

## 2023-02-16 PROCEDURE — 1159F MED LIST DOCD IN RCRD: CPT | Mod: CPTII,95,, | Performed by: NURSE PRACTITIONER

## 2023-02-16 PROCEDURE — 3044F HG A1C LEVEL LT 7.0%: CPT | Mod: CPTII,95,, | Performed by: NURSE PRACTITIONER

## 2023-02-16 PROCEDURE — 1160F RVW MEDS BY RX/DR IN RCRD: CPT | Mod: CPTII,95,, | Performed by: NURSE PRACTITIONER

## 2023-02-16 PROCEDURE — 90833 PSYTX W PT W E/M 30 MIN: CPT | Mod: 95,,, | Performed by: NURSE PRACTITIONER

## 2023-02-16 PROCEDURE — 1160F PR REVIEW ALL MEDS BY PRESCRIBER/CLIN PHARMACIST DOCUMENTED: ICD-10-PCS | Mod: CPTII,95,, | Performed by: NURSE PRACTITIONER

## 2023-02-16 PROCEDURE — 90833 PR PSYCHOTHERAPY W/PATIENT W/E&M, 30 MIN (ADD ON): ICD-10-PCS | Mod: 95,,, | Performed by: NURSE PRACTITIONER

## 2023-02-16 PROCEDURE — 99213 PR OFFICE/OUTPT VISIT, EST, LEVL III, 20-29 MIN: ICD-10-PCS | Mod: 95,,, | Performed by: NURSE PRACTITIONER

## 2023-02-16 PROCEDURE — 1159F PR MEDICATION LIST DOCUMENTED IN MEDICAL RECORD: ICD-10-PCS | Mod: CPTII,95,, | Performed by: NURSE PRACTITIONER

## 2023-02-16 RX ORDER — BUSPIRONE HYDROCHLORIDE 15 MG/1
15 TABLET ORAL 2 TIMES DAILY PRN
Qty: 60 TABLET | Refills: 11 | Status: SHIPPED | OUTPATIENT
Start: 2023-02-16 | End: 2023-04-11

## 2023-02-16 RX ORDER — DIAZEPAM 5 MG/1
5 TABLET ORAL EVERY 12 HOURS PRN
Qty: 60 TABLET | Refills: 5 | Status: SHIPPED | OUTPATIENT
Start: 2023-02-16 | End: 2023-08-16 | Stop reason: SDUPTHER

## 2023-02-16 RX ORDER — QUETIAPINE FUMARATE 25 MG/1
TABLET, FILM COATED ORAL
Qty: 60 TABLET | Refills: 5 | Status: SHIPPED | OUTPATIENT
Start: 2023-02-16 | End: 2023-08-16 | Stop reason: SDUPTHER

## 2023-02-16 RX ORDER — VENLAFAXINE HYDROCHLORIDE 150 MG/1
150 CAPSULE, EXTENDED RELEASE ORAL DAILY
Qty: 90 CAPSULE | Refills: 3 | Status: SHIPPED | OUTPATIENT
Start: 2023-02-16 | End: 2023-08-16 | Stop reason: SDUPTHER

## 2023-02-16 NOTE — PROGRESS NOTES
Outpatient Psychiatry Follow-Up Visit (MD/NP)    2/16/2023    Clinical Status of Patient:  Outpatient (Ambulatory)    Chief Complaint:  Daksha Marie is a 52 y.o. female who presents today for follow-up of anxiety and PTSD .  Met with patient.      Last visit was: 9/13/22. Chart and  reviewed.   The patient location is: home  The chief complaint leading to consultation is: depression and anxiety    Visit type: audiovisual    Face to Face time with patient: 30 minutes  30 minutes of total time spent on the encounter, which includes face to face time and non-face to face time preparing to see the patient (eg, review of tests), Obtaining and/or reviewing separately obtained history, Documenting clinical information in the electronic or other health record, Independently interpreting results (not separately reported) and communicating results to the patient/family/caregiver, or Care coordination (not separately reported).     Each patient to whom he or she provides medical services by telemedicine is:  (1) informed of the relationship between the physician and patient and the respective role of any other health care provider with respect to management of the patient; and (2) notified that he or she may decline to receive medical services by telemedicine and may withdraw from such care at any time.    Interval History and Content of Current Session:  Current Psychiatric Medications/changes  Continue Effexor XR 75 mg po daily  Continue Antabuse 250 mg po daily  Continue Valium 5 mg po BID PRN   Try Seroquel 25-50 mg before bed  Buspar 15 mg BID    Virtual Visit:  Pt presents blunted affect and dysthymic mood. States she lost her Valium, Complains of incereased aniety and depression. Reports good response to Seroquel for sleep, Will incerese Efexor to 225 mg. Raised to 150 last communication. Denies SI/HI/AVH    Psychotherapy:  Target symptoms: anxiety   Why chosen therapy is appropriate versus another  modality: relevant to diagnosis  Outcome monitoring methods: self-report  Therapeutic intervention type: insight oriented psychotherapy  Topics discussed/themes: building skills sets for symptom management, symptom recognition  The patient's response to the intervention is accepting. The patient's progress toward treatment goals is good.   Duration of intervention: 18 minutes.    Review of Systems   PSYCHIATRIC: Pertinant items are noted in the narrative.  CONSTITUTIONAL: No weight gain or loss.   MUSCULOSKELETAL: No pain or stiffness of the joints.  NEUROLOGIC: No weakness, sensory changes, seizures, confusion, memory loss, tremor or other abnormal movements.  ENDOCRINE: No polydipsia or polyuria.  INTEGUMENTARY: No rashes or lacerations.  EYES: No exophthalmos, jaundice or blindness.  ENT: No dizziness, tinnitus or hearing loss.  RESPIRATORY: No shortness of breath.  CARDIOVASCULAR: No tachycardia or chest pain.  GASTROINTESTINAL: No nausea, vomiting, pain, constipation or diarrhea.  GENITOURINARY: No frequency, dysuria or sexual dysfunction.  HEMATOLOGIC/LYMPHATIC: No excessive bleeding, prolonged or excessive bleeding after dental extraction/injury.  ALLERGIC/IMMUNOLOGIC: No allergic response to materials, foods or animals at this time.    Past Medical, Family and Social History: The patient's past medical, family and social history have been reviewed and updated as appropriate within the electronic medical record - see encounter notes.    Compliance: yes    Side effects: None    Risk Parameters:  Patient reports no suicidal ideation  Patient reports no homicidal ideation  Patient reports no self-injurious behavior  Patient reports no violent behavior    Exam (detailed: at least 9 elements; comprehensive: all 15 elements)   Constitutional  Vitals:  Most recent vital signs, dated greater than 90 days prior to this appointment, were reviewed.   There were no vitals filed for this visit.     General:  unremarkable,  age appropriate     Musculoskeletal  Muscle Strength/Tone:  no tremor, no tic   Gait & Station:  non-ataxic     Psychiatric  Speech:  no latency; no press   Mood & Affect:  dysthymic  congruent and appropriate   Thought Process:  normal and logical   Associations:  intact   Thought Content:  normal, no suicidality, no homicidality, delusions, or paranoia   Insight:  intact   Judgement: behavior is adequate to circumstances   Orientation:  grossly intact   Memory: intact for content of interview   Language: grossly intact   Attention Span & Concentration:  able to focus   Fund of Knowledge:  intact and appropriate to age and level of education     Assessment and Diagnosis   Status/Progress: Based on the examination today, the patient's problem(s) is/are adequately but not ideally controlled.  New problems have not been presented today.   Co-morbidities and Lack of compliance are not complicating management of the primary condition.  There are no active rule-out diagnoses for this patient at this time.     General Impression:       ICD-10-CM ICD-9-CM   1. Panic disorder  F41.0 300.01   2. Depression, major, recurrent, in partial remission  F33.41 296.35   3. Insomnia disorder, with non-sleep disorder mental comorbidity  G47.00 780.52   4. PTSD (post-traumatic stress disorder)  F43.10 309.81   5. Alcohol use disorder, moderate, in sustained remission  F10.21 303.93     Intervention/Counseling/Treatment Plan   Medication Management: Continue current medications. The risks and benefits of medication were discussed with the patient.  AA/NA/CA/ACOA/Abstinence   Increase to Effexor  mg po daily  Continue Antabuse 250 mg po daily  Continue Valium 5 mg po BID PRN   Continue Seroquel 25-50 mg before bed  Continue Buspar 15 gm BID  Continue with psychotherapy    Return to Clinic: 6 months     Risks, benefits, side effects and alternative treatments discussed with patient. Patient agrees with the current plan as  documented.  Encouraged Patient to keep future appointments.  Take medications as prescribed and abstain from substance abuse.  Pt to present to ED for thoughts to harm herself or others

## 2023-02-17 ENCOUNTER — PATIENT MESSAGE (OUTPATIENT)
Dept: PSYCHIATRY | Facility: CLINIC | Age: 53
End: 2023-02-17
Payer: MEDICARE

## 2023-02-17 RX ORDER — VENLAFAXINE HYDROCHLORIDE 75 MG/1
75 CAPSULE, EXTENDED RELEASE ORAL DAILY
Qty: 90 CAPSULE | Refills: 1 | Status: SHIPPED | OUTPATIENT
Start: 2023-02-17 | End: 2023-08-16 | Stop reason: SDUPTHER

## 2023-02-25 ENCOUNTER — HOSPITAL ENCOUNTER (INPATIENT)
Facility: OTHER | Age: 53
LOS: 2 days | Discharge: HOME | DRG: 381 | End: 2023-03-02
Attending: EMERGENCY MEDICINE | Admitting: HOSPITALIST
Payer: MEDICARE

## 2023-02-25 DIAGNOSIS — Z98.84 HISTORY OF ROUX-EN-Y GASTRIC BYPASS: Chronic | ICD-10-CM

## 2023-02-25 DIAGNOSIS — K92.1 GASTROINTESTINAL HEMORRHAGE WITH MELENA: Primary | ICD-10-CM

## 2023-02-25 DIAGNOSIS — Z91.89 AT RISK FOR LONG QT SYNDROME: ICD-10-CM

## 2023-02-25 DIAGNOSIS — K28.7 CHRONIC ANASTOMOTIC ULCER OF GASTROJEJUNAL REGION: ICD-10-CM

## 2023-02-25 LAB
ALBUMIN SERPL BCP-MCNC: 3.5 G/DL (ref 3.5–5.2)
ALP SERPL-CCNC: 97 U/L (ref 55–135)
ALT SERPL W/O P-5'-P-CCNC: 13 U/L (ref 10–44)
ANION GAP SERPL CALC-SCNC: 13 MMOL/L (ref 8–16)
AST SERPL-CCNC: 17 U/L (ref 10–40)
BASOPHILS # BLD AUTO: 0.01 K/UL (ref 0–0.2)
BASOPHILS NFR BLD: 0.2 % (ref 0–1.9)
BILIRUB SERPL-MCNC: 0.4 MG/DL (ref 0.1–1)
BUN SERPL-MCNC: 28 MG/DL (ref 6–20)
CALCIUM SERPL-MCNC: 8.8 MG/DL (ref 8.7–10.5)
CHLORIDE SERPL-SCNC: 109 MMOL/L (ref 95–110)
CO2 SERPL-SCNC: 18 MMOL/L (ref 23–29)
CREAT SERPL-MCNC: 1.4 MG/DL (ref 0.5–1.4)
DIFFERENTIAL METHOD: NORMAL
EOSINOPHIL # BLD AUTO: 0 K/UL (ref 0–0.5)
EOSINOPHIL NFR BLD: 0.2 % (ref 0–8)
ERYTHROCYTE [DISTWIDTH] IN BLOOD BY AUTOMATED COUNT: 12.5 % (ref 11.5–14.5)
EST. GFR  (NO RACE VARIABLE): 46 ML/MIN/1.73 M^2
GLUCOSE SERPL-MCNC: 86 MG/DL (ref 70–110)
HCT VFR BLD AUTO: 37.8 % (ref 37–48.5)
HCV AB SERPL QL IA: NEGATIVE
HGB BLD-MCNC: 12.5 G/DL (ref 12–16)
HIV 1+2 AB+HIV1 P24 AG SERPL QL IA: NEGATIVE
IMM GRANULOCYTES # BLD AUTO: 0.03 K/UL (ref 0–0.04)
IMM GRANULOCYTES NFR BLD AUTO: 0.5 % (ref 0–0.5)
LIPASE SERPL-CCNC: 26 U/L (ref 4–60)
LYMPHOCYTES # BLD AUTO: 1.9 K/UL (ref 1–4.8)
LYMPHOCYTES NFR BLD: 28.7 % (ref 18–48)
MCH RBC QN AUTO: 30.8 PG (ref 27–31)
MCHC RBC AUTO-ENTMCNC: 33.1 G/DL (ref 32–36)
MCV RBC AUTO: 93 FL (ref 82–98)
MONOCYTES # BLD AUTO: 0.3 K/UL (ref 0.3–1)
MONOCYTES NFR BLD: 5.1 % (ref 4–15)
NEUTROPHILS # BLD AUTO: 4.2 K/UL (ref 1.8–7.7)
NEUTROPHILS NFR BLD: 65.3 % (ref 38–73)
NRBC BLD-RTO: 0 /100 WBC
PLATELET # BLD AUTO: 166 K/UL (ref 150–450)
PMV BLD AUTO: 9.9 FL (ref 9.2–12.9)
POTASSIUM SERPL-SCNC: 4.1 MMOL/L (ref 3.5–5.1)
PROT SERPL-MCNC: 6.6 G/DL (ref 6–8.4)
RBC # BLD AUTO: 4.06 M/UL (ref 4–5.4)
SODIUM SERPL-SCNC: 140 MMOL/L (ref 136–145)
WBC # BLD AUTO: 6.44 K/UL (ref 3.9–12.7)

## 2023-02-25 PROCEDURE — 86803 HEPATITIS C AB TEST: CPT | Performed by: EMERGENCY MEDICINE

## 2023-02-25 PROCEDURE — 87389 HIV-1 AG W/HIV-1&-2 AB AG IA: CPT | Performed by: EMERGENCY MEDICINE

## 2023-02-25 PROCEDURE — G0378 HOSPITAL OBSERVATION PER HR: HCPCS

## 2023-02-25 PROCEDURE — 96374 THER/PROPH/DIAG INJ IV PUSH: CPT

## 2023-02-25 PROCEDURE — 25000003 PHARM REV CODE 250: Performed by: EMERGENCY MEDICINE

## 2023-02-25 PROCEDURE — 63600175 PHARM REV CODE 636 W HCPCS: Performed by: EMERGENCY MEDICINE

## 2023-02-25 PROCEDURE — 99285 EMERGENCY DEPT VISIT HI MDM: CPT | Mod: 25

## 2023-02-25 PROCEDURE — 25000003 PHARM REV CODE 250

## 2023-02-25 PROCEDURE — 85025 COMPLETE CBC W/AUTO DIFF WBC: CPT | Performed by: EMERGENCY MEDICINE

## 2023-02-25 PROCEDURE — 80053 COMPREHEN METABOLIC PANEL: CPT | Performed by: EMERGENCY MEDICINE

## 2023-02-25 PROCEDURE — 83690 ASSAY OF LIPASE: CPT | Performed by: EMERGENCY MEDICINE

## 2023-02-25 PROCEDURE — 96375 TX/PRO/DX INJ NEW DRUG ADDON: CPT

## 2023-02-25 PROCEDURE — 96361 HYDRATE IV INFUSION ADD-ON: CPT

## 2023-02-25 PROCEDURE — C9113 INJ PANTOPRAZOLE SODIUM, VIA: HCPCS | Performed by: EMERGENCY MEDICINE

## 2023-02-25 RX ORDER — FAMOTIDINE 20 MG/1
20 TABLET, FILM COATED ORAL 2 TIMES DAILY
Status: DISCONTINUED | OUTPATIENT
Start: 2023-02-25 | End: 2023-02-27

## 2023-02-25 RX ORDER — DISULFIRAM 250 MG/1
250 TABLET ORAL DAILY
Status: DISCONTINUED | OUTPATIENT
Start: 2023-02-26 | End: 2023-03-02 | Stop reason: HOSPADM

## 2023-02-25 RX ORDER — SODIUM CHLORIDE 0.9 % (FLUSH) 0.9 %
10 SYRINGE (ML) INJECTION
Status: DISCONTINUED | OUTPATIENT
Start: 2023-02-25 | End: 2023-03-02 | Stop reason: HOSPADM

## 2023-02-25 RX ORDER — SPIRONOLACTONE 25 MG/1
25 TABLET ORAL DAILY
Status: DISCONTINUED | OUTPATIENT
Start: 2023-02-26 | End: 2023-02-28

## 2023-02-25 RX ORDER — TALC
6 POWDER (GRAM) TOPICAL NIGHTLY PRN
Status: DISCONTINUED | OUTPATIENT
Start: 2023-02-25 | End: 2023-03-02 | Stop reason: HOSPADM

## 2023-02-25 RX ORDER — ACETAMINOPHEN 325 MG/1
650 TABLET ORAL EVERY 8 HOURS PRN
Status: DISCONTINUED | OUTPATIENT
Start: 2023-02-25 | End: 2023-02-27

## 2023-02-25 RX ORDER — MORPHINE SULFATE 4 MG/ML
4 INJECTION, SOLUTION INTRAMUSCULAR; INTRAVENOUS EVERY 4 HOURS PRN
Status: DISCONTINUED | OUTPATIENT
Start: 2023-02-25 | End: 2023-03-02 | Stop reason: HOSPADM

## 2023-02-25 RX ORDER — AMLODIPINE BESYLATE 5 MG/1
10 TABLET ORAL DAILY
Status: DISCONTINUED | OUTPATIENT
Start: 2023-02-26 | End: 2023-02-27

## 2023-02-25 RX ORDER — VENLAFAXINE HYDROCHLORIDE 37.5 MG/1
75 CAPSULE, EXTENDED RELEASE ORAL DAILY
Status: DISCONTINUED | OUTPATIENT
Start: 2023-02-26 | End: 2023-03-02 | Stop reason: HOSPADM

## 2023-02-25 RX ORDER — FUROSEMIDE 20 MG/1
40 TABLET ORAL DAILY
Status: DISCONTINUED | OUTPATIENT
Start: 2023-02-26 | End: 2023-02-28

## 2023-02-25 RX ORDER — HYDROCODONE BITARTRATE AND ACETAMINOPHEN 5; 325 MG/1; MG/1
1 TABLET ORAL EVERY 4 HOURS PRN
Status: DISCONTINUED | OUTPATIENT
Start: 2023-02-25 | End: 2023-03-02 | Stop reason: HOSPADM

## 2023-02-25 RX ORDER — LIDOCAINE HYDROCHLORIDE 20 MG/ML
15 SOLUTION OROPHARYNGEAL ONCE
Status: COMPLETED | OUTPATIENT
Start: 2023-02-25 | End: 2023-02-25

## 2023-02-25 RX ORDER — CARVEDILOL 3.12 MG/1
3.12 TABLET ORAL 2 TIMES DAILY
Status: DISCONTINUED | OUTPATIENT
Start: 2023-02-25 | End: 2023-03-02 | Stop reason: HOSPADM

## 2023-02-25 RX ORDER — MONTELUKAST SODIUM 10 MG/1
10 TABLET ORAL NIGHTLY
Status: DISCONTINUED | OUTPATIENT
Start: 2023-02-25 | End: 2023-03-02 | Stop reason: HOSPADM

## 2023-02-25 RX ORDER — PANTOPRAZOLE SODIUM 40 MG/1
40 TABLET, DELAYED RELEASE ORAL DAILY
Status: DISCONTINUED | OUTPATIENT
Start: 2023-02-26 | End: 2023-02-27

## 2023-02-25 RX ORDER — CETIRIZINE HYDROCHLORIDE 10 MG/1
10 TABLET ORAL DAILY
Status: DISCONTINUED | OUTPATIENT
Start: 2023-02-26 | End: 2023-03-02 | Stop reason: HOSPADM

## 2023-02-25 RX ORDER — FAMOTIDINE 40 MG/5ML
40 POWDER, FOR SUSPENSION ORAL NIGHTLY
Status: DISCONTINUED | OUTPATIENT
Start: 2023-02-25 | End: 2023-02-25

## 2023-02-25 RX ORDER — DIAZEPAM 5 MG/1
5 TABLET ORAL EVERY 12 HOURS PRN
Status: DISCONTINUED | OUTPATIENT
Start: 2023-02-25 | End: 2023-03-02 | Stop reason: HOSPADM

## 2023-02-25 RX ORDER — ACETAMINOPHEN 500 MG
1000 TABLET ORAL
Status: COMPLETED | OUTPATIENT
Start: 2023-02-25 | End: 2023-02-25

## 2023-02-25 RX ORDER — ONDANSETRON 2 MG/ML
4 INJECTION INTRAMUSCULAR; INTRAVENOUS EVERY 8 HOURS PRN
Status: DISCONTINUED | OUTPATIENT
Start: 2023-02-25 | End: 2023-02-27

## 2023-02-25 RX ORDER — PANTOPRAZOLE SODIUM 40 MG/10ML
80 INJECTION, POWDER, LYOPHILIZED, FOR SOLUTION INTRAVENOUS
Status: COMPLETED | OUTPATIENT
Start: 2023-02-25 | End: 2023-02-25

## 2023-02-25 RX ORDER — ONDANSETRON 2 MG/ML
4 INJECTION INTRAMUSCULAR; INTRAVENOUS
Status: COMPLETED | OUTPATIENT
Start: 2023-02-25 | End: 2023-02-25

## 2023-02-25 RX ORDER — BUSPIRONE HYDROCHLORIDE 7.5 MG/1
15 TABLET ORAL 2 TIMES DAILY PRN
Status: DISCONTINUED | OUTPATIENT
Start: 2023-02-25 | End: 2023-03-02 | Stop reason: HOSPADM

## 2023-02-25 RX ORDER — MAG HYDROX/ALUMINUM HYD/SIMETH 200-200-20
30 SUSPENSION, ORAL (FINAL DOSE FORM) ORAL ONCE
Status: COMPLETED | OUTPATIENT
Start: 2023-02-25 | End: 2023-02-25

## 2023-02-25 RX ORDER — ONDANSETRON 8 MG/1
8 TABLET, ORALLY DISINTEGRATING ORAL EVERY 8 HOURS PRN
Status: DISCONTINUED | OUTPATIENT
Start: 2023-02-25 | End: 2023-02-27

## 2023-02-25 RX ORDER — QUETIAPINE FUMARATE 25 MG/1
25 TABLET, FILM COATED ORAL DAILY
Status: DISCONTINUED | OUTPATIENT
Start: 2023-02-26 | End: 2023-02-27

## 2023-02-25 RX ORDER — SUCRALFATE 1 G/10ML
1 SUSPENSION ORAL
Status: DISCONTINUED | OUTPATIENT
Start: 2023-02-25 | End: 2023-02-27

## 2023-02-25 RX ORDER — VENLAFAXINE HYDROCHLORIDE 37.5 MG/1
150 CAPSULE, EXTENDED RELEASE ORAL DAILY
Status: DISCONTINUED | OUTPATIENT
Start: 2023-02-26 | End: 2023-03-02 | Stop reason: HOSPADM

## 2023-02-25 RX ORDER — SODIUM CHLORIDE 9 MG/ML
1000 INJECTION, SOLUTION INTRAVENOUS
Status: COMPLETED | OUTPATIENT
Start: 2023-02-25 | End: 2023-02-25

## 2023-02-25 RX ADMIN — ALUMINUM HYDROXIDE, MAGNESIUM HYDROXIDE, AND SIMETHICONE 30 ML: 200; 200; 20 SUSPENSION ORAL at 05:02

## 2023-02-25 RX ADMIN — CARVEDILOL 3.12 MG: 3.12 TABLET, FILM COATED ORAL at 08:02

## 2023-02-25 RX ADMIN — LIDOCAINE HYDROCHLORIDE 15 ML: 20 SOLUTION ORAL; TOPICAL at 05:02

## 2023-02-25 RX ADMIN — ACETAMINOPHEN 1000 MG: 500 TABLET, FILM COATED ORAL at 08:02

## 2023-02-25 RX ADMIN — DIAZEPAM 5 MG: 5 TABLET ORAL at 08:02

## 2023-02-25 RX ADMIN — ONDANSETRON 4 MG: 2 INJECTION INTRAMUSCULAR; INTRAVENOUS at 05:02

## 2023-02-25 RX ADMIN — SODIUM CHLORIDE 1000 ML: 0.9 INJECTION, SOLUTION INTRAVENOUS at 05:02

## 2023-02-25 RX ADMIN — FAMOTIDINE 20 MG: 20 TABLET ORAL at 08:02

## 2023-02-25 RX ADMIN — SUCRALFATE ORAL 1 G: 1 SUSPENSION ORAL at 08:02

## 2023-02-25 RX ADMIN — MONTELUKAST 10 MG: 10 TABLET, FILM COATED ORAL at 08:02

## 2023-02-25 RX ADMIN — PANTOPRAZOLE SODIUM 80 MG: 40 INJECTION, POWDER, FOR SOLUTION INTRAVENOUS at 05:02

## 2023-02-25 NOTE — Clinical Note
Diagnosis: Gastrointestinal hemorrhage with melena [[id number]]   Future Attending Provider: ANA TRINIDAD [[id number]]   Is the patient being sent to ED Observation?: Yes   Admitting Provider:: ANA TRINIDAD [[id number]]

## 2023-02-25 NOTE — ED PROVIDER NOTES
"Encounter Date: 2/25/2023    SCRIBE #1 NOTE: I, Meena Sullivan, am scribing for, and in the presence of,  Thor Zamora II, MD. I have scribed the following portions of the note - Other sections scribed: HPI, ROS, PE.     History     Chief Complaint   Patient presents with    GI Problem     Dark stools, N/V since 1000, Hx of ulcers.      Daksha Marie is a 52 y.o. female, with a PMHx of HTN, HFpEF, EtOH abuse, CKD, GERD, previous CVA who presents to the ED via EMS for evaluation of diarrhea onset earlier today. Pt reports decreased appetite ongoing for the last 3-4 days due to nausea and states she has not eaten or been compliant with her medications in 4 days. She does note vomiting episodes for the last few days, stating she has been vomiting acid due to decreased P/O intake. She reports associated generalized abdominal pain. Of note, she is currently being followed by GI for evaluation of ulcers and has not taken her Pepcid, carafate, or Prilosec in several days. Endorses 3 episodes of diarrhea today at 10, 1p, and 3p which she states was black and oily "and it really freaked me out," prompting her to the ED. She states she is a regular smoker. She denies recent EtOH use. This is the extent of the patient's complaints at this time.    The history is provided by the patient.   Review of patient's allergies indicates:   Allergen Reactions    Penicillins Hives    Hydroxyzine Anxiety, Hallucinations and Palpitations     Past Medical History:   Diagnosis Date    Alcohol abuse 10/07/2015    In remission since 5/1/17    Anemia 06/17/2015    Arthritis     Blindness of right eye     only sees light    Breast calcification, left     biopsied: scar tissue from breast reduction surgery    Chronic diastolic congestive heart failure     Chronic gastrojejunal anastomotic ulcer     2015 - 2016    CVA (cerebral vascular accident) 01/2022    TPA    Encounter for blood transfusion     GERD (gastroesophageal reflux " disease)     Glaucoma 2008    Hypertension     Hypoalbuminemia     Hyponatremia 10/2015    alcohol abuse, polydipsia, Na 109, seizure in ICU    Insomnia     Malingering 2016    Morbid obesity 2017    PTSD (post-traumatic stress disorder)     Sickle cell trait     Stage 3a chronic kidney disease 10/14/2018     Past Surgical History:   Procedure Laterality Date    BREAST BIOPSY Left     CARPAL TUNNEL RELEASE  2020     SECTION      CHOLECYSTECTOMY  after     bile fistula    ESOPHAGOGASTRODUODENOSCOPY N/A 2022    Procedure: ESOPHAGOGASTRODUODENOSCOPY (EGD);  Surgeon: Trey Anderson MD;  [facility]  Service: Endoscopy;  Laterality: N/A;    ESOPHAGOGASTRODUODENOSCOPY N/A 2023    Procedure: EGD (ESOPHAGOGASTRODUODENOSCOPY);  Surgeon: Yordan Chandler MD;  Location: [facility];  Service: Endoscopy;  Laterality: N/A;    GASTRIC BYPASS  2007    TOTAL REDUCTION MAMMOPLASTY Bilateral     xen gel stent implant  2018    Performed at [facility]     Family History   Problem Relation Age of Onset    Diabetes Mother     Lupus Mother     Hypertension Mother     Heart attack Mother     Diabetes Maternal Grandmother     Crohn's disease Cousin     Heart failure Father     Hypertension Brother     Asthma Brother      Social History     Tobacco Use    Smoking status: Former     Packs/day: 0.50     Years: 4.00     Pack years: 2.00     Types: Cigarettes     Quit date: 2023     Years since quittin.0    Smokeless tobacco: Never   Substance Use Topics    Alcohol use: No     Comment: former heavy drinker since May 1st 2017    Drug use: Not Currently     Review of Systems   Constitutional:  Positive for appetite change. Negative for chills and fever.   HENT:  Negative for congestion and sore throat.    Eyes:  Negative for visual disturbance.   Respiratory:  Negative for cough and shortness of breath.    Cardiovascular:  Negative for chest pain and  palpitations.   Gastrointestinal:  Positive for abdominal pain, diarrhea, nausea and vomiting.        +Dark stool.   Genitourinary:  Negative for decreased urine volume, dysuria and vaginal discharge.   Musculoskeletal:  Negative for joint swelling, neck pain and neck stiffness.   Skin:  Negative for rash and wound.   Neurological:  Negative for weakness, numbness and headaches.   Psychiatric/Behavioral:  Negative for confusion.      Physical Exam     Initial Vitals [02/25/23 1646]   BP Pulse Resp Temp SpO2   (!) 141/96 94 19 98.8 °F (37.1 °C) 95 %      MAP       --         Physical Exam    Nursing note and vitals reviewed.  Constitutional: She appears well-developed and well-nourished. She is not diaphoretic. No distress.   HENT:   Head: Normocephalic and atraumatic.   Moist mucous membranes.   Eyes: EOM are normal. Pupils are equal, round, and reactive to light.   No pallor or icterus.   Neck: Neck supple.   Normal range of motion.  Cardiovascular:  Normal rate, regular rhythm and normal heart sounds.     Exam reveals no gallop and no friction rub.       No murmur heard.  Abdominal: Abdomen is soft. There is abdominal tenderness in the epigastric area. There is no rebound and no guarding.   Genitourinary: Rectum:      Guaiac result positive.   Guaiac positive stool. : Acceptable.   Genitourinary Comments: Scant black stool in vault. Strongly guaiac positive.   Chaperoned by tech or RN.      Musculoskeletal:         General: No tenderness or edema. Normal range of motion.      Cervical back: Normal range of motion and neck supple.     Lymphadenopathy:     She has no cervical adenopathy.   Neurological: She is alert and oriented to person, place, and time.   Skin: Skin is warm and dry.   Psychiatric: She has a normal mood and affect. Her behavior is normal. Judgment and thought content normal.       ED Course   Procedures  Labs Reviewed   COMPREHENSIVE METABOLIC PANEL - Abnormal; Notable for the  following components:       Result Value    CO2 18 (*)     BUN 28 (*)     eGFR 46 (*)     All other components within normal limits   CBC W/ AUTO DIFFERENTIAL - Abnormal; Notable for the following components:    RBC 3.55 (*)     Hemoglobin 11.1 (*)     Hematocrit 33.1 (*)     MCH 31.3 (*)     All other components within normal limits   CBC W/ AUTO DIFFERENTIAL - Abnormal; Notable for the following components:    RBC 3.60 (*)     Hemoglobin 11.2 (*)     Hematocrit 34.1 (*)     MCH 31.1 (*)     Lymph % 48.1 (*)     All other components within normal limits   BASIC METABOLIC PANEL - Abnormal; Notable for the following components:    Chloride 112 (*)     BUN 22 (*)     Anion Gap 6 (*)     eGFR 50 (*)     All other components within normal limits   CBC W/ AUTO DIFFERENTIAL   LIPASE   HIV 1 / 2 ANTIBODY    Narrative:     Release to patient->Immediate   HEPATITIS C ANTIBODY    Narrative:     Release to patient->Immediate        ECG Results    None       Imaging Results    None          Medications   busPIRone tablet 15 mg (15 mg Oral Given 3/1/23 1051)   carvediloL tablet 3.125 mg (3.125 mg Oral Not Given 3/2/23 0900)   diazePAM tablet 5 mg (5 mg Oral Given 3/2/23 1509)   disulfiram tablet 250 mg (250 mg Oral Given 3/2/23 0846)   cetirizine tablet 10 mg (10 mg Oral Given 3/2/23 0846)   montelukast tablet 10 mg (0 mg Oral Hold 3/1/23 2113)   venlafaxine 24 hr capsule 150 mg (150 mg Oral Given 3/2/23 0843)   venlafaxine 24 hr capsule 75 mg (75 mg Oral Given 3/2/23 0843)   sodium chloride 0.9% flush 10 mL (has no administration in time range)   melatonin tablet 6 mg (6 mg Oral Given 3/1/23 2110)   HYDROcodone-acetaminophen 5-325 mg per tablet 1 tablet (1 tablet Oral Given 3/2/23 0422)   morphine injection 4 mg (4 mg Intravenous Given 2/26/23 1501)   folic acid tablet 1 mg (1 mg Oral Not Given 3/2/23 0900)   LIDOcaine 5 % patch 1 patch (1 patch Transdermal Patch Removed 3/2/23 0583)   methocarbamoL tablet 750 mg (750 mg Oral  Given 2/27/23 2333)   meperidine (PF) injection 12.5 mg (has no administration in time range)   sodium chloride 0.9% flush 3 mL (has no administration in time range)   pantoprazole EC tablet 40 mg (40 mg Oral Given 3/2/23 0844)   prochlorperazine injection Soln 2.5 mg (2.5 mg Intravenous Given 3/2/23 1059)   ondansetron injection 4 mg (0 mg Intravenous Return to Cabinet 3/2/23 0419)   senna-docusate 8.6-50 mg per tablet 1 tablet (1 tablet Oral Given 3/2/23 0844)   sumatriptan tablet 50 mg (50 mg Oral Given 3/1/23 1348)   QUEtiapine tablet 25 mg (25 mg Oral Given 3/1/23 2110)   ondansetron disintegrating tablet 8 mg (8 mg Oral Given 3/2/23 0422)   sucralfate 100 mg/mL suspension 2 g (2 g Oral Given 3/2/23 1138)   metoclopramide HCl tablet 10 mg (10 mg Oral Given 3/2/23 1138)   dicyclomine capsule 10 mg (10 mg Oral Given 3/2/23 1509)   0.9%  NaCl infusion (0 mLs Intravenous Stopped 2/25/23 1923)   pantoprazole injection 80 mg (80 mg Intravenous Given 2/25/23 1743)   ondansetron injection 4 mg (4 mg Intravenous Given 2/25/23 1742)   aluminum-magnesium hydroxide-simethicone 200-200-20 mg/5 mL suspension 30 mL (30 mLs Oral Given 2/25/23 1741)     And   LIDOcaine HCl 2% oral solution 15 mL (15 mLs Oral Given 2/25/23 1741)   acetaminophen tablet 1,000 mg (1,000 mg Oral Given 2/25/23 2007)   sumatriptan tablet 50 mg (50 mg Oral Given 2/27/23 1651)   bisacodyL suppository 10 mg (10 mg Rectal Given 2/27/23 1651)   ondansetron disintegrating tablet 4 mg (4 mg Oral Given 2/28/23 0845)   aluminum-magnesium hydroxide-simethicone 200-200-20 mg/5 mL suspension 30 mL (30 mLs Oral Given 3/1/23 1102)     And   LIDOcaine HCl 2% oral solution 15 mL (15 mLs Oral Given 3/1/23 1102)     Medical Decision Making:   History:   Old Medical Records: I decided to obtain old medical records.  Clinical Tests:   Lab Tests: Ordered and Reviewed     Patient with history of gastric bypass surgery, GERD/gastritis versus ulcers presents with report of  melena.  On exam hemodynamically stable, benign abdomen.  Initial hemoglobin low normal.  Will place in EDOUfor observation, serial H/H and evaluation and recommendations by GI       Scribe Attestation:   Scribe #1: I performed the above scribed service and the documentation accurately describes the services I performed. I attest to the accuracy of the note.    I, [facility], personally performed the services described in this documentation. All medical record entries made by the scribe were at my direction and in my presence. I have reviewed the chart and agree that the record reflects my personal performance and is accurate and complete.                   Clinical Impression:   Final diagnoses:  [K92.1] Gastrointestinal hemorrhage with melena (Primary)  [K28.7] Chronic anastomotic ulcer of gastrojejunal region        ED Disposition Condition    Observation Stable                Thor Zamora II, MD  03/02/23 9885

## 2023-02-26 PROBLEM — K92.1 GASTROINTESTINAL HEMORRHAGE WITH MELENA: Status: ACTIVE | Noted: 2023-02-26

## 2023-02-26 LAB
ANION GAP SERPL CALC-SCNC: 6 MMOL/L (ref 8–16)
BASOPHILS # BLD AUTO: 0.01 K/UL (ref 0–0.2)
BASOPHILS # BLD AUTO: 0.03 K/UL (ref 0–0.2)
BASOPHILS NFR BLD: 0.2 % (ref 0–1.9)
BASOPHILS NFR BLD: 0.6 % (ref 0–1.9)
BUN SERPL-MCNC: 22 MG/DL (ref 6–20)
CALCIUM SERPL-MCNC: 9.1 MG/DL (ref 8.7–10.5)
CHLORIDE SERPL-SCNC: 112 MMOL/L (ref 95–110)
CO2 SERPL-SCNC: 25 MMOL/L (ref 23–29)
CREAT SERPL-MCNC: 1.3 MG/DL (ref 0.5–1.4)
DIFFERENTIAL METHOD: ABNORMAL
DIFFERENTIAL METHOD: ABNORMAL
EOSINOPHIL # BLD AUTO: 0 K/UL (ref 0–0.5)
EOSINOPHIL # BLD AUTO: 0.1 K/UL (ref 0–0.5)
EOSINOPHIL NFR BLD: 0.5 % (ref 0–8)
EOSINOPHIL NFR BLD: 1 % (ref 0–8)
ERYTHROCYTE [DISTWIDTH] IN BLOOD BY AUTOMATED COUNT: 12.5 % (ref 11.5–14.5)
ERYTHROCYTE [DISTWIDTH] IN BLOOD BY AUTOMATED COUNT: 12.7 % (ref 11.5–14.5)
EST. GFR  (NO RACE VARIABLE): 50 ML/MIN/1.73 M^2
GLUCOSE SERPL-MCNC: 81 MG/DL (ref 70–110)
HCT VFR BLD AUTO: 33.1 % (ref 37–48.5)
HCT VFR BLD AUTO: 34.1 % (ref 37–48.5)
HGB BLD-MCNC: 11.1 G/DL (ref 12–16)
HGB BLD-MCNC: 11.2 G/DL (ref 12–16)
IMM GRANULOCYTES # BLD AUTO: 0.01 K/UL (ref 0–0.04)
IMM GRANULOCYTES # BLD AUTO: 0.01 K/UL (ref 0–0.04)
IMM GRANULOCYTES NFR BLD AUTO: 0.2 % (ref 0–0.5)
IMM GRANULOCYTES NFR BLD AUTO: 0.2 % (ref 0–0.5)
LYMPHOCYTES # BLD AUTO: 2.3 K/UL (ref 1–4.8)
LYMPHOCYTES # BLD AUTO: 2.7 K/UL (ref 1–4.8)
LYMPHOCYTES NFR BLD: 41.8 % (ref 18–48)
LYMPHOCYTES NFR BLD: 48.1 % (ref 18–48)
MCH RBC QN AUTO: 31.1 PG (ref 27–31)
MCH RBC QN AUTO: 31.3 PG (ref 27–31)
MCHC RBC AUTO-ENTMCNC: 32.8 G/DL (ref 32–36)
MCHC RBC AUTO-ENTMCNC: 33.5 G/DL (ref 32–36)
MCV RBC AUTO: 93 FL (ref 82–98)
MCV RBC AUTO: 95 FL (ref 82–98)
MONOCYTES # BLD AUTO: 0.3 K/UL (ref 0.3–1)
MONOCYTES # BLD AUTO: 0.5 K/UL (ref 0.3–1)
MONOCYTES NFR BLD: 5.8 % (ref 4–15)
MONOCYTES NFR BLD: 7.6 % (ref 4–15)
NEUTROPHILS # BLD AUTO: 2.1 K/UL (ref 1.8–7.7)
NEUTROPHILS # BLD AUTO: 3.2 K/UL (ref 1.8–7.7)
NEUTROPHILS NFR BLD: 44.3 % (ref 38–73)
NEUTROPHILS NFR BLD: 49.7 % (ref 38–73)
NRBC BLD-RTO: 0 /100 WBC
NRBC BLD-RTO: 0 /100 WBC
PLATELET # BLD AUTO: 155 K/UL (ref 150–450)
PLATELET # BLD AUTO: 158 K/UL (ref 150–450)
PMV BLD AUTO: 10.1 FL (ref 9.2–12.9)
PMV BLD AUTO: 9.8 FL (ref 9.2–12.9)
POTASSIUM SERPL-SCNC: 4.6 MMOL/L (ref 3.5–5.1)
RBC # BLD AUTO: 3.55 M/UL (ref 4–5.4)
RBC # BLD AUTO: 3.6 M/UL (ref 4–5.4)
SODIUM SERPL-SCNC: 143 MMOL/L (ref 136–145)
WBC # BLD AUTO: 4.8 K/UL (ref 3.9–12.7)
WBC # BLD AUTO: 6.34 K/UL (ref 3.9–12.7)

## 2023-02-26 PROCEDURE — 25000003 PHARM REV CODE 250: Performed by: PHYSICIAN ASSISTANT

## 2023-02-26 PROCEDURE — 85025 COMPLETE CBC W/AUTO DIFF WBC: CPT

## 2023-02-26 PROCEDURE — 99223 PR INITIAL HOSPITAL CARE,LEVL III: ICD-10-PCS | Mod: ,,, | Performed by: PHYSICIAN ASSISTANT

## 2023-02-26 PROCEDURE — 63600175 PHARM REV CODE 636 W HCPCS

## 2023-02-26 PROCEDURE — 36415 COLL VENOUS BLD VENIPUNCTURE: CPT | Performed by: NURSE PRACTITIONER

## 2023-02-26 PROCEDURE — 80048 BASIC METABOLIC PNL TOTAL CA: CPT | Performed by: NURSE PRACTITIONER

## 2023-02-26 PROCEDURE — 96375 TX/PRO/DX INJ NEW DRUG ADDON: CPT

## 2023-02-26 PROCEDURE — 96376 TX/PRO/DX INJ SAME DRUG ADON: CPT

## 2023-02-26 PROCEDURE — G0378 HOSPITAL OBSERVATION PER HR: HCPCS

## 2023-02-26 PROCEDURE — 25000003 PHARM REV CODE 250

## 2023-02-26 PROCEDURE — 99223 1ST HOSP IP/OBS HIGH 75: CPT | Mod: ,,, | Performed by: PHYSICIAN ASSISTANT

## 2023-02-26 PROCEDURE — 36415 COLL VENOUS BLD VENIPUNCTURE: CPT

## 2023-02-26 PROCEDURE — 85025 COMPLETE CBC W/AUTO DIFF WBC: CPT | Mod: 91 | Performed by: NURSE PRACTITIONER

## 2023-02-26 RX ORDER — LISINOPRIL 20 MG/1
20 TABLET ORAL
COMMUNITY
Start: 2023-01-23 | End: 2023-05-26

## 2023-02-26 RX ORDER — CLINDAMYCIN HYDROCHLORIDE 300 MG/1
300 CAPSULE ORAL EVERY 8 HOURS
Status: ON HOLD | COMMUNITY
Start: 2023-02-14 | End: 2023-02-27

## 2023-02-26 RX ORDER — LISINOPRIL 20 MG/1
20 TABLET ORAL DAILY
Status: DISCONTINUED | OUTPATIENT
Start: 2023-02-27 | End: 2023-02-27

## 2023-02-26 RX ORDER — LIDOCAINE 50 MG/G
1 PATCH TOPICAL
Status: DISCONTINUED | OUTPATIENT
Start: 2023-02-26 | End: 2023-03-02 | Stop reason: HOSPADM

## 2023-02-26 RX ORDER — FOLIC ACID 1 MG/1
1 TABLET ORAL DAILY
Status: DISCONTINUED | OUTPATIENT
Start: 2023-02-27 | End: 2023-03-02 | Stop reason: HOSPADM

## 2023-02-26 RX ORDER — METHOCARBAMOL 750 MG/1
750 TABLET, FILM COATED ORAL 3 TIMES DAILY PRN
Status: DISCONTINUED | OUTPATIENT
Start: 2023-02-26 | End: 2023-03-02 | Stop reason: HOSPADM

## 2023-02-26 RX ADMIN — SUCRALFATE ORAL 1 G: 1 SUSPENSION ORAL at 08:02

## 2023-02-26 RX ADMIN — MONTELUKAST 10 MG: 10 TABLET, FILM COATED ORAL at 08:02

## 2023-02-26 RX ADMIN — CARVEDILOL 3.12 MG: 3.12 TABLET, FILM COATED ORAL at 08:02

## 2023-02-26 RX ADMIN — SUCRALFATE ORAL 1 G: 1 SUSPENSION ORAL at 12:02

## 2023-02-26 RX ADMIN — MORPHINE SULFATE 4 MG: 4 INJECTION, SOLUTION INTRAMUSCULAR; INTRAVENOUS at 03:02

## 2023-02-26 RX ADMIN — LIDOCAINE 1 PATCH: 50 PATCH CUTANEOUS at 05:02

## 2023-02-26 RX ADMIN — DIAZEPAM 5 MG: 5 TABLET ORAL at 08:02

## 2023-02-26 RX ADMIN — CETIRIZINE HYDROCHLORIDE 10 MG: 10 TABLET, FILM COATED ORAL at 12:02

## 2023-02-26 RX ADMIN — HYDROCODONE BITARTRATE AND ACETAMINOPHEN 1 TABLET: 5; 325 TABLET ORAL at 08:02

## 2023-02-26 RX ADMIN — MORPHINE SULFATE 4 MG: 4 INJECTION, SOLUTION INTRAMUSCULAR; INTRAVENOUS at 09:02

## 2023-02-26 RX ADMIN — SUCRALFATE ORAL 1 G: 1 SUSPENSION ORAL at 05:02

## 2023-02-26 RX ADMIN — METHOCARBAMOL 750 MG: 750 TABLET ORAL at 09:02

## 2023-02-26 RX ADMIN — FAMOTIDINE 20 MG: 20 TABLET ORAL at 08:02

## 2023-02-26 RX ADMIN — VENLAFAXINE HYDROCHLORIDE 150 MG: 37.5 CAPSULE, EXTENDED RELEASE ORAL at 12:02

## 2023-02-26 RX ADMIN — MORPHINE SULFATE 4 MG: 4 INJECTION, SOLUTION INTRAMUSCULAR; INTRAVENOUS at 04:02

## 2023-02-26 RX ADMIN — SPIRONOLACTONE 25 MG: 25 TABLET, FILM COATED ORAL at 12:02

## 2023-02-26 RX ADMIN — QUETIAPINE FUMARATE 25 MG: 25 TABLET ORAL at 08:02

## 2023-02-26 RX ADMIN — FUROSEMIDE 40 MG: 40 TABLET ORAL at 12:02

## 2023-02-26 RX ADMIN — PANTOPRAZOLE SODIUM 40 MG: 40 TABLET, DELAYED RELEASE ORAL at 12:02

## 2023-02-26 NOTE — PROGRESS NOTES
"[facility] ED Observation Unit  Progress Note      HPI   Daksha Marie is a 52 y.o. female, with a PMHx of HTN, HFpEF, EtOH abuse, CKD, GERD, previous CVA who presents to the ED via EMS for evaluation of diarrhea onset earlier today. Pt reports decreased appetite ongoing for the last 3-4 days due to nausea and states she has not eaten or been compliant with her medications in 4 days. She does note vomiting episodes for the last few days, stating she has been vomiting acid due to decreased P/O intake. She reports associated generalized abdominal pain. Of note, she is currently being followed by GI for evaluation of ulcers and has not taken her Pepcid, carafate, or Prilosec in several days. Endorses 3 episodes of diarrhea today at 10, 1p, and 3p which she states was black and oily "and it really freaked me out," prompting her to the ED. She states she is a regular smoker. She denies recent EtOH use. This is the extent of the patient's complaints at this time.     ED course:  Scant black stool in rectal vault, guaiac positive.  Started on IV Protonix and IV fluids. H&H 12.5/37.8 which is elevated from her baseline.     Interval History   Remained NPO overnight, repeat CBC this AM shows slight drop in h/h to 11.1/33.1.  She was evaluated by GI who recommends clear liquids and will do an EGD in the AM.  Patient to be transferred to  for continued hospital stay.      PMHx   Past Medical History:   Diagnosis Date    Alcohol abuse 10/07/2015    In remission since 5/1/17    Anemia 06/17/2015    Arthritis     Blindness of right eye     only sees light    Breast calcification, left     biopsied: scar tissue from breast reduction surgery    Chronic diastolic congestive heart failure     Chronic gastrojejunal anastomotic ulcer     2015 - 2016    CVA (cerebral vascular accident) 01/2022    TPA    Encounter for blood transfusion     GERD (gastroesophageal reflux disease)     Glaucoma 2008    Hypertension     " Hypoalbuminemia     Hyponatremia 10/2015    alcohol abuse, polydipsia, Na 109, seizure in ICU    Insomnia     Malingering 2016    Morbid obesity 2017    PTSD (post-traumatic stress disorder)     Sickle cell trait     Stage 3a chronic kidney disease 10/14/2018      Past Surgical History:   Procedure Laterality Date    BREAST BIOPSY Left     CARPAL TUNNEL RELEASE  2020     SECTION      CHOLECYSTECTOMY  after     bile fistula    ESOPHAGOGASTRODUODENOSCOPY N/A 2022    Procedure: ESOPHAGOGASTRODUODENOSCOPY (EGD);  Surgeon: Trey Anderson MD;  Location: [facility] ([address] [place] [address]);  Service: Endoscopy;  Laterality: N/A;    GASTRIC BYPASS  2007    TOTAL REDUCTION MAMMOPLASTY Bilateral     xen gel stent implant  2018    Performed at [facility]        Family Hx   Family History   Problem Relation Age of Onset    Diabetes Mother     Lupus Mother     Hypertension Mother     Heart attack Mother     Diabetes Maternal Grandmother     Crohn's disease Cousin     Heart failure Father     Hypertension Brother     Asthma Brother         Social Hx   Social History     Socioeconomic History    Marital status: Single   Tobacco Use    Smoking status: Former     Packs/day: 0.50     Years: 4.00     Pack years: 2.00     Types: Cigarettes     Quit date: 2023     Years since quittin.0    Smokeless tobacco: Never   Substance and Sexual Activity    Alcohol use: No     Comment: former heavy drinker since May 1st 2017    Drug use: Not Currently    Sexual activity: Yes     Partners: Male     Social Determinants of Health     Financial Resource Strain: High Risk    Difficulty of Paying Living Expenses: Very hard   Food Insecurity: Food Insecurity Present    Worried About Running Out of Food in the Last Year: Often true    Ran Out of Food in the Last Year: Often true   Transportation Needs: Unmet Transportation Needs    Lack of Transportation (Medical): Yes    Lack of Transportation  (Non-Medical): Yes   Physical Activity: Insufficiently Active    Days of Exercise per Week: 5 days    Minutes of Exercise per Session: 10 min   Stress: Stress Concern Present    Feeling of Stress : Rather much   Social Connections: Unknown    Frequency of Communication with Friends and Family: More than three times a week    Frequency of Social Gatherings with Friends and Family: Never    Active Member of Clubs or Organizations: No    Attends Club or Organization Meetings: Patient refused    Marital Status:    Housing Stability: Low Risk     Unable to Pay for Housing in the Last Year: No    Number of Places Lived in the Last Year: 2    Unstable Housing in the Last Year: No        Vital Signs   Vitals:    02/26/23 0413 02/26/23 0814 02/26/23 0909 02/26/23 0910   BP:  (!) 94/53  127/75   BP Location:  Right arm  Left arm   Patient Position:  Lying  Lying   Pulse:  69  69   Resp: 18  18    Temp:  98.4 °F (36.9 °C)     TempSrc:  Oral     SpO2:  97%          Review of Systems  Review of Systems   Constitutional:  Negative for chills and fever.   Respiratory:  Negative for shortness of breath and wheezing.    Cardiovascular:  Negative for chest pain.   Gastrointestinal:  Positive for melena. Negative for abdominal pain.   Neurological:  Negative for dizziness, weakness and headaches.   All other systems reviewed and are negative.    Brief Physical Exam/Reassessment   Physical Exam    Nursing note and vitals reviewed.  Constitutional: Vital signs are normal. She appears well-developed and well-nourished. She does not appear ill. No distress.   HENT:   Head: Normocephalic and atraumatic.   Mouth/Throat: Mucous membranes are normal.   Neck: Neck supple.   Cardiovascular:  Normal rate, regular rhythm, S1 normal, S2 normal and normal heart sounds.           Pulmonary/Chest: Effort normal and breath sounds normal. No tachypnea. She has no decreased breath sounds. She has no wheezes.   Abdominal: Abdomen is soft and  flat. There is abdominal tenderness in the epigastric area.   Musculoskeletal:      Cervical back: Neck supple.     Neurological: She is alert and oriented to person, place, and time.   Skin: Skin is warm, dry and intact. Capillary refill takes less than 2 seconds. No rash noted.   Psychiatric: She has a normal mood and affect. Her behavior is normal.       Labs/Imaging   Labs Reviewed   COMPREHENSIVE METABOLIC PANEL - Abnormal; Notable for the following components:       Result Value    CO2 18 (*)     BUN 28 (*)     eGFR 46 (*)     All other components within normal limits   CBC W/ AUTO DIFFERENTIAL - Abnormal; Notable for the following components:    RBC 3.55 (*)     Hemoglobin 11.1 (*)     Hematocrit 33.1 (*)     MCH 31.3 (*)     All other components within normal limits   CBC W/ AUTO DIFFERENTIAL   LIPASE   HIV 1 / 2 ANTIBODY    Narrative:     Release to patient->Immediate   HEPATITIS C ANTIBODY    Narrative:     Release to patient->Immediate   CBC W/ AUTO DIFFERENTIAL   BASIC METABOLIC PANEL      Imaging Results    None          I reviewed all labs, imaging, EKGs.     Plan   1. Gastrointestinal hemorrhage with melena        - EGD in the AM   2. Chronic anastomotic ulcer of gastrojejunal region        I have discussed this case with PATRICIO Reid.

## 2023-02-26 NOTE — ED NOTES
All PO meds will be held until the pt is seen by GI provider. NPO orders placed on this patient without mention of medication exemption. Will administer if approved by GI provider. Pt aware of NPO status and POC. No new orders at this time.

## 2023-02-26 NOTE — ED NOTES
Ok to to feed patient per Mag Mcclendon PA-C. Pt will be NPO after MN per provider. Reinforced with patient. Pt verbalized understanding.

## 2023-02-26 NOTE — ASSESSMENT & PLAN NOTE
- recent EGD in December showed jejunal ulcer and erythematous mucosa  - no melena since admission  - H/H stable, continue to monitor  - GI consulted and plan for EGD tomorrow  - continue PPI and carafate and pepcid      Continue all home medications for other chronic stable medical problems (med-rec completed with patient).

## 2023-02-26 NOTE — SUBJECTIVE & OBJECTIVE
Past Medical History:   Diagnosis Date    Alcohol abuse 10/07/2015    In remission since 18    Anemia 2015    Arthritis     Blindness of right eye     only sees light    Breast calcification, left     biopsied: scar tissue from breast reduction surgery    Chronic diastolic congestive heart failure     Chronic gastrojejunal anastomotic ulcer      -     CVA (cerebral vascular accident) 2022    TPA    Encounter for blood transfusion     GERD (gastroesophageal reflux disease)     Glaucoma     Hypertension     Hypoalbuminemia     Hyponatremia 10/2015    alcohol abuse, polydipsia, Na 109, seizure in ICU    Insomnia     Malingering 2016    Morbid obesity 2017    PTSD (post-traumatic stress disorder)     Sickle cell trait     Stage 3a chronic kidney disease 10/14/2018       Past Surgical History:   Procedure Laterality Date    BREAST BIOPSY Left     CARPAL TUNNEL RELEASE  2020     SECTION      CHOLECYSTECTOMY  after     bile fistula    ESOPHAGOGASTRODUODENOSCOPY N/A 2022    Procedure: ESOPHAGOGASTRODUODENOSCOPY (EGD);  Surgeon: Trey Anderson MD;  Location: [facility] ([address] [place] [address]);  Service: Endoscopy;  Laterality: N/A;    GASTRIC BYPASS      TOTAL REDUCTION MAMMOPLASTY Bilateral     xen gel stent implant  2018    Performed at [facility]       Review of patient's allergies indicates:   Allergen Reactions    Penicillins Hives    Hydroxyzine Anxiety, Hallucinations and Palpitations       No current facility-administered medications on file prior to encounter.     Current Outpatient Medications on File Prior to Encounter   Medication Sig    amLODIPine (NORVASC) 10 MG tablet Take 1 tablet (10 mg total) by mouth once daily.    BELBUCA 150 mcg Film DISSOLVE 1 FILM TO THE GUM EVERY 12 HOURS    brimonidine-timoloL (COMBIGAN) 0.2-0.5 % Drop Place 1 drop into both eyes 2 (two) times daily.    busPIRone (BUSPAR) 15 MG tablet Take 1 tablet (15 mg total)  by mouth 2 (two) times daily as needed (breakthrough anxiety).    calcium citrate 250 mg calcium Tab     carvediloL (COREG) 3.125 MG tablet Take 1 tablet (3.125 mg total) by mouth 2 (two) times daily.    cetirizine (ZYRTEC) 10 MG tablet Take 1 tablet (10 mg total) by mouth once daily.    diazePAM (VALIUM) 5 MG tablet Take 1 tablet (5 mg total) by mouth every 12 (twelve) hours as needed for Anxiety.    disulfiram (ANTABUSE) 250 mg tablet Take 1 tablet (250 mg total) by mouth once daily.    famotidine (PEPCID) 40 mg/5 mL (8 mg/mL) suspension Take 5 mLs (40 mg total) by mouth nightly.    fluticasone propionate (FLONASE) 50 mcg/actuation nasal spray 2 sprays (100 mcg total) by Each Nostril route once daily.    folic acid (FOLVITE) 1 MG tablet Take 1 tablet (1 mg total) by mouth once daily.    furosemide (LASIX) 40 MG tablet Take 1 tablet (40 mg total) by mouth once daily.    HYDROcodone-acetaminophen (NORCO) 7.5-325 mg per tablet TAKE 1 T PO TID PRN FOR 30 DAYS    levocetirizine (XYZAL) 5 MG tablet Take 0.5 tablets (2.5 mg total) by mouth every evening.    LIDOcaine (LIDODERM) 5 % Place 1 patch onto the skin every 24 hours. Remove & Discard patch within 12 hours or as directed by MD    lisinopriL (PRINIVIL,ZESTRIL) 20 MG tablet Take 20 mg by mouth.    methocarbamoL (ROBAXIN) 750 MG Tab Take 750 mg by mouth 3 (three) times daily.    montelukast (SINGULAIR) 10 mg tablet Take 1 tablet (10 mg total) by mouth every evening.    multivitamin (THERAGRAN) tablet Take 1 tablet by mouth once daily.    omeprazole (PRILOSEC) 40 MG capsule Take 1 capsule (40 mg total) by mouth 2 (two) times daily before meals.    promethazine (PHENERGAN) 25 MG tablet Take 1 tablet (25 mg total) by mouth every 6 (six) hours as needed for Nausea.    QUEtiapine (SEROQUEL) 25 MG Tab Take 1 or 2 tablets by mouth each evening before bed    spironolactone (ALDACTONE) 25 MG tablet Take 1 tablet (25 mg total) by mouth once daily.    sucralfate (CARAFATE) 100  mg/mL suspension Take 10 mLs (1 g total) by mouth 4 (four) times daily before meals and nightly.    sumatriptan (IMITREX) 50 MG tablet Once for severe headache. May repeat once after 2 hours. Do not exceed 3-4 doses in one week.    TRAVATAN Z 0.004 % ophthalmic solution INT 1 GTT IN OU QD    venlafaxine (EFFEXOR-XR) 150 MG Cp24 Take 1 capsule (150 mg total) by mouth once daily.    venlafaxine (EFFEXOR-XR) 75 MG 24 hr capsule Take 1 capsule (75 mg total) by mouth once daily. Take with Effexor 150 to equal 225 mg daily    clindamycin (CLEOCIN) 300 MG capsule Take 300 mg by mouth every 8 (eight) hours.    cyanocobalamin 1,000 mcg/mL injection inject  1000mcg ( 1 vial ) as directed  every 28 days ( the 15th of every month )    ondansetron (ZOFRAN-ODT) 4 MG TbDL Take 1 tablet (4 mg total) by mouth 3 (three) times daily.    [DISCONTINUED] acamprosate (CAMPRAL) 333 mg tablet Take 2 tablets (666 mg total) by mouth 3 (three) times daily.    [DISCONTINUED] ALPRAZolam (XANAX) 1 MG tablet Take 1 tablet (1 mg total) by mouth 2 (two) times daily as needed for Anxiety.     Family History       Problem Relation (Age of Onset)    Asthma Brother    Crohn's disease Cousin    Diabetes Mother, Maternal Grandmother    Heart attack Mother    Heart failure Father    Hypertension Mother, Brother    Lupus Mother          Tobacco Use    Smoking status: Former     Packs/day: 0.50     Years: 4.00     Pack years: 2.00     Types: Cigarettes     Quit date: 2023     Years since quittin.0    Smokeless tobacco: Never   Substance and Sexual Activity    Alcohol use: No     Comment: former heavy drinker since May 1st 2017    Drug use: Not Currently    Sexual activity: Yes     Partners: Male     Review of Systems   Constitutional:  Positive for appetite change. Negative for chills and fever.   Respiratory:  Negative for cough, shortness of breath and wheezing.    Cardiovascular:  Negative for chest pain, palpitations and leg swelling.    Gastrointestinal:  Positive for abdominal pain (not currently), diarrhea (resolved) and nausea. Negative for constipation and vomiting.   Genitourinary:  Negative for difficulty urinating and dysuria.   Neurological:  Negative for dizziness and light-headedness.   Objective:     Vital Signs (Most Recent):  Temp: 97.5 °F (36.4 °C) (02/26/23 1442)  Pulse: 73 (02/26/23 1442)  Resp: 18 (02/26/23 1501)  BP: 122/80 (02/26/23 1442)  SpO2: 97 % (02/26/23 1442) Vital Signs (24h Range):  Temp:  [97.5 °F (36.4 °C)-98.8 °F (37.1 °C)] 97.5 °F (36.4 °C)  Pulse:  [65-94] 73  Resp:  [18-20] 18  SpO2:  [95 %-99 %] 97 %  BP: ()/(53-96) 122/80        There is no height or weight on file to calculate BMI.    Physical Exam  Vitals and nursing note reviewed.   Constitutional:       General: She is not in acute distress.     Appearance: Normal appearance. She is not ill-appearing.   HENT:      Head: Normocephalic and atraumatic.      Mouth/Throat:      Mouth: Mucous membranes are moist.      Pharynx: Oropharynx is clear.   Cardiovascular:      Rate and Rhythm: Normal rate and regular rhythm.      Pulses: Normal pulses.      Heart sounds: Normal heart sounds. No murmur heard.  Pulmonary:      Effort: Pulmonary effort is normal.      Breath sounds: Normal breath sounds. No wheezing or rales.   Abdominal:      General: Bowel sounds are normal. There is no distension.      Palpations: Abdomen is soft.      Tenderness: There is abdominal tenderness (mild epigastric tenderness).   Musculoskeletal:      Cervical back: Normal range of motion and neck supple.      Right lower leg: No edema.      Left lower leg: No edema.   Skin:     General: Skin is warm and dry.      Findings: No erythema, lesion or rash.   Neurological:      General: No focal deficit present.      Mental Status: She is alert and oriented to person, place, and time. Mental status is at baseline.   Psychiatric:         Behavior: Behavior normal.         Thought Content:  Thought content normal.           Significant Labs: All pertinent labs within the past 24 hours have been reviewed.  CBC:   Recent Labs   Lab 02/25/23  1753 02/26/23  0015 02/26/23  1144   WBC 6.44 6.34 4.80   HGB 12.5 11.1* 11.2*   HCT 37.8 33.1* 34.1*    158 155     CMP:   Recent Labs   Lab 02/25/23  1753 02/26/23  1144    143   K 4.1 4.6    112*   CO2 18* 25   GLU 86 81   BUN 28* 22*   CREATININE 1.4 1.3   CALCIUM 8.8 9.1   PROT 6.6  --    ALBUMIN 3.5  --    BILITOT 0.4  --    ALKPHOS 97  --    AST 17  --    ALT 13  --    ANIONGAP 13 6*       Significant Imaging: I have reviewed all pertinent imaging results/findings within the past 24 hours.

## 2023-02-26 NOTE — ED NOTES
"Received report from Leandra Randall RN. AAOx4. Skin:w/d. RR:18  even/unlabor. Calm&cooperative. States abdominal pain 8/10 "they just gave me tylenol." Pt requesting to eat.  Denies palpitations, cp, sob, dizziness, lightheadedness, n/v, weakness or any other discomfort @this time. Oriented to room & call light. POC updated. NADN. Bed locked in low position, side rails up x2 Will continue to monitor.    "

## 2023-02-26 NOTE — CONSULTS
Reason for Consult:  GI bleeding    HPI:  Pt is a 52 y.o. female with multiple medical problems including HTN/ CAD/Hx of CVA and PUD was doing well until a day prior coming to hospital. Developed epigastric abdominal pain followed by melanotic stool x3 yesterday. Came in to ER admitted for GI  bleeding. H/H was 12.5/38 on admit and today it was 11/33, No more stool since she has been in hospital. Had EGD by history 2 months back at [facility] found ulcer.    Past Medical History:   Diagnosis Date    Alcohol abuse 10/07/2015    In remission since 18    Anemia 2015    Arthritis     Blindness of right eye     only sees light    Breast calcification, left     biopsied: scar tissue from breast reduction surgery    Chronic diastolic congestive heart failure     Chronic gastrojejunal anastomotic ulcer      -     CVA (cerebral vascular accident) 2022    TPA    Encounter for blood transfusion     GERD (gastroesophageal reflux disease)     Glaucoma     Hypertension     Hypoalbuminemia     Hyponatremia 10/2015    alcohol abuse, polydipsia, Na 109, seizure in ICU    Insomnia     Malingering 2016    Morbid obesity 2017    PTSD (post-traumatic stress disorder)     Sickle cell trait     Stage 3a chronic kidney disease 10/14/2018       Past Surgical History:   Procedure Laterality Date    BREAST BIOPSY Left     CARPAL TUNNEL RELEASE  2020     SECTION      CHOLECYSTECTOMY  after     bile fistula    ESOPHAGOGASTRODUODENOSCOPY N/A 2022    Procedure: ESOPHAGOGASTRODUODENOSCOPY (EGD);  Surgeon: Trey Anderson MD;  Location: [address]);  Service: Endoscopy;  Laterality: N/A;    GASTRIC BYPASS      TOTAL REDUCTION MAMMOPLASTY Bilateral     xen gel stent implant  2018    Performed at [facility]       Family History   Problem Relation Age of Onset    Diabetes Mother     Lupus Mother     Hypertension Mother     Heart attack Mother     Diabetes  Maternal Grandmother     Crohn's disease Cousin     Heart failure Father     Hypertension Brother     Asthma Brother        Social History     Socioeconomic History    Marital status: Single   Tobacco Use    Smoking status: Former     Packs/day: 0.50     Years: 4.00     Pack years: 2.00     Types: Cigarettes     Quit date: 2023     Years since quittin.0    Smokeless tobacco: Never   Substance and Sexual Activity    Alcohol use: No     Comment: former heavy drinker since May 1st 2017    Drug use: Not Currently    Sexual activity: Yes     Partners: Male     Social Determinants of Health     Financial Resource Strain: High Risk    Difficulty of Paying Living Expenses: Very hard   Food Insecurity: Food Insecurity Present    Worried About Running Out of Food in the Last Year: Often true    Ran Out of Food in the Last Year: Often true   Transportation Needs: Unmet Transportation Needs    Lack of Transportation (Medical): Yes    Lack of Transportation (Non-Medical): Yes   Physical Activity: Insufficiently Active    Days of Exercise per Week: 5 days    Minutes of Exercise per Session: 10 min   Stress: Stress Concern Present    Feeling of Stress : Rather much   Social Connections: Unknown    Frequency of Communication with Friends and Family: More than three times a week    Frequency of Social Gatherings with Friends and Family: Never    Active Member of Clubs or Organizations: No    Attends Club or Organization Meetings: Patient refused    Marital Status:    Housing Stability: Low Risk     Unable to Pay for Housing in the Last Year: No    Number of Places Lived in the Last Year: 2    Unstable Housing in the Last Year: No       Endoscopic History:  EGD 2 months back by hx had PUD    Review of patient's allergies indicates:   Allergen Reactions    Penicillins Hives    Hydroxyzine Anxiety, Hallucinations and Palpitations       Current Facility-Administered Medications on File Prior to Encounter   Medication  Dose Route Frequency Provider Last Rate Last Admin    cyanocobalamin injection 1,000 mcg  1,000 mcg Intramuscular Q30 Days D'Amico C. Johnson, MD         Current Outpatient Medications on File Prior to Encounter   Medication Sig Dispense Refill    amLODIPine (NORVASC) 10 MG tablet Take 1 tablet (10 mg total) by mouth once daily. 90 tablet 3    BELBUCA 150 mcg Film DISSOLVE 1 FILM TO THE GUM EVERY 12 HOURS      brimonidine-timoloL (COMBIGAN) 0.2-0.5 % Drop Place 1 drop into both eyes 2 (two) times daily.      busPIRone (BUSPAR) 15 MG tablet Take 1 tablet (15 mg total) by mouth 2 (two) times daily as needed (breakthrough anxiety). 60 tablet 11    calcium citrate 250 mg calcium Tab       carvediloL (COREG) 3.125 MG tablet Take 1 tablet (3.125 mg total) by mouth 2 (two) times daily. 180 tablet 3    cetirizine (ZYRTEC) 10 MG tablet Take 1 tablet (10 mg total) by mouth once daily. 90 tablet 3    cyanocobalamin 1,000 mcg/mL injection inject  1000mcg ( 1 vial ) as directed  every 28 days ( the 15th of every month ) 1 mL 6    diazePAM (VALIUM) 5 MG tablet Take 1 tablet (5 mg total) by mouth every 12 (twelve) hours as needed for Anxiety. 60 tablet 5    disulfiram (ANTABUSE) 250 mg tablet Take 1 tablet (250 mg total) by mouth once daily. 90 tablet 3    famotidine (PEPCID) 40 mg/5 mL (8 mg/mL) suspension Take 5 mLs (40 mg total) by mouth nightly. 150 mL 11    fluticasone propionate (FLONASE) 50 mcg/actuation nasal spray 2 sprays (100 mcg total) by Each Nostril route once daily. 16 g 11    folic acid (FOLVITE) 1 MG tablet Take 1 tablet (1 mg total) by mouth once daily. 30 tablet 11    furosemide (LASIX) 40 MG tablet Take 1 tablet (40 mg total) by mouth once daily. 90 tablet 3    HYDROcodone-acetaminophen (NORCO) 7.5-325 mg per tablet TAKE 1 T PO TID PRN FOR 30 DAYS  0    levocetirizine (XYZAL) 5 MG tablet Take 0.5 tablets (2.5 mg total) by mouth every evening. 15 tablet 11    LIDOcaine (LIDODERM) 5 % Place 1 patch onto the skin  every 24 hours. Remove & Discard patch within 12 hours or as directed by MD      methocarbamoL (ROBAXIN) 750 MG Tab Take 750 mg by mouth 3 (three) times daily.      montelukast (SINGULAIR) 10 mg tablet Take 1 tablet (10 mg total) by mouth every evening. 90 tablet 1    multivitamin (THERAGRAN) tablet Take 1 tablet by mouth once daily. 30 tablet 5    omeprazole (PRILOSEC) 40 MG capsule Take 1 capsule (40 mg total) by mouth 2 (two) times daily before meals. 60 capsule 11    ondansetron (ZOFRAN-ODT) 4 MG TbDL Take 1 tablet (4 mg total) by mouth 3 (three) times daily. 10 tablet 0    promethazine (PHENERGAN) 25 MG tablet Take 1 tablet (25 mg total) by mouth every 6 (six) hours as needed for Nausea. 15 tablet 0    QUEtiapine (SEROQUEL) 25 MG Tab Take 1 or 2 tablets by mouth each evening before bed 60 tablet 5    spironolactone (ALDACTONE) 25 MG tablet Take 1 tablet (25 mg total) by mouth once daily. 90 tablet 0    sucralfate (CARAFATE) 100 mg/mL suspension Take 10 mLs (1 g total) by mouth 4 (four) times daily before meals and nightly. 414 mL 8    sumatriptan (IMITREX) 50 MG tablet Once for severe headache. May repeat once after 2 hours. Do not exceed 3-4 doses in one week. 12 tablet 0    TRAVATAN Z 0.004 % ophthalmic solution INT 1 GTT IN OU QD  3    venlafaxine (EFFEXOR-XR) 150 MG Cp24 Take 1 capsule (150 mg total) by mouth once daily. 90 capsule 3    venlafaxine (EFFEXOR-XR) 75 MG 24 hr capsule Take 1 capsule (75 mg total) by mouth once daily. Take with Effexor 150 to equal 225 mg daily 90 capsule 1    [DISCONTINUED] acamprosate (CAMPRAL) 333 mg tablet Take 2 tablets (666 mg total) by mouth 3 (three) times daily. 180 tablet 11    [DISCONTINUED] ALPRAZolam (XANAX) 1 MG tablet Take 1 tablet (1 mg total) by mouth 2 (two) times daily as needed for Anxiety. 60 tablet 5     Scheduled Meds:   amLODIPine  10 mg Oral Daily    carvediloL  3.125 mg Oral BID    cetirizine  10 mg Oral Daily    cyanocobalamin  1,000 mcg Intramuscular  Q30 Days    disulfiram  250 mg Oral Daily    famotidine  20 mg Oral BID    furosemide  40 mg Oral Daily    montelukast  10 mg Oral QHS    pantoprazole  40 mg Oral Daily    QUEtiapine  25 mg Oral Daily    spironolactone  25 mg Oral Daily    sucralfate  1 g Oral QID (AC & HS)    venlafaxine  150 mg Oral Daily    venlafaxine  75 mg Oral Daily     Continuous Infusions:  PRN Meds:.acetaminophen, busPIRone, diazePAM, HYDROcodone-acetaminophen, melatonin, morphine, ondansetron, ondansetron, sodium chloride 0.9%    Review of Systems:  CONSTITUTIONAL: Negative for weakness, fever, weight loss, weight gain.  HEENT: Eyes: Negative for double/blurred vision. Ears: Negative pain or loss of hearing. Nose:Negative for nasal congestion. Negative for rhinorrhea Mouth: Negative for dry mouth/pain Throat: Negative for masses or hoarseness.  CARDIOVASCULAR: Negative for chest pain or palpitations.  RESPIRATORY: Negative for SOB, wheezes  GASTROINTESTINAL: See HPI  GENITOURINARY: Negative for dysuria/hematuria.  MUSCULOSKELETAL: Negative for osteoarthritis, muscle pain.  SKIN: Negative for rashes/lesions.  NEUROLOGIC: Negative for headaches, numbness/tingling.  ENDOCRINE: Negative for diabetes or thyroid abnormalities.  HEMATOLOGIC: Negative for anemia or blood dyscrasias.    Patient Vitals for the past 24 hrs:   BP Temp Temp src Pulse Resp SpO2   02/26/23 0910 127/75 -- -- 69 -- --   02/26/23 0909 -- -- -- -- 18 --   02/26/23 0814 (!) 94/53 98.4 °F (36.9 °C) Oral 69 -- 97 %   02/26/23 0413 -- -- -- -- 18 --   02/26/23 0411 115/72 -- -- 68 18 98 %   02/26/23 0400 93/66 97.8 °F (36.6 °C) Oral 65 18 97 %   02/25/23 2350 124/79 98.1 °F (36.7 °C) Oral 74 18 98 %   02/25/23 2033 139/79 98.2 °F (36.8 °C) Oral 86 20 99 %   02/25/23 1928 121/82 98.8 °F (37.1 °C) Oral 79 18 98 %   02/25/23 1646 (!) 141/96 98.8 °F (37.1 °C) Oral 94 19 95 %       Gen: Well developed, well nourished, no apparent distress, cooperative  HEENT: Anicteric, PERRLA,  normocephalic, neck symmetrical  CV: S1, S2, no murmers/rubs, non-displaced PMI  Lungs: CTA-B, normal excursion  Abd: Soft, epigastric tenderness, ND, normal BS's, no HSM  Ext: No c/c/e, 1+ DP pulses to BLE's  Neuro: CN II-XII grossly intact, no asterixis.  Skin: No rashes/lesions, normal texture  Psych: AA&O x 4, normal affect    Labs:  Recent Labs   Lab 02/25/23  1753   CALCIUM 8.8   PROT 6.6      K 4.1   CO2 18*      BUN 28*   CREATININE 1.4   ALKPHOS 97   ALT 13   AST 17   BILITOT 0.4     Recent Results (from the past 336 hour(s))   CBC auto differential    Collection Time: 02/26/23 12:15 AM   Result Value Ref Range    WBC 6.34 3.90 - 12.70 K/uL    Hemoglobin 11.1 (L) 12.0 - 16.0 g/dL    Hematocrit 33.1 (L) 37.0 - 48.5 %    Platelets 158 150 - 450 K/uL   CBC auto differential    Collection Time: 02/25/23  5:53 PM   Result Value Ref Range    WBC 6.44 3.90 - 12.70 K/uL    Hemoglobin 12.5 12.0 - 16.0 g/dL    Hematocrit 37.8 37.0 - 48.5 %    Platelets 166 150 - 450 K/uL     No results for input(s): PT, INR, APTT in the last 24 hours.    Imaging: normal CXR      Assessment:  Pt. Is a 52 y.o. female with:  1. Epigastric pain  2. Melena:r/o GI bleeding  3. Blood loss anemia  4. Hx of PUD  5. CAD  6. HTN  7.. Hx of CVA    Recommendations:  1.continue pantoprazole. Clear liquids for diet. NPO past midnight. Schedule for EGD tomorrow AM.      I would like to take this opportunity to thank you for this consult.  If you have any questions or concerns, please do not hesitate to contact me.

## 2023-02-26 NOTE — ED TRIAGE NOTES
Pt reports loose, black stool occurrence this am. Denies blood thinners. Denies SOB, chest pain, change LOC, dizziness. NAD noted. Hx gastric ulcers; cigarette use, alcohol abuse.

## 2023-02-26 NOTE — NURSING
Nurses Note -- 4 Eyes      2/26/2023   4:55 PM      Skin assessed during: Admit from EDOU      [x] No Pressure Injuries Present    [x]Prevention Measures Documented      [] Yes- Altered Skin Integrity Present or Discovered   [] LDA Added if Not in Epic (Describe Wound)   [] New Altered Skin Integrity was Present on Admit and Documented in LDA   [] Wound Image Taken    Wound Care Consulted? No    Attending Nurse:  Tram Pillai RN     Second RN/Staff Member:  April, PCT

## 2023-02-26 NOTE — ED NOTES
LOC: The patient is awake, alert, and oriented to self, place, time, and situation. Pt is calm and cooperative. Affect is appropriate.  Speech is appropriate and clear.     APPEARANCE: Patient resting comfortably in no acute distress.  Patient is clean and well groomed.    SKIN: The skin is warm and dry; color consistent with ethnicity.  Patient has normal skin turgor and moist mucus membranes.  Skin intact; no breakdown or bruising noted.     MUSCULOSKELETAL: Patient moving upper and lower extremities without difficulty; denies pain in the extremities or back.  Denies weakness.     RESPIRATORY: Airway is open and patent. Respirations spontaneous, even, easy, and non-labored.  Patient has a normal effort and rate.  No accessory muscle use noted. Denies cough.     CARDIAC:  Normal rate noted.  No peripheral edema noted. No complaints of chest pain.     ABDOMEN: Soft and non tender to palpation.  No distention noted. Pt reports 7/10 epigastric pain; denies nausea, vomiting, or constipation. Reports diarrhea this am.     NEUROLOGIC: Eyes open spontaneously.  Behavior appropriate to situation.  Follows commands; facial expression symmetrical.  Purposeful motor response noted; normal sensation in all extremities. Pt denies headache; denies lightheadedness or dizziness; denies visual disturbances; denies loss of balance; denies unilateral weakness.

## 2023-02-26 NOTE — H&P
"ED Observation Unit  History and Physical      I assumed care of this patient from the Main ED at onset of observation time, 1916 on 02/25/2023.       History of Present Illness:    Daksha Marie is a 52 y.o. female, with a PMHx of HTN, HFpEF, EtOH abuse, CKD, GERD, previous CVA who presents to the ED via EMS for evaluation of diarrhea onset earlier today. Pt reports decreased appetite ongoing for the last 3-4 days due to nausea and states she has not eaten or been compliant with her medications in 4 days. She does note vomiting episodes for the last few days, stating she has been vomiting acid due to decreased P/O intake. She reports associated generalized abdominal pain. Of note, she is currently being followed by GI for evaluation of ulcers and has not taken her Pepcid, carafate, or Prilosec in several days. Endorses 3 episodes of diarrhea today at 10, 1p, and 3p which she states was black and oily "and it really freaked me out," prompting her to the ED. She states she is a regular smoker. She denies recent EtOH use. This is the extent of the patient's complaints at this time.    ED course:  Scant black stool in rectal vault, guaiac positive.  Started on IV Protonix and IV fluids. H&H 12.5/37.8 which is elevated from her baseline.      I reviewed the ED Provider Note dated 2/25/2023 prior to my evaluation of this patient.  I reviewed all labs and imaging performed in the Main ED, prior to patient being placed in Observation. Patient was placed in the ED Observation Unit for trending H&H, serial abdominal exams, GI consult.      PMHx   Past Medical History:   Diagnosis Date    Alcohol abuse 10/07/2015    In remission since 5/1/17    Anemia 06/17/2015    Arthritis     Blindness of right eye     only sees light    Breast calcification, left     biopsied: scar tissue from breast reduction surgery    Chronic diastolic congestive heart failure     Chronic gastrojejunal anastomotic ulcer     2015 - 2016    " CVA (cerebral vascular accident) 2022    TPA    Encounter for blood transfusion     GERD (gastroesophageal reflux disease)     Glaucoma     Hypertension     Hypoalbuminemia     Hyponatremia 10/2015    alcohol abuse, polydipsia, Na 109, seizure in ICU    Insomnia     Malingering 2016    Morbid obesity     PTSD (post-traumatic stress disorder)     Sickle cell trait     Stage 3a chronic kidney disease 10/14/2018      Past Surgical History:   Procedure Laterality Date    BREAST BIOPSY Left     CARPAL TUNNEL RELEASE  2020     SECTION      CHOLECYSTECTOMY  after     bile fistula    ESOPHAGOGASTRODUODENOSCOPY N/A 2022    Procedure: ESOPHAGOGASTRODUODENOSCOPY (EGD);  Surgeon: Trey Anderson MD;  Location: [facility] ([address] [place] [address]);  Service: Endoscopy;  Laterality: N/A;    GASTRIC BYPASS      TOTAL REDUCTION MAMMOPLASTY Bilateral     xen gel stent implant  2018    Performed at [facility]        Family Hx   Family History   Problem Relation Age of Onset    Diabetes Mother     Lupus Mother     Hypertension Mother     Heart attack Mother     Diabetes Maternal Grandmother     Crohn's disease Cousin     Heart failure Father     Hypertension Brother     Asthma Brother         Social Hx   Social History     Socioeconomic History    Marital status: Single   Tobacco Use    Smoking status: Former     Packs/day: 0.50     Years: 4.00     Pack years: 2.00     Types: Cigarettes     Quit date: 2023     Years since quittin.0    Smokeless tobacco: Never   Substance and Sexual Activity    Alcohol use: No     Comment: former heavy drinker since May 1st 2017    Drug use: Not Currently    Sexual activity: Yes     Partners: Male     Social Determinants of Health     Financial Resource Strain: High Risk    Difficulty of Paying Living Expenses: Very hard   Food Insecurity: Food Insecurity Present    Worried About Running Out of Food in the Last Year: Often true    Ran Out  of Food in the Last Year: Often true   Transportation Needs: Unmet Transportation Needs    Lack of Transportation (Medical): Yes    Lack of Transportation (Non-Medical): Yes   Physical Activity: Insufficiently Active    Days of Exercise per Week: 5 days    Minutes of Exercise per Session: 10 min   Stress: Stress Concern Present    Feeling of Stress : Rather much   Social Connections: Unknown    Frequency of Communication with Friends and Family: More than three times a week    Frequency of Social Gatherings with Friends and Family: Never    Active Member of Clubs or Organizations: No    Attends Club or Organization Meetings: Patient refused    Marital Status:    Housing Stability: Low Risk     Unable to Pay for Housing in the Last Year: No    Number of Places Lived in the Last Year: 2    Unstable Housing in the Last Year: No        Vital Signs   Vitals:    02/25/23 1646 02/25/23 1928 02/25/23 2033   BP: (!) 141/96 121/82 139/79   BP Location: Left arm Right arm Right arm   Patient Position: Lying Lying Lying   Pulse: 94 79 86   Resp: 19 18 20   Temp: 98.8 °F (37.1 °C) 98.8 °F (37.1 °C) 98.2 °F (36.8 °C)   TempSrc: Oral Oral Oral   SpO2: 95% 98% 99%        Review of Systems  Review of Systems   Constitutional:  Negative for chills and fever.   HENT:  Negative for congestion, nosebleeds and sore throat.    Eyes:  Negative for blurred vision, double vision and photophobia.   Respiratory:  Negative for cough and shortness of breath.    Cardiovascular:  Negative for chest pain, claudication and leg swelling.   Gastrointestinal:  Positive for abdominal pain and melena. Negative for diarrhea, nausea and vomiting.   Genitourinary:  Negative for dysuria and urgency.   Musculoskeletal:  Negative for back pain and neck pain.   Skin:  Negative for itching and rash.   Neurological:  Negative for dizziness, weakness and headaches.     Physical Exam  Physical Exam  Constitutional:       General: She is not in acute  distress.     Appearance: Normal appearance. She is not toxic-appearing.   HENT:      Head: Normocephalic and atraumatic.      Nose: Nose normal.      Mouth/Throat:      Mouth: Mucous membranes are moist.   Cardiovascular:      Rate and Rhythm: Normal rate and regular rhythm.   Pulmonary:      Effort: Pulmonary effort is normal. No respiratory distress.   Abdominal:      General: Abdomen is flat. There is no distension.      Tenderness: There is abdominal tenderness in the epigastric area. There is no guarding or rebound. Negative signs include Danielle's sign.   Musculoskeletal:         General: Normal range of motion.      Cervical back: Normal range of motion.   Skin:     General: Skin is warm and dry.   Neurological:      General: No focal deficit present.      Mental Status: She is alert and oriented to person, place, and time. Mental status is at baseline.   Psychiatric:         Mood and Affect: Mood normal.         Behavior: Behavior normal.       Medications:   Scheduled Meds:   [START ON 2/26/2023] amLODIPine  10 mg Oral Daily    carvediloL  3.125 mg Oral BID    [START ON 2/26/2023] cetirizine  10 mg Oral Daily    cyanocobalamin  1,000 mcg Intramuscular Q30 Days    [START ON 2/26/2023] disulfiram  250 mg Oral Daily    famotidine  20 mg Oral BID    [START ON 2/26/2023] furosemide  40 mg Oral Daily    montelukast  10 mg Oral QHS    [START ON 2/26/2023] pantoprazole  40 mg Oral Daily    [START ON 2/26/2023] QUEtiapine  25 mg Oral Daily    [START ON 2/26/2023] spironolactone  25 mg Oral Daily    sucralfate  1 g Oral QID (AC & HS)    [START ON 2/26/2023] venlafaxine  150 mg Oral Daily    [START ON 2/26/2023] venlafaxine  75 mg Oral Daily     Continuous Infusions:  PRN Meds:.acetaminophen, busPIRone, diazePAM, HYDROcodone-acetaminophen, melatonin, morphine, ondansetron, ondansetron, sodium chloride 0.9%      Assessment/Plan:  GI bleed  - serial H&H  - serial abdominal exams  - GI consult  - PPI  - prn pain meds and  antiemetics      Case was discussed with the ED provider, Dr. Zamora

## 2023-02-26 NOTE — H&P
"[facility] - Emergency Dept (Observation)  [facility]  History & Physical    Patient Name: Daksha Marie  MRN: [id number]  Patient Class: OP- Observation  Admission Date: 2/25/2023  Attending Physician: Aure Millard MD   Primary Care Provider: Caridad Padilla MD         Patient information was obtained from patient, past medical records, and ER records.     Subjective:     Principal Problem:Gastrointestinal hemorrhage with melena    Chief Complaint:   Chief Complaint   Patient presents with    GI Problem     Dark stools, N/V since 1000, Hx of ulcers.         HPI: Daksha Marie is a 52 y.o. female, with a PMHx of HTN, HFpEF, EtOH abuse, CKD3, GERD, previous CVA who presented to the ED via EMS for evaluation of dark bloody diarrhea onset a few hours PTA. Pt reports decreased appetite ongoing for the last 3-4 days due to nausea and states she has not eaten or been compliant with her medications in 4 days.  She reports associated generalized abdominal pain. Of note, she is currently being followed by GI for evaluation of ulcers and has not taken her Pepcid, carafate, or Prilosec in several days. Endorses 3 episodes of diarrhea today at 10, 1p, and 3p which she states was black and oily "and it really freaked me out," prompting her to the ED. She states she is a regular smoker. She denies recent EtOH use. In the ED, patient was found to have scant black stool in rectal vault, guaiac positive.  Started on IV Protonix and IV fluids and admitted to EDOU for observation.  Hgb dropped slightly from 12>>11, but no repeat episodes of melena overnight.  She was evaluated by GI who recommends clear liquids and will do an EGD in the AM.  Patient transferred to hospital medicine for further care.     Past Medical History:   Diagnosis Date    Alcohol abuse 10/07/2015    In remission since 5/1/17    Anemia 06/17/2015    Arthritis     Blindness of right eye     only sees light    Breast calcification, left     " biopsied: scar tissue from breast reduction surgery    Chronic diastolic congestive heart failure     Chronic gastrojejunal anastomotic ulcer      -     CVA (cerebral vascular accident) 2022    TPA    Encounter for blood transfusion     GERD (gastroesophageal reflux disease)     Glaucoma     Hypertension     Hypoalbuminemia     Hyponatremia 10/2015    alcohol abuse, polydipsia, Na 109, seizure in ICU    Insomnia     Malingering 2016    Morbid obesity 2017    PTSD (post-traumatic stress disorder)     Sickle cell trait     Stage 3a chronic kidney disease 10/14/2018       Past Surgical History:   Procedure Laterality Date    BREAST BIOPSY Left     CARPAL TUNNEL RELEASE  2020     SECTION      CHOLECYSTECTOMY  after     bile fistula    ESOPHAGOGASTRODUODENOSCOPY N/A 2022    Procedure: ESOPHAGOGASTRODUODENOSCOPY (EGD);  Surgeon: Trey Anderson MD;  Location: [facility] ([address] [place] [address]);  Service: Endoscopy;  Laterality: N/A;    GASTRIC BYPASS      TOTAL REDUCTION MAMMOPLASTY Bilateral     xen gel stent implant  2018    Performed at [facility]       Review of patient's allergies indicates:   Allergen Reactions    Penicillins Hives    Hydroxyzine Anxiety, Hallucinations and Palpitations       No current facility-administered medications on file prior to encounter.     Current Outpatient Medications on File Prior to Encounter   Medication Sig    amLODIPine (NORVASC) 10 MG tablet Take 1 tablet (10 mg total) by mouth once daily.    BELBUCA 150 mcg Film DISSOLVE 1 FILM TO THE GUM EVERY 12 HOURS    brimonidine-timoloL (COMBIGAN) 0.2-0.5 % Drop Place 1 drop into both eyes 2 (two) times daily.    busPIRone (BUSPAR) 15 MG tablet Take 1 tablet (15 mg total) by mouth 2 (two) times daily as needed (breakthrough anxiety).    calcium citrate 250 mg calcium Tab     carvediloL (COREG) 3.125 MG tablet Take 1 tablet (3.125 mg total) by mouth 2 (two) times daily.     cetirizine (ZYRTEC) 10 MG tablet Take 1 tablet (10 mg total) by mouth once daily.    diazePAM (VALIUM) 5 MG tablet Take 1 tablet (5 mg total) by mouth every 12 (twelve) hours as needed for Anxiety.    disulfiram (ANTABUSE) 250 mg tablet Take 1 tablet (250 mg total) by mouth once daily.    famotidine (PEPCID) 40 mg/5 mL (8 mg/mL) suspension Take 5 mLs (40 mg total) by mouth nightly.    fluticasone propionate (FLONASE) 50 mcg/actuation nasal spray 2 sprays (100 mcg total) by Each Nostril route once daily.    folic acid (FOLVITE) 1 MG tablet Take 1 tablet (1 mg total) by mouth once daily.    furosemide (LASIX) 40 MG tablet Take 1 tablet (40 mg total) by mouth once daily.    HYDROcodone-acetaminophen (NORCO) 7.5-325 mg per tablet TAKE 1 T PO TID PRN FOR 30 DAYS    levocetirizine (XYZAL) 5 MG tablet Take 0.5 tablets (2.5 mg total) by mouth every evening.    LIDOcaine (LIDODERM) 5 % Place 1 patch onto the skin every 24 hours. Remove & Discard patch within 12 hours or as directed by MD    lisinopriL (PRINIVIL,ZESTRIL) 20 MG tablet Take 20 mg by mouth.    methocarbamoL (ROBAXIN) 750 MG Tab Take 750 mg by mouth 3 (three) times daily.    montelukast (SINGULAIR) 10 mg tablet Take 1 tablet (10 mg total) by mouth every evening.    multivitamin (THERAGRAN) tablet Take 1 tablet by mouth once daily.    omeprazole (PRILOSEC) 40 MG capsule Take 1 capsule (40 mg total) by mouth 2 (two) times daily before meals.    promethazine (PHENERGAN) 25 MG tablet Take 1 tablet (25 mg total) by mouth every 6 (six) hours as needed for Nausea.    QUEtiapine (SEROQUEL) 25 MG Tab Take 1 or 2 tablets by mouth each evening before bed    spironolactone (ALDACTONE) 25 MG tablet Take 1 tablet (25 mg total) by mouth once daily.    sucralfate (CARAFATE) 100 mg/mL suspension Take 10 mLs (1 g total) by mouth 4 (four) times daily before meals and nightly.    sumatriptan (IMITREX) 50 MG tablet Once for severe headache. May repeat once after 2 hours. Do not  exceed 3-4 doses in one week.    TRAVATAN Z 0.004 % ophthalmic solution INT 1 GTT IN OU QD    venlafaxine (EFFEXOR-XR) 150 MG Cp24 Take 1 capsule (150 mg total) by mouth once daily.    venlafaxine (EFFEXOR-XR) 75 MG 24 hr capsule Take 1 capsule (75 mg total) by mouth once daily. Take with Effexor 150 to equal 225 mg daily    clindamycin (CLEOCIN) 300 MG capsule Take 300 mg by mouth every 8 (eight) hours.    cyanocobalamin 1,000 mcg/mL injection inject  1000mcg ( 1 vial ) as directed  every 28 days ( the 15th of every month )    ondansetron (ZOFRAN-ODT) 4 MG TbDL Take 1 tablet (4 mg total) by mouth 3 (three) times daily.    [DISCONTINUED] acamprosate (CAMPRAL) 333 mg tablet Take 2 tablets (666 mg total) by mouth 3 (three) times daily.    [DISCONTINUED] ALPRAZolam (XANAX) 1 MG tablet Take 1 tablet (1 mg total) by mouth 2 (two) times daily as needed for Anxiety.     Family History       Problem Relation (Age of Onset)    Asthma Brother    Crohn's disease Cousin    Diabetes Mother, Maternal Grandmother    Heart attack Mother    Heart failure Father    Hypertension Mother, Brother    Lupus Mother          Tobacco Use    Smoking status: Former     Packs/day: 0.50     Years: 4.00     Pack years: 2.00     Types: Cigarettes     Quit date: 2023     Years since quittin.0    Smokeless tobacco: Never   Substance and Sexual Activity    Alcohol use: No     Comment: former heavy drinker since May 1st 2017    Drug use: Not Currently    Sexual activity: Yes     Partners: Male     Review of Systems   Constitutional:  Positive for appetite change. Negative for chills and fever.   Respiratory:  Negative for cough, shortness of breath and wheezing.    Cardiovascular:  Negative for chest pain, palpitations and leg swelling.   Gastrointestinal:  Positive for abdominal pain (not currently), diarrhea (resolved) and nausea. Negative for constipation and vomiting.   Genitourinary:  Negative for difficulty urinating and dysuria.    Neurological:  Negative for dizziness and light-headedness.   Objective:     Vital Signs (Most Recent):  Temp: 97.5 °F (36.4 °C) (02/26/23 1442)  Pulse: 73 (02/26/23 1442)  Resp: 18 (02/26/23 1501)  BP: 122/80 (02/26/23 1442)  SpO2: 97 % (02/26/23 1442) Vital Signs (24h Range):  Temp:  [97.5 °F (36.4 °C)-98.8 °F (37.1 °C)] 97.5 °F (36.4 °C)  Pulse:  [65-94] 73  Resp:  [18-20] 18  SpO2:  [95 %-99 %] 97 %  BP: ()/(53-96) 122/80        There is no height or weight on file to calculate BMI.    Physical Exam  Vitals and nursing note reviewed.   Constitutional:       General: She is not in acute distress.     Appearance: Normal appearance. She is not ill-appearing.   HENT:      Head: Normocephalic and atraumatic.      Mouth/Throat:      Mouth: Mucous membranes are moist.      Pharynx: Oropharynx is clear.   Cardiovascular:      Rate and Rhythm: Normal rate and regular rhythm.      Pulses: Normal pulses.      Heart sounds: Normal heart sounds. No murmur heard.  Pulmonary:      Effort: Pulmonary effort is normal.      Breath sounds: Normal breath sounds. No wheezing or rales.   Abdominal:      General: Bowel sounds are normal. There is no distension.      Palpations: Abdomen is soft.      Tenderness: There is abdominal tenderness (mild epigastric tenderness).   Musculoskeletal:      Cervical back: Normal range of motion and neck supple.      Right lower leg: No edema.      Left lower leg: No edema.   Skin:     General: Skin is warm and dry.      Findings: No erythema, lesion or rash.   Neurological:      General: No focal deficit present.      Mental Status: She is alert and oriented to person, place, and time. Mental status is at baseline.   Psychiatric:         Behavior: Behavior normal.         Thought Content: Thought content normal.           Significant Labs: All pertinent labs within the past 24 hours have been reviewed.  CBC:   Recent Labs   Lab 02/25/23  1753 02/26/23  0015 02/26/23  1144   WBC 6.44 6.34  4.80   HGB 12.5 11.1* 11.2*   HCT 37.8 33.1* 34.1*    158 155     CMP:   Recent Labs   Lab 02/25/23  1753 02/26/23  1144    143   K 4.1 4.6    112*   CO2 18* 25   GLU 86 81   BUN 28* 22*   CREATININE 1.4 1.3   CALCIUM 8.8 9.1   PROT 6.6  --    ALBUMIN 3.5  --    BILITOT 0.4  --    ALKPHOS 97  --    AST 17  --    ALT 13  --    ANIONGAP 13 6*       Significant Imaging: I have reviewed all pertinent imaging results/findings within the past 24 hours.    Assessment/Plan:     * Gastrointestinal hemorrhage with melena  - recent EGD in December showed jejunal ulcer and erythematous mucosa  - no melena since admission  - H/H stable, continue to monitor  - GI consulted and plan for EGD tomorrow  - continue PPI and carafate and pepcid      Continue all home medications for other chronic stable medical problems (med-rec completed with patient).      VTE Risk Mitigation (From admission, onward)           Ordered     Place sequential compression device  Until discontinued         02/26/23 1522     IP VTE LOW RISK PATIENT  Once         02/25/23 2004                       CHANTALE De La RosaC  Department of Hospital Medicine   [facility] - Emergency Dept (Observation)

## 2023-02-27 ENCOUNTER — ANESTHESIA EVENT (OUTPATIENT)
Dept: ENDOSCOPY | Facility: OTHER | Age: 53
DRG: 381 | End: 2023-02-27
Payer: MEDICARE

## 2023-02-27 ENCOUNTER — ANESTHESIA (OUTPATIENT)
Dept: ENDOSCOPY | Facility: OTHER | Age: 53
DRG: 381 | End: 2023-02-27
Payer: MEDICARE

## 2023-02-27 PROBLEM — F17.200 TOBACCO DEPENDENCE: Status: ACTIVE | Noted: 2023-02-27

## 2023-02-27 PROCEDURE — 25000003 PHARM REV CODE 250

## 2023-02-27 PROCEDURE — 96376 TX/PRO/DX INJ SAME DRUG ADON: CPT

## 2023-02-27 PROCEDURE — 25000003 PHARM REV CODE 250: Performed by: HOSPITALIST

## 2023-02-27 PROCEDURE — 63600175 PHARM REV CODE 636 W HCPCS: Performed by: NURSE ANESTHETIST, CERTIFIED REGISTERED

## 2023-02-27 PROCEDURE — 63600175 PHARM REV CODE 636 W HCPCS: Performed by: ANESTHESIOLOGY

## 2023-02-27 PROCEDURE — 37000008 HC ANESTHESIA 1ST 15 MINUTES: Performed by: INTERNAL MEDICINE

## 2023-02-27 PROCEDURE — 63600175 PHARM REV CODE 636 W HCPCS: Performed by: INTERNAL MEDICINE

## 2023-02-27 PROCEDURE — 99233 SBSQ HOSP IP/OBS HIGH 50: CPT | Mod: ,,,

## 2023-02-27 PROCEDURE — 25000003 PHARM REV CODE 250: Performed by: INTERNAL MEDICINE

## 2023-02-27 PROCEDURE — G0378 HOSPITAL OBSERVATION PER HR: HCPCS

## 2023-02-27 PROCEDURE — 43235 EGD DIAGNOSTIC BRUSH WASH: CPT | Performed by: INTERNAL MEDICINE

## 2023-02-27 PROCEDURE — 37000009 HC ANESTHESIA EA ADD 15 MINS: Performed by: INTERNAL MEDICINE

## 2023-02-27 PROCEDURE — 63600175 PHARM REV CODE 636 W HCPCS

## 2023-02-27 PROCEDURE — 99233 PR SUBSEQUENT HOSPITAL CARE,LEVL III: ICD-10-PCS | Mod: ,,,

## 2023-02-27 PROCEDURE — 25000003 PHARM REV CODE 250: Performed by: NURSE ANESTHETIST, CERTIFIED REGISTERED

## 2023-02-27 RX ORDER — HYDROCODONE BITARTRATE AND ACETAMINOPHEN 5; 325 MG/1; MG/1
1 TABLET ORAL EVERY 8 HOURS PRN
Status: CANCELLED | OUTPATIENT
Start: 2023-02-27

## 2023-02-27 RX ORDER — SUMATRIPTAN 50 MG/1
50 TABLET, FILM COATED ORAL ONCE
Status: COMPLETED | OUTPATIENT
Start: 2023-02-27 | End: 2023-02-27

## 2023-02-27 RX ORDER — PROCHLORPERAZINE EDISYLATE 5 MG/ML
2.5 INJECTION INTRAMUSCULAR; INTRAVENOUS EVERY 6 HOURS PRN
Status: DISCONTINUED | OUTPATIENT
Start: 2023-02-27 | End: 2023-03-02 | Stop reason: HOSPADM

## 2023-02-27 RX ORDER — HYDROMORPHONE HYDROCHLORIDE 2 MG/ML
0.4 INJECTION, SOLUTION INTRAMUSCULAR; INTRAVENOUS; SUBCUTANEOUS EVERY 5 MIN PRN
Status: DISCONTINUED | OUTPATIENT
Start: 2023-02-27 | End: 2023-02-28

## 2023-02-27 RX ORDER — ONDANSETRON 2 MG/ML
4 INJECTION INTRAMUSCULAR; INTRAVENOUS EVERY 8 HOURS PRN
Status: DISCONTINUED | OUTPATIENT
Start: 2023-02-27 | End: 2023-03-02 | Stop reason: HOSPADM

## 2023-02-27 RX ORDER — BUPRENORPHINE HCL 150 MCG
150 FILM, MEDICATED (EA) BUCCAL 2 TIMES DAILY
Status: DISCONTINUED | OUTPATIENT
Start: 2023-02-27 | End: 2023-03-02

## 2023-02-27 RX ORDER — MEPERIDINE HYDROCHLORIDE 25 MG/ML
12.5 INJECTION INTRAMUSCULAR; INTRAVENOUS; SUBCUTANEOUS ONCE AS NEEDED
Status: ACTIVE | OUTPATIENT
Start: 2023-02-27 | End: 2023-02-28

## 2023-02-27 RX ORDER — AMOXICILLIN 250 MG
1 CAPSULE ORAL DAILY
Status: DISCONTINUED | OUTPATIENT
Start: 2023-02-28 | End: 2023-03-02 | Stop reason: HOSPADM

## 2023-02-27 RX ORDER — SUCRALFATE 1 G/1
1 TABLET ORAL
Status: DISCONTINUED | OUTPATIENT
Start: 2023-02-27 | End: 2023-02-28

## 2023-02-27 RX ORDER — PHENYLEPHRINE HYDROCHLORIDE 10 MG/ML
INJECTION INTRAVENOUS
Status: DISCONTINUED | OUTPATIENT
Start: 2023-02-27 | End: 2023-02-27

## 2023-02-27 RX ORDER — ONDANSETRON 2 MG/ML
4 INJECTION INTRAMUSCULAR; INTRAVENOUS DAILY PRN
Status: DISCONTINUED | OUTPATIENT
Start: 2023-02-27 | End: 2023-02-27

## 2023-02-27 RX ORDER — PANTOPRAZOLE SODIUM 40 MG/1
40 TABLET, DELAYED RELEASE ORAL 2 TIMES DAILY
Status: DISCONTINUED | OUTPATIENT
Start: 2023-02-27 | End: 2023-03-02 | Stop reason: HOSPADM

## 2023-02-27 RX ORDER — QUETIAPINE FUMARATE 25 MG/1
25 TABLET, FILM COATED ORAL NIGHTLY
Status: DISCONTINUED | OUTPATIENT
Start: 2023-02-28 | End: 2023-02-27

## 2023-02-27 RX ORDER — BISACODYL 10 MG
10 SUPPOSITORY, RECTAL RECTAL ONCE
Status: COMPLETED | OUTPATIENT
Start: 2023-02-27 | End: 2023-02-27

## 2023-02-27 RX ORDER — SODIUM CHLORIDE 0.9 % (FLUSH) 0.9 %
3 SYRINGE (ML) INJECTION
Status: DISCONTINUED | OUTPATIENT
Start: 2023-02-27 | End: 2023-03-02 | Stop reason: HOSPADM

## 2023-02-27 RX ORDER — LIDOCAINE HYDROCHLORIDE 20 MG/ML
INJECTION INTRAVENOUS
Status: DISCONTINUED | OUTPATIENT
Start: 2023-02-27 | End: 2023-02-27

## 2023-02-27 RX ORDER — PROPOFOL 10 MG/ML
VIAL (ML) INTRAVENOUS
Status: DISCONTINUED | OUTPATIENT
Start: 2023-02-27 | End: 2023-02-27

## 2023-02-27 RX ORDER — OXYCODONE HYDROCHLORIDE 5 MG/1
5 TABLET ORAL
Status: DISCONTINUED | OUTPATIENT
Start: 2023-02-27 | End: 2023-02-28

## 2023-02-27 RX ORDER — SUMATRIPTAN 50 MG/1
50 TABLET, FILM COATED ORAL DAILY PRN
Status: DISCONTINUED | OUTPATIENT
Start: 2023-02-28 | End: 2023-03-02 | Stop reason: HOSPADM

## 2023-02-27 RX ORDER — QUETIAPINE FUMARATE 25 MG/1
25 TABLET, FILM COATED ORAL NIGHTLY
Status: DISCONTINUED | OUTPATIENT
Start: 2023-02-27 | End: 2023-03-02 | Stop reason: HOSPADM

## 2023-02-27 RX ADMIN — VENLAFAXINE HYDROCHLORIDE 150 MG: 37.5 CAPSULE, EXTENDED RELEASE ORAL at 09:02

## 2023-02-27 RX ADMIN — DISULFIRAM 250 MG: 250 TABLET ORAL at 09:02

## 2023-02-27 RX ADMIN — PANTOPRAZOLE SODIUM 40 MG: 40 TABLET, DELAYED RELEASE ORAL at 08:02

## 2023-02-27 RX ADMIN — SUCRALFATE 1 G: 1 TABLET ORAL at 04:02

## 2023-02-27 RX ADMIN — ONDANSETRON 4 MG: 2 INJECTION INTRAMUSCULAR; INTRAVENOUS at 01:02

## 2023-02-27 RX ADMIN — CARVEDILOL 3.12 MG: 3.12 TABLET, FILM COATED ORAL at 08:02

## 2023-02-27 RX ADMIN — SUCRALFATE 1 G: 1 TABLET ORAL at 09:02

## 2023-02-27 RX ADMIN — QUETIAPINE FUMARATE 25 MG: 25 TABLET ORAL at 08:02

## 2023-02-27 RX ADMIN — PHENYLEPHRINE HYDROCHLORIDE 100 MCG: 10 INJECTION INTRAVENOUS at 07:02

## 2023-02-27 RX ADMIN — GLYCOPYRROLATE 0.2 MG: 0.2 INJECTION, SOLUTION INTRAMUSCULAR; INTRAVITREAL at 07:02

## 2023-02-27 RX ADMIN — PROPOFOL 100 MG: 10 INJECTION, EMULSION INTRAVENOUS at 07:02

## 2023-02-27 RX ADMIN — MONTELUKAST 10 MG: 10 TABLET, FILM COATED ORAL at 08:02

## 2023-02-27 RX ADMIN — SODIUM CHLORIDE, SODIUM LACTATE, POTASSIUM CHLORIDE, AND CALCIUM CHLORIDE: .6; .31; .03; .02 INJECTION, SOLUTION INTRAVENOUS at 06:02

## 2023-02-27 RX ADMIN — HYDROCODONE BITARTRATE AND ACETAMINOPHEN 1 TABLET: 5; 325 TABLET ORAL at 02:02

## 2023-02-27 RX ADMIN — LIDOCAINE HYDROCHLORIDE 100 MG: 20 INJECTION, SOLUTION INTRAVENOUS at 07:02

## 2023-02-27 RX ADMIN — HYDROCODONE BITARTRATE AND ACETAMINOPHEN 1 TABLET: 5; 325 TABLET ORAL at 12:02

## 2023-02-27 RX ADMIN — SUCRALFATE 1 G: 1 TABLET ORAL at 08:02

## 2023-02-27 RX ADMIN — CETIRIZINE HYDROCHLORIDE 10 MG: 10 TABLET, FILM COATED ORAL at 09:02

## 2023-02-27 RX ADMIN — SUMATRIPTAN SUCCINATE 50 MG: 50 TABLET ORAL at 04:02

## 2023-02-27 RX ADMIN — FOLIC ACID 1 MG: 1 TABLET ORAL at 09:02

## 2023-02-27 RX ADMIN — BISACODYL 10 MG: 10 SUPPOSITORY RECTAL at 04:02

## 2023-02-27 RX ADMIN — VENLAFAXINE HYDROCHLORIDE 75 MG: 37.5 CAPSULE, EXTENDED RELEASE ORAL at 09:02

## 2023-02-27 RX ADMIN — METHOCARBAMOL 750 MG: 750 TABLET ORAL at 11:02

## 2023-02-27 RX ADMIN — ONDANSETRON 4 MG: 2 INJECTION INTRAMUSCULAR; INTRAVENOUS at 09:02

## 2023-02-27 RX ADMIN — LIDOCAINE 1 PATCH: 50 PATCH CUTANEOUS at 04:02

## 2023-02-27 RX ADMIN — HYDROCODONE BITARTRATE AND ACETAMINOPHEN 1 TABLET: 5; 325 TABLET ORAL at 08:02

## 2023-02-27 RX ADMIN — PANTOPRAZOLE SODIUM 40 MG: 40 TABLET, DELAYED RELEASE ORAL at 09:02

## 2023-02-27 RX ADMIN — DIAZEPAM 5 MG: 5 TABLET ORAL at 11:02

## 2023-02-27 NOTE — HOSPITAL COURSE
Daksha Marie is a 52 y.o. female, with a PMHx of HTN, HFpEF, EtOH abuse, CKD3, GERD, previous CVA who presented to the ED via EMS for evaluation of dark bloody diarrhea for 1 day and decreased appetite for 3-4 days with generalized abdominal pain. Hgb stable. GI was consulted, EGD was performed on 2/27/23 with findings of normal esophagus and normal jejunum, 1 cm linear anastomotic ulceration with associated marginal friability and some narrowing with known Haylee En Y bypass. No visible vessel or stigmata of recent bleeding. Plan for follow-up with bariatric surgery to for surveillance EGD in 8 weeks with Bid pantoprazole, QID carafate and to stop smoking. No further melena. Continues to vomit, not tolerating PO. Will make NPO, trial bentyl and reglan, improving. Tolerating PO, stable for dc with GI and bariatric f/u. Return precautions discussed, no further questions at dc. Bentyl, zofran, protonix at dc

## 2023-02-27 NOTE — ANESTHESIA PREPROCEDURE EVALUATION
02/27/2023  Daksha Marie is a 52 y.o., female.      Pre-op Assessment    I have reviewed the Patient Summary Reports.     I have reviewed the Nursing Notes. I have reviewed the NPO Status.   I have reviewed the Medications.     Review of Systems  Anesthesia Hx:  Denies Family Hx of Anesthesia complications.   Denies Personal Hx of Anesthesia complications.   Social:  Former Smoker    Hematology/Oncology:     Oncology Normal    -- Anemia: Hematology Comments: H/H 11/34  SS trait    Cardiovascular:   Hypertension CHF Echo:  ? The left ventricle is normal in size with concentric hypertrophy and low normal systolic function.  ? The estimated ejection fraction is 53%.  ? Grade I left ventricular diastolic dysfunction.  ? Normal right ventricular size with normal right ventricular systolic function.   Pulmonary:  Pulmonary Normal    Renal/:   Chronic Renal Disease, CKD    Hepatic/GI:   PUD, GERD    Neurological:   CVA    Endocrine:  Obesity / BMI > 30  Psych:   Psychiatric History          Physical Exam  General: Cooperative, Well nourished, Alert and Oriented    Airway:  Mallampati: II   Mouth Opening: Normal  TM Distance: Normal  Tongue: Normal  Neck ROM: Normal ROM    Dental:  Periodontal disease, Caps / Implants        Anesthesia Plan  Type of Anesthesia, risks & benefits discussed:    Anesthesia Type: Gen Natural Airway  Intra-op Monitoring Plan: Standard ASA Monitors  Post Op Pain Control Plan: multimodal analgesia  Induction:  IV  Informed Consent: Informed consent signed with the Patient and all parties understand the risks and agree with anesthesia plan.  All questions answered.   ASA Score: 3    Ready For Surgery From Anesthesia Perspective.     .

## 2023-02-27 NOTE — ASSESSMENT & PLAN NOTE
Patient is identified as having Combined Systolic and Diastolic heart failure that is Chronic. CHF is currently controlled. Latest ECHO performed and demonstrates- Results for orders placed during the hospital encounter of 09/27/22    Echo    Interpretation Summary  · The left ventricle is normal in size with concentric hypertrophy and low normal systolic function.  · The estimated ejection fraction is 53%.  · Grade I left ventricular diastolic dysfunction.  · Normal right ventricular size with normal right ventricular systolic function.  . Continue Beta Blocker, Furosemide and Aldactone and monitor clinical status closely. Monitor on telemetry. Patient is off CHF pathway.  Monitor strict Is&Os and daily weights.  Place on fluid restriction of 1.5 L. Continue to stress to patient importance of self efficacy and  on diet for CHF. Last BNP reviewed- and noted below No results for input(s): BNP, BNPTRIAGEBLO in the last 168 hours..

## 2023-02-27 NOTE — ASSESSMENT & PLAN NOTE
EGD performed 2/27/23, findings of normal esophagus and normal jejunum, 1 cm linear anastomotic ulceration with associated marginal friability and some narrowing with known Haylee En Y bypass. No visible vessel or stigmata of recent bleeding.   - Plan for follow-up with bariatric surgery to for surveillance EGD in 8 weeks with Bid pantoprazole, QID carafate and to stop smoking.

## 2023-02-27 NOTE — ASSESSMENT & PLAN NOTE
Body mass index is 36.61 kg/m². Morbid obesity complicates all aspects of disease management from diagnostic modalities to treatment. Weight loss encouraged and health benefits explained to patient.

## 2023-02-27 NOTE — ANESTHESIA POSTPROCEDURE EVALUATION
Anesthesia Post Evaluation    Patient: Daksha Marie    Procedure(s) Performed: Procedure(s) (LRB):  EGD (ESOPHAGOGASTRODUODENOSCOPY) (N/A)    Final Anesthesia Type: general      Patient location during evaluation: PACU  Patient participation: Yes- Able to Participate  Level of consciousness: awake and alert  Post-procedure vital signs: reviewed and stable  Pain management: adequate  Airway patency: patent    PONV status at discharge: No PONV  Anesthetic complications: no      Cardiovascular status: hemodynamically stable  Respiratory status: unassisted  Hydration status: euvolemic  Follow-up not needed.          Vitals Value Taken Time   /61 02/27/23 0938   Temp 36.7 °C (98 °F) 02/27/23 0938   Pulse 63 02/27/23 0938   Resp 16 02/27/23 0938   SpO2 98 % 02/27/23 0938         Event Time   Out of Recovery 02/27/2023 08:32:00         Pain/Batsheva Score: Pain Rating Prior to Med Admin: 10 (2/27/2023  2:00 AM)  Pain Rating Post Med Admin: 4 (2/27/2023  3:00 AM)  Batsheva Score: 8 (2/27/2023  7:56 AM)

## 2023-02-27 NOTE — PLAN OF CARE
EGD today.  See Provation for full report.    Findings:  -Normal esophagus  -Anatomy c/w known Haylee En Y bypass with a 1 cm linear anastomotic ulceration with associated marginal friability and some narrowing with scope able to traverse anastomosis with ease.  No visible vessel or stigmata of recent bleeding.  -normal jejunum    Plan:  -BID Pantoprazole  -Carafate QID  -Stressed importance of smoking cessation  -Follow up with bariatric surgery to for surveillance EGD in 8 weeks.   -Will sign off, re consult as needed.

## 2023-02-27 NOTE — OR NURSING
Pt continues without c/o pain at this time. No change from previous assessment. Prepared for tra inpt room 367. Report to Jenn SAHU. Pt placed on transport.

## 2023-02-27 NOTE — PROGRESS NOTES
"[facility] Surg [address] Medicine  Progress Note    Patient Name: Daksha Marie  MRN: [id number]  Patient Class: OP- Observation   Admission Date: 2/25/2023  Length of Stay: 0 days  Attending Physician: Aure Millard MD  Primary Care Provider: Caridad Padilla MD        Subjective:     Principal Problem:Gastrointestinal hemorrhage with melena        HPI:  Daksha Marie is a 52 y.o. female, with a PMHx of HTN, HFpEF, EtOH abuse, CKD3, GERD, previous CVA who presented to the ED via EMS for evaluation of dark bloody diarrhea onset a few hours PTA. Pt reports decreased appetite ongoing for the last 3-4 days due to nausea and states she has not eaten or been compliant with her medications in 4 days.  She reports associated generalized abdominal pain. Of note, she is currently being followed by GI for evaluation of ulcers and has not taken her Pepcid, carafate, or Prilosec in several days. Endorses 3 episodes of diarrhea today at 10, 1p, and 3p which she states was black and oily "and it really freaked me out," prompting her to the ED. She states she is a regular smoker. She denies recent EtOH use. In the ED, patient was found to have scant black stool in rectal vault, guaiac positive.  Started on IV Protonix and IV fluids and admitted to EDOU for observation.  Hgb dropped slightly from 12>>11, but no repeat episodes of melena overnight.  She was evaluated by GI who recommends clear liquids and will do an EGD in the AM.  Patient transferred to hospital medicine for further care.       Overview/Hospital Course:  Daksha Marie is a 52 y.o. female, with a PMHx of HTN, HFpEF, EtOH abuse, CKD3, GERD, previous CVA who presented to the ED via EMS for evaluation of dark bloody diarrhea for 1 day and decreased appetite for 3-4 days with generalized abdominal pain. Hgb stable. GI was consulted, EGD was performed on 2/27/23 with findings of normal esophagus and normal jejunum, 1 cm " linear anastomotic ulceration with associated marginal friability and some narrowing with known Haylee En Y bypass. No visible vessel or stigmata of recent bleeding. Plan for follow-up with bariatric surgery to for surveillance EGD in 8 weeks with Bid pantoprazole, QID carafate and to stop smoking. No further melena. Planned for discharge today but patient with significant nausea and epigastric pain unrelieved with current regimen. Will hold off discharge.       Interval History: Post EGD today, complaining of pain 7/10 to epigastrium. Advised to stop smoking, which she said she has stopped for 2 weeks and will continue to do so. Advised on keeping to carafate QID and pantoprazole BID, and to avoid caffeine and spicy foods. No more further melena, Hgb stable. Patient with significant nausea and epigastric pain unrelieved with current regimen.   Review of Systems   Constitutional:  Positive for fatigue (sleepy). Negative for chills and fever.   HENT:  Negative for congestion and sore throat.    Eyes:  Negative for pain and redness.   Respiratory:  Negative for cough and shortness of breath.    Cardiovascular:  Negative for chest pain and leg swelling.   Gastrointestinal:  Positive for abdominal pain (7/10, epigastrium) and nausea. Negative for constipation, diarrhea and vomiting.   Genitourinary:  Negative for difficulty urinating and dysuria.   Musculoskeletal:  Negative for back pain and gait problem.   Skin:  Negative for rash and wound.   Neurological:  Negative for weakness and headaches.   Psychiatric/Behavioral:  Negative for agitation and behavioral problems.    Objective:     Vital Signs (Most Recent):  Temp: 97.8 °F (36.6 °C) (02/27/23 1154)  Pulse: 71 (02/27/23 1154)  Resp: 18 (02/27/23 1154)  BP: 117/64 (02/27/23 1154)  SpO2: 98 % (02/27/23 1154) Vital Signs (24h Range):  Temp:  [96.3 °F (35.7 °C)-98.4 °F (36.9 °C)] 97.8 °F (36.6 °C)  Pulse:  [61-73] 71  Resp:  [16-18] 18  SpO2:  [96 %-100 %] 98 %  BP:  ()/(55-80) 117/64     Weight: 99.8 kg (220 lb)  Body mass index is 36.61 kg/m².    Intake/Output Summary (Last 24 hours) at 2/27/2023 1203  Last data filed at 2/27/2023 0806  Gross per 24 hour   Intake 800 ml   Output 0 ml   Net 800 ml      Physical Exam  Vitals and nursing note reviewed.   Constitutional:       General: She is not in acute distress.     Appearance: She is obese.   HENT:      Head: Normocephalic and atraumatic.   Eyes:      General:         Left eye: No discharge.   Cardiovascular:      Rate and Rhythm: Normal rate and regular rhythm.   Pulmonary:      Effort: Pulmonary effort is normal.      Breath sounds: Normal breath sounds.   Abdominal:      Palpations: Abdomen is soft.      Tenderness: There is abdominal tenderness (epigastrium, tenderness on palpation).   Musculoskeletal:      Right lower leg: No edema.      Left lower leg: No edema.   Neurological:      General: No focal deficit present.      Mental Status: She is alert and oriented to person, place, and time. Mental status is at baseline.   Psychiatric:         Mood and Affect: Mood normal.         Behavior: Behavior normal.       Significant Labs: All pertinent labs within the past 24 hours have been reviewed.  BMP:   Recent Labs   Lab 02/26/23  1144   GLU 81      K 4.6   *   CO2 25   BUN 22*   CREATININE 1.3   CALCIUM 9.1     CBC:   Recent Labs   Lab 02/25/23  1753 02/26/23  0015 02/26/23  1144   WBC 6.44 6.34 4.80   HGB 12.5 11.1* 11.2*   HCT 37.8 33.1* 34.1*    158 155     CMP:   Recent Labs   Lab 02/25/23  1753 02/26/23  1144    143   K 4.1 4.6    112*   CO2 18* 25   GLU 86 81   BUN 28* 22*   CREATININE 1.4 1.3   CALCIUM 8.8 9.1   PROT 6.6  --    ALBUMIN 3.5  --    BILITOT 0.4  --    ALKPHOS 97  --    AST 17  --    ALT 13  --    ANIONGAP 13 6*       Significant Imaging: I have reviewed and interpreted all pertinent imaging results/findings within the past 24 hours.      Assessment/Plan:      *  Gastrointestinal hemorrhage with melena  - recent EGD in December showed jejunal ulcer and erythematous mucosa  - no melena since admission  - H/H stable, continue to monitor  - GI consulted and plan for EGD performed 2/27/23, findings of normal esophagus and normal jejunum, 1 cm linear anastomotic ulceration with associated marginal friability and some narrowing with known Haylee En Y bypass. No visible vessel or stigmata of recent bleeding.   - Plan for follow-up with bariatric surgery to for surveillance EGD in 8 weeks with Bid pantoprazole, QID carafate and to stop smoking.       Continue all home medications for other chronic stable medical problems (med-rec completed with patient).    History of Haylee-en-Y gastric bypass  EGD performed 2/27/23, findings of normal esophagus and normal jejunum, 1 cm linear anastomotic ulceration with associated marginal friability and some narrowing with known Haylee En Y bypass. No visible vessel or stigmata of recent bleeding.   - Plan for follow-up with bariatric surgery to for surveillance EGD in 8 weeks with Bid pantoprazole, QID carafate and to stop smoking.       Tobacco dependence  Stopped smoking since 2 weeks ago, motivated to quit     Continue to encourage smoking cessation       Class 1 obesity due to excess calories with serious comorbidity and body mass index (BMI) of 34.0 to 34.9 in adult  Body mass index is 36.61 kg/m². Morbid obesity complicates all aspects of disease management from diagnostic modalities to treatment. Weight loss encouraged and health benefits explained to patient.         Alcohol use disorder, moderate, in sustained remission  In sobriety, continue home dose disulfiram 250mg daily    Generalized anxiety disorder  Hx of GUY, MDD    Resume home meds quetiapine 25mg daily, venlafaxine 225mg daily, buspirone 15mg BID PRN (breakthrough) and diazepam 5mg Q12 PRN      Chronic headache  Chronic. Currently having migraine.     - Resume home dose sumatriptan  50mg daily PRN    Chronic diastolic congestive heart failure  Patient is identified as having Combined Systolic and Diastolic heart failure that is Chronic. CHF is currently controlled. Latest ECHO performed and demonstrates- Results for orders placed during the hospital encounter of 09/27/22    Echo    Interpretation Summary  · The left ventricle is normal in size with concentric hypertrophy and low normal systolic function.  · The estimated ejection fraction is 53%.  · Grade I left ventricular diastolic dysfunction.  · Normal right ventricular size with normal right ventricular systolic function.  . Continue Beta Blocker, Furosemide and Aldactone and monitor clinical status closely. Monitor on telemetry. Patient is off CHF pathway.  Monitor strict Is&Os and daily weights.  Place on fluid restriction of 1.5 L. Continue to stress to patient importance of self efficacy and  on diet for CHF. Last BNP reviewed- and noted below No results for input(s): BNP, BNPTRIAGEBLO in the last 168 hours..    Essential hypertension  Chronic. On home meds amlodipine 10mg daily, carvedilol 3.125mg BID, lisinopril 20mg daily, spironolactone 25mg daily, furosemide 40mg daily     - Holding Amlodipine 10mg and lisinopril 20mg for /60s  - Follow up outpatient       VTE Risk Mitigation (From admission, onward)         Ordered     Place sequential compression device  Until discontinued         02/26/23 1522     IP VTE LOW RISK PATIENT  Once         02/25/23 2004                Discharge Planning   GIANFRANCO:      Code Status: Full Code   Is the patient medically ready for discharge?: Yes    Reason for patient still in hospital (select all that apply): Other (specify) Patient still having nausea and epigastric pain  Discharge Plan A: Home with family          Marilynn Dejesus NP  Department of Hospital Medicine   [facility] ([address])

## 2023-02-27 NOTE — ASSESSMENT & PLAN NOTE
Stopped smoking since 2 weeks ago, motivated to quit     Continue to encourage smoking cessation

## 2023-02-27 NOTE — SUBJECTIVE & OBJECTIVE
Interval History: Post EGD today, complaining of pain 7/10 to epigastrium. Advised to stop smoking, which she said she has stopped for 2 weeks and will continue to do so. Advised on keeping to carafate QID and pantoprazole BID, and to avoid caffeine and spicy foods. No more further melena, Hgb stable. Patient with significant nausea and epigastric pain unrelieved with current regimen.   Review of Systems   Constitutional:  Positive for fatigue (sleepy). Negative for chills and fever.   HENT:  Negative for congestion and sore throat.    Eyes:  Negative for pain and redness.   Respiratory:  Negative for cough and shortness of breath.    Cardiovascular:  Negative for chest pain and leg swelling.   Gastrointestinal:  Positive for abdominal pain (7/10, epigastrium) and nausea. Negative for constipation, diarrhea and vomiting.   Genitourinary:  Negative for difficulty urinating and dysuria.   Musculoskeletal:  Negative for back pain and gait problem.   Skin:  Negative for rash and wound.   Neurological:  Negative for weakness and headaches.   Psychiatric/Behavioral:  Negative for agitation and behavioral problems.    Objective:     Vital Signs (Most Recent):  Temp: 97.8 °F (36.6 °C) (02/27/23 1154)  Pulse: 71 (02/27/23 1154)  Resp: 18 (02/27/23 1154)  BP: 117/64 (02/27/23 1154)  SpO2: 98 % (02/27/23 1154) Vital Signs (24h Range):  Temp:  [96.3 °F (35.7 °C)-98.4 °F (36.9 °C)] 97.8 °F (36.6 °C)  Pulse:  [61-73] 71  Resp:  [16-18] 18  SpO2:  [96 %-100 %] 98 %  BP: ()/(55-80) 117/64     Weight: 99.8 kg (220 lb)  Body mass index is 36.61 kg/m².    Intake/Output Summary (Last 24 hours) at 2/27/2023 1203  Last data filed at 2/27/2023 0806  Gross per 24 hour   Intake 800 ml   Output 0 ml   Net 800 ml      Physical Exam  Vitals and nursing note reviewed.   Constitutional:       General: She is not in acute distress.     Appearance: She is obese.   HENT:      Head: Normocephalic and atraumatic.   Eyes:      General:          Left eye: No discharge.   Cardiovascular:      Rate and Rhythm: Normal rate and regular rhythm.   Pulmonary:      Effort: Pulmonary effort is normal.      Breath sounds: Normal breath sounds.   Abdominal:      Palpations: Abdomen is soft.      Tenderness: There is abdominal tenderness (epigastrium, tenderness on palpation).   Musculoskeletal:      Right lower leg: No edema.      Left lower leg: No edema.   Neurological:      General: No focal deficit present.      Mental Status: She is alert and oriented to person, place, and time. Mental status is at baseline.   Psychiatric:         Mood and Affect: Mood normal.         Behavior: Behavior normal.       Significant Labs: All pertinent labs within the past 24 hours have been reviewed.  BMP:   Recent Labs   Lab 02/26/23  1144   GLU 81      K 4.6   *   CO2 25   BUN 22*   CREATININE 1.3   CALCIUM 9.1     CBC:   Recent Labs   Lab 02/25/23  1753 02/26/23  0015 02/26/23  1144   WBC 6.44 6.34 4.80   HGB 12.5 11.1* 11.2*   HCT 37.8 33.1* 34.1*    158 155     CMP:   Recent Labs   Lab 02/25/23  1753 02/26/23  1144    143   K 4.1 4.6    112*   CO2 18* 25   GLU 86 81   BUN 28* 22*   CREATININE 1.4 1.3   CALCIUM 8.8 9.1   PROT 6.6  --    ALBUMIN 3.5  --    BILITOT 0.4  --    ALKPHOS 97  --    AST 17  --    ALT 13  --    ANIONGAP 13 6*       Significant Imaging: I have reviewed and interpreted all pertinent imaging results/findings within the past 24 hours.

## 2023-02-27 NOTE — PROGRESS NOTES
Pt admitted from ED with GI hemorrhage w/ melena. VSS. Porfirio CL diet. NPO after MN for colonoscopy in AM. R wrist Sl intact. Porfirio Morphine for abd pain. Lidocaine patch applied to back. Anxious @ times.Pleasant.

## 2023-02-27 NOTE — PLAN OF CARE
Problem: Adult Inpatient Plan of Care  Goal: Plan of Care Review  Outcome: Ongoing, Progressing  Goal: Patient-Specific Goal (Individualized)  Outcome: Ongoing, Progressing  Goal: Absence of Hospital-Acquired Illness or Injury  Outcome: Ongoing, Progressing  Goal: Optimal Comfort and Wellbeing  Outcome: Ongoing, Progressing     POC reviewed with patient. All questions and concerns addressed. Fall/safety precautions implemented and maintained. Pain management provided. NPO after MN d/t EGD in am. No acute events noted this shift. Please see flowsheet for full assessment and vitals. Bed locked in lowest position. Side rails up x2. Call bell within reach. Will continue to monitor.

## 2023-02-27 NOTE — [employer] PATIENT INSTRUCTIONS
Discharge Summary/Instructions after an Endoscopic Procedure  Patient Name: Daksha Marie  Patient MRN: [id number]  Patient   YOB: 1970 Monday, February 27, 2023  Yordan Chandler MD  RESTRICTIONS:  During your procedure today, you received medications for sedation.  These   medications may affect your judgment, balance and coordination.  Therefore,   for 24 hours, you have the following restrictions:   - DO NOT drive a car, operate machinery, make legal/financial decisions,   sign important papers or drink alcohol.    ACTIVITY:  Today: no heavy lifting, straining or running due to procedural   sedation/anesthesia.  The following day: return to full activity including work.  DIET:  Eat and drink normally unless instructed otherwise.     TREATMENT FOR COMMON SIDE EFFECTS:  - Mild abdominal pain, nausea, belching, bloating or excessive gas:  rest,   eat lightly and use a heating pad.  - Sore Throat: treat with throat lozenges and/or gargle with warm salt   water.  - Because air was used during the procedure, expelling large amounts of air   from your rectum or belching is normal.  - If a bowel prep was taken, you may not have a bowel movement for 1-3 days.    This is normal.  SYMPTOMS TO WATCH FOR AND REPORT TO YOUR PHYSICIAN:  1. Abdominal pain or bloating, other than gas cramps.  2. Chest pain.  3. Back pain.  4. Signs of infection such as: chills or fever occurring within 24 hours   after the procedure.  5. Rectal bleeding, which would show as bright red, maroon, or black stools.   (A tablespoon of blood from the rectum is not serious, especially if   hemorrhoids are present.)  6. Vomiting.  7. Weakness or dizziness.  GO DIRECTLY TO THE NEAREST EMERGENCY ROOM IF YOU HAVE ANY OF THE FOLLOWING:      Difficulty breathing              Chills and/or fever over 101 F   Persistent vomiting and/or vomiting blood   Severe abdominal pain   Severe chest pain   Black, tarry stools   Bleeding- more than one  tablespoon   Any other symptom or condition that you feel may need urgent attention  Your doctor recommends these additional instructions:  If any biopsies were taken, your doctors clinic will contact you in 1 to 2   weeks with any results.  - Return patient to hospital ford for possible discharge same day.   - Resume previous diet.   - Use Protonix (pantoprazole) 40 mg PO BID.   - Use sucralfate tablets 1 gram PO QID.  For questions, problems or results please call your physician - Yordan Chandler MD at Work:  [phone].  [facility], EMERGENCY ROOM PHONE NUMBER: [phone], [place]   [phone].  IF A COMPLICATION OR EMERGENCY SITUATION ARISES AND YOU ARE UNABLE TO REACH   YOUR PHYSICIAN - GO DIRECTLY TO THE EMERGENCY ROOM.  MD Yordan Benítez MD  2/27/2023 7:56:33 AM  This report has been verified and signed electronically.  Dear patient,  As a result of recent federal legislation (The Federal Cures Act), you may   receive lab or pathology results from your procedure in your [facility]   account before your physician is able to contact you. Your physician or   their representative will relay the results to you with their   recommendations at their soonest availability.  Thank you,  [employer]

## 2023-02-27 NOTE — PLAN OF CARE
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  Relation: Relative    Discharge Plan A: Home with family         [id number] [place], [place] - [address] AT [facility]  [address]  [place] [address]  Phone: [phone] Fax: [phone]      Initial Assessment (most recent)       Adult Discharge Assessment - 02/27/23 1019          Discharge Assessment    Assessment Type Discharge Planning Assessment     Confirmed/corrected address, phone number and insurance Yes     Confirmed Demographics Correct on Facesheet     Source of Information patient     Does patient/caregiver understand observation status Yes     Communicated GIANFRANCO with patient/caregiver Yes     People in Home child(delano), adult     Do you expect to return to your current living situation? Yes     Do you have help at home or someone to help you manage your care at home? Yes     Prior to hospitilization cognitive status: Alert/Oriented     Current cognitive status: Alert/Oriented     Walking or Climbing Stairs ambulation difficulty, requires equipment     Dressing/Bathing bathing difficulty, requires equipment     Home Accessibility wheelchair accessible     Equipment Currently  Used at Home rollator;shower chair;grab bar     Do you currently have service(s) that help you manage your care at home? No     Do you take prescription medications? Yes     Do you have prescription coverage? Yes     Do you have any problems affording any of your prescribed medications? No     Is the patient taking medications as prescribed? yes     Who is going to help you get home at discharge? needs ride     How do you get to doctors appointments? health plan transportation     Are you on dialysis? No     Discharge Plan A Home with family     DME Needed Upon Discharge  none     Discharge Plan discussed with: Patient     Discharge Barriers Identified Transportation

## 2023-02-27 NOTE — ASSESSMENT & PLAN NOTE
Hx of GUY, MDD    Resume home meds quetiapine 25mg daily, venlafaxine 225mg daily, buspirone 15mg BID PRN (breakthrough) and diazepam 5mg Q12 PRN

## 2023-02-27 NOTE — ASSESSMENT & PLAN NOTE
- recent EGD in December showed jejunal ulcer and erythematous mucosa  - no melena since admission  - H/H stable, continue to monitor  - GI consulted and plan for EGD performed 2/27/23, findings of normal esophagus and normal jejunum, 1 cm linear anastomotic ulceration with associated marginal friability and some narrowing with known Haylee En Y bypass. No visible vessel or stigmata of recent bleeding.   - Plan for follow-up with bariatric surgery to for surveillance EGD in 8 weeks with Bid pantoprazole, QID carafate and to stop smoking.       Continue all home medications for other chronic stable medical problems (med-rec completed with patient).

## 2023-02-27 NOTE — OR NURSING
Report received from Rasheeda SAHU, Patient has been NPO since midnight and awake, alert and oriented times 4.

## 2023-02-27 NOTE — ASSESSMENT & PLAN NOTE
Chronic. On home meds amlodipine 10mg daily, carvedilol 3.125mg BID, lisinopril 20mg daily, spironolactone 25mg daily, furosemide 40mg daily     - Holding Amlodipine 10mg and lisinopril 20mg for /60s  - Follow up outpatient

## 2023-02-28 LAB
ALBUMIN SERPL BCP-MCNC: 3.1 G/DL (ref 3.5–5.2)
ALP SERPL-CCNC: 80 U/L (ref 55–135)
ALT SERPL W/O P-5'-P-CCNC: 12 U/L (ref 10–44)
ANION GAP SERPL CALC-SCNC: 5 MMOL/L (ref 8–16)
AST SERPL-CCNC: 14 U/L (ref 10–40)
BASOPHILS # BLD AUTO: 0.02 K/UL (ref 0–0.2)
BASOPHILS NFR BLD: 0.5 % (ref 0–1.9)
BILIRUB SERPL-MCNC: 0.2 MG/DL (ref 0.1–1)
BUN SERPL-MCNC: 17 MG/DL (ref 6–20)
CALCIUM SERPL-MCNC: 8.7 MG/DL (ref 8.7–10.5)
CHLORIDE SERPL-SCNC: 107 MMOL/L (ref 95–110)
CO2 SERPL-SCNC: 24 MMOL/L (ref 23–29)
CREAT SERPL-MCNC: 1.3 MG/DL (ref 0.5–1.4)
DIFFERENTIAL METHOD: ABNORMAL
EOSINOPHIL # BLD AUTO: 0 K/UL (ref 0–0.5)
EOSINOPHIL NFR BLD: 0.8 % (ref 0–8)
ERYTHROCYTE [DISTWIDTH] IN BLOOD BY AUTOMATED COUNT: 12.5 % (ref 11.5–14.5)
EST. GFR  (NO RACE VARIABLE): 50 ML/MIN/1.73 M^2
GLUCOSE SERPL-MCNC: 94 MG/DL (ref 70–110)
HCT VFR BLD AUTO: 31.4 % (ref 37–48.5)
HGB BLD-MCNC: 10.5 G/DL (ref 12–16)
IMM GRANULOCYTES # BLD AUTO: 0 K/UL (ref 0–0.04)
IMM GRANULOCYTES NFR BLD AUTO: 0 % (ref 0–0.5)
LYMPHOCYTES # BLD AUTO: 1.5 K/UL (ref 1–4.8)
LYMPHOCYTES NFR BLD: 40.3 % (ref 18–48)
MCH RBC QN AUTO: 31.3 PG (ref 27–31)
MCHC RBC AUTO-ENTMCNC: 33.4 G/DL (ref 32–36)
MCV RBC AUTO: 94 FL (ref 82–98)
MONOCYTES # BLD AUTO: 0.3 K/UL (ref 0.3–1)
MONOCYTES NFR BLD: 7 % (ref 4–15)
NEUTROPHILS # BLD AUTO: 1.9 K/UL (ref 1.8–7.7)
NEUTROPHILS NFR BLD: 51.4 % (ref 38–73)
NRBC BLD-RTO: 0 /100 WBC
PLATELET # BLD AUTO: 135 K/UL (ref 150–450)
PMV BLD AUTO: 10.2 FL (ref 9.2–12.9)
POTASSIUM SERPL-SCNC: 4.3 MMOL/L (ref 3.5–5.1)
PROT SERPL-MCNC: 6 G/DL (ref 6–8.4)
RBC # BLD AUTO: 3.35 M/UL (ref 4–5.4)
SODIUM SERPL-SCNC: 136 MMOL/L (ref 136–145)
WBC # BLD AUTO: 3.7 K/UL (ref 3.9–12.7)

## 2023-02-28 PROCEDURE — 11000001 HC ACUTE MED/SURG PRIVATE ROOM

## 2023-02-28 PROCEDURE — 25000003 PHARM REV CODE 250: Performed by: INTERNAL MEDICINE

## 2023-02-28 PROCEDURE — 85025 COMPLETE CBC W/AUTO DIFF WBC: CPT | Performed by: PHYSICIAN ASSISTANT

## 2023-02-28 PROCEDURE — 25000003 PHARM REV CODE 250: Performed by: HOSPITALIST

## 2023-02-28 PROCEDURE — 25000003 PHARM REV CODE 250: Performed by: PHYSICIAN ASSISTANT

## 2023-02-28 PROCEDURE — 25000003 PHARM REV CODE 250

## 2023-02-28 PROCEDURE — 99233 SBSQ HOSP IP/OBS HIGH 50: CPT | Mod: ,,, | Performed by: PHYSICIAN ASSISTANT

## 2023-02-28 PROCEDURE — 36415 COLL VENOUS BLD VENIPUNCTURE: CPT | Performed by: PHYSICIAN ASSISTANT

## 2023-02-28 PROCEDURE — 80053 COMPREHEN METABOLIC PANEL: CPT | Performed by: PHYSICIAN ASSISTANT

## 2023-02-28 PROCEDURE — 99233 PR SUBSEQUENT HOSPITAL CARE,LEVL III: ICD-10-PCS | Mod: ,,, | Performed by: PHYSICIAN ASSISTANT

## 2023-02-28 PROCEDURE — 94761 N-INVAS EAR/PLS OXIMETRY MLT: CPT

## 2023-02-28 RX ORDER — SUCRALFATE 1 G/10ML
2 SUSPENSION ORAL
Status: DISCONTINUED | OUTPATIENT
Start: 2023-02-28 | End: 2023-03-02 | Stop reason: HOSPADM

## 2023-02-28 RX ORDER — ONDANSETRON 4 MG/1
4 TABLET, ORALLY DISINTEGRATING ORAL ONCE
Status: COMPLETED | OUTPATIENT
Start: 2023-02-28 | End: 2023-02-28

## 2023-02-28 RX ORDER — DICYCLOMINE HYDROCHLORIDE 10 MG/1
10 CAPSULE ORAL 4 TIMES DAILY
Status: DISCONTINUED | OUTPATIENT
Start: 2023-02-28 | End: 2023-03-02 | Stop reason: HOSPADM

## 2023-02-28 RX ORDER — ONDANSETRON 8 MG/1
8 TABLET, ORALLY DISINTEGRATING ORAL EVERY 8 HOURS PRN
Status: DISCONTINUED | OUTPATIENT
Start: 2023-02-28 | End: 2023-03-02 | Stop reason: HOSPADM

## 2023-02-28 RX ORDER — METOCLOPRAMIDE 10 MG/1
10 TABLET ORAL
Status: DISCONTINUED | OUTPATIENT
Start: 2023-02-28 | End: 2023-03-02 | Stop reason: HOSPADM

## 2023-02-28 RX ADMIN — SPIRONOLACTONE 25 MG: 25 TABLET, FILM COATED ORAL at 09:02

## 2023-02-28 RX ADMIN — DISULFIRAM 250 MG: 250 TABLET ORAL at 09:02

## 2023-02-28 RX ADMIN — SENNOSIDES AND DOCUSATE SODIUM 1 TABLET: 50; 8.6 TABLET ORAL at 09:02

## 2023-02-28 RX ADMIN — QUETIAPINE FUMARATE 25 MG: 25 TABLET ORAL at 09:02

## 2023-02-28 RX ADMIN — METOCLOPRAMIDE 10 MG: 10 TABLET ORAL at 04:02

## 2023-02-28 RX ADMIN — METOCLOPRAMIDE 10 MG: 10 TABLET ORAL at 11:02

## 2023-02-28 RX ADMIN — ONDANSETRON 4 MG: 4 TABLET, ORALLY DISINTEGRATING ORAL at 08:02

## 2023-02-28 RX ADMIN — SUCRALFATE 1 G: 1 TABLET ORAL at 06:02

## 2023-02-28 RX ADMIN — HYDROCODONE BITARTRATE AND ACETAMINOPHEN 1 TABLET: 5; 325 TABLET ORAL at 11:02

## 2023-02-28 RX ADMIN — SUCRALFATE 2 G: 1 SUSPENSION ORAL at 11:02

## 2023-02-28 RX ADMIN — LIDOCAINE 1 PATCH: 50 PATCH CUTANEOUS at 04:02

## 2023-02-28 RX ADMIN — MONTELUKAST 10 MG: 10 TABLET, FILM COATED ORAL at 09:02

## 2023-02-28 RX ADMIN — CARVEDILOL 3.12 MG: 3.12 TABLET, FILM COATED ORAL at 09:02

## 2023-02-28 RX ADMIN — DICYCLOMINE HYDROCHLORIDE 10 MG: 10 CAPSULE ORAL at 09:02

## 2023-02-28 RX ADMIN — PANTOPRAZOLE SODIUM 40 MG: 40 TABLET, DELAYED RELEASE ORAL at 09:02

## 2023-02-28 RX ADMIN — HYDROCODONE BITARTRATE AND ACETAMINOPHEN 1 TABLET: 5; 325 TABLET ORAL at 09:02

## 2023-02-28 RX ADMIN — DICYCLOMINE HYDROCHLORIDE 10 MG: 10 CAPSULE ORAL at 12:02

## 2023-02-28 RX ADMIN — SUCRALFATE 2 G: 1 SUSPENSION ORAL at 09:02

## 2023-02-28 RX ADMIN — HYDROCODONE BITARTRATE AND ACETAMINOPHEN 1 TABLET: 5; 325 TABLET ORAL at 02:02

## 2023-02-28 RX ADMIN — HYDROCODONE BITARTRATE AND ACETAMINOPHEN 1 TABLET: 5; 325 TABLET ORAL at 06:02

## 2023-02-28 RX ADMIN — VENLAFAXINE HYDROCHLORIDE 75 MG: 37.5 CAPSULE, EXTENDED RELEASE ORAL at 09:02

## 2023-02-28 RX ADMIN — FUROSEMIDE 40 MG: 40 TABLET ORAL at 09:02

## 2023-02-28 RX ADMIN — FOLIC ACID 1 MG: 1 TABLET ORAL at 09:02

## 2023-02-28 RX ADMIN — SUCRALFATE 2 G: 1 SUSPENSION ORAL at 04:02

## 2023-02-28 RX ADMIN — VENLAFAXINE HYDROCHLORIDE 150 MG: 37.5 CAPSULE, EXTENDED RELEASE ORAL at 09:02

## 2023-02-28 RX ADMIN — DICYCLOMINE HYDROCHLORIDE 10 MG: 10 CAPSULE ORAL at 04:02

## 2023-02-28 RX ADMIN — DIAZEPAM 5 MG: 5 TABLET ORAL at 12:02

## 2023-02-28 RX ADMIN — CETIRIZINE HYDROCHLORIDE 10 MG: 10 TABLET, FILM COATED ORAL at 09:02

## 2023-02-28 NOTE — SUBJECTIVE & OBJECTIVE
Interval History: Patient seen at bedside, still uncomfortable, breakfast untouched. Reporting nausea. Trial reglan and bentyl, liquid diet. Resuem IV antiemetics once IV is replaced    Review of Systems   Constitutional:  Positive for fatigue (sleepy). Negative for chills and fever.   Respiratory:  Negative for cough and shortness of breath.    Cardiovascular:  Negative for chest pain and leg swelling.   Gastrointestinal:  Positive for abdominal pain (7/10, epigastrium) and nausea. Negative for vomiting.   Genitourinary:  Negative for difficulty urinating and dysuria.   Musculoskeletal:  Negative for back pain.   Skin:  Negative for wound.   Neurological:  Negative for weakness and headaches.   Objective:     Vital Signs (Most Recent):  Temp: 97.6 °F (36.4 °C) (02/28/23 1610)  Pulse: (!) 58 (02/28/23 1610)  Resp: 18 (02/28/23 1610)  BP: 125/70 (02/28/23 1610)  SpO2: 97 % (02/28/23 1610)   Vital Signs (24h Range):  Temp:  [96.2 °F (35.7 °C)-98 °F (36.7 °C)] 97.6 °F (36.4 °C)  Pulse:  [57-67] 58  Resp:  [15-20] 18  SpO2:  [96 %-99 %] 97 %  BP: (122-161)/(68-89) 125/70     Weight: 99.8 kg (220 lb)  Body mass index is 36.61 kg/m².    Intake/Output Summary (Last 24 hours) at 2/28/2023 1624  Last data filed at 2/27/2023 1650  Gross per 24 hour   Intake 360 ml   Output --   Net 360 ml      Physical Exam  Vitals and nursing note reviewed.   Constitutional:       General: She is not in acute distress.     Appearance: She is ill-appearing.   HENT:      Head: Normocephalic and atraumatic.   Cardiovascular:      Rate and Rhythm: Normal rate and regular rhythm.   Pulmonary:      Effort: Pulmonary effort is normal.   Abdominal:      General: Abdomen is flat.      Palpations: Abdomen is soft.      Tenderness: There is abdominal tenderness (epigastrium, tenderness on palpation).   Musculoskeletal:      Right lower leg: No edema.      Left lower leg: No edema.   Neurological:      General: No focal deficit present.      Mental  Status: She is alert.   Psychiatric:         Mood and Affect: Mood normal.         Behavior: Behavior normal.       Significant Labs: All pertinent labs within the past 24 hours have been reviewed.    Significant Imaging: I have reviewed all pertinent imaging results/findings within the past 24 hours.

## 2023-02-28 NOTE — PROGRESS NOTES
"[facility] Surg [address] Medicine  Progress Note    Patient Name: Daksha Marie  MRN: [id number]  Patient Class: IP- Inpatient   Admission Date: 2/25/2023  Length of Stay: 0 days  Attending Physician: JERRICA Burrell MD  Primary Care Provider: Caridad Padilla MD        Subjective:     Principal Problem:Gastrointestinal hemorrhage with melena        HPI:  Daksha Marie is a 52 y.o. female, with a PMHx of HTN, HFpEF, EtOH abuse, CKD3, GERD, previous CVA who presented to the ED via EMS for evaluation of dark bloody diarrhea onset a few hours PTA. Pt reports decreased appetite ongoing for the last 3-4 days due to nausea and states she has not eaten or been compliant with her medications in 4 days.  She reports associated generalized abdominal pain. Of note, she is currently being followed by GI for evaluation of ulcers and has not taken her Pepcid, carafate, or Prilosec in several days. Endorses 3 episodes of diarrhea today at 10, 1p, and 3p which she states was black and oily "and it really freaked me out," prompting her to the ED. She states she is a regular smoker. She denies recent EtOH use. In the ED, patient was found to have scant black stool in rectal vault, guaiac positive.  Started on IV Protonix and IV fluids and admitted to EDOU for observation.  Hgb dropped slightly from 12>>11, but no repeat episodes of melena overnight.  She was evaluated by GI who recommends clear liquids and will do an EGD in the AM.  Patient transferred to hospital medicine for further care.       Overview/Hospital Course:  Daksha Marie is a 52 y.o. female, with a PMHx of HTN, HFpEF, EtOH abuse, CKD3, GERD, previous CVA who presented to the ED via EMS for evaluation of dark bloody diarrhea for 1 day and decreased appetite for 3-4 days with generalized abdominal pain. Hgb stable. GI was consulted, EGD was performed on 2/27/23 with findings of normal esophagus and normal jejunum, 1 cm " linear anastomotic ulceration with associated marginal friability and some narrowing with known Haylee En Y bypass. No visible vessel or stigmata of recent bleeding. Plan for follow-up with bariatric surgery to for surveillance EGD in 8 weeks with Bid pantoprazole, QID carafate and to stop smoking. No further melena. Continues to vomit, not tolerating PO. Will make NPO, trial bentyl and reglan      Interval History: Patient seen at bedside, still uncomfortable, breakfast untouched. Reporting nausea. Trial reglan and bentyl, liquid diet. Resuem IV antiemetics once IV is replaced    Review of Systems   Constitutional:  Positive for fatigue (sleepy). Negative for chills and fever.   Respiratory:  Negative for cough and shortness of breath.    Cardiovascular:  Negative for chest pain and leg swelling.   Gastrointestinal:  Positive for abdominal pain (7/10, epigastrium) and nausea. Negative for vomiting.   Genitourinary:  Negative for difficulty urinating and dysuria.   Musculoskeletal:  Negative for back pain.   Skin:  Negative for wound.   Neurological:  Negative for weakness and headaches.   Objective:     Vital Signs (Most Recent):  Temp: 97.6 °F (36.4 °C) (02/28/23 1610)  Pulse: (!) 58 (02/28/23 1610)  Resp: 18 (02/28/23 1610)  BP: 125/70 (02/28/23 1610)  SpO2: 97 % (02/28/23 1610)   Vital Signs (24h Range):  Temp:  [96.2 °F (35.7 °C)-98 °F (36.7 °C)] 97.6 °F (36.4 °C)  Pulse:  [57-67] 58  Resp:  [15-20] 18  SpO2:  [96 %-99 %] 97 %  BP: (122-161)/(68-89) 125/70     Weight: 99.8 kg (220 lb)  Body mass index is 36.61 kg/m².    Intake/Output Summary (Last 24 hours) at 2/28/2023 1624  Last data filed at 2/27/2023 1650  Gross per 24 hour   Intake 360 ml   Output --   Net 360 ml      Physical Exam  Vitals and nursing note reviewed.   Constitutional:       General: She is not in acute distress.     Appearance: She is ill-appearing.   HENT:      Head: Normocephalic and atraumatic.   Cardiovascular:      Rate and Rhythm:  Normal rate and regular rhythm.   Pulmonary:      Effort: Pulmonary effort is normal.   Abdominal:      General: Abdomen is flat.      Palpations: Abdomen is soft.      Tenderness: There is abdominal tenderness (epigastrium, tenderness on palpation).   Musculoskeletal:      Right lower leg: No edema.      Left lower leg: No edema.   Neurological:      General: No focal deficit present.      Mental Status: She is alert.   Psychiatric:         Mood and Affect: Mood normal.         Behavior: Behavior normal.       Significant Labs: All pertinent labs within the past 24 hours have been reviewed.    Significant Imaging: I have reviewed all pertinent imaging results/findings within the past 24 hours.      Assessment/Plan:      * Gastrointestinal hemorrhage with melena  - recent EGD in December showed jejunal ulcer and erythematous mucosa  - no melena since admission  - H/H stable, continue to monitor  - GI consulted and plan for EGD performed 2/27/23, findings of normal esophagus and normal jejunum, 1 cm linear anastomotic ulceration with associated marginal friability and some narrowing with known Haylee En Y bypass. No visible vessel or stigmata of recent bleeding.   - Plan for follow-up with bariatric surgery to for surveillance EGD in 8 weeks with Bid pantoprazole, QID carafate and to stop smoking.   - continues to not tolerate PO, nauseated and with abdominal cramping; trial bentyl and reglan, NPO for now, PO trial tomorrow    Continue all home medications for other chronic stable medical problems (med-rec completed with patient).    Tobacco dependence  Stopped smoking since 2 weeks ago, motivated to quit     Continue to encourage smoking cessation       Class 1 obesity due to excess calories with serious comorbidity and body mass index (BMI) of 34.0 to 34.9 in adult  Body mass index is 36.61 kg/m². Morbid obesity complicates all aspects of disease management from diagnostic modalities to treatment. Weight loss  encouraged and health benefits explained to patient.         Alcohol use disorder, moderate, in sustained remission  In sobriety, continue home dose disulfiram 250mg daily    Generalized anxiety disorder  Hx of GUY, MDD    Resume home meds quetiapine 25mg daily, venlafaxine 225mg daily, buspirone 15mg BID PRN (breakthrough) and diazepam 5mg Q12 PRN      Chronic headache  Chronic. Currently having migraine.     - Resume home dose sumatriptan 50mg daily PRN    History of Haylee-en-Y gastric bypass  EGD performed 2/27/23, findings of normal esophagus and normal jejunum, 1 cm linear anastomotic ulceration with associated marginal friability and some narrowing with known Haylee En Y bypass. No visible vessel or stigmata of recent bleeding.   - Plan for follow-up with bariatric surgery to for surveillance EGD in 8 weeks with Bid pantoprazole, QID carafate and to stop smoking.       Chronic diastolic congestive heart failure  Patient is identified as having Combined Systolic and Diastolic heart failure that is Chronic. CHF is currently controlled. Latest ECHO performed and demonstrates- Results for orders placed during the hospital encounter of 09/27/22    Echo    Interpretation Summary  · The left ventricle is normal in size with concentric hypertrophy and low normal systolic function.  · The estimated ejection fraction is 53%.  · Grade I left ventricular diastolic dysfunction.  · Normal right ventricular size with normal right ventricular systolic function.  . Continue Beta Blocker, Furosemide and Aldactone and monitor clinical status closely. Monitor on telemetry. Patient is off CHF pathway.  Monitor strict Is&Os and daily weights.  Place on fluid restriction of 1.5 L. Continue to stress to patient importance of self efficacy and  on diet for CHF. Last BNP reviewed- and noted below No results for input(s): BNP, BNPTRIAGEBLO in the last 168 hours..    Essential hypertension  Chronic. On home meds amlodipine 10mg  daily, carvedilol 3.125mg BID, lisinopril 20mg daily, spironolactone 25mg daily, furosemide 40mg daily     - Holding Amlodipine 10mg and lisinopril 20mg for /60s  - Follow up outpatient       VTE Risk Mitigation (From admission, onward)         Ordered     Place sequential compression device  Until discontinued         02/26/23 1522     IP VTE LOW RISK PATIENT  Once         02/25/23 2004                Discharge Planning   GIANFRANCO: 2/28/2023     Code Status: Full Code   Is the patient medically ready for discharge?: Yes    Reason for patient still in hospital (select all that apply): Patient trending condition  Discharge Plan A: Home with family                  Cynthia Coronado PA-C  Department of Hospital Medicine   [facility] - Med Surg ([address])

## 2023-02-28 NOTE — ASSESSMENT & PLAN NOTE
- recent EGD in December showed jejunal ulcer and erythematous mucosa  - no melena since admission  - H/H stable, continue to monitor  - GI consulted and plan for EGD performed 2/27/23, findings of normal esophagus and normal jejunum, 1 cm linear anastomotic ulceration with associated marginal friability and some narrowing with known Haylee En Y bypass. No visible vessel or stigmata of recent bleeding.   - Plan for follow-up with bariatric surgery to for surveillance EGD in 8 weeks with Bid pantoprazole, QID carafate and to stop smoking.   - continues to not tolerate PO, nauseated and with abdominal cramping; trial bentyl and reglan, NPO for now, PO trial tomorrow    Continue all home medications for other chronic stable medical problems (med-rec completed with patient).

## 2023-02-28 NOTE — PROGRESS NOTES
VSS. BP meds held. R wrist SL intact. Up ad charissa. Showered. Norco ineffective-- Imitrex given. Received Bisacodyl Supp. GI consult-- EGD this morning. Porfirio some diet. Emesis X1- Zofran given. Pleasant.

## 2023-03-01 ENCOUNTER — TELEPHONE (OUTPATIENT)
Dept: BARIATRICS | Facility: CLINIC | Age: 53
End: 2023-03-01
Payer: MEDICARE

## 2023-03-01 PROCEDURE — 99233 PR SUBSEQUENT HOSPITAL CARE,LEVL III: ICD-10-PCS | Mod: ,,, | Performed by: PHYSICIAN ASSISTANT

## 2023-03-01 PROCEDURE — 25000003 PHARM REV CODE 250

## 2023-03-01 PROCEDURE — 25000003 PHARM REV CODE 250: Performed by: INTERNAL MEDICINE

## 2023-03-01 PROCEDURE — 94761 N-INVAS EAR/PLS OXIMETRY MLT: CPT

## 2023-03-01 PROCEDURE — 11000001 HC ACUTE MED/SURG PRIVATE ROOM

## 2023-03-01 PROCEDURE — 63600175 PHARM REV CODE 636 W HCPCS

## 2023-03-01 PROCEDURE — 25000003 PHARM REV CODE 250: Performed by: PHYSICIAN ASSISTANT

## 2023-03-01 PROCEDURE — 99233 SBSQ HOSP IP/OBS HIGH 50: CPT | Mod: ,,, | Performed by: PHYSICIAN ASSISTANT

## 2023-03-01 PROCEDURE — 25000003 PHARM REV CODE 250: Performed by: HOSPITALIST

## 2023-03-01 RX ORDER — MAG HYDROX/ALUMINUM HYD/SIMETH 200-200-20
30 SUSPENSION, ORAL (FINAL DOSE FORM) ORAL ONCE
Status: COMPLETED | OUTPATIENT
Start: 2023-03-01 | End: 2023-03-01

## 2023-03-01 RX ORDER — LIDOCAINE HYDROCHLORIDE 20 MG/ML
15 SOLUTION OROPHARYNGEAL ONCE
Status: COMPLETED | OUTPATIENT
Start: 2023-03-01 | End: 2023-03-01

## 2023-03-01 RX ADMIN — DIAZEPAM 5 MG: 5 TABLET ORAL at 05:03

## 2023-03-01 RX ADMIN — LIDOCAINE HYDROCHLORIDE 15 ML: 20 SOLUTION ORAL; TOPICAL at 11:03

## 2023-03-01 RX ADMIN — DIAZEPAM 5 MG: 5 TABLET ORAL at 09:03

## 2023-03-01 RX ADMIN — DICYCLOMINE HYDROCHLORIDE 10 MG: 10 CAPSULE ORAL at 05:03

## 2023-03-01 RX ADMIN — HYDROCODONE BITARTRATE AND ACETAMINOPHEN 1 TABLET: 5; 325 TABLET ORAL at 10:03

## 2023-03-01 RX ADMIN — METOCLOPRAMIDE 10 MG: 10 TABLET ORAL at 11:03

## 2023-03-01 RX ADMIN — METOCLOPRAMIDE 10 MG: 10 TABLET ORAL at 06:03

## 2023-03-01 RX ADMIN — DICYCLOMINE HYDROCHLORIDE 10 MG: 10 CAPSULE ORAL at 09:03

## 2023-03-01 RX ADMIN — VENLAFAXINE HYDROCHLORIDE 75 MG: 37.5 CAPSULE, EXTENDED RELEASE ORAL at 10:03

## 2023-03-01 RX ADMIN — PROCHLORPERAZINE EDISYLATE 2.5 MG: 5 INJECTION INTRAMUSCULAR; INTRAVENOUS at 07:03

## 2023-03-01 RX ADMIN — ALUMINUM HYDROXIDE, MAGNESIUM HYDROXIDE, AND SIMETHICONE 30 ML: 200; 200; 20 SUSPENSION ORAL at 11:03

## 2023-03-01 RX ADMIN — Medication 6 MG: at 09:03

## 2023-03-01 RX ADMIN — HYDROCODONE BITARTRATE AND ACETAMINOPHEN 1 TABLET: 5; 325 TABLET ORAL at 03:03

## 2023-03-01 RX ADMIN — BUSPIRONE HYDROCHLORIDE 15 MG: 7.5 TABLET ORAL at 10:03

## 2023-03-01 RX ADMIN — ONDANSETRON 4 MG: 2 INJECTION INTRAMUSCULAR; INTRAVENOUS at 04:03

## 2023-03-01 RX ADMIN — METOCLOPRAMIDE 10 MG: 10 TABLET ORAL at 05:03

## 2023-03-01 RX ADMIN — DICYCLOMINE HYDROCHLORIDE 10 MG: 10 CAPSULE ORAL at 01:03

## 2023-03-01 RX ADMIN — SUCRALFATE 2 G: 1 SUSPENSION ORAL at 10:03

## 2023-03-01 RX ADMIN — VENLAFAXINE HYDROCHLORIDE 150 MG: 37.5 CAPSULE, EXTENDED RELEASE ORAL at 10:03

## 2023-03-01 RX ADMIN — SUCRALFATE 2 G: 1 SUSPENSION ORAL at 06:03

## 2023-03-01 RX ADMIN — SUCRALFATE 2 G: 1 SUSPENSION ORAL at 09:03

## 2023-03-01 RX ADMIN — LIDOCAINE 1 PATCH: 50 PATCH CUTANEOUS at 05:03

## 2023-03-01 RX ADMIN — PROCHLORPERAZINE EDISYLATE 2.5 MG: 5 INJECTION INTRAMUSCULAR; INTRAVENOUS at 10:03

## 2023-03-01 RX ADMIN — ONDANSETRON 4 MG: 2 INJECTION INTRAMUSCULAR; INTRAVENOUS at 08:03

## 2023-03-01 RX ADMIN — SUMATRIPTAN SUCCINATE 50 MG: 50 TABLET ORAL at 01:03

## 2023-03-01 RX ADMIN — PANTOPRAZOLE SODIUM 40 MG: 40 TABLET, DELAYED RELEASE ORAL at 09:03

## 2023-03-01 RX ADMIN — QUETIAPINE FUMARATE 25 MG: 25 TABLET ORAL at 09:03

## 2023-03-01 RX ADMIN — DICYCLOMINE HYDROCHLORIDE 10 MG: 10 CAPSULE ORAL at 10:03

## 2023-03-01 RX ADMIN — SUCRALFATE 2 G: 1 SUSPENSION ORAL at 05:03

## 2023-03-01 NOTE — PROGRESS NOTES
"[facility], [place], operated by [facility] Surg [address] Medicine  Progress Note    Patient Name: Daksha Marie  MRN: [id number]  Patient Class: IP- Inpatient   Admission Date: 2/25/2023  Length of Stay: 1 days  Attending Physician: JERRICA Burrell MD  Primary Care Provider: Caridad Padilla MD        Subjective:     Principal Problem:Gastrointestinal hemorrhage with melena        HPI:  Daksha Marie is a 52 y.o. female, with a PMHx of HTN, HFpEF, EtOH abuse, CKD3, GERD, previous CVA who presented to the ED via EMS for evaluation of dark bloody diarrhea onset a few hours PTA. Pt reports decreased appetite ongoing for the last 3-4 days due to nausea and states she has not eaten or been compliant with her medications in 4 days.  She reports associated generalized abdominal pain. Of note, she is currently being followed by GI for evaluation of ulcers and has not taken her Pepcid, carafate, or Prilosec in several days. Endorses 3 episodes of diarrhea today at 10, 1p, and 3p which she states was black and oily "and it really freaked me out," prompting her to the ED. She states she is a regular smoker. She denies recent EtOH use. In the ED, patient was found to have scant black stool in rectal vault, guaiac positive.  Started on IV Protonix and IV fluids and admitted to EDOU for observation.  Hgb dropped slightly from 12>>11, but no repeat episodes of melena overnight.  She was evaluated by GI who recommends clear liquids and will do an EGD in the AM.  Patient transferred to hospital medicine for further care.       Overview/Hospital Course:  Daksha Marie is a 52 y.o. female, with a PMHx of HTN, HFpEF, EtOH abuse, CKD3, GERD, previous CVA who presented to the ED via EMS for evaluation of dark bloody diarrhea for 1 day and decreased appetite for 3-4 days with generalized abdominal pain. Hgb stable. GI was consulted, EGD was performed on 2/27/23 with findings of normal esophagus and normal jejunum, 1 cm " linear anastomotic ulceration with associated marginal friability and some narrowing with known Haylee En Y bypass. No visible vessel or stigmata of recent bleeding. Plan for follow-up with bariatric surgery to for surveillance EGD in 8 weeks with Bid pantoprazole, QID carafate and to stop smoking. No further melena. Continues to vomit, not tolerating PO. Will make NPO, trial bentyl and reglan, improving. PO trial today 3/1      Interval History: Slowly improving, this AM reproted she hadnt vomited since last night. Trial PO today    Review of Systems   Constitutional:  Positive for fatigue (sleepy). Negative for chills and fever.   Respiratory:  Negative for cough and shortness of breath.    Cardiovascular:  Negative for chest pain and leg swelling.   Gastrointestinal:  Positive for abdominal pain (7/10, epigastrium) and nausea. Negative for vomiting.   Genitourinary:  Negative for difficulty urinating and dysuria.   Musculoskeletal:  Negative for back pain.   Skin:  Negative for wound.   Neurological:  Negative for headaches.   Objective:     Vital Signs (Most Recent):  Temp: 98.7 °F (37.1 °C) (03/01/23 1132)  Pulse: 68 (03/01/23 1132)  Resp: 17 (03/01/23 1132)  BP: 108/71 (03/01/23 1132)  SpO2: 95 % (03/01/23 1132) Vital Signs (24h Range):  Temp:  [97.5 °F (36.4 °C)-98.7 °F (37.1 °C)] 98.7 °F (37.1 °C)  Pulse:  [55-68] 68  Resp:  [12-18] 17  SpO2:  [95 %-98 %] 95 %  BP: ()/(52-77) 108/71     Weight: 99.8 kg (220 lb)  Body mass index is 36.61 kg/m².  No intake or output data in the 24 hours ending 03/01/23 1521   Physical Exam  Vitals and nursing note reviewed.   Constitutional:       General: She is not in acute distress.     Appearance: She is ill-appearing.   HENT:      Head: Normocephalic and atraumatic.   Cardiovascular:      Rate and Rhythm: Normal rate and regular rhythm.   Pulmonary:      Effort: Pulmonary effort is normal.   Abdominal:      General: Abdomen is flat.      Palpations: Abdomen is soft.       Tenderness: There is abdominal tenderness (epigastrium, tenderness on palpation).   Musculoskeletal:      Right lower leg: No edema.      Left lower leg: No edema.   Neurological:      General: No focal deficit present.      Mental Status: She is alert.   Psychiatric:         Mood and Affect: Mood normal.         Behavior: Behavior normal.       Significant Labs: All pertinent labs within the past 24 hours have been reviewed.    Significant Imaging: I have reviewed all pertinent imaging results/findings within the past 24 hours.      Assessment/Plan:      * Gastrointestinal hemorrhage with melena  - recent EGD in December showed jejunal ulcer and erythematous mucosa  - no melena since admission  - H/H stable, continue to monitor  - GI consulted and plan for EGD performed 2/27/23, findings of normal esophagus and normal jejunum, 1 cm linear anastomotic ulceration with associated marginal friability and some narrowing with known Haylee En Y bypass. No visible vessel or stigmata of recent bleeding.   - Plan for follow-up with bariatric surgery to for surveillance EGD in 8 weeks with Bid pantoprazole, QID carafate and to stop smoking.   - continues to not tolerate PO, nauseated and with abdominal cramping; trial bentyl and reglan, PO trial today    Continue all home medications for other chronic stable medical problems (med-rec completed with patient).    Tobacco dependence  Stopped smoking since 2 weeks ago, motivated to quit     Continue to encourage smoking cessation       Class 1 obesity due to excess calories with serious comorbidity and body mass index (BMI) of 34.0 to 34.9 in adult  Body mass index is 36.61 kg/m². Morbid obesity complicates all aspects of disease management from diagnostic modalities to treatment. Weight loss encouraged and health benefits explained to patient.         Alcohol use disorder, moderate, in sustained remission  In sobriety, continue home dose disulfiram 250mg daily    Generalized  anxiety disorder  Hx of GUY, MDD    Resume home meds quetiapine 25mg daily, venlafaxine 225mg daily, buspirone 15mg BID PRN (breakthrough) and diazepam 5mg Q12 PRN      Chronic headache  Chronic. Currently having migraine.     - Resume home dose sumatriptan 50mg daily PRN    History of Haylee-en-Y gastric bypass  EGD performed 2/27/23, findings of normal esophagus and normal jejunum, 1 cm linear anastomotic ulceration with associated marginal friability and some narrowing with known Haylee En Y bypass. No visible vessel or stigmata of recent bleeding.   - Plan for follow-up with bariatric surgery to for surveillance EGD in 8 weeks with Bid pantoprazole, QID carafate and to stop smoking.       Chronic diastolic congestive heart failure  Patient is identified as having Combined Systolic and Diastolic heart failure that is Chronic. CHF is currently controlled. Latest ECHO performed and demonstrates- Results for orders placed during the hospital encounter of 09/27/22    Echo    Interpretation Summary  · The left ventricle is normal in size with concentric hypertrophy and low normal systolic function.  · The estimated ejection fraction is 53%.  · Grade I left ventricular diastolic dysfunction.  · Normal right ventricular size with normal right ventricular systolic function.  . Continue Beta Blocker, Furosemide and Aldactone and monitor clinical status closely. Monitor on telemetry. Patient is off CHF pathway.  Monitor strict Is&Os and daily weights.  Place on fluid restriction of 1.5 L. Continue to stress to patient importance of self efficacy and  on diet for CHF. Last BNP reviewed- and noted below No results for input(s): BNP, BNPTRIAGEBLO in the last 168 hours..    Essential hypertension  Chronic. On home meds amlodipine 10mg daily, carvedilol 3.125mg BID, lisinopril 20mg daily, spironolactone 25mg daily, furosemide 40mg daily     - Holding Amlodipine 10mg and lisinopril 20mg for /60s  - Follow up outpatient        VTE Risk Mitigation (From admission, onward)         Ordered     Place sequential compression device  Until discontinued         02/26/23 1522     IP VTE LOW RISK PATIENT  Once         02/25/23 2004                Discharge Planning   GIANFRANCO: 2/28/2023     Code Status: Full Code   Is the patient medically ready for discharge?: Yes    Reason for patient still in hospital (select all that apply): Pending disposition  Discharge Plan A: Home with family                  Cynthia Coronado PA-C  Department of Hospital Medicine   [facility] - Med Surg ([address])

## 2023-03-01 NOTE — PLAN OF CARE
Patient will need PCP & Bariatric/GI follow up at discharge - PCP appt scheduled 3/8 - appt info on AVS - Bariatric/GI appt 3/24 - [facility] will attempt to secure sooner appt

## 2023-03-01 NOTE — TELEPHONE ENCOUNTER
Spoke with phone rep at [facility],MD. Marilynn wanted patient to be seen soon as possible regarding GI/Bleed. Provider wanted patient to be seen by MD. Yenifer, I didn't see any upcoming availability. I scheduled patient with TREY Ley.

## 2023-03-01 NOTE — ASSESSMENT & PLAN NOTE
- recent EGD in December showed jejunal ulcer and erythematous mucosa  - no melena since admission  - H/H stable, continue to monitor  - GI consulted and plan for EGD performed 2/27/23, findings of normal esophagus and normal jejunum, 1 cm linear anastomotic ulceration with associated marginal friability and some narrowing with known Haylee En Y bypass. No visible vessel or stigmata of recent bleeding.   - Plan for follow-up with bariatric surgery to for surveillance EGD in 8 weeks with Bid pantoprazole, QID carafate and to stop smoking.   - continues to not tolerate PO, nauseated and with abdominal cramping; trial bentyl and reglan, PO trial today    Continue all home medications for other chronic stable medical problems (med-rec completed with patient).

## 2023-03-01 NOTE — SUBJECTIVE & OBJECTIVE
Interval History: Slowly improving, this AM reproted she hadnt vomited since last night. Trial PO today    Review of Systems   Constitutional:  Positive for fatigue (sleepy). Negative for chills and fever.   Respiratory:  Negative for cough and shortness of breath.    Cardiovascular:  Negative for chest pain and leg swelling.   Gastrointestinal:  Positive for abdominal pain (7/10, epigastrium) and nausea. Negative for vomiting.   Genitourinary:  Negative for difficulty urinating and dysuria.   Musculoskeletal:  Negative for back pain.   Skin:  Negative for wound.   Neurological:  Negative for headaches.   Objective:     Vital Signs (Most Recent):  Temp: 98.7 °F (37.1 °C) (03/01/23 1132)  Pulse: 68 (03/01/23 1132)  Resp: 17 (03/01/23 1132)  BP: 108/71 (03/01/23 1132)  SpO2: 95 % (03/01/23 1132) Vital Signs (24h Range):  Temp:  [97.5 °F (36.4 °C)-98.7 °F (37.1 °C)] 98.7 °F (37.1 °C)  Pulse:  [55-68] 68  Resp:  [12-18] 17  SpO2:  [95 %-98 %] 95 %  BP: ()/(52-77) 108/71     Weight: 99.8 kg (220 lb)  Body mass index is 36.61 kg/m².  No intake or output data in the 24 hours ending 03/01/23 1521   Physical Exam  Vitals and nursing note reviewed.   Constitutional:       General: She is not in acute distress.     Appearance: She is ill-appearing.   HENT:      Head: Normocephalic and atraumatic.   Cardiovascular:      Rate and Rhythm: Normal rate and regular rhythm.   Pulmonary:      Effort: Pulmonary effort is normal.   Abdominal:      General: Abdomen is flat.      Palpations: Abdomen is soft.      Tenderness: There is abdominal tenderness (epigastrium, tenderness on palpation).   Musculoskeletal:      Right lower leg: No edema.      Left lower leg: No edema.   Neurological:      General: No focal deficit present.      Mental Status: She is alert.   Psychiatric:         Mood and Affect: Mood normal.         Behavior: Behavior normal.       Significant Labs: All pertinent labs within the past 24 hours have been  reviewed.    Significant Imaging: I have reviewed all pertinent imaging results/findings within the past 24 hours.

## 2023-03-02 ENCOUNTER — TELEPHONE (OUTPATIENT)
Dept: BARIATRICS | Facility: CLINIC | Age: 53
End: 2023-03-02
Payer: MEDICARE

## 2023-03-02 VITALS
WEIGHT: 220 LBS | SYSTOLIC BLOOD PRESSURE: 125 MMHG | RESPIRATION RATE: 18 BRPM | TEMPERATURE: 99 F | OXYGEN SATURATION: 97 % | HEIGHT: 65 IN | DIASTOLIC BLOOD PRESSURE: 81 MMHG | BODY MASS INDEX: 36.65 KG/M2 | HEART RATE: 61 BPM

## 2023-03-02 PROCEDURE — 25000003 PHARM REV CODE 250: Performed by: PHYSICIAN ASSISTANT

## 2023-03-02 PROCEDURE — 99239 HOSP IP/OBS DSCHRG MGMT >30: CPT | Mod: ,,, | Performed by: PHYSICIAN ASSISTANT

## 2023-03-02 PROCEDURE — 1111F PR DISCHARGE MEDS RECONCILED W/ CURRENT OUTPATIENT MED LIST: ICD-10-PCS | Mod: CPTII,,, | Performed by: PHYSICIAN ASSISTANT

## 2023-03-02 PROCEDURE — 25000003 PHARM REV CODE 250: Performed by: INTERNAL MEDICINE

## 2023-03-02 PROCEDURE — 25000003 PHARM REV CODE 250

## 2023-03-02 PROCEDURE — 94761 N-INVAS EAR/PLS OXIMETRY MLT: CPT

## 2023-03-02 PROCEDURE — 99239 PR HOSPITAL DISCHARGE DAY,>30 MIN: ICD-10-PCS | Mod: ,,, | Performed by: PHYSICIAN ASSISTANT

## 2023-03-02 PROCEDURE — 63600175 PHARM REV CODE 636 W HCPCS

## 2023-03-02 PROCEDURE — 1111F DSCHRG MED/CURRENT MED MERGE: CPT | Mod: CPTII,,, | Performed by: PHYSICIAN ASSISTANT

## 2023-03-02 RX ORDER — AMOXICILLIN 250 MG
1 CAPSULE ORAL DAILY
Qty: 60 TABLET | Refills: 1 | Status: SHIPPED | OUTPATIENT
Start: 2023-03-03 | End: 2023-12-11 | Stop reason: SDUPTHER

## 2023-03-02 RX ORDER — ONDANSETRON 4 MG/1
4 TABLET, ORALLY DISINTEGRATING ORAL EVERY 8 HOURS PRN
Qty: 20 TABLET | Refills: 0 | Status: SHIPPED | OUTPATIENT
Start: 2023-03-02 | End: 2023-12-11

## 2023-03-02 RX ORDER — PANTOPRAZOLE SODIUM 40 MG/1
40 TABLET, DELAYED RELEASE ORAL 2 TIMES DAILY
Qty: 180 TABLET | Refills: 1 | Status: SHIPPED | OUTPATIENT
Start: 2023-03-02 | End: 2023-12-11 | Stop reason: ALTCHOICE

## 2023-03-02 RX ORDER — DICYCLOMINE HYDROCHLORIDE 10 MG/1
10 CAPSULE ORAL
Qty: 120 CAPSULE | Refills: 0 | Status: SHIPPED | OUTPATIENT
Start: 2023-03-02 | End: 2023-04-01

## 2023-03-02 RX ADMIN — SUCRALFATE 2 G: 1 SUSPENSION ORAL at 05:03

## 2023-03-02 RX ADMIN — VENLAFAXINE HYDROCHLORIDE 75 MG: 37.5 CAPSULE, EXTENDED RELEASE ORAL at 08:03

## 2023-03-02 RX ADMIN — PANTOPRAZOLE SODIUM 40 MG: 40 TABLET, DELAYED RELEASE ORAL at 08:03

## 2023-03-02 RX ADMIN — SUCRALFATE 2 G: 1 SUSPENSION ORAL at 11:03

## 2023-03-02 RX ADMIN — VENLAFAXINE HYDROCHLORIDE 150 MG: 37.5 CAPSULE, EXTENDED RELEASE ORAL at 08:03

## 2023-03-02 RX ADMIN — SENNOSIDES AND DOCUSATE SODIUM 1 TABLET: 50; 8.6 TABLET ORAL at 08:03

## 2023-03-02 RX ADMIN — ONDANSETRON 8 MG: 8 TABLET, ORALLY DISINTEGRATING ORAL at 04:03

## 2023-03-02 RX ADMIN — METOCLOPRAMIDE 10 MG: 10 TABLET ORAL at 05:03

## 2023-03-02 RX ADMIN — DICYCLOMINE HYDROCHLORIDE 10 MG: 10 CAPSULE ORAL at 08:03

## 2023-03-02 RX ADMIN — DICYCLOMINE HYDROCHLORIDE 10 MG: 10 CAPSULE ORAL at 03:03

## 2023-03-02 RX ADMIN — PROCHLORPERAZINE EDISYLATE 2.5 MG: 5 INJECTION INTRAMUSCULAR; INTRAVENOUS at 10:03

## 2023-03-02 RX ADMIN — DIAZEPAM 5 MG: 5 TABLET ORAL at 03:03

## 2023-03-02 RX ADMIN — CETIRIZINE HYDROCHLORIDE 10 MG: 10 TABLET, FILM COATED ORAL at 08:03

## 2023-03-02 RX ADMIN — DISULFIRAM 250 MG: 250 TABLET ORAL at 08:03

## 2023-03-02 RX ADMIN — HYDROCODONE BITARTRATE AND ACETAMINOPHEN 1 TABLET: 5; 325 TABLET ORAL at 04:03

## 2023-03-02 RX ADMIN — METOCLOPRAMIDE 10 MG: 10 TABLET ORAL at 11:03

## 2023-03-02 NOTE — NURSING
PIV d/c'd. All discharge information understood, pharmacy delivered medications. Walked off floor with brother

## 2023-03-02 NOTE — TELEPHONE ENCOUNTER
----- Message from Katy Tapia MA sent at 3/2/2023 11:59 AM CST -----  Contact: [phone]  [facility]  [facility] is calling in regards to rescheduling pt appt. He states pt needs at least 3 business days to plan for transportation. Leonard states to contact the pt at [phone].

## 2023-03-02 NOTE — PLAN OF CARE
Problem: Adult Inpatient Plan of Care  Goal: Plan of Care Review  Outcome: Ongoing, Progressing  Goal: Optimal Comfort and Wellbeing  Outcome: Ongoing, Progressing     Problem: Fall Injury Risk  Goal: Absence of Fall and Fall-Related Injury  Outcome: Ongoing, Progressing     Problem: Pain Acute  Goal: Acceptable Pain Control and Functional Ability  Outcome: Ongoing, Progressing     Problem: Nausea and Vomiting  Goal: Fluid and Electrolyte Balance  Outcome: Ongoing, Progressing

## 2023-03-02 NOTE — PLAN OF CARE
"Patient now states "I won't be able to make it to my appt Monday because my transportation requires 3 business days to schedule rides - attempted to assist patient with scheduling transportation & soonest availability if scheduled today is 3/7 - contacted [facility] in attempts to postpone appt until 3/7 but  had to send message to office who will contact patient with appt date/time - patient updated   "

## 2023-03-02 NOTE — TELEPHONE ENCOUNTER
Returned pt's call in regard to needing to reschedule consult appt with Roual Ley NP, due to transportation. Pt has been successfully rescheduled to 3/24, due to having consult appts with MD Linton and Jennifer Freeman RD. Pt verbalized understanding of new appt time/date and the call was disconnected.

## 2023-03-02 NOTE — PLAN OF CARE
Spoke with patient regarding discharge - follow up appts scheduled 3/6 with Bariatric/GI & 3/8 with PCP - info for both appts added to AVS - meds escribed for bedside delivery - brother will provide transportation home after 5pm - LYFT can be requested from house supv if discharging sooner     [facility] ([address])  Discharge Final Note    Primary Care Provider: Caridad Padilla MD    Expected Discharge Date: 3/2/2023    Final Discharge Note (most recent)       Final Note - 03/02/23 1033          Final Note    Assessment Type Final Discharge Note     Anticipated Discharge Disposition Home or Self Care     What phone number can be called within the next 1-3 days to see how you are doing after discharge? [phone]     Hospital Resources/Appts/Education Provided Provided patient/caregiver with written discharge plan information;Appointments scheduled and added to AVS        Post-Acute Status    Discharge Delays Personal Transportation

## 2023-03-02 NOTE — PLAN OF CARE
Problem: Adult Inpatient Plan of Care  Goal: Plan of Care Review  Outcome: Met  Goal: Patient-Specific Goal (Individualized)  Outcome: Met  Goal: Absence of Hospital-Acquired Illness or Injury  Outcome: Met  Goal: Optimal Comfort and Wellbeing  Outcome: Met  Goal: Readiness for Transition of Care  Outcome: Met     Problem: Fall Injury Risk  Goal: Absence of Fall and Fall-Related Injury  Outcome: Met     Problem: Pain Acute  Goal: Acceptable Pain Control and Functional Ability  Outcome: Met     Problem: Nausea and Vomiting  Goal: Fluid and Electrolyte Balance  Outcome: Met

## 2023-03-03 ENCOUNTER — PATIENT OUTREACH (OUTPATIENT)
Dept: ADMINISTRATIVE | Facility: CLINIC | Age: 53
End: 2023-03-03
Payer: MEDICARE

## 2023-03-03 NOTE — PROGRESS NOTES
C3 nurse attempted to contact Daksha Marie for a TCC post hospital discharge follow up call. No answer. The patient does not have a scheduled HOSFU appointment, and the pt does not have an [facility] PCP.

## 2023-03-04 NOTE — DISCHARGE SUMMARY
"[facility] Surg ([address] Medicine  Discharge Summary      Patient Name: Daksha Marie  MRN: [id number]  WENDIE: [id number]  Patient Class: IP- Inpatient  Admission Date: 2/25/2023  Hospital Length of Stay: 2 days  Discharge Date and Time: 3/2/2023  4:25 PM  Attending Physician: No att. providers found   Discharging Provider: Cynthia Coronado PA-C  Primary Care Provider: Caridad Padilla MD    Primary Care Team: Networked reference to record PCT     HPI:   Daksha Marie is a 52 y.o. female, with a PMHx of HTN, HFpEF, EtOH abuse, CKD3, GERD, previous CVA who presented to the ED via EMS for evaluation of dark bloody diarrhea onset a few hours PTA. Pt reports decreased appetite ongoing for the last 3-4 days due to nausea and states she has not eaten or been compliant with her medications in 4 days.  She reports associated generalized abdominal pain. Of note, she is currently being followed by GI for evaluation of ulcers and has not taken her Pepcid, carafate, or Prilosec in several days. Endorses 3 episodes of diarrhea today at 10, 1p, and 3p which she states was black and oily "and it really freaked me out," prompting her to the ED. She states she is a regular smoker. She denies recent EtOH use. In the ED, patient was found to have scant black stool in rectal vault, guaiac positive.  Started on IV Protonix and IV fluids and admitted to EDOU for observation.  Hgb dropped slightly from 12>>11, but no repeat episodes of melena overnight.  She was evaluated by GI who recommends clear liquids and will do an EGD in the AM.  Patient transferred to hospital medicine for further care.       Procedure(s) (LRB):  EGD (ESOPHAGOGASTRODUODENOSCOPY) (N/A)      Hospital Course:   Daksha Marie is a 52 y.o. female, with a PMHx of HTN, HFpEF, EtOH abuse, CKD3, GERD, previous CVA who presented to the ED via EMS for evaluation of dark bloody diarrhea for 1 day and decreased appetite for 3-4 " days with generalized abdominal pain. Hgb stable. GI was consulted, EGD was performed on 2/27/23 with findings of normal esophagus and normal jejunum, 1 cm linear anastomotic ulceration with associated marginal friability and some narrowing with known Haylee En Y bypass. No visible vessel or stigmata of recent bleeding. Plan for follow-up with bariatric surgery to for surveillance EGD in 8 weeks with Bid pantoprazole, QID carafate and to stop smoking. No further melena. Continues to vomit, not tolerating PO. Will make NPO, trial bentyl and reglan, improving. Tolerating PO, stable for dc with GI and bariatric f/u. Return precautions discussed, no further questions at dc. Bentyl, zofran, protonix at dc       Goals of Care Treatment Preferences:  Code Status: Full Code      Consults:     Neuro  Chronic headache  Chronic. Currently having migraine.     - Resume home dose sumatriptan 50mg daily PRN    Cardiac/Vascular  Chronic diastolic congestive heart failure  Patient is identified as having Combined Systolic and Diastolic heart failure that is Chronic. CHF is currently controlled. Latest ECHO performed and demonstrates- Results for orders placed during the hospital encounter of 09/27/22    Echo    Interpretation Summary  · The left ventricle is normal in size with concentric hypertrophy and low normal systolic function.  · The estimated ejection fraction is 53%.  · Grade I left ventricular diastolic dysfunction.  · Normal right ventricular size with normal right ventricular systolic function.  . Continue Beta Blocker, Furosemide and Aldactone and monitor clinical status closely. Monitor on telemetry. Patient is off CHF pathway.  Monitor strict Is&Os and daily weights.  Place on fluid restriction of 1.5 L. Continue to stress to patient importance of self efficacy and  on diet for CHF. Last BNP reviewed- and noted below No results for input(s): BNP, BNPTRIAGEBLO in the last 168 hours..    Essential  hypertension  Chronic. On home meds amlodipine 10mg daily, carvedilol 3.125mg BID, lisinopril 20mg daily, spironolactone 25mg daily, furosemide 40mg daily     - Holding Amlodipine 10mg and lisinopril 20mg for /60s  - Follow up outpatient     Endocrine  Class 1 obesity due to excess calories with serious comorbidity and body mass index (BMI) of 34.0 to 34.9 in adult  Body mass index is 36.61 kg/m². Morbid obesity complicates all aspects of disease management from diagnostic modalities to treatment. Weight loss encouraged and health benefits explained to patient.         History of Haylee-en-Y gastric bypass  EGD performed 2/27/23, findings of normal esophagus and normal jejunum, 1 cm linear anastomotic ulceration with associated marginal friability and some narrowing with known Haylee En Y bypass. No visible vessel or stigmata of recent bleeding.   - Plan for follow-up with bariatric surgery to for surveillance EGD in 8 weeks with Bid pantoprazole, QID carafate and to stop smoking.       GI  * Gastrointestinal hemorrhage with melena  - recent EGD in December showed jejunal ulcer and erythematous mucosa  - no melena since admission  - H/H stable, continue to monitor  - GI consulted and plan for EGD performed 2/27/23, findings of normal esophagus and normal jejunum, 1 cm linear anastomotic ulceration with associated marginal friability and some narrowing with known Haylee En Y bypass. No visible vessel or stigmata of recent bleeding.   - Plan for follow-up with bariatric surgery to for surveillance EGD in 8 weeks with Bid pantoprazole, QID carafate and to stop smoking.   - continues to not tolerate PO, nauseated and with abdominal cramping; trial bentyl and reglan, PO trial today: tolerating PO,stable for dc with gi and bariatric fu    Continue all home medications for other chronic stable medical problems (med-rec completed with patient).    Other  Tobacco dependence  Stopped smoking since 2 weeks ago, motivated to  quit     Continue to encourage smoking cessation       Alcohol use disorder, moderate, in sustained remission  In sobriety, continue home dose disulfiram 250mg daily    Generalized anxiety disorder  Hx of GUY, MDD    Resume home meds quetiapine 25mg daily, venlafaxine 225mg daily, buspirone 15mg BID PRN (breakthrough) and diazepam 5mg Q12 PRN        Final Active Diagnoses:    Diagnosis Date Noted POA    PRINCIPAL PROBLEM:  Gastrointestinal hemorrhage with melena [K92.1] 02/26/2023 Yes    Tobacco dependence [F17.200] 02/27/2023 Yes    Class 1 obesity due to excess calories with serious comorbidity and body mass index (BMI) of 34.0 to 34.9 in adult [E66.09, Z68.34] 09/27/2022 Not Applicable    Alcohol use disorder, moderate, in sustained remission [F10.21] 01/13/2019 Yes    Chronic recurrent major depressive disorder [F33.9]  Yes    Generalized anxiety disorder [F41.1] 03/22/2018 Yes    Chronic headache [R51.9, G89.29] 10/22/2017 Yes    History of Haylee-en-Y gastric bypass [Z98.84] 06/24/2015 Not Applicable     Chronic    Chronic diastolic congestive heart failure [I50.32]  Yes     Chronic    Essential hypertension [I10] 08/12/2014 Yes     Chronic      Problems Resolved During this Admission:       Discharged Condition: stable    Disposition: Home or Self Care    Follow Up:    Patient Instructions:      Ambulatory referral/consult to Bariatric Surgery   Standing Status: Future   Referral Priority: Routine Referral Type: Consultation   Referral Reason: Specialty Services Required   Requested Specialty: Bariatrics   Number of Visits Requested: 1     Ambulatory referral/consult to Gastroenterology   Standing Status: Future   Referral Priority: Routine Referral Type: Consultation   Referral Reason: Specialty Services Required   Requested Specialty: Gastroenterology   Number of Visits Requested: 1       Significant Diagnostic Studies: Endoscopy: Gastroscopy: -Normal esophagus  -Anatomy c/w known Haylee En Y bypass  with a 1 cm linear anastomotic ulceration with associated marginal friability and some narrowing with scope able to traverse anastomosis with ease.  No visible vessel or stigmata of recent bleeding.  -normal jejunum    Pending Diagnostic Studies:     None         Medications:  Reconciled Home Medications:      Medication List      START taking these medications    dicyclomine 10 MG capsule  Commonly known as: BENTYL  Take 1 capsule (10 mg total) by mouth 4 (four) times daily before meals and nightly.     ondansetron 4 MG Tbdl  Commonly known as: ZOFRAN-ODT  dissolve 1 tablet (4 mg total) by mouth every 8 (eight) hours as needed.     pantoprazole 40 MG tablet  Commonly known as: PROTONIX  Take 1 tablet (40 mg total) by mouth 2 (two) times daily.     STOOL SOFTENER-LAXATIVE 8.6-50 mg per tablet  Generic drug: senna-docusate 8.6-50 mg  Take 1 tablet by mouth once daily.        CONTINUE taking these medications    amLODIPine 10 MG tablet  Commonly known as: NORVASC  Take 1 tablet (10 mg total) by mouth once daily.     BELBUCA 150 mcg Film  Generic drug: buprenorphine HCL  DISSOLVE 1 FILM TO THE GUM EVERY 12 HOURS     brimonidine-timoloL 0.2-0.5 % Drop  Commonly known as: COMBIGAN  Place 1 drop into both eyes 2 (two) times daily.     busPIRone 15 MG tablet  Commonly known as: BUSPAR  Take 1 tablet (15 mg total) by mouth 2 (two) times daily as needed (breakthrough anxiety).     calcium citrate 250 mg calcium Tab     carvediloL 3.125 MG tablet  Commonly known as: COREG  Take 1 tablet (3.125 mg total) by mouth 2 (two) times daily.     cetirizine 10 MG tablet  Commonly known as: ZYRTEC  Take 1 tablet (10 mg total) by mouth once daily.     cyanocobalamin 1,000 mcg/mL injection  inject  1000mcg ( 1 vial ) as directed  every 28 days ( the 15th of every month )     diazePAM 5 MG tablet  Commonly known as: VALIUM  Take 1 tablet (5 mg total) by mouth every 12 (twelve) hours as needed for Anxiety.     disulfiram 250 mg  tablet  Commonly known as: ANTABUSE  Take 1 tablet (250 mg total) by mouth once daily.     fluticasone propionate 50 mcg/actuation nasal spray  Commonly known as: FLONASE  2 sprays (100 mcg total) by Each Nostril route once daily.     folic acid 1 MG tablet  Commonly known as: FOLVITE  Take 1 tablet (1 mg total) by mouth once daily.     furosemide 40 MG tablet  Commonly known as: LASIX  Take 1 tablet (40 mg total) by mouth once daily.     HYDROcodone-acetaminophen 7.5-325 mg per tablet  Commonly known as: NORCO  TAKE 1 T PO TID PRN FOR 30 DAYS     levocetirizine 5 MG tablet  Commonly known as: XYZAL  Take 0.5 tablets (2.5 mg total) by mouth every evening.     LIDOcaine 5 %  Commonly known as: LIDODERM  Place 1 patch onto the skin every 24 hours. Remove & Discard patch within 12 hours or as directed by MD     lisinopriL 20 MG tablet  Commonly known as: PRINIVIL,ZESTRIL  Take 20 mg by mouth.     methocarbamoL 750 MG Tab  Commonly known as: ROBAXIN  Take 750 mg by mouth 3 (three) times daily.     montelukast 10 mg tablet  Commonly known as: SINGULAIR  Take 1 tablet (10 mg total) by mouth every evening.     multivitamin tablet  Commonly known as: THERAGRAN  Take 1 tablet by mouth once daily.     promethazine 25 MG tablet  Commonly known as: PHENERGAN  Take 1 tablet (25 mg total) by mouth every 6 (six) hours as needed for Nausea.     QUEtiapine 25 MG Tab  Commonly known as: SEROQUEL  Take 1 or 2 tablets by mouth each evening before bed     spironolactone 25 MG tablet  Commonly known as: ALDACTONE  Take 1 tablet (25 mg total) by mouth once daily.     sucralfate 100 mg/mL suspension  Commonly known as: CARAFATE  Take 10 mLs (1 g total) by mouth 4 (four) times daily before meals and nightly.     sumatriptan 50 MG tablet  Commonly known as: IMITREX  Once for severe headache. May repeat once after 2 hours. Do not exceed 3-4 doses in one week.     TRAVATAN Z 0.004 % ophthalmic solution  Generic drug: travoprost  INT 1 GTT IN  OU QD     * venlafaxine 150 MG Cp24  Commonly known as: EFFEXOR-XR  Take 1 capsule (150 mg total) by mouth once daily.     * venlafaxine 75 MG 24 hr capsule  Commonly known as: EFFEXOR-XR  Take 1 capsule (75 mg total) by mouth once daily. Take with Effexor 150 to equal 225 mg daily         * This list has 2 medication(s) that are the same as other medications prescribed for you. Read the directions carefully, and ask your doctor or other care provider to review them with you.            STOP taking these medications    famotidine 40 mg/5 mL (8 mg/mL) suspension  Commonly known as: PEPCID     omeprazole 40 MG capsule  Commonly known as: PRILOSEC            Indwelling Lines/Drains at time of discharge:   Lines/Drains/Airways     None                 Time spent on the discharge of patient: >45 minutes         Cynthia Coronado PA-C  Department of Hospital Medicine  [facility] Surg ([address])

## 2023-03-04 NOTE — ASSESSMENT & PLAN NOTE
- recent EGD in December showed jejunal ulcer and erythematous mucosa  - no melena since admission  - H/H stable, continue to monitor  - GI consulted and plan for EGD performed 2/27/23, findings of normal esophagus and normal jejunum, 1 cm linear anastomotic ulceration with associated marginal friability and some narrowing with known Haylee En Y bypass. No visible vessel or stigmata of recent bleeding.   - Plan for follow-up with bariatric surgery to for surveillance EGD in 8 weeks with Bid pantoprazole, QID carafate and to stop smoking.   - continues to not tolerate PO, nauseated and with abdominal cramping; trial bentyl and reglan, PO trial today: tolerating PO,stable for dc with gi and bariatric fu    Continue all home medications for other chronic stable medical problems (med-rec completed with patient).

## 2023-03-06 NOTE — PROGRESS NOTES
C3 nurse 2nd attempt to contact Daksha Marie for a TCC post hospital discharge follow up call. No answer. No voicemail available.The patient does not have a scheduled HOSFU appointment. The patient does not have an [facility] PCP.

## 2023-03-09 NOTE — PHYSICIAN QUERY
PT Name: Daksha Marie  MR #: [id number]     DOCUMENTATION CLARIFICATION      CDS/: Lauryn Shaffer RN, CDI            Contact information:[email or web address]     This form is a permanent document in the medical record.    Query Date: March 9, 2023    By submitting this query, we are merely seeking further clarification of documentation to reflect the severity of illness of your patient. Please utilize your independent clinical judgment when addressing the question(s) below.     The Medical Record contains the following:   Indicators   Supporting Clinical Findings Location in Medical Record   x Gastrointestinal Ulcer Documented  Anatomy c/w known Haylee En Y bypass with a 1 cm linear anastomotic ulceration with associated marginal friability and some narrowing with scope able to traverse anastomosis with ease.    Gastrointestinal hemorrhage with melena  - recent EGD in December showed jejunal ulcer and erythematous mucosa  Of note, she is currently being followed by GI for evaluation of ulcers and has not taken her Pepcid, carafate, or Prilosec in several days. GI plan of care 2/27          H&P 2/26   x EGD/Colonscopy Findings The examined esophagus was normal.   Evidence of a Haylee-en-Y gastrojejunostomy was found. The gastrojejunal anastomosis was characterized by mild stenosis (easily traversed) and a 1 cm linear clean based ulceration with surrounding   mucosal friability. This was traversed. [Pouch-to-Jejunum Description]. [Jejunojejunal Description]. [Duod-to-Jejunum Examined?] [Duod-to-Jejunum Length] [Duod-to-Jejunum Description]. [Excluded Stomach Examined?] [Excluded Stomach Description].   The examined jejunum was normal.  EGD 2/27    Pathology Findings      Radiology Findings     x Treatment/Medication   - Plan for follow-up with bariatric surgery to for surveillance EGD in 8 weeks with Bid pantoprazole, QID carafate and to stop smoking.  Discharge summary 3/2    Other        Please further  specify the acuity of the  ___anastomotic____ ulcer:    [   ] Acute   [  x ] Chronic   [   ] Unspecified   [   ] Other (please specify): ___________     Please document in your progress notes daily for the duration of treatment until resolved, and include in your discharge summary.  Form No. [id number]

## 2023-03-14 ENCOUNTER — TELEPHONE (OUTPATIENT)
Dept: GASTROENTEROLOGY | Facility: CLINIC | Age: 53
End: 2023-03-14
Payer: MEDICARE

## 2023-03-14 NOTE — TELEPHONE ENCOUNTER
Return call to patient inform patient that her message was forward to Dr. Anderson and will call her back with Dr. Anderson recommendation.     Patient verbalized understanding.

## 2023-03-14 NOTE — TELEPHONE ENCOUNTER
----- Message from Tremontana Chevalier sent at 3/14/2023  9:24 AM CDT -----  Regarding: pt advice  Consult/Advisory    Name Of Caller: rodriguez Crooks      Contact Preference: [phone]    Nature of call: pt clling to state that she recently was in the hosp and had this test done and needing to know if she still needs the test rigoberto with Dr. Ryan on 03/17 for Endoscopy

## 2023-03-15 ENCOUNTER — TELEPHONE (OUTPATIENT)
Dept: GASTROENTEROLOGY | Facility: CLINIC | Age: 53
End: 2023-03-15
Payer: MEDICARE

## 2023-03-15 NOTE — TELEPHONE ENCOUNTER
----- Message from Trey Anderson MD sent at 3/14/2023  5:39 PM CDT -----  Regarding: RE: pt advice  Karan going to defer this question to her surgeon Dr. Cardoza.    Just got looks like her ulcer on recent EGD by 1 of the [facility] GI physicians shows ulcer has not healed yet.    She has an EGD scheduled with me coming up she wants to know what to do I do not think it will be healed by now do have any plans for her  ----- Message -----  From: Karan Martin MA  Sent: 3/14/2023  10:04 AM CDT  To: Trey Anderson MD  Subject: FW: pt advice                                    Dr. Anderson please advise.   ----- Message -----  From: Tremontana Chevalier  Sent: 3/14/2023   9:27 AM CDT  To: Justin GOYAL Staff  Subject: pt advice                                        Consult/Advisory    Name Of Caller: pt Daksha      Contact Preference: [phone]    Nature of call: pt clling to state that she recently was in the hosp and had this test done and needing to know if she still needs the test rigoberto with Dr. Ryan on 03/17 for Endoscopy

## 2023-03-15 NOTE — TELEPHONE ENCOUNTER
Contact and spoke with patient per Dr. Anderson. going to defer this question to her surgeon Dr. Cardoza.     Just got looks like her ulcer on recent EGD by 1 of the [facility] GI physicians shows ulcer has not healed yet.     She has an EGD scheduled with me coming up she wants to know what to do I do not think it will be healed by now do have any plans for her     Patient verbalized understanding.

## 2023-03-17 ENCOUNTER — TELEPHONE (OUTPATIENT)
Dept: ENDOSCOPY | Facility: HOSPITAL | Age: 53
End: 2023-03-17
Payer: MEDICARE

## 2023-03-17 NOTE — TELEPHONE ENCOUNTER
----- Message from Edith Kapoor sent at 3/17/2023  8:50 AM CDT -----  Regarding: appt  Contact: [phone]  PT requesting to cancel her procedure on today. Pt states appt is no longer needed due to receiving procedure while in the hospital. Please call to discuss further.

## 2023-03-28 ENCOUNTER — TELEPHONE (OUTPATIENT)
Dept: SURGERY | Facility: CLINIC | Age: 53
End: 2023-03-28
Payer: MEDICARE

## 2023-03-28 NOTE — TELEPHONE ENCOUNTER
Returned pt phone call in reference to her pain and requesting new medications.  Her family is insisting that we need to fix the hernia and that is the cause of her problems.  Discussed their concerns with Dr. Street and she states that she will not repair the HH at this time.  She needs to quit smoking and take care of her ulcer prior to any HH repair. She will see. Dr. Leos next week and can discuss medications as she is not getting any relief with the Protonix that she is currently taking.     They understood the importance of keeping appointment with Dr. Leos and smoking cessation.

## 2023-03-28 NOTE — TELEPHONE ENCOUNTER
----- Message from Tai Haro sent at 3/28/2023  1:26 PM CDT -----  Regarding: adv  Contact: @[phone]  Pt calling to speak to  in regards to being in a ton of pain the last 3 days pls call and adv@[phone] 2 days without eating and a ton of sleeping unable to do anything

## 2023-03-30 ENCOUNTER — TELEPHONE (OUTPATIENT)
Dept: ADMINISTRATIVE | Facility: OTHER | Age: 53
End: 2023-03-30
Payer: MEDICARE

## 2023-03-30 NOTE — TELEPHONE ENCOUNTER
Left voice message for patient to return call to schedule appointment from referral to .Bariatrics department. Portal message sent.  Ofelia DAMICO [phone]

## 2023-04-10 ENCOUNTER — PATIENT MESSAGE (OUTPATIENT)
Dept: ADMINISTRATIVE | Facility: OTHER | Age: 53
End: 2023-04-10
Payer: MEDICARE

## 2023-04-10 ENCOUNTER — OFFICE VISIT (OUTPATIENT)
Dept: CARDIOLOGY | Facility: CLINIC | Age: 53
End: 2023-04-10
Payer: MEDICARE

## 2023-04-10 DIAGNOSIS — I77.1 STRICTURE OF ARTERY: Primary | ICD-10-CM

## 2023-04-10 DIAGNOSIS — I10 ESSENTIAL HYPERTENSION: Chronic | ICD-10-CM

## 2023-04-10 PROCEDURE — 3044F PR MOST RECENT HEMOGLOBIN A1C LEVEL <7.0%: ICD-10-PCS | Mod: CPTII,95,, | Performed by: INTERNAL MEDICINE

## 2023-04-10 PROCEDURE — 99213 PR OFFICE/OUTPT VISIT, EST, LEVL III, 20-29 MIN: ICD-10-PCS | Mod: 95,,, | Performed by: INTERNAL MEDICINE

## 2023-04-10 PROCEDURE — 3044F HG A1C LEVEL LT 7.0%: CPT | Mod: CPTII,95,, | Performed by: INTERNAL MEDICINE

## 2023-04-10 PROCEDURE — 4010F ACE/ARB THERAPY RXD/TAKEN: CPT | Mod: CPTII,95,, | Performed by: INTERNAL MEDICINE

## 2023-04-10 PROCEDURE — 99213 OFFICE O/P EST LOW 20 MIN: CPT | Mod: 95,,, | Performed by: INTERNAL MEDICINE

## 2023-04-10 PROCEDURE — 4010F PR ACE/ARB THEARPY RXD/TAKEN: ICD-10-PCS | Mod: CPTII,95,, | Performed by: INTERNAL MEDICINE

## 2023-04-10 NOTE — PROGRESS NOTES
The patient location is: home  The chief complaint leading to consultation is: follow up    Visit type: audiovisual    Face to Face time with patient: 15  15 minutes of total time spent on the encounter, which includes face to face time and non-face to face time preparing to see the patient (eg, review of tests), Obtaining and/or reviewing separately obtained history, Documenting clinical information in the electronic or other health record, Independently interpreting results (not separately reported) and communicating results to the patient/family/caregiver, or Care coordination (not separately reported).         Each patient to whom he or she provides medical services by telemedicine is:  (1) informed of the relationship between the physician and patient and the respective role of any other health care provider with respect to management of the patient; and (2) notified that he or she may decline to receive medical services by telemedicine and may withdraw from such care at any time.    Notes:     Patient has been doing well from a cardiac standpoint.  She denies any angina, dyspnea on exertion, paroxysmal nocturnal dyspnea.  She has been suffering with GI issues with her hiatal hernia.  She has been having diarrhea.  She has appointments to follow with the PCP and her GI doctor.  She is not been checking her blood pressure as she does not have a cuff at the moment.  She is tolerating her medications.  Discussed that we will order the hypertension digital medicine program for her to monitor blood pressure at home.  She is in agreement.  Will see her back in 6 months.

## 2023-04-11 ENCOUNTER — OFFICE VISIT (OUTPATIENT)
Dept: PSYCHIATRY | Facility: CLINIC | Age: 53
End: 2023-04-11
Payer: MEDICARE

## 2023-04-11 DIAGNOSIS — F33.41 DEPRESSION, MAJOR, RECURRENT, IN PARTIAL REMISSION: ICD-10-CM

## 2023-04-11 DIAGNOSIS — F43.10 PTSD (POST-TRAUMATIC STRESS DISORDER): Primary | ICD-10-CM

## 2023-04-11 DIAGNOSIS — F41.0 PANIC DISORDER: ICD-10-CM

## 2023-04-11 DIAGNOSIS — G47.00 INSOMNIA DISORDER, WITH NON-SLEEP DISORDER MENTAL COMORBIDITY: ICD-10-CM

## 2023-04-11 DIAGNOSIS — F10.21 ALCOHOL USE DISORDER, MODERATE, IN SUSTAINED REMISSION: ICD-10-CM

## 2023-04-11 PROCEDURE — 3044F HG A1C LEVEL LT 7.0%: CPT | Mod: CPTII,95,, | Performed by: NURSE PRACTITIONER

## 2023-04-11 PROCEDURE — 1160F PR REVIEW ALL MEDS BY PRESCRIBER/CLIN PHARMACIST DOCUMENTED: ICD-10-PCS | Mod: CPTII,95,, | Performed by: NURSE PRACTITIONER

## 2023-04-11 PROCEDURE — 4010F ACE/ARB THERAPY RXD/TAKEN: CPT | Mod: CPTII,95,, | Performed by: NURSE PRACTITIONER

## 2023-04-11 PROCEDURE — 4010F PR ACE/ARB THEARPY RXD/TAKEN: ICD-10-PCS | Mod: CPTII,95,, | Performed by: NURSE PRACTITIONER

## 2023-04-11 PROCEDURE — 3044F PR MOST RECENT HEMOGLOBIN A1C LEVEL <7.0%: ICD-10-PCS | Mod: CPTII,95,, | Performed by: NURSE PRACTITIONER

## 2023-04-11 PROCEDURE — 1159F MED LIST DOCD IN RCRD: CPT | Mod: CPTII,95,, | Performed by: NURSE PRACTITIONER

## 2023-04-11 PROCEDURE — 1160F RVW MEDS BY RX/DR IN RCRD: CPT | Mod: CPTII,95,, | Performed by: NURSE PRACTITIONER

## 2023-04-11 PROCEDURE — 99214 OFFICE O/P EST MOD 30 MIN: CPT | Mod: 95,,, | Performed by: NURSE PRACTITIONER

## 2023-04-11 PROCEDURE — 99214 PR OFFICE/OUTPT VISIT, EST, LEVL IV, 30-39 MIN: ICD-10-PCS | Mod: 95,,, | Performed by: NURSE PRACTITIONER

## 2023-04-11 PROCEDURE — 1159F PR MEDICATION LIST DOCUMENTED IN MEDICAL RECORD: ICD-10-PCS | Mod: CPTII,95,, | Performed by: NURSE PRACTITIONER

## 2023-04-11 RX ORDER — BUSPIRONE HYDROCHLORIDE 30 MG/1
30 TABLET ORAL 2 TIMES DAILY PRN
Qty: 60 TABLET | Refills: 5 | Status: SHIPPED | OUTPATIENT
Start: 2023-04-11 | End: 2023-08-16 | Stop reason: SDUPTHER

## 2023-04-11 RX ORDER — PRAZOSIN HYDROCHLORIDE 1 MG/1
1 CAPSULE ORAL NIGHTLY
Qty: 30 CAPSULE | Refills: 5 | Status: SHIPPED | OUTPATIENT
Start: 2023-04-11 | End: 2023-06-08 | Stop reason: SDUPTHER

## 2023-04-11 NOTE — PROGRESS NOTES
Outpatient Psychiatry Follow-Up Visit (MD/NP)    4/11/2023    Clinical Status of Patient:  Outpatient (Ambulatory)    Chief Complaint:  Daksha Marie is a 52 y.o. female who presents today for follow-up of anxiety and PTSD .  Met with patient.      Last visit was: 92/16/23 Chart and  reviewed.   The patient location is: home  The chief complaint leading to consultation is: depression and anxiety    Visit type: audiovisual    Face to Face time with patient: 30 minutes  35 minutes of total time spent on the encounter, which includes face to face time and non-face to face time preparing to see the patient (eg, review of tests), Obtaining and/or reviewing separately obtained history, Documenting clinical information in the electronic or other health record, Independently interpreting results (not separately reported) and communicating results to the patient/family/caregiver, or Care coordination (not separately reported).     Each patient to whom he or she provides medical services by telemedicine is:  (1) informed of the relationship between the physician and patient and the respective role of any other health care provider with respect to management of the patient; and (2) notified that he or she may decline to receive medical services by telemedicine and may withdraw from such care at any time.    Interval History and Content of Current Session:  Current Psychiatric Medications/changes  Increase to Effexor  mg po daily  Continue Antabuse 250 mg po daily  Continue Valium 5 mg po BID PRN   Continue Seroquel 25-50 mg before bed  Continue Buspar 15 gm BID  Continue with psychotherapy    Virtual Visit:  Pt presents blunted affect and dysthymic mood. Reports nightmares consistently that wake her out of sleep. Complains of incereased aniety and depression. Failed to respond to current medication changes. Remains sober on Antabuse. Will add Prazosin for nightmares and increase Buspar for anxiety. Denies  SI/HI/AVH    Psychotherapy:  Target symptoms: anxiety   Why chosen therapy is appropriate versus another modality: relevant to diagnosis  Outcome monitoring methods: self-report  Therapeutic intervention type: insight oriented psychotherapy  Topics discussed/themes: building skills sets for symptom management, symptom recognition  The patient's response to the intervention is accepting. The patient's progress toward treatment goals is good.   Duration of intervention: 18 minutes.    Review of Systems   PSYCHIATRIC: Pertinant items are noted in the narrative.  CONSTITUTIONAL: No weight gain or loss.   MUSCULOSKELETAL: No pain or stiffness of the joints.  NEUROLOGIC: No weakness, sensory changes, seizures, confusion, memory loss, tremor or other abnormal movements.  ENDOCRINE: No polydipsia or polyuria.  INTEGUMENTARY: No rashes or lacerations.  EYES: No exophthalmos, jaundice or blindness.  ENT: No dizziness, tinnitus or hearing loss.  RESPIRATORY: No shortness of breath.  CARDIOVASCULAR: No tachycardia or chest pain.  GASTROINTESTINAL: No nausea, vomiting, pain, constipation or diarrhea.  GENITOURINARY: No frequency, dysuria or sexual dysfunction.  HEMATOLOGIC/LYMPHATIC: No excessive bleeding, prolonged or excessive bleeding after dental extraction/injury.  ALLERGIC/IMMUNOLOGIC: No allergic response to materials, foods or animals at this time.    Past Medical, Family and Social History: The patient's past medical, family and social history have been reviewed and updated as appropriate within the electronic medical record - see encounter notes.    Compliance: yes    Side effects: None    Risk Parameters:  Patient reports no suicidal ideation  Patient reports no homicidal ideation  Patient reports no self-injurious behavior  Patient reports no violent behavior    Exam (detailed: at least 9 elements; comprehensive: all 15 elements)   Constitutional  Vitals:  Most recent vital signs, dated greater than 90 days prior to  this appointment, were reviewed.   There were no vitals filed for this visit.     General:  unremarkable, age appropriate     Musculoskeletal  Muscle Strength/Tone:  no tremor, no tic   Gait & Station:  non-ataxic     Psychiatric  Speech:  no latency; no press   Mood & Affect:  dysthymic  congruent and appropriate   Thought Process:  normal and logical   Associations:  intact   Thought Content:  normal, no suicidality, no homicidality, delusions, or paranoia   Insight:  intact   Judgement: behavior is adequate to circumstances   Orientation:  grossly intact   Memory: intact for content of interview   Language: grossly intact   Attention Span & Concentration:  able to focus   Fund of Knowledge:  intact and appropriate to age and level of education     Assessment and Diagnosis   Status/Progress: Based on the examination today, the patient's problem(s) is/are inadequately controlled.  New problems have not been presented today.   Co-morbidities and Lack of compliance are not complicating management of the primary condition.  There are no active rule-out diagnoses for this patient at this time.     General Impression:       ICD-10-CM ICD-9-CM   1. PTSD (post-traumatic stress disorder)  F43.10 309.81   2. Panic disorder  F41.0 300.01   3. Depression, major, recurrent, in partial remission  F33.41 296.35   4. Insomnia disorder, with non-sleep disorder mental comorbidity  G47.00 780.52   5. Alcohol use disorder, moderate, in sustained remission  F10.21 303.93       Intervention/Counseling/Treatment Plan   Medication Management: Continue current medications. The risks and benefits of medication were discussed with the patient.  AA/NA/CA/ACOA/Abstinence   Continue Effexor  mg po daily  Continue Antabuse 250 mg po daily  Continue Valium 5 mg po BID PRN   Continue Seroquel 25-50 mg before bed  Increase to Buspar 30 mg BID PRN anxiety  Start Prazosin 1 mg at bedtime for nightmares  Continue with psychotherapy    Return to  Clinic: 6 months     Risks, benefits, side effects and alternative treatments discussed with patient. Patient agrees with the current plan as documented.  Encouraged Patient to keep future appointments.  Take medications as prescribed and abstain from substance abuse.  Pt to present to ED for thoughts to harm herself or others

## 2023-04-14 ENCOUNTER — HOSPITAL ENCOUNTER (EMERGENCY)
Facility: OTHER | Age: 53
Discharge: HOME | End: 2023-04-14
Attending: EMERGENCY MEDICINE
Payer: MEDICARE

## 2023-04-14 VITALS
HEART RATE: 79 BPM | TEMPERATURE: 98 F | SYSTOLIC BLOOD PRESSURE: 111 MMHG | DIASTOLIC BLOOD PRESSURE: 69 MMHG | HEIGHT: 65 IN | OXYGEN SATURATION: 99 % | BODY MASS INDEX: 33.66 KG/M2 | RESPIRATION RATE: 18 BRPM | WEIGHT: 202 LBS

## 2023-04-14 DIAGNOSIS — R55 SYNCOPE: ICD-10-CM

## 2023-04-14 DIAGNOSIS — R10.13 EPIGASTRIC PAIN: Primary | ICD-10-CM

## 2023-04-14 DIAGNOSIS — A59.9 TRICHOMONAS INFECTION: ICD-10-CM

## 2023-04-14 DIAGNOSIS — K59.00 CONSTIPATION: ICD-10-CM

## 2023-04-14 LAB
ALBUMIN SERPL BCP-MCNC: 3.4 G/DL (ref 3.5–5.2)
ALP SERPL-CCNC: 89 U/L (ref 55–135)
ALT SERPL W/O P-5'-P-CCNC: 12 U/L (ref 10–44)
ANION GAP SERPL CALC-SCNC: 8 MMOL/L (ref 8–16)
AST SERPL-CCNC: 16 U/L (ref 10–40)
B-HCG UR QL: NEGATIVE
BACTERIA #/AREA URNS HPF: ABNORMAL /HPF
BASOPHILS # BLD AUTO: 0.01 K/UL (ref 0–0.2)
BASOPHILS NFR BLD: 0.2 % (ref 0–1.9)
BILIRUB SERPL-MCNC: 0.3 MG/DL (ref 0.1–1)
BILIRUB UR QL STRIP: NEGATIVE
BNP SERPL-MCNC: 15 PG/ML (ref 0–99)
BUN SERPL-MCNC: 16 MG/DL (ref 6–20)
CALCIUM SERPL-MCNC: 9.2 MG/DL (ref 8.7–10.5)
CHLORIDE SERPL-SCNC: 105 MMOL/L (ref 95–110)
CLARITY UR: CLEAR
CO2 SERPL-SCNC: 23 MMOL/L (ref 23–29)
COLOR UR: YELLOW
CREAT SERPL-MCNC: 1.6 MG/DL (ref 0.5–1.4)
CTP QC/QA: YES
DIFFERENTIAL METHOD: ABNORMAL
EOSINOPHIL # BLD AUTO: 0.1 K/UL (ref 0–0.5)
EOSINOPHIL NFR BLD: 1.2 % (ref 0–8)
ERYTHROCYTE [DISTWIDTH] IN BLOOD BY AUTOMATED COUNT: 13.2 % (ref 11.5–14.5)
EST. GFR  (NO RACE VARIABLE): 39 ML/MIN/1.73 M^2
ETHANOL SERPL-MCNC: <10 MG/DL
GLUCOSE SERPL-MCNC: 81 MG/DL (ref 70–110)
GLUCOSE UR QL STRIP: NEGATIVE
HCT VFR BLD AUTO: 36.9 % (ref 37–48.5)
HGB BLD-MCNC: 12.4 G/DL (ref 12–16)
HGB UR QL STRIP: NEGATIVE
HYALINE CASTS #/AREA URNS LPF: 4 /LPF
IMM GRANULOCYTES # BLD AUTO: 0.01 K/UL (ref 0–0.04)
IMM GRANULOCYTES NFR BLD AUTO: 0.2 % (ref 0–0.5)
KETONES UR QL STRIP: NEGATIVE
LEUKOCYTE ESTERASE UR QL STRIP: ABNORMAL
LIPASE SERPL-CCNC: 35 U/L (ref 4–60)
LYMPHOCYTES # BLD AUTO: 1.9 K/UL (ref 1–4.8)
LYMPHOCYTES NFR BLD: 36.8 % (ref 18–48)
MAGNESIUM SERPL-MCNC: 2 MG/DL (ref 1.6–2.6)
MCH RBC QN AUTO: 31.2 PG (ref 27–31)
MCHC RBC AUTO-ENTMCNC: 33.6 G/DL (ref 32–36)
MCV RBC AUTO: 93 FL (ref 82–98)
MICROSCOPIC COMMENT: ABNORMAL
MONOCYTES # BLD AUTO: 0.3 K/UL (ref 0.3–1)
MONOCYTES NFR BLD: 6.5 % (ref 4–15)
NEUTROPHILS # BLD AUTO: 2.8 K/UL (ref 1.8–7.7)
NEUTROPHILS NFR BLD: 55.1 % (ref 38–73)
NITRITE UR QL STRIP: NEGATIVE
NRBC BLD-RTO: 0 /100 WBC
PH UR STRIP: 6 [PH] (ref 5–8)
PLATELET # BLD AUTO: 169 K/UL (ref 150–450)
PMV BLD AUTO: 9.7 FL (ref 9.2–12.9)
POTASSIUM SERPL-SCNC: 4.3 MMOL/L (ref 3.5–5.1)
PROT SERPL-MCNC: 6.8 G/DL (ref 6–8.4)
PROT UR QL STRIP: NEGATIVE
RBC # BLD AUTO: 3.98 M/UL (ref 4–5.4)
RBC #/AREA URNS HPF: 2 /HPF (ref 0–4)
SODIUM SERPL-SCNC: 136 MMOL/L (ref 136–145)
SP GR UR STRIP: 1.01 (ref 1–1.03)
SQUAMOUS #/AREA URNS HPF: 3 /HPF
TRICHOMONAS UR QL MICRO: ABNORMAL
TROPONIN I SERPL DL<=0.01 NG/ML-MCNC: <0.006 NG/ML (ref 0–0.03)
URN SPEC COLLECT METH UR: ABNORMAL
UROBILINOGEN UR STRIP-ACNC: NEGATIVE EU/DL
WBC # BLD AUTO: 5.08 K/UL (ref 3.9–12.7)
WBC #/AREA URNS HPF: 11 /HPF (ref 0–5)

## 2023-04-14 PROCEDURE — 87086 URINE CULTURE/COLONY COUNT: CPT | Performed by: NURSE PRACTITIONER

## 2023-04-14 PROCEDURE — 96361 HYDRATE IV INFUSION ADD-ON: CPT

## 2023-04-14 PROCEDURE — 63600175 PHARM REV CODE 636 W HCPCS: Performed by: NURSE PRACTITIONER

## 2023-04-14 PROCEDURE — 82077 ASSAY SPEC XCP UR&BREATH IA: CPT | Performed by: NURSE PRACTITIONER

## 2023-04-14 PROCEDURE — 96375 TX/PRO/DX INJ NEW DRUG ADDON: CPT

## 2023-04-14 PROCEDURE — 96374 THER/PROPH/DIAG INJ IV PUSH: CPT

## 2023-04-14 PROCEDURE — 25000003 PHARM REV CODE 250: Performed by: NURSE PRACTITIONER

## 2023-04-14 PROCEDURE — 99285 EMERGENCY DEPT VISIT HI MDM: CPT

## 2023-04-14 PROCEDURE — 83690 ASSAY OF LIPASE: CPT | Performed by: NURSE PRACTITIONER

## 2023-04-14 PROCEDURE — 85025 COMPLETE CBC W/AUTO DIFF WBC: CPT | Performed by: NURSE PRACTITIONER

## 2023-04-14 PROCEDURE — 80053 COMPREHEN METABOLIC PANEL: CPT | Performed by: NURSE PRACTITIONER

## 2023-04-14 PROCEDURE — 84484 ASSAY OF TROPONIN QUANT: CPT | Performed by: NURSE PRACTITIONER

## 2023-04-14 PROCEDURE — 81000 URINALYSIS NONAUTO W/SCOPE: CPT | Mod: 59 | Performed by: NURSE PRACTITIONER

## 2023-04-14 PROCEDURE — 93005 ELECTROCARDIOGRAM TRACING: CPT

## 2023-04-14 PROCEDURE — 83735 ASSAY OF MAGNESIUM: CPT | Performed by: NURSE PRACTITIONER

## 2023-04-14 PROCEDURE — 81025 URINE PREGNANCY TEST: CPT | Performed by: NURSE PRACTITIONER

## 2023-04-14 PROCEDURE — 93010 ELECTROCARDIOGRAM REPORT: CPT | Mod: ,,, | Performed by: INTERNAL MEDICINE

## 2023-04-14 PROCEDURE — 36415 COLL VENOUS BLD VENIPUNCTURE: CPT | Performed by: NURSE PRACTITIONER

## 2023-04-14 PROCEDURE — 93010 EKG 12-LEAD: ICD-10-PCS | Mod: ,,, | Performed by: INTERNAL MEDICINE

## 2023-04-14 PROCEDURE — 83880 ASSAY OF NATRIURETIC PEPTIDE: CPT | Performed by: NURSE PRACTITIONER

## 2023-04-14 RX ORDER — LACTULOSE 10 G/15ML
10 SOLUTION ORAL EVERY 6 HOURS PRN
Qty: 250 ML | Refills: 0 | Status: SHIPPED | OUTPATIENT
Start: 2023-04-14

## 2023-04-14 RX ORDER — POLYETHYLENE GLYCOL 3350 17 G/17G
17 POWDER, FOR SOLUTION ORAL DAILY
Status: DISCONTINUED | OUTPATIENT
Start: 2023-04-14 | End: 2023-04-14 | Stop reason: HOSPADM

## 2023-04-14 RX ORDER — LIDOCAINE HYDROCHLORIDE 20 MG/ML
15 SOLUTION OROPHARYNGEAL ONCE
Status: COMPLETED | OUTPATIENT
Start: 2023-04-14 | End: 2023-04-14

## 2023-04-14 RX ORDER — ONDANSETRON 2 MG/ML
4 INJECTION INTRAMUSCULAR; INTRAVENOUS
Status: COMPLETED | OUTPATIENT
Start: 2023-04-14 | End: 2023-04-14

## 2023-04-14 RX ORDER — ACETAMINOPHEN 325 MG/1
650 TABLET ORAL
Status: COMPLETED | OUTPATIENT
Start: 2023-04-14 | End: 2023-04-14

## 2023-04-14 RX ORDER — HYOSCYAMINE SULFATE 0.5 MG/ML
0.5 INJECTION, SOLUTION SUBCUTANEOUS
Status: COMPLETED | OUTPATIENT
Start: 2023-04-14 | End: 2023-04-14

## 2023-04-14 RX ORDER — FAMOTIDINE 10 MG/ML
20 INJECTION INTRAVENOUS
Status: COMPLETED | OUTPATIENT
Start: 2023-04-14 | End: 2023-04-14

## 2023-04-14 RX ORDER — METHOCARBAMOL 750 MG/1
750 TABLET, FILM COATED ORAL 3 TIMES DAILY PRN
Qty: 30 TABLET | Refills: 0 | Status: SHIPPED | OUTPATIENT
Start: 2023-04-14 | End: 2023-04-19

## 2023-04-14 RX ORDER — MORPHINE SULFATE 4 MG/ML
4 INJECTION, SOLUTION INTRAMUSCULAR; INTRAVENOUS
Status: COMPLETED | OUTPATIENT
Start: 2023-04-14 | End: 2023-04-14

## 2023-04-14 RX ORDER — MAG HYDROX/ALUMINUM HYD/SIMETH 200-200-20
30 SUSPENSION, ORAL (FINAL DOSE FORM) ORAL ONCE
Status: COMPLETED | OUTPATIENT
Start: 2023-04-14 | End: 2023-04-14

## 2023-04-14 RX ORDER — HYDROCODONE BITARTRATE AND ACETAMINOPHEN 7.5; 325 MG/1; MG/1
1 TABLET ORAL ONCE
Status: COMPLETED | OUTPATIENT
Start: 2023-04-14 | End: 2023-04-14

## 2023-04-14 RX ORDER — METRONIDAZOLE 500 MG/1
500 TABLET ORAL
Status: COMPLETED | OUTPATIENT
Start: 2023-04-14 | End: 2023-04-14

## 2023-04-14 RX ORDER — METRONIDAZOLE 500 MG/1
500 TABLET ORAL 2 TIMES DAILY
Qty: 14 TABLET | Refills: 0 | Status: SHIPPED | OUTPATIENT
Start: 2023-04-14 | End: 2023-04-21

## 2023-04-14 RX ADMIN — HYDROCODONE BITARTRATE AND ACETAMINOPHEN 1 TABLET: 7.5; 325 TABLET ORAL at 01:04

## 2023-04-14 RX ADMIN — SODIUM CHLORIDE 1000 ML: 0.9 INJECTION, SOLUTION INTRAVENOUS at 10:04

## 2023-04-14 RX ADMIN — POLYETHYLENE GLYCOL 3350 17 G: 17 POWDER, FOR SOLUTION ORAL at 11:04

## 2023-04-14 RX ADMIN — HYOSCYAMINE SULFATE 0.5 MG: 0.5 INJECTION, SOLUTION SUBCUTANEOUS at 11:04

## 2023-04-14 RX ADMIN — ACETAMINOPHEN 650 MG: 325 TABLET, FILM COATED ORAL at 11:04

## 2023-04-14 RX ADMIN — METRONIDAZOLE 500 MG: 500 TABLET ORAL at 11:04

## 2023-04-14 RX ADMIN — MORPHINE SULFATE 4 MG: 4 INJECTION, SOLUTION INTRAMUSCULAR; INTRAVENOUS at 11:04

## 2023-04-14 RX ADMIN — ONDANSETRON HYDROCHLORIDE 4 MG: 2 INJECTION INTRAMUSCULAR; INTRAVENOUS at 09:04

## 2023-04-14 RX ADMIN — LIDOCAINE HYDROCHLORIDE 15 ML: 20 SOLUTION ORAL; TOPICAL at 09:04

## 2023-04-14 RX ADMIN — FAMOTIDINE 20 MG: 10 INJECTION, SOLUTION INTRAVENOUS at 11:04

## 2023-04-14 RX ADMIN — ALUMINUM HYDROXIDE, MAGNESIUM HYDROXIDE, AND SIMETHICONE 30 ML: 200; 200; 20 SUSPENSION ORAL at 09:04

## 2023-04-14 NOTE — ED PROVIDER NOTES
Source of History:  Patient    Chief complaint:  Abdominal Pain (BIB [facility] c/o diffused abd pain 10/10 and constipation x 1 wk. -n/v. Denies any other complaints. CBG 94 per EMS. VSS. )      HPI:  Daksha Marie is a 52 y.o. female PMHx of HTN, HFpEF, EtOH abuse, CKD3, GERD, CVA, PUD and hiatal hernia presenting via EMS with multiple complaints.  Patient primarily is complaining of upper abdominal pain and constipation.  She is on chronic opioid therapy for her chronic back pain from pain management.  She states that she was on the toilet straining to have a bowel movement and the next thing she remembered is being on the floor surrounded by nonbloody emesis.  She reports left hip pain, continued nausea and upper abdominal pain.  She says she has a known ulcer and hiatal hernia.  She is taking Carafate, ppi, Bentyl.  She took these medications this morning but states that she likely threw them up.  Unsure if patient hit her head but denies headache visual changes.  No chest pain or shortness of breath.    This is the extent to the patients complaints today here in the emergency department.    ROS: As per HPI and below:  Per HPI    Review of patient's allergies indicates:   Allergen Reactions    Penicillins Hives    Hydroxyzine Anxiety, Hallucinations and Palpitations       PMH:  As per HPI and below:  Past Medical History:   Diagnosis Date    Alcohol abuse 10/07/2015    In remission since 5/1/17    Anemia 06/17/2015    Arthritis     Blindness of right eye     only sees light    Breast calcification, left     biopsied: scar tissue from breast reduction surgery    Chronic diastolic congestive heart failure     Chronic gastrojejunal anastomotic ulcer     2015 - 2016    CVA (cerebral vascular accident) 01/2022    TPA    Encounter for blood transfusion     GERD (gastroesophageal reflux disease)     Glaucoma 2008    Hypertension     Hypoalbuminemia     Hyponatremia 10/2015    alcohol abuse, polydipsia, Na  "109, seizure in ICU    Insomnia     Malingering 2016    Morbid obesity 2017    PTSD (post-traumatic stress disorder)     Sickle cell trait     Stage 3a chronic kidney disease 10/14/2018     Past Surgical History:   Procedure Laterality Date    BREAST BIOPSY Left     CARPAL TUNNEL RELEASE  2020     SECTION      CHOLECYSTECTOMY  after     bile fistula    ESOPHAGOGASTRODUODENOSCOPY N/A 2022    Procedure: ESOPHAGOGASTRODUODENOSCOPY (EGD);  Surgeon: Trey Anderson MD;  [facility]  Service: Endoscopy;  Laterality: N/A;    ESOPHAGOGASTRODUODENOSCOPY N/A 2023    Procedure: EGD (ESOPHAGOGASTRODUODENOSCOPY);  Surgeon: Yordan Chandler MD;  Location: [facility];  Service: Endoscopy;  Laterality: N/A;    GASTRIC BYPASS  2007    TOTAL REDUCTION MAMMOPLASTY Bilateral     xen gel stent implant  2018    Performed at [facility]       Social History     Tobacco Use    Smoking status: Former     Packs/day: 0.50     Years: 4.00     Pack years: 2.00     Types: Cigarettes     Quit date: 2023     Years since quittin.1    Smokeless tobacco: Never   Substance Use Topics    Alcohol use: No     Comment: former heavy drinker since May 1st 2017    Drug use: Not Currently       Physical Exam:    /69 (BP Location: Left arm)   Pulse 79   Temp 98.1 °F (36.7 °C) (Oral)   Resp 18   Ht 5' 5" (1.651 m)   Wt 91.6 kg (202 lb)   SpO2 99%   BMI 33.61 kg/m²   Nursing note and vital signs reviewed.  Appearance: No acute distress.  Eyes: No conjunctival injection.  ENT: Oropharynx clear.    Chest/ Respiratory: Clear to auscultation bilaterally.  Good air movement.  No wheezes.  No rhonchi. No rales. No accessory muscle use.  Cardiovascular: Regular rate and rhythm.  No murmurs. No gallops. No rubs.  Abdomen: Soft.  Not distended.  TTP in the epigastrium.  No guarding.  No rebound. Non-peritoneal.  Musculoskeletal: Good range of motion all joints.  No " deformities.  Neck supple.  No meningismus.  Skin: No rashes seen.  Good turgor.  No abrasions.  No ecchymoses.  Neurologic: Motor intact.  Sensation intact.  Cerebellar intact.  Cranial nerves intact.  Mental Status:  Alert and oriented x 3.  Appropriate, conversant    Labs that have been ordered have been independently reviewed and interpreted by myself.        Labs Reviewed   CBC W/ AUTO DIFFERENTIAL - Abnormal; Notable for the following components:       Result Value    RBC 3.98 (*)     Hematocrit 36.9 (*)     MCH 31.2 (*)     All other components within normal limits   COMPREHENSIVE METABOLIC PANEL - Abnormal; Notable for the following components:    Creatinine 1.6 (*)     Albumin 3.4 (*)     eGFR 39 (*)     All other components within normal limits   URINALYSIS, REFLEX TO URINE CULTURE - Abnormal; Notable for the following components:    Leukocytes, UA 2+ (*)     All other components within normal limits    Narrative:     Specimen Source->Urine   URINALYSIS MICROSCOPIC - Abnormal; Notable for the following components:    WBC, UA 11 (*)     Hyaline Casts, UA 4 (*)     Trichomonas, UA Occasional (*)     All other components within normal limits    Narrative:     Specimen Source->Urine   CULTURE, URINE   LIPASE   MAGNESIUM   TROPONIN I   ALCOHOL,MEDICAL (ETHANOL)   B-TYPE NATRIURETIC PEPTIDE   B-TYPE NATRIURETIC PEPTIDE   POCT URINE PREGNANCY       Imaging Results              X-Ray Abdomen Flat And Erect (Final result)  Result time 04/14/23 10:34:21      Final result by Young Lutz III, MD (04/14/23 10:34:21)                   Impression:      No acute process seen.      Electronically signed by: Young Lutz MD  Date:    04/14/2023  Time:    10:34               Narrative:    EXAMINATION:  XR ABDOMEN FLAT AND ERECT    CLINICAL HISTORY:  Constipation, unspecified    FINDINGS:  Abdomen two views: Lung bases are clear.  No obstruction, ileus, or perforation seen.                                      Initial  Impression/ Differential Dx:  Urgent evaluation of 52 y.o. female presenting with upper abdominal pain nausea and syncope. Patient is afebrile, not toxic appearing and hemodynamically stable.  I suspect patient had a vasovagal episode as she was sitting on the toilet straining to have a BM when syncopal episode occurred.  She reports no bowel movement for a week.  Will obtain KUB.  Will plan for syncope workup.  Patient is neurologically intact with no focal deficits, do not feel like CT of the head is indicated at this time given no evidence of trauma, headache or confusion.  Patient with known PUD and tenderness in the epigastrium, do not feel like CT abdomen pelvis is indicated at this time as abdomen is soft and pain is likely secondary to known ulcer.  Considered but do not suspect incarcerated or strangulated hernia as abdomen soft.    Differential Diagnosis includes, but is not limited to:  Arrhythmia, aortic dissection, MI/unstable angina, PE, cardiogenic shock, CHF, CVA/TIA, intracranial lesion/mass, seizure, perforated viscous, ruptured AAA, orthostatic hypotension, vasovagal episode, anemia, dehydration, medication reaction, bowel obstruction, impaction, incarcerated/strangulated hernia      MDM:        ED Course as of 04/14/23 1430   Fri Apr 14, 2023   0943 CBC auto differential(!)  No leukocytosis or shift, mild anemia [CU]   0947 EKG independently interpreted by myself shows normal sinus rhythm at a rate of 77 beats per minute, normal intervals, no STEMI, nonspecific T-wave abnormality in the precordial leads grossly unchanged from prior EKG from January [CU]   1032 Comprehensive metabolic panel(!)  No significant electrolyte abnormalities, elevated creatinine in patient with known CKD, baseline appears to be 1.3, hypoalbuminemia noted on prior lab, no elevation of LFTs [CU]   1055 BNP: 15 [CU]   1055 Alcohol, Serum: <10 [CU]   1055 Magnesium: 2.0 [CU]   1055 Lipase: 35 [CU]   1055 Troponin I: <0.006  [CU]   1055 X-Ray Abdomen Flat And Erect  No acute process. [CU]   1117 At bedside to reassess patient.  She states that she is still in pain in her upper abdomen.  Will give additional analgesia.  Counseled patient on grossly normal laboratory workup [CU]   1117 Trichomonas, UA(!): Occasional  Will treat [CU]   1117 Urinalysis Microscopic(!)  No convincing evidence of cystitis [CU]   1244 At bedside to reassess patient.  She reports resolution of her pain and states that she feels comfortable going home.  Will refill lactulose as she states this has previously worked for her in the past.  Will also give 1 dose of home pain meds.  She is followed by pain management and treated with Norco and Robaxin.  She states that she can not get until Tuesday.  Attempted to get in contact with patient's physician to get clearance to give 3 days to cover patient until she is seen on Tuesday, however office is closed.  Will give patient 1 dose of Norco here in the emergency department.  Will refill Robaxin however. Patient educated on signs and symptoms to monitor for and when to return to ED. Patient verbalized understanding agrees with treatment plan. All questions and concerns addressed.      [CU]      ED Course User Index  [CU] Yordan Leigh NP                   Diagnostic Impression:    1. Epigastric pain    2. Constipation    3. Syncope    4. Trichomonas infection         ED Disposition Condition    Discharge Good            ED Prescriptions       Medication Sig Dispense Start Date End Date Auth. Provider    lactulose (CHRONULAC) 20 gram/30 mL Soln Take 15 mLs (10 g total) by mouth every 6 (six) hours as needed (constipation). 250 mL 4/14/2023 -- Yordan Leigh NP    methocarbamoL (ROBAXIN) 750 MG Tab Take 1 tablet (750 mg total) by mouth 3 (three) times daily as needed (muscle spasm). 30 tablet 4/14/2023 4/19/2023 Yordan Leigh NP    metroNIDAZOLE (FLAGYL) 500 MG tablet Take 1 tablet (500 mg total) by mouth 2  (two) times a day. for 7 days 14 tablet 4/14/2023 4/21/2023 Yordan Leigh NP          Follow-up Information       Follow up With Specialties Details Why Contact Info    Caridad Padilla MD ([facility]) Family Medicine In 2 days  [address] [address]  [phone]      [facility]   [address], [address]  [place] [address] [phone]             Yordan Leigh NP  04/14/23 1357       Yordan Leigh, BEHZAD  04/14/23 [id number]

## 2023-04-14 NOTE — ED TRIAGE NOTES
Pt arrived via [facility] c/o epigastric and L ABD pain 10/10 and constipation x 1 wk. Also c/o n/v. Denies any other complaints. CBG 94 per EMS. PMH CHF and gastric bypass. VSS. NAD noted.

## 2023-04-16 LAB
BACTERIA UR CULT: NORMAL
BACTERIA UR CULT: NORMAL

## 2023-04-19 ENCOUNTER — PATIENT MESSAGE (OUTPATIENT)
Dept: CARDIOLOGY | Facility: CLINIC | Age: 53
End: 2023-04-19
Payer: MEDICARE

## 2023-04-19 ENCOUNTER — PATIENT MESSAGE (OUTPATIENT)
Dept: PSYCHIATRY | Facility: CLINIC | Age: 53
End: 2023-04-19
Payer: MEDICARE

## 2023-04-20 ENCOUNTER — PATIENT MESSAGE (OUTPATIENT)
Dept: ADMINISTRATIVE | Facility: OTHER | Age: 53
End: 2023-04-20
Payer: MEDICARE

## 2023-04-20 ENCOUNTER — OFFICE VISIT (OUTPATIENT)
Dept: PSYCHIATRY | Facility: CLINIC | Age: 53
End: 2023-04-20
Payer: MEDICARE

## 2023-04-20 DIAGNOSIS — F43.10 PTSD (POST-TRAUMATIC STRESS DISORDER): Primary | ICD-10-CM

## 2023-04-20 DIAGNOSIS — F41.0 PANIC DISORDER: ICD-10-CM

## 2023-04-20 DIAGNOSIS — F33.41 DEPRESSION, MAJOR, RECURRENT, IN PARTIAL REMISSION: ICD-10-CM

## 2023-04-20 PROCEDURE — 3044F PR MOST RECENT HEMOGLOBIN A1C LEVEL <7.0%: ICD-10-PCS | Mod: CPTII,95,, | Performed by: SOCIAL WORKER

## 2023-04-20 PROCEDURE — 3044F HG A1C LEVEL LT 7.0%: CPT | Mod: CPTII,95,, | Performed by: SOCIAL WORKER

## 2023-04-20 PROCEDURE — 90834 PSYTX W PT 45 MINUTES: CPT | Mod: 95,,, | Performed by: SOCIAL WORKER

## 2023-04-20 PROCEDURE — 4010F ACE/ARB THERAPY RXD/TAKEN: CPT | Mod: CPTII,95,, | Performed by: SOCIAL WORKER

## 2023-04-20 PROCEDURE — 4010F PR ACE/ARB THEARPY RXD/TAKEN: ICD-10-PCS | Mod: CPTII,95,, | Performed by: SOCIAL WORKER

## 2023-04-20 PROCEDURE — 90834 PR PSYCHOTHERAPY W/PATIENT, 45 MIN: ICD-10-PCS | Mod: 95,,, | Performed by: SOCIAL WORKER
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
## 2023-05-02 ENCOUNTER — PATIENT MESSAGE (OUTPATIENT)
Dept: PSYCHIATRY | Facility: CLINIC | Age: 53
End: 2023-05-02
Payer: MEDICARE

## 2023-06-05 ENCOUNTER — PATIENT MESSAGE (OUTPATIENT)
Dept: PSYCHIATRY | Facility: CLINIC | Age: 53
End: 2023-06-05
Payer: MEDICARE

## 2023-06-06 ENCOUNTER — PATIENT MESSAGE (OUTPATIENT)
Dept: ADMINISTRATIVE | Facility: OTHER | Age: 53
End: 2023-06-06
Payer: MEDICARE

## 2023-06-08 ENCOUNTER — OFFICE VISIT (OUTPATIENT)
Dept: PSYCHIATRY | Facility: CLINIC | Age: 53
End: 2023-06-08
Payer: MEDICARE

## 2023-06-08 DIAGNOSIS — F10.21 ALCOHOL USE DISORDER, MODERATE, IN SUSTAINED REMISSION: ICD-10-CM

## 2023-06-08 DIAGNOSIS — F43.10 PTSD (POST-TRAUMATIC STRESS DISORDER): ICD-10-CM

## 2023-06-08 DIAGNOSIS — F33.41 DEPRESSION, MAJOR, RECURRENT, IN PARTIAL REMISSION: ICD-10-CM

## 2023-06-08 DIAGNOSIS — F41.0 PANIC DISORDER: Primary | ICD-10-CM

## 2023-06-08 DIAGNOSIS — G47.00 INSOMNIA DISORDER, WITH NON-SLEEP DISORDER MENTAL COMORBIDITY: ICD-10-CM

## 2023-06-08 DIAGNOSIS — F42.8 OTHER OBSESSIVE-COMPULSIVE DISORDERS: ICD-10-CM

## 2023-06-08 PROCEDURE — 4010F ACE/ARB THERAPY RXD/TAKEN: CPT | Mod: CPTII,95,, | Performed by: NURSE PRACTITIONER

## 2023-06-08 PROCEDURE — 3044F PR MOST RECENT HEMOGLOBIN A1C LEVEL <7.0%: ICD-10-PCS | Mod: CPTII,95,, | Performed by: NURSE PRACTITIONER

## 2023-06-08 PROCEDURE — 3044F HG A1C LEVEL LT 7.0%: CPT | Mod: CPTII,95,, | Performed by: NURSE PRACTITIONER

## 2023-06-08 PROCEDURE — 99214 PR OFFICE/OUTPT VISIT, EST, LEVL IV, 30-39 MIN: ICD-10-PCS | Mod: 95,,, | Performed by: NURSE PRACTITIONER

## 2023-06-08 PROCEDURE — 4010F PR ACE/ARB THEARPY RXD/TAKEN: ICD-10-PCS | Mod: CPTII,95,, | Performed by: NURSE PRACTITIONER

## 2023-06-08 PROCEDURE — 99214 OFFICE O/P EST MOD 30 MIN: CPT | Mod: 95,,, | Performed by: NURSE PRACTITIONER

## 2023-06-08 RX ORDER — PRAZOSIN HYDROCHLORIDE 2 MG/1
2 CAPSULE ORAL NIGHTLY
Qty: 90 CAPSULE | Refills: 1 | Status: SHIPPED | OUTPATIENT
Start: 2023-06-08 | End: 2023-08-16 | Stop reason: SDUPTHER

## 2023-06-08 NOTE — PROGRESS NOTES
Outpatient Psychiatry Follow-Up Visit (MD/NP)    6/8/2023    Clinical Status of Patient:  Outpatient (Ambulatory)    Chief Complaint:  Daksha Marie is a 53 y.o. female who presents today for follow-up of anxiety and PTSD .  Met with patient.      Last visit was: 4/11/23 Chart and  reviewed.   The patient location is: home  The chief complaint leading to consultation is: depression and anxiety    Visit type: audiovisual    Face to Face time with patient: 30 minutes  35 minutes of total time spent on the encounter, which includes face to face time and non-face to face time preparing to see the patient (eg, review of tests), Obtaining and/or reviewing separately obtained history, Documenting clinical information in the electronic or other health record, Independently interpreting results (not separately reported) and communicating results to the patient/family/caregiver, or Care coordination (not separately reported).     Each patient to whom he or she provides medical services by telemedicine is:  (1) informed of the relationship between the physician and patient and the respective role of any other health care provider with respect to management of the patient; and (2) notified that he or she may decline to receive medical services by telemedicine and may withdraw from such care at any time.    Interval History and Content of Current Session:  Current Psychiatric Medications/changes  Continue Effexor  mg po daily  Continue Antabuse 250 mg po daily  Continue Valium 5 mg po BID PRN   Continue Seroquel 25-50 mg before bed  Increase to Buspar 30 mg BID PRN anxiety  Start Prazosin 1 mg at bedtime for nightmares    Virtual Visit:  Pt continues to report PTSD related  nightmares. Will increase Prazosin. Denies any side effects. Reports Buspar has been helpful for anxiety. No complaint today of depression.  Remains sober on Antabuse. Thought processes are clear and organized.  Denies  SI/HI/AVH    Psychotherapy:  Target symptoms: anxiety   Why chosen therapy is appropriate versus another modality: relevant to diagnosis  Outcome monitoring methods: self-report  Therapeutic intervention type: insight oriented psychotherapy  Topics discussed/themes: building skills sets for symptom management, symptom recognition  The patient's response to the intervention is accepting. The patient's progress toward treatment goals is good.   Duration of intervention: 13 minutes.    Review of Systems   PSYCHIATRIC: Pertinant items are noted in the narrative.  CONSTITUTIONAL: No weight gain or loss.   MUSCULOSKELETAL: No pain or stiffness of the joints.  NEUROLOGIC: No weakness, sensory changes, seizures, confusion, memory loss, tremor or other abnormal movements.  ENDOCRINE: No polydipsia or polyuria.  INTEGUMENTARY: No rashes or lacerations.  EYES: No exophthalmos, jaundice or blindness.  ENT: No dizziness, tinnitus or hearing loss.  RESPIRATORY: No shortness of breath.  CARDIOVASCULAR: No tachycardia or chest pain.  GASTROINTESTINAL: No nausea, vomiting, pain, constipation or diarrhea.  GENITOURINARY: No frequency, dysuria or sexual dysfunction.  HEMATOLOGIC/LYMPHATIC: No excessive bleeding, prolonged or excessive bleeding after dental extraction/injury.  ALLERGIC/IMMUNOLOGIC: No allergic response to materials, foods or animals at this time.    Past Medical, Family and Social History: The patient's past medical, family and social history have been reviewed and updated as appropriate within the electronic medical record - see encounter notes.    Compliance: yes    Side effects: None    Risk Parameters:  Patient reports no suicidal ideation  Patient reports no homicidal ideation  Patient reports no self-injurious behavior  Patient reports no violent behavior    Exam (detailed: at least 9 elements; comprehensive: all 15 elements)   Constitutional  Vitals:  Most recent vital signs, dated greater than 90 days prior to  this appointment, were reviewed.   There were no vitals filed for this visit.     General:  unremarkable, age appropriate     Musculoskeletal  Muscle Strength/Tone:  no tremor, no tic   Gait & Station:  non-ataxic     Psychiatric  Speech:  no latency; no press   Mood & Affect:  dysthymic  congruent and appropriate   Thought Process:  normal and logical   Associations:  intact   Thought Content:  normal, no suicidality, no homicidality, delusions, or paranoia   Insight:  intact   Judgement: behavior is adequate to circumstances   Orientation:  grossly intact   Memory: intact for content of interview   Language: grossly intact   Attention Span & Concentration:  able to focus   Fund of Knowledge:  intact and appropriate to age and level of education     Assessment and Diagnosis   Status/Progress: Based on the examination today, the patient's problem(s) is/are inadequately controlled.  New problems have not been presented today.   Co-morbidities and Lack of compliance are not complicating management of the primary condition.  There are no active rule-out diagnoses for this patient at this time.     General Impression:       ICD-10-CM ICD-9-CM   1. Panic disorder  F41.0 300.01   2. PTSD (post-traumatic stress disorder)  F43.10 309.81   3. Depression, major, recurrent, in partial remission  F33.41 296.35   4. Insomnia disorder, with non-sleep disorder mental comorbidity  G47.00 780.52   5. Alcohol use disorder, moderate, in sustained remission  F10.21 303.93   6. Other obsessive-compulsive disorders  F42.8 300.3     Intervention/Counseling/Treatment Plan   Medication Management: Continue current medications. The risks and benefits of medication were discussed with the patient.  AA/NA/CA/ACOA/Abstinence   Continue Effexor  mg po daily  Continue Antabuse 250 mg po daily  Continue Valium 5 mg po BID PRN   Continue Seroquel 25-50 mg before bed  Continue  Buspar 30 mg BID PRN anxiety  Increase to Prazosin 2 mg at bedtime  for nightmares  Continue with psychotherapy    Return to Clinic: 6 months     Risks, benefits, side effects and alternative treatments discussed with patient. Patient agrees with the current plan as documented.  Encouraged Patient to keep future appointments.  Take medications as prescribed and abstain from substance abuse.  Pt to present to ED for thoughts to harm herself or others

## 2023-07-03 DIAGNOSIS — J30.9 ALLERGIC RHINITIS, UNSPECIFIED SEASONALITY, UNSPECIFIED TRIGGER: ICD-10-CM

## 2023-07-03 RX ORDER — MONTELUKAST SODIUM 10 MG/1
10 TABLET ORAL NIGHTLY
Qty: 90 TABLET | Refills: 1 | Status: SHIPPED | OUTPATIENT
Start: 2023-07-03

## 2023-07-27 DIAGNOSIS — G44.001 INTRACTABLE CLUSTER HEADACHE SYNDROME, UNSPECIFIED CHRONICITY PATTERN: ICD-10-CM

## 2023-07-27 RX ORDER — SUMATRIPTAN 50 MG/1
TABLET, FILM COATED ORAL
Qty: 9 TABLET | Refills: 1 | Status: SHIPPED | OUTPATIENT
Start: 2023-07-27 | End: 2023-08-27 | Stop reason: SDUPTHER

## 2023-08-16 ENCOUNTER — OFFICE VISIT (OUTPATIENT)
Dept: PSYCHIATRY | Facility: CLINIC | Age: 53
End: 2023-08-16
Payer: MEDICARE

## 2023-08-16 DIAGNOSIS — G47.00 INSOMNIA DISORDER, WITH NON-SLEEP DISORDER MENTAL COMORBIDITY: ICD-10-CM

## 2023-08-16 DIAGNOSIS — F10.91 ALCOHOL USE DISORDER IN REMISSION: ICD-10-CM

## 2023-08-16 DIAGNOSIS — F41.0 PANIC DISORDER: ICD-10-CM

## 2023-08-16 DIAGNOSIS — F33.41 DEPRESSION, MAJOR, RECURRENT, IN PARTIAL REMISSION: ICD-10-CM

## 2023-08-16 DIAGNOSIS — F10.21 ALCOHOL USE DISORDER, MODERATE, IN SUSTAINED REMISSION: Primary | ICD-10-CM

## 2023-08-16 DIAGNOSIS — F43.10 PTSD (POST-TRAUMATIC STRESS DISORDER): ICD-10-CM

## 2023-08-16 PROCEDURE — 4010F ACE/ARB THERAPY RXD/TAKEN: CPT | Mod: CPTII,95,, | Performed by: NURSE PRACTITIONER

## 2023-08-16 PROCEDURE — 3044F HG A1C LEVEL LT 7.0%: CPT | Mod: CPTII,95,, | Performed by: NURSE PRACTITIONER

## 2023-08-16 PROCEDURE — 3044F PR MOST RECENT HEMOGLOBIN A1C LEVEL <7.0%: ICD-10-PCS | Mod: CPTII,95,, | Performed by: NURSE PRACTITIONER

## 2023-08-16 PROCEDURE — 4010F PR ACE/ARB THEARPY RXD/TAKEN: ICD-10-PCS | Mod: CPTII,95,, | Performed by: NURSE PRACTITIONER

## 2023-08-16 PROCEDURE — 99214 PR OFFICE/OUTPT VISIT, EST, LEVL IV, 30-39 MIN: ICD-10-PCS | Mod: 95,,, | Performed by: NURSE PRACTITIONER

## 2023-08-16 PROCEDURE — 99214 OFFICE O/P EST MOD 30 MIN: CPT | Mod: 95,,, | Performed by: NURSE PRACTITIONER

## 2023-08-16 RX ORDER — BUSPIRONE HYDROCHLORIDE 30 MG/1
30 TABLET ORAL 2 TIMES DAILY PRN
Qty: 60 TABLET | Refills: 5 | Status: SHIPPED | OUTPATIENT
Start: 2023-08-16 | End: 2024-01-30 | Stop reason: SDUPTHER

## 2023-08-16 RX ORDER — VENLAFAXINE HYDROCHLORIDE 150 MG/1
150 CAPSULE, EXTENDED RELEASE ORAL DAILY
Qty: 90 CAPSULE | Refills: 3 | Status: SHIPPED | OUTPATIENT
Start: 2023-08-16 | End: 2024-01-30 | Stop reason: SDUPTHER

## 2023-08-16 RX ORDER — DISULFIRAM 250 MG/1
250 TABLET ORAL DAILY
Qty: 90 TABLET | Refills: 3 | Status: SHIPPED | OUTPATIENT
Start: 2023-08-16 | End: 2024-01-30 | Stop reason: SDUPTHER

## 2023-08-16 RX ORDER — QUETIAPINE FUMARATE 25 MG/1
TABLET, FILM COATED ORAL
Qty: 60 TABLET | Refills: 5 | Status: SHIPPED | OUTPATIENT
Start: 2023-08-16 | End: 2023-12-11

## 2023-08-16 RX ORDER — DIAZEPAM 5 MG/1
5 TABLET ORAL EVERY 12 HOURS PRN
Qty: 60 TABLET | Refills: 5 | Status: SHIPPED | OUTPATIENT
Start: 2023-08-16 | End: 2024-01-30 | Stop reason: SDUPTHER

## 2023-08-16 RX ORDER — PRAZOSIN HYDROCHLORIDE 2 MG/1
2 CAPSULE ORAL NIGHTLY
Qty: 90 CAPSULE | Refills: 1 | Status: SHIPPED | OUTPATIENT
Start: 2023-08-16 | End: 2024-01-30 | Stop reason: SDUPTHER

## 2023-08-16 RX ORDER — VENLAFAXINE HYDROCHLORIDE 75 MG/1
75 CAPSULE, EXTENDED RELEASE ORAL DAILY
Qty: 90 CAPSULE | Refills: 1 | Status: SHIPPED | OUTPATIENT
Start: 2023-08-16 | End: 2024-01-30 | Stop reason: SDUPTHER

## 2023-08-16 NOTE — PROGRESS NOTES
Outpatient Psychiatry Follow-Up Visit (MD/NP)    8/16/2023    Clinical Status of Patient:  Outpatient (Ambulatory)    Chief Complaint:  Daksha Marie is a 53 y.o. female who presents today for follow-up of anxiety and PTSD .  Met with patient.      Last visit was: 4/11/23 Chart and  reviewed.   The patient location is: home  The chief complaint leading to consultation is: depression and anxiety    Visit type: audiovisual    Face to Face time with patient: 30 minutes  35 minutes of total time spent on the encounter, which includes face to face time and non-face to face time preparing to see the patient (eg, review of tests), Obtaining and/or reviewing separately obtained history, Documenting clinical information in the electronic or other health record, Independently interpreting results (not separately reported) and communicating results to the patient/family/caregiver, or Care coordination (not separately reported).     Each patient to whom he or she provides medical services by telemedicine is:  (1) informed of the relationship between the physician and patient and the respective role of any other health care provider with respect to management of the patient; and (2) notified that he or she may decline to receive medical services by telemedicine and may withdraw from such care at any time.    Interval History and Content of Current Session:  Current Psychiatric Medications/changes  Continue Effexor  mg po daily  Continue Antabuse 250 mg po daily  Continue Valium 5 mg po BID PRN   Continue Seroquel 25-50 mg before bed  Continue  Buspar 30 mg BID PRN anxiety  Increase to Prazosin 2 mg at bedtime for nightmares    Virtual Visit:  Pt report PTSD related  nightmares improved with  increase Prazosin. Denies any side effects. Reports Buspar has been helpful for anxiety. No complaint today of depression.  Remains sober on Antabuse. Thought processes are clear and organized.  Moderate use of Valium  for panic attacks. Denies SI/HI/AVH    Psychotherapy:  Target symptoms: anxiety   Why chosen therapy is appropriate versus another modality: relevant to diagnosis  Outcome monitoring methods: self-report  Therapeutic intervention type: insight oriented psychotherapy  Topics discussed/themes: building skills sets for symptom management, symptom recognition  The patient's response to the intervention is accepting. The patient's progress toward treatment goals is good.   Duration of intervention: 13 minutes.    Review of Systems   PSYCHIATRIC: Pertinant items are noted in the narrative.  CONSTITUTIONAL: No weight gain or loss.   MUSCULOSKELETAL: No pain or stiffness of the joints.  NEUROLOGIC: No weakness, sensory changes, seizures, confusion, memory loss, tremor or other abnormal movements.  ENDOCRINE: No polydipsia or polyuria.  INTEGUMENTARY: No rashes or lacerations.  EYES: No exophthalmos, jaundice or blindness.  ENT: No dizziness, tinnitus or hearing loss.  RESPIRATORY: No shortness of breath.  CARDIOVASCULAR: No tachycardia or chest pain.  GASTROINTESTINAL: No nausea, vomiting, pain, constipation or diarrhea.  GENITOURINARY: No frequency, dysuria or sexual dysfunction.  HEMATOLOGIC/LYMPHATIC: No excessive bleeding, prolonged or excessive bleeding after dental extraction/injury.  ALLERGIC/IMMUNOLOGIC: No allergic response to materials, foods or animals at this time.    Past Medical, Family and Social History: The patient's past medical, family and social history have been reviewed and updated as appropriate within the electronic medical record - see encounter notes.    Compliance: yes    Side effects: None    Risk Parameters:  Patient reports no suicidal ideation  Patient reports no homicidal ideation  Patient reports no self-injurious behavior  Patient reports no violent behavior    Exam (detailed: at least 9 elements; comprehensive: all 15 elements)   Constitutional  Vitals:  Most recent vital signs, dated  greater than 90 days prior to this appointment, were reviewed.   There were no vitals filed for this visit.     General:  unremarkable, age appropriate     Musculoskeletal  Muscle Strength/Tone:  no tremor, no tic   Gait & Station:  non-ataxic     Psychiatric  Speech:  no latency; no press   Mood & Affect:  dysthymic  congruent and appropriate   Thought Process:  normal and logical   Associations:  intact   Thought Content:  normal, no suicidality, no homicidality, delusions, or paranoia   Insight:  intact   Judgement: behavior is adequate to circumstances   Orientation:  grossly intact   Memory: intact for content of interview   Language: grossly intact   Attention Span & Concentration:  able to focus   Fund of Knowledge:  intact and appropriate to age and level of education     Assessment and Diagnosis   Status/Progress: Based on the examination today, the patient's problem(s) is/are improved.  New problems have not been presented today.   Co-morbidities and Lack of compliance are not complicating management of the primary condition.  There are no active rule-out diagnoses for this patient at this time.     General Impression:       ICD-10-CM ICD-9-CM   1. Alcohol use disorder, moderate, in sustained remission  F10.21 303.93   2. PTSD (post-traumatic stress disorder)  F43.10 309.81   3. Panic disorder  F41.0 300.01   4. Alcohol use disorder in remission  F10.91 305.03   5. Depression, major, recurrent, in partial remission  F33.41 296.35   6. Insomnia disorder, with non-sleep disorder mental comorbidity  G47.00 780.52       Intervention/Counseling/Treatment Plan   Medication Management: Continue current medications. The risks and benefits of medication were discussed with the patient.  AA/NA/CA/ACOA/Abstinence   Continue Effexor  mg po daily  Continue Antabuse 250 mg po daily  Continue Valium 5 mg po BID PRN   Continue Seroquel 25-50 mg before bed  Continue  Buspar 30 mg BID PRN anxiety  Continue Prazosin 2  mg at bedtime for nightmares  Continue with psychotherapy    Return to Clinic: 6 months     Risks, benefits, side effects and alternative treatments discussed with patient. Patient agrees with the current plan as documented.  Encouraged Patient to keep future appointments.  Take medications as prescribed and abstain from substance abuse.  Pt to present to ED for thoughts to harm herself or others

## 2023-08-27 ENCOUNTER — PATIENT MESSAGE (OUTPATIENT)
Dept: BARIATRICS | Facility: CLINIC | Age: 53
End: 2023-08-27
Payer: MEDICARE

## 2023-08-27 ENCOUNTER — PATIENT MESSAGE (OUTPATIENT)
Dept: PSYCHIATRY | Facility: CLINIC | Age: 53
End: 2023-08-27
Payer: MEDICARE

## 2023-08-27 DIAGNOSIS — F17.210 TOBACCO DEPENDENCE DUE TO CIGARETTES: ICD-10-CM

## 2023-08-27 DIAGNOSIS — G44.001 INTRACTABLE CLUSTER HEADACHE SYNDROME, UNSPECIFIED CHRONICITY PATTERN: ICD-10-CM

## 2023-08-27 DIAGNOSIS — K28.7 CHRONIC ANASTOMOTIC ULCER OF GASTROJEJUNAL REGION: Primary | ICD-10-CM

## 2023-08-27 DIAGNOSIS — F43.10 PTSD (POST-TRAUMATIC STRESS DISORDER): ICD-10-CM

## 2023-08-28 ENCOUNTER — TELEPHONE (OUTPATIENT)
Dept: BARIATRICS | Facility: CLINIC | Age: 53
End: 2023-08-28
Payer: MEDICARE

## 2023-08-28 NOTE — TELEPHONE ENCOUNTER
----- Message from Scarlet Bermudez RN sent at 8/28/2023  1:56 PM CDT -----  Hi-    Dr. Street is requesting a follow up appointment with a bariatric dietician to see if y'all can give tips/advice on oral intake and nutrition.    Thanks for your help.    -Scarlet

## 2023-08-29 ENCOUNTER — TELEPHONE (OUTPATIENT)
Dept: BARIATRICS | Facility: CLINIC | Age: 53
End: 2023-08-29
Payer: MEDICARE

## 2023-08-29 ENCOUNTER — PATIENT MESSAGE (OUTPATIENT)
Dept: GASTROENTEROLOGY | Facility: CLINIC | Age: 53
End: 2023-08-29
Payer: MEDICARE

## 2023-08-29 RX ORDER — SUMATRIPTAN 50 MG/1
TABLET, FILM COATED ORAL
Qty: 9 TABLET | Refills: 1 | Status: SHIPPED | OUTPATIENT
Start: 2023-08-29

## 2023-08-29 RX ORDER — SUCRALFATE 1 G/10ML
1 SUSPENSION ORAL
Qty: 414 ML | Refills: 3 | Status: SHIPPED | OUTPATIENT
Start: 2023-08-29 | End: 2023-12-27 | Stop reason: SDUPTHER

## 2023-08-29 RX ORDER — PRAZOSIN HYDROCHLORIDE 2 MG/1
2 CAPSULE ORAL NIGHTLY
Qty: 90 CAPSULE | Refills: 1 | OUTPATIENT
Start: 2023-08-29

## 2023-08-29 RX ORDER — FAMOTIDINE 40 MG/1
40 TABLET, FILM COATED ORAL NIGHTLY
Qty: 30 TABLET | Refills: 11 | Status: SHIPPED | OUTPATIENT
Start: 2023-08-29 | End: 2024-03-07 | Stop reason: SDUPTHER

## 2023-08-29 NOTE — TELEPHONE ENCOUNTER
Spoke with PT regarding her inability to keep foods down. We discussed the BRAT diet and foods/drinks she is able to keep down. PT reports she can keep decaf coffee, pedialyte, and apple sauce down. PT says she tried making smoothies with protein powder, water, almond milk, frozen fruits and vegetables and it came back up. Suggested to PT to mix protein powder with almond milk alone to try, and to also put some of the protein drink into her coffee. Also suggested sprinkling protein powder into applesauce. PT stated she will try the above suggestions.

## 2023-09-01 ENCOUNTER — TELEPHONE (OUTPATIENT)
Dept: BARIATRICS | Facility: CLINIC | Age: 53
End: 2023-09-01
Payer: MEDICARE

## 2023-09-05 ENCOUNTER — TELEPHONE (OUTPATIENT)
Dept: SMOKING CESSATION | Facility: CLINIC | Age: 53
End: 2023-09-05
Payer: MEDICARE

## 2023-09-05 NOTE — TELEPHONE ENCOUNTER
[facility]-called patient regarding missed intake appointment.  Patient states that she is not feeling well.  Patient is rescheduled on 09- at 11:00 AM.

## 2023-09-14 ENCOUNTER — TELEPHONE (OUTPATIENT)
Dept: SMOKING CESSATION | Facility: CLINIC | Age: 53
End: 2023-09-14
Payer: MEDICARE

## 2023-09-14 NOTE — TELEPHONE ENCOUNTER
[facility]-called patient regarding canceled intake appointment, no answer, no voicemail.

## 2023-09-26 ENCOUNTER — TELEPHONE (OUTPATIENT)
Dept: SMOKING CESSATION | Facility: CLINIC | Age: 53
End: 2023-09-26
Payer: MEDICARE

## 2023-09-26 NOTE — TELEPHONE ENCOUNTER
[facility]-called patient regarding canceled intake appointment, no answer.  Left message and contact number was given.

## 2023-10-19 ENCOUNTER — TELEPHONE (OUTPATIENT)
Dept: ENDOSCOPY | Facility: HOSPITAL | Age: 53
End: 2023-10-19
Payer: MEDICARE

## 2023-10-19 NOTE — TELEPHONE ENCOUNTER
----- Message from Brandi Horne sent at 10/19/2023 12:37 PM CDT -----  Regarding: appt access  Contact: pt [phone]  Pt calling to reschedule appt. Pls call

## 2023-11-06 ENCOUNTER — TELEPHONE (OUTPATIENT)
Dept: CARDIOLOGY | Facility: CLINIC | Age: 53
End: 2023-11-06
Payer: MEDICARE

## 2023-11-06 NOTE — TELEPHONE ENCOUNTER
----- Message from Jenn Gavin sent at 11/6/2023  9:26 AM CST -----  Regarding: cardio clearance for dental  Name of Who is Calling:CHANTALE MOSLEY [[id number]]          What is the request in detail:  Pt is calling bc she needs to get  a cardiac clearance for some dental work she needs done. Please call and et her know how she can go about getting from filled out.         Can the clinic reply by [facility]:          What Number to Call Back if not in [facility]:[phone]

## 2023-11-08 ENCOUNTER — PATIENT MESSAGE (OUTPATIENT)
Dept: CARDIOLOGY | Facility: CLINIC | Age: 53
End: 2023-11-08
Payer: MEDICARE

## 2023-12-04 ENCOUNTER — OFFICE VISIT (OUTPATIENT)
Dept: PSYCHIATRY | Facility: CLINIC | Age: 53
End: 2023-12-04
Payer: MEDICARE

## 2023-12-04 DIAGNOSIS — F41.0 PANIC DISORDER: ICD-10-CM

## 2023-12-04 DIAGNOSIS — F33.41 DEPRESSION, MAJOR, RECURRENT, IN PARTIAL REMISSION: ICD-10-CM

## 2023-12-04 DIAGNOSIS — F42.8 OTHER OBSESSIVE-COMPULSIVE DISORDERS: ICD-10-CM

## 2023-12-04 DIAGNOSIS — F43.10 PTSD (POST-TRAUMATIC STRESS DISORDER): Primary | ICD-10-CM

## 2023-12-04 DIAGNOSIS — F10.21 ALCOHOL USE DISORDER, MODERATE, IN SUSTAINED REMISSION: ICD-10-CM

## 2023-12-04 PROCEDURE — 3044F HG A1C LEVEL LT 7.0%: CPT | Mod: CPTII,95,, | Performed by: SOCIAL WORKER

## 2023-12-04 PROCEDURE — 4010F ACE/ARB THERAPY RXD/TAKEN: CPT | Mod: CPTII,95,, | Performed by: SOCIAL WORKER

## 2023-12-04 PROCEDURE — 3044F PR MOST RECENT HEMOGLOBIN A1C LEVEL <7.0%: ICD-10-PCS | Mod: CPTII,95,, | Performed by: SOCIAL WORKER

## 2023-12-04 PROCEDURE — 90832 PSYTX W PT 30 MINUTES: CPT | Mod: 95,,, | Performed by: SOCIAL WORKER

## 2023-12-04 PROCEDURE — 90832 PR PSYCHOTHERAPY W/PATIENT, 30 MIN: ICD-10-PCS | Mod: 95,,, | Performed by: SOCIAL WORKER

## 2023-12-04 PROCEDURE — 4010F PR ACE/ARB THEARPY RXD/TAKEN: ICD-10-PCS | Mod: CPTII,95,, | Performed by: SOCIAL WORKER
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
## 2023-12-11 ENCOUNTER — TELEPHONE (OUTPATIENT)
Dept: ENDOSCOPY | Facility: HOSPITAL | Age: 53
End: 2023-12-11
Payer: MEDICARE

## 2023-12-11 ENCOUNTER — LAB VISIT (OUTPATIENT)
Dept: LAB | Facility: HOSPITAL | Age: 53
End: 2023-12-11
Attending: STUDENT IN AN ORGANIZED HEALTH CARE EDUCATION/TRAINING PROGRAM
Payer: MEDICARE

## 2023-12-11 ENCOUNTER — OFFICE VISIT (OUTPATIENT)
Dept: GASTROENTEROLOGY | Facility: CLINIC | Age: 53
End: 2023-12-11
Payer: MEDICARE

## 2023-12-11 VITALS
DIASTOLIC BLOOD PRESSURE: 75 MMHG | WEIGHT: 178.13 LBS | BODY MASS INDEX: 29.68 KG/M2 | HEIGHT: 65 IN | SYSTOLIC BLOOD PRESSURE: 110 MMHG | HEART RATE: 84 BPM

## 2023-12-11 DIAGNOSIS — K27.9 PEPTIC ULCER DISEASE: Primary | ICD-10-CM

## 2023-12-11 DIAGNOSIS — R10.9 ABDOMINAL PAIN, UNSPECIFIED ABDOMINAL LOCATION: ICD-10-CM

## 2023-12-11 DIAGNOSIS — R10.9 ABDOMINAL PAIN, UNSPECIFIED ABDOMINAL LOCATION: Primary | ICD-10-CM

## 2023-12-11 LAB
ALBUMIN SERPL BCP-MCNC: 3.7 G/DL (ref 3.5–5.2)
ALP SERPL-CCNC: 108 U/L (ref 55–135)
ALT SERPL W/O P-5'-P-CCNC: 18 U/L (ref 10–44)
ANION GAP SERPL CALC-SCNC: 14 MMOL/L (ref 8–16)
AST SERPL-CCNC: 20 U/L (ref 10–40)
BASOPHILS # BLD AUTO: 0.03 K/UL (ref 0–0.2)
BASOPHILS NFR BLD: 0.5 % (ref 0–1.9)
BILIRUB SERPL-MCNC: 0.4 MG/DL (ref 0.1–1)
BUN SERPL-MCNC: 28 MG/DL (ref 6–20)
CALCIUM SERPL-MCNC: 9.9 MG/DL (ref 8.7–10.5)
CHLORIDE SERPL-SCNC: 106 MMOL/L (ref 95–110)
CO2 SERPL-SCNC: 20 MMOL/L (ref 23–29)
CREAT SERPL-MCNC: 1.2 MG/DL (ref 0.5–1.4)
DIFFERENTIAL METHOD: ABNORMAL
EOSINOPHIL # BLD AUTO: 0.1 K/UL (ref 0–0.5)
EOSINOPHIL NFR BLD: 2 % (ref 0–8)
ERYTHROCYTE [DISTWIDTH] IN BLOOD BY AUTOMATED COUNT: 13.3 % (ref 11.5–14.5)
EST. GFR  (NO RACE VARIABLE): 54.5 ML/MIN/1.73 M^2
GLUCOSE SERPL-MCNC: 91 MG/DL (ref 70–110)
HCT VFR BLD AUTO: 34.6 % (ref 37–48.5)
HGB BLD-MCNC: 12.2 G/DL (ref 12–16)
IMM GRANULOCYTES # BLD AUTO: 0.01 K/UL (ref 0–0.04)
IMM GRANULOCYTES NFR BLD AUTO: 0.2 % (ref 0–0.5)
LYMPHOCYTES # BLD AUTO: 1.5 K/UL (ref 1–4.8)
LYMPHOCYTES NFR BLD: 24.7 % (ref 18–48)
MCH RBC QN AUTO: 31.1 PG (ref 27–31)
MCHC RBC AUTO-ENTMCNC: 35.3 G/DL (ref 32–36)
MCV RBC AUTO: 88 FL (ref 82–98)
MONOCYTES # BLD AUTO: 0.3 K/UL (ref 0.3–1)
MONOCYTES NFR BLD: 5.4 % (ref 4–15)
NEUTROPHILS # BLD AUTO: 4 K/UL (ref 1.8–7.7)
NEUTROPHILS NFR BLD: 67.2 % (ref 38–73)
NRBC BLD-RTO: 0 /100 WBC
PLATELET # BLD AUTO: 247 K/UL (ref 150–450)
PMV BLD AUTO: 10.2 FL (ref 9.2–12.9)
POTASSIUM SERPL-SCNC: 4.4 MMOL/L (ref 3.5–5.1)
PROT SERPL-MCNC: 7.7 G/DL (ref 6–8.4)
RBC # BLD AUTO: 3.92 M/UL (ref 4–5.4)
SODIUM SERPL-SCNC: 140 MMOL/L (ref 136–145)
WBC # BLD AUTO: 5.9 K/UL (ref 3.9–12.7)

## 2023-12-11 PROCEDURE — 4010F ACE/ARB THERAPY RXD/TAKEN: CPT | Mod: CPTII,S$GLB,, | Performed by: STUDENT IN AN ORGANIZED HEALTH CARE EDUCATION/TRAINING PROGRAM

## 2023-12-11 PROCEDURE — 3008F PR BODY MASS INDEX (BMI) DOCUMENTED: ICD-10-PCS | Mod: CPTII,S$GLB,, | Performed by: STUDENT IN AN ORGANIZED HEALTH CARE EDUCATION/TRAINING PROGRAM

## 2023-12-11 PROCEDURE — 4010F PR ACE/ARB THEARPY RXD/TAKEN: ICD-10-PCS | Mod: CPTII,S$GLB,, | Performed by: STUDENT IN AN ORGANIZED HEALTH CARE EDUCATION/TRAINING PROGRAM

## 2023-12-11 PROCEDURE — 3044F HG A1C LEVEL LT 7.0%: CPT | Mod: CPTII,S$GLB,, | Performed by: STUDENT IN AN ORGANIZED HEALTH CARE EDUCATION/TRAINING PROGRAM

## 2023-12-11 PROCEDURE — 3044F PR MOST RECENT HEMOGLOBIN A1C LEVEL <7.0%: ICD-10-PCS | Mod: CPTII,S$GLB,, | Performed by: STUDENT IN AN ORGANIZED HEALTH CARE EDUCATION/TRAINING PROGRAM

## 2023-12-11 PROCEDURE — 1159F PR MEDICATION LIST DOCUMENTED IN MEDICAL RECORD: ICD-10-PCS | Mod: CPTII,S$GLB,, | Performed by: STUDENT IN AN ORGANIZED HEALTH CARE EDUCATION/TRAINING PROGRAM

## 2023-12-11 PROCEDURE — 99214 OFFICE O/P EST MOD 30 MIN: CPT | Mod: S$GLB,,, | Performed by: STUDENT IN AN ORGANIZED HEALTH CARE EDUCATION/TRAINING PROGRAM

## 2023-12-11 PROCEDURE — 99214 PR OFFICE/OUTPT VISIT, EST, LEVL IV, 30-39 MIN: ICD-10-PCS | Mod: S$GLB,,, | Performed by: STUDENT IN AN ORGANIZED HEALTH CARE EDUCATION/TRAINING PROGRAM

## 2023-12-11 PROCEDURE — 3074F SYST BP LT 130 MM HG: CPT | Mod: CPTII,S$GLB,, | Performed by: STUDENT IN AN ORGANIZED HEALTH CARE EDUCATION/TRAINING PROGRAM

## 2023-12-11 PROCEDURE — 99999 PR PBB SHADOW E&M-EST. PATIENT-LVL V: CPT | Mod: PBBFAC,,, | Performed by: STUDENT IN AN ORGANIZED HEALTH CARE EDUCATION/TRAINING PROGRAM

## 2023-12-11 PROCEDURE — 99999 PR PBB SHADOW E&M-EST. PATIENT-LVL V: ICD-10-PCS | Mod: PBBFAC,,, | Performed by: STUDENT IN AN ORGANIZED HEALTH CARE EDUCATION/TRAINING PROGRAM

## 2023-12-11 PROCEDURE — 3078F PR MOST RECENT DIASTOLIC BLOOD PRESSURE < 80 MM HG: ICD-10-PCS | Mod: CPTII,S$GLB,, | Performed by: STUDENT IN AN ORGANIZED HEALTH CARE EDUCATION/TRAINING PROGRAM

## 2023-12-11 PROCEDURE — 3074F PR MOST RECENT SYSTOLIC BLOOD PRESSURE < 130 MM HG: ICD-10-PCS | Mod: CPTII,S$GLB,, | Performed by: STUDENT IN AN ORGANIZED HEALTH CARE EDUCATION/TRAINING PROGRAM

## 2023-12-11 PROCEDURE — 36415 COLL VENOUS BLD VENIPUNCTURE: CPT | Performed by: STUDENT IN AN ORGANIZED HEALTH CARE EDUCATION/TRAINING PROGRAM

## 2023-12-11 PROCEDURE — 80053 COMPREHEN METABOLIC PANEL: CPT | Performed by: STUDENT IN AN ORGANIZED HEALTH CARE EDUCATION/TRAINING PROGRAM

## 2023-12-11 PROCEDURE — 3008F BODY MASS INDEX DOCD: CPT | Mod: CPTII,S$GLB,, | Performed by: STUDENT IN AN ORGANIZED HEALTH CARE EDUCATION/TRAINING PROGRAM

## 2023-12-11 PROCEDURE — 3078F DIAST BP <80 MM HG: CPT | Mod: CPTII,S$GLB,, | Performed by: STUDENT IN AN ORGANIZED HEALTH CARE EDUCATION/TRAINING PROGRAM

## 2023-12-11 PROCEDURE — 1159F MED LIST DOCD IN RCRD: CPT | Mod: CPTII,S$GLB,, | Performed by: STUDENT IN AN ORGANIZED HEALTH CARE EDUCATION/TRAINING PROGRAM

## 2023-12-11 PROCEDURE — 85025 COMPLETE CBC W/AUTO DIFF WBC: CPT | Performed by: STUDENT IN AN ORGANIZED HEALTH CARE EDUCATION/TRAINING PROGRAM

## 2023-12-11 RX ORDER — AMOXICILLIN 250 MG
1 CAPSULE ORAL DAILY
Qty: 60 TABLET | Refills: 1 | Status: SHIPPED | OUTPATIENT
Start: 2023-12-11

## 2023-12-11 RX ORDER — OMEPRAZOLE 40 MG/1
40 CAPSULE, DELAYED RELEASE ORAL
Qty: 60 CAPSULE | Refills: 1 | Status: SHIPPED | OUTPATIENT
Start: 2023-12-11 | End: 2023-12-12

## 2023-12-11 NOTE — TELEPHONE ENCOUNTER
"----- Message from Jennifer Winkler sent at 2023 11:27 AM CST -----    ----- Message -----  From: Leslye Ward MD  Sent: 2023  10:16 AM CST  To: [facility]    Procedure: EGD    Diagnosis: Abdominal pain and Peptic ulcer disease    Procedure Timin-4 weeks    #If within 4 weeks selected, please bharathi as high priority#    #If greater than 12 weeks, please select "5-12 weeks" and delay sending until 3 months prior to requested date#     Provider: Any GI provider    [place] [facility]    Additional Scheduling Information: Advanced cardiac or pulmonary disease    Prep Specifications:N/A    Is the patient taking a GLP-1 Agonist:no    Have you attached a patient to this message: yes        "

## 2023-12-11 NOTE — PROGRESS NOTES
"    [facility] Consultation Note    Reason for Consult:  abdominal pain     PCP:   Caridad Padilla ([place])       Referring MD:  No referring provider defined for this encounter.    HPI:  This is a 53 y.o. female here for evaluation of abdominal pain. PMHx of Arthritis, CHF, CVA, GERD, HTN, PTSD, Sickle Cell Trait, CKD3. She has a history of gastric bypass in 2007 with anastomotic ulcers in the past. Last EGD was in 2/2023 done during a hospitalization which showed 1cm linear ulceration at anastomosis with mild stenosis. No biopsies obtained. Recommended to take protonix BID and carafate. She has been complaint with this but still has epigastric abdominal pain, N/V. Denied any hematemesis, dysphagia.      A previous EGD in 2022 by Dr Rosario showed more extensive ulceration at the anastomosis. She was still smoking 1ppd at that time. Gastric biopsies were HP negative. Most recent blood work is from 4/2023 which showed no anemia. CMP and lipase wnl. She had an upper GI series 1/2023 which showed narrowing at gastrojejunostomy without upstream dilation or obvious obstruction. However, test was aborted due to patient anxiety and N/V. Also noted a small hiatal hernia.  She is s/p cholecystectomy. She has seen Dr Street in the past it seems. Patient is interested in hiatal hernia repair but it was recommended she quit smoking and also the ulcerations need to be improved prior to any consideration for this (from chart review). She has been able to decrease to 5 cigarettes per day.     Denied NSAID use. No Fh of CRC, gastric cancer, celiac. Has a cousin with Crohn's disease.        No colonoscopy on file. There is a flex sig from 2015 which was poor prep. Had a recent negative FIT she states     Objective Findings:    Vital Signs:  /75   Pulse 84   Ht 5' 5" (1.651 m)   Wt 80.8 kg (178 lb 2.1 oz) Comment: 80.9  BMI 29.64 kg/m²   Body mass index is 29.64 kg/m².    Physical Exam:  General " Appearance: Well appearing in no acute distress  Head:   Normocephalic, without obvious abnormality  Eyes:    No scleral icterus    Lungs: CTA bilaterally in anterior and posterior fields   Heart:  Regular rate and rhythm, no murmurs heard  Abdomen: Soft, non tender, non distended with positive bowel sounds in all four quadrants.      Imaging:    KUB 4/2023    Impression:     No acute process seen.     UG 2023   Impression:     Examination was terminated prematurely due to severe patient anxiety and numerous episodes of vomiting.     Postoperative changes from Haylee-en-Y gastric bypass.     Small sliding hiatal hernia.     Luminal narrowing at the gastrojejunostomy, without upstream dilatation or holdup of contrast to suggest obstruction.    Endoscopy:      EGD 2/2023        The examined esophagus was normal.        Evidence of a Haylee-en-Y gastrojejunostomy was found. The        gastrojejunal anastomosis was characterized by mild stenosis (easily        traversed) and a 1 cm linear clean based ulceration with surrounding        mucosal friability. This was traversed. [Pouch-to-Jejunum        Description]. [Jejunojejunal Description]. [Duod-to-Jejunum        Examined?] [Duod-to-Jejunum Length] [Duod-to-Jejunum Description].        [Excluded Stomach Examined?] [Excluded Stomach Description].        The examined jejunum was normal.     EGD 2022    - Non-bleeding jejunal ulcer with a clean ulcer                          base (Rancho Class III) Biopsied.                          - Erythematous mucosa in the cardia and gastric                          fundus. Biopsied.                          - Gastric stenosis was found at the anastomosis. A                          benign-appearing, intrinsic mild to moderate                          stenosis was found at the gastrojejunal                          anastomosis with the anastomosis ulcer just distal                          to the anastomosis on the jejunal side (likely                           from chronic ischemia). This was traversed.                          Opening estimated to be about 13-14mm. Not dilated                          at this first encounter due to deep ulcer likely                          ischemic and patient needs to stop smoking.Post                          surgical anatomy of a Haylee-en-Y gastric by pass                          with gastric pouch 4cm. Gastrojejunal anastomosis                          has an chronic ischemic stenosis with a large                          single ulcer at it - on the jejunal side.                          - 1 cm hiatal hernia. Biopsied.     Flex sig 2015  A moderate amount of semi-solid solid stool was found in the rectum        and in the sigmoid colon.     Assessment:    Abdominal pain   History of gastric bypass with anastomotic ulcers   Hiatal hernia (1cm)     Patient with continued abdominal pain despite protonix BID. Last EGD showed persistent ulceration at anastomosis although improved from 2022 EGD images. Recommend to repeat EGD and also to change to omeprazole to be taken as an open capsule. Continue to work on stopping smoking. Blood work today.     Recommendations:    - Change to omeprazole and take as open capsule with spoonful of applesauce   - Repeat EGD  - Repeat blood work today   - Patient prefers FIT testing over cscope     RTC after endoscopy       Order summary:  Orders Placed This Encounter    CBC Auto Differential    COMPREHENSIVE METABOLIC PANEL    omeprazole (PRILOSEC) 40 MG capsule    senna-docusate 8.6-50 mg (PERICOLACE) 8.6-50 mg per tablet         Thank you so much for allowing me to participate in the care of Daksha Ward MD  Gastroenterology   [facility]

## 2023-12-11 NOTE — TELEPHONE ENCOUNTER
Spoke to pt to schedule procedure(s) Upper Endoscopy (EGD)       Physician to perform procedure(s) Dr. JOSE G Claudio  Date of Procedure (s) 03/14/24  Arrival Time 10:30 AM  Time of Procedure(s) 11:30 AM   Location of Procedure(s) [address]  Type of Rx Prep sent to patient: N/A  Instructions provided to patient via [facility]    Patient was informed on the following information and verbalized understanding. Screening questionnaire reviewed with patient and complete. If procedure requires anesthesia, a responsible adult needs to be present to accompany the patient home, patient cannot drive after receiving anesthesia. Appointment details are tentative, especially check-in time. Patient will receive a prep-op call 7 days prior to confirm check-in time for procedure. If applicable the patient should contact their pharmacy to verify Rx for procedure prep is ready for pick-up. Patient was advised to call the scheduling department at [phone] if pharmacy states no Rx is available. Patient was advised to call the endoscopy scheduling department if any questions or concerns arise.      [facility] Scheduling Department

## 2023-12-26 ENCOUNTER — TELEPHONE (OUTPATIENT)
Dept: GASTROENTEROLOGY | Facility: CLINIC | Age: 53
End: 2023-12-26
Payer: MEDICARE

## 2023-12-27 DIAGNOSIS — K28.7 CHRONIC ANASTOMOTIC ULCER OF GASTROJEJUNAL REGION: ICD-10-CM

## 2023-12-27 RX ORDER — SUCRALFATE 1 G/10ML
1 SUSPENSION ORAL
Qty: 414 ML | Refills: 3 | Status: SHIPPED | OUTPATIENT
Start: 2023-12-27 | End: 2024-03-19

## 2024-01-19 DIAGNOSIS — Z12.31 SCREENING MAMMOGRAM, ENCOUNTER FOR: Primary | ICD-10-CM

## 2024-01-26 ENCOUNTER — PATIENT MESSAGE (OUTPATIENT)
Dept: GASTROENTEROLOGY | Facility: CLINIC | Age: 54
End: 2024-01-26
Payer: MEDICARE

## 2024-01-30 ENCOUNTER — OFFICE VISIT (OUTPATIENT)
Dept: PSYCHIATRY | Facility: CLINIC | Age: 54
End: 2024-01-30
Payer: MEDICARE

## 2024-01-30 DIAGNOSIS — F41.0 PANIC DISORDER: ICD-10-CM

## 2024-01-30 DIAGNOSIS — F33.41 DEPRESSION, MAJOR, RECURRENT, IN PARTIAL REMISSION: Primary | ICD-10-CM

## 2024-01-30 DIAGNOSIS — F43.10 PTSD (POST-TRAUMATIC STRESS DISORDER): ICD-10-CM

## 2024-01-30 DIAGNOSIS — G47.00 INSOMNIA DISORDER, WITH NON-SLEEP DISORDER MENTAL COMORBIDITY: ICD-10-CM

## 2024-01-30 DIAGNOSIS — F10.91 ALCOHOL USE DISORDER IN REMISSION: ICD-10-CM

## 2024-01-30 PROCEDURE — 99214 OFFICE O/P EST MOD 30 MIN: CPT | Mod: 95,,, | Performed by: NURSE PRACTITIONER

## 2024-01-30 RX ORDER — ZOLPIDEM TARTRATE 5 MG/1
5 TABLET ORAL NIGHTLY PRN
Qty: 30 TABLET | Refills: 2 | Status: SHIPPED | OUTPATIENT
Start: 2024-01-30 | End: 2024-02-22

## 2024-01-30 RX ORDER — PRAZOSIN HYDROCHLORIDE 2 MG/1
2 CAPSULE ORAL NIGHTLY
Qty: 90 CAPSULE | Refills: 1 | Status: SHIPPED | OUTPATIENT
Start: 2024-01-30 | End: 2024-03-13

## 2024-01-30 RX ORDER — DISULFIRAM 250 MG/1
250 TABLET ORAL DAILY
Qty: 90 TABLET | Refills: 3 | Status: SHIPPED | OUTPATIENT
Start: 2024-01-30

## 2024-01-30 RX ORDER — VENLAFAXINE HYDROCHLORIDE 75 MG/1
75 CAPSULE, EXTENDED RELEASE ORAL DAILY
Qty: 90 CAPSULE | Refills: 1 | Status: SHIPPED | OUTPATIENT
Start: 2024-01-30

## 2024-01-30 RX ORDER — BUSPIRONE HYDROCHLORIDE 30 MG/1
30 TABLET ORAL 2 TIMES DAILY PRN
Qty: 60 TABLET | Refills: 5 | Status: SHIPPED | OUTPATIENT
Start: 2024-01-30

## 2024-01-30 RX ORDER — DIAZEPAM 5 MG/1
5 TABLET ORAL EVERY 12 HOURS PRN
Qty: 60 TABLET | Refills: 5 | Status: SHIPPED | OUTPATIENT
Start: 2024-01-30

## 2024-01-30 RX ORDER — VENLAFAXINE HYDROCHLORIDE 150 MG/1
150 CAPSULE, EXTENDED RELEASE ORAL DAILY
Qty: 90 CAPSULE | Refills: 3 | Status: SHIPPED | OUTPATIENT
Start: 2024-01-30

## 2024-01-30 NOTE — PROGRESS NOTES
Outpatient Psychiatry Follow-Up Visit (MD/NP)    1/30/2024    Clinical Status of Patient:  Outpatient (Ambulatory)    Chief Complaint:  Daksha Marie is a 53 y.o. female who presents today for follow-up of anxiety and PTSD .  Met with patient.      Last visit was: 4/11/23 Chart and  reviewed.   The patient location is: home  The chief complaint leading to consultation is: depression and anxiety    Visit type: audiovisual    Face to Face time with patient: 30 minutes  35 minutes of total time spent on the encounter, which includes face to face time and non-face to face time preparing to see the patient (eg, review of tests), Obtaining and/or reviewing separately obtained history, Documenting clinical information in the electronic or other health record, Independently interpreting results (not separately reported) and communicating results to the patient/family/caregiver, or Care coordination (not separately reported).     Each patient to whom he or she provides medical services by telemedicine is:  (1) informed of the relationship between the physician and patient and the respective role of any other health care provider with respect to management of the patient; and (2) notified that he or she may decline to receive medical services by telemedicine and may withdraw from such care at any time.    Interval History and Content of Current Session:  Current Psychiatric Medications/changes  Continue Effexor  mg po daily  Continue Antabuse 250 mg po daily  Continue Valium 5 mg po BID PRN   Continue Seroquel 25-50 mg before bed  Continue  Buspar 30 mg BID PRN anxiety  Increase to Prazosin 2 mg at bedtime for nightmares    Virtual Visit:  Pt report PTSD related  nightmares improved with  increase Prazosin but didn't like Seroquel or Trazodone. Will try Ambien. Denies any side effects. Reports Buspar has been helpful for anxiety. No complaint today of depression.  Remains sober on Antabuse. Thought  processes are clear and organized.  Moderate use of Valium for panic attacks. Denies SI/HI/AVH    Psychotherapy:  Target symptoms: anxiety   Why chosen therapy is appropriate versus another modality: relevant to diagnosis  Outcome monitoring methods: self-report  Therapeutic intervention type: insight oriented psychotherapy  Topics discussed/themes: building skills sets for symptom management, symptom recognition  The patient's response to the intervention is accepting. The patient's progress toward treatment goals is good.   Duration of intervention: 13 minutes.    Review of Systems   PSYCHIATRIC: Pertinant items are noted in the narrative.  CONSTITUTIONAL: No weight gain or loss.   MUSCULOSKELETAL: No pain or stiffness of the joints.  NEUROLOGIC: No weakness, sensory changes, seizures, confusion, memory loss, tremor or other abnormal movements.  ENDOCRINE: No polydipsia or polyuria.  INTEGUMENTARY: No rashes or lacerations.  EYES: No exophthalmos, jaundice or blindness.  ENT: No dizziness, tinnitus or hearing loss.  RESPIRATORY: No shortness of breath.  CARDIOVASCULAR: No tachycardia or chest pain.  GASTROINTESTINAL: No nausea, vomiting, pain, constipation or diarrhea.  GENITOURINARY: No frequency, dysuria or sexual dysfunction.  HEMATOLOGIC/LYMPHATIC: No excessive bleeding, prolonged or excessive bleeding after dental extraction/injury.  ALLERGIC/IMMUNOLOGIC: No allergic response to materials, foods or animals at this time.    Past Medical, Family and Social History: The patient's past medical, family and social history have been reviewed and updated as appropriate within the electronic medical record - see encounter notes.    Compliance: yes    Side effects: None    Risk Parameters:  Patient reports no suicidal ideation  Patient reports no homicidal ideation  Patient reports no self-injurious behavior  Patient reports no violent behavior    Exam (detailed: at least 9 elements; comprehensive: all 15 elements)    Constitutional  Vitals:  Most recent vital signs, dated greater than 90 days prior to this appointment, were reviewed.   There were no vitals filed for this visit.     General:  unremarkable, age appropriate     Musculoskeletal  Muscle Strength/Tone:  no tremor, no tic   Gait & Station:  non-ataxic     Psychiatric  Speech:  no latency; no press   Mood & Affect:  dysthymic  congruent and appropriate   Thought Process:  normal and logical   Associations:  intact   Thought Content:  normal, no suicidality, no homicidality, delusions, or paranoia   Insight:  intact   Judgement: behavior is adequate to circumstances   Orientation:  grossly intact   Memory: intact for content of interview   Language: grossly intact   Attention Span & Concentration:  able to focus   Fund of Knowledge:  intact and appropriate to age and level of education     Assessment and Diagnosis   Status/Progress: Based on the examination today, the patient's problem(s) is/are improved.  New problems have not been presented today.   Co-morbidities and Lack of compliance are not complicating management of the primary condition.  There are no active rule-out diagnoses for this patient at this time.     General Impression:       ICD-10-CM ICD-9-CM   1. Depression, major, recurrent, in partial remission  F33.41 296.35   2. PTSD (post-traumatic stress disorder)  F43.10 309.81   3. Panic disorder  F41.0 300.01   4. Alcohol use disorder in remission  F10.91 305.03   5. Insomnia disorder, with non-sleep disorder mental comorbidity  G47.00 780.52         Intervention/Counseling/Treatment Plan   Medication Management: Continue current medications. The risks and benefits of medication were discussed with the patient.  AA/NA/CA/ACOA/Abstinence   Continue Effexor  mg po daily  Continue Antabuse 250 mg po daily  Continue Valium 5 mg po BID PRN   DC Seroquel  Try Ambien 5 mg before bed as needed for insomnia  Continue  Buspar 30 mg BID PRN anxiety  Continue  Prazosin 2 mg at bedtime for nightmares  Continue with psychotherapy    Return to Clinic: 6 months     Risks, benefits, side effects and alternative treatments discussed with patient. Patient agrees with the current plan as documented.  Encouraged Patient to keep future appointments.  Take medications as prescribed and abstain from substance abuse.  Pt to present to ED for thoughts to harm herself or others

## 2024-02-05 ENCOUNTER — PATIENT MESSAGE (OUTPATIENT)
Dept: GASTROENTEROLOGY | Facility: CLINIC | Age: 54
End: 2024-02-05
Payer: MEDICARE

## 2024-02-05 DIAGNOSIS — R10.9 ABDOMINAL PAIN, UNSPECIFIED ABDOMINAL LOCATION: ICD-10-CM

## 2024-02-05 DIAGNOSIS — R10.9 ABDOMINAL PAIN, UNSPECIFIED ABDOMINAL LOCATION: Primary | ICD-10-CM

## 2024-02-05 RX ORDER — DICYCLOMINE HYDROCHLORIDE 10 MG/1
10 CAPSULE ORAL
Qty: 120 CAPSULE | Refills: 1 | Status: SHIPPED | OUTPATIENT
Start: 2024-02-05 | End: 2024-02-06 | Stop reason: SDUPTHER

## 2024-02-06 RX ORDER — DICYCLOMINE HYDROCHLORIDE 10 MG/1
CAPSULE ORAL
Qty: 360 CAPSULE | Refills: 2 | Status: SHIPPED | OUTPATIENT
Start: 2024-02-06 | End: 2024-02-21

## 2024-02-19 ENCOUNTER — OFFICE VISIT (OUTPATIENT)
Dept: PSYCHIATRY | Facility: CLINIC | Age: 54
End: 2024-02-19
Payer: MEDICARE

## 2024-02-19 DIAGNOSIS — F43.10 PTSD (POST-TRAUMATIC STRESS DISORDER): Primary | ICD-10-CM

## 2024-02-19 DIAGNOSIS — F41.0 PANIC DISORDER: ICD-10-CM

## 2024-02-19 PROCEDURE — 90834 PSYTX W PT 45 MINUTES: CPT | Mod: 95,,, | Performed by: SOCIAL WORKER
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
## 2024-02-21 ENCOUNTER — PATIENT MESSAGE (OUTPATIENT)
Dept: PSYCHIATRY | Facility: CLINIC | Age: 54
End: 2024-02-21
Payer: MEDICARE

## 2024-02-21 DIAGNOSIS — R10.9 ABDOMINAL PAIN, UNSPECIFIED ABDOMINAL LOCATION: ICD-10-CM

## 2024-02-21 DIAGNOSIS — F33.41 DEPRESSION, MAJOR, RECURRENT, IN PARTIAL REMISSION: ICD-10-CM

## 2024-02-21 DIAGNOSIS — F43.10 PTSD (POST-TRAUMATIC STRESS DISORDER): ICD-10-CM

## 2024-02-21 DIAGNOSIS — F10.91 ALCOHOL USE DISORDER IN REMISSION: ICD-10-CM

## 2024-02-21 DIAGNOSIS — G47.00 INSOMNIA DISORDER, WITH NON-SLEEP DISORDER MENTAL COMORBIDITY: Primary | ICD-10-CM

## 2024-02-21 RX ORDER — VENLAFAXINE HYDROCHLORIDE 75 MG/1
TABLET, EXTENDED RELEASE ORAL
Refills: 0 | OUTPATIENT
Start: 2024-02-21

## 2024-02-21 RX ORDER — BUSPIRONE HYDROCHLORIDE 15 MG/1
TABLET ORAL
Refills: 0 | OUTPATIENT
Start: 2024-02-21

## 2024-02-21 RX ORDER — PRAZOSIN HYDROCHLORIDE 2 MG/1
CAPSULE ORAL
Refills: 0 | OUTPATIENT
Start: 2024-02-21

## 2024-02-21 RX ORDER — DISULFIRAM 250 MG/1
TABLET ORAL
Refills: 0 | OUTPATIENT
Start: 2024-02-21

## 2024-02-21 RX ORDER — OMEPRAZOLE 40 MG/1
40 CAPSULE, DELAYED RELEASE ORAL EVERY MORNING
Qty: 30 CAPSULE | Refills: 11 | Status: SHIPPED | OUTPATIENT
Start: 2024-02-21 | End: 2025-02-15

## 2024-02-21 RX ORDER — DICYCLOMINE HYDROCHLORIDE 10 MG/1
10 CAPSULE ORAL
Qty: 120 CAPSULE | Refills: 0 | Status: SHIPPED | OUTPATIENT
Start: 2024-02-21 | End: 2024-04-25

## 2024-02-22 ENCOUNTER — HOSPITAL ENCOUNTER (OUTPATIENT)
Dept: RADIOLOGY | Facility: HOSPITAL | Age: 54
Discharge: HOME | End: 2024-02-22
Attending: FAMILY MEDICINE
Payer: MEDICARE

## 2024-02-22 VITALS — WEIGHT: 178 LBS | BODY MASS INDEX: 29.66 KG/M2 | HEIGHT: 65 IN

## 2024-02-22 DIAGNOSIS — Z12.31 SCREENING MAMMOGRAM, ENCOUNTER FOR: ICD-10-CM

## 2024-02-22 PROCEDURE — 77067 SCR MAMMO BI INCL CAD: CPT | Mod: TC

## 2024-02-22 PROCEDURE — 77067 SCR MAMMO BI INCL CAD: CPT | Mod: 26,,, | Performed by: RADIOLOGY

## 2024-02-22 PROCEDURE — 77063 BREAST TOMOSYNTHESIS BI: CPT | Mod: 26,,, | Performed by: RADIOLOGY

## 2024-02-22 RX ORDER — MIRTAZAPINE 15 MG/1
15 TABLET, FILM COATED ORAL NIGHTLY
Qty: 30 TABLET | Refills: 2 | Status: SHIPPED | OUTPATIENT
Start: 2024-02-22 | End: 2024-03-13 | Stop reason: SDUPTHER

## 2024-03-01 ENCOUNTER — HOSPITAL ENCOUNTER (EMERGENCY)
Facility: OTHER | Age: 54
Discharge: HOME | End: 2024-03-01
Attending: INTERNAL MEDICINE
Payer: MEDICARE

## 2024-03-01 VITALS
DIASTOLIC BLOOD PRESSURE: 58 MMHG | RESPIRATION RATE: 18 BRPM | SYSTOLIC BLOOD PRESSURE: 120 MMHG | OXYGEN SATURATION: 99 % | TEMPERATURE: 98 F | HEART RATE: 61 BPM

## 2024-03-01 DIAGNOSIS — R55 SYNCOPE: ICD-10-CM

## 2024-03-01 LAB
ALBUMIN SERPL BCP-MCNC: 3.5 G/DL (ref 3.5–5.2)
ALP SERPL-CCNC: 113 U/L (ref 55–135)
ALT SERPL W/O P-5'-P-CCNC: 27 U/L (ref 10–44)
ANION GAP SERPL CALC-SCNC: 10 MMOL/L (ref 8–16)
AST SERPL-CCNC: 30 U/L (ref 10–40)
BASOPHILS # BLD AUTO: 0.02 K/UL (ref 0–0.2)
BASOPHILS NFR BLD: 0.3 % (ref 0–1.9)
BILIRUB SERPL-MCNC: 0.4 MG/DL (ref 0.1–1)
BNP SERPL-MCNC: 87 PG/ML (ref 0–99)
BUN SERPL-MCNC: 21 MG/DL (ref 6–20)
CALCIUM SERPL-MCNC: 9 MG/DL (ref 8.7–10.5)
CHLORIDE SERPL-SCNC: 107 MMOL/L (ref 95–110)
CO2 SERPL-SCNC: 19 MMOL/L (ref 23–29)
CREAT SERPL-MCNC: 1.3 MG/DL (ref 0.5–1.4)
DIFFERENTIAL METHOD BLD: ABNORMAL
EOSINOPHIL # BLD AUTO: 0.1 K/UL (ref 0–0.5)
EOSINOPHIL NFR BLD: 1.4 % (ref 0–8)
ERYTHROCYTE [DISTWIDTH] IN BLOOD BY AUTOMATED COUNT: 13.9 % (ref 11.5–14.5)
EST. GFR  (NO RACE VARIABLE): 49 ML/MIN/1.73 M^2
GLUCOSE SERPL-MCNC: 100 MG/DL (ref 70–110)
HCG INTACT+B SERPL-ACNC: 2.3 MIU/ML
HCT VFR BLD AUTO: 32.6 % (ref 37–48.5)
HGB BLD-MCNC: 10.7 G/DL (ref 12–16)
IMM GRANULOCYTES # BLD AUTO: 0.01 K/UL (ref 0–0.04)
IMM GRANULOCYTES NFR BLD AUTO: 0.2 % (ref 0–0.5)
LYMPHOCYTES # BLD AUTO: 1.9 K/UL (ref 1–4.8)
LYMPHOCYTES NFR BLD: 32.8 % (ref 18–48)
MAGNESIUM SERPL-MCNC: 1.7 MG/DL (ref 1.6–2.6)
MCH RBC QN AUTO: 28.5 PG (ref 27–31)
MCHC RBC AUTO-ENTMCNC: 32.8 G/DL (ref 32–36)
MCV RBC AUTO: 87 FL (ref 82–98)
MONOCYTES # BLD AUTO: 0.3 K/UL (ref 0.3–1)
MONOCYTES NFR BLD: 5.8 % (ref 4–15)
NEUTROPHILS # BLD AUTO: 3.4 K/UL (ref 1.8–7.7)
NEUTROPHILS NFR BLD: 59.5 % (ref 38–73)
NRBC BLD-RTO: 0 /100 WBC
PLATELET # BLD AUTO: 186 K/UL (ref 150–450)
PMV BLD AUTO: 10.1 FL (ref 9.2–12.9)
POCT GLUCOSE: 91 MG/DL (ref 70–110)
POTASSIUM SERPL-SCNC: 5.1 MMOL/L (ref 3.5–5.1)
PROT SERPL-MCNC: 6.9 G/DL (ref 6–8.4)
RBC # BLD AUTO: 3.75 M/UL (ref 4–5.4)
SODIUM SERPL-SCNC: 136 MMOL/L (ref 136–145)
TROPONIN I SERPL DL<=0.01 NG/ML-MCNC: <0.006 NG/ML (ref 0–0.03)
WBC # BLD AUTO: 5.73 K/UL (ref 3.9–12.7)

## 2024-03-01 PROCEDURE — 83880 ASSAY OF NATRIURETIC PEPTIDE: CPT | Performed by: INTERNAL MEDICINE

## 2024-03-01 PROCEDURE — 85025 COMPLETE CBC W/AUTO DIFF WBC: CPT | Performed by: INTERNAL MEDICINE

## 2024-03-01 PROCEDURE — 93010 ELECTROCARDIOGRAM REPORT: CPT | Mod: ,,, | Performed by: INTERNAL MEDICINE

## 2024-03-01 PROCEDURE — 96360 HYDRATION IV INFUSION INIT: CPT

## 2024-03-01 PROCEDURE — 84702 CHORIONIC GONADOTROPIN TEST: CPT | Performed by: INTERNAL MEDICINE

## 2024-03-01 PROCEDURE — 83735 ASSAY OF MAGNESIUM: CPT | Performed by: INTERNAL MEDICINE

## 2024-03-01 PROCEDURE — 93005 ELECTROCARDIOGRAM TRACING: CPT

## 2024-03-01 PROCEDURE — 25000003 PHARM REV CODE 250: Performed by: INTERNAL MEDICINE

## 2024-03-01 PROCEDURE — 84484 ASSAY OF TROPONIN QUANT: CPT | Performed by: INTERNAL MEDICINE

## 2024-03-01 PROCEDURE — 82962 GLUCOSE BLOOD TEST: CPT

## 2024-03-01 PROCEDURE — 80053 COMPREHEN METABOLIC PANEL: CPT | Performed by: INTERNAL MEDICINE

## 2024-03-01 PROCEDURE — 99285 EMERGENCY DEPT VISIT HI MDM: CPT | Mod: 25

## 2024-03-01 RX ORDER — HYDROCODONE BITARTRATE AND ACETAMINOPHEN 5; 325 MG/1; MG/1
1 TABLET ORAL
Status: COMPLETED | OUTPATIENT
Start: 2024-03-01 | End: 2024-03-01

## 2024-03-01 RX ADMIN — HYDROCODONE BITARTRATE AND ACETAMINOPHEN 1 TABLET: 5; 325 TABLET ORAL at 02:03

## 2024-03-01 RX ADMIN — SODIUM CHLORIDE 500 ML: 0.9 INJECTION, SOLUTION INTRAVENOUS at 03:03

## 2024-03-01 NOTE — ED PROVIDER NOTES
Encounter date: 2:08 AM 03/01/2024  SCRIBE #1 NOTE: ISybil Kilday, am scribing for, and in the presence of,  Michi Schmidt MD. I have scribed the following portions of the note - Other sections scribed: HPI, PE.       Source of History    Patient/EMS    Chief Complaint   Pt presents with:   Loss of Consciousness (Pt presents via no ems after syncopal episode tonight after feeling dizzy. Pt c/o tailbone pain. Pt denies taking blood thinners)      History Of Present Illness   Daksha Marie is a 53 y.o. female with  hypertension, CHF, PTSD, previous Haylee-en-Y gastric bypass who presents to the ED after a syncopal episode tonight immediately pta. Pt states that she was standing up and organizing her medications for 30 minutes when she felt weak, and then woke up on the ground. She states that she called for her son when she woke up, and he was able to assist her, but he did not witness or hear the fall, and he does not know how long she was unconscious. She reports that she was clammy after the fall and her blood pressure was 86/64 with a pulse of 84. Afterwards, her blood pressure bob to 122/77, with a pulse of 51. Her CBC was 137, per EMS. She reports pain in her tailbone, which she fractured 10 years ago. She denies chest pain, shortness of breath, nausea, vomiting, or diarrhea. She denies pain in her head, neck, or back. She reports being able to walk after the fall. Pt denies use of blood thinners. She was given 300 mL of fluid by EMS.    This is the extent to the patients complaints today here in the emergency department.  Past History     Review of patient's allergies indicates:   Allergen Reactions    Penicillins Hives    Hydroxyzine Anxiety, Hallucinations and Palpitations       Current Facility-Administered Medications on File Prior to Encounter   Medication Dose Route Frequency Provider Last Rate Last Admin    cyanocobalamin injection 1,000 mcg  1,000 mcg Intramuscular Q30 Days Bebeto  D'Amico C., MD         Current Outpatient Medications on File Prior to Encounter   Medication Sig Dispense Refill    amLODIPine (NORVASC) 10 MG tablet Take 1 tablet (10 mg total) by mouth once daily. 90 tablet 3    BELBUCA 150 mcg Film DISSOLVE 1 FILM TO THE GUM EVERY 12 HOURS      brimonidine-timoloL (COMBIGAN) 0.2-0.5 % Drop Place 1 drop into both eyes 2 (two) times daily.      busPIRone (BUSPAR) 30 MG Tab Take 1 tablet (30 mg total) by mouth 2 (two) times daily as needed (breakthrough anxiety). 60 tablet 5    calcium citrate 250 mg calcium Tab       carvediloL (COREG) 3.125 MG tablet Take 1 tablet (3.125 mg total) by mouth 2 (two) times daily. 180 tablet 3    cetirizine (ZYRTEC) 10 MG tablet Take 1 tablet (10 mg total) by mouth once daily. 90 tablet 3    diazePAM (VALIUM) 5 MG tablet Take 1 tablet (5 mg total) by mouth every 12 (twelve) hours as needed for Anxiety. 60 tablet 5    dicyclomine (BENTYL) 10 MG capsule Take 1 capsule (10 mg total) by mouth 4 (four) times daily before meals and nightly. 120 capsule 0    disulfiram (ANTABUSE) 250 mg tablet Take 1 tablet (250 mg total) by mouth once daily. 90 tablet 3    famotidine (PEPCID) 40 MG tablet Take 1 tablet (40 mg total) by mouth nightly. 30 tablet 11    fluticasone propionate (FLONASE) 50 mcg/actuation nasal spray 2 sprays (100 mcg total) by Each Nostril route once daily. 16 g 11    furosemide (LASIX) 40 MG tablet Take 1 tablet (40 mg total) by mouth once daily. 90 tablet 3    HYDROcodone-acetaminophen (NORCO) 7.5-325 mg per tablet TAKE 1 T PO TID PRN FOR 30 DAYS  0    lactulose (CHRONULAC) 20 gram/30 mL Soln Take 15 mLs (10 g total) by mouth every 6 (six) hours as needed (constipation). 250 mL 0    levocetirizine (XYZAL) 5 MG tablet Take 0.5 tablets (2.5 mg total) by mouth every evening. 15 tablet 11    LIDOcaine (LIDODERM) 5 % Place 1 patch onto the skin every 24 hours. Remove & Discard patch within 12 hours or as directed by MD      methocarbamoL (ROBAXIN)  750 MG Tab Take 750 mg by mouth 3 (three) times daily.      mirtazapine (REMERON) 15 MG tablet Take 1 tablet (15 mg total) by mouth every evening. 30 tablet 2    montelukast (SINGULAIR) 10 mg tablet Take 1 tablet (10 mg total) by mouth every evening. 90 tablet 1    multivitamin (THERAGRAN) tablet Take 1 tablet by mouth once daily. 30 tablet 5    omeprazole (PRILOSEC) 40 MG capsule Take 1 capsule (40 mg total) by mouth every morning. 30 capsule 11    prazosin (MINIPRESS) 2 MG Cap Take 1 capsule (2 mg total) by mouth every evening. Before bed for nightmares 90 capsule 1    promethazine (PHENERGAN) 25 MG tablet Take 1 tablet (25 mg total) by mouth every 6 (six) hours as needed for Nausea. 15 tablet 0    senna-docusate 8.6-50 mg (PERICOLACE) 8.6-50 mg per tablet Take 1 tablet by mouth once daily. 60 tablet 1    spironolactone (ALDACTONE) 25 MG tablet Take 1 tablet (25 mg total) by mouth once daily. 90 tablet 0    sucralfate (CARAFATE) 100 mg/mL suspension Take 10 mLs (1 g total) by mouth 4 (four) times daily with meals and nightly. 414 mL 3    sumatriptan (IMITREX) 50 MG tablet Once for severe headache. May repeat once after 2 hours. Do not exceed 3-4 doses in one week. 9 tablet 1    TRAVATAN Z 0.004 % ophthalmic solution INT 1 GTT IN OU QD  3    venlafaxine (EFFEXOR-XR) 150 MG Cp24 Take 1 capsule (150 mg total) by mouth once daily. 90 capsule 3    venlafaxine (EFFEXOR-XR) 75 MG 24 hr capsule Take 1 capsule (75 mg total) by mouth once daily. Take with Effexor 150 to equal 225 mg daily 90 capsule 1    [DISCONTINUED] acamprosate (CAMPRAL) 333 mg tablet Take 2 tablets (666 mg total) by mouth 3 (three) times daily. 180 tablet 11    [DISCONTINUED] ALPRAZolam (XANAX) 1 MG tablet Take 1 tablet (1 mg total) by mouth 2 (two) times daily as needed for Anxiety. 60 tablet 5       As per HPI and below:  Past Medical History:   Diagnosis Date    Alcohol abuse 10/07/2015    In remission since 5/1/17    Anemia 06/17/2015    Arthritis      Blindness of right eye     only sees light    Breast calcification, left     biopsied: scar tissue from breast reduction surgery    Chronic diastolic congestive heart failure     Chronic gastrojejunal anastomotic ulcer      -     CVA (cerebral vascular accident) 2022    TPA    Encounter for blood transfusion     GERD (gastroesophageal reflux disease)     Glaucoma     Hypertension     Hypoalbuminemia     Hyponatremia 10/2015    alcohol abuse, polydipsia, Na 109, seizure in ICU    Insomnia     Malingering 2016    Morbid obesity     PTSD (post-traumatic stress disorder)     Sickle cell trait     Stage 3a chronic kidney disease 10/14/2018     Past Surgical History:   Procedure Laterality Date    BREAST BIOPSY Left     CARPAL TUNNEL RELEASE  2020     SECTION      CHOLECYSTECTOMY  after     bile fistula    ESOPHAGOGASTRODUODENOSCOPY N/A 2022    Procedure: ESOPHAGOGASTRODUODENOSCOPY (EGD);  Surgeon: Trey Anderson MD;  Location: [address]);  Service: Endoscopy;  Laterality: N/A;    ESOPHAGOGASTRODUODENOSCOPY N/A 2023    Procedure: EGD (ESOPHAGOGASTRODUODENOSCOPY);  Surgeon: Yordan Chandler MD;  Location: [facility];  Service: Endoscopy;  Laterality: N/A;    GASTRIC BYPASS      TOTAL REDUCTION MAMMOPLASTY Bilateral     xen gel stent implant  2018    Performed at [facility]       Social History     Socioeconomic History    Marital status: Single   Tobacco Use    Smoking status: Former     Current packs/day: 0.00     Average packs/day: 0.5 packs/day for 4.0 years (2.0 ttl pk-yrs)     Types: Cigarettes     Start date: 2019     Quit date: 2023     Years since quittin.0    Smokeless tobacco: Never   Substance and Sexual Activity    Alcohol use: No     Comment: former heavy drinker since May 1st 2017    Drug use: Not Currently    Sexual activity: Yes     Partners: Male     Social Determinants of Health     Financial  Resource Strain: High Risk (12/4/2023)    Overall Financial Resource Strain (CARDIA)     Difficulty of Paying Living Expenses: Very hard   Food Insecurity: Food Insecurity Present (12/4/2023)    Hunger Vital Sign     Worried About Running Out of Food in the Last Year: Often true     Ran Out of Food in the Last Year: Often true   Transportation Needs: Unmet Transportation Needs (12/4/2023)    PRAPARE - Transportation     Lack of Transportation (Medical): Yes     Lack of Transportation (Non-Medical): Yes   Physical Activity: Sufficiently Active (12/4/2023)    Exercise Vital Sign     Days of Exercise per Week: 5 days     Minutes of Exercise per Session: 30 min   Recent Concern: Physical Activity - Insufficiently Active (10/12/2023)    Exercise Vital Sign     Days of Exercise per Week: 1 day     Minutes of Exercise per Session: 10 min   Stress: Stress Concern Present (12/4/2023)    [place] [employer] - Occupational Stress Questionnaire     Feeling of Stress : Very much   Social Connections: Unknown (12/4/2023)    Social Connection and Isolation Panel [NHANES]     Frequency of Communication with Friends and Family: More than three times a week     Frequency of Social Gatherings with Friends and Family: Never     Active Member of Clubs or Organizations: No     Attends Club or Organization Meetings: Never     Marital Status:    Housing Stability: Low Risk  (12/4/2023)    Housing Stability Vital Sign     Unable to Pay for Housing in the Last Year: No     Number of Places Lived in the Last Year: 1     Unstable Housing in the Last Year: No       Family History   Problem Relation Age of Onset    Diabetes Mother     Lupus Mother     Hypertension Mother     Heart attack Mother     Diabetes Maternal Grandmother     Crohn's disease Cousin     Heart failure Father     Hypertension Brother     Asthma Brother        Physical Exam     Vitals:    03/01/24 0302 03/01/24 0420 03/01/24 0431 03/01/24 0502    BP: (!) 99/55 123/62 (!) 123/58 (!) 120/58   BP Location:   Right arm    Patient Position:   Lying    Pulse: (!) 58 (!) 59 62 61   Resp: 17 17 19 18   Temp:   98.4 °F (36.9 °C)    TempSrc:   Oral    SpO2: 99% 100% 96% 99%     Physical Exam:   Nursing note and vitals reviewed.  Appearance: Well-appearing, nontoxic female in no acute respiratory distress.  Making purposeful movements.  Speaking in full sentences.  Skin: No obvious rashes seen.  Good turgor.  No abrasions.  No ecchymoses.  Eyes: No conjunctival injection. EOMI, PERRL.  Head: NC/AT  Chest: CTAB. No increased work of breathing, bilateral chest rise.  Cardiovascular: Regular rate and rhythm.  Normal equal bilateral radial pulses.  Abdomen: Soft.  Not distended.  Nontender.  No guarding.  No rebound. No Masses  Musculoskeletal: No obvious deformities.  Neck supple, full range of motion, no obvious deformity. Tenderness to palpation on tailbone, performed with nurse chaperone.  No tenderness to palpation of cervical through lumbar spine.  No step-offs or deformities. Good range of motion all joints.  Neurologic: Moves all extremities.  Normal sensation.  No facial droop.  Normal speech.    Mental Status:  Alert and oriented x 3.  Appropriate, conversant.      Results and Medications    Procedures    Labs Reviewed   CBC W/ AUTO DIFFERENTIAL - Abnormal; Notable for the following components:       Result Value    RBC 3.75 (*)     Hemoglobin 10.7 (*)     Hematocrit 32.6 (*)     All other components within normal limits   COMPREHENSIVE METABOLIC PANEL - Abnormal; Notable for the following components:    CO2 19 (*)     BUN 21 (*)     eGFR 49 (*)     All other components within normal limits   TROPONIN I   B-TYPE NATRIURETIC PEPTIDE   MAGNESIUM   HCG, QUANTITATIVE   HCG, QUANTITATIVE   POCT GLUCOSE   POCT GLUCOSE MONITORING CONTINUOUS       Imaging Results              CT Pelvis Without Contrast (Final result)  Result time 03/01/24 04:45:25      Final result by  Uche Spicer MD (03/01/24 04:45:25)                   Impression:      Chronic changes are noted, there is no evidence for acute fracture or dislocation, close clinical and historical correlation is needed to determine need for additional follow-up.      Electronically signed by: Uche Spicer  Date:    03/01/2024  Time:    04:45               Narrative:    EXAMINATION:  CT PELVIS WITHOUT CONTRAST    CLINICAL HISTORY:  Pelvic trauma;    TECHNIQUE:  CT examination of the pelvis was performed.  Axial imaging, sagittal and coronal reconstruction imaging is submitted.  Intravenous contrast and oral contrast was not utilized.  The examination was performed via osseous protocol.    COMPARISON:  None    FINDINGS:  The visualized osseous structures demonstrate chronic change, there are chronic changes of the visualized lower lumbar spine.  There are chronic changes at the symphysis pubis and sacroiliac joints as well as the hip joints.  There is no evidence for hip dislocation.  The osseous structures appear intact, there is no evidence for osseous destructive process, acute fracture or dislocation.    Postoperative change of the lower anterior abdominopelvic wall is noted.  The visualized bowel loops demonstrate no evidence for inflammatory or obstructive change, diverticula of the colon are noted, there is no evidence for acute diverticulitis.  There are postoperative changes of bowel noted.  There is no evidence for acute process of the uterus or adnexa.  The urinary bladder appears unremarkable.  The appendix is identified, it does not appear inflamed.                                       X-Ray Chest AP Portable (Final result)  Result time 03/01/24 03:32:35      Final result by Uche Spicer MD (03/01/24 03:32:35)                   Impression:      Mild left basilar infiltrates.      Electronically signed by: Uche Spicer  Date:    03/01/2024  Time:    03:32               Narrative:    EXAMINATION:  XR  CHEST AP PORTABLE    CLINICAL HISTORY:  CHF;    TECHNIQUE:  Single frontal view of the chest was performed.    COMPARISON:  Chest radiograph January 25, 2023    FINDINGS:  Single portable chest view is submitted.  The cardiomediastinal silhouette appears stable.    Mild patchy pulmonary infiltrates are noted at the left lung base without dense consolidation.    There is no evidence for pleural effusion and no evidence for pneumothorax.    The osseous structures appear intact.                                          Medications   HYDROcodone-acetaminophen 5-325 mg per tablet 1 tablet (1 tablet Oral Given 3/1/24 0241)   sodium chloride 0.9% bolus 500 mL 500 mL (0 mLs Intravenous Stopped 3/1/24 0440)       MDM, Impression and Plan   Previous Records:   I decided to obtain old medical records which is listed here or in ED course: Echo on 09/27/2022 shows:  The left ventricle is normal in size with concentric hypertrophy and low normal systolic function.  The estimated ejection fraction is 53%.  Grade I left ventricular diastolic dysfunction.  Normal right ventricular size with normal right ventricular systolic function.    Independently Interpreted Test(s):   EKG:  I independently reviewed and interpreted the EKG and my findings are as follows:   Detailed here or in ED course.  IMAGING:  I have ordered and independently interpreted X-rays and my findings are as follow:  Detailed here or in ED course.   CXR with no consolidation. No pneumothorax. No large pleural effusion.    Clinical Tests:   Lab Tests: Ordered and Reviewed  Radiological Study: Ordered and Reviewed  Medical Tests: Ordered and Reviewed    Differential diagnosis:  - orthostatic syncope  - hypovolemia  -unlikely ACS but will risk stratify with EKG and troponin  - unlikely CHF exacerbation  - tailbone fracture  - electrolyte abnormality  - unlikely ICH      Initial Assessment & ED Management:    Urgent evaluation of 53 y.o. female with  hypertension, CHF,  PTSD, previous Haylee-en-Y gastric bypass who presents to the ED after a syncopal episode tonight. On arrival to the ED patient was noted to be afebrile, hemodynamically stable in no acute respiratory distress.    Patient's pain was controlled with Norco.  She received a total of 800 of IV fluid between what she received in the ED and EMS.  Her CBC and CMP grossly unremarkable she was noted to have a low CO2 which was thought to be due to dehydration and repeated with IV fluids.  EKG and troponin making ACS unlikely.  Chest x-ray BNP and physical exam makes heart failure unlikely.  HCG not consistent with pregnancy.  Magnesium within normal limits.  X-ray of chest shows no acute signs pointing towards decompensated heart failure.  They did note mild left bibasilar infiltrates yet she does not present with symptoms consistent of a pneumonia. Ct pelvis shows no acute fracture or dislocation.  On reassessment her pain is controlled.  Her gait is stable.  Her blood pressure has improved and she has no ongoing complaints.  I do believe her symptoms are due to hypovolemia and orthostatic hypotension as she states that she has not had good fluid intake and she had low blood pressures that corrected with IV fluids.  She has no ongoing symptoms.  She has a cardiologist to follow up with in the outpatient setting.  Care plan addressed with patient and all those present. All questions answered.  Strict return precautions discussed.  Patient was instructed on the correct follow-up time and route.  They voiced verbal understanding and agreement  with the plan and were deemed stable for discharge.   Medical Decision Making  Amount and/or Complexity of Data Reviewed  Labs: ordered.  Radiology: ordered.    Risk  Prescription drug management.             Please see ED course for discussion of workup and dispo if not listed above.          Final diagnoses:  [R55] Syncope        ED Disposition Condition    Discharge Stable          ED  Prescriptions    None       Follow-up Information       Follow up With Specialties Details Why Contact Info    Caridad Padilla MD (Cindy) Family Medicine In 2 days  [address] [facility]  [place] [address]  [phone]      [facility] Emergency Dept Emergency Medicine  If symptoms worsen [address]  [facility] [address]  [phone]            ED Course as of 03/01/24 0723   Fri Mar 01, 2024   0310 EKG shows normal sinus rhythm with ventricular rate of 60 beats per minute.  Narrow QRS.  No ST segment elevations depressions no STEMI.  Similar to previous EKGs. [HM]   0435 Patient assessed; pt states that her pain is controlled and she is ready to go home. [IK]      ED Course User Index  [HM] Michi Schmidt MD  [IK] Sybil Tiwari         Scribe Attestation:   Scribe #1: I performed the above scribed service and the documentation accurately describes the services I performed. I attest to the accuracy of the note.    Physician Attestation for Scribe: I, Michi Schmidt , reviewed documentation as scribed in my presence, which is both accurate and complete.          MD Brayan Parry Heyward B, MD  03/01/24 0723

## 2024-03-01 NOTE — ED NOTES
Pt ambulated throughout hallway independently. Denies pain or discomfort and no difficulties noted.

## 2024-03-01 NOTE — DISCHARGE INSTRUCTIONS
Diagnosis:   1. Syncope    2. Shortness of breath        Tests you had showed:   Labs Reviewed   CBC W/ AUTO DIFFERENTIAL - Abnormal; Notable for the following components:       Result Value    RBC 3.75 (*)     Hemoglobin 10.7 (*)     Hematocrit 32.6 (*)     All other components within normal limits   COMPREHENSIVE METABOLIC PANEL - Abnormal; Notable for the following components:    CO2 19 (*)     BUN 21 (*)     eGFR 49 (*)     All other components within normal limits   TROPONIN I   B-TYPE NATRIURETIC PEPTIDE   MAGNESIUM   HCG, QUANTITATIVE   HCG, QUANTITATIVE   POCT GLUCOSE   POCT GLUCOSE MONITORING CONTINUOUS      CT Pelvis Without Contrast   Final Result      Chronic changes are noted, there is no evidence for acute fracture or dislocation, close clinical and historical correlation is needed to determine need for additional follow-up.         Electronically signed by: Uche Spicer   Date:    03/01/2024   Time:    04:45      X-Ray Chest AP Portable   Final Result      Mild left basilar infiltrates.         Electronically signed by: Uche Spicer   Date:    03/01/2024   Time:    03:32          Treatments you received were:   Medications   HYDROcodone-acetaminophen 5-325 mg per tablet 1 tablet (1 tablet Oral Given 3/1/24 0241)   sodium chloride 0.9% bolus 500 mL 500 mL (0 mLs Intravenous Stopped 3/1/24 0440)       Home Care Instructions:  - Medications: Continue taking your home medications as prescribed    Follow-Up Plan:  - Follow-up with: Primary care doctor within 1-2  days  - Additional testing and/or evaluation will be directed by your primary doctor    Return to the Emergency Department for symptoms including but not limited to: worsening symptoms, severe back pain, shortness of breath or chest pain, vomiting with inability to hold down fluids, blood in vomit or poop, fevers greater than 100.4°F, passing out/fainting/unconsciousness, or other concerning symptoms.     Future Appointments   Date Time  Provider Department [place]   3/13/2024  1:00 PM Ceferino Farr III, NP [facility]

## 2024-03-02 ENCOUNTER — TELEPHONE (OUTPATIENT)
Dept: ADMINISTRATIVE | Facility: OTHER | Age: 54
End: 2024-03-02
Payer: MEDICARE

## 2024-03-02 ENCOUNTER — PATIENT MESSAGE (OUTPATIENT)
Dept: ADMINISTRATIVE | Facility: OTHER | Age: 54
End: 2024-03-02
Payer: MEDICARE

## 2024-03-02 LAB
OHS QRS DURATION: 86 MS
OHS QTC CALCULATION: 468 MS

## 2024-03-02 NOTE — TELEPHONE ENCOUNTER
GABE with scheduled Cardiology appointment of 4-, and contact number provided for any questions.My Chart message to be sent.

## 2024-03-07 DIAGNOSIS — K28.7 CHRONIC ANASTOMOTIC ULCER OF GASTROJEJUNAL REGION: ICD-10-CM

## 2024-03-07 DIAGNOSIS — K21.9 GASTROESOPHAGEAL REFLUX DISEASE, UNSPECIFIED WHETHER ESOPHAGITIS PRESENT: Primary | ICD-10-CM

## 2024-03-07 RX ORDER — FAMOTIDINE 40 MG/1
40 TABLET, FILM COATED ORAL DAILY
Qty: 30 TABLET | Refills: 11 | Status: SHIPPED | OUTPATIENT
Start: 2024-03-07 | End: 2024-03-07 | Stop reason: ALTCHOICE

## 2024-03-07 RX ORDER — FAMOTIDINE 40 MG/1
40 TABLET, FILM COATED ORAL NIGHTLY
Qty: 30 TABLET | Refills: 11 | Status: SHIPPED | OUTPATIENT
Start: 2024-03-07 | End: 2025-03-07

## 2024-03-08 ENCOUNTER — TELEPHONE (OUTPATIENT)
Dept: ENDOSCOPY | Facility: HOSPITAL | Age: 54
End: 2024-03-08
Payer: MEDICARE

## 2024-03-09 DIAGNOSIS — I10 ESSENTIAL HYPERTENSION: Chronic | ICD-10-CM

## 2024-03-09 DIAGNOSIS — J30.9 ALLERGIC RHINITIS, UNSPECIFIED SEASONALITY, UNSPECIFIED TRIGGER: ICD-10-CM

## 2024-03-11 RX ORDER — MONTELUKAST SODIUM 10 MG/1
TABLET ORAL
Qty: 777 TABLET | Refills: 0 | OUTPATIENT
Start: 2024-03-11

## 2024-03-11 RX ORDER — AMLODIPINE BESYLATE 10 MG/1
TABLET ORAL
Qty: 777 TABLET | Refills: 0 | OUTPATIENT
Start: 2024-03-11

## 2024-03-13 ENCOUNTER — OFFICE VISIT (OUTPATIENT)
Dept: PSYCHIATRY | Facility: CLINIC | Age: 54
End: 2024-03-13
Payer: MEDICARE

## 2024-03-13 DIAGNOSIS — G47.00 INSOMNIA DISORDER, WITH NON-SLEEP DISORDER MENTAL COMORBIDITY: ICD-10-CM

## 2024-03-13 DIAGNOSIS — F43.10 PTSD (POST-TRAUMATIC STRESS DISORDER): ICD-10-CM

## 2024-03-13 DIAGNOSIS — F10.91 ALCOHOL USE DISORDER IN REMISSION: ICD-10-CM

## 2024-03-13 DIAGNOSIS — F33.41 DEPRESSION, MAJOR, RECURRENT, IN PARTIAL REMISSION: Primary | ICD-10-CM

## 2024-03-13 PROCEDURE — 99214 OFFICE O/P EST MOD 30 MIN: CPT | Mod: 95,,, | Performed by: NURSE PRACTITIONER

## 2024-03-13 RX ORDER — ARIPIPRAZOLE 5 MG/1
5 TABLET ORAL DAILY
Qty: 90 TABLET | Refills: 1 | Status: SHIPPED | OUTPATIENT
Start: 2024-03-13

## 2024-03-13 RX ORDER — PRAZOSIN HYDROCHLORIDE 1 MG/1
1 CAPSULE ORAL NIGHTLY
Qty: 90 CAPSULE | Refills: 1 | Status: SHIPPED | OUTPATIENT
Start: 2024-03-13

## 2024-03-13 RX ORDER — MIRTAZAPINE 15 MG/1
15 TABLET, FILM COATED ORAL NIGHTLY
Qty: 90 TABLET | Refills: 1 | Status: SHIPPED | OUTPATIENT
Start: 2024-03-13

## 2024-03-13 NOTE — PROGRESS NOTES
Outpatient Psychiatry Follow-Up Visit (MD/NP)    3/13/2024    Clinical Status of Patient:  Outpatient (Ambulatory)    Chief Complaint:  Daksha Marie is a 53 y.o. female who presents today for follow-up of anxiety and PTSD .  Met with patient.      Last visit was: 4/11/23 Chart and  reviewed.   The patient location is: home  The chief complaint leading to consultation is: depression and anxiety    Visit type: audiovisual    Face to Face time with patient: 30 minutes  35 minutes of total time spent on the encounter, which includes face to face time and non-face to face time preparing to see the patient (eg, review of tests), Obtaining and/or reviewing separately obtained history, Documenting clinical information in the electronic or other health record, Independently interpreting results (not separately reported) and communicating results to the patient/family/caregiver, or Care coordination (not separately reported).     Each patient to whom he or she provides medical services by telemedicine is:  (1) informed of the relationship between the physician and patient and the respective role of any other health care provider with respect to management of the patient; and (2) notified that he or she may decline to receive medical services by telemedicine and may withdraw from such care at any time.    Interval History and Content of Current Session:  Current Psychiatric Medications/changes  Continue Effexor  mg po daily  Continue Antabuse 250 mg po daily  Continue Valium 5 mg po BID PRN   DC Seroquel  Try Ambien 5 mg before bed as needed for insomnia  Continue  Buspar 30 mg BID PRN anxiety  Continue Prazosin 2 mg at bedtime for nightmares    Virtual Visit:Pt is lying in bed during interview. Reports she is tried because she was up all night binge watching TV. Reports she had no sleep from Saturday to Monday.  Reports her sleep improved since switching from Ambien to Remeron.  Feels 2 mg Prazosin  makes her dizzy. Will decrease back to 1 mg for nighmares. Spoke with Osvaldo Blackwood LCSW who had concerns of manic behavior during their therapy session; reviewed notes. Will start Abilify.     Remains sober on Antabuse. Thought processes are clear and organized.  Moderate use of Valium for panic attacks. Denies SI/HI/AVH    Psychotherapy:  Target symptoms: anxiety   Why chosen therapy is appropriate versus another modality: relevant to diagnosis  Outcome monitoring methods: self-report  Therapeutic intervention type: insight oriented psychotherapy  Topics discussed/themes: building skills sets for symptom management, symptom recognition  The patient's response to the intervention is accepting. The patient's progress toward treatment goals is good.   Duration of intervention: 13 minutes.    Review of Systems   PSYCHIATRIC: Pertinant items are noted in the narrative.  CONSTITUTIONAL: No weight gain or loss.   MUSCULOSKELETAL: No pain or stiffness of the joints.  NEUROLOGIC: No weakness, sensory changes, seizures, confusion, memory loss, tremor or other abnormal movements.  ENDOCRINE: No polydipsia or polyuria.  INTEGUMENTARY: No rashes or lacerations.  EYES: No exophthalmos, jaundice or blindness.  ENT: No dizziness, tinnitus or hearing loss.  RESPIRATORY: No shortness of breath.  CARDIOVASCULAR: No tachycardia or chest pain.  GASTROINTESTINAL: No nausea, vomiting, pain, constipation or diarrhea.  GENITOURINARY: No frequency, dysuria or sexual dysfunction.  HEMATOLOGIC/LYMPHATIC: No excessive bleeding, prolonged or excessive bleeding after dental extraction/injury.  ALLERGIC/IMMUNOLOGIC: No allergic response to materials, foods or animals at this time.    Past Medical, Family and Social History: The patient's past medical, family and social history have been reviewed and updated as appropriate within the electronic medical record - see encounter notes.    Compliance: yes    Side effects: None    Risk  Parameters:  Patient reports no suicidal ideation  Patient reports no homicidal ideation  Patient reports no self-injurious behavior  Patient reports no violent behavior    Exam (detailed: at least 9 elements; comprehensive: all 15 elements)   Constitutional  Vitals:  Most recent vital signs, dated greater than 90 days prior to this appointment, were reviewed.   There were no vitals filed for this visit.     General:  unremarkable, age appropriate     Musculoskeletal  Muscle Strength/Tone:  no tremor, no tic   Gait & Station:  non-ataxic     Psychiatric  Speech:  no latency; no press   Mood & Affect:  dysthymic  congruent and appropriate   Thought Process:  normal and logical   Associations:  intact   Thought Content:  normal, no suicidality, no homicidality, delusions, or paranoia   Insight:  intact   Judgement: behavior is adequate to circumstances   Orientation:  grossly intact   Memory: intact for content of interview   Language: grossly intact   Attention Span & Concentration:  able to focus   Fund of Knowledge:  intact and appropriate to age and level of education     Assessment and Diagnosis   Status/Progress: Based on the examination today, the patient's problem(s) is/are improved.  New problems have not been presented today.   Co-morbidities and Lack of compliance are not complicating management of the primary condition.  There are no active rule-out diagnoses for this patient at this time.     General Impression:       ICD-10-CM ICD-9-CM   1. Depression, major, recurrent, in partial remission  F33.41 296.35   2. Insomnia disorder, with non-sleep disorder mental comorbidity  G47.00 780.52   3. PTSD (post-traumatic stress disorder)  F43.10 309.81   4. Alcohol use disorder in remission  F10.91 305.03       Intervention/Counseling/Treatment Plan   Medication Management: Continue current medications. The risks and benefits of medication were discussed with the patient.  AA/NA/CA/ACOA/Abstinence   Continue  Effexor  mg po daily  Continue Antabuse 250 mg po daily  Continue Valium 5 mg po BID PRN   Contineu Remeron 15 mg before bed  Start Abilify 5 mg daily  Continue  Buspar 30 mg BID PRN anxiety  Decrease to Prazosin 1 mg at bedtime for nightmares  Continue with psychotherapy    Return to Clinic: 6 months     Risks, benefits, side effects and alternative treatments discussed with patient. Patient agrees with the current plan as documented.  Encouraged Patient to keep future appointments.  Take medications as prescribed and abstain from substance abuse.  Pt to present to ED for thoughts to harm herself or others               Markus

## 2024-03-14 ENCOUNTER — HOSPITAL ENCOUNTER (OUTPATIENT)
Facility: HOSPITAL | Age: 54
Discharge: HOME | End: 2024-03-14
Attending: INTERNAL MEDICINE | Admitting: INTERNAL MEDICINE
Payer: MEDICARE

## 2024-03-14 ENCOUNTER — ANESTHESIA (OUTPATIENT)
Dept: ENDOSCOPY | Facility: HOSPITAL | Age: 54
End: 2024-03-14
Payer: MEDICARE

## 2024-03-14 ENCOUNTER — ANESTHESIA EVENT (OUTPATIENT)
Dept: ENDOSCOPY | Facility: HOSPITAL | Age: 54
End: 2024-03-14
Payer: MEDICARE

## 2024-03-14 VITALS
BODY MASS INDEX: 29.66 KG/M2 | SYSTOLIC BLOOD PRESSURE: 158 MMHG | TEMPERATURE: 98 F | HEIGHT: 65 IN | WEIGHT: 178 LBS | RESPIRATION RATE: 16 BRPM | DIASTOLIC BLOOD PRESSURE: 85 MMHG | HEART RATE: 76 BPM | OXYGEN SATURATION: 99 %

## 2024-03-14 DIAGNOSIS — K28.9 ANASTOMOTIC ULCER S/P GASTRIC BYPASS: ICD-10-CM

## 2024-03-14 PROCEDURE — 37000009 HC ANESTHESIA EA ADD 15 MINS: Performed by: INTERNAL MEDICINE

## 2024-03-14 PROCEDURE — 25000003 PHARM REV CODE 250: Performed by: INTERNAL MEDICINE

## 2024-03-14 PROCEDURE — E9220 PRA ENDO ANESTHESIA: HCPCS | Mod: ,,, | Performed by: STUDENT IN AN ORGANIZED HEALTH CARE EDUCATION/TRAINING PROGRAM

## 2024-03-14 PROCEDURE — 25000003 PHARM REV CODE 250: Performed by: STUDENT IN AN ORGANIZED HEALTH CARE EDUCATION/TRAINING PROGRAM

## 2024-03-14 PROCEDURE — 63600175 PHARM REV CODE 636 W HCPCS: Performed by: STUDENT IN AN ORGANIZED HEALTH CARE EDUCATION/TRAINING PROGRAM

## 2024-03-14 PROCEDURE — 43239 EGD BIOPSY SINGLE/MULTIPLE: CPT | Performed by: INTERNAL MEDICINE

## 2024-03-14 PROCEDURE — 88305 TISSUE EXAM BY PATHOLOGIST: CPT | Mod: 59 | Performed by: PATHOLOGY

## 2024-03-14 PROCEDURE — 88305 TISSUE EXAM BY PATHOLOGIST: CPT | Mod: 26,,, | Performed by: PATHOLOGY

## 2024-03-14 PROCEDURE — 43239 EGD BIOPSY SINGLE/MULTIPLE: CPT | Mod: ,,, | Performed by: INTERNAL MEDICINE

## 2024-03-14 PROCEDURE — 27201012 HC FORCEPS, HOT/COLD, DISP: Performed by: INTERNAL MEDICINE

## 2024-03-14 PROCEDURE — 37000008 HC ANESTHESIA 1ST 15 MINUTES: Performed by: INTERNAL MEDICINE

## 2024-03-14 RX ORDER — SODIUM CHLORIDE 0.9 % (FLUSH) 0.9 %
10 SYRINGE (ML) INJECTION
Status: DISCONTINUED | OUTPATIENT
Start: 2024-03-14 | End: 2024-03-14 | Stop reason: HOSPADM

## 2024-03-14 RX ORDER — SODIUM CHLORIDE 9 MG/ML
INJECTION, SOLUTION INTRAVENOUS CONTINUOUS
Status: DISCONTINUED | OUTPATIENT
Start: 2024-03-14 | End: 2024-03-14 | Stop reason: HOSPADM

## 2024-03-14 RX ORDER — PROPOFOL 10 MG/ML
VIAL (ML) INTRAVENOUS
Status: DISCONTINUED | OUTPATIENT
Start: 2024-03-14 | End: 2024-03-14

## 2024-03-14 RX ORDER — LIDOCAINE HYDROCHLORIDE 20 MG/ML
INJECTION INTRAVENOUS
Status: DISCONTINUED | OUTPATIENT
Start: 2024-03-14 | End: 2024-03-14

## 2024-03-14 RX ADMIN — PROPOFOL 175 MCG/KG/MIN: 10 INJECTION, EMULSION INTRAVENOUS at 11:03

## 2024-03-14 RX ADMIN — PROPOFOL 20 MG: 10 INJECTION, EMULSION INTRAVENOUS at 11:03

## 2024-03-14 RX ADMIN — LIDOCAINE HYDROCHLORIDE 100 MG: 20 INJECTION INTRAVENOUS at 11:03

## 2024-03-14 RX ADMIN — PROPOFOL 50 MG: 10 INJECTION, EMULSION INTRAVENOUS at 11:03

## 2024-03-14 RX ADMIN — SODIUM CHLORIDE: 0.9 INJECTION, SOLUTION INTRAVENOUS at 11:03

## 2024-03-14 RX ADMIN — GLYCOPYRROLATE 0.2 MG: 0.2 INJECTION, SOLUTION INTRAMUSCULAR; INTRAVENOUS at 11:03

## 2024-03-14 NOTE — TRANSFER OF CARE
"Anesthesia Transfer of Care Note    Patient: Daksha Marie    Procedure(s) Performed: Procedure(s) (LRB):  EGD (ESOPHAGOGASTRODUODENOSCOPY) (N/A)    Patient location: GI    Anesthesia Type: general    Transport from OR: Transported from OR on room air with adequate spontaneous ventilation    Post pain: adequate analgesia    Post assessment: no apparent anesthetic complications and tolerated procedure well    Post vital signs: stable    Level of consciousness: awake, alert and oriented    Nausea/Vomiting: no nausea/vomiting    Complications: none    Transfer of care protocol was followed      Last vitals: Visit Vitals  BP (!) 142/70 (BP Location: Left arm)   Pulse 86   Temp 36.6 °C (97.8 °F) (Temporal)   Resp 16   Ht 5' 5" (1.651 m)   Wt 80.7 kg (178 lb)   SpO2 98%   Breastfeeding No   BMI 29.62 kg/m²     "

## 2024-03-14 NOTE — H&P
Short Stay Endoscopy History and Physical    PCP - Caridad Padilla MD (Cindy)  Referring Physician - Leslye Ward MD  [address] [place] [address]    Procedure - EGD  ASA - per anesthesia  Mallampati - per anesthesia  History of Anesthesia problems - per anesthesia  Family history Anesthesia problems -  per anesthesia   Plan of anesthesia - per anesthesia    HPI:  This is a 53 y.o. female here for evaluation of: EGD for eval of prior GJ anastomotic ulcer with abdo pains; h/o RYGB anatomy    Reflux - no  Dysphagia - no  Abdominal pain - yes  Diarrhea - no    ROS:  Constitutional: No fevers, chills, No weight loss  CV: No chest pain  Pulm: No cough, No shortness of breath  Ophtho: No vision changes  GI: see HPI  Derm: No rash    Medical History:  has a past medical history of Alcohol abuse (10/07/2015), Anemia (2015), Arthritis, Blindness of right eye, Breast calcification, left, Chronic diastolic congestive heart failure, Chronic gastrojejunal anastomotic ulcer, CVA (cerebral vascular accident) (2022), Encounter for blood transfusion, GERD (gastroesophageal reflux disease), Glaucoma (), Hypertension, Hypoalbuminemia, Hyponatremia (10/2015), Insomnia, Malingering (2016), Morbid obesity (), PTSD (post-traumatic stress disorder), Sickle cell trait, and Stage 3a chronic kidney disease (10/14/2018).    Surgical History:  has a past surgical history that includes Gastric bypass ();  section; Cholecystectomy (after ); Breast biopsy (Left); Total Reduction Mammoplasty (Bilateral, ); xen gel stent implant (2018); Carpal tunnel release (2020); Esophagogastroduodenoscopy (N/A, 2022); and Esophagogastroduodenoscopy (N/A, 2023).    Family History: family history includes Asthma in her brother; Crohn's disease in her cousin; Diabetes in her maternal grandmother and mother; Heart attack in her mother; Heart failure in her father; Hypertension  in her brother and mother; Lupus in her mother..    Social History:  reports that she has been smoking cigarettes. She started smoking about 5 years ago. She has a 2.0 pack-year smoking history. She has never used smokeless tobacco. She reports that she does not currently use drugs. She reports that she does not drink alcohol.    Review of patient's allergies indicates:   Allergen Reactions    Penicillins Hives    Hydroxyzine Anxiety, Hallucinations and Palpitations       Medications:   Facility-Administered Medications Prior to Admission   Medication Dose Route Frequency Provider Last Rate Last Admin    cyanocobalamin injection 1,000 mcg  1,000 mcg Intramuscular Q30 Days Johnson, D'Amico C., MD         Medications Prior to Admission   Medication Sig Dispense Refill Last Dose    diazePAM (VALIUM) 5 MG tablet Take 1 tablet (5 mg total) by mouth every 12 (twelve) hours as needed for Anxiety. 60 tablet 5 3/14/2024 at 0700    amLODIPine (NORVASC) 10 MG tablet Take 1 tablet (10 mg total) by mouth once daily. 90 tablet 3     ARIPiprazole (ABILIFY) 5 MG Tab Take 1 tablet (5 mg total) by mouth once daily. 90 tablet 1     BELBUCA 150 mcg Film DISSOLVE 1 FILM TO THE GUM EVERY 12 HOURS       brimonidine-timoloL (COMBIGAN) 0.2-0.5 % Drop Place 1 drop into both eyes 2 (two) times daily.       busPIRone (BUSPAR) 30 MG Tab Take 1 tablet (30 mg total) by mouth 2 (two) times daily as needed (breakthrough anxiety). 60 tablet 5     calcium citrate 250 mg calcium Tab        carvediloL (COREG) 3.125 MG tablet Take 1 tablet (3.125 mg total) by mouth 2 (two) times daily. 180 tablet 3     cetirizine (ZYRTEC) 10 MG tablet Take 1 tablet (10 mg total) by mouth once daily. 90 tablet 3     dicyclomine (BENTYL) 10 MG capsule Take 1 capsule (10 mg total) by mouth 4 (four) times daily before meals and nightly. 120 capsule 0     disulfiram (ANTABUSE) 250 mg tablet Take 1 tablet (250 mg total) by mouth once daily. 90 tablet 3     famotidine (PEPCID)  40 MG tablet Take 1 tablet (40 mg total) by mouth nightly. 30 tablet 11     fluticasone propionate (FLONASE) 50 mcg/actuation nasal spray 2 sprays (100 mcg total) by Each Nostril route once daily. 16 g 11     furosemide (LASIX) 40 MG tablet Take 1 tablet (40 mg total) by mouth once daily. 90 tablet 3 3/12/2024    HYDROcodone-acetaminophen (NORCO) 7.5-325 mg per tablet TAKE 1 T PO TID PRN FOR 30 DAYS  0     lactulose (CHRONULAC) 20 gram/30 mL Soln Take 15 mLs (10 g total) by mouth every 6 (six) hours as needed (constipation). 250 mL 0     levocetirizine (XYZAL) 5 MG tablet Take 0.5 tablets (2.5 mg total) by mouth every evening. 15 tablet 11     LIDOcaine (LIDODERM) 5 % Place 1 patch onto the skin every 24 hours. Remove & Discard patch within 12 hours or as directed by MD       methocarbamoL (ROBAXIN) 750 MG Tab Take 750 mg by mouth 3 (three) times daily.       mirtazapine (REMERON) 15 MG tablet Take 1 tablet (15 mg total) by mouth every evening. 90 tablet 1     montelukast (SINGULAIR) 10 mg tablet Take 1 tablet (10 mg total) by mouth every evening. 90 tablet 1     multivitamin (THERAGRAN) tablet Take 1 tablet by mouth once daily. 30 tablet 5     omeprazole (PRILOSEC) 40 MG capsule Take 1 capsule (40 mg total) by mouth every morning. 30 capsule 11     prazosin (MINIPRESS) 1 MG Cap Take 1 capsule (1 mg total) by mouth every evening. Before bed for nightmares 90 capsule 1     promethazine (PHENERGAN) 25 MG tablet Take 1 tablet (25 mg total) by mouth every 6 (six) hours as needed for Nausea. 15 tablet 0     senna-docusate 8.6-50 mg (PERICOLACE) 8.6-50 mg per tablet Take 1 tablet by mouth once daily. 60 tablet 1     spironolactone (ALDACTONE) 25 MG tablet Take 1 tablet (25 mg total) by mouth once daily. 90 tablet 0     sucralfate (CARAFATE) 100 mg/mL suspension Take 10 mLs (1 g total) by mouth 4 (four) times daily with meals and nightly. 414 mL 3     sumatriptan (IMITREX) 50 MG tablet Once for severe headache. May repeat  once after 2 hours. Do not exceed 3-4 doses in one week. 9 tablet 1     TRAVATAN Z 0.004 % ophthalmic solution INT 1 GTT IN OU QD  3     venlafaxine (EFFEXOR-XR) 150 MG Cp24 Take 1 capsule (150 mg total) by mouth once daily. 90 capsule 3     venlafaxine (EFFEXOR-XR) 75 MG 24 hr capsule Take 1 capsule (75 mg total) by mouth once daily. Take with Effexor 150 to equal 225 mg daily 90 capsule 1        Physical Exam:    Vital Signs:   Vitals:    03/14/24 1109   BP: (!) 164/90   Pulse: 82   Resp: 19   Temp: 97.5 °F (36.4 °C)       General Appearance: Well appearing in no acute distress    Labs:  Lab Results   Component Value Date    WBC 5.73 03/01/2024    HGB 10.7 (L) 03/01/2024    HCT 32.6 (L) 03/01/2024     03/01/2024    CHOL 172 01/25/2023    TRIG 135 01/25/2023    HDL 78 (H) 01/25/2023    ALT 27 03/01/2024    AST 30 03/01/2024     03/01/2024    K 5.1 03/01/2024     03/01/2024    CREATININE 1.3 03/01/2024    BUN 21 (H) 03/01/2024    CO2 19 (L) 03/01/2024    TSH 1.616 01/25/2023    INR 1.0 05/02/2017    HGBA1C 5.0 01/25/2023       I have explained the risks and benefits of this endoscopic procedure to the patient including but not limited to bleeding, inflammation, infection, perforation, missing a lesion and death.      Kristofer Claudio MD

## 2024-03-14 NOTE — ADDENDUM NOTE
Addendum  created 03/14/24 1301 by Chantal Murillo CRNA    Attestation recorded in Intraprocedure, Intraprocedure Attestations filed

## 2024-03-14 NOTE — ANESTHESIA PREPROCEDURE EVALUATION
2024  Daksha Marie is a 53 y.o., female.  Past Medical History:   Diagnosis Date    Alcohol abuse 10/07/2015    In remission since 18    Anemia 2015    Arthritis     Blindness of right eye     only sees light    Breast calcification, left     biopsied: scar tissue from breast reduction surgery    Chronic diastolic congestive heart failure     Chronic gastrojejunal anastomotic ulcer      -     CVA (cerebral vascular accident) 2022    TPA    Encounter for blood transfusion     GERD (gastroesophageal reflux disease)     Glaucoma     Hypertension     Hypoalbuminemia     Hyponatremia 10/2015    alcohol abuse, polydipsia, Na 109, seizure in ICU    Insomnia     Malingering 2016    Morbid obesity 2017    PTSD (post-traumatic stress disorder)     Sickle cell trait     Stage 3a chronic kidney disease 10/14/2018     Past Surgical History:   Procedure Laterality Date    BREAST BIOPSY Left     CARPAL TUNNEL RELEASE  2020     SECTION      CHOLECYSTECTOMY  after     bile fistula    ESOPHAGOGASTRODUODENOSCOPY N/A 2022    Procedure: ESOPHAGOGASTRODUODENOSCOPY (EGD);  Surgeon: Trey Anderson MD;  Location: [address]);  Service: Endoscopy;  Laterality: N/A;    ESOPHAGOGASTRODUODENOSCOPY N/A 2023    Procedure: EGD (ESOPHAGOGASTRODUODENOSCOPY);  Surgeon: Yordan Chandler MD;  Location: [facility];  Service: Endoscopy;  Laterality: N/A;    GASTRIC BYPASS      TOTAL REDUCTION MAMMOPLASTY Bilateral     xen gel stent implant  2018    Performed at [facility]           Pre-op Assessment    I have reviewed the Patient Summary Reports.    I have reviewed the NPO Status.   I have reviewed the Medications.     Review of Systems  Anesthesia Hx:  No problems with previous Anesthesia   Neg history of prior surgery.           Denies Family Hx of Anesthesia complications.    Denies Personal Hx of Anesthesia complications.                    Social:  No Alcohol Use, Smoker       Hematology/Oncology:    Oncology Normal    -- Anemia:                                  EENT/Dental:  EENT/Dental Normal           Cardiovascular:  Exercise tolerance: good   Hypertension       Denies Angina. CHF      Denies GARCIA.  ECG has been reviewed. [facility]      Congestive Heart Failure (CHF)                Hypertension         Pulmonary:  Pulmonary Normal                       Renal/:  Chronic Renal Disease        Kidney Function/Disease             Hepatic/GI:   PUD, Hiatal Hernia, GERD, poorly controlled      Gerd, Peptic Ulcer Disease          Neurological:   CVA, no residual symptoms   Headaches      Dx of Headaches              CVA - Cerebrovasular Accident                 Psych:  Psychiatric History anxiety depression PTSD               Physical Exam  General: Well nourished, Cooperative, Alert and Oriented    Airway:  Mallampati: II / I  Mouth Opening: Normal  TM Distance: Normal  Tongue: Normal  Neck ROM: Normal ROM    Dental:  Front 4 teeth temporary crowns      Anesthesia Plan  Type of Anesthesia, risks & benefits discussed:    Anesthesia Type: Gen Natural Airway  Intra-op Monitoring Plan: Standard ASA Monitors  Post Op Pain Control Plan: multimodal analgesia  Induction:  IV  Informed Consent: Informed consent signed with the Patient and all parties understand the risks and agree with anesthesia plan.  All questions answered. Patient consented to blood products? No  ASA Score: 3  Day of Surgery Review of History & Physical: H&P Update referred to the surgeon/provider.    Ready For Surgery From Anesthesia Perspective.     .

## 2024-03-14 NOTE — PLAN OF CARE
Ok to discharge home now per Dr. Claudio. Pt aa&o. Calm. Verbalized understanding of all discharge instructions. Discharged home now with son.

## 2024-03-14 NOTE — [employer] PATIENT INSTRUCTIONS
Discharge Summary/Instructions after an Endoscopic Procedure  Patient Name: Daksha Marie  Patient MRN: [id number]  Patient   YOB: 1970 Thursday, March 14, 2024  Kristofer Claudio MD  Dear patient,  As a result of recent federal legislation (The Federal Cures Act), you may   receive lab or pathology results from your procedure in your [facility]   account before your physician is able to contact you. Your physician or   their representative will relay the results to you with their   recommendations at their soonest availability.  Thank you,  RESTRICTIONS:  During your procedure today, you received medications for sedation.  These   medications may affect your judgment, balance and coordination.  Therefore,   for 24 hours, you have the following restrictions:   - DO NOT drive a car, operate machinery, make legal/financial decisions,   sign important papers or drink alcohol.    ACTIVITY:  Today: no heavy lifting, straining or running due to procedural   sedation/anesthesia.  The following day: return to full activity including work.  DIET:  Eat and drink normally unless instructed otherwise.     TREATMENT FOR COMMON SIDE EFFECTS:  - Mild abdominal pain, nausea, belching, bloating or excessive gas:  rest,   eat lightly and use a heating pad.  - Sore Throat: treat with throat lozenges and/or gargle with warm salt   water.  - Because air was used during the procedure, expelling large amounts of air   from your rectum or belching is normal.  - If a bowel prep was taken, you may not have a bowel movement for 1-3 days.    This is normal.  SYMPTOMS TO WATCH FOR AND REPORT TO YOUR PHYSICIAN:  1. Abdominal pain or bloating, other than gas cramps.  2. Chest pain.  3. Back pain.  4. Signs of infection such as: chills or fever occurring within 24 hours   after the procedure.  5. Rectal bleeding, which would show as bright red, maroon, or black stools.   (A tablespoon of blood from the rectum is not serious,  especially if   hemorrhoids are present.)  6. Vomiting.  7. Weakness or dizziness.  GO DIRECTLY TO THE NEAREST EMERGENCY ROOM IF YOU HAVE ANY OF THE FOLLOWING:      Difficulty breathing              Chills and/or fever over 101 F   Persistent vomiting and/or vomiting blood   Severe abdominal pain   Severe chest pain   Black, tarry stools   Bleeding- more than one tablespoon   Any other symptom or condition that you feel may need urgent attention  Your doctor recommends these additional instructions:  If any biopsies were taken, your doctors clinic will contact you in 1 to 2   weeks with any results.  - It is of utmost importance that you quit smoking tobacco in setting of   longstanding chronic GJ anastomotic ulcerations.  - Continue PPI high dose twice daily and follow up closely with referring GI   provider.  - Consider a follow up with your bariatric surgeon with nonhealing GJ   anastomotic ulcers.  - Patient has a contact number available for emergencies.  The signs and   symptoms of potential delayed complications were discussed with the   patient.  Return to normal activities tomorrow.  Written discharge   instructions were provided to the patient.   - Discharge patient to home (ambulatory).   - Resume previous diet. Recommend abiding by predominantly full liquid and   soft diet items with smaller/frequent meals for lifestyle modifications in   setting of gastric bypass anatomy and mild GJ anastomotic narrowing.  For questions, problems or results please call your physician - Kristofer Claudio MD at Work:  [phone].  [facility], EMERGENCY ROOM PHONE NUMBER: [phone]  IF A COMPLICATION OR EMERGENCY SITUATION ARISES AND YOU ARE UNABLE TO REACH   YOUR PHYSICIAN - GO DIRECTLY TO THE EMERGENCY ROOM.  Kristofer Claudio MD  3/14/2024 12:02:13 PM  This report has been verified and signed electronically.  Dear patient,  As a result of recent federal legislation (The Federal Cures Act), you may    receive lab or pathology results from your procedure in your [facility]   account before your physician is able to contact you. Your physician or   their representative will relay the results to you with their   recommendations at their soonest availability.  Thank you,  [employer]

## 2024-03-14 NOTE — ANESTHESIA POSTPROCEDURE EVALUATION
Anesthesia Post Evaluation    Patient: Daksha Marie    Procedure(s) Performed: Procedure(s) (LRB):  EGD (ESOPHAGOGASTRODUODENOSCOPY) (N/A)    Final Anesthesia Type: general      Patient location during evaluation: GI PACU  Patient participation: Yes- Able to Participate  Level of consciousness: awake and alert and oriented  Post-procedure vital signs: reviewed and stable  Pain management: adequate  Airway patency: patent    PONV status at discharge: No PONV  Anesthetic complications: no      Cardiovascular status: hemodynamically stable  Respiratory status: unassisted, spontaneous ventilation and room air  Hydration status: euvolemic  Follow-up not needed.              Vitals Value Taken Time   /85 03/14/24 1218   Temp 36.6 °C (97.8 °F) 03/14/24 1209   Pulse 76 03/14/24 1218   Resp 16 03/14/24 1218   SpO2 99 % 03/14/24 1218         No case tracking events are documented in the log.      Pain/Batsheva Score: Batsheva Score: 10 (3/14/2024 12:10 PM)           distended

## 2024-03-18 LAB
FINAL PATHOLOGIC DIAGNOSIS: NORMAL
GROSS: NORMAL
Lab: NORMAL

## 2024-03-19 DIAGNOSIS — K28.7 CHRONIC ANASTOMOTIC ULCER OF GASTROJEJUNAL REGION: ICD-10-CM

## 2024-03-19 RX ORDER — SUCRALFATE 1 G/10ML
SUSPENSION ORAL
Qty: 414 ML | Refills: 3 | Status: SHIPPED | OUTPATIENT
Start: 2024-03-19 | End: 2024-04-30

## 2024-04-24 DIAGNOSIS — R10.9 ABDOMINAL PAIN, UNSPECIFIED ABDOMINAL LOCATION: ICD-10-CM

## 2024-04-25 RX ORDER — DICYCLOMINE HYDROCHLORIDE 10 MG/1
CAPSULE ORAL
Qty: 120 CAPSULE | Refills: 11 | Status: SHIPPED | OUTPATIENT
Start: 2024-04-25

## 2024-04-30 DIAGNOSIS — K28.7 CHRONIC ANASTOMOTIC ULCER OF GASTROJEJUNAL REGION: ICD-10-CM

## 2024-04-30 RX ORDER — SUCRALFATE 1 G/10ML
SUSPENSION ORAL
Qty: 414 ML | Refills: 3 | Status: SHIPPED | OUTPATIENT
Start: 2024-04-30 | End: 2024-06-19 | Stop reason: SDUPTHER

## 2024-05-07 ENCOUNTER — TELEPHONE (OUTPATIENT)
Dept: GASTROENTEROLOGY | Facility: CLINIC | Age: 54
End: 2024-05-07
Payer: MEDICARE

## 2024-05-13 ENCOUNTER — PATIENT MESSAGE (OUTPATIENT)
Dept: ADMINISTRATIVE | Facility: OTHER | Age: 54
End: 2024-05-13
Payer: MEDICARE

## 2024-06-11 ENCOUNTER — PATIENT MESSAGE (OUTPATIENT)
Dept: CARDIOLOGY | Facility: CLINIC | Age: 54
End: 2024-06-11
Payer: MEDICARE

## 2024-06-19 DIAGNOSIS — K28.7 CHRONIC ANASTOMOTIC ULCER OF GASTROJEJUNAL REGION: ICD-10-CM

## 2024-06-19 RX ORDER — SUCRALFATE 1 G/10ML
SUSPENSION ORAL
Qty: 414 ML | Refills: 3 | Status: SHIPPED | OUTPATIENT
Start: 2024-06-19

## 2024-06-27 ENCOUNTER — PATIENT MESSAGE (OUTPATIENT)
Dept: PSYCHIATRY | Facility: CLINIC | Age: 54
End: 2024-06-27
Payer: MEDICARE

## 2024-07-09 ENCOUNTER — OFFICE VISIT (OUTPATIENT)
Dept: PSYCHIATRY | Facility: CLINIC | Age: 54
End: 2024-07-09
Payer: MEDICARE

## 2024-07-09 DIAGNOSIS — F10.91 ALCOHOL USE DISORDER IN REMISSION: ICD-10-CM

## 2024-07-09 DIAGNOSIS — F43.10 PTSD (POST-TRAUMATIC STRESS DISORDER): ICD-10-CM

## 2024-07-09 DIAGNOSIS — F33.41 DEPRESSION, MAJOR, RECURRENT, IN PARTIAL REMISSION: ICD-10-CM

## 2024-07-09 DIAGNOSIS — F41.0 PANIC DISORDER: ICD-10-CM

## 2024-07-09 DIAGNOSIS — G47.00 INSOMNIA DISORDER, WITH NON-SLEEP DISORDER MENTAL COMORBIDITY: ICD-10-CM

## 2024-07-09 DIAGNOSIS — F39 MOOD DISORDER: Primary | ICD-10-CM

## 2024-07-09 PROCEDURE — 99214 OFFICE O/P EST MOD 30 MIN: CPT | Mod: 95,,, | Performed by: NURSE PRACTITIONER

## 2024-07-09 PROCEDURE — 1160F RVW MEDS BY RX/DR IN RCRD: CPT | Mod: CPTII,95,, | Performed by: NURSE PRACTITIONER

## 2024-07-09 PROCEDURE — 1159F MED LIST DOCD IN RCRD: CPT | Mod: CPTII,95,, | Performed by: NURSE PRACTITIONER

## 2024-07-09 RX ORDER — VENLAFAXINE HYDROCHLORIDE 75 MG/1
75 CAPSULE, EXTENDED RELEASE ORAL DAILY
Qty: 90 CAPSULE | Refills: 1 | Status: SHIPPED | OUTPATIENT
Start: 2024-07-09

## 2024-07-09 RX ORDER — DIAZEPAM 5 MG/1
5 TABLET ORAL EVERY 12 HOURS PRN
Qty: 60 TABLET | Refills: 5 | Status: SHIPPED | OUTPATIENT
Start: 2024-07-09

## 2024-07-09 RX ORDER — PRAZOSIN HYDROCHLORIDE 1 MG/1
1 CAPSULE ORAL NIGHTLY
Qty: 90 CAPSULE | Refills: 1 | Status: SHIPPED | OUTPATIENT
Start: 2024-07-09

## 2024-07-09 RX ORDER — DIVALPROEX SODIUM 500 MG/1
500 TABLET, FILM COATED, EXTENDED RELEASE ORAL DAILY
Qty: 30 TABLET | Refills: 11 | Status: SHIPPED | OUTPATIENT
Start: 2024-07-09 | End: 2025-07-09

## 2024-07-09 RX ORDER — DISULFIRAM 250 MG/1
250 TABLET ORAL DAILY
Qty: 90 TABLET | Refills: 3 | Status: SHIPPED | OUTPATIENT
Start: 2024-07-09

## 2024-07-09 RX ORDER — VENLAFAXINE HYDROCHLORIDE 150 MG/1
150 CAPSULE, EXTENDED RELEASE ORAL DAILY
Qty: 90 CAPSULE | Refills: 3 | Status: SHIPPED | OUTPATIENT
Start: 2024-07-09

## 2024-07-09 RX ORDER — MIRTAZAPINE 15 MG/1
15 TABLET, FILM COATED ORAL NIGHTLY
Qty: 90 TABLET | Refills: 1 | Status: SHIPPED | OUTPATIENT
Start: 2024-07-09

## 2024-07-09 RX ORDER — QUETIAPINE FUMARATE 50 MG/1
50 TABLET, FILM COATED ORAL NIGHTLY
Qty: 30 TABLET | Refills: 11 | Status: SHIPPED | OUTPATIENT
Start: 2024-07-09 | End: 2025-07-09

## 2024-07-09 NOTE — PROGRESS NOTES
Outpatient Psychiatry Follow-Up Visit (MD/NP)    7/9/2024    Clinical Status of Patient:  Outpatient (Ambulatory)    Chief Complaint:  Daksha Marie is a 54 y.o. female who presents today for follow-up of anxiety and PTSD .  Met with patient.      Last visit was: 4/11/23 Chart and  reviewed.   The patient location is: home  The chief complaint leading to consultation is: depression and anxiety    Visit type: audiovisual    Face to Face time with patient: 30 minutes  35 minutes of total time spent on the encounter, which includes face to face time and non-face to face time preparing to see the patient (eg, review of tests), Obtaining and/or reviewing separately obtained history, Documenting clinical information in the electronic or other health record, Independently interpreting results (not separately reported) and communicating results to the patient/family/caregiver, or Care coordination (not separately reported).     Each patient to whom he or she provides medical services by telemedicine is:  (1) informed of the relationship between the physician and patient and the respective role of any other health care provider with respect to management of the patient; and (2) notified that he or she may decline to receive medical services by telemedicine and may withdraw from such care at any time.    Interval History and Content of Current Session:  Current Psychiatric Medications/changes  Continue Effexor  mg po daily  Continue Antabuse 250 mg po daily  Continue Valium 5 mg po BID PRN   Contineu Remeron 15 mg before bed  Start Abilify 5 mg daily  Continue  Buspar 30 mg BID PRN anxiety  Decrease to Prazosin 1 mg at bedtime for nightmares  Continue with psychotherapy      Virtual Visit:Pt  states she doesn't like the Abilify but is compliant. Reports trouble with sleep and thoughts are racing.  Will try Depakote and add Seroquel at bedtime. Hx of good results on Seroquel. Reports her son stated she  follows him around the house and is too affectionate which she blames on the Abilify.     Remains sober on Antabuse.  Moderate use of Valium for panic attacks. Denies SI/HI/AVH    Psychotherapy:  Target symptoms: anxiety   Why chosen therapy is appropriate versus another modality: relevant to diagnosis  Outcome monitoring methods: self-report  Therapeutic intervention type: insight oriented psychotherapy  Topics discussed/themes: building skills sets for symptom management, symptom recognition  The patient's response to the intervention is accepting. The patient's progress toward treatment goals is good.   Duration of intervention: 13 minutes.    Review of Systems   PSYCHIATRIC: Pertinant items are noted in the narrative.  CONSTITUTIONAL: No weight gain or loss.   MUSCULOSKELETAL: No pain or stiffness of the joints.  NEUROLOGIC: No weakness, sensory changes, seizures, confusion, memory loss, tremor or other abnormal movements.  ENDOCRINE: No polydipsia or polyuria.  INTEGUMENTARY: No rashes or lacerations.  EYES: No exophthalmos, jaundice or blindness.  ENT: No dizziness, tinnitus or hearing loss.  RESPIRATORY: No shortness of breath.  CARDIOVASCULAR: No tachycardia or chest pain.  GASTROINTESTINAL: No nausea, vomiting, pain, constipation or diarrhea.  GENITOURINARY: No frequency, dysuria or sexual dysfunction.  HEMATOLOGIC/LYMPHATIC: No excessive bleeding, prolonged or excessive bleeding after dental extraction/injury.  ALLERGIC/IMMUNOLOGIC: No allergic response to materials, foods or animals at this time.    Past Medical, Family and Social History: The patient's past medical, family and social history have been reviewed and updated as appropriate within the electronic medical record - see encounter notes.    Compliance: yes    Side effects: None    Risk Parameters:  Patient reports no suicidal ideation  Patient reports no homicidal ideation  Patient reports no self-injurious behavior  Patient reports no violent  behavior    Exam (detailed: at least 9 elements; comprehensive: all 15 elements)   Constitutional  Vitals:  Most recent vital signs, dated greater than 90 days prior to this appointment, were reviewed.   There were no vitals filed for this visit.     General:  unremarkable, age appropriate     Musculoskeletal  Muscle Strength/Tone:  no tremor, no tic   Gait & Station:  non-ataxic     Psychiatric  Speech:  no latency; no press   Mood & Affect:  steady  congruent and appropriate   Thought Process:  normal and logical   Associations:  tangential   Thought Content:  normal, no suicidality, no homicidality, delusions, or paranoia   Insight:  intact   Judgement: behavior is adequate to circumstances   Orientation:  grossly intact   Memory: intact for content of interview   Language: grossly intact   Attention Span & Concentration:  able to focus   Fund of Knowledge:  intact and appropriate to age and level of education     Assessment and Diagnosis   Status/Progress: Based on the examination today, the patient's problem(s) is/are inadequately controlled.  New problems have not been presented today.   Co-morbidities and Lack of compliance are not complicating management of the primary condition.  There are no active rule-out diagnoses for this patient at this time.     General Impression:       ICD-10-CM ICD-9-CM   1. Mood disorder  F39 296.90   2. Depression, major, recurrent, in partial remission  F33.41 296.35   3. PTSD (post-traumatic stress disorder)  F43.10 309.81   4. Insomnia disorder, with non-sleep disorder mental comorbidity  G47.00 780.52   5. Alcohol use disorder in remission  F10.91 305.03   6. Panic disorder  F41.0 300.01         Intervention/Counseling/Treatment Plan   Medication Management: Continue current medications. The risks and benefits of medication were discussed with the patient.  AA/NA/CA/ACOA/Abstinence   Continue Effexor  mg po daily  Continue Antabuse 250 mg po daily  Continue Valium 5 mg  po BID PRN   Contineu Remeron 15 mg before bed  DC Abilify   Start Depakote  mg daily  Seroquel 50 mg at bedtime  Continue  Buspar 30 mg BID PRN anxiety  Prazosin 1 mg at bedtime for nightmares  Continue with psychotherapy    Return to Clinic: 2 months     Risks, benefits, side effects and alternative treatments discussed with patient. Patient agrees with the current plan as documented.  Encouraged Patient to keep future appointments.  Take medications as prescribed and abstain from substance abuse.  Pt to present to ED for thoughts to harm herself or others

## 2024-07-28 ENCOUNTER — ON-DEMAND VIRTUAL (OUTPATIENT)
Dept: URGENT CARE | Facility: CLINIC | Age: 54
End: 2024-07-28
Payer: MEDICARE

## 2024-07-28 DIAGNOSIS — B35.3 TINEA PEDIS, UNSPECIFIED LATERALITY: Primary | ICD-10-CM

## 2024-07-28 PROCEDURE — 99213 OFFICE O/P EST LOW 20 MIN: CPT | Mod: 95,,, | Performed by: NURSE PRACTITIONER

## 2024-07-28 RX ORDER — PRENATAL VIT 91/IRON/FOLIC/DHA 28-975-200
COMBINATION PACKAGE (EA) ORAL 2 TIMES DAILY
Qty: 28.4 G | Refills: 1 | Status: SHIPPED | OUTPATIENT
Start: 2024-07-28

## 2024-07-28 NOTE — PROGRESS NOTES
Subjective:      Patient ID: Daksha Marie is a 54 y.o. female.    Vitals:  vitals were not taken for this visit.     Chief Complaint: Recurrent Skin Infections      Visit Type: TELE AUDIOVISUAL    Present with the patient at the time of consultation: TELEMED PRESENT WITH PATIENT: None        Past Medical History:   Diagnosis Date    Alcohol abuse 10/07/2015    In remission since 18    Anemia 2015    Arthritis     Blindness of right eye     only sees light    Breast calcification, left     biopsied: scar tissue from breast reduction surgery    Chronic diastolic congestive heart failure     Chronic gastrojejunal anastomotic ulcer      -     CVA (cerebral vascular accident) 2022    TPA    Encounter for blood transfusion     GERD (gastroesophageal reflux disease)     Glaucoma     Hypertension     Hypoalbuminemia     Hyponatremia 10/2015    alcohol abuse, polydipsia, Na 109, seizure in ICU    Insomnia     Malingering 2016    Morbid obesity 2017    PTSD (post-traumatic stress disorder)     Sickle cell trait     Stage 3a chronic kidney disease 10/14/2018     Past Surgical History:   Procedure Laterality Date    BREAST BIOPSY Left     CARPAL TUNNEL RELEASE  2020     SECTION      CHOLECYSTECTOMY  after     bile fistula    ESOPHAGOGASTRODUODENOSCOPY N/A 2022    Procedure: ESOPHAGOGASTRODUODENOSCOPY (EGD);  Surgeon: Trey Anderson MD;  Location: [address]);  Service: Endoscopy;  Laterality: N/A;    ESOPHAGOGASTRODUODENOSCOPY N/A 2023    Procedure: EGD (ESOPHAGOGASTRODUODENOSCOPY);  Surgeon: Yrodan Chandler MD;  [facility];  Service: Endoscopy;  Laterality: N/A;    ESOPHAGOGASTRODUODENOSCOPY N/A 3/14/2024    Procedure: EGD (ESOPHAGOGASTRODUODENOSCOPY);  Surgeon: Kristofer Claudio MD;  Location: [address]);  Service: Endoscopy;  Laterality: N/A;  Prep instructions sent via portal -[employer]  3/8/24- sarita  complete - ERW    GASTRIC BYPASS  2007    TOTAL REDUCTION MAMMOPLASTY Bilateral 2003    xen gel stent implant  08/30/2018    Performed at [facility]     Review of patient's allergies indicates:   Allergen Reactions    Penicillins Hives    Hydroxyzine Anxiety, Hallucinations and Palpitations     Current Outpatient Medications on File Prior to Visit   Medication Sig Dispense Refill    amLODIPine (NORVASC) 10 MG tablet Take 1 tablet (10 mg total) by mouth once daily. 90 tablet 3    BELBUCA 150 mcg Film DISSOLVE 1 FILM TO THE GUM EVERY 12 HOURS      brimonidine-timoloL (COMBIGAN) 0.2-0.5 % Drop Place 1 drop into both eyes 2 (two) times daily.      busPIRone (BUSPAR) 30 MG Tab Take 1 tablet (30 mg total) by mouth 2 (two) times daily as needed (breakthrough anxiety). 60 tablet 5    calcium citrate 250 mg calcium Tab       carvediloL (COREG) 3.125 MG tablet Take 1 tablet (3.125 mg total) by mouth 2 (two) times daily. 180 tablet 3    cetirizine (ZYRTEC) 10 MG tablet Take 1 tablet (10 mg total) by mouth once daily. 90 tablet 3    diazePAM (VALIUM) 5 MG tablet Take 1 tablet (5 mg total) by mouth every 12 (twelve) hours as needed for Anxiety. 60 tablet 5    dicyclomine (BENTYL) 10 MG capsule TAKE 1 CAPSULE FOUR TIMES DAILY BEFORE MEALS AND NIGHTLY 120 capsule 11    disulfiram (ANTABUSE) 250 mg tablet Take 1 tablet (250 mg total) by mouth once daily. 90 tablet 3    divalproex ER (DEPAKOTE ER) 500 MG Tb24 24 hr tablet Take 1 tablet (500 mg total) by mouth once daily. 30 tablet 11    famotidine (PEPCID) 40 MG tablet Take 1 tablet (40 mg total) by mouth nightly. 30 tablet 11    fluticasone propionate (FLONASE) 50 mcg/actuation nasal spray 2 sprays (100 mcg total) by Each Nostril route once daily. 16 g 11    furosemide (LASIX) 40 MG tablet Take 1 tablet (40 mg total) by mouth once daily. 90 tablet 3    HYDROcodone-acetaminophen (NORCO) 7.5-325 mg per tablet TAKE 1 T PO TID PRN FOR 30 DAYS  0     lactulose (CHRONULAC) 20 gram/30 mL Soln Take 15 mLs (10 g total) by mouth every 6 (six) hours as needed (constipation). 250 mL 0    levocetirizine (XYZAL) 5 MG tablet Take 0.5 tablets (2.5 mg total) by mouth every evening. 15 tablet 11    LIDOcaine (LIDODERM) 5 % Place 1 patch onto the skin every 24 hours. Remove & Discard patch within 12 hours or as directed by MD      methocarbamoL (ROBAXIN) 750 MG Tab Take 750 mg by mouth 3 (three) times daily.      mirtazapine (REMERON) 15 MG tablet Take 1 tablet (15 mg total) by mouth every evening. 90 tablet 1    montelukast (SINGULAIR) 10 mg tablet Take 1 tablet (10 mg total) by mouth every evening. 90 tablet 1    multivitamin (THERAGRAN) tablet Take 1 tablet by mouth once daily. 30 tablet 5    omeprazole (PRILOSEC) 40 MG capsule Take 1 capsule (40 mg total) by mouth every morning. 30 capsule 11    prazosin (MINIPRESS) 1 MG Cap Take 1 capsule (1 mg total) by mouth every evening. Before bed for nightmares 90 capsule 1    promethazine (PHENERGAN) 25 MG tablet Take 1 tablet (25 mg total) by mouth every 6 (six) hours as needed for Nausea. 15 tablet 0    QUEtiapine (SEROQUEL) 50 MG tablet Take 1 tablet (50 mg total) by mouth every evening. 30 tablet 11    senna-docusate 8.6-50 mg (PERICOLACE) 8.6-50 mg per tablet Take 1 tablet by mouth once daily. 60 tablet 1    spironolactone (ALDACTONE) 25 MG tablet Take 1 tablet (25 mg total) by mouth once daily. 90 tablet 0    sucralfate (CARAFATE) 100 mg/mL suspension SHAKE LIQUID AND TAKE 10 ML BY MOUTH FOUR TIMES DAILY, WITH MEALS AND AT NIGHT 414 mL 3    sumatriptan (IMITREX) 50 MG tablet Once for severe headache. May repeat once after 2 hours. Do not exceed 3-4 doses in one week. 9 tablet 1    TRAVATAN Z 0.004 % ophthalmic solution INT 1 GTT IN OU QD  3    venlafaxine (EFFEXOR-XR) 150 MG Cp24 Take 1 capsule (150 mg total) by mouth once daily. 90 capsule 3    venlafaxine (EFFEXOR-XR) 75 MG 24 hr capsule Take 1 capsule (75  mg total) by mouth once daily. Take with Effexor 150 to equal 225 mg daily 90 capsule 1    [DISCONTINUED] acamprosate (CAMPRAL) 333 mg tablet Take 2 tablets (666 mg total) by mouth 3 (three) times daily. 180 tablet 11    [DISCONTINUED] ALPRAZolam (XANAX) 1 MG tablet Take 1 tablet (1 mg total) by mouth 2 (two) times daily as needed for Anxiety. 60 tablet 5     Current Facility-Administered Medications on File Prior to Visit   Medication Dose Route Frequency Provider Last Rate Last Admin    cyanocobalamin injection 1,000 mcg  1,000 mcg Intramuscular Q30 Days Johnson, D'Amico C., MD         Family History   Problem Relation Name Age of Onset    Diabetes Mother      Lupus Mother      Hypertension Mother      Heart attack Mother      Diabetes Maternal Grandmother      Crohn's disease Cousin      Heart failure Father      Hypertension Brother      Asthma Brother         Medications Ordered                [id number] - [facility] [address] [facility] [address] [facility] [address]   [address] [facility] [address]    Telephone: [phone]   Fax: [phone]   Hours: Not open 24 hours                         E-Prescribed (1 of 1)              terbinafine HCL (LAMISIL) 1 % cream    Sig: Apply topically 2 (two) times daily.       Start: 7/28/24     Quantity: 28.4 g Refills: 1                           Ohs Peq Odvv Intake    7/28/2024  4:47 PM CDT - Filed by Patient   What is your current physical address in the event of a medical emergency? [address] [facility] [address]   Are you able to take your vital signs? No   Please attach any relevant images or files          53 yo female with c/o foot fungus to both feet. She states she had in the past and it came back. She denies drainage, redness and swelling.       Constitution: Negative.   HENT: Negative.     Cardiovascular: Negative.    Respiratory: Negative.     Gastrointestinal: Negative.    Endocrine: negative.    Genitourinary: Negative.  Negative for frequency and urgency.   Musculoskeletal: Negative.    Skin: Negative.  Positive for skin thickening/induration.   Allergic/Immunologic: Negative.    Neurological: Negative.    Hematologic/Lymphatic: Negative.    Psychiatric/Behavioral: Negative.          Objective:   The physical exam was conducted virtually.  LOCATION OF PATIENT home  Physical Exam   Constitutional: She is oriented to person, place, and time. She appears well-developed.   HENT:   Head: Normocephalic and atraumatic.   Ears:   Right Ear: Hearing, tympanic membrane and external ear normal.   Left Ear: Hearing, tympanic membrane and external ear normal.   Nose: Nose normal.   Mouth/Throat: Uvula is midline, oropharynx is clear and moist and mucous membranes are normal.   Eyes: Conjunctivae and EOM are normal. Pupils are equal, round, and reactive to light.   Neck: Neck supple.   Cardiovascular: Normal rate.   Pulmonary/Chest: Effort normal and breath sounds normal.   Musculoskeletal: Normal range of motion.         General: Normal range of motion.   Neurological: She is alert and oriented to person, place, and time.   Skin: Skin is warm.   Psychiatric: Her behavior is normal. Thought content normal.   Nursing note and vitals reviewed.      Assessment:     1. Tinea pedis, unspecified laterality        Plan:   Follow up with your primary care provider if symptoms persist.  Go to the Emergency room for worsening of symptoms.       Tinea pedis, unspecified laterality  -     terbinafine HCL (LAMISIL) 1 % cream; Apply topically 2 (two) times daily.  Dispense: 28.4 g; Refill: 1

## 2024-10-14 NOTE — HPI
"Daksha Marie is a 52 y.o. female, with a PMHx of HTN, HFpEF, EtOH abuse, CKD3, GERD, previous CVA who presented to the ED via EMS for evaluation of dark bloody diarrhea onset a few hours PTA. Pt reports decreased appetite ongoing for the last 3-4 days due to nausea and states she has not eaten or been compliant with her medications in 4 days.  She reports associated generalized abdominal pain. Of note, she is currently being followed by GI for evaluation of ulcers and has not taken her Pepcid, carafate, or Prilosec in several days. Endorses 3 episodes of diarrhea today at 10, 1p, and 3p which she states was black and oily "and it really freaked me out," prompting her to the ED. She states she is a regular smoker. She denies recent EtOH use. In the ED, patient was found to have scant black stool in rectal vault, guaiac positive.  Started on IV Protonix and IV fluids and admitted to EDOU for observation.  Hgb dropped slightly from 12>>11, but no repeat episodes of melena overnight.  She was evaluated by GI who recommends clear liquids and will do an EGD in the AM.  Patient transferred to hospital medicine for further care.   " Thrombocytopenia

## 2024-12-02 ENCOUNTER — E-VISIT (OUTPATIENT)
Dept: FAMILY MEDICINE | Facility: CLINIC | Age: 54
End: 2024-12-02
Payer: MEDICARE

## 2024-12-02 DIAGNOSIS — N30.00 ACUTE CYSTITIS WITHOUT HEMATURIA: Primary | ICD-10-CM

## 2024-12-02 RX ORDER — PHENAZOPYRIDINE HYDROCHLORIDE 200 MG/1
200 TABLET, FILM COATED ORAL 3 TIMES DAILY PRN
Qty: 9 TABLET | Refills: 0 | Status: SHIPPED | OUTPATIENT
Start: 2024-12-02 | End: 2024-12-05

## 2024-12-02 RX ORDER — SULFAMETHOXAZOLE AND TRIMETHOPRIM 800; 160 MG/1; MG/1
1 TABLET ORAL 2 TIMES DAILY
Qty: 6 TABLET | Refills: 0 | Status: SHIPPED | OUTPATIENT
Start: 2024-12-02 | End: 2024-12-05

## 2024-12-02 NOTE — PROGRESS NOTES
Patient ID: Daksha Marie is a 54 y.o. female.    Chief Complaint: General Illness (Entered automatically based on patient selection in [employer].)          274}  The patient initiated a request through [employer] on 12/2/2024 for evaluation and management with a chief complaint of General Illness (Entered automatically based on patient selection in [employer].)     I evaluated the questionnaire submission on 12/02/2024 .    Total Time (in minutes): 11     Ohs Peq Evisit Supergroup-Common Problems    12/2/2024 10:10 AM CST - Filed by Patient   What do you need help with? Urinary Symptoms   Do you agree to participate in an E-Visit? Yes   If you have any of the following symptoms, please present to your local emergency room or call 911: I acknowledge   Select all that apply: None of the above   What is the main issue you would like addressed today? I have all of the symptoms of a UTI. I need some medicine to treat a UTI   I would like to address: Medication and Immunizations   What countries and cities are you visiting? [place]   What is the purpose of your trip? Visiting family or friends   How many days is your trip? 10   Do you plan to do any of these activities during your trip? None   Where will you stay during your trip? None   Do you need a medication for any of the following? Other   What do you need medication for? Possible UTI Signs and symptoms of a chest cold   Have you gotten any vaccines from a pharmacy or outside clinic? Yes   Which pharmacy or clinic did you get your vaccines? [facility]   Would you like vaccine recommendations for your trip? No   Please upload any travel documents, accommodations, or forms for your provider to complete.     Provide any additional information you feel is important.    Please attach any relevant images or files    Are you able to take your vital signs? Yes   Systolic Blood Pressure: 139   Diastolic Blood Pressure: 82   Weight: 178   Height: 65   Pulse: 72    Temperature: 97.2   Respiration rate: 24   Pulse Oxygen:    Do you want to address a new or existing medication? I would like to start a new medication that I do not already take   What is the name of the medication that you would like to start? Bactrim   Have you taken a similar medication in the past? Yes   What was the name of the similar medication? Clindamycin   Why are you no longer on that medication? No specific reason    What medical condition is the  medication intended to treat? UTI   Provide any additional information you feel is important.    Please attach any relevant images or files    Are you able to take your vital signs? Yes   Systolic Blood Pressure: 141   Diastolic Blood Pressure: 75   Weight: 178   Height: 65   Pulse: 75   Temperature: 97.2   Respiration rate: 24   Pulse Oxygen:           Active Problem List with Overview Notes    Diagnosis Date Noted    Stricture of artery 04/10/2023    Tobacco dependence 02/27/2023    Gastrointestinal hemorrhage with melena 02/26/2023    Class 1 obesity due to excess calories with serious comorbidity and body mass index (BMI) of 34.0 to 34.9 in adult 09/27/2022    Postprandial epigastric pain 09/21/2022    Alcohol use disorder, moderate, in sustained remission 01/13/2019    Chronic recurrent major depressive disorder     Status post glaucoma surgery 05/30/2018    Generalized anxiety disorder 03/22/2018    Panic disorder 03/22/2018    Chronic headache 10/22/2017    Gastroesophageal reflux disease without esophagitis 10/29/2015    Microcytic anemia 10/07/2015    History of Haylee-en-Y gastric bypass 06/24/2015    Chronic diastolic congestive heart failure      Followed by PCP      PTSD (post-traumatic stress disorder)     Glaucoma     Essential hypertension 08/12/2014      Recent Labs Obtained:  Lab Results   Component Value Date    WBC 5.73 03/01/2024    HGB 10.7 (L) 03/01/2024    HCT 32.6 (L) 03/01/2024    MCV 87 03/01/2024     03/01/2024      03/01/2024    K 5.1 03/01/2024     03/01/2024    CREATININE 1.3 03/01/2024    EGFRNORACEVR 49 (A) 03/01/2024    HGBA1C 5.0 01/25/2023    TSH 1.616 01/25/2023      Review of patient's allergies indicates:   Allergen Reactions    Penicillins Hives    Hydroxyzine Anxiety, Hallucinations and Palpitations       Encounter Diagnosis   Name Primary?    Acute cystitis without hematuria Yes        No orders of the defined types were placed in this encounter.     Medications Ordered This Encounter   Medications    phenazopyridine (PYRIDIUM) 200 MG tablet     Sig: Take 1 tablet (200 mg total) by mouth 3 (three) times daily as needed for Pain.     Dispense:  9 tablet     Refill:  0    sulfamethoxazole-trimethoprim 800-160mg (BACTRIM DS) 800-160 mg Tab     Sig: Take 1 tablet by mouth 2 (two) times daily. for 3 days     Dispense:  6 tablet     Refill:  0        E-Visit Time Tracking:    Day 1 Time (in minutes): 11    Total Time (in minutes): 11      274}

## 2024-12-11 ENCOUNTER — TELEPHONE (OUTPATIENT)
Dept: FAMILY MEDICINE | Facility: CLINIC | Age: 54
End: 2024-12-11
Payer: MEDICARE

## 2024-12-11 DIAGNOSIS — N30.00 ACUTE CYSTITIS WITHOUT HEMATURIA: Primary | ICD-10-CM

## 2024-12-11 NOTE — TELEPHONE ENCOUNTER
----- Message from Karly sent at 12/11/2024  3:02 PM CST -----  Regarding: LAB ORDERS  Patient is schedule for labs on 12/12/24 but orders are not attached to the appointment. Please attach orders.

## 2024-12-13 DIAGNOSIS — G44.001 INTRACTABLE CLUSTER HEADACHE SYNDROME, UNSPECIFIED CHRONICITY PATTERN: ICD-10-CM

## 2024-12-13 DIAGNOSIS — J30.9 ALLERGIC RHINITIS, UNSPECIFIED SEASONALITY, UNSPECIFIED TRIGGER: ICD-10-CM

## 2024-12-13 DIAGNOSIS — I10 ESSENTIAL HYPERTENSION: Chronic | ICD-10-CM

## 2024-12-13 DIAGNOSIS — R11.2 NON-INTRACTABLE VOMITING WITH NAUSEA: ICD-10-CM

## 2024-12-13 DIAGNOSIS — I50.32 CHRONIC DIASTOLIC CONGESTIVE HEART FAILURE: Chronic | ICD-10-CM

## 2024-12-13 RX ORDER — PROMETHAZINE HYDROCHLORIDE 25 MG/1
25 TABLET ORAL EVERY 6 HOURS PRN
Qty: 15 TABLET | Refills: 0 | OUTPATIENT
Start: 2024-12-13

## 2024-12-13 RX ORDER — MONTELUKAST SODIUM 10 MG/1
10 TABLET ORAL NIGHTLY
Qty: 90 TABLET | Refills: 1 | OUTPATIENT
Start: 2024-12-13

## 2024-12-13 RX ORDER — CETIRIZINE HYDROCHLORIDE 10 MG/1
10 TABLET ORAL DAILY
Qty: 90 TABLET | Refills: 3 | OUTPATIENT
Start: 2024-12-13 | End: 2025-12-13

## 2024-12-13 RX ORDER — SUMATRIPTAN SUCCINATE 50 MG/1
TABLET ORAL
Qty: 9 TABLET | Refills: 1 | OUTPATIENT
Start: 2024-12-13

## 2024-12-13 RX ORDER — AMLODIPINE BESYLATE 10 MG/1
10 TABLET ORAL DAILY
Qty: 90 TABLET | Refills: 3 | OUTPATIENT
Start: 2024-12-13 | End: 2025-12-13

## 2024-12-13 RX ORDER — FUROSEMIDE 40 MG/1
40 TABLET ORAL DAILY
Qty: 90 TABLET | Refills: 3 | OUTPATIENT
Start: 2024-12-13 | End: 2025-12-13

## 2024-12-13 NOTE — TELEPHONE ENCOUNTER
Please see the attached refill request.Daksha Argueta [facility] Clinical Support  Phone Number: [phone]     Refills have been requested for the following medications:        cetirizine (ZYRTEC) 10 MG tablet [Darius Tolbert]      Patient Comment: Allergies        furosemide (LASIX) 40 MG tablet [Darius Tolbert]      Patient Comment: Fluid Retention        amLODIPine (NORVASC) 10 MG tablet [Darius Tolbert]      Patient Comment: Hypertension        promethazine (PHENERGAN) 25 MG tablet [Darius Tolbert]      Patient Comment: Nausea        montelukast (SINGULAIR) 10 mg tablet [Darius Tolbert]      Patient Comment: Allergies        sumatriptan (IMITREX) 50 MG tablet [Darius Tolbert]      Patient Comment: Excessive Migraines    Preferred pharmacy: Other - [facility] [address]  Delivery method: Pickup

## 2024-12-13 NOTE — TELEPHONE ENCOUNTER
Pt has several physicians.   She will schedule with Dr. Padilla. She declined to make an appointment here in our office.

## 2025-01-01 DIAGNOSIS — I10 ESSENTIAL HYPERTENSION: Chronic | ICD-10-CM

## 2025-01-01 DIAGNOSIS — J30.9 ALLERGIC RHINITIS, UNSPECIFIED SEASONALITY, UNSPECIFIED TRIGGER: ICD-10-CM

## 2025-01-01 DIAGNOSIS — F41.0 PANIC DISORDER: ICD-10-CM

## 2025-01-01 DIAGNOSIS — R09.81 CHRONIC NASAL CONGESTION: ICD-10-CM

## 2025-01-02 RX ORDER — AMLODIPINE BESYLATE 10 MG/1
10 TABLET ORAL DAILY
Qty: 90 TABLET | Refills: 3 | Status: SHIPPED | OUTPATIENT
Start: 2025-01-02 | End: 2026-01-02

## 2025-01-02 RX ORDER — CETIRIZINE HYDROCHLORIDE 10 MG/1
10 TABLET ORAL DAILY
Qty: 90 TABLET | Refills: 3 | Status: SHIPPED | OUTPATIENT
Start: 2025-01-02 | End: 2026-01-02

## 2025-01-02 RX ORDER — FLUTICASONE PROPIONATE 50 MCG
2 SPRAY, SUSPENSION (ML) NASAL DAILY
Qty: 16 G | Refills: 11 | Status: SHIPPED | OUTPATIENT
Start: 2025-01-02

## 2025-01-02 RX ORDER — BUSPIRONE HYDROCHLORIDE 30 MG/1
30 TABLET ORAL 2 TIMES DAILY PRN
Qty: 60 TABLET | Refills: 5 | Status: SHIPPED | OUTPATIENT
Start: 2025-01-02

## 2025-01-04 ENCOUNTER — PATIENT MESSAGE (OUTPATIENT)
Dept: OTHER | Facility: OTHER | Age: 55
End: 2025-01-04
Payer: MEDICARE

## 2025-01-04 ENCOUNTER — PATIENT MESSAGE (OUTPATIENT)
Dept: PSYCHIATRY | Facility: CLINIC | Age: 55
End: 2025-01-04
Payer: MEDICARE

## 2025-01-06 ENCOUNTER — PATIENT MESSAGE (OUTPATIENT)
Dept: ADMINISTRATIVE | Facility: OTHER | Age: 55
End: 2025-01-06
Payer: MEDICARE

## 2025-01-15 DIAGNOSIS — R10.9 ABDOMINAL PAIN, UNSPECIFIED ABDOMINAL LOCATION: ICD-10-CM

## 2025-01-15 RX ORDER — OMEPRAZOLE 40 MG/1
40 CAPSULE, DELAYED RELEASE ORAL EVERY MORNING
Qty: 90 CAPSULE | Refills: 3 | Status: SHIPPED | OUTPATIENT
Start: 2025-01-15

## 2025-01-28 ENCOUNTER — OFFICE VISIT (OUTPATIENT)
Dept: PSYCHIATRY | Facility: CLINIC | Age: 55
End: 2025-01-28
Payer: MEDICARE

## 2025-01-28 DIAGNOSIS — F39 MOOD DISORDER: ICD-10-CM

## 2025-01-28 DIAGNOSIS — G47.00 INSOMNIA DISORDER, WITH NON-SLEEP DISORDER MENTAL COMORBIDITY: ICD-10-CM

## 2025-01-28 DIAGNOSIS — F10.91 ALCOHOL USE DISORDER IN REMISSION: ICD-10-CM

## 2025-01-28 DIAGNOSIS — F33.41 DEPRESSION, MAJOR, RECURRENT, IN PARTIAL REMISSION: ICD-10-CM

## 2025-01-28 DIAGNOSIS — F43.10 PTSD (POST-TRAUMATIC STRESS DISORDER): ICD-10-CM

## 2025-01-28 DIAGNOSIS — F41.0 PANIC DISORDER: ICD-10-CM

## 2025-01-28 PROCEDURE — 98006 SYNCH AUDIO-VIDEO EST MOD 30: CPT | Mod: 95,,, | Performed by: NURSE PRACTITIONER

## 2025-01-28 RX ORDER — VENLAFAXINE HYDROCHLORIDE 150 MG/1
150 CAPSULE, EXTENDED RELEASE ORAL DAILY
Qty: 90 CAPSULE | Refills: 3 | Status: SHIPPED | OUTPATIENT
Start: 2025-01-28

## 2025-01-28 RX ORDER — MIRTAZAPINE 15 MG/1
15 TABLET, FILM COATED ORAL NIGHTLY
Qty: 90 TABLET | Refills: 1 | Status: SHIPPED | OUTPATIENT
Start: 2025-01-28

## 2025-01-28 RX ORDER — QUETIAPINE FUMARATE 50 MG/1
50 TABLET, FILM COATED ORAL NIGHTLY
Qty: 30 TABLET | Refills: 11 | Status: SHIPPED | OUTPATIENT
Start: 2025-01-28 | End: 2026-01-28

## 2025-01-28 RX ORDER — DIVALPROEX SODIUM 500 MG/1
500 TABLET, FILM COATED, EXTENDED RELEASE ORAL DAILY
Qty: 30 TABLET | Refills: 11 | Status: SHIPPED | OUTPATIENT
Start: 2025-01-28 | End: 2026-01-28

## 2025-01-28 RX ORDER — BUSPIRONE HYDROCHLORIDE 30 MG/1
30 TABLET ORAL 2 TIMES DAILY PRN
Qty: 60 TABLET | Refills: 5 | Status: SHIPPED | OUTPATIENT
Start: 2025-01-28

## 2025-01-28 RX ORDER — PRAZOSIN HYDROCHLORIDE 1 MG/1
1 CAPSULE ORAL NIGHTLY
Qty: 90 CAPSULE | Refills: 1 | Status: SHIPPED | OUTPATIENT
Start: 2025-01-28

## 2025-01-28 RX ORDER — DIAZEPAM 5 MG/1
5 TABLET ORAL EVERY 12 HOURS PRN
Qty: 60 TABLET | Refills: 5 | Status: SHIPPED | OUTPATIENT
Start: 2025-01-28

## 2025-01-28 RX ORDER — VENLAFAXINE HYDROCHLORIDE 75 MG/1
75 CAPSULE, EXTENDED RELEASE ORAL DAILY
Qty: 90 CAPSULE | Refills: 1 | Status: SHIPPED | OUTPATIENT
Start: 2025-01-28

## 2025-01-28 RX ORDER — DISULFIRAM 250 MG/1
250 TABLET ORAL DAILY
Qty: 90 TABLET | Refills: 3 | Status: SHIPPED | OUTPATIENT
Start: 2025-01-28

## 2025-01-28 NOTE — PROGRESS NOTES
"  Outpatient Psychiatry Follow-Up Visit (MD/NP)    1/28/2025    Clinical Status of Patient:  Outpatient (Ambulatory)    Chief Complaint:  Daksha Marie is a 54 y.o. female who presents today for follow-up of anxiety and PTSD .  Met with patient.      Last visit was: 4/11/23 Chart and  reviewed.   The patient location is: home  The chief complaint leading to consultation is: depression and anxiety    Visit type: audiovisual    Face to Face time with patient: 30 minutes  35 minutes of total time spent on the encounter, which includes face to face time and non-face to face time preparing to see the patient (eg, review of tests), Obtaining and/or reviewing separately obtained history, Documenting clinical information in the electronic or other health record, Independently interpreting results (not separately reported) and communicating results to the patient/family/caregiver, or Care coordination (not separately reported).     Each patient to whom he or she provides medical services by telemedicine is:  (1) informed of the relationship between the physician and patient and the respective role of any other health care provider with respect to management of the patient; and (2) notified that he or she may decline to receive medical services by telemedicine and may withdraw from such care at any time.    Interval History and Content of Current Session:  Current Psychiatric Medications/changes  Continue Effexor  mg po daily  Continue Antabuse 250 mg po daily  Continue Valium 5 mg po BID PRN   Contineu Remeron 15 mg before bed  DC Abilify   Start Depakote  mg daily  Seroquel 50 mg at bedtime  Continue  Buspar 30 mg BID PRN anxiety  Prazosin 1 mg at bedtime for nightmares    Virtual Visit:Pt presents bright affect and euthymic mood. Pt states, "I've been doing well" .Reports effective response to medications and denies side effects. Denies any unmanaged mood, anxiety, or insomnia symptoms.  Denies " SI/HI/AVH. Will continue current medications.     Remains sober on Antabuse.  Moderate use of Valium for panic attacks. Denies SI/HI/AVH    Psychotherapy:  Target symptoms: anxiety   Why chosen therapy is appropriate versus another modality: relevant to diagnosis  Outcome monitoring methods: self-report  Therapeutic intervention type: insight oriented psychotherapy  Topics discussed/themes: building skills sets for symptom management, symptom recognition  The patient's response to the intervention is accepting. The patient's progress toward treatment goals is good.   Duration of intervention: 13 minutes.    Review of Systems   PSYCHIATRIC: Pertinant items are noted in the narrative.  CONSTITUTIONAL: No weight gain or loss.   MUSCULOSKELETAL: No pain or stiffness of the joints.  NEUROLOGIC: No weakness, sensory changes, seizures, confusion, memory loss, tremor or other abnormal movements.  ENDOCRINE: No polydipsia or polyuria.  INTEGUMENTARY: No rashes or lacerations.  EYES: No exophthalmos, jaundice or blindness.  ENT: No dizziness, tinnitus or hearing loss.  RESPIRATORY: No shortness of breath.  CARDIOVASCULAR: No tachycardia or chest pain.  GASTROINTESTINAL: No nausea, vomiting, pain, constipation or diarrhea.  GENITOURINARY: No frequency, dysuria or sexual dysfunction.  HEMATOLOGIC/LYMPHATIC: No excessive bleeding, prolonged or excessive bleeding after dental extraction/injury.  ALLERGIC/IMMUNOLOGIC: No allergic response to materials, foods or animals at this time.    Past Medical, Family and Social History: The patient's past medical, family and social history have been reviewed and updated as appropriate within the electronic medical record - see encounter notes.    Compliance: yes    Side effects: None    Risk Parameters:  Patient reports no suicidal ideation  Patient reports no homicidal ideation  Patient reports no self-injurious behavior  Patient reports no violent behavior    Exam (detailed: at least 9  elements; comprehensive: all 15 elements)   Constitutional  Vitals:  Most recent vital signs, dated greater than 90 days prior to this appointment, were reviewed.   There were no vitals filed for this visit.     General:  unremarkable, age appropriate     Musculoskeletal  Muscle Strength/Tone:  no tremor, no tic   Gait & Station:  non-ataxic     Psychiatric  Speech:  no latency; no press   Mood & Affect:  steady  congruent and appropriate   Thought Process:  normal and logical   Associations:  tangential   Thought Content:  normal, no suicidality, no homicidality, delusions, or paranoia   Insight:  intact   Judgement: behavior is adequate to circumstances   Orientation:  grossly intact   Memory: intact for content of interview   Language: grossly intact   Attention Span & Concentration:  able to focus   Fund of Knowledge:  intact and appropriate to age and level of education     Assessment and Diagnosis   Status/Progress: Based on the examination today, the patient's problem(s) is/are inadequately controlled.  New problems have not been presented today.   Co-morbidities and Lack of compliance are not complicating management of the primary condition.  There are no active rule-out diagnoses for this patient at this time.     General Impression:       ICD-10-CM ICD-9-CM   1. Panic disorder  F41.0 300.01   2. PTSD (post-traumatic stress disorder)  F43.10 309.81   3. Insomnia disorder, with non-sleep disorder mental comorbidity  G47.00 780.52   4. Mood disorder  F39 296.90   5. Depression, major, recurrent, in partial remission  F33.41 296.35   6. Alcohol use disorder in remission  F10.91 305.03       Intervention/Counseling/Treatment Plan   Medication Management: Continue current medications. The risks and benefits of medication were discussed with the patient.  AA/NA/CA/ACOA/Abstinence   Continue Effexor  mg po daily  Continue Antabuse 250 mg po daily  Continue Valium 5 mg po BID PRN   Contineu Remeron 15 mg  before bed  Continue Depakote  mg daily  Continue Seroquel 50 mg at bedtime  Continue  Buspar 30 mg BID PRN anxiety  Continue Prazosin 1 mg at bedtime for nightmares  Continue with psychotherapy    Return to Clinic: 6 months     Risks, benefits, side effects and alternative treatments discussed with patient. Patient agrees with the current plan as documented.  Encouraged Patient to keep future appointments.  Take medications as prescribed and abstain from substance abuse.  Pt to present to ED for thoughts to harm herself or others

## 2025-02-21 DIAGNOSIS — K28.7 CHRONIC ANASTOMOTIC ULCER OF GASTROJEJUNAL REGION: ICD-10-CM

## 2025-02-24 RX ORDER — FAMOTIDINE 40 MG/1
40 TABLET, FILM COATED ORAL NIGHTLY
Qty: 90 TABLET | Refills: 3 | Status: SHIPPED | OUTPATIENT
Start: 2025-02-24

## 2025-04-21 ENCOUNTER — HOSPITAL ENCOUNTER (INPATIENT)
Facility: OTHER | Age: 55
LOS: 1 days | Discharge: HOME | DRG: 682 | End: 2025-04-23
Attending: EMERGENCY MEDICINE | Admitting: INTERNAL MEDICINE
Payer: MEDICARE

## 2025-04-21 DIAGNOSIS — R55 SYNCOPE: ICD-10-CM

## 2025-04-21 DIAGNOSIS — G93.40 ENCEPHALOPATHY ACUTE: ICD-10-CM

## 2025-04-21 DIAGNOSIS — N39.0 URINARY TRACT INFECTION WITHOUT HEMATURIA, SITE UNSPECIFIED: ICD-10-CM

## 2025-04-21 DIAGNOSIS — G44.001 INTRACTABLE CLUSTER HEADACHE SYNDROME, UNSPECIFIED CHRONICITY PATTERN: ICD-10-CM

## 2025-04-21 DIAGNOSIS — G93.40 ENCEPHALOPATHY: ICD-10-CM

## 2025-04-21 DIAGNOSIS — N17.9 AKI (ACUTE KIDNEY INJURY): Primary | ICD-10-CM

## 2025-04-21 DIAGNOSIS — R10.9 ABDOMINAL PAIN, UNSPECIFIED ABDOMINAL LOCATION: ICD-10-CM

## 2025-04-21 LAB
ABSOLUTE EOSINOPHIL (OHS): 0.08 K/UL
ABSOLUTE MONOCYTE (OHS): 0.53 K/UL (ref 0.3–1)
ABSOLUTE NEUTROPHIL COUNT (OHS): 2.92 K/UL (ref 1.8–7.7)
BASOPHILS # BLD AUTO: 0.03 K/UL
BASOPHILS NFR BLD AUTO: 0.5 %
BILIRUB UR QL STRIP.AUTO: NEGATIVE
CLARITY UR: ABNORMAL
COLOR UR AUTO: YELLOW
ERYTHROCYTE [DISTWIDTH] IN BLOOD BY AUTOMATED COUNT: 17.6 % (ref 11.5–14.5)
GLUCOSE UR QL STRIP: NEGATIVE
HCT VFR BLD AUTO: 36.5 % (ref 37–48.5)
HGB BLD-MCNC: 11.5 GM/DL (ref 12–16)
HGB UR QL STRIP: NEGATIVE
HOLD SPECIMEN: NORMAL
IMM GRANULOCYTES # BLD AUTO: 0.02 K/UL (ref 0–0.04)
IMM GRANULOCYTES NFR BLD AUTO: 0.3 % (ref 0–0.5)
INDIRECT COOMBS: NORMAL
KETONES UR QL STRIP: ABNORMAL
LEUKOCYTE ESTERASE UR QL STRIP: ABNORMAL
LYMPHOCYTES # BLD AUTO: 2.43 K/UL (ref 1–4.8)
MCH RBC QN AUTO: 25.4 PG (ref 27–31)
MCHC RBC AUTO-ENTMCNC: 31.5 G/DL (ref 32–36)
MCV RBC AUTO: 81 FL (ref 82–98)
NITRITE UR QL STRIP: POSITIVE
NUCLEATED RBC (/100WBC) (OHS): 0 /100 WBC
PH UR STRIP: 6 [PH]
PLATELET # BLD AUTO: 233 K/UL (ref 150–450)
PMV BLD AUTO: 9.5 FL (ref 9.2–12.9)
POCT GLUCOSE: 70 MG/DL (ref 70–110)
PROT UR QL STRIP: NEGATIVE
RBC # BLD AUTO: 4.52 M/UL (ref 4–5.4)
RELATIVE EOSINOPHIL (OHS): 1.3 %
RELATIVE LYMPHOCYTE (OHS): 40.4 % (ref 18–48)
RELATIVE MONOCYTE (OHS): 8.8 % (ref 4–15)
RELATIVE NEUTROPHIL (OHS): 48.7 % (ref 38–73)
RH BLD: NORMAL
SP GR UR STRIP: 1.01
SPECIMEN OUTDATE: NORMAL
UROBILINOGEN UR STRIP-ACNC: NEGATIVE EU/DL
VALPROATE SERPL-MCNC: 35.9 UG/ML (ref 50–100)
WBC # BLD AUTO: 6.01 K/UL (ref 3.9–12.7)

## 2025-04-21 PROCEDURE — 93010 ELECTROCARDIOGRAM REPORT: CPT | Mod: ,,, | Performed by: INTERNAL MEDICINE

## 2025-04-21 PROCEDURE — 25000003 PHARM REV CODE 250: Performed by: EMERGENCY MEDICINE

## 2025-04-21 PROCEDURE — 87389 HIV-1 AG W/HIV-1&-2 AB AG IA: CPT | Performed by: EMERGENCY MEDICINE

## 2025-04-21 PROCEDURE — 80165 DIPROPYLACETIC ACID FREE: CPT | Performed by: EMERGENCY MEDICINE

## 2025-04-21 PROCEDURE — 85025 COMPLETE CBC W/AUTO DIFF WBC: CPT | Performed by: EMERGENCY MEDICINE

## 2025-04-21 PROCEDURE — 36415 COLL VENOUS BLD VENIPUNCTURE: CPT | Performed by: EMERGENCY MEDICINE

## 2025-04-21 PROCEDURE — 80164 ASSAY DIPROPYLACETIC ACD TOT: CPT | Performed by: EMERGENCY MEDICINE

## 2025-04-21 PROCEDURE — 93005 ELECTROCARDIOGRAM TRACING: CPT

## 2025-04-21 PROCEDURE — 87086 URINE CULTURE/COLONY COUNT: CPT | Performed by: EMERGENCY MEDICINE

## 2025-04-21 PROCEDURE — 86900 BLOOD TYPING SEROLOGIC ABO: CPT | Performed by: EMERGENCY MEDICINE

## 2025-04-21 PROCEDURE — 81001 URINALYSIS AUTO W/SCOPE: CPT | Performed by: EMERGENCY MEDICINE

## 2025-04-21 PROCEDURE — 82962 GLUCOSE BLOOD TEST: CPT

## 2025-04-21 PROCEDURE — 99285 EMERGENCY DEPT VISIT HI MDM: CPT | Mod: 25

## 2025-04-21 PROCEDURE — 86803 HEPATITIS C AB TEST: CPT | Performed by: EMERGENCY MEDICINE

## 2025-04-21 RX ORDER — SUMATRIPTAN SUCCINATE 25 MG/1
50 TABLET ORAL
Status: COMPLETED | OUTPATIENT
Start: 2025-04-21 | End: 2025-04-21

## 2025-04-21 RX ADMIN — SUMATRIPTAN SUCCINATE 50 MG: 25 TABLET ORAL at 10:04

## 2025-04-22 PROBLEM — N18.9 ACUTE KIDNEY INJURY SUPERIMPOSED ON CKD: Status: ACTIVE | Noted: 2025-04-22

## 2025-04-22 PROBLEM — N17.9 ACUTE KIDNEY INJURY SUPERIMPOSED ON CKD: Status: ACTIVE | Noted: 2025-04-22

## 2025-04-22 PROBLEM — G93.40 ENCEPHALOPATHY: Status: ACTIVE | Noted: 2025-04-22

## 2025-04-22 LAB
ALBUMIN SERPL BCP-MCNC: 3.6 G/DL (ref 3.5–5.2)
ALP SERPL-CCNC: 92 UNIT/L (ref 40–150)
ALT SERPL W/O P-5'-P-CCNC: 16 UNIT/L (ref 10–44)
AMPHET UR QL SCN: NEGATIVE
ANION GAP (OHS): 12 MMOL/L (ref 8–16)
AST SERPL-CCNC: 24 UNIT/L (ref 11–45)
B-HCG UR QL: NEGATIVE
BACTERIA #/AREA URNS AUTO: ABNORMAL /HPF
BARBITURATE SCN PRESENT UR: NEGATIVE
BENZODIAZ UR QL SCN: ABNORMAL
BILIRUB SERPL-MCNC: 0.3 MG/DL (ref 0.1–1)
BNP SERPL-MCNC: 93 PG/ML (ref 0–99)
BUN SERPL-MCNC: 29 MG/DL (ref 6–20)
CALCIUM SERPL-MCNC: 9.3 MG/DL (ref 8.7–10.5)
CANNABINOIDS UR QL SCN: NEGATIVE
CHLORIDE SERPL-SCNC: 104 MMOL/L (ref 95–110)
CO2 SERPL-SCNC: 19 MMOL/L (ref 23–29)
COCAINE UR QL SCN: NEGATIVE
CREAT SERPL-MCNC: 2.6 MG/DL (ref 0.5–1.4)
CREAT UR-MCNC: 100.1 MG/DL (ref 15–325)
CREAT UR-MCNC: 100.1 MG/DL (ref 15–325)
CTP QC/QA: YES
ETHANOL SERPL-MCNC: <10 MG/DL
FENTANYL UR QL SCN: NEGATIVE
GFR SERPLBLD CREATININE-BSD FMLA CKD-EPI: 21 ML/MIN/1.73/M2
GLUCOSE SERPL-MCNC: 68 MG/DL (ref 70–110)
HCV AB SERPL QL IA: NEGATIVE
HIV 1+2 AB+HIV1 P24 AG SERPL QL IA: NEGATIVE
HOLD SPECIMEN: NORMAL
HYALINE CASTS UR QL AUTO: 2 /LPF (ref 0–1)
LACTATE SERPL-SCNC: 1 MMOL/L (ref 0.5–2.2)
LDH SERPL L TO P-CCNC: 1.26 MMOL/L (ref 0.5–2.2)
MAGNESIUM SERPL-MCNC: 2 MG/DL (ref 1.6–2.6)
METHADONE UR QL SCN: NEGATIVE
MICROSCOPIC COMMENT: ABNORMAL
OPIATES UR QL SCN: NEGATIVE
PCP UR QL: NEGATIVE
POCT GLUCOSE: 199 MG/DL (ref 70–110)
POCT GLUCOSE: 63 MG/DL (ref 70–110)
POTASSIUM SERPL-SCNC: 4.9 MMOL/L (ref 3.5–5.1)
PROT SERPL-MCNC: 7.4 GM/DL (ref 6–8.4)
RBC #/AREA URNS AUTO: 2 /HPF (ref 0–4)
SAMPLE: NORMAL
SODIUM SERPL-SCNC: 135 MMOL/L (ref 136–145)
SQUAMOUS #/AREA URNS AUTO: 3 /HPF
WBC #/AREA URNS AUTO: >100 /HPF (ref 0–5)
WBC CLUMPS UR QL AUTO: ABNORMAL

## 2025-04-22 PROCEDURE — 82962 GLUCOSE BLOOD TEST: CPT

## 2025-04-22 PROCEDURE — 80307 DRUG TEST PRSMV CHEM ANLYZR: CPT | Performed by: EMERGENCY MEDICINE

## 2025-04-22 PROCEDURE — 83735 ASSAY OF MAGNESIUM: CPT | Performed by: EMERGENCY MEDICINE

## 2025-04-22 PROCEDURE — 83605 ASSAY OF LACTIC ACID: CPT | Mod: 59 | Performed by: EMERGENCY MEDICINE

## 2025-04-22 PROCEDURE — 81025 URINE PREGNANCY TEST: CPT | Performed by: INTERNAL MEDICINE

## 2025-04-22 PROCEDURE — 96374 THER/PROPH/DIAG INJ IV PUSH: CPT

## 2025-04-22 PROCEDURE — 25000003 PHARM REV CODE 250: Performed by: NURSE PRACTITIONER

## 2025-04-22 PROCEDURE — 11000001 HC ACUTE MED/SURG PRIVATE ROOM

## 2025-04-22 PROCEDURE — 96375 TX/PRO/DX INJ NEW DRUG ADDON: CPT

## 2025-04-22 PROCEDURE — 83880 ASSAY OF NATRIURETIC PEPTIDE: CPT | Performed by: INTERNAL MEDICINE

## 2025-04-22 PROCEDURE — 63600175 PHARM REV CODE 636 W HCPCS: Performed by: NURSE PRACTITIONER

## 2025-04-22 PROCEDURE — 80053 COMPREHEN METABOLIC PANEL: CPT | Performed by: EMERGENCY MEDICINE

## 2025-04-22 PROCEDURE — 96372 THER/PROPH/DIAG INJ SC/IM: CPT | Performed by: NURSE PRACTITIONER

## 2025-04-22 PROCEDURE — 87040 BLOOD CULTURE FOR BACTERIA: CPT | Performed by: EMERGENCY MEDICINE

## 2025-04-22 PROCEDURE — 99900035 HC TECH TIME PER 15 MIN (STAT)

## 2025-04-22 PROCEDURE — 25000003 PHARM REV CODE 250: Performed by: EMERGENCY MEDICINE

## 2025-04-22 PROCEDURE — 36415 COLL VENOUS BLD VENIPUNCTURE: CPT | Performed by: INTERNAL MEDICINE

## 2025-04-22 PROCEDURE — 25000003 PHARM REV CODE 250: Performed by: PHYSICIAN ASSISTANT

## 2025-04-22 PROCEDURE — 21400001 HC TELEMETRY ROOM

## 2025-04-22 PROCEDURE — 82077 ASSAY SPEC XCP UR&BREATH IA: CPT | Performed by: EMERGENCY MEDICINE

## 2025-04-22 PROCEDURE — 63600175 PHARM REV CODE 636 W HCPCS: Performed by: EMERGENCY MEDICINE

## 2025-04-22 PROCEDURE — 83605 ASSAY OF LACTIC ACID: CPT

## 2025-04-22 PROCEDURE — 96361 HYDRATE IV INFUSION ADD-ON: CPT

## 2025-04-22 RX ORDER — IBUPROFEN 200 MG
24 TABLET ORAL
Status: DISCONTINUED | OUTPATIENT
Start: 2025-04-22 | End: 2025-04-23 | Stop reason: HOSPADM

## 2025-04-22 RX ORDER — AZTREONAM 2 G/1
2000 INJECTION, POWDER, LYOPHILIZED, FOR SOLUTION INTRAMUSCULAR; INTRAVENOUS
Status: DISCONTINUED | OUTPATIENT
Start: 2025-04-22 | End: 2025-04-22

## 2025-04-22 RX ORDER — METHOCARBAMOL 750 MG/1
750 TABLET, FILM COATED ORAL ONCE
Status: DISCONTINUED | OUTPATIENT
Start: 2025-04-22 | End: 2025-04-22

## 2025-04-22 RX ORDER — CARVEDILOL 6.25 MG/1
6.25 TABLET ORAL 2 TIMES DAILY
Status: DISCONTINUED | OUTPATIENT
Start: 2025-04-22 | End: 2025-04-23 | Stop reason: HOSPADM

## 2025-04-22 RX ORDER — SODIUM CHLORIDE, SODIUM LACTATE, POTASSIUM CHLORIDE, CALCIUM CHLORIDE 600; 310; 30; 20 MG/100ML; MG/100ML; MG/100ML; MG/100ML
INJECTION, SOLUTION INTRAVENOUS CONTINUOUS
Status: DISCONTINUED | OUTPATIENT
Start: 2025-04-22 | End: 2025-04-23

## 2025-04-22 RX ORDER — ONDANSETRON HYDROCHLORIDE 2 MG/ML
4 INJECTION, SOLUTION INTRAVENOUS EVERY 8 HOURS PRN
Status: DISCONTINUED | OUTPATIENT
Start: 2025-04-22 | End: 2025-04-23 | Stop reason: HOSPADM

## 2025-04-22 RX ORDER — DIVALPROEX SODIUM 250 MG/1
500 TABLET, FILM COATED, EXTENDED RELEASE ORAL DAILY
Status: DISCONTINUED | OUTPATIENT
Start: 2025-04-22 | End: 2025-04-23 | Stop reason: HOSPADM

## 2025-04-22 RX ORDER — IBUPROFEN 200 MG
16 TABLET ORAL
Status: DISCONTINUED | OUTPATIENT
Start: 2025-04-22 | End: 2025-04-23 | Stop reason: HOSPADM

## 2025-04-22 RX ORDER — LIDOCAINE 50 MG/G
2 PATCH TOPICAL
Status: DISCONTINUED | OUTPATIENT
Start: 2025-04-22 | End: 2025-04-23 | Stop reason: HOSPADM

## 2025-04-22 RX ORDER — ORPHENADRINE CITRATE 100 MG/1
100 TABLET, EXTENDED RELEASE ORAL ONCE
Status: COMPLETED | OUTPATIENT
Start: 2025-04-22 | End: 2025-04-22

## 2025-04-22 RX ORDER — HEPARIN SODIUM 5000 [USP'U]/ML
5000 INJECTION, SOLUTION INTRAVENOUS; SUBCUTANEOUS EVERY 8 HOURS
Status: DISCONTINUED | OUTPATIENT
Start: 2025-04-22 | End: 2025-04-23 | Stop reason: HOSPADM

## 2025-04-22 RX ORDER — NALOXONE HCL 0.4 MG/ML
0.02 VIAL (ML) INJECTION
Status: DISCONTINUED | OUTPATIENT
Start: 2025-04-22 | End: 2025-04-23 | Stop reason: HOSPADM

## 2025-04-22 RX ORDER — SODIUM CHLORIDE 0.9 % (FLUSH) 0.9 %
10 SYRINGE (ML) INJECTION EVERY 8 HOURS PRN
Status: DISCONTINUED | OUTPATIENT
Start: 2025-04-22 | End: 2025-04-22

## 2025-04-22 RX ORDER — AMLODIPINE BESYLATE 5 MG/1
10 TABLET ORAL DAILY
Status: DISCONTINUED | OUTPATIENT
Start: 2025-04-22 | End: 2025-04-23 | Stop reason: HOSPADM

## 2025-04-22 RX ORDER — CEFTRIAXONE 2 G/1
2 INJECTION, POWDER, FOR SOLUTION INTRAMUSCULAR; INTRAVENOUS ONCE
Status: COMPLETED | OUTPATIENT
Start: 2025-04-22 | End: 2025-04-22

## 2025-04-22 RX ORDER — BUSPIRONE HYDROCHLORIDE 10 MG/1
30 TABLET ORAL 2 TIMES DAILY
Status: DISCONTINUED | OUTPATIENT
Start: 2025-04-22 | End: 2025-04-23 | Stop reason: HOSPADM

## 2025-04-22 RX ORDER — FAMOTIDINE 20 MG/1
20 TABLET, FILM COATED ORAL DAILY
Status: DISCONTINUED | OUTPATIENT
Start: 2025-04-22 | End: 2025-04-23 | Stop reason: HOSPADM

## 2025-04-22 RX ORDER — BACLOFEN 10 MG/1
10 TABLET ORAL 2 TIMES DAILY PRN
Status: ON HOLD | COMMUNITY
Start: 2025-04-16 | End: 2025-04-23 | Stop reason: HOSPADM

## 2025-04-22 RX ORDER — GLUCAGON 1 MG
1 KIT INJECTION
Status: DISCONTINUED | OUTPATIENT
Start: 2025-04-22 | End: 2025-04-23 | Stop reason: HOSPADM

## 2025-04-22 RX ORDER — ACETAMINOPHEN 325 MG/1
650 TABLET ORAL EVERY 4 HOURS PRN
Status: DISCONTINUED | OUTPATIENT
Start: 2025-04-22 | End: 2025-04-23 | Stop reason: HOSPADM

## 2025-04-22 RX ORDER — CEFTRIAXONE 1 G/1
1 INJECTION, POWDER, FOR SOLUTION INTRAMUSCULAR; INTRAVENOUS
Status: DISCONTINUED | OUTPATIENT
Start: 2025-04-23 | End: 2025-04-23

## 2025-04-22 RX ADMIN — CARVEDILOL 6.25 MG: 6.25 TABLET, FILM COATED ORAL at 08:04

## 2025-04-22 RX ADMIN — LIDOCAINE 2 PATCH: 50 PATCH CUTANEOUS at 10:04

## 2025-04-22 RX ADMIN — BUSPIRONE HYDROCHLORIDE 30 MG: 10 TABLET ORAL at 10:04

## 2025-04-22 RX ADMIN — ORPHENADRINE CITRATE 100 MG: 100 TABLET, EXTENDED RELEASE ORAL at 10:04

## 2025-04-22 RX ADMIN — AMLODIPINE BESYLATE 10 MG: 5 TABLET ORAL at 08:04

## 2025-04-22 RX ADMIN — DIVALPROEX SODIUM 500 MG: 500 TABLET, FILM COATED, EXTENDED RELEASE ORAL at 08:04

## 2025-04-22 RX ADMIN — HEPARIN SODIUM 5000 UNITS: 5000 INJECTION INTRAVENOUS; SUBCUTANEOUS at 03:04

## 2025-04-22 RX ADMIN — CARVEDILOL 6.25 MG: 6.25 TABLET, FILM COATED ORAL at 10:04

## 2025-04-22 RX ADMIN — DEXTROSE MONOHYDRATE 25 G: 25 INJECTION, SOLUTION INTRAVENOUS at 12:04

## 2025-04-22 RX ADMIN — SODIUM CHLORIDE, POTASSIUM CHLORIDE, SODIUM LACTATE AND CALCIUM CHLORIDE: 600; 310; 30; 20 INJECTION, SOLUTION INTRAVENOUS at 05:04

## 2025-04-22 RX ADMIN — FAMOTIDINE 20 MG: 20 TABLET, FILM COATED ORAL at 08:04

## 2025-04-22 RX ADMIN — SODIUM CHLORIDE, POTASSIUM CHLORIDE, SODIUM LACTATE AND CALCIUM CHLORIDE: 600; 310; 30; 20 INJECTION, SOLUTION INTRAVENOUS at 06:04

## 2025-04-22 RX ADMIN — CEFTRIAXONE SODIUM 2 G: 2 INJECTION, POWDER, FOR SOLUTION INTRAMUSCULAR; INTRAVENOUS at 01:04

## 2025-04-22 RX ADMIN — BUSPIRONE HYDROCHLORIDE 30 MG: 10 TABLET ORAL at 08:04

## 2025-04-22 RX ADMIN — HEPARIN SODIUM 5000 UNITS: 5000 INJECTION INTRAVENOUS; SUBCUTANEOUS at 10:04

## 2025-04-22 RX ADMIN — HEPARIN SODIUM 5000 UNITS: 5000 INJECTION INTRAVENOUS; SUBCUTANEOUS at 05:04

## 2025-04-22 NOTE — SUBJECTIVE & OBJECTIVE
Past Medical History:   Diagnosis Date    Alcohol abuse 10/07/2015    In remission since 18    Anemia 2015    Arthritis     Blindness of right eye     only sees light    Breast calcification, left     biopsied: scar tissue from breast reduction surgery    Chronic diastolic congestive heart failure     Chronic gastrojejunal anastomotic ulcer      -     CVA (cerebral vascular accident) 2022    TPA    Encounter for blood transfusion     GERD (gastroesophageal reflux disease)     Glaucoma     Hypertension     Hypoalbuminemia     Hyponatremia 10/2015    alcohol abuse, polydipsia, Na 109, seizure in ICU    Insomnia     Malingering 2016    Morbid obesity 2017    PTSD (post-traumatic stress disorder)     Sickle cell trait     Stage 3a chronic kidney disease 10/14/2018       Past Surgical History:   Procedure Laterality Date    BREAST BIOPSY Left     CARPAL TUNNEL RELEASE  2020     SECTION      CHOLECYSTECTOMY  after     bile fistula    ESOPHAGOGASTRODUODENOSCOPY N/A 2022    Procedure: ESOPHAGOGASTRODUODENOSCOPY (EGD);  Surgeon: Trey Anderson MD;  Location: [facility] ([address] [place] [address]);  Service: Endoscopy;  Laterality: N/A;    ESOPHAGOGASTRODUODENOSCOPY N/A 2023    Procedure: EGD (ESOPHAGOGASTRODUODENOSCOPY);  Surgeon: Yordan Chandler MD;  Location: [facility];  Service: Endoscopy;  Laterality: N/A;    ESOPHAGOGASTRODUODENOSCOPY N/A 3/14/2024    Procedure: EGD (ESOPHAGOGASTRODUODENOSCOPY);  Surgeon: Kristofer Claudio MD;  Location: [address]);  Service: Endoscopy;  Laterality: N/A;  Prep instructions sent via portal -  3/8/24- precall complete - ERW    GASTRIC BYPASS  2007    TOTAL REDUCTION MAMMOPLASTY Bilateral     xen gel stent implant  2018    Performed at [facility]       Review of patient's allergies indicates:   Allergen Reactions    Penicillins Hives    Hydroxyzine Anxiety, Hallucinations and Palpitations       Current  Facility-Administered Medications on File Prior to Encounter   Medication    cyanocobalamin injection 1,000 mcg     Current Outpatient Medications on File Prior to Encounter   Medication Sig    amLODIPine (NORVASC) 10 MG tablet Take 1 tablet (10 mg total) by mouth once daily.    BELBUCA 150 mcg Film DISSOLVE 1 FILM TO THE GUM EVERY 12 HOURS    brimonidine-timoloL (COMBIGAN) 0.2-0.5 % Drop Place 1 drop into both eyes 2 (two) times daily.    busPIRone (BUSPAR) 30 MG Tab Take 1 tablet (30 mg total) by mouth 2 (two) times daily as needed (breakthrough anxiety).    calcium citrate 250 mg calcium Tab     carvediloL (COREG) 6.25 MG tablet Take 1 tablet (6.25 mg total) by mouth 2 (two) times daily.    cetirizine (ZYRTEC) 10 MG tablet Take 1 tablet (10 mg total) by mouth once daily.    diazePAM (VALIUM) 5 MG tablet Take 1 tablet (5 mg total) by mouth every 12 (twelve) hours as needed for Anxiety.    dicyclomine (BENTYL) 10 MG capsule TAKE 1 CAPSULE FOUR TIMES DAILY BEFORE MEALS AND NIGHTLY    disulfiram (ANTABUSE) 250 mg tablet Take 1 tablet (250 mg total) by mouth once daily.    divalproex ER (DEPAKOTE ER) 500 MG Tb24 24 hr tablet Take 1 tablet (500 mg total) by mouth once daily.    famotidine (PEPCID) 40 MG tablet TAKE 1 TABLET EVERY NIGHT    fluticasone propionate (FLONASE) 50 mcg/actuation nasal spray 2 sprays (100 mcg total) by Each Nostril route once daily.    furosemide (LASIX) 40 MG tablet Take 1 tablet (40 mg total) by mouth once daily.    HYDROcodone-acetaminophen (NORCO) 7.5-325 mg per tablet TAKE 1 T PO TID PRN FOR 30 DAYS    lactulose (CHRONULAC) 20 gram/30 mL Soln Take 15 mLs (10 g total) by mouth every 6 (six) hours as needed (constipation).    LIDOcaine (LIDODERM) 5 % Place 1 patch onto the skin every 24 hours. Remove & Discard patch within 12 hours or as directed by MD    methocarbamoL (ROBAXIN) 750 MG Tab Take 750 mg by mouth 3 (three) times daily.    mirtazapine (REMERON) 15 MG tablet Take 1 tablet (15 mg  total) by mouth every evening.    montelukast (SINGULAIR) 10 mg tablet Take 1 tablet (10 mg total) by mouth every evening.    multivitamin (THERAGRAN) tablet Take 1 tablet by mouth once daily.    omeprazole (PRILOSEC) 40 MG capsule TAKE 1 CAPSULE EVERY MORNING    prazosin (MINIPRESS) 1 MG Cap Take 1 capsule (1 mg total) by mouth every evening. Before bed for nightmares    promethazine (PHENERGAN) 25 MG tablet Take 1 tablet (25 mg total) by mouth every 6 (six) hours as needed for Nausea.    QUEtiapine (SEROQUEL) 50 MG tablet Take 1 tablet (50 mg total) by mouth every evening.    senna-docusate 8.6-50 mg (PERICOLACE) 8.6-50 mg per tablet Take 1 tablet by mouth once daily.    spironolactone (ALDACTONE) 25 MG tablet Take 1 tablet (25 mg total) by mouth once daily.    sucralfate (CARAFATE) 100 mg/mL suspension SHAKE LIQUID AND TAKE 10 ML BY MOUTH FOUR TIMES DAILY, WITH MEALS AND AT NIGHT    sumatriptan (IMITREX) 50 MG tablet Once for severe headache. May repeat once after 2 hours. Do not exceed 3-4 doses in one week.    terbinafine HCL (LAMISIL) 1 % cream Apply topically 2 (two) times daily.    TRAVATAN Z 0.004 % ophthalmic solution INT 1 GTT IN OU QD    venlafaxine (EFFEXOR-XR) 150 MG Cp24 Take 1 capsule (150 mg total) by mouth once daily.    venlafaxine (EFFEXOR-XR) 75 MG 24 hr capsule Take 1 capsule (75 mg total) by mouth once daily. Take with Effexor 150 to equal 225 mg daily    [DISCONTINUED] acamprosate (CAMPRAL) 333 mg tablet Take 2 tablets (666 mg total) by mouth 3 (three) times daily.    [DISCONTINUED] ALPRAZolam (XANAX) 1 MG tablet Take 1 tablet (1 mg total) by mouth 2 (two) times daily as needed for Anxiety.     Family History       Problem Relation (Age of Onset)    Asthma Brother    Crohn's disease Cousin    Diabetes Mother, Maternal Grandmother    Heart attack Mother    Heart failure Father    Hypertension Mother, Brother    Lupus Mother          Tobacco Use    Smoking status: Every Day     Current  packs/day: 0.00     Average packs/day: 0.5 packs/day for 4.0 years (2.0 ttl pk-yrs)     Types: Cigarettes     Start date: 2019     Last attempt to quit: 2023     Years since quittin.1    Smokeless tobacco: Never   Substance and Sexual Activity    Alcohol use: No     Comment: former heavy drinker since May 1st 2017    Drug use: Not Currently    Sexual activity: Yes     Partners: Male     Review of Systems   Unable to perform ROS: Mental status change     Objective:     Vital Signs (Most Recent):  Temp: 97.4 °F (36.3 °C) (25 2158)  Pulse: 64 (25 0400)  Resp: 19 (25 0304)  BP: (!) 152/93 (25 0400)  SpO2: 99 % (25 0400) Vital Signs (24h Range):  Temp:  [97.4 °F (36.3 °C)-97.5 °F (36.4 °C)] 97.4 °F (36.3 °C)  Pulse:  [60-74] 64  Resp:  [13-20] 19  SpO2:  [96 %-100 %] 99 %  BP: (140-178)/() 152/93        There is no height or weight on file to calculate BMI.     Physical Exam  Constitutional:       Appearance: She is well-developed.      Interventions: Nasal cannula in place.   HENT:      Head: Normocephalic.   Eyes:      General:         Right eye: No discharge.         Left eye: No discharge.      Conjunctiva/sclera: Conjunctivae normal.   Cardiovascular:      Rate and Rhythm: Normal rate and regular rhythm.      Pulses:           Radial pulses are 2+ on the right side and 2+ on the left side.      Heart sounds: Normal heart sounds.   Pulmonary:      Effort: Pulmonary effort is normal. No respiratory distress.      Breath sounds: Normal breath sounds.   Abdominal:      General: Bowel sounds are decreased. There is no distension.      Palpations: Abdomen is soft.      Tenderness: There is no abdominal tenderness.   Musculoskeletal:         General: Normal range of motion.      Cervical back: Normal range of motion and neck supple.   Skin:     General: Skin is warm and dry.   Neurological:      Mental Status: She is lethargic.      GCS: GCS eye subscore is 2. GCS verbal  subscore is 2. GCS motor subscore is 4.                Significant Labs: All pertinent labs within the past 24 hours have been reviewed.  CBC:   Recent Labs   Lab 04/21/25  2313   WBC 6.01   HGB 11.5*   HCT 36.5*        CMP:   Recent Labs   Lab 04/22/25  0052   *   K 4.9      CO2 19*   BUN 29*   CREATININE 2.6*   CALCIUM 9.3   ALBUMIN 3.6   BILITOT 0.3   ALKPHOS 92   AST 24   ALT 16   ANIONGAP 12       Significant Imaging: I have reviewed all pertinent imaging results/findings within the past 24 hours.

## 2025-04-22 NOTE — ED NOTES
04/22/25 0352   Safety Interventions   Quick Updates - Free Text NADN.   Updates Patient is resting comfortably;Bed rails up - Call light within reach;Family updated;Vitals stable;Denies pain   Patient Rounds bed in low position;bed wheels locked;call light in patient/parent reach;clutter free environment maintained;ID band on;placement of personal items at bedside;visualized patient

## 2025-04-22 NOTE — ED PROVIDER NOTES
Encounter Date: 4/21/2025       History     Chief Complaint   Patient presents with    Loss of Consciousness     Pt presents via [facility] em[facility] secondary to a syncopal episode that occurred at home, witnessed by family lasting approx 30 seconds. Pt has no complaints currently. Pt/family denies head injury stating they caught her and brought her to a recliner.      54-year-old female with past medical history including CVA, anemia, diastolic heart failure, hypertension, CKD, alcohol abuse in remission, major depression, anxiety and PTSD presents via EMS for evaluation following a witnessed syncopal episode at today.  The patient states that she was at her friend's house.  Upon initial encounter she asked to be discharged.  Upon further questioning she does admit to having a generalized headache but denies any other complaints.  She has a history of migraines for which she has been prescribed Imitrex but states that she ran out.  Her headache today is consistent with her previous migraines.    10:34 PM  The patient's friend is now at the bedside and describes a different scenario.  According to the friend the patient has been sleeping more since she had a medication change last week.  She was started on baclofen 5 days ago.  Today the patient was at her friend's house and was standing up leaning on the bed using her phone when the friend noticed that she was falling asleep while standing.  She had her sit down in the recliner, and the patient fell asleep.  About 10 minutes later the friend called out to her to make sure she was okay and she had to call her name out 5 times before the patient woke up and answered.  Given the patient has history of anemia, the friend was concerned that maybe she needed a blood transfusion and decided to bring her in.  The friend also states that due to the excessive sleeping over the last couple of days all she has eaten over the past couple of days is a little bit of oatmeal.  Per the  friend the right arm shaking usually occurs when the patient is sick or very sleepy, and she states that aside from the drowsiness the patient is at her baseline mental status.      Review of patient's allergies indicates:   Allergen Reactions    Penicillins Hives    Hydroxyzine Anxiety, Hallucinations and Palpitations     Past Medical History:   Diagnosis Date    Alcohol abuse 10/07/2015    In remission since 18    Anemia 2015    Arthritis     Blindness of right eye     only sees light    Breast calcification, left     biopsied: scar tissue from breast reduction surgery    Chronic diastolic congestive heart failure     Chronic gastrojejunal anastomotic ulcer      -     CVA (cerebral vascular accident) 2022    TPA    Encounter for blood transfusion     GERD (gastroesophageal reflux disease)     Glaucoma     Hypertension     Hypoalbuminemia     Hyponatremia 10/2015    alcohol abuse, polydipsia, Na 109, seizure in ICU    Insomnia     Malingering 2016    Morbid obesity 2017    PTSD (post-traumatic stress disorder)     Sickle cell trait     Stage 3a chronic kidney disease 10/14/2018     Past Surgical History:   Procedure Laterality Date    BREAST BIOPSY Left     CARPAL TUNNEL RELEASE  2020     SECTION      CHOLECYSTECTOMY  after     bile fistula    ESOPHAGOGASTRODUODENOSCOPY N/A 2022    Procedure: ESOPHAGOGASTRODUODENOSCOPY (EGD);  Surgeon: Trey Anderson MD;  Location: [address]);  Service: Endoscopy;  Laterality: N/A;    ESOPHAGOGASTRODUODENOSCOPY N/A 2023    Procedure: EGD (ESOPHAGOGASTRODUODENOSCOPY);  Surgeon: Yordan Chandler MD;  Location: [facility];  Service: Endoscopy;  Laterality: N/A;    ESOPHAGOGASTRODUODENOSCOPY N/A 3/14/2024    Procedure: EGD (ESOPHAGOGASTRODUODENOSCOPY);  Surgeon: Kristofer Claudio MD;  Location: [address]);  Service: Endoscopy;  Laterality: N/A;  Prep instructions sent via portal -[employer]  3/8/24- precall complete  - ERW    GASTRIC BYPASS  2007    TOTAL REDUCTION MAMMOPLASTY Bilateral 2003    xen gel stent implant  08/30/2018    Performed at [facility]     Family History   Problem Relation Name Age of Onset    Diabetes Mother      Lupus Mother      Hypertension Mother      Heart attack Mother      Diabetes Maternal Grandmother      Crohn's disease Cousin      Heart failure Father      Hypertension Brother      Asthma Brother       Social History[1]  Review of Systems    Physical Exam     Initial Vitals [04/21/25 2144]   BP Pulse Resp Temp SpO2   (!) 178/99 74 20 97.5 °F (36.4 °C) 98 %      MAP       --         Physical Exam    Constitutional: She appears well-developed and well-nourished. She is not diaphoretic. No distress.   Speech clear but slow   HENT:   Head: Normocephalic and atraumatic.   Right Ear: External ear normal.   Left Ear: External ear normal.   Nose: Nose normal. Mouth/Throat: No oropharyngeal exudate.   Eyes: Pupils are equal, round, and reactive to light. Right eye exhibits no discharge. Left eye exhibits no discharge.   Right eye injected.    Neck: Neck supple. No JVD present.   Normal range of motion.  Cardiovascular:  Normal rate, regular rhythm and normal heart sounds.     Exam reveals no gallop and no friction rub.       No murmur heard.  Pulmonary/Chest: Breath sounds normal. No respiratory distress. She has no wheezes. She has no rhonchi. She has no rales.   Abdominal: Abdomen is soft. She exhibits no distension. There is no abdominal tenderness. There is no rebound and no guarding.   Musculoskeletal:         General: No tenderness or edema.      Cervical back: Normal range of motion and neck supple.      Comments: Intermittent right upper extremity rest tremor     Neurological: She is alert and oriented to person, place, and time. GCS score is 15. GCS eye subscore is 4. GCS verbal subscore is 5. GCS motor subscore is 6.   Skin: Skin is warm.         ED Course   Procedures  Labs  Reviewed   COMPREHENSIVE METABOLIC PANEL - Abnormal       Result Value    Sodium 135 (*)     Potassium 4.9      Chloride 104      CO2 19 (*)     Glucose 68 (*)     BUN 29 (*)     Creatinine 2.6 (*)     Calcium 9.3      Protein Total 7.4      Albumin 3.6      Bilirubin Total 0.3      ALP 92      AST 24      ALT 16      Anion Gap 12      eGFR 21 (*)    CBC WITH DIFFERENTIAL - Abnormal    WBC 6.01      RBC 4.52      HGB 11.5 (*)     HCT 36.5 (*)     MCV 81 (*)     MCH 25.4 (*)     MCHC 31.5 (*)     RDW 17.6 (*)     Platelet Count 233      MPV 9.5      Nucleated RBC 0      Neut % 48.7      Lymph % 40.4      Mono % 8.8      Eos % 1.3      Basophil % 0.5      Imm Grans % 0.3      Neut # 2.92      Lymph # 2.43      Mono # 0.53      Eos # 0.08      Baso # 0.03      Imm Grans # 0.02     URINALYSIS, REFLEX TO URINE CULTURE - Abnormal    Color, UA Yellow      Appearance, UA Hazy (*)     pH, UA 6.0      Spec Grav UA 1.015      Protein, UA Negative      Glucose, UA Negative      Ketones, UA Trace (*)     Bilirubin, UA Negative      Blood, UA Negative      Nitrites, UA Positive (*)     Urobilinogen, UA Negative      Leukocyte Esterase, UA 3+ (*)    VALPROIC ACID - Abnormal    Valproic Acid 35.9 (*)    URINALYSIS MICROSCOPIC - Abnormal    RBC, UA 2      WBC, UA >100 (*)     WBC Clumps, UA Occasional (*)     Bacteria, UA Many (*)     Squamous Epithelial Cells, UA 3      Hyaline Casts, UA 2 (*)     Microscopic Comment       DRUG SCREEN PANEL, URINE EMERGENCY - Abnormal    Benzodiazepine, Urine Presumptive Positive (*)     Methadone, Urine Negative      Cocaine, Urine Negative      Opiates, Urine Negative      Barbiturates, Urine Negative      Amphetamines, Urine Negative      THC Negative      Phencyclidine, Urine Negative      Urine Creatinine 100.1      Narrative:     This screen includes the following classes of drugs at the listed cut-off:     Benzodiazepines:        200 ng/ml   Methadone:              300 ng/ml   Cocaine  "metabolite:     300 ng/ml   Opiates:                300 ng/ml   Barbiturates:           200 ng/ml   Amphetamines:           1000 ng/ml   Marijuana metabs (THC): 50 ng/ml   Phencyclidine (PCP):    25 ng/ml     This is a screening test. If results do not correlate with clinical presentation, then a confirmatory send out test is advised.    This report is intended for use in clinical monitoring and management of patients. It is not intended for use in employment related drug testing."   POCT GLUCOSE - Abnormal    POCT Glucose 63 (*)    POCT GLUCOSE - Abnormal    POCT Glucose 199 (*)    HEPATITIS C ANTIBODY - Normal    Hep C Ab Interp Negative     HIV 1 / 2 ANTIBODY - Normal    HIV 1/2 Ag/Ab Negative     MAGNESIUM - Normal    Magnesium  2.0     ALCOHOL,MEDICAL (ETHANOL) - Normal    Alcohol, Serum <10     FENTANYL, URINE - Normal    Fentanyl, Urine Negative      Urine Creatinine 100.1     LACTIC ACID, PLASMA - Normal    Lactic Acid Level 1.0      Narrative:     Falsely low lactic acid results can be found in samples containing >=13.0 mg/dL total bilirubin and/or >=3.5 mg/dL direct bilirubin.    CULTURE, URINE   CULTURE, BLOOD   CULTURE, BLOOD   CBC W/ AUTO DIFFERENTIAL    Narrative:     The following orders were created for panel order CBC Auto Differential.  Procedure                               Abnormality         Status                     ---------                               -----------         ------                     CBC with Differential[[id number]]       Abnormal            Final result                 Please view results for these tests on the individual orders.   GREY TOP URINE HOLD    Extra Tube Hold for add-ons.     EXTRA TUBES    Narrative:     The following orders were created for panel order EXTRA TUBES.  Procedure                               Abnormality         Status                     ---------                               -----------         ------                     Red Top " Hold[[id number]]                                    Final result                 Please view results for these tests on the individual orders.   RED TOP HOLD    Extra Tube extra     LACTIC ACID, PLASMA   B-TYPE NATRIURETIC PEPTIDE   TYPE & SCREEN    Specimen Outdate 04/24/2025 23:59      Group & Rh A POS      Indirect Lucas NEG     POCT GLUCOSE    POCT Glucose 70     ISTAT LACTATE    POC Lactate 1.26      Sample VENOUS       EKG Readings: (Independently Interpreted)   Normal sinus rhythm, heart rate 72, normal intervals, no significant ST/T-wave changes       Imaging Results              X-Ray Chest AP Portable (Final result)  Result time 04/22/25 00:36:16      Final result by Sharron Cortes MD (04/22/25 00:36:16)                   Impression:      No acute cardiopulmonary process identified.      Electronically signed by: Sharron Cortes MD  Date:    04/22/2025  Time:    00:36               Narrative:    EXAMINATION:  XR CHEST AP PORTABLE    CLINICAL HISTORY:  Sepsis;    TECHNIQUE:  Single frontal view of the chest was performed.    COMPARISON:  March 2024.    FINDINGS:  Cardiac silhouette is stable in size.  Lungs are mildly hypoinflated with elevation the right hemidiaphragm seen.  No evidence of focal consolidative process, pneumothorax, or significant pleural effusion.  No acute osseous abnormality identified.                                       CT Head Without Contrast (Final result)  Result time 04/21/25 23:15:51      Final result by Sharron Cortes MD (04/21/25 23:15:51)                   Impression:      No acute intracranial abnormalities identified.      Electronically signed by: Sharron Cortes MD  Date:    04/21/2025  Time:    23:15               Narrative:    EXAMINATION:  CT HEAD WITHOUT CONTRAST    CLINICAL HISTORY:  Mental status change, unknown cause;    TECHNIQUE:  Low dose axial images were obtained through the head.  Coronal and sagittal reformations were also performed. Contrast was not  administered.    COMPARISON:  April 2019.    FINDINGS:  No evidence of acute/recent major vascular distribution cerebral infarction, intraparenchymal hemorrhage, or intra-axial space occupying lesion. The ventricular system is normal in size and configuration with no evidence of hydrocephalus. No effacement of the skull-base cisterns. No abnormal extra-axial fluid collections or blood products. Visualized paranasal sinuses and mastoid air cells are clear. The calvarium shows no significant abnormality.                                       Medications   amLODIPine tablet 10 mg (has no administration in time range)   buprenorphine (Belbuca) 150 mcg buccal film (has no administration in time range)   busPIRone tablet 30 mg (has no administration in time range)   carvediloL tablet 6.25 mg (has no administration in time range)   divalproex ER 24 hr tablet 500 mg (has no administration in time range)   famotidine tablet 40 mg (has no administration in time range)   acetaminophen tablet 650 mg (has no administration in time range)   naloxone 0.4 mg/mL injection 0.02 mg (has no administration in time range)   glucose chewable tablet 16 g (has no administration in time range)   glucose chewable tablet 24 g (has no administration in time range)   dextrose 50% injection 12.5 g (has no administration in time range)   dextrose 50% injection 25 g (has no administration in time range)   glucagon (human recombinant) injection 1 mg (has no administration in time range)   heparin (porcine) injection 5,000 Units (5,000 Units Subcutaneous Given 4/22/25 0557)   ondansetron injection 4 mg (has no administration in time range)   lactated ringers infusion ( Intravenous New Bag 4/22/25 8938)   cefTRIAXone injection 1 g (has no administration in time range)   sumatriptan tablet 50 mg (50 mg Oral Given 4/21/25 2238)   dextrose 50% injection 25 g (25 g Intravenous Given 4/22/25 0040)   cefTRIAXone injection 2 g (2 g Intravenous Given 4/22/25  0118)     Medical Decision Making  54-year-old female brought in by EMS status post a witnessed syncopal episode.  The patient complains of a generalized headache only.  The patient's friend reports that she has been more somnolent over the past couple of days with decreased p.o. intake.  Differential diagnosis includes infectious process, metabolic encephalopathy, polypharmacy, CVA, dehydration, DONTAE.  Will obtain CT of the head, lab work, EKG and UA and give IV fluids.    Amount and/or Complexity of Data Reviewed  Labs: ordered.  Radiology: ordered.    Risk  Prescription drug management.               ED Course as of 04/22/25 0616   Tue Apr 22, 2025   0006 Was called to the patient's bedside to reassure her friend because the patient is not talking to her.  On my evaluation the patient is very drowsy (even more so than on arrival) but does respond to verbal stimuli.  Specifically, she opens her eyes and turns her head whenever her friend called her name but is not speaking much and immediately falls back to sleep.  Pupils are equal and reactive and vital signs significant only for an elevated blood pressure which has trended down since arrival.    CT of the head shows no evidence of an acute process.  UA is positive for UTI.  CBC without leukocytosis or left shift and no significant anemia.  We will treat UTI with history and given penicillin allergy. [AA]   0013 Repeat blood glucose 63.  We will give D50. [AA]   0100 The patient is now more awake and was able to confirm that she does have a penicillin allergy (hives only) and she has tolerated Keflex well previously.  We will switch antibiotic from his drain them to Rocephin. [AA]      ED Course User Index  [AA] Melanie Arechiga MD                           Clinical Impression:  Final diagnoses:  [R55] Syncope  [N17.9] DONTAE (acute kidney injury) (Primary)  [N39.0] Urinary tract infection without hematuria, site unspecified  [G93.40] Encephalopathy acute  [G93.40]  Encephalopathy          ED Disposition Condition    Observation                     [1]   Social History  Tobacco Use    Smoking status: Every Day     Current packs/day: 0.00     Average packs/day: 0.5 packs/day for 4.0 years (2.0 ttl pk-yrs)     Types: Cigarettes     Start date: 2019     Last attempt to quit: 2023     Years since quittin.1    Smokeless tobacco: Never   Substance Use Topics    Alcohol use: No     Comment: former heavy drinker since May 1st 2017    Drug use: Not Currently        Melanie Arechiga MD  25 0616

## 2025-04-22 NOTE — ED NOTES
04/22/25 0607   Output (mL)   Urine 800 mL   Urine Assessment   Urine Characteristics kumar;clear

## 2025-04-22 NOTE — ASSESSMENT & PLAN NOTE
DONTAE is likely due to pre-renal azotemia due to dehydration. Baseline creatinine is 1.3. Most recent creatinine and eGFR are listed below.  Recent Labs     04/22/25  0052   CREATININE 2.6*   EGFRNORACEVR 21*      Plan  - DONTAE is unchanged  - Avoid nephrotoxins and renally dose meds for GFR listed above  - Monitor urine output, serial BMP, and adjust therapy as needed  - IVF

## 2025-04-22 NOTE — NURSING
Pt arrived on unit from ED, aaox4,vs per EPIC,denies pain at this time will report to oncoming nurse.

## 2025-04-22 NOTE — HPI
The patient is a 54-year-old female with a past medical history including CVA, anemia, diastolic heart failure, hypertension, and CKD who presents for evaluation following a witnessed syncopal episode at today.  According to the friend, the patient has been sleeping more since she had a medication change last week. She was started on baclofen 5 days ago. Today the patient was at her friend's house and was standing up leaning on the bed using her phone when the friend noticed that she was falling asleep while standing. She had her sit down in the recliner, and the patient fell asleep. About 10 minutes later the friend called out to her to make sure she was okay and she had to call her name out 5 times before the patient woke up and answered.  On initial workup, the patient's creatinine 2.6 and very difficult to arouse.  She will be admitted for further management of her encephalopathy and acute kidney injury.

## 2025-04-22 NOTE — PROGRESS NOTES
Pharmacist Renal Dose Adjustment Note    Daksha Marie is a 54 y.o. female being treated with the medication pepcid    Patient Data:    Vital Signs (Most Recent):  Temp: 97.9 °F (36.6 °C) (04/22/25 0650)  Pulse: 62 (04/22/25 0700)  Resp: 15 (04/22/25 0515)  BP: (!) 152/97 (04/22/25 0700)  SpO2: 99 % (04/22/25 0700) Vital Signs (72h Range):  Temp:  [97.4 °F (36.3 °C)-97.9 °F (36.6 °C)]   Pulse:  [60-74]   Resp:  [13-20]   BP: (140-178)/()   SpO2:  [92 %-100 %]      Recent Labs   Lab 04/22/25  0052   CREATININE 2.6*     Medication:pepcid dose: 40 mg frequency daily will be changed to medication:pepcid dose:20 mg frequency:daily    Pharmacist's Name: Melanie Dhaliwal  Pharmacist's Extension: [phone]

## 2025-04-22 NOTE — H&P
[facility] Medicine  History & Physical    Patient Name: Daksha Marie  MRN: [id number]  Patient Class: OP- Observation  Admission Date: 4/21/2025  Attending Physician: JERRICA Burrell MD   Primary Care Provider: Caridad Padilla MD (Cindy)         Patient information was obtained from patient, past medical records, and ER records.     Subjective:     Principal Problem:Encephalopathy    Chief Complaint:   Chief Complaint   Patient presents with    Loss of Consciousness     Pt presents via [facility] secondary to a syncopal episode that occurred at home, witnessed by family lasting approx 30 seconds. Pt has no complaints currently. Pt/family denies head injury stating they caught her and brought her to a recliner.         HPI: The patient is a 54-year-old female with a past medical history including CVA, anemia, diastolic heart failure, hypertension, and CKD who presents for evaluation following a witnessed syncopal episode at today.  According to the friend, the patient has been sleeping more since she had a medication change last week. She was started on baclofen 5 days ago. Today the patient was at her friend's house and was standing up leaning on the bed using her phone when the friend noticed that she was falling asleep while standing. She had her sit down in the recliner, and the patient fell asleep. About 10 minutes later the friend called out to her to make sure she was okay and she had to call her name out 5 times before the patient woke up and answered.  On initial workup, the patient's creatinine 2.6 and very difficult to arouse.  She will be admitted for further management of her encephalopathy and acute kidney injury.    Past Medical History:   Diagnosis Date    Alcohol abuse 10/07/2015    In remission since 5/1/17    Anemia 06/17/2015    Arthritis     Blindness of right eye     only sees light    Breast calcification, left     biopsied: scar tissue from breast  reduction surgery    Chronic diastolic congestive heart failure     Chronic gastrojejunal anastomotic ulcer      -     CVA (cerebral vascular accident) 2022    TPA    Encounter for blood transfusion     GERD (gastroesophageal reflux disease)     Glaucoma     Hypertension     Hypoalbuminemia     Hyponatremia 10/2015    alcohol abuse, polydipsia, Na 109, seizure in ICU    Insomnia     Malingering 2016    Morbid obesity 2017    PTSD (post-traumatic stress disorder)     Sickle cell trait     Stage 3a chronic kidney disease 10/14/2018       Past Surgical History:   Procedure Laterality Date    BREAST BIOPSY Left     CARPAL TUNNEL RELEASE  2020     SECTION      CHOLECYSTECTOMY  after     bile fistula    ESOPHAGOGASTRODUODENOSCOPY N/A 2022    Procedure: ESOPHAGOGASTRODUODENOSCOPY (EGD);  Surgeon: Trey Anderson MD;  [facility]  Service: Endoscopy;  Laterality: N/A;    ESOPHAGOGASTRODUODENOSCOPY N/A 2023    Procedure: EGD (ESOPHAGOGASTRODUODENOSCOPY);  Surgeon: Yordan Chandler MD;  Location: [facility];  Service: Endoscopy;  Laterality: N/A;    ESOPHAGOGASTRODUODENOSCOPY N/A 3/14/2024    Procedure: EGD (ESOPHAGOGASTRODUODENOSCOPY);  Surgeon: Kristofer Claudio MD;  Location: [facility] ([address] [place] [address]);  Service: Endoscopy;  Laterality: N/A;  Prep instructions sent via portal -  3/8/24- precall complete - ERW    GASTRIC BYPASS      TOTAL REDUCTION MAMMOPLASTY Bilateral     xen gel stent implant  2018    Performed at [facility]       Review of patient's allergies indicates:   Allergen Reactions    Penicillins Hives    Hydroxyzine Anxiety, Hallucinations and Palpitations       Current Facility-Administered Medications on File Prior to Encounter   Medication    cyanocobalamin injection 1,000 mcg     Current Outpatient Medications on File Prior to Encounter   Medication Sig    amLODIPine (NORVASC) 10 MG tablet Take 1 tablet (10 mg  total) by mouth once daily.    BELBUCA 150 mcg Film DISSOLVE 1 FILM TO THE GUM EVERY 12 HOURS    brimonidine-timoloL (COMBIGAN) 0.2-0.5 % Drop Place 1 drop into both eyes 2 (two) times daily.    busPIRone (BUSPAR) 30 MG Tab Take 1 tablet (30 mg total) by mouth 2 (two) times daily as needed (breakthrough anxiety).    calcium citrate 250 mg calcium Tab     carvediloL (COREG) 6.25 MG tablet Take 1 tablet (6.25 mg total) by mouth 2 (two) times daily.    cetirizine (ZYRTEC) 10 MG tablet Take 1 tablet (10 mg total) by mouth once daily.    diazePAM (VALIUM) 5 MG tablet Take 1 tablet (5 mg total) by mouth every 12 (twelve) hours as needed for Anxiety.    dicyclomine (BENTYL) 10 MG capsule TAKE 1 CAPSULE FOUR TIMES DAILY BEFORE MEALS AND NIGHTLY    disulfiram (ANTABUSE) 250 mg tablet Take 1 tablet (250 mg total) by mouth once daily.    divalproex ER (DEPAKOTE ER) 500 MG Tb24 24 hr tablet Take 1 tablet (500 mg total) by mouth once daily.    famotidine (PEPCID) 40 MG tablet TAKE 1 TABLET EVERY NIGHT    fluticasone propionate (FLONASE) 50 mcg/actuation nasal spray 2 sprays (100 mcg total) by Each Nostril route once daily.    furosemide (LASIX) 40 MG tablet Take 1 tablet (40 mg total) by mouth once daily.    HYDROcodone-acetaminophen (NORCO) 7.5-325 mg per tablet TAKE 1 T PO TID PRN FOR 30 DAYS    lactulose (CHRONULAC) 20 gram/30 mL Soln Take 15 mLs (10 g total) by mouth every 6 (six) hours as needed (constipation).    LIDOcaine (LIDODERM) 5 % Place 1 patch onto the skin every 24 hours. Remove & Discard patch within 12 hours or as directed by MD    methocarbamoL (ROBAXIN) 750 MG Tab Take 750 mg by mouth 3 (three) times daily.    mirtazapine (REMERON) 15 MG tablet Take 1 tablet (15 mg total) by mouth every evening.    montelukast (SINGULAIR) 10 mg tablet Take 1 tablet (10 mg total) by mouth every evening.    multivitamin (THERAGRAN) tablet Take 1 tablet by mouth once daily.    omeprazole (PRILOSEC) 40 MG capsule TAKE 1 CAPSULE  EVERY MORNING    prazosin (MINIPRESS) 1 MG Cap Take 1 capsule (1 mg total) by mouth every evening. Before bed for nightmares    promethazine (PHENERGAN) 25 MG tablet Take 1 tablet (25 mg total) by mouth every 6 (six) hours as needed for Nausea.    QUEtiapine (SEROQUEL) 50 MG tablet Take 1 tablet (50 mg total) by mouth every evening.    senna-docusate 8.6-50 mg (PERICOLACE) 8.6-50 mg per tablet Take 1 tablet by mouth once daily.    spironolactone (ALDACTONE) 25 MG tablet Take 1 tablet (25 mg total) by mouth once daily.    sucralfate (CARAFATE) 100 mg/mL suspension SHAKE LIQUID AND TAKE 10 ML BY MOUTH FOUR TIMES DAILY, WITH MEALS AND AT NIGHT    sumatriptan (IMITREX) 50 MG tablet Once for severe headache. May repeat once after 2 hours. Do not exceed 3-4 doses in one week.    terbinafine HCL (LAMISIL) 1 % cream Apply topically 2 (two) times daily.    TRAVATAN Z 0.004 % ophthalmic solution INT 1 GTT IN OU QD    venlafaxine (EFFEXOR-XR) 150 MG Cp24 Take 1 capsule (150 mg total) by mouth once daily.    venlafaxine (EFFEXOR-XR) 75 MG 24 hr capsule Take 1 capsule (75 mg total) by mouth once daily. Take with Effexor 150 to equal 225 mg daily    [DISCONTINUED] acamprosate (CAMPRAL) 333 mg tablet Take 2 tablets (666 mg total) by mouth 3 (three) times daily.    [DISCONTINUED] ALPRAZolam (XANAX) 1 MG tablet Take 1 tablet (1 mg total) by mouth 2 (two) times daily as needed for Anxiety.     Family History       Problem Relation (Age of Onset)    Asthma Brother    Crohn's disease Cousin    Diabetes Mother, Maternal Grandmother    Heart attack Mother    Heart failure Father    Hypertension Mother, Brother    Lupus Mother          Tobacco Use    Smoking status: Every Day     Current packs/day: 0.00     Average packs/day: 0.5 packs/day for 4.0 years (2.0 ttl pk-yrs)     Types: Cigarettes     Start date: 2019     Last attempt to quit: 2023     Years since quittin.1    Smokeless tobacco: Never   Substance and Sexual  Activity    Alcohol use: No     Comment: former heavy drinker since May 1st 2017    Drug use: Not Currently    Sexual activity: Yes     Partners: Male     Review of Systems   Unable to perform ROS: Mental status change     Objective:     Vital Signs (Most Recent):  Temp: 97.4 °F (36.3 °C) (04/21/25 2158)  Pulse: 64 (04/22/25 0400)  Resp: 19 (04/22/25 0304)  BP: (!) 152/93 (04/22/25 0400)  SpO2: 99 % (04/22/25 0400) Vital Signs (24h Range):  Temp:  [97.4 °F (36.3 °C)-97.5 °F (36.4 °C)] 97.4 °F (36.3 °C)  Pulse:  [60-74] 64  Resp:  [13-20] 19  SpO2:  [96 %-100 %] 99 %  BP: (140-178)/() 152/93        There is no height or weight on file to calculate BMI.     Physical Exam  Constitutional:       Appearance: She is well-developed.      Interventions: Nasal cannula in place.   HENT:      Head: Normocephalic.   Eyes:      General:         Right eye: No discharge.         Left eye: No discharge.      Conjunctiva/sclera: Conjunctivae normal.   Cardiovascular:      Rate and Rhythm: Normal rate and regular rhythm.      Pulses:           Radial pulses are 2+ on the right side and 2+ on the left side.      Heart sounds: Normal heart sounds.   Pulmonary:      Effort: Pulmonary effort is normal. No respiratory distress.      Breath sounds: Normal breath sounds.   Abdominal:      General: Bowel sounds are decreased. There is no distension.      Palpations: Abdomen is soft.      Tenderness: There is no abdominal tenderness.   Musculoskeletal:         General: Normal range of motion.      Cervical back: Normal range of motion and neck supple.   Skin:     General: Skin is warm and dry.   Neurological:      Mental Status: She is lethargic.      GCS: GCS eye subscore is 2. GCS verbal subscore is 2. GCS motor subscore is 4.                Significant Labs: All pertinent labs within the past 24 hours have been reviewed.  CBC:   Recent Labs   Lab 04/21/25  2313   WBC 6.01   HGB 11.5*   HCT 36.5*        CMP:   Recent Labs   Lab  "04/22/25  0052   *   K 4.9      CO2 19*   BUN 29*   CREATININE 2.6*   CALCIUM 9.3   ALBUMIN 3.6   BILITOT 0.3   ALKPHOS 92   AST 24   ALT 16   ANIONGAP 12       Significant Imaging: I have reviewed all pertinent imaging results/findings within the past 24 hours.  Assessment/Plan:     Assessment & Plan  Encephalopathy  Patient difficult to arouse. Discussed with family member at bedside who states patient takes valium, norco, belbuca, and other sedating meds.  Believe encephalopathy is likely due to polypharmacy.    Will hold most sedating medications until more alert.  IVF      Essential hypertension  Patient's blood pressure range in the last 24 hours was: BP  Min: 140/100  Max: 178/99.The patient's inpatient anti-hypertensive regimen is listed below:  Current Antihypertensives  amLODIPine tablet 10 mg, Daily, Oral  carvediloL tablet 6.25 mg, 2 times daily, Oral    Plan  - BP is uncontrolled, will adjust as follows: Continue BP meds  Chronic diastolic congestive heart failure  Patient has Diastolic (HFpEF) heart failure that is Chronic. On presentation their CHF was well compensated. Most recent BNP and echo results are listed below.  No results for input(s): "BNP" in the last 72 hours.  Latest ECHO  Results for orders placed during the hospital encounter of 09/27/22    Echo    Interpretation Summary  · The left ventricle is normal in size with concentric hypertrophy and low normal systolic function.  · The estimated ejection fraction is 53%.  · Grade I left ventricular diastolic dysfunction.  · Normal right ventricular size with normal right ventricular systolic function.    Current Heart Failure Medications  carvediloL tablet 6.25 mg, 2 times daily, Oral    Plan  - Monitor strict I&Os and daily weights.    - Place on telemetry  - Low sodium diet  - Place on fluid restriction of 1.5 L.   - Cardiology has not been consulted  - The patient's volume status is at their baseline    Acute kidney injury " superimposed on CKD  DONTAE is likely due to pre-renal azotemia due to dehydration. Baseline creatinine is  1.3 . Most recent creatinine and eGFR are listed below.  Recent Labs     04/22/25  0052   CREATININE 2.6*   EGFRNORACEVR 21*      Plan  - DONTAE is  unchanged  - Avoid nephrotoxins and renally dose meds for GFR listed above  - Monitor urine output, serial BMP, and adjust therapy as needed  - IVF  VTE Risk Mitigation (From admission, onward)           Ordered     heparin (porcine) injection 5,000 Units  Every 8 hours         04/22/25 0433     IP VTE HIGH RISK PATIENT  Once         04/22/25 0433     Place sequential compression device  Until discontinued         04/22/25 0433                         On 04/22/2025, patient should be placed in hospital observation services under my care in collaboration with Dr. Burrell.           William Armando NP  Department of Hospital Medicine  [facility] - Emergency Dept

## 2025-04-22 NOTE — ASSESSMENT & PLAN NOTE
Patient difficult to arouse. Discussed with family member at bedside who states patient takes valium, norco, belbuca, and other sedating meds.  Believe encephalopathy is likely due to polypharmacy.    Will hold most sedating medications until more alert.  IVF

## 2025-04-22 NOTE — ASSESSMENT & PLAN NOTE
"Patient has Diastolic (HFpEF) heart failure that is Chronic. On presentation their CHF was well compensated. Most recent BNP and echo results are listed below.  No results for input(s): "BNP" in the last 72 hours.  Latest ECHO  Results for orders placed during the hospital encounter of 09/27/22    Echo    Interpretation Summary  · The left ventricle is normal in size with concentric hypertrophy and low normal systolic function.  · The estimated ejection fraction is 53%.  · Grade I left ventricular diastolic dysfunction.  · Normal right ventricular size with normal right ventricular systolic function.    Current Heart Failure Medications  carvediloL tablet 6.25 mg, 2 times daily, Oral    Plan  - Monitor strict I&Os and daily weights.    - Place on telemetry  - Low sodium diet  - Place on fluid restriction of 1.5 L.   - Cardiology has not been consulted  - The patient's volume status is at their baseline    "

## 2025-04-22 NOTE — ASSESSMENT & PLAN NOTE
Patient's blood pressure range in the last 24 hours was: BP  Min: 140/100  Max: 178/99.The patient's inpatient anti-hypertensive regimen is listed below:  Current Antihypertensives  amLODIPine tablet 10 mg, Daily, Oral  carvediloL tablet 6.25 mg, 2 times daily, Oral    Plan  - BP is uncontrolled, will adjust as follows: Continue BP meds

## 2025-04-23 VITALS
TEMPERATURE: 98 F | RESPIRATION RATE: 18 BRPM | DIASTOLIC BLOOD PRESSURE: 65 MMHG | SYSTOLIC BLOOD PRESSURE: 124 MMHG | HEART RATE: 65 BPM | OXYGEN SATURATION: 97 %

## 2025-04-23 LAB
ABSOLUTE EOSINOPHIL (OHS): 0.03 K/UL
ABSOLUTE MONOCYTE (OHS): 0.26 K/UL (ref 0.3–1)
ABSOLUTE NEUTROPHIL COUNT (OHS): 1.57 K/UL (ref 1.8–7.7)
ALBUMIN SERPL BCP-MCNC: 3.1 G/DL (ref 3.5–5.2)
ALP SERPL-CCNC: 90 UNIT/L (ref 40–150)
ALT SERPL W/O P-5'-P-CCNC: 9 UNIT/L (ref 10–44)
ANION GAP (OHS): 10 MMOL/L (ref 8–16)
AST SERPL-CCNC: 15 UNIT/L (ref 11–45)
BASOPHILS # BLD AUTO: 0.03 K/UL
BASOPHILS NFR BLD AUTO: 0.9 %
BILIRUB SERPL-MCNC: 0.2 MG/DL (ref 0.1–1)
BUN SERPL-MCNC: 21 MG/DL (ref 6–20)
CALCIUM SERPL-MCNC: 8.6 MG/DL (ref 8.7–10.5)
CHLORIDE SERPL-SCNC: 109 MMOL/L (ref 95–110)
CO2 SERPL-SCNC: 19 MMOL/L (ref 23–29)
CREAT SERPL-MCNC: 1.4 MG/DL (ref 0.5–1.4)
ERYTHROCYTE [DISTWIDTH] IN BLOOD BY AUTOMATED COUNT: 17.3 % (ref 11.5–14.5)
GFR SERPLBLD CREATININE-BSD FMLA CKD-EPI: 45 ML/MIN/1.73/M2
GLUCOSE SERPL-MCNC: 75 MG/DL (ref 70–110)
HCT VFR BLD AUTO: 33.1 % (ref 37–48.5)
HGB BLD-MCNC: 10 GM/DL (ref 12–16)
IMM GRANULOCYTES # BLD AUTO: 0.01 K/UL (ref 0–0.04)
IMM GRANULOCYTES NFR BLD AUTO: 0.3 % (ref 0–0.5)
LYMPHOCYTES # BLD AUTO: 1.6 K/UL (ref 1–4.8)
MAGNESIUM SERPL-MCNC: 1.9 MG/DL (ref 1.6–2.6)
MCH RBC QN AUTO: 24.8 PG (ref 27–31)
MCHC RBC AUTO-ENTMCNC: 30.2 G/DL (ref 32–36)
MCV RBC AUTO: 82 FL (ref 82–98)
NUCLEATED RBC (/100WBC) (OHS): 0 /100 WBC
PHOSPHATE SERPL-MCNC: 3.8 MG/DL (ref 2.7–4.5)
PLATELET # BLD AUTO: 199 K/UL (ref 150–450)
PMV BLD AUTO: 10.4 FL (ref 9.2–12.9)
POTASSIUM SERPL-SCNC: 4.6 MMOL/L (ref 3.5–5.1)
PROT SERPL-MCNC: 6.6 GM/DL (ref 6–8.4)
RBC # BLD AUTO: 4.04 M/UL (ref 4–5.4)
RELATIVE EOSINOPHIL (OHS): 0.9 %
RELATIVE LYMPHOCYTE (OHS): 45.7 % (ref 18–48)
RELATIVE MONOCYTE (OHS): 7.4 % (ref 4–15)
RELATIVE NEUTROPHIL (OHS): 44.8 % (ref 38–73)
SODIUM SERPL-SCNC: 138 MMOL/L (ref 136–145)
WBC # BLD AUTO: 3.5 K/UL (ref 3.9–12.7)

## 2025-04-23 PROCEDURE — 63600175 PHARM REV CODE 636 W HCPCS: Performed by: INTERNAL MEDICINE

## 2025-04-23 PROCEDURE — 80053 COMPREHEN METABOLIC PANEL: CPT | Performed by: NURSE PRACTITIONER

## 2025-04-23 PROCEDURE — 63600175 PHARM REV CODE 636 W HCPCS: Performed by: NURSE PRACTITIONER

## 2025-04-23 PROCEDURE — 83735 ASSAY OF MAGNESIUM: CPT | Performed by: NURSE PRACTITIONER

## 2025-04-23 PROCEDURE — 36415 COLL VENOUS BLD VENIPUNCTURE: CPT | Performed by: NURSE PRACTITIONER

## 2025-04-23 PROCEDURE — 84100 ASSAY OF PHOSPHORUS: CPT | Performed by: NURSE PRACTITIONER

## 2025-04-23 PROCEDURE — 85025 COMPLETE CBC W/AUTO DIFF WBC: CPT | Performed by: NURSE PRACTITIONER

## 2025-04-23 PROCEDURE — 25000003 PHARM REV CODE 250: Performed by: INTERNAL MEDICINE

## 2025-04-23 PROCEDURE — 25000003 PHARM REV CODE 250: Performed by: NURSE PRACTITIONER

## 2025-04-23 RX ORDER — CIPROFLOXACIN 250 MG/1
500 TABLET, FILM COATED ORAL EVERY 12 HOURS
Status: DISCONTINUED | OUTPATIENT
Start: 2025-04-23 | End: 2025-04-23 | Stop reason: HOSPADM

## 2025-04-23 RX ORDER — MELOXICAM 15 MG/1
15 TABLET ORAL DAILY
Qty: 10 TABLET | Refills: 0 | Status: SHIPPED | OUTPATIENT
Start: 2025-04-23 | End: 2025-05-03

## 2025-04-23 RX ORDER — CIPROFLOXACIN 500 MG/1
500 TABLET ORAL 2 TIMES DAILY
Qty: 3 TABLET | Refills: 0 | Status: SHIPPED | OUTPATIENT
Start: 2025-04-23 | End: 2025-04-25 | Stop reason: ALTCHOICE

## 2025-04-23 RX ORDER — SUMATRIPTAN SUCCINATE 50 MG/1
TABLET ORAL
Qty: 9 TABLET | Refills: 0 | Status: SHIPPED | OUTPATIENT
Start: 2025-04-23

## 2025-04-23 RX ADMIN — CIPROFLOXACIN HYDROCHLORIDE 500 MG: 250 TABLET, FILM COATED ORAL at 08:04

## 2025-04-23 RX ADMIN — BUSPIRONE HYDROCHLORIDE 30 MG: 10 TABLET ORAL at 08:04

## 2025-04-23 RX ADMIN — CEFTRIAXONE SODIUM 1 G: 1 INJECTION, POWDER, FOR SOLUTION INTRAMUSCULAR; INTRAVENOUS at 12:04

## 2025-04-23 RX ADMIN — DIVALPROEX SODIUM 500 MG: 500 TABLET, FILM COATED, EXTENDED RELEASE ORAL at 08:04

## 2025-04-23 RX ADMIN — CARVEDILOL 6.25 MG: 6.25 TABLET, FILM COATED ORAL at 08:04

## 2025-04-23 RX ADMIN — FAMOTIDINE 20 MG: 20 TABLET, FILM COATED ORAL at 08:04

## 2025-04-23 RX ADMIN — HEPARIN SODIUM 5000 UNITS: 5000 INJECTION INTRAVENOUS; SUBCUTANEOUS at 06:04

## 2025-04-23 RX ADMIN — AMLODIPINE BESYLATE 10 MG: 5 TABLET ORAL at 08:04

## 2025-04-23 NOTE — ASSESSMENT & PLAN NOTE
DONTAE is likely due to pre-renal azotemia due to dehydration. Baseline creatinine is 1.3. Most recent creatinine and eGFR are listed below.  Recent Labs     04/23/25  0416   CREATININE 1.4   EGFRNORACEVR 45*      Plan  - DONTAE is unchanged  - Avoid nephrotoxins and renally dose meds for GFR listed above  - Monitor urine output, serial BMP, and adjust therapy as needed  - IVF

## 2025-04-23 NOTE — PLAN OF CARE
Case Management Final Discharge Note    Discharge Disposition: Hpme    New DME ordered / company name: None    Relevant SDOH / Transition of Care Barriers:  None    Person available to provide assistance at home when needed and their contact information: Self    Scheduled followup appointment: PCP, Pain mangement    Referrals placed: None    Transportation: Family          Patient educated on discharge services and updated on DC plan. Bedside RN notified, patient clear to discharge from Case Management Perspective.      04/23/25 0923   Final Note   Assessment Type Final Discharge Note   Anticipated Discharge Disposition Home   Hospital Resources/Appts/Education Provided Provided patient/caregiver with written discharge plan information;Appointments scheduled and added to AVS   Post-Acute Status   Discharge Delays None known at this time     [facility] ([facility])  Discharge Final Note    Primary Care Provider: Nicolette Mustafa MD    Expected Discharge Date: 4/23/2025    Final Discharge Note (most recent)       Final Note - 04/23/25 0923          Final Note    Assessment Type Final Discharge Note (P)      Anticipated Discharge Disposition Home or Self Care (P)      Hospital Resources/Appts/Education Provided Provided patient/caregiver with written discharge plan information;Appointments scheduled and added to AVS (P)         Post-Acute Status    Discharge Delays None known at this time (P)                      Important Message from Medicare  Important Message from Medicare regarding Discharge Appeal Rights: Explained to patient/caregiver, Signed/date by patient/caregiver     Date IMM was signed: 04/22/25  Time IMM was signed: 1950    Contact Info       Nicolette Mustafa MD   Specialty: Family Medicine   Relationship: PCP - General    [facility]  [place] [id number]   Phone: [phone]       Next Steps: Go on 4/30/2025    Instructions: 10:30am

## 2025-04-23 NOTE — PLAN OF CARE
04/22/25 2034   Admission   Initial VN Admission Questions Complete   Communication Issues? None   Shift   Pain Management Interventions quiet environment facilitated;relaxation techniques promoted   Virtual Nurse - Patient Verbalized Approval Of VN Rounding;Camera Use   Type of Frequent Check   Type Telemetry Monitoring;Patient Rounds   Safety/Activity   Patient Rounds bed in low position;bed wheels locked;call light in patient/parent reach;ID band on;clutter free environment maintained;visualized patient;placement of personal items at bedside   Safety Promotion/Fall Prevention assistive device/personal item within reach;bed alarm set;Fall Risk reviewed with patient/family;nonskid shoes/socks when out of bed;room near unit station;side rails raised x 2;medications reviewed   Safety Precautions emergency equipment at bedside   Safety Bands on Patient Fall Risk Band;Allergy Band   Activity Management Ambulated in room - L4   Positioning   Body Position position changed independently   Head of Bed (HOB) Positioning HOB elevated   Positioning/Transfer Devices pillows;in use        VIRTUAL NURSE: Pt arrived to unit. Permission received per patient to turn camera to view patient. VIP model explained; patient informed this VN will be working with bedside nurse and the rest of the care team. Plan of care reviewed with patient.  Educated patient on VTE, fall risk and fall risk precautions in place. Call light within reach, side rails up x2. Admission questions completed. Patient instucted to ask staff for assistance. Patient verbalized complete understanding. Patient denies complaints or any needs at this time. Will continue to be available and intervene as needed.

## 2025-04-23 NOTE — PLAN OF CARE
Case Management Assessment     PCP: Dr Nicolette Mustafa    Patient Arrived From: Home  Existing Help at Home: Self, Adult Son    Barriers to Discharge: None    Discharge Plan:    A. Home w/family   B. Home      Patient AAOX3, ambulates with RW at times otherwise independent. PCP updated in system. Family to provide transportation home.     04/23/25 0916   Discharge Assessment   Assessment Type Discharge Planning Assessment   Confirmed/corrected address, phone number and insurance Yes   Confirmed Demographics Correct on Facesheet   Source of Information patient   Communicated GIANFRANCO with patient/caregiver Date not available/Unable to determine   People in Home child(delano), adult   Do you expect to return to your current living situation? Yes   Prior to hospitilization cognitive status: Alert/Oriented   Current cognitive status: Alert/Oriented   Walking or Climbing Stairs Difficulty yes   Walking or Climbing Stairs ambulation difficulty, requires equipment   Dressing/Bathing Difficulty no   Equipment Currently Used at Home walker, rolling   Readmission within 30 days? No   Do you currently have service(s) that help you manage your care at home? No   Do you take prescription medications? Yes   Do you have prescription coverage? Yes   Do you have any problems affording any of your prescribed medications? No   Is the patient taking medications as prescribed? yes   Who is going to help you get home at discharge? Family   How do you get to doctors appointments? health plan transportation   Are you on dialysis? No   Do you take coumadin? No   Discharge Plan A Home with family   Discharge Plan B Home   DME Needed Upon Discharge  none   Discharge Plan discussed with: Patient   Transition of Care Barriers None     [facility] ([facility])  Initial Discharge Assessment       Primary Care Provider: Nicolette Mustafa MD    Admission Diagnosis: Syncope [R55]  Encephalopathy acute [G93.40]  Encephalopathy [G93.40]  DONTAE (acute kidney injury)  [N17.9]  Urinary tract infection without hematuria, site unspecified [N39.0]    Admission Date: 4/21/2025  Expected Discharge Date: 4/23/2025    Transition of Care Barriers: (P) None    Payor: [employer] MANAGED MEDICARE / Plan: [employer] SNP HMO PPO SPECIAL NEEDS / Product Type: Medicare Advantage /     Extended Emergency Contact Information  Primary Emergency Contact: Nikolay Calles   [facility]  Home Phone: [phone]  Relation: Brother  Secondary Emergency Contact: Ramone Guerrero  Address: [address] [place] [address] [facility]  Home Phone: [phone]  Mobile Phone: [phone]  Relation: Relative    Discharge Plan A: (P) Home with family  Discharge Plan B: (P) Home      [facility] - [facility] [address]  [address] [facility] [address]  Phone: [phone] Fax: [phone]    [facility] [address] [place] [address] [address] [facility]  [address] [facility] [address]  Phone: [phone] Fax: [phone]      Initial Assessment (most recent)       Adult Discharge Assessment - 04/23/25 0916          Discharge Assessment    Assessment Type Discharge Planning Assessment (P)      Confirmed/corrected address, phone number and insurance Yes (P)      Confirmed Demographics Correct on Facesheet (P)      Source of Information patient (P)      Communicated GIANFRANCO with patient/caregiver Date not available/Unable to determine (P)      People in Home child(delano), adult (P)      Do you expect to return to your current living situation? Yes (P)      Prior to hospitilization cognitive status: Alert/Oriented (P)      Current cognitive status: Alert/Oriented (P)      Walking or Climbing Stairs Difficulty yes (P)      Walking or Climbing Stairs ambulation difficulty, requires equipment (P)      Dressing/Bathing Difficulty no (P)      Equipment Currently Used at Home walker, rolling (P)      Readmission within 30 days? No  (P)      Do you currently have service(s) that help you manage your care at home? No (P)      Do you take prescription medications? Yes (P)      Do you have prescription coverage? Yes (P)      Do you have any problems affording any of your prescribed medications? No (P)      Is the patient taking medications as prescribed? yes (P)      Who is going to help you get home at discharge? Family (P)      How do you get to doctors appointments? health plan transportation (P)      Are you on dialysis? No (P)      Do you take coumadin? No (P)      Discharge Plan A Home with family (P)      Discharge Plan B Home (P)      DME Needed Upon Discharge  none (P)      Discharge Plan discussed with: Patient (P)      Transition of Care Barriers None (P)

## 2025-04-23 NOTE — DISCHARGE SUMMARY
[facility] Medicine  Discharge Summary      Patient Name: Daksha Marie  MRN: [id number]  WENDIE: [id number]  Patient Class: IP- Inpatient  Admission Date: 4/21/2025  Hospital Length of Stay: 1 days  Discharge Date and Time: 4/23/2025  3:18 PM  Attending Physician: Chely att. providers found   Discharging Provider: MOOKIE Burrell MD  Primary Care Provider: Nicolette Mustafa MD    Primary Care Team: Networked reference to record PCT     HPI:   The patient is a 54-year-old female with a past medical history including CVA, anemia, diastolic heart failure, hypertension, and CKD who presents for evaluation following a witnessed syncopal episode at today.  According to the friend, the patient has been sleeping more since she had a medication change last week. She was started on baclofen 5 days ago. Today the patient was at her friend's house and was standing up leaning on the bed using her phone when the friend noticed that she was falling asleep while standing. She had her sit down in the recliner, and the patient fell asleep. About 10 minutes later the friend called out to her to make sure she was okay and she had to call her name out 5 times before the patient woke up and answered.  On initial workup, the patient's creatinine 2.6 and very difficult to arouse.  She will be admitted for further management of her encephalopathy and acute kidney injury.    * No surgery found *      Hospital Course:   Placed in observation with acute mental status change in setting of recently initiated baclofen with multiple underlying medications. Baclofen and other sedating medications held. Mentation improved. Urinalysis was abnormal; started ceftriaxone in event this contributed to her symptoms though she denied urinary symptoms after improvement in mentation. Recommended close follow-up with pain management for alternatives given plan for discontinuation of baclofen after discharge. With clinical  "improvement and vital stability she was prepared for discharge home.     Goals of Care Treatment Preferences:  Code Status: Full Code      SDOH Screening:  The patient was screened for food insecurity, housing instability, transportation needs, utility difficulties, and interpersonal safety. The social determinant(s) of health identified as a concern this admission are:  Housing instability  Food insecurity  Utility difficulties  Transportation difficulties    Social Drivers of Health with Concerns     Food Insecurity: Food Insecurity Present (4/22/2025)   Housing Stability: High Risk (4/22/2025)   Transportation Needs: Unmet Transportation Needs (4/22/2025)   Utilities: At Risk (4/22/2025)        Consults:     Assessment & Plan  Acute encephalopathy  Patient difficult to arouse. Discussed with family member at bedside who states patient takes valium, norco, belbuca, and other sedating meds.  Believe encephalopathy is likely due to polypharmacy.    Will hold most sedating medications until more alert.  IVF      Essential hypertension  Patient's blood pressure range in the last 24 hours was: No data recorded.The patient's inpatient anti-hypertensive regimen is listed below:  Current Antihypertensives       Plan  - BP is uncontrolled, will adjust as follows: Continue BP meds  Chronic diastolic congestive heart failure  Patient has Diastolic (HFpEF) heart failure that is Chronic. On presentation their CHF was well compensated. Most recent BNP and echo results are listed below.  No results for input(s): "BNP" in the last 72 hours.    Latest ECHO  Results for orders placed during the hospital encounter of 09/27/22    Echo    Interpretation Summary  · The left ventricle is normal in size with concentric hypertrophy and low normal systolic function.  · The estimated ejection fraction is 53%.  · Grade I left ventricular diastolic dysfunction.  · Normal right ventricular size with normal right ventricular systolic " function.    Current Heart Failure Medications       Plan  - Monitor strict I&Os and daily weights.    - Place on telemetry  - Low sodium diet  - Place on fluid restriction of 1.5 L.   - Cardiology has not been consulted  - The patient's volume status is at their baseline    Acute kidney injury superimposed on CKD  DONTAE is likely due to pre-renal azotemia due to dehydration. Baseline creatinine is  1.3 . Most recent creatinine and eGFR are listed below.  Recent Labs     04/23/25  0416   CREATININE 1.4   EGFRNORACEVR 45*      Plan  - DONTAE is  unchanged  - Avoid nephrotoxins and renally dose meds for GFR listed above  - Monitor urine output, serial BMP, and adjust therapy as needed  - IVF    Final Active Diagnoses:    Diagnosis Date Noted POA    PRINCIPAL PROBLEM:  Acute encephalopathy [G93.40] 04/22/2025 Yes    Acute kidney injury superimposed on CKD [N17.9, N18.9] 04/22/2025 Yes    Chronic diastolic congestive heart failure [I50.32]  Yes     Chronic    Essential hypertension [I10] 08/12/2014 Yes     Chronic      Problems Resolved During this Admission:       Discharged Condition: good    Disposition: Home or Self Care    Follow Up:   Follow-up Information       Slava Mustafa MD. Go on 4/30/2025.    Specialty: Family Medicine  Why: 10:30am  Contact information:  [address] [facility]  [place] [facility] [address]  [phone]               [employer] [place] Transportation Follow up.    Why: Please call three days prior to appointment to arrange transportation  Contact information:  [phone]                         Patient Instructions:      Ambulatory referral/consult to Smoking Cessation Program   Standing Status: Future   Referral Priority: Routine Referral Type: Consultation   Referral Reason: Specialty Services Required   Requested Specialty: CTTS   Number of Visits Requested: 1     Diet Adult Regular     Reason for not Prescribing Nicotine Replacement     Order Specific Question Answer Comments    Reason for not Prescribing: Not medically appropriate at this time        Significant Diagnostic Studies:   CBC:  Recent Labs   Lab 04/21/25  2313 04/23/25  0416   WBC 6.01 3.50*   HGB 11.5* 10.0*   HCT 36.5* 33.1*    199   LYMPH 2.43  40.4 1.60  45.7   MONO 8.8  0.53 7.4  0.26*   EOS 1.3  0.08 0.9  0.03     BMP:  Recent Labs   Lab 04/22/25 0052 04/23/25  0416   * 138   K 4.9 4.6    109   CO2 19* 19*   BUN 29* 21*   CREATININE 2.6* 1.4   CALCIUM 9.3 8.6*   MG 2.0 1.9   PHOS  --  3.8     CMP:  Recent Labs   Lab 04/22/25 0052 04/23/25 0416   * 138   K 4.9 4.6    109   CO2 19* 19*   BUN 29* 21*   CREATININE 2.6* 1.4   CALCIUM 9.3 8.6*   MG 2.0 1.9   PHOS  --  3.8   ALKPHOS 92 90   AST 24 15   ALT 16 9*   BILITOT 0.3 0.2   ALBUMIN 3.6 3.1*   ANIONGAP 12 10     Imaging Results              X-Ray Pelvis Routine AP (Final result)  Result time 04/22/25 13:03:30      Final result by Young Lutz III, MD (04/22/25 13:03:30)                   Narrative:    EXAMINATION:  XR PELVIS ROUTINE AP    CLINICAL HISTORY:  Sacral tenderness / pain, eval for bony abnormality;    FINDINGS:  Pelvis routine AP.    There is mild DJD.  No fracture dislocation bone destruction seen.  No acute trauma seen.      Electronically signed by: Young Lutz MD  Date:    04/22/2025  Time:    13:03                                     X-Ray Chest AP Portable (Final result)  Result time 04/22/25 00:36:16      Final result by Sharron Cortes MD (04/22/25 00:36:16)                   Impression:      No acute cardiopulmonary process identified.      Electronically signed by: Sharron Cortes MD  Date:    04/22/2025  Time:    00:36               Narrative:    EXAMINATION:  XR CHEST AP PORTABLE    CLINICAL HISTORY:  Sepsis;    TECHNIQUE:  Single frontal view of the chest was performed.    COMPARISON:  March 2024.    FINDINGS:  Cardiac silhouette is stable in size.  Lungs are mildly hypoinflated with elevation the  right hemidiaphragm seen.  No evidence of focal consolidative process, pneumothorax, or significant pleural effusion.  No acute osseous abnormality identified.                                       CT Head Without Contrast (Final result)  Result time 04/21/25 23:15:51      Final result by Sharron Cortes MD (04/21/25 23:15:51)                   Impression:      No acute intracranial abnormalities identified.      Electronically signed by: Sharron Cortes MD  Date:    04/21/2025  Time:    23:15               Narrative:    EXAMINATION:  CT HEAD WITHOUT CONTRAST    CLINICAL HISTORY:  Mental status change, unknown cause;    TECHNIQUE:  Low dose axial images were obtained through the head.  Coronal and sagittal reformations were also performed. Contrast was not administered.    COMPARISON:  April 2019.    FINDINGS:  No evidence of acute/recent major vascular distribution cerebral infarction, intraparenchymal hemorrhage, or intra-axial space occupying lesion. The ventricular system is normal in size and configuration with no evidence of hydrocephalus. No effacement of the skull-base cisterns. No abnormal extra-axial fluid collections or blood products. Visualized paranasal sinuses and mastoid air cells are clear. The calvarium shows no significant abnormality.                                      Pending Diagnostic Studies:       None           Medications:  Reconciled Home Medications:      Medication List        START taking these medications      ciprofloxacin HCl 500 MG tablet  Commonly known as: CIPRO  Take 1 tablet (500 mg total) by mouth 2 (two) times a day. for 3 doses     meloxicam 15 MG tablet  Commonly known as: MOBIC  Take 1 tablet (15 mg total) by mouth once daily. for 10 days            CHANGE how you take these medications      BELBUCA 150 mcg Film buccal film  Generic drug: buprenorphine (Belbuca)  DISSOLVE 1 FILM TO THE GUM EVERY 12 HOURS  What changed: See the new instructions.     famotidine 40 MG  tablet  Commonly known as: PEPCID  TAKE 1 TABLET EVERY NIGHT  What changed:   when to take this  reasons to take this     sumatriptan 50 MG tablet  Commonly known as: IMITREX  Take one tablet by mouth Once for severe headache. May repeat once after 2 hours. Do not exceed 3-4 doses in one week.  What changed: additional instructions            CONTINUE taking these medications      amLODIPine 10 MG tablet  Commonly known as: NORVASC  Take 1 tablet (10 mg total) by mouth once daily.     brimonidine-timoloL 0.2-0.5 % Drop  Commonly known as: COMBIGAN  Place 1 drop into both eyes 2 (two) times daily.     busPIRone 30 MG Tab  Commonly known as: BUSPAR  Take 1 tablet (30 mg total) by mouth 2 (two) times daily as needed (breakthrough anxiety).     calcium citrate 250 mg calcium Tab     carvediloL 6.25 MG tablet  Commonly known as: COREG  Take 1 tablet (6.25 mg total) by mouth 2 (two) times daily.     cetirizine 10 MG tablet  Commonly known as: ZYRTEC  Take 1 tablet (10 mg total) by mouth once daily.     diazePAM 5 MG tablet  Commonly known as: VALIUM  Take 1 tablet (5 mg total) by mouth every 12 (twelve) hours as needed for Anxiety.     dicyclomine 10 MG capsule  Commonly known as: BENTYL  TAKE 1 CAPSULE FOUR TIMES DAILY BEFORE MEALS AND NIGHTLY     disulfiram 250 mg tablet  Commonly known as: ANTABUSE  Take 1 tablet (250 mg total) by mouth once daily.     divalproex  MG Tb24 24 hr tablet  Commonly known as: DEPAKOTE ER  Take 1 tablet (500 mg total) by mouth once daily.     fluticasone propionate 50 mcg/actuation nasal spray  Commonly known as: FLONASE  2 sprays (100 mcg total) by Each Nostril route once daily.     furosemide 40 MG tablet  Commonly known as: LASIX  Take 1 tablet (40 mg total) by mouth once daily.     LIDOcaine 5 %  Commonly known as: LIDODERM  Place 1 patch onto the skin every 24 hours. Remove & Discard patch within 12 hours or as directed by MD     mirtazapine 15 MG tablet  Commonly known as:  REMERON  Take 1 tablet (15 mg total) by mouth every evening.     montelukast 10 mg tablet  Commonly known as: SINGULAIR  Take 1 tablet (10 mg total) by mouth every evening.     multivitamin tablet  Commonly known as: THERAGRAN  Take 1 tablet by mouth once daily.     omeprazole 40 MG capsule  Commonly known as: PRILOSEC  TAKE 1 CAPSULE EVERY MORNING     prazosin 1 MG Cap  Commonly known as: MINIPRESS  Take 1 capsule (1 mg total) by mouth every evening. Before bed for nightmares     promethazine 25 MG tablet  Commonly known as: PHENERGAN  Take 1 tablet (25 mg total) by mouth every 6 (six) hours as needed for Nausea.     QUEtiapine 50 MG tablet  Commonly known as: SEROQUEL  Take 1 tablet (50 mg total) by mouth every evening.     spironolactone 25 MG tablet  Commonly known as: ALDACTONE  Take 1 tablet (25 mg total) by mouth once daily.     sucralfate 100 mg/mL suspension  Commonly known as: CARAFATE  SHAKE LIQUID AND TAKE 10 ML BY MOUTH FOUR TIMES DAILY, WITH MEALS AND AT NIGHT     TRAVATAN Z 0.004 % ophthalmic solution  Generic drug: travoprost  INT 1 GTT IN OU QD     * venlafaxine 75 MG 24 hr capsule  Commonly known as: EFFEXOR-XR  Take 1 capsule (75 mg total) by mouth once daily. Take with Effexor 150 to equal 225 mg daily     * venlafaxine 150 MG Cp24  Commonly known as: EFFEXOR-XR  Take 1 capsule (150 mg total) by mouth once daily.           * This list has 2 medication(s) that are the same as other medications prescribed for you. Read the directions carefully, and ask your doctor or other care provider to review them with you.                STOP taking these medications      baclofen 10 MG tablet  Commonly known as: LIORESAL              Indwelling Lines/Drains at time of discharge:   Lines/Drains/Airways       None                   Time spent on the discharge of patient: 35 minutes         MOOKIE Burrell MD  Department of Hospital Medicine  [facility])

## 2025-04-23 NOTE — ASSESSMENT & PLAN NOTE
Patient's blood pressure range in the last 24 hours was: No data recorded.The patient's inpatient anti-hypertensive regimen is listed below:  Current Antihypertensives       Plan  - BP is uncontrolled, will adjust as follows: Continue BP meds

## 2025-04-23 NOTE — ASSESSMENT & PLAN NOTE
"Patient has Diastolic (HFpEF) heart failure that is Chronic. On presentation their CHF was well compensated. Most recent BNP and echo results are listed below.  No results for input(s): "BNP" in the last 72 hours.    Latest ECHO  Results for orders placed during the hospital encounter of 09/27/22    Echo    Interpretation Summary  · The left ventricle is normal in size with concentric hypertrophy and low normal systolic function.  · The estimated ejection fraction is 53%.  · Grade I left ventricular diastolic dysfunction.  · Normal right ventricular size with normal right ventricular systolic function.    Current Heart Failure Medications       Plan  - Monitor strict I&Os and daily weights.    - Place on telemetry  - Low sodium diet  - Place on fluid restriction of 1.5 L.   - Cardiology has not been consulted  - The patient's volume status is at their baseline    "

## 2025-04-23 NOTE — PLAN OF CARE
Problem: Adult Inpatient Plan of Care  Goal: Patient-Specific Goal (Individualized)  Outcome: Progressing     Problem: Adult Inpatient Plan of Care  Goal: Absence of Hospital-Acquired Illness or Injury  Outcome: Progressing     Problem: Adult Inpatient Plan of Care  Goal: Optimal Comfort and Wellbeing  Outcome: Progressing     Problem: Adult Inpatient Plan of Care  Goal: Readiness for Transition of Care  Outcome: Progressing     Problem: Acute Kidney Injury/Impairment  Goal: Fluid and Electrolyte Balance  Outcome: Progressing     Problem: Acute Kidney Injury/Impairment  Goal: Improved Oral Intake  Outcome: Progressing

## 2025-04-23 NOTE — PLAN OF CARE
Provided patient BRIANNE transportation contact information to assist with transportation to medical appointments.

## 2025-04-23 NOTE — PLAN OF CARE
Problem: Adult Inpatient Plan of Care  Goal: Plan of Care Review  Outcome: Adequate for Care Transition  Goal: Patient-Specific Goal (Individualized)  Outcome: Adequate for Care Transition  Goal: Absence of Hospital-Acquired Illness or Injury  Outcome: Adequate for Care Transition  Goal: Optimal Comfort and Wellbeing  Outcome: Adequate for Care Transition  Goal: Readiness for Transition of Care  Outcome: Adequate for Care Transition     Problem: Acute Kidney Injury/Impairment  Goal: Fluid and Electrolyte Balance  Outcome: Adequate for Care Transition  Goal: Improved Oral Intake  Outcome: Adequate for Care Transition  Goal: Effective Renal Function  Outcome: Adequate for Care Transition     Problem: Comorbidity Management  Goal: Maintenance of Heart Failure Symptom Control  Outcome: Adequate for Care Transition  Goal: Blood Pressure in Desired Range  Outcome: Adequate for Care Transition

## 2025-04-24 ENCOUNTER — PATIENT OUTREACH (OUTPATIENT)
Dept: ADMINISTRATIVE | Facility: CLINIC | Age: 55
End: 2025-04-24
Payer: MEDICARE

## 2025-04-24 LAB — BACTERIA UR CULT: ABNORMAL

## 2025-04-24 NOTE — PROGRESS NOTES
C3 nurse spoke with Daksha Marie and Enedina  for a TCC post hospital discharge follow up call. The patient has a scheduled HOSFU appointment with Nicolette Mustafa MD  on 4/30/25 @ 1030.

## 2025-04-25 ENCOUNTER — RESULTS FOLLOW-UP (OUTPATIENT)
Dept: HEPATOLOGY | Facility: OTHER | Age: 55
End: 2025-04-25

## 2025-04-25 DIAGNOSIS — G93.40 ACUTE ENCEPHALOPATHY: Primary | ICD-10-CM

## 2025-04-25 RX ORDER — CEFDINIR 300 MG/1
300 CAPSULE ORAL 2 TIMES DAILY
Qty: 2 CAPSULE | Refills: 0 | Status: SHIPPED | OUTPATIENT
Start: 2025-04-25 | End: 2025-04-26

## 2025-04-25 NOTE — HOSPITAL COURSE
Placed in observation with acute mental status change in setting of recently initiated baclofen with multiple underlying medications. Baclofen and other sedating medications held. Mentation improved. Urinalysis was abnormal; started ceftriaxone in event this contributed to her symptoms though she denied urinary symptoms after improvement in mentation. Recommended close follow-up with pain management for alternatives given plan for discontinuation of baclofen after discharge. With clinical improvement and vital stability she was prepared for discharge home.

## 2025-04-27 LAB — BACTERIA BLD CULT: NORMAL

## 2025-04-28 LAB
OHS QRS DURATION: 100 MS
OHS QTC CALCULATION: 475 MS

## 2025-05-20 ENCOUNTER — TELEPHONE (OUTPATIENT)
Dept: SMOKING CESSATION | Facility: CLINIC | Age: 55
End: 2025-05-20
Payer: MEDICARE

## 2025-05-20 NOTE — TELEPHONE ENCOUNTER
[facility]-called patient for canceled  intake appointment.  Patient states not feeling well and would like a call back later.

## 2025-05-26 ENCOUNTER — TELEPHONE (OUTPATIENT)
Dept: SMOKING CESSATION | Facility: CLINIC | Age: 55
End: 2025-05-26
Payer: MEDICARE

## 2025-06-04 DIAGNOSIS — R10.9 ABDOMINAL PAIN, UNSPECIFIED ABDOMINAL LOCATION: ICD-10-CM

## 2025-06-04 RX ORDER — DICYCLOMINE HYDROCHLORIDE 10 MG/1
CAPSULE ORAL
Qty: 360 CAPSULE | Refills: 3 | Status: SHIPPED | OUTPATIENT
Start: 2025-06-04

## 2025-06-12 DIAGNOSIS — G47.00 INSOMNIA DISORDER, WITH NON-SLEEP DISORDER MENTAL COMORBIDITY: ICD-10-CM

## 2025-06-12 DIAGNOSIS — K28.7 CHRONIC ANASTOMOTIC ULCER OF GASTROJEJUNAL REGION: ICD-10-CM

## 2025-06-12 DIAGNOSIS — F39 MOOD DISORDER: ICD-10-CM

## 2025-06-12 RX ORDER — QUETIAPINE FUMARATE 50 MG/1
50 TABLET, FILM COATED ORAL NIGHTLY
Qty: 90 TABLET | Refills: 1 | Status: SHIPPED | OUTPATIENT
Start: 2025-06-12 | End: 2026-06-12

## 2025-06-12 RX ORDER — SUCRALFATE 1 G/10ML
1 SUSPENSION ORAL
Qty: 414 ML | Refills: 3 | Status: SHIPPED | OUTPATIENT
Start: 2025-06-12 | End: 2025-09-10

## 2025-06-16 ENCOUNTER — TELEPHONE (OUTPATIENT)
Dept: SMOKING CESSATION | Facility: CLINIC | Age: 55
End: 2025-06-16
Payer: MEDICARE

## 2025-06-18 ENCOUNTER — HOSPITAL ENCOUNTER (OUTPATIENT)
Dept: RADIOLOGY | Facility: HOSPITAL | Age: 55
Discharge: HOME | End: 2025-06-18
Attending: FAMILY MEDICINE
Payer: MEDICARE

## 2025-06-18 VITALS — BODY MASS INDEX: 29.66 KG/M2 | WEIGHT: 178 LBS | HEIGHT: 65 IN

## 2025-06-18 DIAGNOSIS — Z12.31 ENCOUNTER FOR SCREENING MAMMOGRAM FOR BREAST CANCER: ICD-10-CM

## 2025-06-18 DIAGNOSIS — F41.0 PANIC DISORDER: ICD-10-CM

## 2025-06-18 PROCEDURE — 77067 SCR MAMMO BI INCL CAD: CPT | Mod: 26,,, | Performed by: RADIOLOGY

## 2025-06-18 PROCEDURE — 77063 BREAST TOMOSYNTHESIS BI: CPT | Mod: 26,,, | Performed by: RADIOLOGY

## 2025-06-18 PROCEDURE — 77063 BREAST TOMOSYNTHESIS BI: CPT | Mod: TC

## 2025-06-18 RX ORDER — BUSPIRONE HYDROCHLORIDE 30 MG/1
30 TABLET ORAL 2 TIMES DAILY PRN
Qty: 60 TABLET | Refills: 5 | Status: SHIPPED | OUTPATIENT
Start: 2025-06-18

## 2025-06-18 RX ORDER — BUSPIRONE HYDROCHLORIDE 30 MG/1
30 TABLET ORAL 2 TIMES DAILY PRN
Qty: 60 TABLET | Refills: 5 | OUTPATIENT
Start: 2025-06-18

## 2025-06-20 ENCOUNTER — RESULTS FOLLOW-UP (OUTPATIENT)
Dept: OBSTETRICS AND GYNECOLOGY | Facility: CLINIC | Age: 55
End: 2025-06-20

## 2025-06-23 ENCOUNTER — TELEPHONE (OUTPATIENT)
Dept: SMOKING CESSATION | Facility: CLINIC | Age: 55
End: 2025-06-23
Payer: MEDICARE

## 2025-07-01 ENCOUNTER — PATIENT MESSAGE (OUTPATIENT)
Dept: ADMINISTRATIVE | Facility: OTHER | Age: 55
End: 2025-07-01
Payer: MEDICARE

## 2025-07-15 ENCOUNTER — OFFICE VISIT (OUTPATIENT)
Dept: PSYCHIATRY | Facility: CLINIC | Age: 55
End: 2025-07-15
Payer: MEDICARE

## 2025-07-15 DIAGNOSIS — F10.91 ALCOHOL USE DISORDER IN REMISSION: ICD-10-CM

## 2025-07-15 DIAGNOSIS — F39 MOOD DISORDER: ICD-10-CM

## 2025-07-15 DIAGNOSIS — F33.41 DEPRESSION, MAJOR, RECURRENT, IN PARTIAL REMISSION: ICD-10-CM

## 2025-07-15 DIAGNOSIS — G47.00 INSOMNIA DISORDER, WITH NON-SLEEP DISORDER MENTAL COMORBIDITY: ICD-10-CM

## 2025-07-15 DIAGNOSIS — F43.10 PTSD (POST-TRAUMATIC STRESS DISORDER): ICD-10-CM

## 2025-07-15 DIAGNOSIS — F41.0 PANIC DISORDER: ICD-10-CM

## 2025-07-15 PROCEDURE — 98006 SYNCH AUDIO-VIDEO EST MOD 30: CPT | Mod: 95,,, | Performed by: NURSE PRACTITIONER

## 2025-07-15 RX ORDER — DISULFIRAM 250 MG/1
250 TABLET ORAL DAILY
Qty: 90 TABLET | Refills: 3 | Status: SHIPPED | OUTPATIENT
Start: 2025-07-15

## 2025-07-15 RX ORDER — PRAZOSIN HYDROCHLORIDE 2 MG/1
2 CAPSULE ORAL NIGHTLY
Qty: 90 CAPSULE | Refills: 3 | Status: SHIPPED | OUTPATIENT
Start: 2025-07-15

## 2025-07-15 RX ORDER — BUSPIRONE HYDROCHLORIDE 30 MG/1
30 TABLET ORAL 2 TIMES DAILY PRN
Qty: 60 TABLET | Refills: 5 | Status: SHIPPED | OUTPATIENT
Start: 2025-07-15

## 2025-07-15 RX ORDER — DIVALPROEX SODIUM 500 MG/1
500 TABLET, FILM COATED, EXTENDED RELEASE ORAL DAILY
Qty: 90 TABLET | Refills: 3 | Status: SHIPPED | OUTPATIENT
Start: 2025-07-15

## 2025-07-15 RX ORDER — DIAZEPAM 5 MG/1
5 TABLET ORAL EVERY 12 HOURS PRN
Qty: 60 TABLET | Refills: 5 | Status: SHIPPED | OUTPATIENT
Start: 2025-07-15

## 2025-07-15 RX ORDER — VENLAFAXINE HYDROCHLORIDE 75 MG/1
75 CAPSULE, EXTENDED RELEASE ORAL DAILY
Qty: 90 CAPSULE | Refills: 3 | Status: SHIPPED | OUTPATIENT
Start: 2025-07-15

## 2025-07-15 RX ORDER — VENLAFAXINE HYDROCHLORIDE 150 MG/1
150 CAPSULE, EXTENDED RELEASE ORAL DAILY
Qty: 90 CAPSULE | Refills: 3 | Status: SHIPPED | OUTPATIENT
Start: 2025-07-15

## 2025-07-15 RX ORDER — MIRTAZAPINE 15 MG/1
15 TABLET, FILM COATED ORAL NIGHTLY
Qty: 90 TABLET | Refills: 3 | Status: SHIPPED | OUTPATIENT
Start: 2025-07-15

## 2025-07-15 NOTE — PROGRESS NOTES
"  Outpatient Psychiatry Follow-Up Visit (MD/NP)    7/15/2025    Clinical Status of Patient:  Outpatient (Ambulatory)    Chief Complaint:  Daksha Marie is a 55 y.o. female who presents today for follow-up of anxiety and PTSD.  Met with patient.      Last visit was: 1/28/25. Chart and  reviewed.   The patient location is: home  The chief complaint leading to consultation is: depression and anxiety    Visit type: audiovisual    Face to Face time with patient: 30 minutes  35 minutes of total time spent on the encounter, which includes face to face time and non-face to face time preparing to see the patient (eg, review of tests), Obtaining and/or reviewing separately obtained history, Documenting clinical information in the electronic or other health record, Independently interpreting results (not separately reported) and communicating results to the patient/family/caregiver, or Care coordination (not separately reported).     Each patient to whom he or she provides medical services by telemedicine is:  (1) informed of the relationship between the physician and patient and the respective role of any other health care provider with respect to management of the patient; and (2) notified that he or she may decline to receive medical services by telemedicine and may withdraw from such care at any time.    Interval History and Content of Current Session:  Current Psychiatric Medications/changes  Continue Effexor  mg po daily  Continue Antabuse 250 mg po daily  Continue Valium 5 mg po BID PRN   Contineu Remeron 15 mg before bed  Continue Depakote  mg daily  Continue Seroquel 50 mg at bedtime  Continue  Buspar 30 mg BID PRN anxiety  Continue Prazosin 1 mg at bedtime for nightmares    Virtual Visit:Pt presents bright affect and euthymic mood. Pt states, "I've been doing well" .Reports effective response to medications and denies side effects. Denies any unmanaged mood, anxiety, or insomnia symptoms.  " Denies SI/HI/AVH. Will continue current medications.     Remains sober on Antabuse.  Moderate use of Valium for panic attacks. Denies SI/HI/AVH    Psychotherapy:  Target symptoms: anxiety   Why chosen therapy is appropriate versus another modality: relevant to diagnosis  Outcome monitoring methods: self-report  Therapeutic intervention type: insight oriented psychotherapy  Topics discussed/themes: building skills sets for symptom management, symptom recognition  The patient's response to the intervention is accepting. The patient's progress toward treatment goals is good.   Duration of intervention: 13 minutes.    Review of Systems   PSYCHIATRIC: Pertinant items are noted in the narrative.  CONSTITUTIONAL: No weight gain or loss.   MUSCULOSKELETAL: No pain or stiffness of the joints.  NEUROLOGIC: No weakness, sensory changes, seizures, confusion, memory loss, tremor or other abnormal movements.  ENDOCRINE: No polydipsia or polyuria.  INTEGUMENTARY: No rashes or lacerations.  EYES: No exophthalmos, jaundice or blindness.  ENT: No dizziness, tinnitus or hearing loss.  RESPIRATORY: No shortness of breath.  CARDIOVASCULAR: No tachycardia or chest pain.  GASTROINTESTINAL: No nausea, vomiting, pain, constipation or diarrhea.  GENITOURINARY: No frequency, dysuria or sexual dysfunction.  HEMATOLOGIC/LYMPHATIC: No excessive bleeding, prolonged or excessive bleeding after dental extraction/injury.  ALLERGIC/IMMUNOLOGIC: No allergic response to materials, foods or animals at this time.    Past Medical, Family and Social History: The patient's past medical, family and social history have been reviewed and updated as appropriate within the electronic medical record - see encounter notes.    Compliance: yes    Side effects: None    Risk Parameters:  Patient reports no suicidal ideation  Patient reports no homicidal ideation  Patient reports no self-injurious behavior  Patient reports no violent behavior    Exam (detailed: at  least 9 elements; comprehensive: all 15 elements)   Constitutional  Vitals:  Most recent vital signs, dated greater than 90 days prior to this appointment, were reviewed.   There were no vitals filed for this visit.     General:  unremarkable, age appropriate     Musculoskeletal  Muscle Strength/Tone:  no tremor, no tic   Gait & Station:  non-ataxic     Psychiatric  Speech:  no latency; no press   Mood & Affect:  steady  congruent and appropriate   Thought Process:  normal and logical   Associations:  tangential   Thought Content:  normal, no suicidality, no homicidality, delusions, or paranoia   Insight:  intact   Judgement: behavior is adequate to circumstances   Orientation:  grossly intact   Memory: intact for content of interview   Language: grossly intact   Attention Span & Concentration:  able to focus   Fund of Knowledge:  intact and appropriate to age and level of education     Assessment and Diagnosis   Status/Progress: Based on the examination today, the patient's problem(s) is/are inadequately controlled.  New problems have not been presented today.   Co-morbidities and Lack of compliance are not complicating management of the primary condition.  There are no active rule-out diagnoses for this patient at this time.     General Impression:       ICD-10-CM ICD-9-CM   1. Panic disorder  F41.0 300.01   2. PTSD (post-traumatic stress disorder)  F43.10 309.81   3. Insomnia disorder, with non-sleep disorder mental comorbidity  G47.00 780.52   4. Depression, major, recurrent, in partial remission  F33.41 296.35   5. Alcohol use disorder in remission  F10.91 305.03   6. Mood disorder  F39 296.90       Intervention/Counseling/Treatment Plan   Medication Management: Continue current medications. The risks and benefits of medication were discussed with the patient.  AA/NA/CA/ACOA/Abstinence   Continue Effexor  mg po daily  Continue Antabuse 250 mg po daily  Continue Valium 5 mg po BID PRN   Contineu Remeron 15  mg before bed  Continue Depakote  mg daily  Continue Seroquel 50 mg at bedtime  Continue  Buspar 30 mg BID PRN anxiety  Continue Prazosin 1 mg at bedtime for nightmares  Continue with psychotherapy    Return to Clinic: 6 months     Risks, benefits, side effects and alternative treatments discussed with patient. Patient agrees with the current plan as documented.  Encouraged Patient to keep future appointments.  Take medications as prescribed and abstain from substance abuse.  Pt to present to ED for thoughts to harm herself or others

## 2025-07-27 DIAGNOSIS — K28.7 CHRONIC ANASTOMOTIC ULCER OF GASTROJEJUNAL REGION: ICD-10-CM

## 2025-07-28 RX ORDER — FAMOTIDINE 40 MG/1
TABLET, FILM COATED ORAL
Qty: 30 TABLET | Refills: 11 | Status: SHIPPED | OUTPATIENT
Start: 2025-07-28